# Patient Record
Sex: MALE | Race: WHITE | NOT HISPANIC OR LATINO | Employment: OTHER | URBAN - METROPOLITAN AREA
[De-identification: names, ages, dates, MRNs, and addresses within clinical notes are randomized per-mention and may not be internally consistent; named-entity substitution may affect disease eponyms.]

---

## 2017-01-30 ENCOUNTER — OFFICE VISIT - HEALTHEAST (OUTPATIENT)
Dept: FAMILY MEDICINE | Facility: CLINIC | Age: 67
End: 2017-01-30

## 2017-01-30 ENCOUNTER — RECORDS - HEALTHEAST (OUTPATIENT)
Dept: GENERAL RADIOLOGY | Facility: CLINIC | Age: 67
End: 2017-01-30

## 2017-01-30 DIAGNOSIS — I73.9 PERIPHERAL VASCULAR DISEASE, UNSPECIFIED (H): ICD-10-CM

## 2017-01-30 DIAGNOSIS — H53.9 VISION ABNORMALITIES: ICD-10-CM

## 2017-01-30 DIAGNOSIS — I73.9 CLAUDICATION OF LEFT LOWER EXTREMITY (H): ICD-10-CM

## 2017-01-30 DIAGNOSIS — Z00.00 HEALTHCARE MAINTENANCE: ICD-10-CM

## 2017-01-30 DIAGNOSIS — I10 HYPERTENSION, ESSENTIAL, BENIGN: ICD-10-CM

## 2017-01-30 ASSESSMENT — MIFFLIN-ST. JEOR: SCORE: 1709.27

## 2017-02-02 ENCOUNTER — COMMUNICATION - HEALTHEAST (OUTPATIENT)
Dept: FAMILY MEDICINE | Facility: CLINIC | Age: 67
End: 2017-02-02

## 2017-02-10 ENCOUNTER — HOSPITAL ENCOUNTER (OUTPATIENT)
Dept: ULTRASOUND IMAGING | Facility: CLINIC | Age: 67
Discharge: HOME | End: 2017-02-10
Attending: FAMILY MEDICINE

## 2017-02-10 ENCOUNTER — AMBULATORY - HEALTHEAST (OUTPATIENT)
Dept: FAMILY MEDICINE | Facility: CLINIC | Age: 67
End: 2017-02-10

## 2017-02-10 DIAGNOSIS — I73.9 CLAUDICATION OF LEFT LOWER EXTREMITY (H): ICD-10-CM

## 2017-02-10 DIAGNOSIS — I73.9 PAD (PERIPHERAL ARTERY DISEASE) (H): ICD-10-CM

## 2017-02-11 ENCOUNTER — OFFICE VISIT - HEALTHEAST (OUTPATIENT)
Dept: FAMILY MEDICINE | Facility: CLINIC | Age: 67
End: 2017-02-11

## 2017-02-11 DIAGNOSIS — M10.9 GOUT OF RIGHT HAND: ICD-10-CM

## 2017-02-13 ENCOUNTER — COMMUNICATION - HEALTHEAST (OUTPATIENT)
Dept: FAMILY MEDICINE | Facility: CLINIC | Age: 67
End: 2017-02-13

## 2017-02-27 ENCOUNTER — OFFICE VISIT - HEALTHEAST (OUTPATIENT)
Dept: VASCULAR SURGERY | Facility: CLINIC | Age: 67
End: 2017-02-27

## 2017-02-27 DIAGNOSIS — I73.9 PAD (PERIPHERAL ARTERY DISEASE) (H): ICD-10-CM

## 2017-02-27 DIAGNOSIS — I73.9 CLAUDICATION (H): ICD-10-CM

## 2017-02-27 DIAGNOSIS — Z01.812 PRE-PROCEDURE LAB EXAM: ICD-10-CM

## 2017-02-27 DIAGNOSIS — Z72.0 TOBACCO USE: ICD-10-CM

## 2017-02-27 ASSESSMENT — MIFFLIN-ST. JEOR: SCORE: 1704.73

## 2017-03-01 ENCOUNTER — COMMUNICATION - HEALTHEAST (OUTPATIENT)
Dept: VASCULAR SURGERY | Facility: CLINIC | Age: 67
End: 2017-03-01

## 2017-03-02 ENCOUNTER — COMMUNICATION - HEALTHEAST (OUTPATIENT)
Dept: VASCULAR SURGERY | Facility: CLINIC | Age: 67
End: 2017-03-02

## 2017-03-21 ENCOUNTER — AMBULATORY - HEALTHEAST (OUTPATIENT)
Dept: VASCULAR SURGERY | Facility: CLINIC | Age: 67
End: 2017-03-21

## 2017-03-23 ENCOUNTER — COMMUNICATION - HEALTHEAST (OUTPATIENT)
Dept: FAMILY MEDICINE | Facility: CLINIC | Age: 67
End: 2017-03-23

## 2017-03-23 ENCOUNTER — COMMUNICATION - HEALTHEAST (OUTPATIENT)
Dept: VASCULAR SURGERY | Facility: CLINIC | Age: 67
End: 2017-03-23

## 2017-03-24 ENCOUNTER — AMBULATORY - HEALTHEAST (OUTPATIENT)
Dept: VASCULAR SURGERY | Facility: CLINIC | Age: 67
End: 2017-03-24

## 2017-03-27 ENCOUNTER — OFFICE VISIT - HEALTHEAST (OUTPATIENT)
Dept: VASCULAR SURGERY | Facility: CLINIC | Age: 67
End: 2017-03-27

## 2017-03-27 DIAGNOSIS — I73.9 CLAUDICATION (H): ICD-10-CM

## 2017-03-27 DIAGNOSIS — E78.5 HYPERLIPIDEMIA: ICD-10-CM

## 2017-03-27 DIAGNOSIS — I73.9 PAD (PERIPHERAL ARTERY DISEASE) (H): ICD-10-CM

## 2017-03-27 DIAGNOSIS — Z72.0 TOBACCO USE: ICD-10-CM

## 2017-03-27 DIAGNOSIS — I10 HYPERTENSION: ICD-10-CM

## 2017-03-27 ASSESSMENT — MIFFLIN-ST. JEOR: SCORE: 1704.73

## 2017-04-06 ENCOUNTER — COMMUNICATION - HEALTHEAST (OUTPATIENT)
Dept: FAMILY MEDICINE | Facility: CLINIC | Age: 67
End: 2017-04-06

## 2017-04-06 ENCOUNTER — HOSPITAL ENCOUNTER (OUTPATIENT)
Dept: INTERVENTIONAL RADIOLOGY/VASCULAR | Facility: CLINIC | Age: 67
Discharge: HOME | End: 2017-04-06
Attending: SURGERY

## 2017-04-06 DIAGNOSIS — I73.9 PAD (PERIPHERAL ARTERY DISEASE) (H): ICD-10-CM

## 2017-04-06 DIAGNOSIS — E78.5 HYPERLIPIDEMIA: ICD-10-CM

## 2017-04-06 DIAGNOSIS — I10 ESSENTIAL HYPERTENSION: ICD-10-CM

## 2017-04-06 DIAGNOSIS — I10 HYPERTENSION: ICD-10-CM

## 2017-04-06 DIAGNOSIS — I73.9 CLAUDICATION (H): ICD-10-CM

## 2017-04-06 DIAGNOSIS — Z72.0 TOBACCO USE: ICD-10-CM

## 2017-04-06 LAB
CREAT SERPL-MCNC: 1.46 MG/DL (ref 0.7–1.3)
GFR SERPL CREATININE-BSD FRML MDRD: 48 ML/MIN/1.73M2

## 2017-04-06 ASSESSMENT — MIFFLIN-ST. JEOR: SCORE: 1684.04

## 2017-04-07 ENCOUNTER — COMMUNICATION - HEALTHEAST (OUTPATIENT)
Dept: VASCULAR SURGERY | Facility: CLINIC | Age: 67
End: 2017-04-07

## 2017-04-10 ENCOUNTER — OFFICE VISIT - HEALTHEAST (OUTPATIENT)
Dept: VASCULAR SURGERY | Facility: CLINIC | Age: 67
End: 2017-04-10

## 2017-04-10 DIAGNOSIS — E78.5 HYPERLIPIDEMIA: ICD-10-CM

## 2017-04-10 DIAGNOSIS — I73.9 CLAUDICATION (H): ICD-10-CM

## 2017-04-10 DIAGNOSIS — I73.9 PAD (PERIPHERAL ARTERY DISEASE) (H): ICD-10-CM

## 2017-04-10 DIAGNOSIS — Z72.0 TOBACCO USE: ICD-10-CM

## 2017-04-10 DIAGNOSIS — I10 HYPERTENSION: ICD-10-CM

## 2017-04-10 ASSESSMENT — MIFFLIN-ST. JEOR: SCORE: 1682.05

## 2017-04-11 ENCOUNTER — AMBULATORY - HEALTHEAST (OUTPATIENT)
Dept: VASCULAR SURGERY | Facility: CLINIC | Age: 67
End: 2017-04-11

## 2017-04-13 ENCOUNTER — AMBULATORY - HEALTHEAST (OUTPATIENT)
Dept: VASCULAR SURGERY | Facility: CLINIC | Age: 67
End: 2017-04-13

## 2017-04-18 ENCOUNTER — OFFICE VISIT - HEALTHEAST (OUTPATIENT)
Dept: CARDIOLOGY | Facility: CLINIC | Age: 67
End: 2017-04-18

## 2017-04-18 DIAGNOSIS — I10 HTN (HYPERTENSION): ICD-10-CM

## 2017-04-18 DIAGNOSIS — I42.9 CARDIOMYOPATHY (H): ICD-10-CM

## 2017-04-18 DIAGNOSIS — I25.10 CORONARY ARTERY DISEASE INVOLVING NATIVE CORONARY ARTERY OF NATIVE HEART WITHOUT ANGINA PECTORIS: ICD-10-CM

## 2017-04-18 DIAGNOSIS — E78.5 DYSLIPIDEMIA: ICD-10-CM

## 2017-04-18 LAB
CHOLEST SERPL-MCNC: 209 MG/DL
FASTING STATUS PATIENT QL REPORTED: YES
HDLC SERPL-MCNC: 39 MG/DL
LDLC SERPL CALC-MCNC: 150 MG/DL
TRIGL SERPL-MCNC: 99 MG/DL

## 2017-04-18 ASSESSMENT — MIFFLIN-ST. JEOR: SCORE: 1704.73

## 2017-04-19 ENCOUNTER — AMBULATORY - HEALTHEAST (OUTPATIENT)
Dept: CARDIOLOGY | Facility: CLINIC | Age: 67
End: 2017-04-19

## 2017-04-19 DIAGNOSIS — E78.5 HYPERLIPIDEMIA: ICD-10-CM

## 2017-04-19 DIAGNOSIS — I25.10 CAD (CORONARY ARTERY DISEASE): ICD-10-CM

## 2017-04-26 ENCOUNTER — OFFICE VISIT - HEALTHEAST (OUTPATIENT)
Dept: FAMILY MEDICINE | Facility: CLINIC | Age: 67
End: 2017-04-26

## 2017-04-26 DIAGNOSIS — I73.9 PAD (PERIPHERAL ARTERY DISEASE) (H): ICD-10-CM

## 2017-04-26 DIAGNOSIS — M10.9 GOUTY ARTHROPATHY: ICD-10-CM

## 2017-04-26 DIAGNOSIS — Z01.818 PREOPERATIVE GENERAL PHYSICAL EXAMINATION: ICD-10-CM

## 2017-04-26 DIAGNOSIS — I10 ESSENTIAL HYPERTENSION WITH GOAL BLOOD PRESSURE LESS THAN 130/85: ICD-10-CM

## 2017-04-26 LAB — HBA1C MFR BLD: 5.9 % (ref 3.5–6.1)

## 2017-04-26 ASSESSMENT — MIFFLIN-ST. JEOR: SCORE: 1739.21

## 2017-04-28 ENCOUNTER — COMMUNICATION - HEALTHEAST (OUTPATIENT)
Dept: FAMILY MEDICINE | Facility: CLINIC | Age: 67
End: 2017-04-28

## 2017-04-28 ENCOUNTER — AMBULATORY - HEALTHEAST (OUTPATIENT)
Dept: FAMILY MEDICINE | Facility: CLINIC | Age: 67
End: 2017-04-28

## 2017-04-28 DIAGNOSIS — D72.829 LEUKOCYTOSIS, UNSPECIFIED TYPE: ICD-10-CM

## 2017-05-01 ENCOUNTER — HOSPITAL ENCOUNTER (OUTPATIENT)
Dept: NUCLEAR MEDICINE | Facility: CLINIC | Age: 67
Discharge: HOME | End: 2017-05-01
Attending: INTERNAL MEDICINE

## 2017-05-01 ENCOUNTER — HOSPITAL ENCOUNTER (OUTPATIENT)
Dept: CARDIOLOGY | Facility: CLINIC | Age: 67
Discharge: HOME | End: 2017-05-01
Attending: INTERNAL MEDICINE

## 2017-05-01 DIAGNOSIS — I42.9 CARDIOMYOPATHY (H): ICD-10-CM

## 2017-05-01 DIAGNOSIS — I25.10 CORONARY ARTERY DISEASE INVOLVING NATIVE CORONARY ARTERY OF NATIVE HEART WITHOUT ANGINA PECTORIS: ICD-10-CM

## 2017-05-01 LAB
AORTIC ROOT: 3.7 CM
AORTIC VALVE MEAN VELOCITY: 112 CM/S
AV DIMENSIONLESS INDEX VTI: 0.6
AV MEAN GRADIENT: 6 MMHG
AV PEAK GRADIENT: 9.6 MMHG
AV VALVE AREA: 1.8 CM2
AV VELOCITY RATIO: 0.5
BSA FOR ECHO PROCEDURE: 2.2 M2
CV BLOOD PRESSURE: NORMAL MMHG
CV ECHO HEIGHT: 69 IN
CV ECHO WEIGHT: 219 LBS
CV STRESS CURRENT BP HE: NORMAL
CV STRESS CURRENT HR HE: 65
CV STRESS CURRENT HR HE: 66
CV STRESS CURRENT HR HE: 68
CV STRESS CURRENT HR HE: 70
CV STRESS CURRENT HR HE: 72
CV STRESS CURRENT HR HE: 73
CV STRESS CURRENT HR HE: 73
CV STRESS CURRENT HR HE: 75
CV STRESS CURRENT HR HE: 77
CV STRESS CURRENT HR HE: 77
CV STRESS CURRENT HR HE: 78
CV STRESS DEVIATION TIME HE: NORMAL
CV STRESS ECHO PERCENT HR HE: NORMAL
CV STRESS EXERCISE STAGE HE: NORMAL
CV STRESS FINAL RESTING BP HE: NORMAL
CV STRESS FINAL RESTING HR HE: 68
CV STRESS MAX HR HE: 78
CV STRESS MAX TREADMILL GRADE HE: 0
CV STRESS MAX TREADMILL SPEED HE: 0
CV STRESS PEAK DIA BP HE: NORMAL
CV STRESS PEAK SYS BP HE: NORMAL
CV STRESS PHASE HE: NORMAL
CV STRESS PROTOCOL HE: NORMAL
CV STRESS RESTING PT POSITION HE: NORMAL
CV STRESS ST DEVIATION AMOUNT HE: NORMAL
CV STRESS ST DEVIATION ELEVATION HE: NORMAL
CV STRESS ST EVELATION AMOUNT HE: NORMAL
CV STRESS TEST TYPE HE: NORMAL
CV STRESS TOTAL STAGE TIME MIN 1 HE: NORMAL
DOP CALC AO PEAK VEL: 155 CM/S
DOP CALC AO VTI: 35.2 CM
DOP CALC LVOT AREA: 3.14 CM2
DOP CALC LVOT DIAMETER: 2 CM
DOP CALC LVOT PEAK VEL: 79.4 CM/S
DOP CALC LVOT STROKE VOLUME: 63.4 CM3
DOP CALCLVOT PEAK VEL VTI: 20.2 CM
EJECTION FRACTION: 52 % (ref 55–75)
FRACTIONAL SHORTENING: 30.4 % (ref 28–44)
INTERVENTRICULAR SEPTUM IN END DIASTOLE: 1.47 CM (ref 0.6–1)
IVS/PW RATIO: 1.3
LA AREA 1: 18.8 CM2
LA AREA 2: 22.9 CM2
LEFT ATRIUM LENGTH: 5.44 CM
LEFT ATRIUM SIZE: 4.2 CM
LEFT ATRIUM TO AORTIC ROOT RATIO: 1.08 NO UNITS
LEFT ATRIUM VOLUME INDEX: 30.6 ML/M2
LEFT ATRIUM VOLUME: 67.3 CM3
LEFT VENTRICLE CARDIAC INDEX: 1.8 L/MIN/M2
LEFT VENTRICLE CARDIAC OUTPUT: 4.1 L/MIN
LEFT VENTRICLE DIASTOLIC VOLUME INDEX: 56.4 CM3/M2 (ref 34–74)
LEFT VENTRICLE DIASTOLIC VOLUME: 124 CM3 (ref 62–150)
LEFT VENTRICLE HEART RATE: 64 BPM
LEFT VENTRICLE MASS INDEX: 125.7 G/M2
LEFT VENTRICLE SYSTOLIC VOLUME INDEX: 26.8 CM3/M2 (ref 11–31)
LEFT VENTRICLE SYSTOLIC VOLUME: 59 CM3 (ref 21–61)
LEFT VENTRICULAR INTERNAL DIMENSION IN DIASTOLE: 5.16 CM (ref 4.2–5.8)
LEFT VENTRICULAR INTERNAL DIMENSION IN SYSTOLE: 3.59 CM (ref 2.5–4)
LEFT VENTRICULAR MASS: 276.6 G
LEFT VENTRICULAR OUTFLOW TRACT MEAN GRADIENT: 1 MMHG
LEFT VENTRICULAR OUTFLOW TRACT MEAN VELOCITY: 51.5 CM/S
LEFT VENTRICULAR OUTFLOW TRACT PEAK GRADIENT: 3 MMHG
LEFT VENTRICULAR POSTERIOR WALL IN END DIASTOLE: 1.14 CM (ref 0.6–1)
LV STROKE VOLUME INDEX: 28.8 ML/M2
MITRAL VALVE E/A RATIO: 1.6
MV AVERAGE E/E' RATIO: 13.6 CM/S
MV DECELERATION TIME: 194 MS
MV E'TISSUE VEL-LAT: 7.22 CM/S
MV E'TISSUE VEL-MED: 12.1 CM/S
MV LATERAL E/E' RATIO: 18.1
MV MEDIAL E/E' RATIO: 10.8
MV PEAK A VELOCITY: 81.7 CM/S
MV PEAK E VELOCITY: 131 CM/S
NUC REST DIASTOLIC VOLUME INDEX: 3504 LBS
NUC REST SYSTOLIC VOLUME INDEX: 69 IN
NUC STRESS EJECTION FRACTION: 40 %
STRESS ECHO BASELINE BP: NORMAL
STRESS ECHO BASELINE HR: 64
STRESS ECHO CALCULATED PERCENT HR: 51 %
STRESS ECHO LAST STRESS BP: NORMAL
STRESS ECHO LAST STRESS HR: 78
TRICUSPID VALVE ANULAR PLANE SYSTOLIC EXCURSION: 2.4 CM

## 2017-05-01 ASSESSMENT — MIFFLIN-ST. JEOR: SCORE: 1738.76

## 2017-05-02 ENCOUNTER — COMMUNICATION - HEALTHEAST (OUTPATIENT)
Dept: CARDIOLOGY | Facility: CLINIC | Age: 67
End: 2017-05-02

## 2017-05-17 ENCOUNTER — SURGERY - HEALTHEAST (OUTPATIENT)
Dept: SURGERY | Facility: CLINIC | Age: 67
End: 2017-05-17

## 2017-05-17 ENCOUNTER — ANESTHESIA - HEALTHEAST (OUTPATIENT)
Dept: SURGERY | Facility: CLINIC | Age: 67
End: 2017-05-17

## 2017-05-17 ASSESSMENT — MIFFLIN-ST. JEOR: SCORE: 1678.08

## 2017-05-18 ASSESSMENT — MIFFLIN-ST. JEOR: SCORE: 1744.65

## 2017-05-19 ASSESSMENT — MIFFLIN-ST. JEOR: SCORE: 1746.01

## 2017-05-20 ASSESSMENT — MIFFLIN-ST. JEOR: SCORE: 1647.75

## 2017-05-21 ASSESSMENT — MIFFLIN-ST. JEOR: SCORE: 1653.02

## 2017-05-22 ASSESSMENT — MIFFLIN-ST. JEOR: SCORE: 1799.99

## 2017-05-23 ASSESSMENT — MIFFLIN-ST. JEOR: SCORE: 1817.68

## 2017-05-24 ASSESSMENT — MIFFLIN-ST. JEOR: SCORE: 1800.9

## 2017-05-25 ASSESSMENT — MIFFLIN-ST. JEOR: SCORE: 1806.79

## 2017-05-26 ASSESSMENT — MIFFLIN-ST. JEOR: SCORE: 1750.55

## 2017-05-27 ENCOUNTER — COMMUNICATION - HEALTHEAST (OUTPATIENT)
Dept: SCHEDULING | Facility: CLINIC | Age: 67
End: 2017-05-27

## 2017-05-31 ENCOUNTER — OFFICE VISIT - HEALTHEAST (OUTPATIENT)
Dept: GERIATRICS | Facility: CLINIC | Age: 67
End: 2017-05-31

## 2017-05-31 DIAGNOSIS — M79.606 ISCHEMIC REST PAIN OF LOWER EXTREMITY: ICD-10-CM

## 2017-05-31 DIAGNOSIS — I10 HYPERTENSION: ICD-10-CM

## 2017-05-31 DIAGNOSIS — I25.5 ISCHEMIC CARDIOMYOPATHY: ICD-10-CM

## 2017-05-31 DIAGNOSIS — N18.3 CKD (CHRONIC KIDNEY DISEASE), STAGE 3 (MODERATE): ICD-10-CM

## 2017-05-31 DIAGNOSIS — I99.8 ISCHEMIC REST PAIN OF LOWER EXTREMITY: ICD-10-CM

## 2017-06-05 ENCOUNTER — OFFICE VISIT - HEALTHEAST (OUTPATIENT)
Dept: GERIATRICS | Facility: CLINIC | Age: 67
End: 2017-06-05

## 2017-06-05 DIAGNOSIS — I99.8 ISCHEMIC REST PAIN OF LOWER EXTREMITY: ICD-10-CM

## 2017-06-05 DIAGNOSIS — I10 HYPERTENSION: ICD-10-CM

## 2017-06-05 DIAGNOSIS — M79.606 ISCHEMIC REST PAIN OF LOWER EXTREMITY: ICD-10-CM

## 2017-06-05 DIAGNOSIS — N18.3 CKD (CHRONIC KIDNEY DISEASE), STAGE 3 (MODERATE): ICD-10-CM

## 2017-06-05 DIAGNOSIS — G89.18 POST-OP PAIN: ICD-10-CM

## 2017-06-05 DIAGNOSIS — M1A.00X0 CHRONIC GOUTY ARTHRITIS: ICD-10-CM

## 2017-06-05 DIAGNOSIS — I25.5 ISCHEMIC CARDIOMYOPATHY: ICD-10-CM

## 2017-06-06 ENCOUNTER — HOME CARE/HOSPICE - HEALTHEAST (OUTPATIENT)
Dept: HOME HEALTH SERVICES | Facility: HOME HEALTH | Age: 67
End: 2017-06-06

## 2017-06-07 ENCOUNTER — COMMUNICATION - HEALTHEAST (OUTPATIENT)
Dept: GERIATRICS | Facility: CLINIC | Age: 67
End: 2017-06-07

## 2017-06-07 ENCOUNTER — HOME CARE/HOSPICE - HEALTHEAST (OUTPATIENT)
Dept: HOME HEALTH SERVICES | Facility: HOME HEALTH | Age: 67
End: 2017-06-07

## 2017-06-07 ENCOUNTER — COMMUNICATION - HEALTHEAST (OUTPATIENT)
Dept: FAMILY MEDICINE | Facility: CLINIC | Age: 67
End: 2017-06-07

## 2017-06-07 ENCOUNTER — AMBULATORY - HEALTHEAST (OUTPATIENT)
Dept: GERIATRICS | Facility: CLINIC | Age: 67
End: 2017-06-07

## 2017-06-07 DIAGNOSIS — L03.90 CELLULITIS: ICD-10-CM

## 2017-06-09 ENCOUNTER — HOME CARE/HOSPICE - HEALTHEAST (OUTPATIENT)
Dept: HOME HEALTH SERVICES | Facility: HOME HEALTH | Age: 67
End: 2017-06-09

## 2017-06-09 ENCOUNTER — OFFICE VISIT - HEALTHEAST (OUTPATIENT)
Dept: VASCULAR SURGERY | Facility: CLINIC | Age: 67
End: 2017-06-09

## 2017-06-09 DIAGNOSIS — I73.9 PAD (PERIPHERAL ARTERY DISEASE) (H): ICD-10-CM

## 2017-06-09 ASSESSMENT — MIFFLIN-ST. JEOR: SCORE: 1727.41

## 2017-06-10 ENCOUNTER — HOME CARE/HOSPICE - HEALTHEAST (OUTPATIENT)
Dept: HOME HEALTH SERVICES | Facility: HOME HEALTH | Age: 67
End: 2017-06-10

## 2017-06-12 ENCOUNTER — OFFICE VISIT - HEALTHEAST (OUTPATIENT)
Dept: FAMILY MEDICINE | Facility: CLINIC | Age: 67
End: 2017-06-12

## 2017-06-12 DIAGNOSIS — M10.9 ACUTE GOUT OF RIGHT KNEE, UNSPECIFIED CAUSE: ICD-10-CM

## 2017-06-12 DIAGNOSIS — Z95.828 S/P AORTOBIFEMORAL BYPASS SURGERY: ICD-10-CM

## 2017-06-13 ENCOUNTER — HOME CARE/HOSPICE - HEALTHEAST (OUTPATIENT)
Dept: HOME HEALTH SERVICES | Facility: HOME HEALTH | Age: 67
End: 2017-06-13

## 2017-06-13 ENCOUNTER — COMMUNICATION - HEALTHEAST (OUTPATIENT)
Dept: FAMILY MEDICINE | Facility: CLINIC | Age: 67
End: 2017-06-13

## 2017-06-13 DIAGNOSIS — M10.9 ACUTE GOUT OF RIGHT KNEE, UNSPECIFIED CAUSE: ICD-10-CM

## 2017-06-16 ENCOUNTER — HOME CARE/HOSPICE - HEALTHEAST (OUTPATIENT)
Dept: HOME HEALTH SERVICES | Facility: HOME HEALTH | Age: 67
End: 2017-06-16

## 2017-06-16 ENCOUNTER — AMBULATORY - HEALTHEAST (OUTPATIENT)
Dept: FAMILY MEDICINE | Facility: CLINIC | Age: 67
End: 2017-06-16

## 2017-06-16 DIAGNOSIS — T81.49XA WOUND INFECTION AFTER SURGERY: ICD-10-CM

## 2017-06-19 ENCOUNTER — HOME CARE/HOSPICE - HEALTHEAST (OUTPATIENT)
Dept: HOME HEALTH SERVICES | Facility: HOME HEALTH | Age: 67
End: 2017-06-19

## 2017-06-20 ENCOUNTER — HOME CARE/HOSPICE - HEALTHEAST (OUTPATIENT)
Dept: HOME HEALTH SERVICES | Facility: HOME HEALTH | Age: 67
End: 2017-06-20

## 2017-07-06 ENCOUNTER — COMMUNICATION - HEALTHEAST (OUTPATIENT)
Dept: VASCULAR SURGERY | Facility: CLINIC | Age: 67
End: 2017-07-06

## 2017-07-12 ENCOUNTER — OFFICE VISIT - HEALTHEAST (OUTPATIENT)
Dept: FAMILY MEDICINE | Facility: CLINIC | Age: 67
End: 2017-07-12

## 2017-07-12 ENCOUNTER — RECORDS - HEALTHEAST (OUTPATIENT)
Dept: ADMINISTRATIVE | Facility: OTHER | Age: 67
End: 2017-07-12

## 2017-07-12 DIAGNOSIS — R10.9 ABDOMINAL PAIN: ICD-10-CM

## 2017-07-12 DIAGNOSIS — G89.29 CHRONIC PAIN OF MULTIPLE JOINTS: ICD-10-CM

## 2017-07-12 DIAGNOSIS — R11.0 NAUSEA: ICD-10-CM

## 2017-07-12 DIAGNOSIS — M25.50 CHRONIC PAIN OF MULTIPLE JOINTS: ICD-10-CM

## 2017-07-13 ENCOUNTER — COMMUNICATION - HEALTHEAST (OUTPATIENT)
Dept: FAMILY MEDICINE | Facility: CLINIC | Age: 67
End: 2017-07-13

## 2017-07-14 ENCOUNTER — OFFICE VISIT - HEALTHEAST (OUTPATIENT)
Dept: FAMILY MEDICINE | Facility: CLINIC | Age: 67
End: 2017-07-14

## 2017-07-14 DIAGNOSIS — K59.03 DRUG-INDUCED CONSTIPATION: ICD-10-CM

## 2017-07-14 DIAGNOSIS — M10.9 ACUTE GOUT OF LEFT WRIST, UNSPECIFIED CAUSE: ICD-10-CM

## 2017-07-15 ENCOUNTER — COMMUNICATION - HEALTHEAST (OUTPATIENT)
Dept: FAMILY MEDICINE | Facility: CLINIC | Age: 67
End: 2017-07-15

## 2017-07-15 ENCOUNTER — AMBULATORY - HEALTHEAST (OUTPATIENT)
Dept: FAMILY MEDICINE | Facility: CLINIC | Age: 67
End: 2017-07-15

## 2017-07-21 ENCOUNTER — COMMUNICATION - HEALTHEAST (OUTPATIENT)
Dept: FAMILY MEDICINE | Facility: CLINIC | Age: 67
End: 2017-07-21

## 2017-07-21 DIAGNOSIS — I10 HYPERTENSION, ESSENTIAL, BENIGN: ICD-10-CM

## 2017-07-28 ENCOUNTER — OFFICE VISIT - HEALTHEAST (OUTPATIENT)
Dept: VASCULAR SURGERY | Facility: CLINIC | Age: 67
End: 2017-07-28

## 2017-07-28 DIAGNOSIS — I73.9 PAD (PERIPHERAL ARTERY DISEASE) (H): ICD-10-CM

## 2017-07-28 DIAGNOSIS — Z95.828 S/P AORTOBIFEMORAL BYPASS SURGERY: ICD-10-CM

## 2017-07-28 DIAGNOSIS — M10.9 GOUT: ICD-10-CM

## 2017-08-11 ENCOUNTER — OFFICE VISIT - HEALTHEAST (OUTPATIENT)
Dept: RHEUMATOLOGY | Facility: CLINIC | Age: 67
End: 2017-08-11

## 2017-08-11 ENCOUNTER — RECORDS - HEALTHEAST (OUTPATIENT)
Dept: GENERAL RADIOLOGY | Age: 67
End: 2017-08-11

## 2017-08-11 DIAGNOSIS — M10.9 ACUTE GOUT OF RIGHT FOOT: ICD-10-CM

## 2017-08-11 DIAGNOSIS — M10.9 GOUT, UNSPECIFIED: ICD-10-CM

## 2017-08-11 DIAGNOSIS — M10.9 ACUTE GOUT OF RIGHT KNEE, UNSPECIFIED CAUSE: ICD-10-CM

## 2017-08-11 DIAGNOSIS — M13.0 POLYARTHRITIS: ICD-10-CM

## 2017-08-11 DIAGNOSIS — M1A.09X1 IDIOPATHIC CHRONIC GOUT, MULTIPLE SITES, WITH TOPHUS (TOPHI): ICD-10-CM

## 2017-08-11 DIAGNOSIS — M13.0 POLYARTHRITIS, UNSPECIFIED: ICD-10-CM

## 2017-08-11 ASSESSMENT — MIFFLIN-ST. JEOR: SCORE: 1652.12

## 2017-08-14 LAB
ANA SER QL: 0.4 U
HCV AB SERPL QL IA: POSITIVE

## 2017-08-16 ENCOUNTER — COMMUNICATION - HEALTHEAST (OUTPATIENT)
Dept: RHEUMATOLOGY | Facility: CLINIC | Age: 67
End: 2017-08-16

## 2017-08-16 DIAGNOSIS — M10.9 ACUTE GOUT OF RIGHT KNEE, UNSPECIFIED CAUSE: ICD-10-CM

## 2017-08-16 DIAGNOSIS — M1A.09X1 IDIOPATHIC CHRONIC GOUT, MULTIPLE SITES, WITH TOPHUS (TOPHI): ICD-10-CM

## 2017-08-30 ENCOUNTER — COMMUNICATION - HEALTHEAST (OUTPATIENT)
Dept: VASCULAR SURGERY | Facility: CLINIC | Age: 67
End: 2017-08-30

## 2017-09-08 ENCOUNTER — OFFICE VISIT - HEALTHEAST (OUTPATIENT)
Dept: FAMILY MEDICINE | Facility: CLINIC | Age: 67
End: 2017-09-08

## 2017-09-08 DIAGNOSIS — F32.89 DEPRESSIVE DISORDER, NOT ELSEWHERE CLASSIFIED: ICD-10-CM

## 2017-09-08 DIAGNOSIS — E78.5 HYPERLIPIDEMIA, UNSPECIFIED HYPERLIPIDEMIA TYPE: ICD-10-CM

## 2017-09-08 DIAGNOSIS — I10 HYPERTENSION, ESSENTIAL, BENIGN: ICD-10-CM

## 2017-09-08 DIAGNOSIS — I25.10 CAD (CORONARY ARTERY DISEASE): ICD-10-CM

## 2017-09-08 DIAGNOSIS — M10.9 ACUTE GOUT OF RIGHT FOOT: ICD-10-CM

## 2017-09-08 DIAGNOSIS — Z23 NEED FOR IMMUNIZATION AGAINST INFLUENZA: ICD-10-CM

## 2017-09-08 DIAGNOSIS — G47.00 INSOMNIA, UNSPECIFIED TYPE: ICD-10-CM

## 2017-10-07 ENCOUNTER — OFFICE VISIT - HEALTHEAST (OUTPATIENT)
Dept: FAMILY MEDICINE | Facility: CLINIC | Age: 67
End: 2017-10-07

## 2017-10-07 DIAGNOSIS — H10.9 BACTERIAL CONJUNCTIVITIS: ICD-10-CM

## 2017-11-13 ENCOUNTER — COMMUNICATION - HEALTHEAST (OUTPATIENT)
Dept: FAMILY MEDICINE | Facility: CLINIC | Age: 67
End: 2017-11-13

## 2017-11-13 DIAGNOSIS — I10 ESSENTIAL HYPERTENSION WITH GOAL BLOOD PRESSURE LESS THAN 130/85: ICD-10-CM

## 2017-11-14 ENCOUNTER — COMMUNICATION - HEALTHEAST (OUTPATIENT)
Dept: FAMILY MEDICINE | Facility: CLINIC | Age: 67
End: 2017-11-14

## 2017-11-14 DIAGNOSIS — I10 ESSENTIAL HYPERTENSION WITH GOAL BLOOD PRESSURE LESS THAN 130/85: ICD-10-CM

## 2017-12-12 ENCOUNTER — COMMUNICATION - HEALTHEAST (OUTPATIENT)
Dept: RHEUMATOLOGY | Facility: CLINIC | Age: 67
End: 2017-12-12

## 2017-12-12 DIAGNOSIS — M10.9 ACUTE GOUT OF RIGHT KNEE, UNSPECIFIED CAUSE: ICD-10-CM

## 2017-12-12 DIAGNOSIS — M1A.09X1 IDIOPATHIC CHRONIC GOUT, MULTIPLE SITES, WITH TOPHUS (TOPHI): ICD-10-CM

## 2017-12-12 DIAGNOSIS — M10.9 ACUTE GOUT OF RIGHT FOOT: ICD-10-CM

## 2017-12-12 DIAGNOSIS — M10.9 GOUTY ARTHROPATHY: ICD-10-CM

## 2018-01-15 ENCOUNTER — OFFICE VISIT - HEALTHEAST (OUTPATIENT)
Dept: FAMILY MEDICINE | Facility: CLINIC | Age: 68
End: 2018-01-15

## 2018-01-15 DIAGNOSIS — E78.5 HYPERLIPIDEMIA, UNSPECIFIED HYPERLIPIDEMIA TYPE: ICD-10-CM

## 2018-01-15 DIAGNOSIS — M10.9 GOUTY ARTHROPATHY: ICD-10-CM

## 2018-01-15 DIAGNOSIS — M1A.09X1 IDIOPATHIC CHRONIC GOUT, MULTIPLE SITES, WITH TOPHUS (TOPHI): ICD-10-CM

## 2018-01-15 DIAGNOSIS — M10.9 ACUTE GOUT OF RIGHT KNEE, UNSPECIFIED CAUSE: ICD-10-CM

## 2018-01-15 DIAGNOSIS — Z23 NEED FOR IMMUNIZATION AGAINST INFLUENZA: ICD-10-CM

## 2018-01-15 DIAGNOSIS — I10 HYPERTENSION, ESSENTIAL, BENIGN: ICD-10-CM

## 2018-01-15 DIAGNOSIS — F32.A DEPRESSION: ICD-10-CM

## 2018-01-16 ENCOUNTER — AMBULATORY - HEALTHEAST (OUTPATIENT)
Dept: LAB | Facility: CLINIC | Age: 68
End: 2018-01-16

## 2018-01-16 ENCOUNTER — COMMUNICATION - HEALTHEAST (OUTPATIENT)
Dept: FAMILY MEDICINE | Facility: CLINIC | Age: 68
End: 2018-01-16

## 2018-01-16 DIAGNOSIS — M1A.09X1 IDIOPATHIC CHRONIC GOUT, MULTIPLE SITES, WITH TOPHUS (TOPHI): ICD-10-CM

## 2018-01-16 DIAGNOSIS — E78.5 HYPERLIPIDEMIA, UNSPECIFIED HYPERLIPIDEMIA TYPE: ICD-10-CM

## 2018-01-16 LAB
ALBUMIN SERPL-MCNC: 4 G/DL (ref 3.5–5)
ALP SERPL-CCNC: 132 U/L (ref 45–120)
ALT SERPL W P-5'-P-CCNC: 24 U/L (ref 0–45)
ANION GAP SERPL CALCULATED.3IONS-SCNC: 11 MMOL/L (ref 5–18)
AST SERPL W P-5'-P-CCNC: 27 U/L (ref 0–40)
BILIRUB SERPL-MCNC: 0.9 MG/DL (ref 0–1)
BUN SERPL-MCNC: 16 MG/DL (ref 8–22)
CALCIUM SERPL-MCNC: 9.8 MG/DL (ref 8.5–10.5)
CHLORIDE BLD-SCNC: 105 MMOL/L (ref 98–107)
CHOLEST SERPL-MCNC: 109 MG/DL
CO2 SERPL-SCNC: 28 MMOL/L (ref 22–31)
CREAT SERPL-MCNC: 1.31 MG/DL (ref 0.7–1.3)
FASTING STATUS PATIENT QL REPORTED: YES
GFR SERPL CREATININE-BSD FRML MDRD: 55 ML/MIN/1.73M2
GLUCOSE BLD-MCNC: 97 MG/DL (ref 70–125)
HDLC SERPL-MCNC: 45 MG/DL
LDLC SERPL CALC-MCNC: 54 MG/DL
POTASSIUM BLD-SCNC: 4 MMOL/L (ref 3.5–5)
PROT SERPL-MCNC: 6.9 G/DL (ref 6–8)
SODIUM SERPL-SCNC: 144 MMOL/L (ref 136–145)
TRIGL SERPL-MCNC: 51 MG/DL

## 2018-01-17 ENCOUNTER — COMMUNICATION - HEALTHEAST (OUTPATIENT)
Dept: FAMILY MEDICINE | Facility: CLINIC | Age: 68
End: 2018-01-17

## 2018-01-22 ENCOUNTER — COMMUNICATION - HEALTHEAST (OUTPATIENT)
Dept: RHEUMATOLOGY | Facility: CLINIC | Age: 68
End: 2018-01-22

## 2018-01-22 DIAGNOSIS — M10.9 ACUTE GOUT OF RIGHT KNEE, UNSPECIFIED CAUSE: ICD-10-CM

## 2018-01-25 ENCOUNTER — COMMUNICATION - HEALTHEAST (OUTPATIENT)
Dept: FAMILY MEDICINE | Facility: CLINIC | Age: 68
End: 2018-01-25

## 2018-01-26 ENCOUNTER — RECORDS - HEALTHEAST (OUTPATIENT)
Dept: ADMINISTRATIVE | Facility: OTHER | Age: 68
End: 2018-01-26

## 2018-02-06 ENCOUNTER — COMMUNICATION - HEALTHEAST (OUTPATIENT)
Dept: FAMILY MEDICINE | Facility: CLINIC | Age: 68
End: 2018-02-06

## 2018-02-06 DIAGNOSIS — F32.A DEPRESSION: ICD-10-CM

## 2018-02-14 ENCOUNTER — COMMUNICATION - HEALTHEAST (OUTPATIENT)
Dept: FAMILY MEDICINE | Facility: CLINIC | Age: 68
End: 2018-02-14

## 2018-03-24 ENCOUNTER — COMMUNICATION - HEALTHEAST (OUTPATIENT)
Dept: SCHEDULING | Facility: CLINIC | Age: 68
End: 2018-03-24

## 2018-03-27 ENCOUNTER — OFFICE VISIT - HEALTHEAST (OUTPATIENT)
Dept: FAMILY MEDICINE | Facility: CLINIC | Age: 68
End: 2018-03-27

## 2018-03-27 DIAGNOSIS — Z12.5 SCREENING FOR MALIGNANT NEOPLASM OF PROSTATE: ICD-10-CM

## 2018-03-27 DIAGNOSIS — Z12.11 SCREEN FOR COLON CANCER: ICD-10-CM

## 2018-03-27 DIAGNOSIS — H53.121 VISUAL LOSS, TRANSIENT, RIGHT: ICD-10-CM

## 2018-03-27 DIAGNOSIS — Z13.220 ENCOUNTER FOR SCREENING FOR LIPOID DISORDERS: ICD-10-CM

## 2018-03-27 DIAGNOSIS — Z00.01 ENCOUNTER FOR GENERAL ADULT MEDICAL EXAMINATION WITH ABNORMAL FINDINGS: ICD-10-CM

## 2018-03-27 DIAGNOSIS — M10.9 ACUTE GOUT OF RIGHT KNEE, UNSPECIFIED CAUSE: ICD-10-CM

## 2018-03-27 LAB
ALBUMIN SERPL-MCNC: 3.8 G/DL (ref 3.5–5)
ALP SERPL-CCNC: 115 U/L (ref 45–120)
ALT SERPL W P-5'-P-CCNC: 31 U/L (ref 0–45)
ANION GAP SERPL CALCULATED.3IONS-SCNC: 11 MMOL/L (ref 5–18)
AST SERPL W P-5'-P-CCNC: 27 U/L (ref 0–40)
BILIRUB SERPL-MCNC: 0.6 MG/DL (ref 0–1)
BUN SERPL-MCNC: 14 MG/DL (ref 8–22)
CALCIUM SERPL-MCNC: 9.7 MG/DL (ref 8.5–10.5)
CHLORIDE BLD-SCNC: 104 MMOL/L (ref 98–107)
CHOLEST SERPL-MCNC: 113 MG/DL
CO2 SERPL-SCNC: 28 MMOL/L (ref 22–31)
CREAT SERPL-MCNC: 1.2 MG/DL (ref 0.7–1.3)
FASTING STATUS PATIENT QL REPORTED: NO
GFR SERPL CREATININE-BSD FRML MDRD: 60 ML/MIN/1.73M2
GLUCOSE BLD-MCNC: 72 MG/DL (ref 70–125)
HDLC SERPL-MCNC: 46 MG/DL
LDLC SERPL CALC-MCNC: 52 MG/DL
POTASSIUM BLD-SCNC: 4 MMOL/L (ref 3.5–5)
PROT SERPL-MCNC: 6.8 G/DL (ref 6–8)
PSA SERPL-MCNC: 0.4 NG/ML (ref 0–4.5)
SODIUM SERPL-SCNC: 143 MMOL/L (ref 136–145)
TRIGL SERPL-MCNC: 73 MG/DL

## 2018-03-28 ENCOUNTER — COMMUNICATION - HEALTHEAST (OUTPATIENT)
Dept: FAMILY MEDICINE | Facility: CLINIC | Age: 68
End: 2018-03-28

## 2018-03-28 DIAGNOSIS — F32.A DEPRESSION: ICD-10-CM

## 2018-04-02 ENCOUNTER — HOSPITAL ENCOUNTER (OUTPATIENT)
Dept: ULTRASOUND IMAGING | Facility: CLINIC | Age: 68
Discharge: HOME | End: 2018-04-02
Attending: FAMILY MEDICINE

## 2018-04-02 ENCOUNTER — HOSPITAL ENCOUNTER (OUTPATIENT)
Dept: MRI IMAGING | Facility: CLINIC | Age: 68
Discharge: HOME | End: 2018-04-02
Attending: FAMILY MEDICINE

## 2018-04-02 DIAGNOSIS — H53.121 VISUAL LOSS, TRANSIENT, RIGHT: ICD-10-CM

## 2018-04-03 ENCOUNTER — AMBULATORY - HEALTHEAST (OUTPATIENT)
Dept: FAMILY MEDICINE | Facility: CLINIC | Age: 68
End: 2018-04-03

## 2018-04-03 DIAGNOSIS — I65.21 CAROTID STENOSIS, RIGHT: ICD-10-CM

## 2018-04-04 ENCOUNTER — AMBULATORY - HEALTHEAST (OUTPATIENT)
Dept: VASCULAR SURGERY | Facility: CLINIC | Age: 68
End: 2018-04-04

## 2018-04-04 DIAGNOSIS — I65.23 CAROTID STENOSIS, ASYMPTOMATIC, BILATERAL: ICD-10-CM

## 2018-04-09 ENCOUNTER — RECORDS - HEALTHEAST (OUTPATIENT)
Dept: ADMINISTRATIVE | Facility: OTHER | Age: 68
End: 2018-04-09

## 2018-04-09 ENCOUNTER — OFFICE VISIT - HEALTHEAST (OUTPATIENT)
Dept: VASCULAR SURGERY | Facility: CLINIC | Age: 68
End: 2018-04-09

## 2018-04-09 ENCOUNTER — COMMUNICATION - HEALTHEAST (OUTPATIENT)
Dept: FAMILY MEDICINE | Facility: CLINIC | Age: 68
End: 2018-04-09

## 2018-04-09 DIAGNOSIS — G45.3 AMAUROSIS FUGAX, RIGHT EYE: ICD-10-CM

## 2018-04-09 DIAGNOSIS — I65.21 SYMPTOMATIC CAROTID ARTERY STENOSIS, RIGHT: ICD-10-CM

## 2018-04-09 DIAGNOSIS — Z87.891 HISTORY OF TOBACCO ABUSE: ICD-10-CM

## 2018-04-09 DIAGNOSIS — I73.9 PAD (PERIPHERAL ARTERY DISEASE) (H): ICD-10-CM

## 2018-04-10 ENCOUNTER — OFFICE VISIT - HEALTHEAST (OUTPATIENT)
Dept: FAMILY MEDICINE | Facility: CLINIC | Age: 68
End: 2018-04-10

## 2018-04-10 ENCOUNTER — RECORDS - HEALTHEAST (OUTPATIENT)
Dept: ADMINISTRATIVE | Facility: OTHER | Age: 68
End: 2018-04-10

## 2018-04-10 DIAGNOSIS — Z01.818 PREOPERATIVE GENERAL PHYSICAL EXAMINATION: ICD-10-CM

## 2018-04-10 DIAGNOSIS — I65.21 CAROTID STENOSIS, RIGHT: ICD-10-CM

## 2018-04-10 LAB
ATRIAL RATE - MUSE: 63 BPM
DIASTOLIC BLOOD PRESSURE - MUSE: NORMAL MMHG
ERYTHROCYTE [DISTWIDTH] IN BLOOD BY AUTOMATED COUNT: 14.3 % (ref 11–14.5)
HCT VFR BLD AUTO: 42.8 % (ref 40–54)
HGB BLD-MCNC: 14.4 G/DL (ref 14–18)
INR PPP: 1.09 (ref 0.9–1.1)
INTERPRETATION ECG - MUSE: NORMAL
MCH RBC QN AUTO: 29.8 PG (ref 27–34)
MCHC RBC AUTO-ENTMCNC: 33.7 G/DL (ref 32–36)
MCV RBC AUTO: 88 FL (ref 80–100)
P AXIS - MUSE: -21 DEGREES
PLATELET # BLD AUTO: 222 THOU/UL (ref 140–440)
PMV BLD AUTO: 9.1 FL (ref 7–10)
PR INTERVAL - MUSE: 200 MS
QRS DURATION - MUSE: 116 MS
QT - MUSE: 422 MS
QTC - MUSE: 431 MS
R AXIS - MUSE: -50 DEGREES
RBC # BLD AUTO: 4.83 MILL/UL (ref 4.4–6.2)
SYSTOLIC BLOOD PRESSURE - MUSE: NORMAL MMHG
T AXIS - MUSE: 47 DEGREES
VENTRICULAR RATE- MUSE: 63 BPM
WBC: 11.8 THOU/UL (ref 4–11)

## 2018-04-10 ASSESSMENT — MIFFLIN-ST. JEOR: SCORE: 1584.08

## 2018-04-11 ENCOUNTER — COMMUNICATION - HEALTHEAST (OUTPATIENT)
Dept: FAMILY MEDICINE | Facility: CLINIC | Age: 68
End: 2018-04-11

## 2018-04-12 ENCOUNTER — SURGERY - HEALTHEAST (OUTPATIENT)
Dept: SURGERY | Facility: CLINIC | Age: 68
End: 2018-04-12

## 2018-04-12 ENCOUNTER — ANESTHESIA - HEALTHEAST (OUTPATIENT)
Dept: SURGERY | Facility: CLINIC | Age: 68
End: 2018-04-12

## 2018-04-12 ASSESSMENT — MIFFLIN-ST. JEOR: SCORE: 1569.22

## 2018-04-13 ASSESSMENT — MIFFLIN-ST. JEOR: SCORE: 1568.65

## 2018-04-16 ENCOUNTER — COMMUNICATION - HEALTHEAST (OUTPATIENT)
Dept: NURSING | Facility: CLINIC | Age: 68
End: 2018-04-16

## 2018-04-16 ENCOUNTER — AMBULATORY - HEALTHEAST (OUTPATIENT)
Dept: CARE COORDINATION | Facility: CLINIC | Age: 68
End: 2018-04-16

## 2018-04-16 DIAGNOSIS — I25.10 CAD (CORONARY ARTERY DISEASE): ICD-10-CM

## 2018-04-16 DIAGNOSIS — N18.9 CKD (CHRONIC KIDNEY DISEASE): ICD-10-CM

## 2018-04-20 ENCOUNTER — COMMUNICATION - HEALTHEAST (OUTPATIENT)
Dept: FAMILY MEDICINE | Facility: CLINIC | Age: 68
End: 2018-04-20

## 2018-04-20 ENCOUNTER — OFFICE VISIT - HEALTHEAST (OUTPATIENT)
Dept: FAMILY MEDICINE | Facility: CLINIC | Age: 68
End: 2018-04-20

## 2018-04-20 DIAGNOSIS — M1A.09X1 IDIOPATHIC CHRONIC GOUT, MULTIPLE SITES, WITH TOPHUS (TOPHI): ICD-10-CM

## 2018-04-20 DIAGNOSIS — F32.A DEPRESSION: ICD-10-CM

## 2018-04-20 DIAGNOSIS — Z98.890 S/P CAROTID ENDARTERECTOMY: ICD-10-CM

## 2018-04-20 DIAGNOSIS — I65.21 SYMPTOMATIC STENOSIS OF RIGHT CAROTID ARTERY: ICD-10-CM

## 2018-04-23 ENCOUNTER — OFFICE VISIT - HEALTHEAST (OUTPATIENT)
Dept: VASCULAR SURGERY | Facility: CLINIC | Age: 68
End: 2018-04-23

## 2018-04-23 DIAGNOSIS — I65.23 CAROTID STENOSIS, ASYMPTOMATIC, BILATERAL: ICD-10-CM

## 2018-04-23 DIAGNOSIS — I73.9 PAD (PERIPHERAL ARTERY DISEASE) (H): ICD-10-CM

## 2018-04-26 ENCOUNTER — COMMUNICATION - HEALTHEAST (OUTPATIENT)
Dept: NURSING | Facility: CLINIC | Age: 68
End: 2018-04-26

## 2018-05-04 ENCOUNTER — COMMUNICATION - HEALTHEAST (OUTPATIENT)
Dept: NURSING | Facility: CLINIC | Age: 68
End: 2018-05-04

## 2018-05-06 ENCOUNTER — COMMUNICATION - HEALTHEAST (OUTPATIENT)
Dept: FAMILY MEDICINE | Facility: CLINIC | Age: 68
End: 2018-05-06

## 2018-05-06 DIAGNOSIS — F32.A DEPRESSION: ICD-10-CM

## 2018-05-06 DIAGNOSIS — G47.00 INSOMNIA, UNSPECIFIED TYPE: ICD-10-CM

## 2018-05-10 ENCOUNTER — COMMUNICATION - HEALTHEAST (OUTPATIENT)
Dept: ADMINISTRATIVE | Facility: CLINIC | Age: 68
End: 2018-05-10

## 2018-05-25 ENCOUNTER — RECORDS - HEALTHEAST (OUTPATIENT)
Dept: ADMINISTRATIVE | Facility: OTHER | Age: 68
End: 2018-05-25

## 2018-05-25 ENCOUNTER — COMMUNICATION - HEALTHEAST (OUTPATIENT)
Dept: FAMILY MEDICINE | Facility: CLINIC | Age: 68
End: 2018-05-25

## 2018-06-17 ENCOUNTER — COMMUNICATION - HEALTHEAST (OUTPATIENT)
Dept: FAMILY MEDICINE | Facility: CLINIC | Age: 68
End: 2018-06-17

## 2018-06-17 DIAGNOSIS — F32.A DEPRESSION: ICD-10-CM

## 2018-07-17 ENCOUNTER — COMMUNICATION - HEALTHEAST (OUTPATIENT)
Dept: FAMILY MEDICINE | Facility: CLINIC | Age: 68
End: 2018-07-17

## 2018-07-17 DIAGNOSIS — F32.A DEPRESSION: ICD-10-CM

## 2018-08-06 ENCOUNTER — RECORDS - HEALTHEAST (OUTPATIENT)
Dept: VASCULAR ULTRASOUND | Facility: CLINIC | Age: 68
End: 2018-08-06

## 2018-08-06 ENCOUNTER — OFFICE VISIT - HEALTHEAST (OUTPATIENT)
Dept: VASCULAR SURGERY | Facility: CLINIC | Age: 68
End: 2018-08-06

## 2018-08-06 DIAGNOSIS — I73.9 PERIPHERAL VASCULAR DISEASE, UNSPECIFIED (H): ICD-10-CM

## 2018-08-06 DIAGNOSIS — I65.23 OCCLUSION AND STENOSIS OF BILATERAL CAROTID ARTERIES: ICD-10-CM

## 2018-08-06 DIAGNOSIS — Z95.828 PRESENCE OF OTHER VASCULAR IMPLANTS AND GRAFTS: ICD-10-CM

## 2018-08-06 DIAGNOSIS — I65.23 CAROTID STENOSIS, ASYMPTOMATIC, BILATERAL: ICD-10-CM

## 2018-08-06 DIAGNOSIS — I65.23 BILATERAL CAROTID ARTERY STENOSIS: ICD-10-CM

## 2018-08-08 ENCOUNTER — COMMUNICATION - HEALTHEAST (OUTPATIENT)
Dept: FAMILY MEDICINE | Facility: CLINIC | Age: 68
End: 2018-08-08

## 2018-08-09 ENCOUNTER — COMMUNICATION - HEALTHEAST (OUTPATIENT)
Dept: FAMILY MEDICINE | Facility: CLINIC | Age: 68
End: 2018-08-09

## 2018-08-09 DIAGNOSIS — M1A.09X1 IDIOPATHIC CHRONIC GOUT, MULTIPLE SITES, WITH TOPHUS (TOPHI): ICD-10-CM

## 2018-08-11 ENCOUNTER — COMMUNICATION - HEALTHEAST (OUTPATIENT)
Dept: FAMILY MEDICINE | Facility: CLINIC | Age: 68
End: 2018-08-11

## 2018-08-15 ENCOUNTER — COMMUNICATION - HEALTHEAST (OUTPATIENT)
Dept: FAMILY MEDICINE | Facility: CLINIC | Age: 68
End: 2018-08-15

## 2018-08-15 DIAGNOSIS — M10.9 ACUTE GOUT OF RIGHT KNEE, UNSPECIFIED CAUSE: ICD-10-CM

## 2018-08-15 DIAGNOSIS — F32.A DEPRESSION: ICD-10-CM

## 2018-08-19 ENCOUNTER — COMMUNICATION - HEALTHEAST (OUTPATIENT)
Dept: FAMILY MEDICINE | Facility: CLINIC | Age: 68
End: 2018-08-19

## 2018-10-14 ENCOUNTER — COMMUNICATION - HEALTHEAST (OUTPATIENT)
Dept: FAMILY MEDICINE | Facility: CLINIC | Age: 68
End: 2018-10-14

## 2018-10-14 DIAGNOSIS — G47.00 INSOMNIA, UNSPECIFIED TYPE: ICD-10-CM

## 2018-10-14 DIAGNOSIS — F32.A DEPRESSION: ICD-10-CM

## 2018-11-05 ENCOUNTER — OFFICE VISIT - HEALTHEAST (OUTPATIENT)
Dept: VASCULAR SURGERY | Facility: CLINIC | Age: 68
End: 2018-11-05

## 2018-11-05 ENCOUNTER — RECORDS - HEALTHEAST (OUTPATIENT)
Dept: VASCULAR ULTRASOUND | Facility: CLINIC | Age: 68
End: 2018-11-05

## 2018-11-05 DIAGNOSIS — I65.23 OCCLUSION AND STENOSIS OF BILATERAL CAROTID ARTERIES: ICD-10-CM

## 2018-11-05 DIAGNOSIS — I65.23 BILATERAL CAROTID ARTERY STENOSIS: ICD-10-CM

## 2018-11-10 ENCOUNTER — COMMUNICATION - HEALTHEAST (OUTPATIENT)
Dept: FAMILY MEDICINE | Facility: CLINIC | Age: 68
End: 2018-11-10

## 2018-11-10 DIAGNOSIS — M1A.09X1 IDIOPATHIC CHRONIC GOUT, MULTIPLE SITES, WITH TOPHUS (TOPHI): ICD-10-CM

## 2018-11-12 ENCOUNTER — COMMUNICATION - HEALTHEAST (OUTPATIENT)
Dept: FAMILY MEDICINE | Facility: CLINIC | Age: 68
End: 2018-11-12

## 2018-11-12 DIAGNOSIS — I10 HYPERTENSION, ESSENTIAL, BENIGN: ICD-10-CM

## 2018-11-29 ENCOUNTER — COMMUNICATION - HEALTHEAST (OUTPATIENT)
Dept: FAMILY MEDICINE | Facility: CLINIC | Age: 68
End: 2018-11-29

## 2018-11-29 DIAGNOSIS — M10.9 ACUTE GOUT OF RIGHT KNEE, UNSPECIFIED CAUSE: ICD-10-CM

## 2018-11-29 DIAGNOSIS — F32.A DEPRESSION: ICD-10-CM

## 2018-12-05 ENCOUNTER — OFFICE VISIT - HEALTHEAST (OUTPATIENT)
Dept: FAMILY MEDICINE | Facility: CLINIC | Age: 68
End: 2018-12-05

## 2018-12-05 DIAGNOSIS — F32.A DEPRESSION: ICD-10-CM

## 2018-12-05 DIAGNOSIS — F41.9 ANXIETY: ICD-10-CM

## 2018-12-05 DIAGNOSIS — I10 ESSENTIAL HYPERTENSION: ICD-10-CM

## 2018-12-30 ENCOUNTER — COMMUNICATION - HEALTHEAST (OUTPATIENT)
Dept: FAMILY MEDICINE | Facility: CLINIC | Age: 68
End: 2018-12-30

## 2018-12-30 DIAGNOSIS — F41.9 ANXIETY: ICD-10-CM

## 2019-01-04 ENCOUNTER — COMMUNICATION - HEALTHEAST (OUTPATIENT)
Dept: FAMILY MEDICINE | Facility: CLINIC | Age: 69
End: 2019-01-04

## 2019-01-04 DIAGNOSIS — F41.9 ANXIETY: ICD-10-CM

## 2019-01-24 ENCOUNTER — COMMUNICATION - HEALTHEAST (OUTPATIENT)
Dept: FAMILY MEDICINE | Facility: CLINIC | Age: 69
End: 2019-01-24

## 2019-01-24 DIAGNOSIS — F41.9 ANXIETY: ICD-10-CM

## 2019-02-01 ENCOUNTER — COMMUNICATION - HEALTHEAST (OUTPATIENT)
Dept: FAMILY MEDICINE | Facility: CLINIC | Age: 69
End: 2019-02-01

## 2019-02-01 ENCOUNTER — OFFICE VISIT - HEALTHEAST (OUTPATIENT)
Dept: FAMILY MEDICINE | Facility: CLINIC | Age: 69
End: 2019-02-01

## 2019-02-01 DIAGNOSIS — Z12.11 SCREENING FOR COLON CANCER: ICD-10-CM

## 2019-02-01 DIAGNOSIS — E78.5 HYPERLIPIDEMIA, UNSPECIFIED HYPERLIPIDEMIA TYPE: ICD-10-CM

## 2019-02-01 DIAGNOSIS — F41.9 ANXIETY: ICD-10-CM

## 2019-02-01 DIAGNOSIS — R10.31 ABDOMINAL PAIN, RIGHT LOWER QUADRANT: ICD-10-CM

## 2019-02-01 LAB
ANION GAP SERPL CALCULATED.3IONS-SCNC: 8 MMOL/L (ref 5–18)
BUN SERPL-MCNC: 14 MG/DL (ref 8–22)
CALCIUM SERPL-MCNC: 9.5 MG/DL (ref 8.5–10.5)
CHLORIDE BLD-SCNC: 104 MMOL/L (ref 98–107)
CO2 SERPL-SCNC: 31 MMOL/L (ref 22–31)
CREAT SERPL-MCNC: 1.34 MG/DL (ref 0.7–1.3)
GFR SERPL CREATININE-BSD FRML MDRD: 53 ML/MIN/1.73M2
GLUCOSE BLD-MCNC: 93 MG/DL (ref 70–125)
POTASSIUM BLD-SCNC: 4.3 MMOL/L (ref 3.5–5)
SODIUM SERPL-SCNC: 143 MMOL/L (ref 136–145)

## 2019-02-04 ENCOUNTER — HOSPITAL ENCOUNTER (OUTPATIENT)
Dept: CT IMAGING | Facility: CLINIC | Age: 69
Discharge: HOME | End: 2019-02-04
Attending: FAMILY MEDICINE

## 2019-02-04 DIAGNOSIS — R10.31 ABDOMINAL PAIN, RIGHT LOWER QUADRANT: ICD-10-CM

## 2019-02-05 ENCOUNTER — COMMUNICATION - HEALTHEAST (OUTPATIENT)
Dept: FAMILY MEDICINE | Facility: CLINIC | Age: 69
End: 2019-02-05

## 2019-02-09 ENCOUNTER — COMMUNICATION - HEALTHEAST (OUTPATIENT)
Dept: FAMILY MEDICINE | Facility: CLINIC | Age: 69
End: 2019-02-09

## 2019-02-09 DIAGNOSIS — I10 HYPERTENSION, ESSENTIAL, BENIGN: ICD-10-CM

## 2019-02-22 ENCOUNTER — RECORDS - HEALTHEAST (OUTPATIENT)
Dept: ADMINISTRATIVE | Facility: OTHER | Age: 69
End: 2019-02-22

## 2019-02-26 ENCOUNTER — RECORDS - HEALTHEAST (OUTPATIENT)
Dept: ADMINISTRATIVE | Facility: OTHER | Age: 69
End: 2019-02-26

## 2019-02-26 ENCOUNTER — COMMUNICATION - HEALTHEAST (OUTPATIENT)
Dept: FAMILY MEDICINE | Facility: CLINIC | Age: 69
End: 2019-02-26

## 2019-02-26 DIAGNOSIS — F41.9 ANXIETY: ICD-10-CM

## 2019-02-27 ENCOUNTER — COMMUNICATION - HEALTHEAST (OUTPATIENT)
Dept: FAMILY MEDICINE | Facility: CLINIC | Age: 69
End: 2019-02-27

## 2019-03-02 ENCOUNTER — COMMUNICATION - HEALTHEAST (OUTPATIENT)
Dept: FAMILY MEDICINE | Facility: CLINIC | Age: 69
End: 2019-03-02

## 2019-03-02 DIAGNOSIS — M10.9 ACUTE GOUT OF RIGHT KNEE, UNSPECIFIED CAUSE: ICD-10-CM

## 2019-03-06 ENCOUNTER — COMMUNICATION - HEALTHEAST (OUTPATIENT)
Dept: FAMILY MEDICINE | Facility: CLINIC | Age: 69
End: 2019-03-06

## 2019-03-28 ENCOUNTER — COMMUNICATION - HEALTHEAST (OUTPATIENT)
Dept: FAMILY MEDICINE | Facility: CLINIC | Age: 69
End: 2019-03-28

## 2019-03-28 DIAGNOSIS — F41.9 ANXIETY: ICD-10-CM

## 2019-03-28 DIAGNOSIS — G47.00 INSOMNIA, UNSPECIFIED TYPE: ICD-10-CM

## 2019-04-29 ENCOUNTER — COMMUNICATION - HEALTHEAST (OUTPATIENT)
Dept: FAMILY MEDICINE | Facility: CLINIC | Age: 69
End: 2019-04-29

## 2019-04-29 DIAGNOSIS — F41.9 ANXIETY: ICD-10-CM

## 2019-05-06 ENCOUNTER — OFFICE VISIT - HEALTHEAST (OUTPATIENT)
Dept: VASCULAR SURGERY | Facility: CLINIC | Age: 69
End: 2019-05-06

## 2019-05-06 ENCOUNTER — RECORDS - HEALTHEAST (OUTPATIENT)
Dept: VASCULAR ULTRASOUND | Facility: CLINIC | Age: 69
End: 2019-05-06

## 2019-05-06 DIAGNOSIS — I65.23 OCCLUSION AND STENOSIS OF BILATERAL CAROTID ARTERIES: ICD-10-CM

## 2019-05-06 DIAGNOSIS — I73.9 PAD (PERIPHERAL ARTERY DISEASE) (H): ICD-10-CM

## 2019-05-06 DIAGNOSIS — R09.89 OTHER SPECIFIED SYMPTOMS AND SIGNS INVOLVING THE CIRCULATORY AND RESPIRATORY SYSTEMS: ICD-10-CM

## 2019-05-06 DIAGNOSIS — I65.23 BILATERAL CAROTID ARTERY STENOSIS: ICD-10-CM

## 2019-05-29 ENCOUNTER — COMMUNICATION - HEALTHEAST (OUTPATIENT)
Dept: FAMILY MEDICINE | Facility: CLINIC | Age: 69
End: 2019-05-29

## 2019-05-29 DIAGNOSIS — F41.9 ANXIETY: ICD-10-CM

## 2019-06-25 ENCOUNTER — COMMUNICATION - HEALTHEAST (OUTPATIENT)
Dept: FAMILY MEDICINE | Facility: CLINIC | Age: 69
End: 2019-06-25

## 2019-06-25 DIAGNOSIS — F41.9 ANXIETY: ICD-10-CM

## 2019-07-08 ENCOUNTER — OFFICE VISIT - HEALTHEAST (OUTPATIENT)
Dept: FAMILY MEDICINE | Facility: CLINIC | Age: 69
End: 2019-07-08

## 2019-07-08 DIAGNOSIS — F32.A DEPRESSION: ICD-10-CM

## 2019-07-08 DIAGNOSIS — E78.5 HYPERLIPIDEMIA, UNSPECIFIED HYPERLIPIDEMIA TYPE: ICD-10-CM

## 2019-07-08 DIAGNOSIS — I10 ESSENTIAL HYPERTENSION: ICD-10-CM

## 2019-07-08 DIAGNOSIS — L98.9 SKIN LESIONS: ICD-10-CM

## 2019-07-08 DIAGNOSIS — F41.9 ANXIETY: ICD-10-CM

## 2019-07-08 LAB
ALBUMIN SERPL-MCNC: 4.1 G/DL (ref 3.5–5)
ALP SERPL-CCNC: 78 U/L (ref 45–120)
ALT SERPL W P-5'-P-CCNC: 34 U/L (ref 0–45)
ANION GAP SERPL CALCULATED.3IONS-SCNC: 12 MMOL/L (ref 5–18)
AST SERPL W P-5'-P-CCNC: 25 U/L (ref 0–40)
BILIRUB SERPL-MCNC: 0.5 MG/DL (ref 0–1)
BUN SERPL-MCNC: 20 MG/DL (ref 8–22)
CALCIUM SERPL-MCNC: 9.8 MG/DL (ref 8.5–10.5)
CHLORIDE BLD-SCNC: 106 MMOL/L (ref 98–107)
CHOLEST SERPL-MCNC: 117 MG/DL
CO2 SERPL-SCNC: 27 MMOL/L (ref 22–31)
CREAT SERPL-MCNC: 1.21 MG/DL (ref 0.7–1.3)
FASTING STATUS PATIENT QL REPORTED: YES
GFR SERPL CREATININE-BSD FRML MDRD: 59 ML/MIN/1.73M2
GLUCOSE BLD-MCNC: 98 MG/DL (ref 70–125)
HDLC SERPL-MCNC: 53 MG/DL
LDLC SERPL CALC-MCNC: 56 MG/DL
POTASSIUM BLD-SCNC: 4.1 MMOL/L (ref 3.5–5)
PROT SERPL-MCNC: 6.9 G/DL (ref 6–8)
SODIUM SERPL-SCNC: 145 MMOL/L (ref 136–145)
TRIGL SERPL-MCNC: 41 MG/DL

## 2019-08-02 ENCOUNTER — COMMUNICATION - HEALTHEAST (OUTPATIENT)
Dept: FAMILY MEDICINE | Facility: CLINIC | Age: 69
End: 2019-08-02

## 2019-08-02 DIAGNOSIS — F41.9 ANXIETY: ICD-10-CM

## 2019-08-05 ENCOUNTER — COMMUNICATION - HEALTHEAST (OUTPATIENT)
Dept: FAMILY MEDICINE | Facility: CLINIC | Age: 69
End: 2019-08-05

## 2019-08-05 DIAGNOSIS — M10.9 ACUTE GOUT OF RIGHT KNEE, UNSPECIFIED CAUSE: ICD-10-CM

## 2019-08-20 ENCOUNTER — COMMUNICATION - HEALTHEAST (OUTPATIENT)
Dept: FAMILY MEDICINE | Facility: CLINIC | Age: 69
End: 2019-08-20

## 2019-08-20 DIAGNOSIS — E78.5 HYPERLIPIDEMIA, UNSPECIFIED HYPERLIPIDEMIA TYPE: ICD-10-CM

## 2019-08-30 ENCOUNTER — COMMUNICATION - HEALTHEAST (OUTPATIENT)
Dept: FAMILY MEDICINE | Facility: CLINIC | Age: 69
End: 2019-08-30

## 2019-08-30 DIAGNOSIS — F41.9 ANXIETY: ICD-10-CM

## 2019-09-03 ENCOUNTER — COMMUNICATION - HEALTHEAST (OUTPATIENT)
Dept: FAMILY MEDICINE | Facility: CLINIC | Age: 69
End: 2019-09-03

## 2019-09-03 DIAGNOSIS — F41.9 ANXIETY: ICD-10-CM

## 2019-09-30 ENCOUNTER — COMMUNICATION - HEALTHEAST (OUTPATIENT)
Dept: FAMILY MEDICINE | Facility: CLINIC | Age: 69
End: 2019-09-30

## 2019-09-30 DIAGNOSIS — F41.9 ANXIETY: ICD-10-CM

## 2019-10-29 ENCOUNTER — COMMUNICATION - HEALTHEAST (OUTPATIENT)
Dept: FAMILY MEDICINE | Facility: CLINIC | Age: 69
End: 2019-10-29

## 2019-10-29 DIAGNOSIS — F41.9 ANXIETY: ICD-10-CM

## 2019-11-14 ENCOUNTER — COMMUNICATION - HEALTHEAST (OUTPATIENT)
Dept: FAMILY MEDICINE | Facility: CLINIC | Age: 69
End: 2019-11-14

## 2019-11-14 DIAGNOSIS — M1A.09X1 IDIOPATHIC CHRONIC GOUT, MULTIPLE SITES, WITH TOPHUS (TOPHI): ICD-10-CM

## 2019-11-19 ENCOUNTER — OFFICE VISIT - HEALTHEAST (OUTPATIENT)
Dept: FAMILY MEDICINE | Facility: CLINIC | Age: 69
End: 2019-11-19

## 2019-11-19 DIAGNOSIS — M10.9 GOUTY ARTHROPATHY: ICD-10-CM

## 2019-11-19 DIAGNOSIS — Z12.5 SCREENING FOR MALIGNANT NEOPLASM OF PROSTATE: ICD-10-CM

## 2019-11-19 DIAGNOSIS — G47.00 INSOMNIA, UNSPECIFIED TYPE: ICD-10-CM

## 2019-11-19 DIAGNOSIS — F32.A DEPRESSION: ICD-10-CM

## 2019-11-19 DIAGNOSIS — Z23 NEED FOR PNEUMOCOCCAL VACCINATION: ICD-10-CM

## 2019-11-19 DIAGNOSIS — F41.9 ANXIETY: ICD-10-CM

## 2019-11-19 LAB — URATE SERPL-MCNC: 5.2 MG/DL (ref 3–8)

## 2019-11-19 ASSESSMENT — MIFFLIN-ST. JEOR: SCORE: 1616.28

## 2019-11-20 LAB — PSA SERPL-MCNC: 0.7 NG/ML (ref 0–4.5)

## 2019-11-22 ENCOUNTER — COMMUNICATION - HEALTHEAST (OUTPATIENT)
Dept: FAMILY MEDICINE | Facility: CLINIC | Age: 69
End: 2019-11-22

## 2019-11-28 ENCOUNTER — COMMUNICATION - HEALTHEAST (OUTPATIENT)
Dept: FAMILY MEDICINE | Facility: CLINIC | Age: 69
End: 2019-11-28

## 2019-11-28 DIAGNOSIS — F41.9 ANXIETY: ICD-10-CM

## 2019-12-26 ENCOUNTER — COMMUNICATION - HEALTHEAST (OUTPATIENT)
Dept: FAMILY MEDICINE | Facility: CLINIC | Age: 69
End: 2019-12-26

## 2019-12-26 DIAGNOSIS — F41.9 ANXIETY: ICD-10-CM

## 2020-01-24 ENCOUNTER — COMMUNICATION - HEALTHEAST (OUTPATIENT)
Dept: FAMILY MEDICINE | Facility: CLINIC | Age: 70
End: 2020-01-24

## 2020-01-24 DIAGNOSIS — F41.9 ANXIETY: ICD-10-CM

## 2020-01-24 DIAGNOSIS — M1A.09X1 IDIOPATHIC CHRONIC GOUT, MULTIPLE SITES, WITH TOPHUS (TOPHI): ICD-10-CM

## 2020-01-30 ENCOUNTER — COMMUNICATION - HEALTHEAST (OUTPATIENT)
Dept: FAMILY MEDICINE | Facility: CLINIC | Age: 70
End: 2020-01-30

## 2020-01-30 DIAGNOSIS — F41.9 ANXIETY: ICD-10-CM

## 2020-02-06 ENCOUNTER — COMMUNICATION - HEALTHEAST (OUTPATIENT)
Dept: FAMILY MEDICINE | Facility: CLINIC | Age: 70
End: 2020-02-06

## 2020-02-06 DIAGNOSIS — I10 HYPERTENSION, ESSENTIAL, BENIGN: ICD-10-CM

## 2020-03-03 ENCOUNTER — COMMUNICATION - HEALTHEAST (OUTPATIENT)
Dept: FAMILY MEDICINE | Facility: CLINIC | Age: 70
End: 2020-03-03

## 2020-03-03 DIAGNOSIS — F41.9 ANXIETY: ICD-10-CM

## 2020-03-09 ENCOUNTER — COMMUNICATION - HEALTHEAST (OUTPATIENT)
Dept: FAMILY MEDICINE | Facility: CLINIC | Age: 70
End: 2020-03-09

## 2020-03-09 DIAGNOSIS — E78.5 HYPERLIPIDEMIA, UNSPECIFIED HYPERLIPIDEMIA TYPE: ICD-10-CM

## 2020-03-16 ENCOUNTER — AMBULATORY - HEALTHEAST (OUTPATIENT)
Dept: FAMILY MEDICINE | Facility: CLINIC | Age: 70
End: 2020-03-16

## 2020-03-16 DIAGNOSIS — E78.5 HYPERLIPIDEMIA, UNSPECIFIED HYPERLIPIDEMIA TYPE: ICD-10-CM

## 2020-03-31 ENCOUNTER — COMMUNICATION - HEALTHEAST (OUTPATIENT)
Dept: FAMILY MEDICINE | Facility: CLINIC | Age: 70
End: 2020-03-31

## 2020-03-31 DIAGNOSIS — F41.9 ANXIETY: ICD-10-CM

## 2020-04-06 ENCOUNTER — COMMUNICATION - HEALTHEAST (OUTPATIENT)
Dept: FAMILY MEDICINE | Facility: CLINIC | Age: 70
End: 2020-04-06

## 2020-04-06 DIAGNOSIS — I10 HYPERTENSION, ESSENTIAL, BENIGN: ICD-10-CM

## 2020-04-10 ENCOUNTER — COMMUNICATION - HEALTHEAST (OUTPATIENT)
Dept: FAMILY MEDICINE | Facility: CLINIC | Age: 70
End: 2020-04-10

## 2020-04-10 DIAGNOSIS — I10 ESSENTIAL HYPERTENSION: ICD-10-CM

## 2020-05-01 ENCOUNTER — COMMUNICATION - HEALTHEAST (OUTPATIENT)
Dept: FAMILY MEDICINE | Facility: CLINIC | Age: 70
End: 2020-05-01

## 2020-05-01 DIAGNOSIS — F41.9 ANXIETY: ICD-10-CM

## 2020-05-27 ENCOUNTER — OFFICE VISIT - HEALTHEAST (OUTPATIENT)
Dept: FAMILY MEDICINE | Facility: CLINIC | Age: 70
End: 2020-05-27

## 2020-05-27 DIAGNOSIS — F32.A DEPRESSION: ICD-10-CM

## 2020-05-27 DIAGNOSIS — G47.00 INSOMNIA, UNSPECIFIED TYPE: ICD-10-CM

## 2020-05-27 DIAGNOSIS — F41.9 ANXIETY: ICD-10-CM

## 2020-05-27 DIAGNOSIS — E78.5 HYPERLIPIDEMIA, UNSPECIFIED HYPERLIPIDEMIA TYPE: ICD-10-CM

## 2020-05-27 DIAGNOSIS — I10 ESSENTIAL HYPERTENSION: ICD-10-CM

## 2020-06-01 ENCOUNTER — COMMUNICATION - HEALTHEAST (OUTPATIENT)
Dept: FAMILY MEDICINE | Facility: CLINIC | Age: 70
End: 2020-06-01

## 2020-06-01 DIAGNOSIS — F41.9 ANXIETY: ICD-10-CM

## 2020-07-01 ENCOUNTER — COMMUNICATION - HEALTHEAST (OUTPATIENT)
Dept: FAMILY MEDICINE | Facility: CLINIC | Age: 70
End: 2020-07-01

## 2020-07-01 DIAGNOSIS — F41.9 ANXIETY: ICD-10-CM

## 2020-08-03 ENCOUNTER — COMMUNICATION - HEALTHEAST (OUTPATIENT)
Dept: FAMILY MEDICINE | Facility: CLINIC | Age: 70
End: 2020-08-03

## 2020-08-03 DIAGNOSIS — F41.9 ANXIETY: ICD-10-CM

## 2020-08-20 ENCOUNTER — HOSPITAL ENCOUNTER (INPATIENT)
Facility: CLINIC | Age: 70
LOS: 2 days | Discharge: HOME | DRG: 123 | End: 2020-08-22
Attending: PSYCHIATRY & NEUROLOGY | Admitting: PSYCHIATRY & NEUROLOGY
Payer: COMMERCIAL

## 2020-08-20 ENCOUNTER — APPOINTMENT (OUTPATIENT)
Dept: GENERAL RADIOLOGY | Facility: CLINIC | Age: 70
DRG: 123 | End: 2020-08-20
Attending: PSYCHIATRY & NEUROLOGY
Payer: COMMERCIAL

## 2020-08-20 ENCOUNTER — APPOINTMENT (OUTPATIENT)
Dept: CT IMAGING | Facility: CLINIC | Age: 70
DRG: 123 | End: 2020-08-20
Attending: NURSE PRACTITIONER
Payer: COMMERCIAL

## 2020-08-20 ENCOUNTER — RECORDS - HEALTHEAST (OUTPATIENT)
Dept: ADMINISTRATIVE | Facility: OTHER | Age: 70
End: 2020-08-20

## 2020-08-20 ENCOUNTER — APPOINTMENT (OUTPATIENT)
Dept: MRI IMAGING | Facility: CLINIC | Age: 70
DRG: 123 | End: 2020-08-20
Attending: COUNSELOR
Payer: COMMERCIAL

## 2020-08-20 ENCOUNTER — APPOINTMENT (OUTPATIENT)
Dept: SPEECH THERAPY | Facility: CLINIC | Age: 70
DRG: 123 | End: 2020-08-20
Attending: COUNSELOR
Payer: COMMERCIAL

## 2020-08-20 ENCOUNTER — APPOINTMENT (OUTPATIENT)
Dept: CARDIOLOGY | Facility: CLINIC | Age: 70
DRG: 123 | End: 2020-08-20
Attending: COUNSELOR
Payer: COMMERCIAL

## 2020-08-20 DIAGNOSIS — I63.9 ISCHEMIC STROKE (H): Primary | ICD-10-CM

## 2020-08-20 DIAGNOSIS — I10 ESSENTIAL HYPERTENSION, BENIGN: ICD-10-CM

## 2020-08-20 LAB
ABO + RH BLD: NORMAL
ABO + RH BLD: NORMAL
ANION GAP SERPL CALCULATED.3IONS-SCNC: 7 MMOL/L (ref 3–14)
APTT PPP: 31 SEC (ref 22–37)
BLD GP AB SCN SERPL QL: NORMAL
BLOOD BANK CMNT PATIENT-IMP: NORMAL
BUN SERPL-MCNC: 22 MG/DL (ref 7–30)
CALCIUM SERPL-MCNC: 8.7 MG/DL (ref 8.5–10.1)
CHLORIDE SERPL-SCNC: 107 MMOL/L (ref 94–109)
CO2 SERPL-SCNC: 27 MMOL/L (ref 20–32)
CREAT SERPL-MCNC: 1.07 MG/DL (ref 0.66–1.25)
CRP SERPL-MCNC: 7.8 MG/L (ref 0–8)
ERYTHROCYTE [DISTWIDTH] IN BLOOD BY AUTOMATED COUNT: 14.3 % (ref 10–15)
ERYTHROCYTE [SEDIMENTATION RATE] IN BLOOD BY WESTERGREN METHOD: 6 MM/H (ref 0–20)
GFR SERPL CREATININE-BSD FRML MDRD: 70 ML/MIN/{1.73_M2}
GLUCOSE BLDC GLUCOMTR-MCNC: 85 MG/DL (ref 70–99)
GLUCOSE BLDC GLUCOMTR-MCNC: 95 MG/DL (ref 70–99)
GLUCOSE BLDC GLUCOMTR-MCNC: 97 MG/DL (ref 70–99)
GLUCOSE SERPL-MCNC: 86 MG/DL (ref 70–99)
HBA1C MFR BLD: 5.4 % (ref 0–5.6)
HCT VFR BLD AUTO: 40.3 % (ref 40–53)
HGB BLD-MCNC: 13.7 G/DL (ref 13.3–17.7)
INR PPP: 1.17 (ref 0.86–1.14)
LDLC SERPL DIRECT ASSAY-MCNC: 41 MG/DL
MAGNESIUM SERPL-MCNC: 1.9 MG/DL (ref 1.6–2.3)
MCH RBC QN AUTO: 30.3 PG (ref 26.5–33)
MCHC RBC AUTO-ENTMCNC: 34 G/DL (ref 31.5–36.5)
MCV RBC AUTO: 89 FL (ref 78–100)
PHOSPHATE SERPL-MCNC: 2.6 MG/DL (ref 2.5–4.5)
PLATELET # BLD AUTO: 195 10E9/L (ref 150–450)
POTASSIUM SERPL-SCNC: 3 MMOL/L (ref 3.4–5.3)
POTASSIUM SERPL-SCNC: 3.6 MMOL/L (ref 3.4–5.3)
RBC # BLD AUTO: 4.52 10E12/L (ref 4.4–5.9)
SODIUM SERPL-SCNC: 140 MMOL/L (ref 133–144)
SPECIMEN EXP DATE BLD: NORMAL
TROPONIN I SERPL-MCNC: 0.02 UG/L (ref 0–0.04)
WBC # BLD AUTO: 13.9 10E9/L (ref 4–11)

## 2020-08-20 PROCEDURE — U0003 INFECTIOUS AGENT DETECTION BY NUCLEIC ACID (DNA OR RNA); SEVERE ACUTE RESPIRATORY SYNDROME CORONAVIRUS 2 (SARS-COV-2) (CORONAVIRUS DISEASE [COVID-19]), AMPLIFIED PROBE TECHNIQUE, MAKING USE OF HIGH THROUGHPUT TECHNOLOGIES AS DESCRIBED BY CMS-2020-01-R: HCPCS | Performed by: PSYCHIATRY & NEUROLOGY

## 2020-08-20 PROCEDURE — 92526 ORAL FUNCTION THERAPY: CPT | Mod: GN

## 2020-08-20 PROCEDURE — 85652 RBC SED RATE AUTOMATED: CPT | Performed by: COUNSELOR

## 2020-08-20 PROCEDURE — 92610 EVALUATE SWALLOWING FUNCTION: CPT | Mod: GN

## 2020-08-20 PROCEDURE — 25800030 ZZH RX IP 258 OP 636: Performed by: COUNSELOR

## 2020-08-20 PROCEDURE — 84484 ASSAY OF TROPONIN QUANT: CPT | Performed by: COUNSELOR

## 2020-08-20 PROCEDURE — 25000128 H RX IP 250 OP 636: Performed by: COUNSELOR

## 2020-08-20 PROCEDURE — 20000004 ZZH R&B ICU UMMC

## 2020-08-20 PROCEDURE — 86850 RBC ANTIBODY SCREEN: CPT | Performed by: COUNSELOR

## 2020-08-20 PROCEDURE — 70496 CT ANGIOGRAPHY HEAD: CPT

## 2020-08-20 PROCEDURE — 25000128 H RX IP 250 OP 636: Performed by: PSYCHIATRY & NEUROLOGY

## 2020-08-20 PROCEDURE — 86900 BLOOD TYPING SEROLOGIC ABO: CPT | Performed by: COUNSELOR

## 2020-08-20 PROCEDURE — 93306 TTE W/DOPPLER COMPLETE: CPT

## 2020-08-20 PROCEDURE — 25000132 ZZH RX MED GY IP 250 OP 250 PS 637: Performed by: COUNSELOR

## 2020-08-20 PROCEDURE — 80048 BASIC METABOLIC PNL TOTAL CA: CPT | Performed by: COUNSELOR

## 2020-08-20 PROCEDURE — 85027 COMPLETE CBC AUTOMATED: CPT | Performed by: COUNSELOR

## 2020-08-20 PROCEDURE — 93005 ELECTROCARDIOGRAM TRACING: CPT

## 2020-08-20 PROCEDURE — 84132 ASSAY OF SERUM POTASSIUM: CPT | Performed by: COUNSELOR

## 2020-08-20 PROCEDURE — 70551 MRI BRAIN STEM W/O DYE: CPT

## 2020-08-20 PROCEDURE — 84100 ASSAY OF PHOSPHORUS: CPT | Performed by: COUNSELOR

## 2020-08-20 PROCEDURE — 86140 C-REACTIVE PROTEIN: CPT | Performed by: COUNSELOR

## 2020-08-20 PROCEDURE — 83721 ASSAY OF BLOOD LIPOPROTEIN: CPT | Performed by: COUNSELOR

## 2020-08-20 PROCEDURE — 83735 ASSAY OF MAGNESIUM: CPT | Performed by: COUNSELOR

## 2020-08-20 PROCEDURE — 70450 CT HEAD/BRAIN W/O DYE: CPT

## 2020-08-20 PROCEDURE — 93010 ELECTROCARDIOGRAM REPORT: CPT | Performed by: INTERNAL MEDICINE

## 2020-08-20 PROCEDURE — 71045 X-RAY EXAM CHEST 1 VIEW: CPT

## 2020-08-20 PROCEDURE — 85730 THROMBOPLASTIN TIME PARTIAL: CPT | Performed by: COUNSELOR

## 2020-08-20 PROCEDURE — 83036 HEMOGLOBIN GLYCOSYLATED A1C: CPT | Performed by: COUNSELOR

## 2020-08-20 PROCEDURE — 86901 BLOOD TYPING SEROLOGIC RH(D): CPT | Performed by: COUNSELOR

## 2020-08-20 PROCEDURE — 36415 COLL VENOUS BLD VENIPUNCTURE: CPT | Performed by: COUNSELOR

## 2020-08-20 PROCEDURE — 93306 TTE W/DOPPLER COMPLETE: CPT | Mod: 26 | Performed by: INTERNAL MEDICINE

## 2020-08-20 PROCEDURE — 25500064 ZZH RX 255 OP 636: Performed by: INTERNAL MEDICINE

## 2020-08-20 PROCEDURE — 00000146 ZZHCL STATISTIC GLUCOSE BY METER IP

## 2020-08-20 PROCEDURE — 85610 PROTHROMBIN TIME: CPT | Performed by: COUNSELOR

## 2020-08-20 RX ORDER — ONDANSETRON 4 MG/1
4 TABLET, ORALLY DISINTEGRATING ORAL EVERY 6 HOURS PRN
Status: DISCONTINUED | OUTPATIENT
Start: 2020-08-20 | End: 2020-08-22 | Stop reason: HOSPADM

## 2020-08-20 RX ORDER — SODIUM CHLORIDE 9 MG/ML
INJECTION, SOLUTION INTRAVENOUS CONTINUOUS
Status: DISCONTINUED | OUTPATIENT
Start: 2020-08-20 | End: 2020-08-20

## 2020-08-20 RX ORDER — IOPAMIDOL 755 MG/ML
75 INJECTION, SOLUTION INTRAVASCULAR ONCE
Status: COMPLETED | OUTPATIENT
Start: 2020-08-20 | End: 2020-08-20

## 2020-08-20 RX ORDER — POTASSIUM CHLORIDE 750 MG/1
20-40 TABLET, EXTENDED RELEASE ORAL
Status: DISCONTINUED | OUTPATIENT
Start: 2020-08-20 | End: 2020-08-22 | Stop reason: HOSPADM

## 2020-08-20 RX ORDER — LABETALOL 20 MG/4 ML (5 MG/ML) INTRAVENOUS SYRINGE
10 EVERY 10 MIN PRN
Status: DISCONTINUED | OUTPATIENT
Start: 2020-08-20 | End: 2020-08-22 | Stop reason: HOSPADM

## 2020-08-20 RX ORDER — MAGNESIUM SULFATE HEPTAHYDRATE 40 MG/ML
4 INJECTION, SOLUTION INTRAVENOUS EVERY 4 HOURS PRN
Status: DISCONTINUED | OUTPATIENT
Start: 2020-08-20 | End: 2020-08-22 | Stop reason: HOSPADM

## 2020-08-20 RX ORDER — POTASSIUM CL/LIDO/0.9 % NACL 10MEQ/0.1L
10 INTRAVENOUS SOLUTION, PIGGYBACK (ML) INTRAVENOUS
Status: DISCONTINUED | OUTPATIENT
Start: 2020-08-20 | End: 2020-08-22 | Stop reason: HOSPADM

## 2020-08-20 RX ORDER — ALBUTEROL SULFATE 90 UG/1
2 AEROSOL, METERED RESPIRATORY (INHALATION) EVERY 6 HOURS PRN
Status: DISCONTINUED | OUTPATIENT
Start: 2020-08-20 | End: 2020-08-22 | Stop reason: HOSPADM

## 2020-08-20 RX ORDER — ACETAMINOPHEN 325 MG/1
650 TABLET ORAL EVERY 4 HOURS PRN
Status: DISCONTINUED | OUTPATIENT
Start: 2020-08-20 | End: 2020-08-20

## 2020-08-20 RX ORDER — ONDANSETRON 2 MG/ML
4 INJECTION INTRAMUSCULAR; INTRAVENOUS EVERY 6 HOURS PRN
Status: DISCONTINUED | OUTPATIENT
Start: 2020-08-20 | End: 2020-08-22 | Stop reason: HOSPADM

## 2020-08-20 RX ORDER — NALOXONE HYDROCHLORIDE 0.4 MG/ML
.1-.4 INJECTION, SOLUTION INTRAMUSCULAR; INTRAVENOUS; SUBCUTANEOUS
Status: DISCONTINUED | OUTPATIENT
Start: 2020-08-20 | End: 2020-08-22 | Stop reason: HOSPADM

## 2020-08-20 RX ORDER — POTASSIUM CHLORIDE 29.8 MG/ML
20 INJECTION INTRAVENOUS
Status: DISCONTINUED | OUTPATIENT
Start: 2020-08-20 | End: 2020-08-22 | Stop reason: HOSPADM

## 2020-08-20 RX ORDER — POTASSIUM CHLORIDE 7.45 MG/ML
10 INJECTION INTRAVENOUS
Status: DISCONTINUED | OUTPATIENT
Start: 2020-08-20 | End: 2020-08-22 | Stop reason: HOSPADM

## 2020-08-20 RX ORDER — ACYCLOVIR 200 MG/1
12 CAPSULE ORAL ONCE
Status: DISCONTINUED | OUTPATIENT
Start: 2020-08-20 | End: 2020-08-22 | Stop reason: HOSPADM

## 2020-08-20 RX ORDER — LIDOCAINE 40 MG/G
CREAM TOPICAL
Status: DISCONTINUED | OUTPATIENT
Start: 2020-08-20 | End: 2020-08-22 | Stop reason: HOSPADM

## 2020-08-20 RX ORDER — MAGNESIUM SULFATE HEPTAHYDRATE 40 MG/ML
2 INJECTION, SOLUTION INTRAVENOUS DAILY PRN
Status: DISCONTINUED | OUTPATIENT
Start: 2020-08-20 | End: 2020-08-22 | Stop reason: HOSPADM

## 2020-08-20 RX ORDER — CITALOPRAM HYDROBROMIDE 20 MG/1
40 TABLET ORAL DAILY
Status: DISCONTINUED | OUTPATIENT
Start: 2020-08-20 | End: 2020-08-20

## 2020-08-20 RX ORDER — AMOXICILLIN 250 MG
1-2 CAPSULE ORAL 2 TIMES DAILY
Status: DISCONTINUED | OUTPATIENT
Start: 2020-08-20 | End: 2020-08-20

## 2020-08-20 RX ORDER — POTASSIUM CHLORIDE 1.5 G/1.58G
20-40 POWDER, FOR SOLUTION ORAL
Status: DISCONTINUED | OUTPATIENT
Start: 2020-08-20 | End: 2020-08-22 | Stop reason: HOSPADM

## 2020-08-20 RX ADMIN — IOPAMIDOL 75 ML: 755 INJECTION, SOLUTION INTRAVENOUS at 16:38

## 2020-08-20 RX ADMIN — SODIUM CHLORIDE: 9 INJECTION, SOLUTION INTRAVENOUS at 06:25

## 2020-08-20 RX ADMIN — Medication 10 MEQ: at 14:21

## 2020-08-20 RX ADMIN — Medication 10 MEQ: at 11:07

## 2020-08-20 RX ADMIN — LABETALOL 20 MG/4 ML (5 MG/ML) INTRAVENOUS SYRINGE 10 MG: at 16:11

## 2020-08-20 RX ADMIN — Medication 10 MEQ: at 12:57

## 2020-08-20 RX ADMIN — HUMAN ALBUMIN MICROSPHERES AND PERFLUTREN 6 ML: 10; .22 INJECTION, SOLUTION INTRAVENOUS at 13:00

## 2020-08-20 RX ADMIN — Medication 10 MEQ: at 09:18

## 2020-08-20 RX ADMIN — POTASSIUM CHLORIDE 20 MEQ: 750 TABLET, EXTENDED RELEASE ORAL at 18:37

## 2020-08-20 RX ADMIN — LABETALOL 20 MG/4 ML (5 MG/ML) INTRAVENOUS SYRINGE 10 MG: at 16:23

## 2020-08-20 RX ADMIN — MAGNESIUM SULFATE 2 G: 2 INJECTION INTRAVENOUS at 15:22

## 2020-08-20 ASSESSMENT — VISUAL ACUITY
OU: NORMAL ACUITY

## 2020-08-20 ASSESSMENT — ACTIVITIES OF DAILY LIVING (ADL)
FALL_HISTORY_WITHIN_LAST_SIX_MONTHS: NO
ADLS_ACUITY_SCORE: 12
COGNITION: 0 - NO COGNITION ISSUES REPORTED
RETIRED_COMMUNICATION: 0-->UNDERSTANDS/COMMUNICATES WITHOUT DIFFICULTY
DRESS: 0-->INDEPENDENT
TRANSFERRING: 0-->INDEPENDENT
TOILETING: 0-->INDEPENDENT
ADLS_ACUITY_SCORE: 12
AMBULATION: 0-->INDEPENDENT
RETIRED_EATING: 0-->INDEPENDENT
BATHING: 0-->INDEPENDENT
ADLS_ACUITY_SCORE: 10
SWALLOWING: 0-->SWALLOWS FOODS/LIQUIDS WITHOUT DIFFICULTY
ADLS_ACUITY_SCORE: 12

## 2020-08-20 NOTE — PLAN OF CARE
"D/I: Patient arrived on unit 4A Surgical/Neuro ICU at appx 0445 - transfer from [facility]  Neuro- A/Ox4. R pupil >L; and right is irregular, sluggish. L pupil round and brisk. Reports vision change in R eye - states it is \"like looking through a straw\". R facial droop. L tongue deviation.  CV- HR stable. BP elevated but stable.   Pulm- LS clear on room air.  GI- NPO pending swallow eval. BS active; no bm overnight  - Voiding adequately per urinal  Gtts- NS at 75ml/hr  Skin- L arm hematoma wrapped with coban.   Pain- Denies.  IV's - R PIVx2  See flow sheets for further interventions and assessments.   A: Stable   P: Continue to monitor pt closely. Notify MD of significant changes    "

## 2020-08-20 NOTE — CONSULTS
Social Work: Assessment with Discharge Plan    Patient Name:  Ahmet Ordonez  :  1950  Age:  70 year old  MRN:  [id number]  Risk/Complexity Score:  Filed Complexity Screen Score: 5  Completed assessment with:  Patient (in person)    Presenting Information   Reason for Referral:  Stroke consult  Date of Intake:  2020  Referral Source:  Physician  Decision Maker:  Patient/ Self  Alternate Decision Maker:  Mitra (Spouse)  Health Care Directive:  Declined completing  Living Situation:  Pt lives with his spouse and 3 dogs in a split-level home  Previous Functional Status:  Independent  Patient and family understanding of hospitalization:  Pt appeared to have a good understanding and asked appropriate questions.  Cultural/Language/Spiritual Considerations:  None  Adjustment to Illness:  Pt is adjusting appropriately, eager to get out of bed.    Physical Health  Reason for Admission:  Stroke  Services Needed/Recommended:  TBD    Mental Health/Chemical Dependency  Diagnosis:  None  Support/Services in Place:  None  Services Needed/Recommended:  None     Support System  Significant relationship at present time:  Mitra (Spouse) works full-time as a CNA at [employer] and thus, is not available 24/ to provide support.  Family of origin is available for support:  Pt has two children however, they have an estranged relationship and would not be able to provide support.  Other support available:  None  Gaps in support system:  Pt does not have 24/ support available  Patient is caregiver to:  None     Provider Information   Primary Care Physician:  Ike Valdes   [phone]   Clinic:  [address] / [facility] [address]      :  N/A    Financial   Income Source:  shelter  Financial Concerns:  None Identified  Insurance:    Payor/Plan Subscriber Name Rel Member # Group #   MEDICARE - MEDICARE AHMET ESTEBAN  [id number]       ATTN CLAIMS, [address]       Discharge  Plan   Patient and family discharge goal:  Home  Provided education on discharge plan:  NO  Patient agreeable to discharge plan:  TBD  A list of Medicare Certified Facilities was provided to the patient and/or family to encourage patient choice. Patient's choices for facility are:  N/A  Will NH provide Skilled rehabilitation or complex medical:  TBD  General information regarding anticipated insurance coverage and possible out of pocket cost was discussed. Patient and patient's family are aware patient may incur the cost of transportation to the facility, pending insurance payment: NO  Barriers to discharge:  Medical Stability    Discharge Recommendations   Anticipated Disposition:  TBD  Transportation Needs:  TBD  Name of Transportation Company and Phone:  TBD    Additional comments   SW met with pt to introduce self, explain role and provide support.  Pt was alert and oriented, reported having a lot of leg cramping and was eager to get out of bed.    Prior to admission, pt was living with his spouse and independent with all ADLs.  His spouse works full-time as a CNA at [employer] and thus, is not available 24/7 for support.  He has been to [facility] a few years ago following a surgery.  Discharge needs are TBD.  BPCI-A letter provided.    JOSEPH Ferrer, [facility]  ICU   [facility]  Phone:  [phone]  Pager:  [phone]

## 2020-08-20 NOTE — PLAN OF CARE
4A PT - Cancel. PT orders acknowledged and appreciated. Per discussion with RN and chart review, pt receive tPA and is to remain bed rest for first 12 hours then can get up to chair BID with RN. Will reschedule evaluation for tomorrow.
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
## 2020-08-20 NOTE — H&P
[facility] [place]    Stroke Admission Note    Chief Complaint  Vision loss     HPI  Young Ordonez is a 70 year old male with past medical history of hypertension, hyperlipidemia, CAD, ischemic cardiomyopathy, CKD3, PAD s/p bl femoral artery bypass graft, right carotid endarterextomy (2018) after 3 episodes of amaurosis fugax,  HCV, COPD,  MI who presents today after receiving TPA for acute onset right eye nasal visual field loss at 1215 AM at [facility] ED. He is transferred to us for post-TPA monitoring and stroke work up. He has persistent visual deficit in the right eye with some intact right upper quadrant vision.     He reports that he takes daily prescription medications are for hypertension, aspirin (81 mg), and atorvastatin. He was a long time tobacco user and quit in 2012. Strong family history of stroke including strokes at a young age in his father (40's).       TPA Treatment   Administered at outside facility.    Endovascular Treatment  Not initiated due to absence of proximal vessel occlusion    Impression  1. Ischemic Stroke due to undetermined etiology   Patient presented with right eye visual deficit concerning for retinal artery occlusion. Patient has history of similar symptom prior to his endarterectomy. He Had mild atherosclerosis on his right carotid on CTA, he did have 80 % stenosis on the left. He also has a facial droop which would not fit with a right sided retinal artery occlusion and may represent chronic subtle asymmetry versus multifocal embolic stroke. We will make him NPO until swallow study.     Plan  Acute Ischemic Stroke (post tPA) Plan  - Admit to Neuro ICU, under Neurocritical Care Team  - Close monitoring and neurochecks per post tPA orders for any evidence of hemorrhagic transformation or allergic reaction  - Labetalol PRN to maintain BP < 180/105  - Avoid hypotonic IV fluids  - Hold all anti-thrombic and anti-coagulant medications for 24 hrs  post-IV Alteplase (tPA)  - Hold pharmacologic VTE prophylaxis for 24 hrs post-IV Alteplase (tPA)  - Statin: atorvastating 80 mg (once able to take PO)   - MRI Stroke Protocol  - TTE with Bubble Study  - Telemetry, EKG  - Bedside Glucose Monitoring  - A1c, Lipid Panel, Troponin x 3  - PT/OT/SLP  - Stroke Education  - Depression Screen  - Apnea Screen  - Euthermia, Euglycemia      #hypertension   -atenolol, lisinopril, amlodipine held     #HLD  -Atorvastatin 80 mg held    # MDD  -sertraline 50 mg held   -trazodone 50 mg held     # Gout   -colchicine and allopurinol held     Prophylaxis            For VTE Prevention:  - pneumatic compression device    For Acid Suppression:  - GI prophylaxis is not indicated    Code Status  Full Code  (okay with intubation as a temporizing measure, would not want to be ventilator dependant long term)     During initial physical assessment, the plan of care was discussed and developed with patient.  Plan of care includes: admission for post TPA monitoring and ECHO and MRI as stroke work up .    Patient was admitted via transfer from [facility]    The patient will be admitted to the Neuro Critical Care/Stroke team..     The patient was discussed with Stroke Fellow, Dr. Cortez.  The Stroke Staff is Dr. Sullivan.    Latanya Noel MD  Neurology Resident  Pager:  [phone]  ___________________________________________________    Nutrition: advance as tolerated   None    Past Medical History   Past Medical History:   Diagnosis Date     BENIGN HYPERTENSION      DEPRESSIVE DISORDER NEC 4/29/2008     HEPATITIS C CARRIER (H)      HYPERLIPIDEMIA NEC/NOS      MI NOS EPISODE CARE UNSPEC 1995     PEYRONIE'S DISEASE      Past Surgical History   Past Surgical History:   Procedure Laterality Date     NONSPECIFIC PROCEDURE  1995    MI -- angioplasty     Medications   Home Meds  Prior to Admission medications    Medication Sig Start Date End Date Taking? Authorizing Provider   ASPIRIN 81 MG OR TABS 1 tab po QD  (Once per day)       ATENOLOL 50 MG OR TABS 1 TABLET DAILY 07   Luis Boswell MD   BETAMETHASONE VALERATE 0.1 % EX CREA apply top bid 07   Luis Boswell MD   CITALOPRAM HYDROBROMIDE 40 MG OR TABS 1 TABLET DAILY 08   Luis Boswell MD   IBUPROFEN 800 MG OR TABS TAKE ONE TABLET 3 TIMES A DAY WITH FOOD 07   Luis Boswell MD   LISINOPRIL-HYDROCHLOROTHIAZIDE 20-12.5 MG OR TABS TWO DAILY IN THE MORNING 08   Luis Boswell MD   MECLIZINE HCL 25 MG OR CAPS 1 CAPSULE 3 TIMES DAILY AS NEEDED 07   Luis Boswell MD   OXYCODONE HCL 5 MG OR TABS ONE TABLET EVERY 6 HOURS, AS NEEDED FOR SEVERE PAIN 08   Luis Berger MD   OXYCODONE HCL 5 MG OR TABS ONE TABLET EVERY 6 HOURS, AS NEEDED FOR SEVERE PAIN 08   Luis Boswell MD   PEGASYS 180 MCG/0.5ML SC KIT  07   Luis Boswell MD   RIBAVIRIN 600 MG OR TABS 1 TABLET TWICE DAILY WITH FOOD 07   Luis Boswell MD       Scheduled Meds      Infusion Meds      PRN Meds      Allergies   Allergies   Allergen Reactions     No Known Allergies      Family History   Family History   Problem Relation Age of Onset     Cancer Mother         d:age37 ovarian     Respiratory Father         d:age 59      () Brother         b:5 asthma     Family History Negative Brother         b:     Social History   Social History     Tobacco Use     Smoking status: Former Smoker     Last attempt to quit: 2003     Years since quittin.6   Substance Use Topics     Alcohol use: No     Drug use: No       Review of Systems   The 10 point Review of Systems is negative other than noted in the HPI or here.        Physical Examination   Vitals: There were no vitals taken for this visit.  General: Patient lying in bed, NAD  HEENT: NC/AT, no icterus, moist mucous membranes  Cardiac: RRR  Chest: non-labored on RA  Abdomen: S/NT/ND  Extremities: Warm, no  edema, large left arm hematoma with pressure bandage. Left ankle with gouty tophi.   Skin: No rash or lesion   Psych: Affect appropriate for situation   Neuro:  Mental status: Awake, alert, attentive, oriented to self, time, place, and circumstance. Language is fluent with intact comprehension of complex commands.  Cranial nerves: Pupils are equal, afferent defect on the right, EOMI, right eye vision with just some superior temporal quadrant vision, unable to count fingers, visual fields intact in the left eye, face with right sided lower droop, shoulder shrug strong, tongue protrusion/uvula midline, no dysarthria.   Motor: Normal muscle bulk and tone. No abnormal movements. 5/5 strength in 4/4 extremities.   Sensory: Intact to light touch, has left led medial foot/lower leg numbness related to attempt of femoral a stent placement.   Coordination: FNF without ataxia or dysmetria.   Gait: deferred         Dysphagia Screen  NPO due to facial droop.     Modified Tiffanie Scale (pre-stroke):   0-No symptoms     Stroke Scales    NIHSS  Interval     Interval Comments     1a. Level of Consciousness 0-->Alert, keenly responsive   1b. LOC Questions 0-->Answers both questions correctly   1c. LOC Commands 0-->Performs both tasks correctly   2.   Best Gaze 0-->Normal   3.   Visual 1-->Partial hemianopia   4.   Facial Palsy 1-->Minor paralysis (flattened nasolabial fold, asymmetry on smiling)   5a. Motor Arm, Left 0-->No drift, limb holds 90 (or 45) degrees for full 10 secs   5b. Motor Arm, Right 0-->No drift, limb holds 90 (or 45) degrees for full 10 secs   6a. Motor Leg, Left 0-->No drift, leg holds 30 degree position for full 5 secs   6b. Motor Leg, right 0-->No drift, leg holds 30 degree position for full 5 secs   7.   Limb Ataxia 0-->Absent   8.   Sensory 0-->Normal, no sensory loss   9.   Best Language 0-->No aphasia, normal   10. Dysarthria 0-->Normal   11. Extinction and Inattention  0-->No abnormality   Total 2 (08/20/20  0545)       Imaging  I personally reviewed all imaging; relevant findings per the HPI.    EXAM: CTA HEAD AND NECK : 8/20/2020   CONTENTS: No intracranial hemorrhage, extraaxial collection, or mass effect. No CT evidence of acute infarct. Mild presumed chronic small vessel ischemic changes. Mild generalized volume loss. No hydrocephalus. Redemonstration of chronic lacunar infarcts in the right caudate nucleus and in the left cerebellar hemisphere. VISUALIZED ORBITS/SINUSES/MASTOIDS: No intraorbital abnormality. No paranasal sinus mucosal disease. No middle ear or mastoid effusion. BONES/SOFT TISSUES: No acute abnormality. HEAD CTA: ANTERIOR CIRCULATION: No intraluminal filling defect, flow-limiting stenosis or occlusion of the major vessels of the anterior circulation. There are moderate atheromatous changes in the carotid siphons. Within the limits of CTA, no convincing intracranial aneurysm or high flow vascular lesion. Standard [place] of [place] anatomy. POSTERIOR CIRCULATION: Segmental atheromatous changes in the intradural vertebral arteries bilaterally with approximate 70% stenosis of the left vertebral artery and 50% stenosis of the right vertebral artery. The basilar artery is patent, demonstrates mild atheromatous changes. No intraluminal filling defect, flow-limiting stenosis or occlusion of the major vessels. Within the limits of CTA, no convincing intracranial aneurysm or high flow vascular lesion. DURAL VENOUS SINUSES: Expected enhancement of the major dural venous sinuses. NECK CTA: RIGHT CAROTID: Scattered areas of atheromatous calcified and noncalcified plaque extend throughout the right common carotid artery. There are mild atheromatous changes in the proximal right internal carotid artery without a measurable stenosis based on NASCET criteria. LEFT CAROTID: Diffuse atheromatous changes are demonstrated in the left common carotid artery with greater than 50% segmental narrowing in the mid cervical  portion. There is extensive calcified and noncalcified atheromatous plaque in the proximal left internal carotid artery with estimated luminal diameter stenosis of approximately 80% based on NASCET criteria. VERTEBRAL ARTERIES: There are scattered areas of atheromatous calcification in both vertebral arteries without a definite flow limiting stenosis. Balanced vertebral arteries. AORTIC ARCH: Classic aortic arch anatomy. There is a moderate to severe stenosis of the origin of the left subclavian artery and moderate stenosis at the origin of the left common carotid artery. There are diffuse atheromatous changes in the demonstrated aortic arch. NONVASCULAR STRUCTURES: Unremarkable.     HEAD CT: 1. No CT evidence of acute intracranial hemorrhage, mass or recent transcortical infarct. 2. Chronic lacunar infarcts right caudate nucleus and left cerebellar hemisphere. 3. Mild age-related changes. HEAD CTA: 1. No acute thromboembolism, flow-limiting stenosis or occlusion of the major vessels of the anterior and posterior circulation. 2. Evidence of intracranial atherosclerosis as highlighted above. NECK CTA: 1. Approximate 80% stenosis of the proximal left internal carotid artery based on NASCET criteria. 2. There are diffuse atheromatous changes involving both common carotid arteries more prominently on the left where there is at least a 50% segmental narrowing in the mid cervical portion. 3. There are moderate to severe stenoses at the origins of the left common carotid artery and the left subclavian artery. 4. Scattered atheromatous changes in the vertebral arteries.     MRI 2018   There is no restricted diffusion. Small chronic lacunar infarcts in the left greater than right cerebellar hemispheres. There is no mass effect, midline shift, or extraaxial collection. No evidence for acute or chronic intracranial blood products.     Carotid US 2018   1.  Moderate atheromatous plaque in the carotid arteries.  2.  High-grade,  70-99% diameter stenosis of the right ICA.  3.  Moderate, 50-69% diameter stenosis left ICA.    Lab Results Data   Pending        Stroke Code / Stroke Consult Data Data    Not a stroke code

## 2020-08-20 NOTE — PROGRESS NOTES
08/20/20 1117   General Information   Onset Date 08/20/20   Start of Care Date 08/20/20   Referring Physician Latanya Noel MD    Patient Profile Review/OT: Additional Occupational Profile Info See Profile for full history and prior level of function   Patient/Family Goals Statement To have something to drink   Swallowing Evaluation Bedside swallow evaluation   Behaviorial Observations WFL (within functional limits)   Mode of current nutrition NPO   Respiratory Status Room air   Comments Young Ordonez is a 70 year old male with past medical history of hypertension, hyperlipidemia, CAD, ischemic cardiomyopathy, CKD3, PAD s/p bl femoral artery bypass graft, right carotid endarterextomy (2018) after 3 episodes of amaurosis fugax,  HCV, COPD,  MI who presents today after receiving TPA for acute onset right eye nasal visual field loss at 1215 AM at [facility] ED. He is transferred to us for post-TPA monitoring and stroke work up. He has persistent visual deficit in the right eye with some intact right upper quadrant vision.  Per pt report and chart review, no hx of swallowing or speech-language deficits.   Clinical Swallow Evaluation   Oral Musculature anomalies present   Structural Abnormalities none present   Dentition other (see comments)  (Missing upper and lower dentition, WFL)   Mucosal Quality good   Mandibular Strength and Mobility intact   Oral Labial Strength and Mobility WFL   Lingual Strength and Mobility WFL   Buccal Strength and Mobility intact   Laryngeal Function Cough;Throat clear;Swallow;Voicing initiated   Oral Musculature Comments Mild R facial droop   Additional Documentation Yes   Swallow Eval   Feeding Assistance no assistance needed   Clinical Swallow Eval: Thin Liquid Texture Trial   Mode of Presentation, Thin Liquids cup;straw;self-fed   Volume of Liquid or Food Presented 4 oz   Oral Phase of Swallow WFL   Pharyngeal Phase of Swallow intact   Diagnostic Statement No overt s/sx of  aspiration, oral phase WFL   Clinical Swallow Eval: Puree Solid Texture Trial   Mode of Presentation, Puree spoon   Volume of Puree Presented 3 TBSP   Oral Phase, Puree WFL   Pharyngeal Phase, Puree intact   Diagnostic Statement No overt s/sx of aspiration, oral phase WFL   Clinical Swallow Eval: Solid Food Texture Trial   Mode of Presentation, Solid self-fed   Volume of Solid Food Presented 1 cracker   Oral Phase, Solid WFL   Pharyngeal Phase, Solid intact   Diagnostic Statement No overt s/sx of aspiration, oral phase WFL   Swallow Compensations   Swallow Compensations No compensations were used   Esophageal Phase of Swallow   Patient reports or presents with symptoms of esophageal dysphagia No   General Therapy Interventions   Planned Therapy Interventions Dysphagia Treatment   Dysphagia treatment Instruction of safe swallow strategies   Swallow Eval: Clinical Impressions   Skilled Criteria for Therapy Intervention No problems identified which require skilled intervention   Functional Assessment Scale (FAS) 7   Treatment Diagnosis Functional oropharyngeal swallowing mechanism   Diet texture recommendations Regular diet;Thin liquids   Recommended Feeding/Eating Techniques alternate between small bites and sips of food/liquid;small sips/bites;maintain upright posture during/after eating for 30 mins   Demonstrates Need for Referral to Another Service occupational therapy;physical therapy   Therapy Frequency   (x1 with bedside swallow evaluation)   Predicted Duration of Therapy Intervention (days/wks) 1 session   Anticipated Discharge Disposition home   Risks and Benefits of Treatment have been explained. Yes   Patient, family and/or staff in agreement with Plan of Care Yes   Clinical Impression Comments Clinical swallow evaluation completed per MD order.  Pt presents with a functional oropharyngeal swallowing mechanism in the setting of stroke.  Recommend regular diet and thin liquids. Ensure pt is fully alert and  upright for all PO, given small bites/sips, alternating bites/sips.  Pt awake and alert. Mild R facial droop.  No overt s/sx of aspiration with thin via cup/straw, puree, solid texture.  Oral phase WFL.  Pt following safe swallow precautions without cues. Per pt report and informal observation by SLP, speech and language skills at baseline PTA. SLP to complete order.   Total Evaluation Time   Total Evaluation Time (Minutes) 9

## 2020-08-20 NOTE — UTILIZATION REVIEW
Admission Status; Secondary Review Determination       Under the authority of the Utilization Management Committee, the utilization review process indicated a secondary review on the above patient. The review outcome is based on review of the medical records, discussions with staff, and applying clinical experience noted on the date of the review.     (x) Inpatient Status Appropriate - This patient's medical care is consistent with medical management for inpatient care and reasonable inpatient medical practice.     RATIONALE FOR DETERMINATION   70 year old male with past medical history of hypertension, hyperlipidemia, CAD, ischemic cardiomyopathy, CKD3, PAD s/p bl femoral artery bypass graft, right carotid endarterextomy (2018) after 3 episodes of amaurosis fugax,  HCV, COPD,  MI who presents today after receiving TPA for acute onset right eye nasal visual field loss at 1215 AM at [facility] ED. He is transferred to us for post-TPA monitoring and stroke work up. He has persistent visual deficit in the right eye with some intact right upper quadrant vision.    Acute stroke post TPA patient presented and started receiving care at an outside emergency room prior to midnight. The expected length of stay at the time of admission was more than 2 nights because of the severity of illness, intensity of service provided, and risk for adverse outcome. Inpatient admission is appropriate.     This document was produced using voice recognition software       The information on this document is developed by the utilization review team in order for the business office to ensure compliance. This only denotes the appropriateness of proper admission status and does not reflect the quality of care rendered.   The definitions of Inpatient Status and Observation Status used in making the determination above are those provided in the CMS Coverage Manual, Chapter 1 and Chapter 6, section 70.4.   Sincerely,   KARIN ARMENTA MD   System  Medical Director   Utilization Management   [facility].

## 2020-08-20 NOTE — PLAN OF CARE
Discharge Planner SLP   Patient plan for discharge: Pt did not state  Current status: Clinical swallow evaluation completed per MD order.  Pt presents with a functional oropharyngeal swallowing mechanism in the setting of stroke.  Recommend regular diet and thin liquids. Ensure pt is fully alert and upright for all PO, given small bites/sips, alternating bites/sips.  Pt awake and alert. Mild R facial droop.  No overt s/sx of aspiration with thin via cup/straw, puree, solid texture.  Oral phase WFL.  Pt following safe swallow precautions without cues. Per pt report and informal observation by SLP, speech and language skills at baseline PTA. SLP to complete order.  Barriers to return to prior living situation: None per SLP perspective  Recommendations for discharge: Defer to PT/OT  Rationale for recommendations: Functional oropharyngeal swallowing mechanism       Entered by: Radha Nj 08/20/2020 11:25 AM

## 2020-08-20 NOTE — LETTER
This letter is to give you information only. No action is required on your part.                               Beneficiary Notification Letter - BPCI Advanced                     Your Doctor or Hospital Has Joined Medicare s New Payment and                                                          Service Delivery Model     Hello,     We wanted to let you know that your health care provider, [facility], has volunteered to take part in our Centers for Medicare & Medicaid Services (CMS) Bundled Payments for Care Improvement Advanced Model (BPCI Advanced). This doesn t change your Medicare rights or benefits and you don t need to do anything.      What are bundled payments?   A bundled payment combines, or bundles together, payments that Medicare makes to your health care providers for the many different kinds of medical services you might get in a specific time period. In BPCI Advanced, this time period could include a hospital inpatient stay or outpatient procedure, plus 90 days.     Why would Medicare bundle payments?   Bundled payments are thought of as a  value-based  way to pay because health care providers are responsible for both the quality and cost of medical care they give. This is a relatively new way of paying health care providers compared to the fee-for-service  way Medicare has traditionally paid, where providers are paid separately for each service they provide. Bundled payments encourage these providers to work together to provide better, more coordinated care during your hospital stay, or outpatient procedure, and through your recovery.     What does BPCI Advanced mean for me?   You re more likely to get even better care when hospitals, doctors, and other health care providers work together. In BPCI Advanced, hospitals, doctors, and other health care providers may be rewarded for providing better, more coordinated health care. Medicare will watch BPCI Advanced participants  closely to make sure that you and other patients keep getting efficient, high quality care.     What do I need to know about BPCI Advanced?   What s most important for you to know is that your Medicare rights and benefits don t change because your health care provider is participating in BPCI Advanced. Medicare will keep covering all of your medically necessary services.       January 2019    Beneficiary Notification Letter - BPCI Advanced (page 2)     Even though Medicare will pay your doctor in a different way under BPCI Advanced, how much you have to pay won t change. Health care providers and suppliers who are enrolled in Medicare will submit their Medicare claims like they always have.     You ll have all the same Medicare rights and protections, including the right to choose which hospital, doctor, or other health care provider you see. If you don t want to get care from a health care provider who s participating in BPCI Advanced, then you ll have to choose a different health care provider who s not participating in the Model.     How can I give feedback about my health care?   Medicare might ask you to take a voluntary survey about the services and care you received from [facility] during your hospital stay or outpatient procedure and for a specific period of time afterwards. You can decide whether you want to take the voluntary survey, but if you do, it ll help Medicare make BPCI Advanced and the care of other Medicare patients better.     If you have concerns or complaints about your care, you can:     Talk to your doctor or health care provider.     Contact your Beneficiary and Family Centered Care Quality Improvement Organization ([facility]IO). You can get your [facility]-QIO s phone number at Medicare.gov/contacts or by calling 1-800-MEDICARE. [employer] users can call 1-640.754.4933.     Where can I learn more about BPCI Advanced?   Learn more about BPCI Advanced at  https://innovation.cms.gov/initiatives/bpci-advanced/:     A list of all the hospitals and physician group practices in the country participating in BPCI Advanced.     All of the inpatient and outpatient Clinical Episodes that are currently included under BPCI Advanced. A Clinical Episode is a grouping of medical conditions or diagnoses that are included in the BPCI Advanced Model.                                   January 2019

## 2020-08-20 NOTE — CONSULTS
OPHTHALMOLOGY CONSULT NOTE  08/20/20    Patient: Young Ordonez      HISTORY OF PRESENTING ILLNESS:     Young Ordonez is a 70 year old male who presents with loss of vision in the right eye. He received TPA yesterday 08/19/2020. In 2018, there is a history of amaurosis fugax which was thought to be from carotid artery occlusion and s/p endarterectomy. Medical history is also significant. For hypertension, CAD, ischemic cardiomyopathy, CKD stage 3, and PAD s/p femoral artery bypass graft. Patient complains of vision loss since 12:30AM 08/20/2020. There is one area he can see light. No eye pain. No headaches. No scalp tenderness. No jaw claudication.       10+ review of systems were otherwise negative except for that which has been stated above.      OCULAR/MEDICAL/SURGICAL HISTORIES:     Past Ocular History:  Cataract both eyes.    Family history of eye disease: none    Not a smoker.     Past Medical History:   Diagnosis Date     BENIGN HYPERTENSION      DEPRESSIVE DISORDER NEC 4/29/2008     HEPATITIS C CARRIER (H)      HYPERLIPIDEMIA NEC/NOS      MI NOS EPISODE CARE UNSPEC 1995     PEYRONIE'S DISEASE        Past Surgical History:   Procedure Laterality Date     NONSPECIFIC PROCEDURE  1995    MI -- angioplasty       EXAMINATION:     Base Eye Exam     Visual Acuity       Right Left    Near cc HM 20/20          Tonometry (TONOPEN, 2:15 PM)       Right Left    Pressure 16 16          Pupils       APD    Right (+) apd    Left           Visual Fields       Left Right     Full     Restrictions  Total superior temporal, inferior temporal, superior nasal, inferior nasal deficiencies          Extraocular Movement       Right Left     Full Full          Neuro/Psych     Oriented x3:  Yes    Mood/Affect:  Normal          Dilation     Both eyes:  1.0% Mydriacyl, 2.5% Michel Synephrine @ 2:14 PM            Slit Lamp and Fundus Exam     External Exam       Right Left    External Normal Normal          Slit Lamp Exam       Right  Left    Lids/Lashes Normal Normal    Conjunctiva/Sclera White and quiet White and quiet    Cornea Clear Clear    Anterior Chamber Deep and quiet Deep and quiet    Iris Round and reactive Round and reactive    Lens NSC NSC          Fundus Exam       Right Left    Vitreous Normal Normal    Disc Normal, no edema, no pallor Normal, no edema, no pallor    C/D Ratio Vertical 0.3 0.3    C/D Ratio Horizontal 0.3 0.3    Macula CRAO - cilioretinal artery sparing Normal    Vessels Normal Normal    Periphery Normal Normal                     ASSESSMENT/PLAN:       #Central Retinal Artery Occlusion, right eye.   #Cilioretinal Artery Sparing, right eye.   Young Ordonez is a 70 year old male who presents with vision loss in the right eye since 12:30AM today 08/20/2020. MRI today 08/20/2020 does not show any evidence of an acute infarct. There is no jaw claudication, no headache, and no scalp tenderness. He does endorse 10 pounds unintentional weight loss since 3-4 months ago. The index of suspicion for GCA is low. Though given age, race, and recent weight loss, I recommend ESR and CRP to rule out GCA.     Plan:  - Recommend lab testing to include ESR and CRP.   - See retina tomorrow 08/21/2020 at 7:30AM with Dr. Jensen at [facility] 9th floor [facility] - primary team, please assist with transport.       It is our pleasure to participate in this patient's care and treatment. Please contact us with any further questions or concerns.      Keegan Campbell OD  Adjunct   Ophthalmology   Pager: [phone]

## 2020-08-21 ENCOUNTER — APPOINTMENT (OUTPATIENT)
Dept: OCCUPATIONAL THERAPY | Facility: CLINIC | Age: 70
DRG: 123 | End: 2020-08-21
Attending: PSYCHIATRY & NEUROLOGY
Payer: COMMERCIAL

## 2020-08-21 ENCOUNTER — OFFICE VISIT (OUTPATIENT)
Dept: OPHTHALMOLOGY | Facility: CLINIC | Age: 70
DRG: 123 | End: 2020-08-21
Attending: OPHTHALMOLOGY
Payer: COMMERCIAL

## 2020-08-21 DIAGNOSIS — H34.11 CRAO (CENTRAL RETINAL ARTERY OCCLUSION), RIGHT: Primary | ICD-10-CM

## 2020-08-21 LAB
CHOLEST SERPL-MCNC: 97 MG/DL
HDLC SERPL-MCNC: 50 MG/DL
INTERPRETATION ECG - MUSE: NORMAL
LDLC SERPL CALC-MCNC: 35 MG/DL
MAGNESIUM SERPL-MCNC: 2.4 MG/DL (ref 1.6–2.3)
NONHDLC SERPL-MCNC: 47 MG/DL
POTASSIUM SERPL-SCNC: 3.4 MMOL/L (ref 3.4–5.3)
SARS-COV-2 RNA SPEC QL NAA+PROBE: NOT DETECTED
SPECIMEN SOURCE: NORMAL
TRIGL SERPL-MCNC: 60 MG/DL

## 2020-08-21 PROCEDURE — G0463 HOSPITAL OUTPT CLINIC VISIT: HCPCS | Mod: ZF

## 2020-08-21 PROCEDURE — 84132 ASSAY OF SERUM POTASSIUM: CPT | Performed by: PSYCHIATRY & NEUROLOGY

## 2020-08-21 PROCEDURE — 83735 ASSAY OF MAGNESIUM: CPT | Performed by: COUNSELOR

## 2020-08-21 PROCEDURE — 92134 CPTRZ OPH DX IMG PST SGM RTA: CPT | Mod: ZF | Performed by: OPHTHALMOLOGY

## 2020-08-21 PROCEDURE — 97535 SELF CARE MNGMENT TRAINING: CPT | Mod: GO | Performed by: OCCUPATIONAL THERAPIST

## 2020-08-21 PROCEDURE — 25000128 H RX IP 250 OP 636: Performed by: PSYCHIATRY & NEUROLOGY

## 2020-08-21 PROCEDURE — 36415 COLL VENOUS BLD VENIPUNCTURE: CPT | Performed by: COUNSELOR

## 2020-08-21 PROCEDURE — 25000132 ZZH RX MED GY IP 250 OP 250 PS 637: Performed by: COUNSELOR

## 2020-08-21 PROCEDURE — 80061 LIPID PANEL: CPT | Performed by: COUNSELOR

## 2020-08-21 PROCEDURE — 92250 FUNDUS PHOTOGRAPHY W/I&R: CPT | Mod: ZF | Performed by: OPHTHALMOLOGY

## 2020-08-21 PROCEDURE — 97166 OT EVAL MOD COMPLEX 45 MIN: CPT | Mod: GO | Performed by: OCCUPATIONAL THERAPIST

## 2020-08-21 PROCEDURE — 92235 FLUORESCEIN ANGRPH MLTIFRAME: CPT | Mod: ZF | Performed by: OPHTHALMOLOGY

## 2020-08-21 PROCEDURE — 25000132 ZZH RX MED GY IP 250 OP 250 PS 637: Performed by: NURSE PRACTITIONER

## 2020-08-21 PROCEDURE — 25000132 ZZH RX MED GY IP 250 OP 250 PS 637: Performed by: PSYCHIATRY & NEUROLOGY

## 2020-08-21 PROCEDURE — 12000001 ZZH R&B MED SURG/OB UMMC

## 2020-08-21 PROCEDURE — 84132 ASSAY OF SERUM POTASSIUM: CPT | Performed by: COUNSELOR

## 2020-08-21 RX ORDER — ATENOLOL 50 MG/1
100 TABLET ORAL DAILY
Status: DISCONTINUED | OUTPATIENT
Start: 2020-08-21 | End: 2020-08-22 | Stop reason: HOSPADM

## 2020-08-21 RX ORDER — ALLOPURINOL 300 MG/1
300 TABLET ORAL DAILY
COMMUNITY
End: 2022-03-02

## 2020-08-21 RX ORDER — ATENOLOL 100 MG/1
100 TABLET ORAL DAILY
COMMUNITY
End: 2022-01-09

## 2020-08-21 RX ORDER — ATORVASTATIN CALCIUM 80 MG/1
80 TABLET, FILM COATED ORAL EVERY EVENING
Status: DISCONTINUED | OUTPATIENT
Start: 2020-08-21 | End: 2020-08-22 | Stop reason: HOSPADM

## 2020-08-21 RX ORDER — COLCHICINE 0.6 MG/1
0.6 TABLET ORAL 2 TIMES DAILY
Status: DISCONTINUED | OUTPATIENT
Start: 2020-08-21 | End: 2020-08-21

## 2020-08-21 RX ORDER — LISINOPRIL 40 MG/1
40 TABLET ORAL DAILY
Status: DISCONTINUED | OUTPATIENT
Start: 2020-08-21 | End: 2020-08-22 | Stop reason: HOSPADM

## 2020-08-21 RX ORDER — CLOPIDOGREL BISULFATE 75 MG/1
75 TABLET ORAL DAILY
Status: DISCONTINUED | OUTPATIENT
Start: 2020-08-21 | End: 2020-08-22 | Stop reason: HOSPADM

## 2020-08-21 RX ORDER — TRAZODONE HYDROCHLORIDE 50 MG/1
50 TABLET, FILM COATED ORAL AT BEDTIME
Status: DISCONTINUED | OUTPATIENT
Start: 2020-08-21 | End: 2020-08-22 | Stop reason: HOSPADM

## 2020-08-21 RX ORDER — ASPIRIN 81 MG/1
81 TABLET, CHEWABLE ORAL DAILY
Status: DISCONTINUED | OUTPATIENT
Start: 2020-08-21 | End: 2020-08-22 | Stop reason: HOSPADM

## 2020-08-21 RX ORDER — LISINOPRIL 20 MG/1
20 TABLET ORAL DAILY
COMMUNITY
End: 2022-03-02

## 2020-08-21 RX ORDER — TRAZODONE HYDROCHLORIDE 50 MG/1
50 TABLET, FILM COATED ORAL AT BEDTIME
COMMUNITY
End: 2022-03-02

## 2020-08-21 RX ORDER — ASPIRIN 81 MG/1
81 TABLET ORAL DAILY
COMMUNITY
End: 2022-05-31

## 2020-08-21 RX ORDER — COLCHICINE 0.6 MG/1
0.6 CAPSULE ORAL 2 TIMES DAILY
Status: ON HOLD | COMMUNITY
End: 2020-08-22

## 2020-08-21 RX ORDER — ALLOPURINOL 300 MG/1
300 TABLET ORAL DAILY
Status: DISCONTINUED | OUTPATIENT
Start: 2020-08-21 | End: 2020-08-22 | Stop reason: HOSPADM

## 2020-08-21 RX ORDER — HYDRALAZINE HYDROCHLORIDE 20 MG/ML
10 INJECTION INTRAMUSCULAR; INTRAVENOUS ONCE
Status: COMPLETED | OUTPATIENT
Start: 2020-08-21 | End: 2020-08-21

## 2020-08-21 RX ORDER — AMLODIPINE BESYLATE 5 MG/1
5 TABLET ORAL DAILY
Status: DISCONTINUED | OUTPATIENT
Start: 2020-08-21 | End: 2020-08-22 | Stop reason: HOSPADM

## 2020-08-21 RX ORDER — AMLODIPINE BESYLATE 5 MG/1
5 TABLET ORAL DAILY
COMMUNITY
End: 2021-12-10

## 2020-08-21 RX ORDER — HYDROCHLOROTHIAZIDE 25 MG/1
25 TABLET ORAL DAILY
Status: DISCONTINUED | OUTPATIENT
Start: 2020-08-21 | End: 2020-08-22 | Stop reason: HOSPADM

## 2020-08-21 RX ORDER — CALCIUM CARBONATE 500 MG/1
500 TABLET, CHEWABLE ORAL DAILY PRN
Status: DISCONTINUED | OUTPATIENT
Start: 2020-08-21 | End: 2020-08-22 | Stop reason: HOSPADM

## 2020-08-21 RX ORDER — HYDRALAZINE HYDROCHLORIDE 20 MG/ML
INJECTION INTRAMUSCULAR; INTRAVENOUS
Status: DISCONTINUED
Start: 2020-08-21 | End: 2020-08-21 | Stop reason: HOSPADM

## 2020-08-21 RX ORDER — ATORVASTATIN CALCIUM 80 MG/1
80 TABLET, FILM COATED ORAL DAILY
COMMUNITY
End: 2022-03-19

## 2020-08-21 RX ORDER — LORAZEPAM 0.5 MG/1
0.5 TABLET ORAL
COMMUNITY
End: 2021-08-09

## 2020-08-21 RX ADMIN — ATORVASTATIN CALCIUM 80 MG: 80 TABLET, FILM COATED ORAL at 19:45

## 2020-08-21 RX ADMIN — CALCIUM CARBONATE (ANTACID) CHEW TAB 500 MG 500 MG: 500 CHEW TAB at 18:47

## 2020-08-21 RX ADMIN — ATENOLOL 100 MG: 25 TABLET ORAL at 12:07

## 2020-08-21 RX ADMIN — TRAZODONE HYDROCHLORIDE 50 MG: 50 TABLET ORAL at 22:21

## 2020-08-21 RX ADMIN — ASPIRIN 81 MG CHEWABLE TABLET 81 MG: 81 TABLET CHEWABLE at 12:07

## 2020-08-21 RX ADMIN — LISINOPRIL 40 MG: 40 TABLET ORAL at 12:07

## 2020-08-21 RX ADMIN — ALLOPURINOL 300 MG: 300 TABLET ORAL at 16:10

## 2020-08-21 RX ADMIN — CLOPIDOGREL BISULFATE 75 MG: 75 TABLET ORAL at 12:07

## 2020-08-21 RX ADMIN — POTASSIUM CHLORIDE 20 MEQ: 750 TABLET, EXTENDED RELEASE ORAL at 06:23

## 2020-08-21 RX ADMIN — HYDRALAZINE HYDROCHLORIDE 10 MG: 20 INJECTION INTRAMUSCULAR; INTRAVENOUS at 11:10

## 2020-08-21 RX ADMIN — SERTRALINE HYDROCHLORIDE 75 MG: 50 TABLET ORAL at 16:09

## 2020-08-21 RX ADMIN — AMLODIPINE BESYLATE 5 MG: 5 TABLET ORAL at 12:07

## 2020-08-21 RX ADMIN — HYDROCHLOROTHIAZIDE 25 MG: 25 TABLET ORAL at 12:07

## 2020-08-21 ASSESSMENT — VISUAL ACUITY
METHOD: SNELLEN - LINEAR
OS_SC+: +1
OD_SC: CF @ 2'
OS_SC: 20/20

## 2020-08-21 ASSESSMENT — MIFFLIN-ST. JEOR: SCORE: 1554.5

## 2020-08-21 ASSESSMENT — ACTIVITIES OF DAILY LIVING (ADL)
ADLS_ACUITY_SCORE: 10
ADLS_ACUITY_SCORE: 12
PREVIOUS_RESPONSIBILITIES: MEAL PREP;HOUSEKEEPING;LAUNDRY;SHOPPING;YARDWORK;MEDICATION MANAGEMENT;FINANCES;DRIVING
ADLS_ACUITY_SCORE: 12

## 2020-08-21 ASSESSMENT — CONF VISUAL FIELD
OD_SUPERIOR_NASAL_RESTRICTION: 1
OD_INFERIOR_TEMPORAL_RESTRICTION: 1
OD_INFERIOR_NASAL_RESTRICTION: 1
OS_NORMAL: 1
OD_SUPERIOR_TEMPORAL_RESTRICTION: 3

## 2020-08-21 ASSESSMENT — TONOMETRY
IOP_METHOD: TONOPEN
OS_IOP_MMHG: 11
OD_IOP_MMHG: 15

## 2020-08-21 ASSESSMENT — EXTERNAL EXAM - LEFT EYE: OS_EXAM: NORMAL

## 2020-08-21 ASSESSMENT — EXTERNAL EXAM - RIGHT EYE: OD_EXAM: NORMAL

## 2020-08-21 ASSESSMENT — SLIT LAMP EXAM - LIDS
COMMENTS: NORMAL
COMMENTS: NORMAL

## 2020-08-21 ASSESSMENT — CUP TO DISC RATIO
OS_RATIO: 0.2
OD_RATIO: 0.2

## 2020-08-21 NOTE — PROGRESS NOTES
Major Shift Events:   VSS. BP wnl. Did not give any labetolol for elevated BP tonoc.   AAO x4. Neuro deficit remains unchanged in that only able to see out of right eye as if looking through a straw. Denies pain. Slept poorly. Potassium replaced.  Plan: Today will have opthamology appt @ 0730 @ [facility] and then ID will come to see pt here.  Continue poc.  For vital signs and complete assessments, please see documentation flowsheets.

## 2020-08-21 NOTE — PHARMACY-ADMISSION MEDICATION HISTORY
Admission Medication History Completed by Pharmacy    See [facility] Admission Navigator for allergy information, preferred outpatient pharmacy, prior to admission medications and immunization status.     Medication History Sources:     Patient + Dispense History (Sure Scripts) +  search     Changes made to PTA medication list (reason):    Added: Allopurinol, amlodipine, atorvastatin, colchicine, lisinopril, lorazepam, sertraline, trazodone (all added per patient and Sure Scripts)     Deleted: Betamethasone, citalopram, ibuprofen, lisinopril-hydrochlorothiazide, meclizine, oxycodone, ribavirin (all old RX's from ~10 years ago, not taking per patient)     Changed: Atenolol 50 mg daily --> 100 mg daily (per patient and Sure Scripts)     Additional Information:    Patient a reliable historian as he was able to verify dosing, frequency, and last times of administration of his medications.     Per  search, patient filled on 8/4/2020: Lorazepam 1 mg #30 for 30 days supply. Fills almost exactly every 30 days.     Per patient, he is suppose to take colchicine 0.6 mg twice daily but due to the cost of this medication, he started taking the medication every third day to make the medication last. Per patient he ran out about two weeks ago. Though he still has a supply of allopurinol at home, he was taking his allopurinol the same way as his colchicine as he was under the impression he had to take the medications together in order to treat his gout.       Prior to Admission medications    Medication Sig Last Dose Taking? Auth Provider   allopurinol (ZYLOPRIM) 300 MG tablet Take 300 mg by mouth daily Past Month at Unknown time Yes Unknown, Entered By History   amLODIPine (NORVASC) 5 MG tablet Take 5 mg by mouth daily 8/19/2020 at PM Yes Unknown, Entered By History   aspirin 81 MG EC tablet Take 81 mg by mouth daily 8/19/2020 at PM Yes Unknown, Entered By History   atenolol (TENORMIN) 100 MG tablet Take 100 mg by mouth daily  8/19/2020 at PM Yes Unknown, Entered By History   atorvastatin (LIPITOR) 80 MG tablet Take 80 mg by mouth daily 8/19/2020 at PM Yes Unknown, Entered By History   colchicine (MITIGARE) 0.6 MG capsule Take 0.6 mg by mouth 2 times daily  Past Month at Unknown time Yes Unknown, Entered By History   lisinopril (ZESTRIL) 20 MG tablet Take 20 mg by mouth daily 8/19/2020 at PM Yes Unknown, Entered By History   LORazepam (ATIVAN) 0.5 MG tablet Take 0.5 mg by mouth nightly as needed for anxiety Past Week at HS Yes Unknown, Entered By History   sertraline (ZOLOFT) 50 MG tablet Take 75 mg by mouth daily 8/19/2020 at PM Yes Unknown, Entered By History   traZODone (DESYREL) 50 MG tablet Take 50 mg by mouth At Bedtime Past Week at HS Yes Unknown, Entered By History       Date completed: 08/21/20    Medication history completed by: Anup May, PharmD

## 2020-08-21 NOTE — PROGRESS NOTES
Nurse Care Coordination was consulted to assess needs of patient following stroke. Chart was reviewed and discussed with interdisciplinary team. Nurse care coordination needs are not identified at this time.  RNCC will cont to follow plan of care.      Kamryn Martinez RN, PHN, BSN  4A and 4E/ ICU  Care Coordinator  Phone: [phone]  Pager: [phone]

## 2020-08-21 NOTE — PLAN OF CARE
Discharge Planner OT   4A ETTAAL TX and DISCHARGE   Patient plan for discharge: home w OP therapies  Current status: Pt and wife present, loss of vision limiting safety with IADLs such as driving and community mobility. Educated re dc rec for OP OT and compensatory strategies.  Rec no driving until cleared by MD due to vision loss BP over parameters after OOB: 164/75   192/86, remained in room only at this time.  No LOB IND with functional transfers and ADLs standing. Ok to mobilize IND.  Precautions SBP <180  Barriers to return to prior living situation: loss of vision, fall risk  Recommendations for discharge: home w A PRN, no driving until cleared by MD, OP OT Vision Therapy   Rationale for recommendations: Vision therapy for compensatory strategies and return to IND IADLs such as driving.       Entered by: Za Do 08/21/2020 3:33 PM

## 2020-08-21 NOTE — PROGRESS NOTES
"Brief Ophthalmology Note    S: Mr. Ordonez is a 70 year old male who was admitted to neuro ICU transferred from [facility] ED following acute onset of right eye vision loss at 1230 AM 8/20 and receiving tPA at OSH. He has a history of three episodes of amaurosis fugax in the right eye and is s/p endarterectomy (2018). He also has a history of htn, CAD, CKD3, PAD s/p bilateral femoral artery bypass grafts. He is accompanied by his wife at bedside. He reports that his vision has been getting worse since onset but he can see his wife's shape out of the corner of his right eye, but not much else. Denies pain, night sweats, joint aches, fevers, but does report he has fluctuating weight where he looses weight in the summer and gains it back in the winter.     O: Agree with examination as documented by Dr. Campbell.  Patient is able to recognize parts of my hand at half a foot away if he moves his head and finds the \"right spot\", unable to count fingers consistently in the right eye.   Anterior bedside slit lamp exam is significant for bilateral cataracts.   Indirect ophthalmoscopy exam is significant for cherry red spot in the right eye with white edematous macula with nasal sparing of retina that does not appear edematous.   Left eye retina appears within normal limits.    A&P: Agree with assessment and plan as documented by Dr. Campbell  - Patient admitted to Neuro ICU transferred to the [facility] from [facility] ED for close monitoring post TPA and for stroke work up per stroke team  - Although based on history there is low suspicion for GCA, GCA can present as CRAO therefore would recommend ESR and CRP labs  - Discussed monocular precautions  - Retina evaluation in the clinic with further studies on 8/20 @730AM    Ena Alcazar MD  Ophthalmology PGY-2      "

## 2020-08-21 NOTE — PLAN OF CARE
Arrived from: 4A  Belongings/meds: cell phone, watch, , hat, shorts   2 RN Skin Assessment Completed by:  Silvia JARRELL And Chance ISRAEL  Non-intact findings documented (yes/no/NA): intact ex RUE large bruise/hematoma from OSH during PIV placement.

## 2020-08-21 NOTE — PROGRESS NOTES
CC -  CRAO OD    INTERVAL HISTORY - Initial visit with me. Vision remains poor in the right eye - HM 8/20 with +APD and cherry red spot per inpatient consult service.     HPI -   Young Ordonez is a  70 year old year-old patient with a vasculopathic history (HTN, CAD, CKD, PAD s/p femoral artery bypass graft) transferred from OSH for CRAO OD, s/p TPA 8/19/20. History of amaurosis fugax, s/p right CEA 4/2018.       PAST OCULAR SURGERY  None    RETINAL IMAGING:  OCT 08/21/20    OD - severe inner retinal edema, PHF attached  OS - retina normal, PHF attached    FA 08/21/20   OD (transit) - normal choroidal filling, very slow arterial filling, possible ciiloretinal filling of nasal macula peripapillary region, ?staining  artery on ONH  OS - normal      ASSESSMENT & PLAN    1. CRAO, OD, with cilioretinal artery sparing   - Onset 8/19/20 of sudden painless vision loss    - Etiology: suspect atherosclerotic plaque; thrombus visible in arteriolar branch (photo 8/2020)   - ESR, CRP normal, no HA, no temporal pain, no fevers   - had 10# weight loss unclear why   - low suspicion for GCA     - MRI brain without acute infarct    - CTA without significant narrowing of the right ICA       - FA with delayed arterial filling ?embolus visible on exam   - OCT with inner hyperreflectivity and diffuse edema      - Agree with hospitalization   - Stroke workup, per primary team   - RTC in 1 month       2. H/o amaurosis fugax, OD   -s/p CEA 4/2018     3. NSC, OU mild/mod   - observe        return to clinic: 1 month, OCT OU    Harsha Gomez MD  Ophthalmology PGY-3  [facility]     ATTESTATION     Attending Attestation:     Complete documentation of historical and exam elements from today's encounter can be found in the full encounter summary report (not reduplicated in this progress note).  I personally obtained the chief complaint(s) and history of present illness.  I confirmed and edited as necessary the review of systems,  past medical/surgical history, family history, social history, and examination findings as documented by others; and I examined the patient myself.  I personally reviewed the relevant tests, images, and reports as documented above.  I personally reviewed the ophthalmic test(s) associated with this encounter, agree with the interpretation(s) as documented by the resident/fellow, and have edited the corresponding report(s) as necessary.   I formulated and edited as necessary the assessment and plan and discussed the findings and management plan with the patient and family    Shelby Jensen MD, PhD  , Vitreoretinal Surgery  Department of Ophthalmology  [facility]

## 2020-08-21 NOTE — PROGRESS NOTES
[facility]  NeuroCritical Care Progress Note    Patient Name:  Young Ordonez  MRN:  [id number]    :  1950  Date of Service:  2020  Primary care provider:  Ike Valdes        24 HOUR EVENTS:  No events overnight. Plan to resume home medications today. Plan for discharge home tomorrow.    ASSESSMENT/PLAN:  Young Ordonez is a 70 year old with an h/o HTN, HLD, CAD, CKD3, PAD, MI, COPD, HCV, endart , admitted 2020 with R eye blindness post TPA.       NEURO:  #retinal artery occlusion s/p TPA.  -Post TPA care  -Imaging completed.  -Optho consulted- Plan to follow up in their clinic 1 month post discharge.  -Resume ASA 81 daily  -Start Plavix 75 daily    #MDD  -restart sertraline 75mg daily  -restart trazodone 50 nightly      CARDIO:  #HTN  #HLD  #CAD  #PAD  -SBP < 180/105  -Home BP goal of 130/80  Resume home atenolol, lisinopril, hydrochlorothiazide, amlodipine, atorvastatin.      PULM:  #COPD  -On rooms air      GI:  #ELLA  -Diet ordered  - Bowel regimen: none  - Last BM: PTA  - GI ppx: none      RENAL:  #CKDIII  -Daily BMP      ENDO:  #gout  -resume home gout meds  - Follow glucose levels    HEME:  #Hep C  - Daily CBC      ID:  #ELLA  -Daily CBC      Checklist:  FEN: none, replacement protocol, regular diet  LDA: PIV  PPx:   - DVT: SCD's,   - GI: none  Code:full    Dispo: transfer/discharge home       Patient discussed with Attending Dr. Sullivan .    Rick Gomez DNP  Ascom: [phone]  2020      ______________________________    24 HOUR VITAL SIGNS SUMMARY:   Temperatures:  Current - Temp: 98.5  F (36.9  C); Max - Temp  Av.1  F (36.7  C)  Min: 97.4  F (36.3  C)  Max: 98.6  F (37  C)  Respiration range: Resp  Av.8  Min: 0  Max: 22  Pulse range: Pulse  Av.9  Min: 58  Max: 70  Blood pressure range: Systolic (24hrs), Av , Min:99 , Max:195   ; Diastolic (24hrs), Av, Min:49, Max:104    Pulse oximetry range: SpO2  Av %   "Min: 90 %  Max: 97 %    VENTILATOR SETTINGS:  Resp: 11        Intake/Output Summary (Last 24 hours) at 8/21/2020 1512  Last data filed at 8/21/2020 0400  Gross per 24 hour   Intake 150 ml   Output 475 ml   Net -325 ml       BP - Mean:  [] 102    PHYSICAL EXAMINATION:   BP (!) 148/70   Pulse 70   Temp 98.5  F (36.9  C) (Axillary)   Resp 11   Ht 1.727 m (5' 8\")   Wt 82 kg (180 lb 12.4 oz)   SpO2 97%   BMI 27.49 kg/m      No sedation    General: Awake and alert, Lying in bed, NAD, cooperative  HEENT: Normocephalic, atraumatic, no epistaxis, no oral lesions, MMM  Resp: CTAB, no increased work of breathing  Cardio: RRR  Abdomen: +BS, Soft, non-distended, non-tender  Extremities: Warm and well perfused, peripheral pulses present, no edema  Skin: Not jaundiced, no rash, ecchymoses on left arm  Psych: Normal mood and affect    Neuro:  Mental status: Awake, alert, attentive; oriented to P/P/T/S  Speech: Fluent, comprehension and repetition intact; No dysarthria  Cranial nerves: blindness in right eye, Eyes conjugate, PERRLA, EOMI w/ normal and smooth pursuit, face expression symmetric, facial sensation intact and symmetric, hearing intact to conversation, shoulder shrug strong, palate rise symmetric, tongue/uvula midline.  Motor: Tone normal. 5/5 strength in x4E. No atrophy or twitches. No Pronator drift present. Finger tapping symmetric. Rolling hands normal rate and coordination  Sensory: Intact to LT, PP x4E; No lateral extinction   Coordination: FNF intact bilaterally, no dysmetria; Normal heel-shin test bilaterally  Gait: Normal width, stride length, turn, and arm swing. Station normal. Tandem walk intact.       CURRENT MEDICATIONS:    amLODIPine  5 mg Oral Daily     aspirin  81 mg Oral Daily     atenolol  100 mg Oral Daily     clopidogrel  75 mg Oral Daily     hydrALAZINE         hydrochlorothiazide  25 mg Oral Daily     lisinopril  40 mg Oral Daily     sodium chloride (PF)  10 mL Intravenous Once     " sodium chloride (PF)  3 mL Intracatheter Q8H     sodium chloride bacteriostatic  12 mL Intravenous Once       PRN MEDICATIONS:  albuterol, labetalol, lidocaine 4%, lidocaine (buffered or not buffered), magnesium sulfate, magnesium sulfate, - MEDICATION INSTRUCTIONS -, naloxone, ondansetron **OR** ondansetron, potassium chloride, potassium chloride with lidocaine, potassium chloride, potassium chloride, potassium chloride, sodium chloride (PF), sodium phosphate, sodium phosphate, sodium phosphate    INFUSIONS:    - MEDICATION INSTRUCTIONS -         ALLERGIES:  Allergies   Allergen Reactions     No Known Allergies          LABS/STUDIES:  Recent Labs   Lab Test 08/21/20  0337 08/20/20  1749 08/20/20  0557   NA  --   --  140   POTASSIUM 3.4 3.6 3.0*   CHLORIDE  --   --  107   CO2  --   --  27   ANIONGAP  --   --  7   GLC  --   --  86   BUN  --   --  22   CR  --   --  1.07   KALIE  --   --  8.7   WBC  --   --  13.9*   RBC  --   --  4.52   HGB  --   --  13.7   PLT  --   --  195       Recent Labs   Lab Test 08/20/20  0557   INR 1.17*   PTT 31       No lab results found in last 7 days.      I have personally reviewed all labs and imaging for this patient.

## 2020-08-21 NOTE — NURSING NOTE
Chief Complaints and History of Present Illnesses   Patient presents with     Retinal Hole Evaluation     CRAO right eye     Chief Complaint(s) and History of Present Illness(es)     Retinal Hole Evaluation     Laterality: right eye    Comments: CRAO right eye              Comments     Pt seen in ED yesterday for CRAO RE.  Pt notes that he stood up and vision started to close off in RE. Pt reports limited vision in RE today.  No eye pain today. No changes in vision LEJERSON Patel August 21, 2020 8:03 AM

## 2020-08-21 NOTE — PROGRESS NOTES
08/21/20 1500   Quick Adds   Type of Visit Initial Occupational Therapy Evaluation   Living Environment   Lives With spouse   Living Arrangements house   Living Environment Comment Pt lives in a split level house with his wife and 3 dogs.    Self-Care   Usual Activity Tolerance excellent   Current Activity Tolerance good   Regular Exercise Yes   Equipment Currently Used at Home none   Activity/Exercise/Self-Care Comment IND at baseline, retired    Functional Level   Ambulation 0-->independent   Transferring 0-->independent   Toileting 0-->independent   Bathing 0-->independent   Dressing 0-->independent   Eating 0-->independent   Communication 0-->understands/communicates without difficulty   Swallowing 0-->swallows foods/liquids without difficulty   Cognition 0 - no cognition issues reported   Fall history within last six months no   Which of the above functional risks had a recent onset or change? ambulation   Prior Functional Level Comment IND prior, deficits in vision   General Information   Onset of Illness/Injury or Date of Surgery - Date 08/20/20   Referring Physician Dr Noel   Patient/Family Goals Statement get back to driving   Additional Occupational Profile Info/Pertinent History of Current Problem 70 year old year-old patient with a vasculopathic history (HTN, CAD, CKD, PAD s/p femoral artery bypass graft) transferred from OS for CRAO OD, s/p TPA 8/19/20.    Precautions/Limitations fall precautions   General Observations Pt pleasant and agreeable, wife present, she works for [employer].  SBP <180   General Info Comments activity up in chair as told   Cognitive Status Examination   Orientation orientation to person, place and time   Level of Consciousness alert   Cognitive Comment intact   Visual Perception   Visual Field loss of vision in R eye, can see some   Sensory Examination   Sensory Quick Adds No deficits were identified   Pain Assessment   Patient Currently in Pain No    Integumentary/Edema   Integumentary/Edema no deficits were identifed   Posture   Posture not impaired   Range of Motion (ROM)   ROM Comment WFL   Strength   Strength Comments WFL   Coordination   Upper Extremity Coordination No deficits were identified   Mobility   Bed Mobility Comments IND   Transfer Skills   Transfer Comments IND, cues for safety with vision loss   Transfer Skill: Bed to Chair/Chair to Bed   Level of [place]: Bed to Chair independent   Transfer Skill: Sit to Stand   Level of [place]: Sit/Stand independent   Transfer Skill: Toilet Transfer   Level of [place]: Toilet independent   Balance   Balance Comments cues for safety    Upper Body Dressing   Level of [place]: Dress Upper Body independent   Lower Body Dressing   Level of [place]: Dress Lower Body independent   Grooming   Level of [place]: Grooming independent   Eating/Self Feeding   Level of [place]: Eating independent   Instrumental Activities of Daily Living (IADL)   Previous Responsibilities meal prep;housekeeping;laundry;shopping;yardwork;medication management;finances;driving   Activities of Daily Living Analysis   Impairments Contributing to Impaired Activities of Daily Living balance impaired  (vision)   General Therapy Interventions   Planned Therapy Interventions ADL retraining;IADL retraining;visual perception   Clinical Impression   Criteria for Skilled Therapeutic Interventions Met yes, treatment indicated   OT Diagnosis stroke with vision loss   Influenced by the following impairments loss of vision   Assessment of Occupational Performance 1-3 Performance Deficits   Identified Performance Deficits driving, safe community mobility   Clinical Decision Making (Complexity) Moderate complexity   Therapy Frequency Daily   Predicted Duration of Therapy Intervention (days/wks) 1x   Anticipated Discharge Disposition Home with Outpatient Therapy   Risks and Benefits of Treatment have been explained. Yes  "  Patient, Family & other staff in agreement with plan of care Yes   [facility] AM-PAC TM \"6 Clicks\"   2016, Trustees of [facility], under license to [employer].  All rights reserved.   6 Clicks Short Forms Daily Activity Inpatient Short Form   [facility] AM-PAC  \"6 Clicks\" Daily Activity Inpatient Short Form   1. Putting on and taking off regular lower body clothing? 4 - None   2. Bathing (including washing, rinsing, drying)? 4 - None   3. Toileting, which includes using toilet, bedpan or urinal? 4 - None   4. Putting on and taking off regular upper body clothing? 4 - None   5. Taking care of personal grooming such as brushing teeth? 4 - None   6. Eating meals? 4 - None   Daily Activity Raw Score (Score out of 24.Lower scores equate to lower levels of function) 24   Total Evaluation Time   Total Evaluation Time (Minutes) 5     "

## 2020-08-21 NOTE — PLAN OF CARE
Major Shift Events:   -Patient left for an eye appointment where his retina was examined and he was dilated  -Blood pressure at its highest was 195/93 and hydralazine was given and lowered to a more acceptable level in the 140s  -Home blood pressure medications was started at 1200  -Patient sat up in the chair for two hours today    -6a orders are written     Plan:  -Continue to monitor for signs of neurological changes  -Transfered up to 6a    For vital signs and complete assessments, please see documentation flowsheets.

## 2020-08-21 NOTE — PLAN OF CARE
4A Defer PT Consult  Discharge Planner PT   Patient plan for discharge: home with SO  Current status: PT orders acknowledged and appreciated. Per discussion with OT and chart review, pt is mobilizing IND - steady on feet. No IP PT needs. PT to complete orders.   Barriers to return to prior living situation: medical status  Recommendations for discharge: per OT, home with SO  Rationale for recommendations: defer to OT    Thank you for your referral.          Entered by: Octavia Camarillo 08/21/2020 3:33 PM

## 2020-08-22 ENCOUNTER — COMMUNICATION - HEALTHEAST (OUTPATIENT)
Dept: SCHEDULING | Facility: CLINIC | Age: 70
End: 2020-08-22

## 2020-08-22 ENCOUNTER — RECORDS - HEALTHEAST (OUTPATIENT)
Dept: ADMINISTRATIVE | Facility: OTHER | Age: 70
End: 2020-08-22

## 2020-08-22 VITALS
BODY MASS INDEX: 27.4 KG/M2 | HEART RATE: 69 BPM | TEMPERATURE: 98.2 F | DIASTOLIC BLOOD PRESSURE: 69 MMHG | HEIGHT: 68 IN | WEIGHT: 180.78 LBS | SYSTOLIC BLOOD PRESSURE: 110 MMHG | RESPIRATION RATE: 14 BRPM | OXYGEN SATURATION: 98 %

## 2020-08-22 PROCEDURE — 25000132 ZZH RX MED GY IP 250 OP 250 PS 637: Performed by: NURSE PRACTITIONER

## 2020-08-22 RX ORDER — HYDROCHLOROTHIAZIDE 25 MG/1
25 TABLET ORAL DAILY
Qty: 90 TABLET | Refills: 1 | Status: SHIPPED | OUTPATIENT
Start: 2020-08-23 | End: 2020-08-22

## 2020-08-22 RX ORDER — LISINOPRIL 40 MG/1
40 TABLET ORAL DAILY
Qty: 90 TABLET | Refills: 1 | Status: SHIPPED | OUTPATIENT
Start: 2020-08-23 | End: 2020-08-22

## 2020-08-22 RX ORDER — CLOPIDOGREL BISULFATE 75 MG/1
75 TABLET ORAL DAILY
Qty: 20 TABLET | Refills: 0 | Status: SHIPPED | OUTPATIENT
Start: 2020-08-23 | End: 2022-05-31

## 2020-08-22 RX ORDER — ASPIRIN 325 MG
325 TABLET ORAL DAILY
Qty: 90 TABLET | Refills: 1 | Status: SHIPPED | OUTPATIENT
Start: 2020-08-22 | End: 2023-01-01

## 2020-08-22 RX ADMIN — SERTRALINE HYDROCHLORIDE 75 MG: 50 TABLET ORAL at 08:50

## 2020-08-22 RX ADMIN — HYDROCHLOROTHIAZIDE 25 MG: 25 TABLET ORAL at 08:49

## 2020-08-22 RX ADMIN — ALLOPURINOL 300 MG: 300 TABLET ORAL at 08:49

## 2020-08-22 RX ADMIN — ASPIRIN 81 MG CHEWABLE TABLET 81 MG: 81 TABLET CHEWABLE at 08:49

## 2020-08-22 RX ADMIN — LISINOPRIL 40 MG: 40 TABLET ORAL at 08:49

## 2020-08-22 RX ADMIN — ATENOLOL 100 MG: 25 TABLET ORAL at 08:49

## 2020-08-22 RX ADMIN — CLOPIDOGREL BISULFATE 75 MG: 75 TABLET ORAL at 08:49

## 2020-08-22 RX ADMIN — AMLODIPINE BESYLATE 5 MG: 5 TABLET ORAL at 08:49

## 2020-08-22 ASSESSMENT — ACTIVITIES OF DAILY LIVING (ADL)
ADLS_ACUITY_SCORE: 12

## 2020-08-22 ASSESSMENT — PATIENT HEALTH QUESTIONNAIRE - PHQ9: SUM OF ALL RESPONSES TO PHQ QUESTIONS 1-9: 2

## 2020-08-22 NOTE — DISCHARGE SUMMARY
[facility], [place]    Neurology Stroke Discharge Summary    Date of Admission: 8/20/2020  Date of Discharge: 08/22/2020    Disposition: Discharged to home  Primary Care Physician: Ike Valdes      Admission Diagnosis:   #Central Retinal Artery Occlusion, right eye.     Discharge Diagnosis:   #Central Retinal Artery Occlusion, right eye.     Problem Leading to Hospitalization (from HPI):   Presented at [facility] ED for acute right eye nasal visual field loss at 12:15 AM on 8/20. He was found to have CRAO and given TPA then transferred to [facility] for post-TPA ICU care and stroke work-up.    Please see H&P dated 8/20/2020 for further details about presentation.    Hospital Course   Patient tolerated first 24 hours after TPA very well. Vision improved slightly by day of discharge. He was seen by PT/OT/SLP who recommended outpatient vision therapy with OT. He will follow up with ophthalmology in 1 month. MR brain without acute infarct. CTA with 70% stenosis of proximal left internal carotid artery. Etiology is thought to be secondary to known right carotid artery disease (pt s/p endarterectomy).   Rehab evaluation: OT, PT and SLP. Will follow up in my clinic in 1-2 months for post stroke eval .    Smoking Cessation: patient is not a smoker    BP Long-term Goal: 140/90 or less    Antithrombotic/Anticoagulant Agent: He will do DAPT for 21 days (aspirin 81 + plavix 75) then transition to full dose aspirin.       Statins: Atorvastatin 80mg    Hgb A1C Goal: < 7.0    Complications: None.     Other problems addressed during this hospitalization:  None    PERTINENT INVESTIGATIONS    Labs  Lipid Panel:   Recent Labs   Lab Test 08/21/20  0337   CHOL 97   HDL 50   LDL 35   TRIG 60     A1C:   Lab Results   Component Value Date    A1C 5.4 08/20/2020     INR:   Recent Labs   Lab 08/20/20  0557   INR 1.17*      Coag Panel / Hypercoag Workup: Not indicated  Pending test results: None    Echo: Mildly  (EF 45-50%) reduced left ventricular function is present. Akinesis and  thinning of the mid inferoseptal and anteroseptal segments and all apical  segments, most likely due to a distal LAD infarct. There is no LV thrombus.  Global right ventricular function is normal.  A small patent foramen ovale was demonstrated by agitated saline bubble  study .    Imaging: See above  Endovascular procedure: None     Cardiac Monitoring: Patient had > 24 hrs of cardiac monitor while in hospital.    Findings: No atrial fibrillation was found.    Sleep Apnea Screen:   1. Prior to your stroke, have you been told that you snore? No.    2. Prior to your stroke, have you been told that you struggle to breath while you are sleeping? No.    3. Prior to your stroke, do you feel tired and sleepy even after getting a normal night of sleep? No.    Sleep Apnea Screen Findings: Patient has 0-1 symptoms of sleep apnea.  Further sleep study is not recommended at this time.    PHQ-9 Depression Screen Score: 2    Education discussed with: patient on blood pressure management, cholesterol management, medical management and driving recommendation (follow up with OT before OK to drive).    During daily rounds, the plan of care was discussed and developed with patient.  Plan of care includes: stroke wokrkup.    PHYSICAL EXAMINATION  Vital Signs:  B/P: 110/69, T: 98.2, P: 69, R: 14    General: Patient lying in bed, NAD  HEENT: NC/AT, no icterus, moist mucous membranes  Cardiac: RRR  Chest: non-labored on RA  Abdomen: S/NT/ND  Extremities: No edema    Skin: No rash or lesion   Psych: Affect appropriate for situation   Neuro:  Mental status: Awake, alert, attentive, oriented to self, time, place, and circumstance. Language is fluent with intact comprehension of complex commands.  Cranial nerves: Pupils are equal, diminished reactivity on right.  Can count fingers up close on right. Symmetric face.   Motor: Normal muscle bulk and tone. No abnormal  movements. 5/5 strength in 4/4 extremities.   Sensory: Intact to light touch x 4 extremities    Coordination: FNF without ataxia or dysmetria.   Gait: took few steps at side of bed. Maintained balance     National Institutes of Health Stroke Scale (on day of discharge)  NIHSS Total Score: 0    Modified Tiffanie Scale (on day of discharge): 2-Slight disability; unable to carry out all previous activities, but able to look after own affairs    Medications    Discharge Medication List as of 8/22/2020  1:26 PM      START taking these medications    Details   aspirin (ASA) 325 MG tablet Take 1 tablet (325 mg) by mouth daily Only start taking this dose of aspirin AFTER you have finished the 20 days of Plavix., Disp-90 tablet,R-1, Local Print      clopidogrel (PLAVIX) 75 MG tablet Take 1 tablet (75 mg) by mouth daily, Disp-20 tablet,R-0, Local Print         CONTINUE these medications which have NOT CHANGED    Details   allopurinol (ZYLOPRIM) 300 MG tablet Take 300 mg by mouth daily, Historical      amLODIPine (NORVASC) 5 MG tablet Take 5 mg by mouth daily, Historical      aspirin 81 MG EC tablet Take 81 mg by mouth daily, Historical      atenolol (TENORMIN) 100 MG tablet Take 100 mg by mouth daily, Historical      atorvastatin (LIPITOR) 80 MG tablet Take 80 mg by mouth daily, Historical      lisinopril (ZESTRIL) 20 MG tablet Take 20 mg by mouth daily, Historical      LORazepam (ATIVAN) 0.5 MG tablet Take 0.5 mg by mouth nightly as needed for anxiety, Historical      sertraline (ZOLOFT) 50 MG tablet Take 75 mg by mouth daily, Historical      traZODone (DESYREL) 50 MG tablet Take 50 mg by mouth At Bedtime, Historical         STOP taking these medications       colchicine (MITIGARE) 0.6 MG capsule Comments:   Reason for Stopping:         hydrochlorothiazide (HYDRODIURIL) 25 MG tablet Comments:   Reason for Stopping:               Additional recommendations and follow up:       OCCUPATIONAL THERAPY REFERRAL      EYE ADULT  REFERRAL      Reason for your hospital stay    You were in the hospital to have your eye stroke evaluated and treated.     Follow Up and recommended labs and tests    Follow up with Dr. Gomez in general neurology clinic at [facility] in 1-2 months for stroke follow up   Follow up with ophthalmology in 1 month.     Activity    Your activity upon discharge: activity as tolerated     Discharge Instructions    Take aspirin 81mg and Plavix 75mg for 20 days. The day after your Plavix runs out, start taking the 325mg aspirin pill and discontinue taking the aspirin 81mg pill.       Please see occupational therapy outpatient before you begin driving again.   Return to the hospital if you experience stroke-like symptoms.   Your risk factors for stroke or TIA (transient ischemic attack):    Your Risk Factors Your Results Normal Ranges  High blood pressure BP Readings from Last 1 Encounters:  20 : 110/69   Less than 120/80  Cholesterol              Total Lab Results       Component                Value               Date                       CHOL                     97                  2020             Less than 150   Triglycerides   Lab Results       Component                Value               Date                       TRIG                     60                  2020           Less than 150  LDL Lab Results       Component                Value               Date                       LDL                      35                  2020              Less than 70  HDL Lab Results       Component                Value               Date                       HDL                      50                  2020                   Greater than 40 (men)  Greater than 50 (women)  Diabetes @labrcntip(g)@ Fasting blood glucose   Smoking/tobacco use  Quit smoking and tobacco  Overweight  Lose 1-2 pounds a week  Lack of exercise  30 minutes moderate activity each day  Other risk factors include carotid  (neck) artery disease, atrial fibrillation and stress. You may be on new medicine to treat high blood pressure, cholesterol, diabetes or atrial fibrillation.    Understanding Stroke Booklet given to patient. Please refer to booklet for further information.    Stroke warning signs and symptoms - CALL 911 right away for:  - Sudden numbness or weakness in the face, arm or leg (often on one side of the body).  - Sudden confusion or trouble understanding what is going on.  - Sudden blurred or decreased vision in one or both eyes.  - Sudden trouble speaking, loss of balance, dizziness or problems with coordination.  - Sudden, severe headache for no reason.  - Fainting or seizures.  - Symptoms may go away then come back suddenly.     Diet    Follow this diet upon discharge: Orders Placed This Encounter      Regular Diet Adult Thin Liquids (water, ice chips, juice, milk, gelatin, ice cream, etc)       Patient was seen and discussed with the Attending, Dr. Sullivan.     Kvng Gomez MD

## 2020-08-22 NOTE — PROGRESS NOTES
"Shift [id number]  Skin: Large bruise on left arm, stable. PIVs x 2.   Eyes: R. Eye visual deficits. Describes it as a dark cloud.   Ears/Nose/Throat:   Respiratory: No shortness of breath. Lung sounds clear.  Cardiovascular: No chest pain. Regular rate and rhythm. Pt hypertensive, did not meet PRN parameters, will continue to monitor.   Gastrointestinal: Tolerated regular diet. No nausea/emesis. Heart burn previous to my shift, improving.   Genitourinary: voiding spontaneously.   Musculoskeletal: Up independently.   Neurologic: Alert and oriented x 4. Neuros intact except right sided vision.     Blood pressure (!) 165/85, pulse 75, temperature 98.3  F (36.8  C), temperature source Oral, resp. rate 16, height 1.727 m (5' 8\"), weight 82 kg (180 lb 12.4 oz), SpO2 96 %.    "

## 2020-08-22 NOTE — PLAN OF CARE
VSS. AxOx4. Denies pain. R eye vision decreased, able to see small visual field in center, cloudy/shadowy vision in peripheral vision in R eye. Up independently in room. Tolerating regular diet. Stroke education completed, patient verbalized signs/symptoms of stroke and when to call emergency services. Discussed upcoming appointments and patient verbalized that he will not drive due to vision changes. All belongings sent with patient and medications picked up at pharmacy. Declined wheelchair transport, nurse accompanied patient to pharmacy and lobby where wife picked him up.

## 2020-08-22 NOTE — PLAN OF CARE
"Status: Pt admitted 08/20 with R eye blindness post TPA for retinal artery occlusion  Vitals: stable on RA  Neuros: A&O x4. R eye with visual deficits, pt describes as \"dark cloud\", can make out colors  IV: PIV SL  Resp/trach: LS clear  Diet: Regular  Bowel status: No BM overnight  : VDSP  Skin: Large bruise to L arm, VINCENT  Pain: Denies pain  Activity: Up independent in room  Social: No calls overnight  Plan: Discharge to home today, pt stated wife can pick him up who lives in [place]    "

## 2020-08-23 ENCOUNTER — PATIENT OUTREACH (OUTPATIENT)
Dept: CARE COORDINATION | Facility: CLINIC | Age: 70
End: 2020-08-23

## 2020-08-24 ENCOUNTER — TELEPHONE (OUTPATIENT)
Dept: NEUROLOGY | Facility: CLINIC | Age: 70
End: 2020-08-24
Payer: COMMERCIAL

## 2020-08-24 NOTE — TELEPHONE ENCOUNTER
[facility] Call Center    Phone Message    May a detailed message be left on voicemail: yes     Reason for Call: Other: Pts discharge instructions state to follow up with Dr Nicole in 1 month for stroke management. Thanks!     Action Taken: Message routed to:  [facility] ([facility] Neurology    Travel Screening: Not Applicable

## 2020-08-28 ENCOUNTER — PATIENT OUTREACH (OUTPATIENT)
Dept: NEUROLOGY | Facility: CLINIC | Age: 70
End: 2020-08-28

## 2020-08-28 NOTE — PROGRESS NOTES
Neurology Stroke Post Discharge Note     Date of Admission: 8/20/2020  Date of Discharge: 08/22/2020    Disposition: Discharged to home  Primary Care Physician: Ike Valdes       Admission Diagnosis:   #Central Retinal Artery Occlusion, right eye.      Discharge Diagnosis:   #Central Retinal Artery Occlusion, right eye.     Plan per Discharge Summary:  * Discontinue colchicine and hydrochlorothiazide   * Take aspirin 81mg and Plavix 75mg for 20 days. The day after your Plavix runs out, start taking the 325mg aspirin pill and discontinue taking the aspirin 81mg pill.   * Please see occupational therapy outpatient before you begin driving again. Scheduled 9/14  * Follow up with Dr. Gomez in general neurology clinic at [facility] in 1-2 months for stroke follow up Scheduled 10/8  * Follow up with ophthalmology in 1 month. Seen 8/20    [facility] for patient to return call with questions/concerns. Alma Aguilera RN 8/28/2020 1:51 PM

## 2020-08-31 ENCOUNTER — HOSPITAL ENCOUNTER (OUTPATIENT)
Dept: OCCUPATIONAL THERAPY | Facility: CLINIC | Age: 70
Setting detail: THERAPIES SERIES
End: 2020-08-31
Attending: PSYCHIATRY & NEUROLOGY
Payer: COMMERCIAL

## 2020-08-31 DIAGNOSIS — I63.9 ISCHEMIC STROKE (H): ICD-10-CM

## 2020-08-31 PROCEDURE — 97535 SELF CARE MNGMENT TRAINING: CPT | Mod: GO | Performed by: OCCUPATIONAL THERAPIST

## 2020-08-31 PROCEDURE — 97166 OT EVAL MOD COMPLEX 45 MIN: CPT | Mod: GO | Performed by: OCCUPATIONAL THERAPIST

## 2020-08-31 ASSESSMENT — ACTIVITIES OF DAILY LIVING (ADL)
PRIOR_ADL/IADL_STATUS: INDEPENDENT PRIOR TO ONSET
RETIRED_PRIOR_ADL/IADL_STATUS: INDEPENDENT PRIOR TO ONSET

## 2020-08-31 NOTE — PROGRESS NOTES
08/31/20 1100   Visit Type   Type of Visit Initial   General Information   Start Of Care Date 08/31/20   Referring Physician Marianna Sullivan MD   Orders Evaluate And Treat As Indicated  (Low Vision)   Date of Order 08/21/20   Medical Diagnosis Ischemic stroke (H) I63.9  - Primary   Onset Of Illness/injury Or Date Of Surgery 8/20/20   Surgical/Medical history reviewed Yes   Precautions/Limitations Hospital discharge notes: Please see occupational therapy outpatient before you begin driving again.   Additional Occupational Profile Info/Pertinent History of Current Problem Presented at [facility] ED for acute right eye nasal visual field loss on 8/20/20. He was found to have CRAO and given TPA then transferred to [facility] for post-TPA ICU care and stroke work-up. MR brain without acute infarct. CTA with 70% stenosis of proximal left internal carotid artery. Etiology is thought to be secondary to known right carotid artery disease (pt s/p endarterectomy). Discharged home on 8/22/20. Past medical history: hypertension, hyperlipidemia, CAD, ischemic cardiomyopathy, CKD3, PAD s/p bl femoral artery bypass graft, right carotid endarterectomy (2018) after 3 episodes of amaurosis fugax.   Prior ADL/IADL status Independent prior to onset   Prior Status Comment patient does all the cooking, cleaning, laundry, shopping for he and spouse; spouse does all finances; patient drove and managed his own medications prior   Others present at visit Spouse/significant other  (Mitra)   Patient/family Goals Statement Be able to drive   General information comments Had his eyes checked last week (had been 2 years since he'd had them checked), ordered trifocals, separate readers, and prescription sunglasses (light sensitivity since stroke).  Has done some homemaking tasks since home from hospital but has been very tired (taking naps - new since CVA).  Spouse works outside the home.   Social History/Home Environment   Living Environment  "House/townhome  ([facility])   Current Community Support Family/friend caregiver  (spouse)   Patient Role/employment History  Retired  (over the road ; was in [employer])   Avocational takes care of 3 dogs   Social/Environment Comment stairs don''t bother him per his report initially but later stated uneven surfaces have been a challenge   Pain Assessment   Pain Reported No   Fall Risk Screen   Have you fallen 2 or more times in the past year? No   Have you fallen and had an injury in the past year? No   Is patient a fall risk? Yes  (related to vision not balance)   Cognitive/Behavioral   Communication Intact   Cognitive Status Intact for evaluation process   Behavior Appropriate   Patient/family aware of diagnosis Yes   Physical Status/Equipment   Physical Status No problems observed/reported   Visual Report   Functional Complaints Reading;Homemaking;Safety in mobility   Visual Complaints Constricted visual fields right eye;Visual fatigue;Difficulty maintaining focus;Light sensitivity   Complaints comments might \"misstep\" a little bit   Arnulfo Bonnet Symptoms? No   Reading glasses   (cheaters only prior to dx)   Technology   (ipad, smart phone - hard to see since stroke in the eye)   Equipment comments wore cheaters (doesn't know power); tends to close R eye while watching TV - inquires about wearing a patch over R eye   Lighting and Glare   Is glare a problem? Yes/ indoors;Yes/ outdoors   Are you satisfied with your sunglasses?   (getting new prescriptioon sunglasses)   Contrast Sensitivity   Contrast sensitivity (score/25) 25/25   MN Read   Smallest print size read ambient light: 1M; task light: .4M   Critical print size ambient light: 1.3M; task light: .6M   Words per minute at critical print size ambient light: ~114wpm; task light: .150wpm; preferred print size: 1.6M; wore +2.0 single vision readers during assessment   Functional Reading Screen   Reading screen comments Central visual field " "screened via Red Dot Confrontation Test.  Patient unable to see all targets in R eye except lower R target.  Saw all targets with L eye. Peripheral visual field via Periometer: R eye: nasal: unable/absent, peripheral/temporal: saw a \"shadow\" at 65 degrees, then nothing until \"shadow\" again at 35 degrees to 0 degrees. L eye: nasal: 55 degrees and peripheral/temporal: 75 degrees.  Eye Dominance: likely R per report (unable to accurately assess).   Dynavision: Evaluation of visual skills/search of extra personal space via 5X4 foot computerized light board with 64 stimuli.  The user reacts as quickly as possible by striking the lights as they turn on in random succession.   Dynavision Cascade Dynavision Mode A (single stimulus attention, 1 minute;Dynavision Mode A Continuous (single stimulus attention, 4 minutes;Dynavision Mode B (timed attention, 1 minute);Dynavision Mode B Timed Divided Attention (1-second flash rate, 1 minute)   Dynavision Mode A (single stimulus attention, 1 minute)   Patient Score (Mode A, 1 min) 49   Dynavision Mode A (SSA, 1 Min) Comment Response time per quadrant all fairly equal between 1.1 seconds and 1.2 seconds with the exception of right lower quadrant at 1.5 seconds   Dynavision Mode A Continuous (single stimulus attention, 4 minutes)   Patient Score (Mode A, 4 min) 47   Dynavision Mode A Continous Comment Response time: 74% upper left quadrant, 86% lower left, 58% upper right, 55% lower right   Clinical Impression, OT Eval   Criteria for Skilled Therapeutic Interventions Met yes;treatment indicated   Therapy  Diagnosis: Impaired ADL/IADL with deficits in Reading based ADL;Written communication;Home management  (functional and community mobility)   Impairment comments decreased vision, fatigue   Assessment of Occupational Performance 3-5 Performance Deficits   Identified Performance Deficits Decreased ability to complete homemaking tasks including meal prep, cleaning; decreased functional " and community mobility   Clinical Decision Making (Complexity) Moderate complexity   OT Visual Rehabilitation Evaluation Plan   Therapy Plan Occupational therapy intervention   Planned Interventions Visual field loss awareness;Visual skills training for near tasks;Visual skills training for safety in mobility;Visual skills training for location of objects;Low vision compensatory training for reading;Low vision compensatory training for written communication;Low vision compensatory training for object identification;Instruction in environmental adaptations for glare;Instruction in environmental adaptations for lighting;Optical device/ADL device instruction and training;Computer modifications   Frequency / Duration 2x/week, decreasing frequency prn x 8 weeks   Risks and Benefits of Treatment have been explained. Yes   Patient, Family in agreement with plan of care Yes   GOALS   Goals Near Vision;Visual Field;Environmental Modification   Goal 1 - Near Vision   Near Vision Goal Comment Patient will demonstrate 3 pieces of adaptive equipment and/or adaptive techniques in regards to magnification, lighting, contrast and glare for increased ADL/IADL independence (reading, meal prep, medication management, writing).   Target Date 10/26/20   Goal 2 - Visual field   Visual Field Goal Comment Patient will verbalize awareness of central/peripheral visual field Loss and demonstrate use of 2 adaptive techniques/adaptive equipment for increased independence and safety in near vision tasks, functional mobility, navigation, object finding.   Target Date 10/26/20   Goal 3 - Environmental modification   Environmental Modification Goal Comment Patient will demonstrate and/or verbalize 3 environmentally-based ADL modifications to improve visual-based ADL/IADL activities.   Target Date 10/26/20   Total Evaluation Time   OT Reinaldo Moderate Complexity Minutes (36640) 33

## 2020-09-01 ENCOUNTER — COMMUNICATION - HEALTHEAST (OUTPATIENT)
Dept: FAMILY MEDICINE | Facility: CLINIC | Age: 70
End: 2020-09-01

## 2020-09-01 DIAGNOSIS — M10.9 ACUTE GOUT OF RIGHT KNEE, UNSPECIFIED CAUSE: ICD-10-CM

## 2020-09-01 DIAGNOSIS — F41.9 ANXIETY: ICD-10-CM

## 2020-09-11 ENCOUNTER — COMMUNICATION - HEALTHEAST (OUTPATIENT)
Dept: FAMILY MEDICINE | Facility: CLINIC | Age: 70
End: 2020-09-11

## 2020-09-11 DIAGNOSIS — E78.5 HYPERLIPIDEMIA, UNSPECIFIED HYPERLIPIDEMIA TYPE: ICD-10-CM

## 2020-09-15 ENCOUNTER — HOSPITAL ENCOUNTER (OUTPATIENT)
Dept: OCCUPATIONAL THERAPY | Facility: CLINIC | Age: 70
Setting detail: THERAPIES SERIES
End: 2020-09-15
Attending: PSYCHIATRY & NEUROLOGY
Payer: COMMERCIAL

## 2020-09-15 PROCEDURE — 97535 SELF CARE MNGMENT TRAINING: CPT | Mod: GO | Performed by: OCCUPATIONAL THERAPIST

## 2020-09-28 DIAGNOSIS — H34.11 CRAO (CENTRAL RETINAL ARTERY OCCLUSION), RIGHT: Primary | ICD-10-CM

## 2020-10-02 ENCOUNTER — HOSPITAL ENCOUNTER (OUTPATIENT)
Dept: OCCUPATIONAL THERAPY | Facility: CLINIC | Age: 70
Setting detail: THERAPIES SERIES
End: 2020-10-02
Attending: PSYCHIATRY & NEUROLOGY
Payer: COMMERCIAL

## 2020-10-02 PROCEDURE — 97535 SELF CARE MNGMENT TRAINING: CPT | Mod: GO | Performed by: OCCUPATIONAL THERAPIST

## 2020-10-04 ENCOUNTER — COMMUNICATION - HEALTHEAST (OUTPATIENT)
Dept: FAMILY MEDICINE | Facility: CLINIC | Age: 70
End: 2020-10-04

## 2020-10-04 DIAGNOSIS — I10 ESSENTIAL HYPERTENSION: ICD-10-CM

## 2020-10-04 DIAGNOSIS — F41.9 ANXIETY: ICD-10-CM

## 2020-10-05 ENCOUNTER — DOCUMENTATION ONLY (OUTPATIENT)
Dept: CARE COORDINATION | Facility: CLINIC | Age: 70
End: 2020-10-05

## 2020-10-05 ENCOUNTER — OFFICE VISIT (OUTPATIENT)
Dept: OPHTHALMOLOGY | Facility: CLINIC | Age: 70
End: 2020-10-05
Attending: OPHTHALMOLOGY
Payer: COMMERCIAL

## 2020-10-05 DIAGNOSIS — H40.51X0 NVG (NEOVASCULAR GLAUCOMA), RIGHT, STAGE UNSPECIFIED: Primary | ICD-10-CM

## 2020-10-05 DIAGNOSIS — H34.11 CRAO (CENTRAL RETINAL ARTERY OCCLUSION), RIGHT: ICD-10-CM

## 2020-10-05 PROCEDURE — 99213 OFFICE O/P EST LOW 20 MIN: CPT | Mod: 25 | Performed by: OPHTHALMOLOGY

## 2020-10-05 PROCEDURE — 250N000011 HC RX IP 250 OP 636: Performed by: OPHTHALMOLOGY

## 2020-10-05 PROCEDURE — 92020 GONIOSCOPY: CPT | Performed by: OPHTHALMOLOGY

## 2020-10-05 PROCEDURE — 67028 INJECTION EYE DRUG: CPT | Mod: RT | Performed by: OPHTHALMOLOGY

## 2020-10-05 PROCEDURE — 92134 CPTRZ OPH DX IMG PST SGM RTA: CPT | Mod: 26 | Performed by: OPHTHALMOLOGY

## 2020-10-05 PROCEDURE — G0463 HOSPITAL OUTPT CLINIC VISIT: HCPCS | Mod: 25

## 2020-10-05 RX ORDER — LATANOPROST 50 UG/ML
1 SOLUTION/ DROPS OPHTHALMIC AT BEDTIME
Qty: 2.5 ML | Refills: 11 | Status: SHIPPED | OUTPATIENT
Start: 2020-10-05 | End: 2022-05-31

## 2020-10-05 RX ORDER — LATANOPROST 50 UG/ML
1 SOLUTION/ DROPS OPHTHALMIC AT BEDTIME
Qty: 2.5 ML | Refills: 11 | Status: SHIPPED | OUTPATIENT
Start: 2020-10-05 | End: 2020-10-05

## 2020-10-05 RX ADMIN — Medication 1.25 MG: at 11:18

## 2020-10-05 ASSESSMENT — GONIOSCOPY
OS_NASAL: SQUEEZING
OS_TEMPORAL: SQUEEZING
OS_SUPERIOR: SQUEEZING

## 2020-10-05 ASSESSMENT — VISUAL ACUITY
OS_SC: 20/20
OD_SC: HM
METHOD: SNELLEN - LINEAR

## 2020-10-05 ASSESSMENT — TONOMETRY
OD_IOP_MMHG: 30
IOP_METHOD: TONOPEN
OS_IOP_MMHG: 12

## 2020-10-05 ASSESSMENT — EXTERNAL EXAM - LEFT EYE: OS_EXAM: NORMAL

## 2020-10-05 ASSESSMENT — EXTERNAL EXAM - RIGHT EYE: OD_EXAM: NORMAL

## 2020-10-05 ASSESSMENT — CUP TO DISC RATIO
OS_RATIO: 0.2
OD_RATIO: 0.2

## 2020-10-05 ASSESSMENT — SLIT LAMP EXAM - LIDS
COMMENTS: NORMAL
COMMENTS: NORMAL

## 2020-10-05 NOTE — NURSING NOTE
BRANT follow up - patient states he's doing well. No pain or discomfort in either eye. Vision unchanged in right eye, left eye is clear.     Jody Yeh  9:52 AM

## 2020-10-05 NOTE — PROGRESS NOTES
CC -  CRAO OD    INTERVAL HISTORY - VA OD unchanged, no pain      HPI -   Young Ordonez is a  70 year old year-old patient with a vasculopathic history (HTN, CAD, CKD, PAD s/p femoral artery bypass graft) transferred from OSH for CRAO OD, s/p TPA 8/19/20. History of amaurosis fugax, s/p right CEA 4/2018.   On 8/2020 VA was HM OS  with +APD and cherry red spot per inpatient consult service. Placed on Plavix x 3 weeks then ASA  Retired         PAST OCULAR SURGERY  None    RETINAL IMAGING:  OCT 10-5-20   OD - inner retinal thinning, still hyper-reflective, PHF attached  OS - retina normal, PHF attached    FA 08/21/20   OD (transit) - normal choroidal filling, very slow arterial filling, possible ciiloretinal filling of nasal macula peripapillary region, ?staining  artery on ONH  OS - normal      ASSESSMENT & PLAN    1. CRAO, OD, with cilioretinal artery sparing   - Onset 8/19/20 of sudden painless vision loss    - Etiology: suspect atherosclerotic plaque; thrombus visible in arteriolar branch (photo 8/2020)   - ESR, CRP normal, no HA, no temporal pain, no fevers   - had 10# weight loss unclear why   - low suspicion for GCA     - had stroke w/u 8/2020 now on ASA    2. NVG OD   - diagnosed 10/5/20   - NVA, no NVI, IOP 30     - Avastin tdoay   - start xalatan   - RTC 2-3 weeks PRP OD     - d/w patient & wife eye color change (brown/ross currently), eyelash growth     - r/b/a IVT anti-VEGF d/w patient   - infection, blindness, need for surgery   - off-label use of Avastin        2. H/o amaurosis fugax, OD   -s/p CEA 4/2018       3. NSC, OU mild/mod   - observe          return to clinic: 1 months, OCT OU        ATTESTATION     Attending Attestation:     Complete documentation of historical and exam elements from today's encounter can be found in the full encounter summary report (not reduplicated in this progress note).  I personally obtained the chief complaint(s) and history of present illness.  I confirmed  and edited as necessary the review of systems, past medical/surgical history, family history, social history, and examination findings as documented by others; and I examined the patient myself.  I personally reviewed the relevant tests, images, and reports as documented above.  I formulated and edited as necessary the assessment and plan and discussed the findings and management plan with the patient and family    Shelby Jensen MD, PhD  , Vitreoretinal Surgery  Department of Ophthalmology  [facility]

## 2020-10-08 ENCOUNTER — RECORDS - HEALTHEAST (OUTPATIENT)
Dept: ADMINISTRATIVE | Facility: OTHER | Age: 70
End: 2020-10-08

## 2020-10-08 ENCOUNTER — OFFICE VISIT (OUTPATIENT)
Dept: NEUROLOGY | Facility: CLINIC | Age: 70
End: 2020-10-08
Payer: COMMERCIAL

## 2020-10-08 VITALS — DIASTOLIC BLOOD PRESSURE: 77 MMHG | OXYGEN SATURATION: 98 % | HEART RATE: 61 BPM | SYSTOLIC BLOOD PRESSURE: 157 MMHG

## 2020-10-08 DIAGNOSIS — G62.9 NEUROPATHY: Primary | ICD-10-CM

## 2020-10-08 PROCEDURE — 99203 OFFICE O/P NEW LOW 30 MIN: CPT | Mod: GC | Performed by: STUDENT IN AN ORGANIZED HEALTH CARE EDUCATION/TRAINING PROGRAM

## 2020-10-08 RX ORDER — ASPIRIN 325 MG
325 TABLET, DELAYED RELEASE (ENTERIC COATED) ORAL DAILY
Qty: 60 TABLET | Refills: 1 | Status: CANCELLED | OUTPATIENT
Start: 2020-10-08

## 2020-10-08 ASSESSMENT — PAIN SCALES - GENERAL: PAINLEVEL: NO PAIN (0)

## 2020-10-08 NOTE — PROGRESS NOTES
"  DEPARTMENT OF NEUROLOGY  Referral for: Stroke Hospitalization Follow-Up   Patient Name:  Young Ordonez  MRN:  [id number]    :  1950  Date of Clinic Visit:  2020  Primary Care Provider:  Ike Valdes  Referring Provider: Dr. Marianna Sullivan     CHIEF COMPLAINT: Stroke Hospitalization Follow-Up     HISTORY OF PRESENT ILLNESS:  Young Ordonez is a 70 year old gentleman with history of hypertension, hyperlipidemia, CAD, ischemic cardiomyopathy, CKD3, PAD s/p bl femoral artery bypass graft, right carotid endarterectomy () after 3 episodes of amaurosis fugax, who presents to neurology clinic for post-stroke (R CRAO) evaluation. Mr. Ordonez presented to [facility] ED for acute right eye nasal visual field loss on 20. He was found to have CRAO, received TPA, then was transferred to [facility] for post-TPA ICU care and stroke work-up. He had no post-TPA complications. MR brain without acute infarct. CTA with 70% stenosis of proximal left internal carotid artery. Etiology is thought to be secondary to known right carotid artery disease (pt s/p endarterectomy). Vision had improved slightly by day of discharge. He was instructed to take DAPT for 21 days (aspirin 81 + plavix 75) then transitioned to full dose aspirin alone.     Mr. Ordonez is accompanied by his wife Mitra wang. They deny any new medical diagnoses or symptoms since hospital discharge in August. From stroke standpoint, Mr. Ordonez endorses stomach discomfort and upset with aspirin use. He currently takes 4 OTC baby aspirins daily because he has been told his insurance does not cover aspirin.     Patient was evaluated by ophthalmology on 10/5/20. Fluorescein angiography showed visible plaque versus embolus in central retinal artery, which I suspect was the source of his CRAO. 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 He was \"given an injection and started on eye drops\". He was told he would need laser " surgery in his eye in 2 weeks. He did participate in visual rehabilitation with OT and has been cleared to drive by them. He drives locally just about everyday and has not had any accidents or issues with driving.     He denies issues with gait, urinary/bowel incontinence, tingling, numbness, weakness. He endorses 3-4 headache days/month that he believes are secondary to poor neck positioning. He describes laying down with his neck crooked for hours sometimes that develops into neck pain headache of his entire head. He is not concerned by these headaches, they do not disturb his quality of life, and they respond to OTC pain medications.    ASSESSMENT AND PLAN:  Young Ordonez is a 70 year old gentleman with history of hypertension, hyperlipidemia, CAD, ischemic cardiomyopathy, CKD3, PAD s/p bl femoral artery bypass graft, right carotid endarterectomy (2018) after 3 episodes of amaurosis fugax, who presents to neurology clinic for post-stroke (R CRAO) evaluation. Etiology is felt to be atheroembolic from known lesion in right ICA. Neurological exam today is notable for right eye vision deficits but otherwise no new significant findings.     Plan:  - Consider switching to 325mg EC aspirin (can get this over the counter at many pharmacies)   - Atorvastatin 40mg is appropriate from secondary stroke prevention standpoint (patient currently on 80mg) but ultimately defer to PCP on this issue.   - Can consider PT for cervicogenic headaches in the future if they persist    Follow up in neurology clinic on an as-needed basis.     Patient was seen and discussed with Dr. Figueredo.     Kvng Gomez MD  PGY-2 Neurology Resident  [facility] Department of Neurology  Pager: [phone]    Attending attestation: I personally interviewed and examined Mr. Ordonez, who presented today with his wife. I agree with the above assessment and plan with the following additions: he has anisocoria with R>L asymmetry.  Additionally, I recommended that we obtain neuropathy labs given exam, and CK given hip flexor weakness, but Mr. Ordonez declined these labs, as well as PT referral for headaches. He will follow with us PRN.    Yolanda Figueredo DO   of Neurology    PHYSICAL EXAMINATION:  Vitals: BP (!) 157/77   Pulse 61   SpO2 98%     Neurologic Exam:    Mental status: Patient is oriented to person, place and time and able to provide a detailed account of history of illness. Attention and fund of knowledge are normal. Immediate recall is 3/3, and remote recall is 3/3.    Language: Speech is fluent; comprehension, repetition and naming are normal.    Cranial nerves: Pupils are round and react normally to light and accommodation. Right optic disc pale. Can count fingers only on temporal side of right eye. Visual fields are full on the left. Extraocular movements are normal. Facial strength and sensation are normal. Hearing is normal to finger rub bilaterally. The palate rises symmetrically and there is no dysarthria. Tongue protrusion is normal.    Power: Strength is normal bilaterally (5/5) in the following muscles/groups: sternocleidomastoids, trapezius, deltoids, biceps, triceps, wrist extensors, finger extensors, finger interossei, abudctor pollicis brevis, hip flexors, quadriceps, hamstrings, gastrocnemius/soleus, and anterior tibialis.    Reflexes: Biceps, brachioradialis, triceps, patellae, and Achilles are hyporeflexic but symmetric.     Motor/cerebellar: There are no tremors, myoclonus, or other abnormal movements. Muscle bulk and tone are normal. Finger-to-nose and heel-to-shin maneuvers are symmetric and normal bilaterally. Rapid alternating movements are symmetric in the extremities. There is no pronator drift in the arms.    Sensation: Sensation is intact to large (vibration, proprioception) and small fiber (pain, temperature) modalities in all four extremities.    Gait: Gait is narrow based and steady  and the patient is able to walk on heels, toes and in tandem. Romberg is negative.       REVIEW OF SYSTEMS: 12-point RoS negative except as per HPI.    ALLERGIES:  Allergies   Allergen Reactions     No Known Allergies      MEDICATIONS:  Current Outpatient Medications   Medication Sig Dispense Refill     allopurinol (ZYLOPRIM) 300 MG tablet Take 300 mg by mouth daily       amLODIPine (NORVASC) 5 MG tablet Take 5 mg by mouth daily       aspirin (ASA) 325 MG tablet Take 1 tablet (325 mg) by mouth daily Only start taking this dose of aspirin AFTER you have finished the 20 days of Plavix. 90 tablet 1     aspirin 81 MG EC tablet Take 81 mg by mouth daily       atenolol (TENORMIN) 100 MG tablet Take 100 mg by mouth daily       atorvastatin (LIPITOR) 80 MG tablet Take 80 mg by mouth daily       clopidogrel (PLAVIX) 75 MG tablet Take 1 tablet (75 mg) by mouth daily 20 tablet 0     latanoprost (XALATAN) 0.005 % ophthalmic solution Place 1 drop into the right eye At Bedtime 2.5 mL 11     lisinopril (ZESTRIL) 20 MG tablet Take 20 mg by mouth daily       LORazepam (ATIVAN) 0.5 MG tablet Take 0.5 mg by mouth nightly as needed for anxiety       sertraline (ZOLOFT) 50 MG tablet Take 75 mg by mouth daily       traZODone (DESYREL) 50 MG tablet Take 50 mg by mouth At Bedtime       PAST MEDICAL HISTORY:  Past Medical History:   Diagnosis Date     Acute myocardial infarction, unspecified site, episode of care unspecified 1995     DEPRESSIVE DISORDER NEC 4/29/2008     Essential hypertension, benign      Hepatitis C carrier (H)      Other and unspecified hyperlipidemia      Peyronie's disease      PAST SURGICAL HISTORY:  Past Surgical History:   Procedure Laterality Date     [facility] NONSPECIFIC PROCEDURE  1995    MI -- angioplasty     SOCIAL HISTORY:  Social History     Socioeconomic History     Marital status:      Spouse name: Mitra     Number of children: 2     Years of education: 12     Highest education level: Not on file    Occupational History     Occupation:      Comment: [employer]   Social Needs     Financial resource strain: Not on file     Food insecurity     Worry: Not on file     Inability: Not on file     Transportation needs     Medical: Not on file     Non-medical: Not on file   Tobacco Use     Smoking status: Former Smoker     Quit date: 2003     Years since quittin.7     Smokeless tobacco: Never Used   Substance and Sexual Activity     Alcohol use: No     Drug use: No     Sexual activity: Yes     Partners: Female   Lifestyle     Physical activity     Days per week: Not on file     Minutes per session: Not on file     Stress: Not on file   Relationships     Social connections     Talks on phone: Not on file     Gets together: Not on file     Attends [employer] service: Not on file     Active member of club or organization: Not on file     Attends meetings of clubs or organizations: Not on file     Relationship status: Not on file     Intimate partner violence     Fear of current or ex partner: Not on file     Emotionally abused: Not on file     Physically abused: Not on file     Forced sexual activity: Not on file   Other Topics Concern     Not on file   Social History Narrative     Not on file     FAMILY HISTORY:  Family History   Problem Relation Age of Onset     Cancer Mother         d:age39 ovarian     Respiratory Father         d:age 59     Family History Negative Brother         b:1960     Glaucoma No family hx of      Macular Degeneration No family hx of

## 2020-10-08 NOTE — LETTER
10/8/2020       RE: Young Ordonez  [address] [facility] [address]     Dear Colleague,    Thank you for referring your patient, Young Ordonez, to the [facility] [place] at [facility]. Please see a copy of my visit note below.    DEPARTMENT OF NEUROLOGY  Referral for: Stroke Hospitalization Follow-Up   Patient Name:  Young Ordonez  MRN:  [id number]    :  1950  Date of Clinic Visit:  2020  Primary Care Provider:  Ike Valdes  Referring Provider: Dr. Marianna Sullivan     CHIEF COMPLAINT: Stroke Hospitalization Follow-Up     HISTORY OF PRESENT ILLNESS:  Young Ordonez is a 70 year old gentleman with history of hypertension, hyperlipidemia, CAD, ischemic cardiomyopathy, CKD3, PAD s/p bl femoral artery bypass graft, right carotid endarterectomy () after 3 episodes of amaurosis fugax, who presents to neurology clinic for post-stroke (R CRAO) evaluation. Mr. Ordonez presented to [facility] ED for acute right eye nasal visual field loss on 20. He was found to have CRAO, received TPA, then was transferred to [facility] for post-TPA ICU care and stroke work-up. He had no post-TPA complications. MR brain without acute infarct. CTA with 70% stenosis of proximal left internal carotid artery. Etiology is thought to be secondary to known right carotid artery disease (pt s/p endarterectomy). Vision had improved slightly by day of discharge. He was instructed to take DAPT for 21 days (aspirin 81 + plavix 75) then transitioned to full dose aspirin alone.     Mr. Ordonez is accompanied by his wife Mitra wang. They deny any new medical diagnoses or symptoms since hospital discharge in August. From stroke standpoint, Mr. Ordonez endorses stomach discomfort and upset with aspirin use. He currently takes 4 OTC baby aspirins daily because he has been told his insurance does not cover aspirin.     Patient was evaluated by ophthalmology on 10/5/20.  "Fluorescein angiography showed visible plaque versus embolus in central retinal artery, which I suspect was the source of his CRAO. 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 He was \"given an injection and started on eye drops\". He was told he would need laser surgery in his eye in 2 weeks. He did participate in visual rehabilitation with OT and has been cleared to drive by them. He drives locally just about everyday and has not had any accidents or issues with driving.     He denies issues with gait, urinary/bowel incontinence, tingling, numbness, weakness. He endorses 3-4 headache days/month that he believes are secondary to poor neck positioning. He describes laying down with his neck crooked for hours sometimes that develops into neck pain headache of his entire head. He is not concerned by these headaches, they do not disturb his quality of life, and they respond to OTC pain medications.    ASSESSMENT AND PLAN:  Young Ordonez is a 70 year old gentleman with history of hypertension, hyperlipidemia, CAD, ischemic cardiomyopathy, CKD3, PAD s/p bl femoral artery bypass graft, right carotid endarterectomy (2018) after 3 episodes of amaurosis fugax, who presents to neurology clinic for post-stroke (R CRAO) evaluation. Etiology is felt to be atheroembolic from known lesion in right ICA. Neurological exam today is notable for right eye vision deficits but otherwise no new significant findings.     Plan:  - Consider switching to 325mg EC aspirin (can get this over the counter at many pharmacies)   - Atorvastatin 40mg is appropriate from secondary stroke prevention standpoint (patient currently on 80mg) but ultimately defer to PCP on this issue.   - Can consider PT for cervicogenic headaches in the future if they persist    Follow up in neurology clinic on an as-needed basis.     Patient was seen and discussed with Dr. Figueredo.     Kvng Gomez MD  PGY-2 Neurology " Resident  [facility] Department of Neurology  Pager: [phone]    Attending attestation: I personally interviewed and examined Mr. Ordonez, who presented today with his wife. I agree with the above assessment and plan with the following additions: he has anisocoria with R>L asymmetry. Additionally, I recommended that we obtain neuropathy labs given exam, and CK given hip flexor weakness, but Mr. Ordonez declined these labs, as well as PT referral for headaches. He will follow with us PRN.    Yolanda Figueredo DO   of Neurology      PHYSICAL EXAMINATION:  Vitals: BP (!) 157/77   Pulse 61   SpO2 98%     Neurologic Exam:    Mental status: Patient is oriented to person, place and time and able to provide a detailed account of history of illness. Attention and fund of knowledge are normal. Immediate recall is 3/3, and remote recall is 3/3.    Language: Speech is fluent; comprehension, repetition and naming are normal.    Cranial nerves: Pupils are round and react normally to light and accommodation. Right optic disc pale. Can count fingers only on temporal side of right eye. Visual fields are full on the left. Extraocular movements are normal. Facial strength and sensation are normal. Hearing is normal to finger rub bilaterally. The palate rises symmetrically and there is no dysarthria. Tongue protrusion is normal.    Power: Strength is normal bilaterally (5/5) in the following muscles/groups: sternocleidomastoids, trapezius, deltoids, biceps, triceps, wrist extensors, finger extensors, finger interossei, abudctor pollicis brevis, hip flexors, quadriceps, hamstrings, gastrocnemius/soleus, and anterior tibialis.    Reflexes: Biceps, brachioradialis, triceps, patellae, and Achilles are hyporeflexic but symmetric.     Motor/cerebellar: There are no tremors, myoclonus, or other abnormal movements. Muscle bulk and tone are normal. Finger-to-nose and heel-to-shin maneuvers are symmetric and  normal bilaterally. Rapid alternating movements are symmetric in the extremities. There is no pronator drift in the arms.    Sensation: Sensation is intact to large (vibration, proprioception) and small fiber (pain, temperature) modalities in all four extremities.    Gait: Gait is narrow based and steady and the patient is able to walk on heels, toes and in tandem. Romberg is negative.    REVIEW OF SYSTEMS: 12-point RoS negative except as per HPI.    ALLERGIES:  Allergies   Allergen Reactions     No Known Allergies      MEDICATIONS:  Current Outpatient Medications   Medication Sig Dispense Refill     allopurinol (ZYLOPRIM) 300 MG tablet Take 300 mg by mouth daily       amLODIPine (NORVASC) 5 MG tablet Take 5 mg by mouth daily       aspirin (ASA) 325 MG tablet Take 1 tablet (325 mg) by mouth daily Only start taking this dose of aspirin AFTER you have finished the 20 days of Plavix. 90 tablet 1     aspirin 81 MG EC tablet Take 81 mg by mouth daily       atenolol (TENORMIN) 100 MG tablet Take 100 mg by mouth daily       atorvastatin (LIPITOR) 80 MG tablet Take 80 mg by mouth daily       clopidogrel (PLAVIX) 75 MG tablet Take 1 tablet (75 mg) by mouth daily 20 tablet 0     latanoprost (XALATAN) 0.005 % ophthalmic solution Place 1 drop into the right eye At Bedtime 2.5 mL 11     lisinopril (ZESTRIL) 20 MG tablet Take 20 mg by mouth daily       LORazepam (ATIVAN) 0.5 MG tablet Take 0.5 mg by mouth nightly as needed for anxiety       sertraline (ZOLOFT) 50 MG tablet Take 75 mg by mouth daily       traZODone (DESYREL) 50 MG tablet Take 50 mg by mouth At Bedtime       PAST MEDICAL HISTORY:  Past Medical History:   Diagnosis Date     Acute myocardial infarction, unspecified site, episode of care unspecified 1995     DEPRESSIVE DISORDER NEC 4/29/2008     Essential hypertension, benign      Hepatitis C carrier (H)      Other and unspecified hyperlipidemia      Peyronie's disease      PAST SURGICAL HISTORY:  Past Surgical  History:   Procedure Laterality Date     ZZC NONSPECIFIC PROCEDURE      MI -- angioplasty     SOCIAL HISTORY:  Social History     Socioeconomic History     Marital status:      Spouse name: Mitra     Number of children: 2     Years of education: 12     Highest education level: Not on file   Occupational History     Occupation:      Comment: [employer]   Social Needs     Financial resource strain: Not on file     Food insecurity     Worry: Not on file     Inability: Not on file     Transportation needs     Medical: Not on file     Non-medical: Not on file   Tobacco Use     Smoking status: Former Smoker     Quit date: 2003     Years since quittin.7     Smokeless tobacco: Never Used   Substance and Sexual Activity     Alcohol use: No     Drug use: No     Sexual activity: Yes     Partners: Female   Lifestyle     Physical activity     Days per week: Not on file     Minutes per session: Not on file     Stress: Not on file   Relationships     Social connections     Talks on phone: Not on file     Gets together: Not on file     Attends Buddhism service: Not on file     Active member of club or organization: Not on file     Attends meetings of clubs or organizations: Not on file     Relationship status: Not on file     Intimate partner violence     Fear of current or ex partner: Not on file     Emotionally abused: Not on file     Physically abused: Not on file     Forced sexual activity: Not on file   Other Topics Concern     Not on file   Social History Narrative     Not on file     FAMILY HISTORY:  Family History   Problem Relation Age of Onset     Cancer Mother         d:age39 ovarian     Respiratory Father         d:age 59     Family History Negative Brother         b:1960     Glaucoma No family hx of      Macular Degeneration No family hx of          Again, thank you for allowing me to participate in the care of your patient.  Sincerely,    Kvng Gomez MD

## 2020-10-08 NOTE — LETTER
10/8/2020       RE: Young Ordonez  [address] [facility] [address]     Dear Colleague,    Thank you for referring your patient, Young Ordonez, to the [facility] [place] at [facility]. Please see a copy of my visit note below.      DEPARTMENT OF NEUROLOGY  Referral for: Stroke Hospitalization Follow-Up   Patient Name:  Young Ordonez  MRN:  [id number]    :  1950  Date of Clinic Visit:  2020  Primary Care Provider:  Ike Valdes  Referring Provider: Dr. Marianna Sullivan     CHIEF COMPLAINT: Stroke Hospitalization Follow-Up     HISTORY OF PRESENT ILLNESS:  Young Ordonez is a 70 year old gentleman with history of hypertension, hyperlipidemia, CAD, ischemic cardiomyopathy, CKD3, PAD s/p bl femoral artery bypass graft, right carotid endarterectomy () after 3 episodes of amaurosis fugax, who presents to neurology clinic for post-stroke (R CRAO) evaluation. Mr. Ordonez presented to [facility] ED for acute right eye nasal visual field loss on 20. He was found to have CRAO, received TPA, then was transferred to [facility] for post-TPA ICU care and stroke work-up. He had no post-TPA complications. MR brain without acute infarct. CTA with 70% stenosis of proximal left internal carotid artery. Etiology is thought to be secondary to known right carotid artery disease (pt s/p endarterectomy). Vision had improved slightly by day of discharge. He was instructed to take DAPT for 21 days (aspirin 81 + plavix 75) then transitioned to full dose aspirin alone.     Mr. Ordonez is accompanied by his wife Mitra wang. They deny any new medical diagnoses or symptoms since hospital discharge in August. From stroke standpoint, Mr. Ordonez endorses stomach discomfort and upset with aspirin use. He currently takes 4 OTC baby aspirins daily because he has been told his insurance does not cover aspirin.     Patient was evaluated by ophthalmology on  "10/5/20. Fluorescein angiography showed visible plaque versus embolus in central retinal artery, which I suspect was the source of his CRAO. 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 He was \"given an injection and started on eye drops\". He was told he would need laser surgery in his eye in 2 weeks. He did participate in visual rehabilitation with OT and has been cleared to drive by them. He drives locally just about everyday and has not had any accidents or issues with driving.     He denies issues with gait, urinary/bowel incontinence, tingling, numbness, weakness. He endorses 3-4 headache days/month that he believes are secondary to poor neck positioning. He describes laying down with his neck crooked for hours sometimes that develops into neck pain headache of his entire head. He is not concerned by these headaches, they do not disturb his quality of life, and they respond to OTC pain medications.    ASSESSMENT AND PLAN:  Young Ordonez is a 70 year old gentleman with history of hypertension, hyperlipidemia, CAD, ischemic cardiomyopathy, CKD3, PAD s/p bl femoral artery bypass graft, right carotid endarterectomy (2018) after 3 episodes of amaurosis fugax, who presents to neurology clinic for post-stroke (R CRAO) evaluation. Etiology is felt to be atheroembolic from known lesion in right ICA. Neurological exam today is notable for right eye vision deficits but otherwise no new significant findings.     Plan:  - Consider switching to 325mg EC aspirin (can get this over the counter at many pharmacies)   - Atorvastatin 40mg is appropriate from secondary stroke prevention standpoint (patient currently on 80mg) but ultimately defer to PCP on this issue.   - Can consider PT for cervicogenic headaches in the future if they persist    Follow up in neurology clinic on an as-needed basis.     Patient was seen and discussed with Dr. Figueredo.     Kvng Gomez MD  PGY-2 Neurology " Resident  [facility] Department of Neurology  Pager: [phone]    Attending attestation: I personally interviewed and examined Mr. Ordonez, who presented today with his wife. I agree with the above assessment and plan with the following additions: he has anisocoria with R>L asymmetry. Additionally, I recommended that we obtain neuropathy labs given exam, and CK given hip flexor weakness, but Mr. Ordonez declined these labs, as well as PT referral for headaches. He will follow with us PRN.    Yolanda Figueredo DO   of Neurology    PHYSICAL EXAMINATION:  Vitals: BP (!) 157/77   Pulse 61   SpO2 98%     Neurologic Exam:    Mental status: Patient is oriented to person, place and time and able to provide a detailed account of history of illness. Attention and fund of knowledge are normal. Immediate recall is 3/3, and remote recall is 3/3.    Language: Speech is fluent; comprehension, repetition and naming are normal.    Cranial nerves: Pupils are round and react normally to light and accommodation. Right optic disc pale. Can count fingers only on temporal side of right eye. Visual fields are full on the left. Extraocular movements are normal. Facial strength and sensation are normal. Hearing is normal to finger rub bilaterally. The palate rises symmetrically and there is no dysarthria. Tongue protrusion is normal.    Power: Strength is normal bilaterally (5/5) in the following muscles/groups: sternocleidomastoids, trapezius, deltoids, biceps, triceps, wrist extensors, finger extensors, finger interossei, abudctor pollicis brevis, hip flexors, quadriceps, hamstrings, gastrocnemius/soleus, and anterior tibialis.    Reflexes: Biceps, brachioradialis, triceps, patellae, and Achilles are hyporeflexic but symmetric.     Motor/cerebellar: There are no tremors, myoclonus, or other abnormal movements. Muscle bulk and tone are normal. Finger-to-nose and heel-to-shin maneuvers are symmetric and  normal bilaterally. Rapid alternating movements are symmetric in the extremities. There is no pronator drift in the arms.    Sensation: Sensation is intact to large (vibration, proprioception) and small fiber (pain, temperature) modalities in all four extremities.    Gait: Gait is narrow based and steady and the patient is able to walk on heels, toes and in tandem. Romberg is negative.       REVIEW OF SYSTEMS: 12-point RoS negative except as per HPI.    ALLERGIES:  Allergies   Allergen Reactions     No Known Allergies      MEDICATIONS:  Current Outpatient Medications   Medication Sig Dispense Refill     allopurinol (ZYLOPRIM) 300 MG tablet Take 300 mg by mouth daily       amLODIPine (NORVASC) 5 MG tablet Take 5 mg by mouth daily       aspirin (ASA) 325 MG tablet Take 1 tablet (325 mg) by mouth daily Only start taking this dose of aspirin AFTER you have finished the 20 days of Plavix. 90 tablet 1     aspirin 81 MG EC tablet Take 81 mg by mouth daily       atenolol (TENORMIN) 100 MG tablet Take 100 mg by mouth daily       atorvastatin (LIPITOR) 80 MG tablet Take 80 mg by mouth daily       clopidogrel (PLAVIX) 75 MG tablet Take 1 tablet (75 mg) by mouth daily 20 tablet 0     latanoprost (XALATAN) 0.005 % ophthalmic solution Place 1 drop into the right eye At Bedtime 2.5 mL 11     lisinopril (ZESTRIL) 20 MG tablet Take 20 mg by mouth daily       LORazepam (ATIVAN) 0.5 MG tablet Take 0.5 mg by mouth nightly as needed for anxiety       sertraline (ZOLOFT) 50 MG tablet Take 75 mg by mouth daily       traZODone (DESYREL) 50 MG tablet Take 50 mg by mouth At Bedtime       PAST MEDICAL HISTORY:  Past Medical History:   Diagnosis Date     Acute myocardial infarction, unspecified site, episode of care unspecified 1995     DEPRESSIVE DISORDER NEC 4/29/2008     Essential hypertension, benign      Hepatitis C carrier (H)      Other and unspecified hyperlipidemia      Peyronie's disease      PAST SURGICAL HISTORY:  Past Surgical  History:   Procedure Laterality Date     ZZC NONSPECIFIC PROCEDURE      MI -- angioplasty     SOCIAL HISTORY:  Social History     Socioeconomic History     Marital status:      Spouse name: Mitra     Number of children: 2     Years of education: 12     Highest education level: Not on file   Occupational History     Occupation:      Comment: [employer]   Social Needs     Financial resource strain: Not on file     Food insecurity     Worry: Not on file     Inability: Not on file     Transportation needs     Medical: Not on file     Non-medical: Not on file   Tobacco Use     Smoking status: Former Smoker     Quit date: 2003     Years since quittin.7     Smokeless tobacco: Never Used   Substance and Sexual Activity     Alcohol use: No     Drug use: No     Sexual activity: Yes     Partners: Female   Lifestyle     Physical activity     Days per week: Not on file     Minutes per session: Not on file     Stress: Not on file   Relationships     Social connections     Talks on phone: Not on file     Gets together: Not on file     Attends Judaism service: Not on file     Active member of club or organization: Not on file     Attends meetings of clubs or organizations: Not on file     Relationship status: Not on file     Intimate partner violence     Fear of current or ex partner: Not on file     Emotionally abused: Not on file     Physically abused: Not on file     Forced sexual activity: Not on file   Other Topics Concern     Not on file   Social History Narrative     Not on file     FAMILY HISTORY:  Family History   Problem Relation Age of Onset     Cancer Mother         d:age37 ovarian     Respiratory Father         d:age 59     Family History Negative Brother         b:1960     Glaucoma No family hx of      Macular Degeneration No family hx of            Again, thank you for allowing me to participate in the care of your patient.      Sincerely,    Kvng Gomez MD

## 2020-10-13 ENCOUNTER — COMMUNICATION - HEALTHEAST (OUTPATIENT)
Dept: SCHEDULING | Facility: CLINIC | Age: 70
End: 2020-10-13

## 2020-10-26 ENCOUNTER — AMBULATORY - HEALTHEAST (OUTPATIENT)
Dept: VASCULAR SURGERY | Facility: CLINIC | Age: 70
End: 2020-10-26

## 2020-10-26 ENCOUNTER — COMMUNICATION - HEALTHEAST (OUTPATIENT)
Dept: VASCULAR SURGERY | Facility: CLINIC | Age: 70
End: 2020-10-26

## 2020-10-26 DIAGNOSIS — I65.23 BILATERAL CAROTID ARTERY STENOSIS: ICD-10-CM

## 2020-10-29 ENCOUNTER — OFFICE VISIT - HEALTHEAST (OUTPATIENT)
Dept: VASCULAR SURGERY | Facility: CLINIC | Age: 70
End: 2020-10-29

## 2020-10-29 ENCOUNTER — COMMUNICATION - HEALTHEAST (OUTPATIENT)
Dept: VASCULAR SURGERY | Facility: CLINIC | Age: 70
End: 2020-10-29

## 2020-10-29 DIAGNOSIS — I65.23 BILATERAL CAROTID ARTERY STENOSIS: ICD-10-CM

## 2020-10-29 DIAGNOSIS — I73.9 PAD (PERIPHERAL ARTERY DISEASE) (H): ICD-10-CM

## 2020-11-05 ENCOUNTER — COMMUNICATION - HEALTHEAST (OUTPATIENT)
Dept: FAMILY MEDICINE | Facility: CLINIC | Age: 70
End: 2020-11-05

## 2020-11-05 DIAGNOSIS — F41.9 ANXIETY: ICD-10-CM

## 2020-11-09 ENCOUNTER — COMMUNICATION - HEALTHEAST (OUTPATIENT)
Dept: FAMILY MEDICINE | Facility: CLINIC | Age: 70
End: 2020-11-09

## 2020-11-09 DIAGNOSIS — I10 HYPERTENSION, ESSENTIAL, BENIGN: ICD-10-CM

## 2020-12-06 ENCOUNTER — COMMUNICATION - HEALTHEAST (OUTPATIENT)
Dept: FAMILY MEDICINE | Facility: CLINIC | Age: 70
End: 2020-12-06

## 2020-12-06 DIAGNOSIS — F41.9 ANXIETY: ICD-10-CM

## 2020-12-10 ENCOUNTER — COMMUNICATION - HEALTHEAST (OUTPATIENT)
Dept: VASCULAR SURGERY | Facility: CLINIC | Age: 70
End: 2020-12-10

## 2020-12-24 NOTE — PROGRESS NOTES
OCCUPATIONAL THERAPY VISUAL REHABILITATION DISCHARGE SUMMARY     Patient: Young Ordonez  : 1950  Insurance:   Payor/Plan Subscriber Name Rel Member # Group #   PREFERREDONE - PREFER* TORRES ORDONEZ Spouse [id number]       [address] [id number]       Beginning/End Dates of Reporting Period:  20 to 2020  (last seen on 10/2/20)    Therapy Diagnosis:   Therapy  Diagnosis: Impaired ADL/IADL with deficits in: Reading based ADL, Written communication, Home management(functional and community mobility)  Impairment comments: decreased vision, fatigue    Client Self Report: Has driven a couple times - local only and it has gone well per his report, spouse not in the car with him.  Patient reports a couple of instances where he has missed things in his lower right quadrant, not specific to driving.  Was able to schedule his eye appointment at the [place] too early next week.  Trying to get used to his trifocals, did not asked to switch to bifocals yet.    GOALS     Goal 1 - Near vision        Near Vision Goal Comment: Patient will demonstrate 3 pieces of adaptive equipment and/or adaptive techniques in regards to magnification, lighting, contrast and glare for increased ADL/IADL independence (reading, meal prep, medication management, writing).   Target Date: 10/26/20        Goal 2 - Visual field        Visual Field Goal Comment: Patient will verbalize awareness of central/peripheral visual field Loss and demonstrate use of 2 adaptive techniques/adaptive equipment for increased independence and safety in near vision tasks, functional mobility, navigation, object finding.   Target Date: 10/26/20        Goal 3 - Environmental modification        Environmental Modification Goal Comment: Patient will demonstrate and/or verbalize 3 environmentally-based ADL modifications to improve visual-based ADL/IADL activities.   Target Date: 10/26/20        Goal 4 - Resource education                     Goal 5 - Distance  viewing                     Goal 6 - Cognition                     Goal 7                 Goal 8                   Progress Toward Goals  Goal(s) all not met due to limited number of treatment sessions; all were partially met  Progress: patient seen for 3 visits total including the initial evaluation    Reason for Discharge  Patient has failed to schedule further appointments.    Adaptive Equipment/Optical Devices Recommended:  education: Central field versus peripheral visual field, nasal versus peripheral field, visual field amount required for community mobility in state of [place]; recommendation for use of large print and larger font on smart phone and ipad; eye dominance, scanning strategies (turning head/eyes, etc.), always lens over L eye to protect it, EC - brief basic; Trial light filters for TV glare: Light plum, light gray and yellow/night driving, all worked fairly well with simulated to computer; visual scanning strategies to the right    Discharge Plan  Patient to continue home program/techniques

## 2020-12-29 ENCOUNTER — COMMUNICATION - HEALTHEAST (OUTPATIENT)
Dept: VASCULAR SURGERY | Facility: CLINIC | Age: 70
End: 2020-12-29

## 2021-01-06 ENCOUNTER — COMMUNICATION - HEALTHEAST (OUTPATIENT)
Dept: FAMILY MEDICINE | Facility: CLINIC | Age: 71
End: 2021-01-06

## 2021-01-06 DIAGNOSIS — F41.9 ANXIETY: ICD-10-CM

## 2021-01-15 ENCOUNTER — HEALTH MAINTENANCE LETTER (OUTPATIENT)
Age: 71
End: 2021-01-15

## 2021-02-02 ENCOUNTER — COMMUNICATION - HEALTHEAST (OUTPATIENT)
Dept: FAMILY MEDICINE | Facility: CLINIC | Age: 71
End: 2021-02-02

## 2021-02-02 DIAGNOSIS — F41.9 ANXIETY: ICD-10-CM

## 2021-02-02 DIAGNOSIS — I10 HYPERTENSION, ESSENTIAL, BENIGN: ICD-10-CM

## 2021-03-01 ENCOUNTER — COMMUNICATION - HEALTHEAST (OUTPATIENT)
Dept: FAMILY MEDICINE | Facility: CLINIC | Age: 71
End: 2021-03-01

## 2021-03-01 DIAGNOSIS — F32.A DEPRESSION: ICD-10-CM

## 2021-03-01 DIAGNOSIS — F41.9 ANXIETY: ICD-10-CM

## 2021-03-04 ENCOUNTER — COMMUNICATION - HEALTHEAST (OUTPATIENT)
Dept: FAMILY MEDICINE | Facility: CLINIC | Age: 71
End: 2021-03-04

## 2021-03-04 DIAGNOSIS — F41.9 ANXIETY: ICD-10-CM

## 2021-03-08 ENCOUNTER — OFFICE VISIT - HEALTHEAST (OUTPATIENT)
Dept: FAMILY MEDICINE | Facility: CLINIC | Age: 71
End: 2021-03-08

## 2021-03-08 DIAGNOSIS — F32.A DEPRESSION: ICD-10-CM

## 2021-03-08 DIAGNOSIS — B18.2: ICD-10-CM

## 2021-03-08 DIAGNOSIS — F41.9 ANXIETY: ICD-10-CM

## 2021-03-08 DIAGNOSIS — M54.50 RIGHT-SIDED LOW BACK PAIN WITHOUT SCIATICA, UNSPECIFIED CHRONICITY: ICD-10-CM

## 2021-03-08 DIAGNOSIS — I73.9 PAD (PERIPHERAL ARTERY DISEASE) (H): ICD-10-CM

## 2021-03-08 ASSESSMENT — MIFFLIN-ST. JEOR: SCORE: 1568.65

## 2021-04-05 ENCOUNTER — COMMUNICATION - HEALTHEAST (OUTPATIENT)
Dept: FAMILY MEDICINE | Facility: CLINIC | Age: 71
End: 2021-04-05

## 2021-04-05 DIAGNOSIS — F41.9 ANXIETY: ICD-10-CM

## 2021-04-05 DIAGNOSIS — G47.00 INSOMNIA, UNSPECIFIED TYPE: ICD-10-CM

## 2021-04-05 DIAGNOSIS — F32.A DEPRESSION: ICD-10-CM

## 2021-05-04 ENCOUNTER — COMMUNICATION - HEALTHEAST (OUTPATIENT)
Dept: FAMILY MEDICINE | Facility: CLINIC | Age: 71
End: 2021-05-04

## 2021-05-04 DIAGNOSIS — F41.9 ANXIETY: ICD-10-CM

## 2021-05-26 VITALS — DIASTOLIC BLOOD PRESSURE: 60 MMHG | RESPIRATION RATE: 17 BRPM | HEART RATE: 80 BPM | SYSTOLIC BLOOD PRESSURE: 120 MMHG

## 2021-05-28 ENCOUNTER — COMMUNICATION - HEALTHEAST (OUTPATIENT)
Dept: FAMILY MEDICINE | Facility: CLINIC | Age: 71
End: 2021-05-28

## 2021-05-28 DIAGNOSIS — F32.A DEPRESSION: ICD-10-CM

## 2021-05-28 DIAGNOSIS — F41.9 ANXIETY: ICD-10-CM

## 2021-05-28 NOTE — TELEPHONE ENCOUNTER
Controlled Substance Refill Request  Medication:   Requested Prescriptions     Pending Prescriptions Disp Refills     LORazepam (ATIVAN) 0.5 MG tablet [Pharmacy Med Name: LORAZEPAM 0.5 MG TABLET] 30 tablet 0     Sig: TAKE 1 TABLET (0.5 MG TOTAL) BY MOUTH AT BEDTIME AS NEEDED FOR ANXIETY.     Date Last Fill: 3/29/19  Pharmacy:  [id number]  Submit electronically to pharmacy  Controlled Substance Agreement on File:   Encounter-Level CSA Scan Date:    There are no encounter-level csa scan date.       Last office visit: Last office visit pertaining to requested medication was 2/1/19.

## 2021-05-28 NOTE — PROGRESS NOTES
HPI: She is here today in clinic for follow-up in 1 year status post right carotid endarterectomy for symptomatic stenosis.  He is doing very well with no symptoms neurologically.  Overall, everything looks stable and he is happy with his results.  He has no significant complaints of lower leg claudication.      Allergies, Medications, Social History, Past Medical History and Past Surgical History were reviewed and are noted in the chart.     Review of Systems - Negative.     Vitals:    05/06/19 1353   BP: 115/70   Pulse: 60   Resp: 14        EXAM:  GENERAL: This is a well developed male in no distress.  HEAD AND NECK: Cranial nerves intact. No neck masses or bruits.  The right neck incision is well-healed with good scar tissue formation.  CARDIAC: RRR w/out murmur  CHEST/LUNG: Clear  EXTREM: Strength and sensation intact bilaterally     IMAGES:   Duplex suggest less than 50% stenosis of the right internal carotid artery.  There is 50-69% stenosis of the left internal carotid artery.      Assessment/Plan: 38-year-old male status post right carotid endarterectomy for symptomatic carotid stenosis with amaurosis fugax.     At this point the plan is to see Young teran at the one-year visit with a repeat carotid duplex at that time.  He also needs an aorto-iliac ultrasound and bilateral lower leg JOSE's.  No additional questions at this time he will call us if he develops any symptoms or problems in the near future.     Total time spent 15 minutes face to face with patient with more than 50% time spent in counseling and coordination of care.      Sergei Lemus MD  Vascular Surgery

## 2021-05-29 ENCOUNTER — COMMUNICATION - HEALTHEAST (OUTPATIENT)
Dept: FAMILY MEDICINE | Facility: CLINIC | Age: 71
End: 2021-05-29

## 2021-05-29 DIAGNOSIS — I10 HYPERTENSION, ESSENTIAL, BENIGN: ICD-10-CM

## 2021-05-29 DIAGNOSIS — M10.9 ACUTE GOUT OF RIGHT KNEE, UNSPECIFIED CAUSE: ICD-10-CM

## 2021-05-29 NOTE — TELEPHONE ENCOUNTER
Controlled Substance Refill Request  Medication:   Requested Prescriptions     Pending Prescriptions Disp Refills     LORazepam (ATIVAN) 0.5 MG tablet [Pharmacy Med Name: LORAZEPAM 0.5 MG TABLET] 30 tablet 0     Sig: TAKE 1 TABLET (0.5 MG TOTAL) BY MOUTH AT BEDTIME AS NEEDED FOR ANXIETY.     Date Last Fill: 4/30/19  [id number]   Submit electronically to pharmacy  Controlled Substance Agreement on File:   Encounter-Level CSA Scan Date:    There are no encounter-level csa scan date.       Last office visit: Last office visit pertaining to requested medication was 2/1/19.

## 2021-05-29 NOTE — TELEPHONE ENCOUNTER
Refill request for medication: 5/29/19  Last visit addressing this medication: 12/5/18  Follow up plan 1  Months - in regards to BP  Last refill on 4/30/19, quantity #30   CSA completed NA    checked  05/30/19, last dispensed refill 4/30/19    Appointment: Not due     Deborah Martinez, A

## 2021-05-30 VITALS — HEIGHT: 68 IN | BODY MASS INDEX: 31.83 KG/M2 | WEIGHT: 210 LBS

## 2021-05-30 VITALS — BODY MASS INDEX: 32.45 KG/M2 | HEIGHT: 69 IN | WEIGHT: 219.1 LBS

## 2021-05-30 VITALS — WEIGHT: 210.44 LBS | BODY MASS INDEX: 31.89 KG/M2 | HEIGHT: 68 IN

## 2021-05-30 VITALS — WEIGHT: 215 LBS | HEIGHT: 68 IN | BODY MASS INDEX: 32.58 KG/M2

## 2021-05-30 VITALS — BODY MASS INDEX: 32.58 KG/M2 | WEIGHT: 215 LBS | HEIGHT: 68 IN

## 2021-05-30 VITALS — BODY MASS INDEX: 32.44 KG/M2 | HEIGHT: 69 IN | WEIGHT: 219 LBS

## 2021-05-30 VITALS — BODY MASS INDEX: 33.15 KG/M2 | WEIGHT: 218 LBS

## 2021-05-30 VITALS — BODY MASS INDEX: 32.74 KG/M2 | WEIGHT: 216 LBS | HEIGHT: 68 IN

## 2021-05-30 NOTE — PROGRESS NOTES
ASSESSMENT:  1. Essential hypertension  - lisinopril (PRINIVIL,ZESTRIL) 20 MG tablet; Take 1 tablet (20 mg total) by mouth daily.  Dispense: 90 tablet; Refill: 1  - Comprehensive Metabolic Panel  - [facility] LAV  Refill his lisinopril for his blood pressure which is actually controlled at this time.  We will also look at his labs.  2. Hyperlipidemia, unspecified hyperlipidemia type  - Lipid Cascade  He has been using atorvastatin.  We will get his labs today.  3. Anxiety  - sertraline (ZOLOFT) 50 MG tablet; Take 1.5 tablets (75 mg total) by mouth daily.  Dispense: 135 tablet; Refill: 1    4. Depression  - sertraline (ZOLOFT) 50 MG tablet; Take 1.5 tablets (75 mg total) by mouth daily.  Dispense: 135 tablet; Refill: 1  We discussed the depression and anxiety.  He is still on 50 mg daily I think that slight increase in the dose might be helpful, such I did give him a prescription for 1-1/2 tablets daily.  If that is not helpful then I can increase the dose of the lorazepam.  5. Skin lesions  Discussed management of the skin lesions especially the one on the thighs which does appear to be warts.  Discussed different options that he has for the management.  At this point he noted that he wanted to watch it and continue to use the over-the-counter medications for it.  I did encourage him to let me know if he wants to so that it can be burnt off.      PLAN:  There are no Patient Instructions on file for this visit.    Orders Placed This Encounter   Procedures     Lipid Cascade     Order Specific Question:   Fasting is required?     Answer:   Yes     Comprehensive Metabolic Panel     [facility] LAV     Medications Discontinued During This Encounter   Medication Reason     sertraline (ZOLOFT) 50 MG tablet Reorder     lisinopril (PRINIVIL,ZESTRIL) 20 MG tablet Reorder     sertraline (ZOLOFT) 50 MG tablet        No follow-ups on file.      CHIEF COMPLAINT:  Chief Complaint   Patient presents with     Follow-up     Medication Refill      Hypertension       HISTORY OF PRESENT ILLNESS:  Young is a 69 y.o. male presenting to the clinic today for follow up and and medication refill.  He does have a history of hypertension as well as hyperlipidemia and gout.  He does have a history of coronary artery disease which is stable and compensated at this time.  He is stable with his other medications and doing well.  He comes in today because he needs to have medication rechecked as well as I get some labs for his cholesterol.  He has been taking allopurinol and colchicine for gout.  Noted that he has not had gout exacerbations for a long time long.  Blood pressure has remained normal.  He denied having any side effects.  He also has a some anxiety.  He does use sertraline at 50 mg daily, he noted that the anxiety/depression is stable but appears to be getting worse.  He also takes lorazepam, is hoping to get an increase to 1 mg daily.  Notes some difficulty with sleep which is not being helped at this time with the Lorazepam.  He also does have some skin concerns, he notes having some growth but he thinks are warts on his thigh on both sides and he wants to see what they are.  He also has a lot of other skin skin lesions that he has had for some years now.  Has been picking at them.  Noted no major abnormal changes.    REVIEW OF SYSTEMS:   Review of system is as noted above, he denies that he feels well.  And noted having some stress financially.  Thinks that it could have been 1 of the reasons why he is having increase in his anxiety.  All other systems are negative.    PFSH:  Reviewed, as below.    Social History     Tobacco Use   Smoking Status Former Smoker     Packs/day: 1.50     Types: Cigarettes     Last attempt to quit:      Years since quittin.5   Smokeless Tobacco Never Used       Family History   Problem Relation Age of Onset     Cancer Mother      Stroke Father      Hypertension Father      Heart disease Paternal Grandmother       Emphysema Paternal Grandfather        Social History     Socioeconomic History     Marital status:      Spouse name: Not on file     Number of children: Not on file     Years of education: Not on file     Highest education level: Not on file   Occupational History     Not on file   Social Needs     Financial resource strain: Not on file     Food insecurity:     Worry: Not on file     Inability: Not on file     Transportation needs:     Medical: Not on file     Non-medical: Not on file   Tobacco Use     Smoking status: Former Smoker     Packs/day: 1.50     Types: Cigarettes     Last attempt to quit:      Years since quittin.5     Smokeless tobacco: Never Used   Substance and Sexual Activity     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      Drug use: No     Sexual activity: Not on file   Lifestyle     Physical activity:     Days per week: Not on file     Minutes per session: Not on file     Stress: Not on file   Relationships     Social connections:     Talks on phone: Not on file     Gets together: Not on file     Attends Cheondoism service: Not on file     Active member of club or organization: Not on file     Attends meetings of clubs or organizations: Not on file     Relationship status: Not on file     Intimate partner violence:     Fear of current or ex partner: Not on file     Emotionally abused: Not on file     Physically abused: Not on file     Forced sexual activity: Not on file   Other Topics Concern     Not on file   Social History Narrative     Not on file       Past Surgical History:   Procedure Laterality Date     ANGIOPLASTY       ANKLE FRACTURE SURGERY Left      AORTA - BILATERAL FEMORAL ARTERY BYPASS GRAFT N/A 2017    Procedure: AORTOBIFEMORAL BYPASS ;  Surgeon: Ilir FERRO MD;  Location: [facility] OR;  Service:      CAROTID ENDARTERECTOMY Right 2018    Procedure: RIGHT CAROTID ENDARTERECTOMY WITH EEG;  Surgeon: Sergei Lemus MD;  Location: [facility]  OR;  Service:      FRACTURE SURGERY       MANDIBLE SURGERY         No Known Allergies    Active Ambulatory Problems     Diagnosis Date Noted     Gout      Chronic coronary artery disease      Cellulitis      HTN, goal below 150/90      Chronic gouty arthritis 09/18/2015     Tobacco use 07/18/2013     Ischemic rest pain of lower extremity (H) 05/17/2017     Hypotension, unspecified hypotension type      FRANCY (acute kidney injury) (H)      Low urine output      Post-op pain      Stage 3 chronic kidney disease (H)      Abdominal distention      Ischemic cardiomyopathy      Coronary artery disease due to lipid rich plaque      Obesity, unspecified obesity severity, unspecified obesity type      Gout attack 05/25/2017     Acute myocardial infarction (H) 07/26/2017     Depressive disorder, not elsewhere classified 04/29/2008     Benign essential hypertension 08/25/2003     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 (H) 07/26/2017     Hyperlipidemia 07/26/2017     Polyarthritis 08/11/2017     Idiopathic chronic gout, multiple sites, with tophus (tophi) 08/11/2017     Acute gout of right foot 08/11/2017     Symptomatic stenosis of right carotid artery 04/12/2018     Resolved Ambulatory Problems     Diagnosis Date Noted     No Resolved Ambulatory Problems     Past Medical History:   Diagnosis Date     Arthritis      Cardiomyopathy (H)      Claudication (H)      COPD (chronic obstructive pulmonary disease) (H)      Coronary artery disease      Depression      Gout      Gout attack 5/25/2017     HTN (hypertension)      Hyperlipidemia      Infectious viral hepatitis      Low back pain      MI (myocardial infarction) (H) 1995     Obesity      PAD (peripheral artery disease) (H)        Current Outpatient Medications   Medication Sig Dispense Refill     allopurinol (ZYLOPRIM) 300 MG tablet TAKE 1 & 1/2 TABLETS BY MOUTH ONCE DAILY 135 tablet 1     amLODIPine (NORVASC) 5 MG tablet TAKE 1 TABLET (5 MG TOTAL) BY MOUTH DAILY. 90 tablet 3     aspirin  81 MG EC tablet Take 81 mg by mouth daily.        atenolol (TENORMIN) 100 MG tablet TAKE 1 TABLET BY MOUTH EVERY DAY 90 tablet 3     atorvastatin (LIPITOR) 80 MG tablet Take 1 tablet (80 mg total) by mouth at bedtime. 90 tablet 1     colchicine 0.6 mg cap Take 1 capsule by mouth 2 (two) times a day. 180 capsule 1     lisinopril (PRINIVIL,ZESTRIL) 20 MG tablet Take 1 tablet (20 mg total) by mouth daily. 90 tablet 1     sertraline (ZOLOFT) 50 MG tablet Take 1.5 tablets (75 mg total) by mouth daily. 135 tablet 1     traZODone (DESYREL) 50 MG tablet TAKE 1 TABLET BY MOUTH EVERYDAY AT BEDTIME 90 tablet 2     LORazepam (ATIVAN) 0.5 MG tablet Take 1 tablet (0.5 mg total) by mouth at bedtime as needed for anxiety. 30 tablet 0     No current facility-administered medications for this visit.        VITALS:  Vitals:    07/08/19 1250   BP: 124/82   Pulse: 66   SpO2: 95%   Weight: 193 lb (87.5 kg)     Wt Readings from Last 3 Encounters:   07/08/19 193 lb (87.5 kg)   02/01/19 196 lb 8 oz (89.1 kg)   12/05/18 198 lb 2 oz (89.9 kg)     Body mass index is 29.35 kg/m .    PHYSICAL EXAM:  General Appearance: Alert, cooperative, no distress, appears stated age  HEENT: Pupils are equal and reactive, extraocular motions is normal.  Oropharynx is moist.  Neck is supple no notable thyromegaly.  External ears are normal.  Lungs: Clear to auscultation bilaterally, respirations unlabored  Heart: Regular rate and rhythm, S1 and S2 normal..  Abdomen: Soft slightly obese, nontender.  Skin: He does have multiple superficial ecchymosis noted on the upper extremities due to possible aspirin.  He also has on his thighs on both sides dry warts.  On the left it is on the inner aspect of the mid thigh, on the right it is on the outer part of the mid thigh.  On the right outer mid thigh it has slightly bigger than the left lesion does look like a wart but has been picked on.  No bleeding.  On the left temple he also has a dry patch,scaly and he felt  slightly tender.  Musculoskeletal: Normal range of motion. No joint swelling or deformity.   Neurologic:  Alert and oriented times 3.  Psychiatric: Normal mood and affect.    MEDICATIONS:  Current Outpatient Medications   Medication Sig Dispense Refill     allopurinol (ZYLOPRIM) 300 MG tablet TAKE 1 & 1/2 TABLETS BY MOUTH ONCE DAILY 135 tablet 1     amLODIPine (NORVASC) 5 MG tablet TAKE 1 TABLET (5 MG TOTAL) BY MOUTH DAILY. 90 tablet 3     aspirin 81 MG EC tablet Take 81 mg by mouth daily.        atenolol (TENORMIN) 100 MG tablet TAKE 1 TABLET BY MOUTH EVERY DAY 90 tablet 3     atorvastatin (LIPITOR) 80 MG tablet Take 1 tablet (80 mg total) by mouth at bedtime. 90 tablet 1     colchicine 0.6 mg cap Take 1 capsule by mouth 2 (two) times a day. 180 capsule 1     lisinopril (PRINIVIL,ZESTRIL) 20 MG tablet Take 1 tablet (20 mg total) by mouth daily. 90 tablet 1     sertraline (ZOLOFT) 50 MG tablet Take 1.5 tablets (75 mg total) by mouth daily. 135 tablet 1     traZODone (DESYREL) 50 MG tablet TAKE 1 TABLET BY MOUTH EVERYDAY AT BEDTIME 90 tablet 2     LORazepam (ATIVAN) 0.5 MG tablet Take 1 tablet (0.5 mg total) by mouth at bedtime as needed for anxiety. 30 tablet 0     No current facility-administered medications for this visit.

## 2021-05-30 NOTE — TELEPHONE ENCOUNTER
Controlled Substance Refill Request  Medication:   Requested Prescriptions     Pending Prescriptions Disp Refills     LORazepam (ATIVAN) 0.5 MG tablet [Pharmacy Med Name: LORAZEPAM 0.5 MG TABLET] 30 tablet 0     Sig: TAKE 1 TABLET (0.5 MG TOTAL) BY MOUTH AT BEDTIME AS NEEDED FOR ANXIETY.     Date Last Fill: 6.3.19  Pharmacy: [facility]   Submit electronically to pharmacy  Controlled Substance Agreement on File:   Encounter-Level CSA Scan Date:    There are no encounter-level csa scan date.       Last office visit: Last office visit pertaining to requested medication was 2.1.19.

## 2021-05-30 NOTE — TELEPHONE ENCOUNTER
Refill request for medication: LORazepam (ATIVAN) 0.5 MG tablet  Last visit addressing this medication: 12/5/18  Follow up plan no clear follow up listed   Last refill on 6/3/19, quantity #30     Appointment: Do you want patient to be seen in clinic?     Ladan Betancur MA

## 2021-05-31 VITALS — WEIGHT: 212.4 LBS | BODY MASS INDEX: 32.3 KG/M2

## 2021-05-31 VITALS — BODY MASS INDEX: 30.83 KG/M2 | WEIGHT: 203.4 LBS | HEIGHT: 68 IN

## 2021-05-31 VITALS — BODY MASS INDEX: 34.11 KG/M2 | HEIGHT: 68 IN | WEIGHT: 225.1 LBS

## 2021-05-31 VITALS — BODY MASS INDEX: 32.23 KG/M2 | WEIGHT: 212 LBS

## 2021-05-31 VITALS — WEIGHT: 218.5 LBS | BODY MASS INDEX: 33.22 KG/M2

## 2021-05-31 VITALS — WEIGHT: 204.2 LBS | BODY MASS INDEX: 31.05 KG/M2

## 2021-05-31 VITALS — BODY MASS INDEX: 31.06 KG/M2 | WEIGHT: 204.25 LBS

## 2021-05-31 VITALS — BODY MASS INDEX: 33.34 KG/M2 | WEIGHT: 220 LBS | HEIGHT: 68 IN

## 2021-05-31 VITALS — WEIGHT: 225 LBS | BODY MASS INDEX: 34.21 KG/M2

## 2021-05-31 VITALS — BODY MASS INDEX: 33.45 KG/M2 | WEIGHT: 220 LBS

## 2021-05-31 VITALS — BODY MASS INDEX: 30.43 KG/M2 | WEIGHT: 200.1 LBS

## 2021-05-31 NOTE — TELEPHONE ENCOUNTER
Refill request for medication: lorazepam 0.5   Last visit addressing this medication: 07/08/19  Follow up plan 6  months  Last refill on 08/08/19, quantity #30   CSA completed none    checked  09/03/19, last dispensed refill 08/08/19    Appointment: Not due     Mariella Braden [facility]

## 2021-05-31 NOTE — TELEPHONE ENCOUNTER
"Medication Question or Clarification  Who is calling: Other: [id number] [place] [address]   What medication are you calling about? (include dose and sig)     allopurinol (ZYLOPRIM) 300 MG tablet 135 tablet 1 3/3/2019     Sig: TAKE 1 & 1/2 TABLETS BY MOUTH ONCE DAILY    Sent to pharmacy as: allopurinol (ZYLOPRIM) 300 MG tablet    E-Prescribing Status: Receipt confirmed by pharmacy (3/3/2019  8:43 AM CST)         Who prescribed the medication?: Ike Valdes MD  What is your question/concern?: Per fax received from the above pharmacy, \"Pt reports taking 1 tablet a day, please verify with patient & send new script.\"  Pharmacy: [facility] [id number] [place] [address]   Okay to leave a detailed message?: No  Site CMT - Please call the pharmacy to obtain any additional needed information.  "

## 2021-05-31 NOTE — TELEPHONE ENCOUNTER
Controlled Substance Refill Request  Medication:   Requested Prescriptions     Pending Prescriptions Disp Refills     LORazepam (ATIVAN) 0.5 MG tablet [Pharmacy Med Name: LORAZEPAM 0.5 MG TABLET] 30 tablet 0     Sig: TAKE 1 TABLET BY MOUTH AT BEDTIME AS NEEDED FOR ANXIETY     Date Last Fill: 6/28/19  [id number]   Submit electronically to pharmacy  Controlled Substance Agreement on File:   Encounter-Level CSA Scan Date:    There are no encounter-level csa scan date.       Last office visit: Last office visit pertaining to requested medication was 7/8/19.

## 2021-05-31 NOTE — TELEPHONE ENCOUNTER
Controlled Substance Refill Request  Medication:   Requested Prescriptions     Pending Prescriptions Disp Refills     LORazepam (ATIVAN) 0.5 MG tablet [Pharmacy Med Name: LORAZEPAM 0.5 MG TABLET] 30 tablet 0     Sig: TAKE 1 TABLET BY MOUTH AT BEDTIME AS NEEDED FOR ANXIETY     Date Last Fill: 8/5/19  [id number]   Submit electronically to pharmacy  Controlled Substance Agreement on File:   Encounter-Level CSA Scan Date:    There are no encounter-level csa scan date.       Last office visit: Last office visit pertaining to requested medication was 7/8/19.

## 2021-06-01 ENCOUNTER — RECORDS - HEALTHEAST (OUTPATIENT)
Dept: ADMINISTRATIVE | Facility: CLINIC | Age: 71
End: 2021-06-01

## 2021-06-01 ENCOUNTER — OFFICE VISIT - HEALTHEAST (OUTPATIENT)
Dept: FAMILY MEDICINE | Facility: CLINIC | Age: 71
End: 2021-06-01

## 2021-06-01 VITALS — WEIGHT: 186.6 LBS | BODY MASS INDEX: 28.37 KG/M2

## 2021-06-01 VITALS — WEIGHT: 188.4 LBS | BODY MASS INDEX: 28.55 KG/M2 | HEIGHT: 68 IN

## 2021-06-01 VITALS — WEIGHT: 189.6 LBS | BODY MASS INDEX: 28.83 KG/M2

## 2021-06-01 VITALS — BODY MASS INDEX: 28.04 KG/M2 | WEIGHT: 185 LBS | HEIGHT: 68 IN

## 2021-06-01 DIAGNOSIS — F32.A DEPRESSION: ICD-10-CM

## 2021-06-01 DIAGNOSIS — I10 HYPERTENSION, ESSENTIAL, BENIGN: ICD-10-CM

## 2021-06-01 DIAGNOSIS — F41.9 ANXIETY: ICD-10-CM

## 2021-06-01 ASSESSMENT — ANXIETY QUESTIONNAIRES
5. BEING SO RESTLESS THAT IT IS HARD TO SIT STILL: NOT AT ALL
IF YOU CHECKED OFF ANY PROBLEMS ON THIS QUESTIONNAIRE, HOW DIFFICULT HAVE THESE PROBLEMS MADE IT FOR YOU TO DO YOUR WORK, TAKE CARE OF THINGS AT HOME, OR GET ALONG WITH OTHER PEOPLE: NOT DIFFICULT AT ALL
1. FEELING NERVOUS, ANXIOUS, OR ON EDGE: SEVERAL DAYS
6. BECOMING EASILY ANNOYED OR IRRITABLE: SEVERAL DAYS
GAD7 TOTAL SCORE: 5
4. TROUBLE RELAXING: SEVERAL DAYS
2. NOT BEING ABLE TO STOP OR CONTROL WORRYING: SEVERAL DAYS
3. WORRYING TOO MUCH ABOUT DIFFERENT THINGS: SEVERAL DAYS
7. FEELING AFRAID AS IF SOMETHING AWFUL MIGHT HAPPEN: NOT AT ALL

## 2021-06-01 ASSESSMENT — PATIENT HEALTH QUESTIONNAIRE - PHQ9: SUM OF ALL RESPONSES TO PHQ QUESTIONS 1-9: 8

## 2021-06-01 NOTE — TELEPHONE ENCOUNTER
Refill request for medication: lorazepam  Last visit addressing this medication: 7/8/2019  Follow up plan 6  months  Last refill on 9/6/2019, quantity #90  I verified with the pharmacy that the patient does not have any pending prescriptions at the pharmacy. Last refill was on 9/3/2019.  CSA completed - no CSA on file    checked  10/01/19, last dispensed refill 9/3/2019    Appointment: Not due     Opal Ceron LPN

## 2021-06-01 NOTE — TELEPHONE ENCOUNTER
Controlled Substance Refill Request  Medication:   Requested Prescriptions     Pending Prescriptions Disp Refills     LORazepam (ATIVAN) 0.5 MG tablet [Pharmacy Med Name: LORAZEPAM 0.5 MG TABLET] 30 tablet 0     Sig: TAKE 1 TABLET BY MOUTH AT BEDTIME AS NEEDED FOR ANXIETY     Date Last Fill: 9/3/19  [id number]   Submit electronically to pharmacy  Controlled Substance Agreement on File:   Encounter-Level CSA Scan Date:    There are no encounter-level csa scan date.       Last office visit: Last office visit pertaining to requested medication was 7/8/19.

## 2021-06-02 ENCOUNTER — RECORDS - HEALTHEAST (OUTPATIENT)
Dept: ADMINISTRATIVE | Facility: CLINIC | Age: 71
End: 2021-06-02

## 2021-06-02 VITALS — WEIGHT: 198.13 LBS | BODY MASS INDEX: 30.12 KG/M2

## 2021-06-02 VITALS — WEIGHT: 196.5 LBS | BODY MASS INDEX: 29.88 KG/M2

## 2021-06-02 NOTE — TELEPHONE ENCOUNTER
He does need to come in to have the discussion regarding what medication to use if the sertraline is not working for him.  We will definitely consider weaning of if he does not come in to discuss that.

## 2021-06-02 NOTE — TELEPHONE ENCOUNTER
Reached out to patient and informed him he will need to start weaning down on the Lorazepam. Patient stated he is not currently taking the Sertraline due to it not being effective. Please advise. Thank you, Mary Saunders

## 2021-06-02 NOTE — TELEPHONE ENCOUNTER
Controlled Substance Refill Request  Medication:   Requested Prescriptions     Pending Prescriptions Disp Refills     LORazepam (ATIVAN) 0.5 MG tablet [Pharmacy Med Name: LORAZEPAM 0.5 MG TABLET] 30 tablet 0     Sig: TAKE 1 TABLET BY MOUTH AT BEDTIME AS NEEDED FOR ANXIETY     Date Last Fill: 10/1/19  [id number]   Submit electronically to pharmacy  Controlled Substance Agreement on File:   Encounter-Level CSA Scan Date:    There are no encounter-level csa scan date.       Last office visit: 7/8/2019 Ike Valdes MD

## 2021-06-03 VITALS — WEIGHT: 193 LBS | BODY MASS INDEX: 29.35 KG/M2

## 2021-06-03 NOTE — TELEPHONE ENCOUNTER
Left patient a message to return phone call. Please relay the below message to patient when he calls back. Thank you,  Mary Saunders

## 2021-06-03 NOTE — PROGRESS NOTES
ASSESSMENT:  1. Gout  - Uric Acid  We will get his uric acid level but he will continue with the allopurinol and colchicine following.  2. Anxiety  - sertraline (ZOLOFT) 50 MG tablet; Take 1.5 tablets (75 mg total) by mouth daily.  Dispense: 135 tablet; Refill: 1    3. Depression  - sertraline (ZOLOFT) 50 MG tablet; Take 1.5 tablets (75 mg total) by mouth daily.  Dispense: 135 tablet; Refill: 1  He is doing well regarding his anxiety and depression on sertraline which he takes 75 mg daily.  He will continue with that for now and will take intermittent lorazepam.  4. Insomnia, unspecified type  - traZODone (DESYREL) 50 MG tablet; Take 1 tablet (50 mg total) by mouth at bedtime.  Dispense: 90 tablet; Refill: 2  Trazodone was refilled.  5. Screening for malignant neoplasm of prostate  - PSA, Annual Screen (Prostatic-Specific Antigen)    6. Need for pneumococcal vaccination  - Pneumococcal conjugate vaccine 13-valent 6wks-17yrs; >50yrs      PLAN:  Discussed all his medical concerns today and encouraged him to follow-up closely.    Orders Placed This Encounter   Procedures     Pneumococcal conjugate vaccine 13-valent 6wks-17yrs; >50yrs     Uric Acid     PSA, Annual Screen (Prostatic-Specific Antigen)     Medications Discontinued During This Encounter   Medication Reason     sertraline (ZOLOFT) 50 MG tablet Reorder     traZODone (DESYREL) 50 MG tablet Reorder       No follow-ups on file.      CHIEF COMPLAINT:  Chief Complaint   Patient presents with     Medication Management     Medication Refill     colchicine        HISTORY OF PRESENT ILLNESS:  Young is a 69 y.o. male presenting to the clinic today having been asked to come in before refill of his colchicine which he uses for gout.  He already had the medication refilled.  He apparently has not had uric acid level done in a long time.  He also has a history of depression and anxiety as well as insomnia and he needed to have medication refilled for those as well.  He  noted that he is doing well currently.  He denied having any concerns.  He sleeps well.  He noted having had gout a long time ago that as long as he is taking the colchicine and allopurinol he has not had any gout attacks.  He would like to have that refilled.  He also noted that his depression anxiety stable.  Trazodone helps him with insomnia and he continues to take that.  He does need to get up-to-date regarding his shots, he had already gotten his influenza shot.    REVIEW OF SYSTEMS:   A full review of system was done and is negative except as stated.  All other systems are negative.    PFSH:  Reviewed, as below.    Social History     Tobacco Use   Smoking Status Former Smoker     Packs/day: 1.50     Types: Cigarettes     Last attempt to quit:      Years since quittin.8   Smokeless Tobacco Never Used       Family History   Problem Relation Age of Onset     Cancer Mother      Stroke Father      Hypertension Father      Heart disease Paternal Grandmother      Emphysema Paternal Grandfather        Social History     Socioeconomic History     Marital status:      Spouse name: Not on file     Number of children: Not on file     Years of education: Not on file     Highest education level: Not on file   Occupational History     Not on file   Social Needs     Financial resource strain: Not on file     Food insecurity:     Worry: Not on file     Inability: Not on file     Transportation needs:     Medical: Not on file     Non-medical: Not on file   Tobacco Use     Smoking status: Former Smoker     Packs/day: 1.50     Types: Cigarettes     Last attempt to quit:      Years since quittin.8     Smokeless tobacco: Never Used   Substance and Sexual Activity     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      Drug use: No     Sexual activity: Not on file   Lifestyle     Physical activity:     Days per week: Not on file     Minutes per session: Not on file     Stress: Not on file   Relationships      Social connections:     Talks on phone: Not on file     Gets together: Not on file     Attends [employer] service: Not on file     Active member of club or organization: Not on file     Attends meetings of clubs or organizations: Not on file     Relationship status: Not on file     Intimate partner violence:     Fear of current or ex partner: Not on file     Emotionally abused: Not on file     Physically abused: Not on file     Forced sexual activity: Not on file   Other Topics Concern     Not on file   Social History Narrative     Not on file       Past Surgical History:   Procedure Laterality Date     ANGIOPLASTY  1995     ANKLE FRACTURE SURGERY Left      AORTA - BILATERAL FEMORAL ARTERY BYPASS GRAFT N/A 5/17/2017    Procedure: AORTOBIFEMORAL BYPASS ;  Surgeon: Ilir FERRO MD;  Location: [facility];  Service:      CAROTID ENDARTERECTOMY Right 4/12/2018    Procedure: RIGHT CAROTID ENDARTERECTOMY WITH EEG;  Surgeon: Sergei Lemus MD;  Location: [facility];  Service:      FRACTURE SURGERY       MANDIBLE SURGERY         No Known Allergies    Active Ambulatory Problems     Diagnosis Date Noted     Gout      Chronic coronary artery disease      Cellulitis      HTN, goal below 150/90      Chronic gouty arthritis 09/18/2015     Tobacco use 07/18/2013     Ischemic rest pain of lower extremity (H) 05/17/2017     Hypotension, unspecified hypotension type      FRANCY (acute kidney injury) (H)      Low urine output      Post-op pain      Stage 3 chronic kidney disease (H)      Abdominal distention      Ischemic cardiomyopathy      Coronary artery disease due to lipid rich plaque      Obesity, unspecified obesity severity, unspecified obesity type      Gout attack 05/25/2017     Acute myocardial infarction (H) 07/26/2017     Depressive disorder, not elsewhere classified 04/29/2008     Benign essential hypertension 08/25/2003     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 (H) 07/26/2017     Hyperlipidemia 07/26/2017      "Polyarthritis 08/11/2017     Idiopathic chronic gout, multiple sites, with tophus (tophi) 08/11/2017     Acute gout of right foot 08/11/2017     Symptomatic stenosis of right carotid artery 04/12/2018     Resolved Ambulatory Problems     Diagnosis Date Noted     No Resolved Ambulatory Problems     Past Medical History:   Diagnosis Date     Arthritis      Cardiomyopathy (H)      Claudication (H)      COPD (chronic obstructive pulmonary disease) (H)      Coronary artery disease      Depression      Gout      HTN (hypertension)      Infectious viral hepatitis      Low back pain      MI (myocardial infarction) (H) 1995     Obesity      PAD (peripheral artery disease) (H)        Current Outpatient Medications   Medication Sig Dispense Refill     allopurinol (ZYLOPRIM) 300 MG tablet Take 1 tablet (300 mg total) by mouth daily. 90 tablet 2     amLODIPine (NORVASC) 5 MG tablet TAKE 1 TABLET (5 MG TOTAL) BY MOUTH DAILY. 90 tablet 3     aspirin 81 MG EC tablet Take 81 mg by mouth daily.        atenolol (TENORMIN) 100 MG tablet TAKE 1 TABLET BY MOUTH EVERY DAY 90 tablet 3     atorvastatin (LIPITOR) 80 MG tablet TAKE 1 TABLET BY MOUTH EVERYDAY AT BEDTIME 90 tablet 1     lisinopril (PRINIVIL,ZESTRIL) 20 MG tablet Take 1 tablet (20 mg total) by mouth daily. 90 tablet 1     LORazepam (ATIVAN) 0.5 MG tablet TAKE 1 TABLET BY MOUTH AT BEDTIME AS NEEDED FOR ANXIETY 30 tablet 0     sertraline (ZOLOFT) 50 MG tablet Take 1.5 tablets (75 mg total) by mouth daily. 135 tablet 1     traZODone (DESYREL) 50 MG tablet Take 1 tablet (50 mg total) by mouth at bedtime. 90 tablet 2     colchicine (MITIGARE) 0.6 mg capsule TAKE 1 CAPSULE BY MOUTH TWICE A  capsule 1     No current facility-administered medications for this visit.        VITALS:  Vitals:    11/19/19 1409   BP: 130/74   Pulse: 70   SpO2: 95%   Weight: 195 lb 8 oz (88.7 kg)   Height: 5' 8\" (1.727 m)     Wt Readings from Last 3 Encounters:   11/19/19 195 lb 8 oz (88.7 kg) "   07/08/19 193 lb (87.5 kg)   02/01/19 196 lb 8 oz (89.1 kg)     Body mass index is 29.73 kg/m .    PHYSICAL EXAM:  General Appearance: Alert, cooperative, no distress, appears stated age  HEENT: Pupils are equal and reactive, extraocular motions is normal. Neck is supple no notable thyromegaly.  External ears are normal.  Lungs: Clear to auscultation bilaterally, respirations unlabored  Heart: Regular rhythm and normal rate,S1 and S2 normal, no murmur, rub, or gallop  Abdomen: Soft  Musculoskeletal: Normal range of motion.   Neurologic:  Alert and oriented times 3.   Psychiatric: Normal mood and affect.    MEDICATIONS:  Current Outpatient Medications   Medication Sig Dispense Refill     allopurinol (ZYLOPRIM) 300 MG tablet Take 1 tablet (300 mg total) by mouth daily. 90 tablet 2     amLODIPine (NORVASC) 5 MG tablet TAKE 1 TABLET (5 MG TOTAL) BY MOUTH DAILY. 90 tablet 3     aspirin 81 MG EC tablet Take 81 mg by mouth daily.        atenolol (TENORMIN) 100 MG tablet TAKE 1 TABLET BY MOUTH EVERY DAY 90 tablet 3     atorvastatin (LIPITOR) 80 MG tablet TAKE 1 TABLET BY MOUTH EVERYDAY AT BEDTIME 90 tablet 1     lisinopril (PRINIVIL,ZESTRIL) 20 MG tablet Take 1 tablet (20 mg total) by mouth daily. 90 tablet 1     LORazepam (ATIVAN) 0.5 MG tablet TAKE 1 TABLET BY MOUTH AT BEDTIME AS NEEDED FOR ANXIETY 30 tablet 0     sertraline (ZOLOFT) 50 MG tablet Take 1.5 tablets (75 mg total) by mouth daily. 135 tablet 1     traZODone (DESYREL) 50 MG tablet Take 1 tablet (50 mg total) by mouth at bedtime. 90 tablet 2     colchicine (MITIGARE) 0.6 mg capsule TAKE 1 CAPSULE BY MOUTH TWICE A  capsule 1     No current facility-administered medications for this visit.

## 2021-06-03 NOTE — TELEPHONE ENCOUNTER
Patient Returning Call  Reason for call:  Patient called back.  Information relayed to patient:  Informed of Dr Valdes's message listed below.  Patient states understanding.  Patient has additional questions:  No  If YES, what are your questions/concerns:    Okay to leave a detailed message?: No call back needed

## 2021-06-03 NOTE — TELEPHONE ENCOUNTER
Controlled Substance Refill Request  Medication:   Requested Prescriptions     Pending Prescriptions Disp Refills     LORazepam (ATIVAN) 0.5 MG tablet [Pharmacy Med Name: LORAZEPAM 0.5 MG TABLET] 30 tablet 0     Sig: TAKE 1 TABLET BY MOUTH AT BEDTIME AS NEEDED FOR ANXIETY     Date Last Fill: 10/30/19  Pharm[facility]: [id number]   Submit electronically to pharmacy  Controlled Substance Agreement on File:   Encounter-Level CSA Scan Date:    There are no encounter-level csa scan date.       Last office visit: 11/19/2019 Ike Valdes MD

## 2021-06-03 NOTE — TELEPHONE ENCOUNTER
Refill request for medication: LORazepam (ATIVAN) 0.5 MG tablet  Last visit addressing this medication: 11/19/2019  Follow up plan 6  months  Last refill on 10/30/2019, quantity #30   CSA completed No   checked  11/29/19, last dispensed refill 10/30/2019    Appointment: Not due     Mary Saunders, [facility]

## 2021-06-03 NOTE — TELEPHONE ENCOUNTER
RN cannot approve Refill Request    RN can NOT refill this medication PCP messaged that patient is overdue for Labs. Last office visit: 7/8/2019 Ike Valdes MD Last Physical: 4/10/2018 Last MTM visit: Visit date not found Last visit same specialty: 7/8/2019 Ike Valdse MD.  Next visit within 3 mo: 11/19/19  Mary Slater, [facility] Triage/Med Refill 11/15/2019    Requested Prescriptions   Pending Prescriptions Disp Refills     colchicine (MITIGARE) 0.6 mg capsule [Pharmacy Med Name: COLCHICINE 0.6 MG CAPSULE] 180 capsule 1     Sig: TAKE 1 CAPSULE BY MOUTH TWICE A DAY       Colchicine Refill Protocol Failed - 11/14/2019  1:46 PM        Failed - CBC (Hemogram 2) in last year      WBC   Date Value Ref Range Status   04/13/2018 19.0 (H) 4.0 - 11.0 thou/uL Final     RBC   Date Value Ref Range Status   04/13/2018 4.15 (L) 4.40 - 6.20 mill/uL Final     Hemoglobin   Date Value Ref Range Status   04/13/2018 12.4 (L) 14.0 - 18.0 g/dL Final     Hematocrit   Date Value Ref Range Status   04/13/2018 36.9 (L) 40.0 - 54.0 % Final     MCV   Date Value Ref Range Status   04/13/2018 89 80 - 100 fL Final     MCH   Date Value Ref Range Status   04/13/2018 29.9 27.0 - 34.0 pg Final     MCHC   Date Value Ref Range Status   04/13/2018 33.6 32.0 - 36.0 g/dL Final     RDW   Date Value Ref Range Status   04/13/2018 15.2 (H) 11.0 - 14.5 % Final     Platelets   Date Value Ref Range Status   04/13/2018 194 140 - 440 thou/uL Final     MPV   Date Value Ref Range Status   04/13/2018 11.7 8.5 - 12.5 fL Final                Passed - LFT or AST or ALT in last year     Albumin   Date Value Ref Range Status   07/08/2019 4.1 3.5 - 5.0 g/dL Final     Bilirubin, Total   Date Value Ref Range Status   07/08/2019 0.5 0.0 - 1.0 mg/dL Final     Bilirubin, Direct   Date Value Ref Range Status   04/18/2017 0.2 <=0.5 mg/dL Final     Alkaline Phosphatase   Date Value Ref Range Status   07/08/2019 78 45 - 120 U/L Final      AST   Date Value Ref Range Status   07/08/2019 25 0 - 40 U/L Final     ALT   Date Value Ref Range Status   07/08/2019 34 0 - 45 U/L Final     Protein, Total   Date Value Ref Range Status   07/08/2019 6.9 6.0 - 8.0 g/dL Final                Passed - Visit with PCP or prescribing provider visit in past 12 months     Last office visit with prescriber/PCP: 7/8/2019 Ike Valdes MD OR same dept: 7/8/2019 Ike Valdes MD OR same specialty: 7/8/2019 Ike Valdes MD  Last physical: 4/10/2018 Last MTM visit: Visit date not found   Next visit within 3 mo: Visit date not found  Next physical within 3 mo: Visit date not found  Prescriber OR PCP: Ike Valdes MD  Last diagnosis associated with med order: 1. Idiopathic chronic gout, multiple sites, with tophus (tophi)  - colchicine (MITIGARE) 0.6 mg capsule [Pharmacy Med Name: COLCHICINE 0.6 MG CAPSULE]; TAKE 1 CAPSULE BY MOUTH TWICE A DAY  Dispense: 180 capsule; Refill: 1    If protocol passes may refill for 12 months if within 3 months of last provider visit (or a total of 15 months).             Passed - Serum creatinine in last year     Creatinine   Date Value Ref Range Status   07/08/2019 1.21 0.70 - 1.30 mg/dL Final

## 2021-06-03 NOTE — TELEPHONE ENCOUNTER
Left a message for patient to return phone call. Please relay the below message when patient calls back.  Thank you, Mary Saunders

## 2021-06-03 NOTE — TELEPHONE ENCOUNTER
Left message for pt to call back     Please relay results to pt     Thank you   Mariella Braden, CMA

## 2021-06-03 NOTE — TELEPHONE ENCOUNTER
----- Message from Ike Valdes MD sent at 11/21/2019  6:08 PM CST -----  The Uric acid and the PSA were both normal.

## 2021-06-04 VITALS
WEIGHT: 195.5 LBS | HEIGHT: 68 IN | OXYGEN SATURATION: 95 % | HEART RATE: 70 BPM | DIASTOLIC BLOOD PRESSURE: 74 MMHG | BODY MASS INDEX: 29.63 KG/M2 | SYSTOLIC BLOOD PRESSURE: 130 MMHG

## 2021-06-04 NOTE — TELEPHONE ENCOUNTER
Pt has had monthly fills of this for the past two years from PCP. As covering provider, this Rx appears to be chronic and regular use. I will fill one time as has been getting monthly, however PATIENT NEEDS PCP VISIT to create controlled substance agreement for ongoing prescriptions. This will enable covering providers to ensure we are all aware of the plan, as this does not not appear to be intermittent use, but rather regular use.

## 2021-06-04 NOTE — TELEPHONE ENCOUNTER
Refill request for medication: lorazepam 0.5   Last visit addressing this medication: 11/19/19 for medication management   Follow up plan 6  months  Last refill on 12/02/19 , quantity #30   CSA completed none    checked  12/30/19, last dispensed refill 12/02/19    Appointment: Not due     Will send it to covering provider        Mariella Braden CMA

## 2021-06-04 NOTE — TELEPHONE ENCOUNTER
Controlled Substance Refill Request  Medication:   Requested Prescriptions     Pending Prescriptions Disp Refills     LORazepam (ATIVAN) 0.5 MG tablet [Pharmacy Med Name: LORAZEPAM 0.5 MG TABLET] 30 tablet 0     Sig: TAKE 1 TABLET BY MOUTH AT BEDTIME AS NEEDED FOR ANXIETY     Date Last Fill: 12/2/19  [id number]   Submit electronically to pharmacy  Controlled Substance Agreement on File:   Encounter-Level CSA Scan Date:    There are no encounter-level csa scan date.       Last office visit: Last office visit pertaining to requested medication was 11/19/19.

## 2021-06-05 VITALS
HEART RATE: 62 BPM | SYSTOLIC BLOOD PRESSURE: 124 MMHG | BODY MASS INDEX: 28.04 KG/M2 | OXYGEN SATURATION: 94 % | DIASTOLIC BLOOD PRESSURE: 80 MMHG | HEIGHT: 68 IN | WEIGHT: 185 LBS

## 2021-06-05 NOTE — TELEPHONE ENCOUNTER
Reached out to patient and was informed he is not due to be seen until May of 2020.  Mary Saunders

## 2021-06-05 NOTE — TELEPHONE ENCOUNTER
RN cannot approve Refill Request    RN can NOT refill this medication PCP messaged that patient is overdue for Labs and Protocol failed and NO refill given.    Vicky Hanna, [facility] Triage/Med Refill 1/24/2020    Requested Prescriptions   Pending Prescriptions Disp Refills     colchicine (MITIGARE) 0.6 mg capsule [Pharmacy Med Name: COLCHICINE 0.6 MG CAPSULE] 90 capsule 3     Sig: TAKE 1 CAPSULE BY MOUTH TWICE A DAY       Colchicine Refill Protocol Failed - 1/24/2020  8:32 AM        Failed - CBC (Hemogram 2) in last year      WBC   Date Value Ref Range Status   04/13/2018 19.0 (H) 4.0 - 11.0 thou/uL Final     RBC   Date Value Ref Range Status   04/13/2018 4.15 (L) 4.40 - 6.20 mill/uL Final     Hemoglobin   Date Value Ref Range Status   04/13/2018 12.4 (L) 14.0 - 18.0 g/dL Final     Hematocrit   Date Value Ref Range Status   04/13/2018 36.9 (L) 40.0 - 54.0 % Final     MCV   Date Value Ref Range Status   04/13/2018 89 80 - 100 fL Final     MCH   Date Value Ref Range Status   04/13/2018 29.9 27.0 - 34.0 pg Final     MCHC   Date Value Ref Range Status   04/13/2018 33.6 32.0 - 36.0 g/dL Final     RDW   Date Value Ref Range Status   04/13/2018 15.2 (H) 11.0 - 14.5 % Final     Platelets   Date Value Ref Range Status   04/13/2018 194 140 - 440 thou/uL Final     MPV   Date Value Ref Range Status   04/13/2018 11.7 8.5 - 12.5 fL Final                Passed - LFT or AST or ALT in last year     Albumin   Date Value Ref Range Status   07/08/2019 4.1 3.5 - 5.0 g/dL Final     Bilirubin, Total   Date Value Ref Range Status   07/08/2019 0.5 0.0 - 1.0 mg/dL Final     Bilirubin, Direct   Date Value Ref Range Status   04/18/2017 0.2 <=0.5 mg/dL Final     Alkaline Phosphatase   Date Value Ref Range Status   07/08/2019 78 45 - 120 U/L Final     AST   Date Value Ref Range Status   07/08/2019 25 0 - 40 U/L Final     ALT   Date Value Ref Range Status   07/08/2019 34 0 - 45 U/L Final     Protein, Total   Date Value Ref Range  Status   07/08/2019 6.9 6.0 - 8.0 g/dL Final                Passed - Visit with PCP or prescribing provider visit in past 12 months     Last office visit with prescriber/PCP: 11/19/2019 Ike Valdes MD OR same dept: 11/19/2019 Ike Valdes MD OR same specialty: 11/19/2019 Ike Valdes MD  Last physical: 4/10/2018 Last MTM visit: Visit date not found   Next visit within 3 mo: Visit date not found  Next physical within 3 mo: Visit date not found  Prescriber OR PCP: Ike Valdes MD  Last diagnosis associated with med order: 1. Idiopathic chronic gout, multiple sites, with tophus (tophi)  - colchicine (MITIGARE) 0.6 mg capsule [Pharmacy Med Name: COLCHICINE 0.6 MG CAPSULE]; TAKE 1 CAPSULE BY MOUTH TWICE A DAY  Dispense: 90 capsule; Refill: 3    If protocol passes may refill for 12 months if within 3 months of last provider visit (or a total of 15 months).             Passed - Serum creatinine in last year     Creatinine   Date Value Ref Range Status   07/08/2019 1.21 0.70 - 1.30 mg/dL Final

## 2021-06-05 NOTE — TELEPHONE ENCOUNTER
Refill request for medication: 1/24/2020  Last visit addressing this medication: 11/19/2019  Follow up plan 6  months  Last refill on 12/30/19, quantity #30   CSA completed not on file    checked  01/27/20, last dispensed refill 12/30/19    Appointment: Not due     KRYSTIN Zhu

## 2021-06-05 NOTE — TELEPHONE ENCOUNTER
Controlled Substance Refill Request  Medication Name:   Requested Prescriptions     Pending Prescriptions Disp Refills     LORazepam (ATIVAN) 0.5 MG tablet 30 tablet 0     Sig: Take 1 tablet (0.5 mg total) by mouth at bedtime as needed for anxiety.     Date Last Fill: 12/30/19  Is patient out of medication?:  Yes  Patient notified refills processed within 3 business days:  Yes  Requested Pharmacy: [id number]  Submit electronically to pharmacy  Controlled Substance Agreement on file:   Encounter-Level CSA Scan Date:    There are no encounter-level csa scan date.        Last office visit:  11/19/19

## 2021-06-05 NOTE — TELEPHONE ENCOUNTER
Controlled Substance Refill Request  Medication Name:   Requested Prescriptions     Pending Prescriptions Disp Refills     LORazepam (ATIVAN) 0.5 MG tablet [Pharmacy Med Name: LORAZEPAM 0.5 MG TABLET] 30 tablet 0     Sig: TAKE 1 TABLET BY MOUTH AT BEDTIME AS NEEDED FOR ANXIETY     Date Last Fill:   LORazepam (ATIVAN) 0.5 MG tablet 30 tablet 0 12/30/2019  No   Sig: TAKE 1 TABLET BY MOUTH AT BEDTIME AS NEEDED FOR ANXIETY   Sent to pharmacy as: LORazepam 0.5 mg tablet (ATIVAN)   Notes to Pharmacy: PATIENT NEEDS PCP VISIT to create controlled substance agreement for ongoing prescriptions.   E-Prescribing Status: Receipt confirmed by pharmacy (12/30/2019  1:00 PM CST)   [facility]ed [id number]  Submit electronically to pharmacy  Controlled Substance Agreement on file:   Encounter-Level CSA Scan Date:    There are no encounter-level csa scan date.        Last office visit:  11/19/19

## 2021-06-05 NOTE — TELEPHONE ENCOUNTER
Refill request for medication: LORazepam (ATIVAN) 0.5 MG tablet  Last visit addressing this medication: 11/19/2019  Follow up plan 6  months  Last refill on 12/30/2019, quantity #30   CSA completed No   checked  01/30/20, last dispensed refill 12/30/2019    Appointment: Not due     Mary Saunders, [facility]

## 2021-06-05 NOTE — TELEPHONE ENCOUNTER
Refill Approved    Rx renewed per Medication Renewal Policy. Medication was last renewed on 2/11/19    Praveena Hester, [facility] Triage/Med Refill 2/6/2020     Requested Prescriptions   Pending Prescriptions Disp Refills     atenoloL (TENORMIN) 100 MG tablet [Pharmacy Med Name: ATENOLOL 100 MG TABLET] 90 tablet 3     Sig: TAKE 1 TABLET BY MOUTH EVERY DAY       Beta-Blockers Refill Protocol Passed - 2/6/2020  4:48 AM        Passed - PCP or prescribing provider visit in past 12 months or next 3 months     Last office visit with prescriber/PCP: 11/19/2019 Ike Valdes MD OR same dept: 11/19/2019 Ike Valdes MD OR same specialty: 11/19/2019 Ike Valdes MD  Last physical: 4/10/2018 Last MTM visit: Visit date not found   Next visit within 3 mo: Visit date not found  Next physical within 3 mo: Visit date not found  Prescriber OR PCP: Ike Valdes MD  Last diagnosis associated with med order: 1. Hypertension, essential, benign  - atenoloL (TENORMIN) 100 MG tablet [Pharmacy Med Name: ATENOLOL 100 MG TABLET]; TAKE 1 TABLET BY MOUTH EVERY DAY  Dispense: 90 tablet; Refill: 3    If protocol passes may refill for 12 months if within 3 months of last provider visit (or a total of 15 months).             Passed - Blood pressure filed in past 12 months     BP Readings from Last 1 Encounters:   11/19/19 130/74

## 2021-06-06 NOTE — TELEPHONE ENCOUNTER
Controlled Substance Refill Request  Medication Name:   Requested Prescriptions     Pending Prescriptions Disp Refills     LORazepam (ATIVAN) 0.5 MG tablet 30 tablet 0     Sig: Take 1 tablet (0.5 mg total) by mouth at bedtime as needed for anxiety. Use needs reviewed.     Date Last Fill: 1.31.2020  Is patient out of medication?: N/A - electronic request  Patient notified refills processed within 3 business days: N/A - electronic request  [facility]  Submit electronically to pharmacy  Controlled Substance Agreement on file:   Encounter-Level CSA Scan Date:    There are no encounter-level csa scan date.        Last office visit:  11.19.2019

## 2021-06-06 NOTE — TELEPHONE ENCOUNTER
Refill Approved    Rx renewed per Medication Renewal Policy. Medication was last renewed on 8/20/19.    Praveena Hester, [facility] Triage/Med Refill 3/12/2020     Requested Prescriptions   Pending Prescriptions Disp Refills     atorvastatin (LIPITOR) 80 MG tablet 90 tablet 1       Statins Refill Protocol (Hmg CoA Reductase Inhibitors) Passed - 3/9/2020 12:09 PM        Passed - PCP or prescribing provider visit in past 12 months      Last office visit with prescriber/PCP: 11/19/2019 Ike Valdes MD OR same dept: 11/19/2019 Ike Valdes MD OR same specialty: 11/19/2019 Ike Valdes MD  Last physical: 4/10/2018 Last MTM visit: Visit date not found   Next visit within 3 mo: Visit date not found  Next physical within 3 mo: Visit date not found  Prescriber OR PCP: Ike Valdes MD  Last diagnosis associated with med order: 1. Hyperlipidemia, unspecified hyperlipidemia type  - atorvastatin (LIPITOR) 80 MG tablet  Dispense: 90 tablet; Refill: 1    If protocol passes may refill for 12 months if within 3 months of last provider visit (or a total of 15 months).

## 2021-06-06 NOTE — TELEPHONE ENCOUNTER
Refill request for medication: LORazepam (ATIVAN) 0.5 MG tablet  Last visit addressing this medication: 07/08/2019    Follow up plan Patient was to follow up in 6 months per office visit dated 11/19/2019  months  Last refill on 01/31/2020 , quantity #30   CSA completed No   checked  03/03/20, last dispensed refill 01/31/2020    Appointment: Not due     Mary Saunders [facility]

## 2021-06-07 NOTE — TELEPHONE ENCOUNTER
Refill request for medication: 3/31/2020  Last visit addressing this medication: 11/19/19  Follow up plan 6  months  Last refill on 3/3/2020, quantity #30   CSA completed NA    checked  04/01/20, last dispensed refill 3/3/20    Appointment: Not due     KRYSTIN Zhu

## 2021-06-08 NOTE — PROGRESS NOTES
Chief Complaint   Patient presents with     Hand Pain     Right pain/swelling        History of Present Illness: Nursing notes reviewed. Patient gets some GI upset from ibuprofen and naproxen. He has some renal insufficiency. He has had increased right hand edema and pain for three days, with condition worsening. He has a prior history of gout.     Review of systems: See history of present illness, otherwise negative.     Current Outpatient Prescriptions   Medication Sig Dispense Refill     allopurinol (ZYLOPRIM) 300 MG tablet TAKE 1 & 1/2 TABLETS BY MOUTH DAILY.  3     amLODIPine (NORVASC) 5 MG tablet Take 1 tablet (5 mg total) by mouth daily. 90 tablet 1     aspirin 81 mg chewable tablet Chew 81 mg.       atenolol (TENORMIN) 100 MG tablet        atorvastatin (LIPITOR) 20 MG tablet        lisinopril (PRINIVIL,ZESTRIL) 20 MG tablet TAKE ONE TABLET BY MOUTH ONE TIME DAILY  0     predniSONE (DELTASONE) 10 MG tablet TAKE 1TAB BY MOUTH DAILY FOR GOUT PROPHYLAXIS INCREASE UP TO 30MG X1DAY 20MG X1DAY THEN 10MG X1DAY  2     sertraline (ZOLOFT) 25 MG tablet TAKE ONE TABLET BY MOUTH ONE TIME DAILY  3     HYDROcodone-acetaminophen 5-325 mg per tablet Take 1-2 tablets by mouth every 6 (six) hours as needed for pain (for pain relief). 15 tablet 0     No current facility-administered medications for this visit.        No past medical history on file.   Past Surgical History:   Procedure Laterality Date     ANGIOPLASTY  1995      Social History     Social History     Marital status:      Spouse name: N/A     Number of children: N/A     Years of education: N/A     Social History Main Topics     Smoking status: Former Smoker     Smokeless tobacco: Never Used     Alcohol use None     Drug use: None     Sexual activity: Not Asked     Other Topics Concern     None     Social History Narrative       History   Smoking Status     Former Smoker   Smokeless Tobacco     Never Used      Exam:   Blood pressure 138/88, pulse 70,  temperature 97.9  F (36.6  C), temperature source Oral, resp. rate 18, weight 218 lb (98.9 kg).    EXAM:   General: Vital signs reviewed. Patient is in some distress due to right hand pain. Breathing is non labored appearing. Patient is alert and oriented x 3.   Examination of right hand is notable for tender moderate edema around his first MCP joint area where there is also some erythema. He has pain here with movement of thumb in any direction. I do not see any open skin wound.    Assessment/Plan   1. Gout of right hand  colchicine 0.6 mg tablet    HYDROcodone-acetaminophen 5-325 mg per tablet       Patient Instructions   Caution that the hydrocodone may cause drowsiness and constipation, and can lead to psychological addiction in some people. There is also acetaminophen in this medication, so do not take other products containing this when using the prescription. Be seen again in 2-3 days if symptoms are not better, sooner if feeling any worse.       Sagar Pritchett, DO

## 2021-06-08 NOTE — PROGRESS NOTES
"Young Ordonez is a 70 y.o. male who is being evaluated via a billable telephone visit.      The patient has been notified of following:     \"This telephone visit will be conducted via a call between you and your physician/provider. We have found that certain health care needs can be provided without the need for a physical exam.  This service lets us provide the care you need with a short phone conversation.  If a prescription is necessary we can send it directly to your pharmacy.  If lab work is needed we can place an order for that and you can then stop by our lab to have the test done at a later time.    Telephone visits are billed at different rates depending on your insurance coverage. During this emergency period, for some insurers they may be billed the same as an in-person visit.  Please reach out to your insurance provider with any questions.    If during the course of the call the physician/provider feels a telephone visit is not appropriate, you will not be charged for this service.\"    Patient has given verbal consent to a Telephone visit? Yes    What phone number would you like to be contacted at? Number in EHR    Patient would like to receive their AVS by AVS Preference: Mail a copy.    HPI:  Called in for medication check. He has a history of hypertension, hyperlipidemia and Anxiety. He takes medication for those and is stable. Last lab check was in November 19. He also does take medication for Gout. For the anxiety, he noted doing well though intermittently will need Lorazepam. He also does have insomnia and takes Trazodone for it. He does not have any concerns today but needs medication refilled.  ROS:  Fully done but negative.    Assessment/Plan:  1. Essential hypertension    2. Hyperlipidemia, unspecified hyperlipidemia type    3. Insomnia, unspecified type  - traZODone (DESYREL) 50 MG tablet; Take 1 tablet (50 mg total) by mouth at bedtime.  Dispense: 90 tablet; Refill: 2    4. Anxiety  - " sertraline (ZOLOFT) 50 MG tablet; Take 1.5 tablets (75 mg total) by mouth daily.  Dispense: 135 tablet; Refill: 1    5. Depression  - sertraline (ZOLOFT) 50 MG tablet; Take 1.5 tablets (75 mg total) by mouth daily.  Dispense: 135 tablet; Refill: 1  Reviewed his medical history and medications. His refills were sent to his pharmacy. Discussed need for labs and he will come in for lab only visit.  Questions were answered.    Phone call duration:  11 minutes    Ike Valdes MD

## 2021-06-08 NOTE — TELEPHONE ENCOUNTER
Refill request for medication: 6/1/2020  Last visit addressing this medication: 5/27/2020  Follow up plan none listed   Last refill on 5/4/2020, quantity #30   CSA completed none on file    checked  06/01/20, last dispensed refill 5/4/2020    Appointment: Not due     KRYSTIN Zhu

## 2021-06-08 NOTE — PROGRESS NOTES
ASSESSMENT:  1. Claudication of left lower extremity  - XR Lumbar Spine 2 or 3 VWS; Future  - US Arterial Legs Bilateral Complete; Future    2. Hypertension, essential, benign  - amLODIPine (NORVASC) 5 MG tablet; Take 1 tablet (5 mg total) by mouth daily.  Dispense: 90 tablet; Refill: 1    3. Vision abnormalities  - Ambulatory referral to Ophthalmology    4. Healthcare maintenance  - Pneumococcal polysaccharide vaccine 23-valent 3 yo or older, subq/IM  - Ambulatory referral for Colonoscopy    PLAN:  There are no Patient Instructions on file for this visit.    Orders Placed This Encounter   Procedures     XR Lumbar Spine 2 or 3 VWS     Standing Status:   Future     Number of Occurrences:   1     Standing Expiration Date:   1/30/2018     Order Specific Question:   Can the procedure be changed per Radiologist protocol?     Answer:   Yes     US Arterial Legs Bilateral Complete     Standing Status:   Future     Standing Expiration Date:   1/30/2018     Order Specific Question:   Can the procedure be changed per Radiologist protocol?     Answer:   Yes     Pneumococcal polysaccharide vaccine 23-valent 3 yo or older, subq/IM     Ambulatory referral to Ophthalmology     Referral Priority:   Routine     Referral Type:   Consultation     Referral Reason:   Evaluation and Treatment     Referral Location:   [facility] [place]     Requested Specialty:   Ophthalmology     Number of Visits Requested:   1     Ambulatory referral for Colonoscopy     Referral Priority:   Routine     Referral Type:   Colonoscopy     Referral Reason:   Evaluation and Treatment     Referral Location:   [facility]     Requested Specialty:   Gastroenterology     Number of Visits Requested:   1     There are no discontinued medications.    No Follow-up on file.     Ordered Lumbar Spine X-ray and Arterial Ultrasound to evaluate leg pain; will await results. Referred patient to Ophthalmology for evaluation and management of vision  abnormalities. Reviewed health maintenance with patient. Referred patient for a colonoscopy because he is due. He will also get a pneumococcal shot today. Refilled medication, as noted.     CHIEF COMPLAINT:  Chief Complaint   Patient presents with     Establish Care     left leg -quad muscle- burning muscle with walking- needs eye check would like referral       HISTORY OF PRESENT ILLNESS:  Young is a 66 y.o. male presenting to the clinic today to establish care and for evaluation of leg pain. He was previous care was at [facility].     Leg Pain: He has burning muscle pain in his left leg when he walks. The pain goes from his upper leg down to his shin. The pain is intermittent. He experiences the pain even after walking short distances like down to the mailbox. Once he experiences the pain, he has to rest and sit down, and he experiences the pain for some time before it subsides. He notes that his left leg feels more muscular than his right. He experiences left lower back pain when he first gets up in the morning, but the pain improves after he gets up and starts moving.     Coronary Artery Disease: He had an angioplasty in 1995 after he had a heart attack.     Hypertension: He takes lisinopril and atenolol daily for hypertension. His blood pressure today is 168/84.     Gout: He takes allopurinol daily for gout.     Anxiety: He takes sertraline for anxiety.     Health Maintenance: He had a colonoscopy about 10 years ago; he reports that he had polyps. He is due for a pneumococcal vaccine.     REVIEW OF SYSTEMS:   He tires quickly and has low energy. His vision is not as clear as it used to be; the vision issues come and go; the vision issues started about the time after he had a nuclear stress test and after he started sertraline. All other systems are negative.    PFSH:  Social: He smoked for 50 years before quitting. He retired about 4 years ago. He has 2 dogs.     Reviewed, as below.    History   Smoking  "Status     Former Smoker   Smokeless Tobacco     Never Used       Family History   Problem Relation Age of Onset     Cancer Mother      Stroke Father      Hypertension Father      Heart disease Paternal Grandmother      Emphysema Paternal Grandfather        Social History     Social History     Marital status:      Spouse name: N/A     Number of children: N/A     Years of education: N/A     Occupational History     Not on file.     Social History Main Topics     Smoking status: Former Smoker     Smokeless tobacco: Never Used     Alcohol use Not on file     Drug use: Not on file     Sexual activity: Not on file     Other Topics Concern     Not on file     Social History Narrative       Past Surgical History   Procedure Laterality Date     Angioplasty  1995       No Known Allergies    Active Ambulatory Problems     Diagnosis Date Noted     Gout      Coronary Artery Disease      Cellulitis      Hypertension      Resolved Ambulatory Problems     Diagnosis Date Noted     No Resolved Ambulatory Problems     No Additional Past Medical History       Current Outpatient Prescriptions   Medication Sig Dispense Refill     allopurinol (ZYLOPRIM) 300 MG tablet TAKE 1 & 1/2 TABLETS BY MOUTH DAILY.  3     atenolol (TENORMIN) 100 MG tablet        lisinopril (PRINIVIL,ZESTRIL) 20 MG tablet TAKE ONE TABLET BY MOUTH ONE TIME DAILY  0     predniSONE (DELTASONE) 10 MG tablet TAKE 1TAB BY MOUTH DAILY FOR GOUT PROPHYLAXIS INCREASE UP TO 30MG X1DAY 20MG X1DAY THEN 10MG X1DAY  2     sertraline (ZOLOFT) 25 MG tablet TAKE ONE TABLET BY MOUTH ONE TIME DAILY  3     amLODIPine (NORVASC) 5 MG tablet Take 1 tablet (5 mg total) by mouth daily. 90 tablet 1     No current facility-administered medications for this visit.        VITALS:  Vitals:    01/30/17 1417   BP: 168/84   Patient Site: Left Arm   Patient Position: Sitting   Cuff Size: Adult Large   Pulse: 64   Weight: 216 lb (98 kg)   Height: 5' 8\" (1.727 m)     Wt Readings from Last 3 " Encounters:   01/30/17 216 lb (98 kg)     Body mass index is 32.84 kg/(m^2).    PHYSICAL EXAM:  General Appearance: Alert, cooperative, no distress, appears stated age  Head: Normocephalic, without obvious abnormality, atraumatic  Eyes: Pupils equal, symmetric  Ears: Normal TMs and external ear canals, both ears  Nose: Nares normal, septum midline, mucosa normal, no drainage  Throat: Lips, mucosa, and tongue normal; teeth and gums normal  Neck: Supple, symmetrical, trachea midline, no adenopathy;  thyroid: not enlarged, symmetric, no tenderness/mass/nodules  Lungs: Clear to auscultation bilaterally, respirations unlabored  Heart: Regular rate and rhythm, S1 and S2 normal, no murmur, rub, or gallop  Abdomen: Truncal obesity. Soft, non-tender, bowel sounds active all four quadrants, no masses, no organomegaly  Musculoskeletal: Lower Back: No specific tender area.   Lower Extremities: Lateral aspect of the left ankle showing bumps that appear to be rivets from plates used to stabilize ankle fracture in the past. Not able to feel peripheral pulsations on lower extremities.  Lymph nodes: Cervical nodes normal  Neurologic:  Alert and oriented times 3. Normal reflexes. Cranial nerves II-XII intact.   Psychiatric: Normal mood and affect.      ADDITIONAL HISTORY SUMMARIZED (2): None.  DECISION TO OBTAIN EXTRA INFORMATION (1): Requested information from Care Everywhere.   RADIOLOGY TESTS (1): Ordered Lumbar Spine X-ray. Reviewed 8/21/2009 Lumbar Spine MRI, indication: pain in the left mid and lower back.   LABS (1): Reviewed labs from [facility] including 2015 and 2016 [facility].   MEDICINE TESTS (1): Ordered Arterial Legs Ultrasound. Reviewed 5/27/2016 Stress report, indication: CAD.   INDEPENDENT REVIEW (2 each): None.       The visit lasted a total of 29 minutes face to face with the patient. Over 50% of the time was spent counseling and educating the patient about leg pain and medications.    Ahmet ZIMMER am  scribing for and in the presence of, Dr. Valdes.    I, Dr. Valdes, personally performed the services described in this documentation, as scribed by Ahmet Spencer in my presence, and it is both accurate and complete.    MEDICATIONS:  Current Outpatient Prescriptions   Medication Sig Dispense Refill     allopurinol (ZYLOPRIM) 300 MG tablet TAKE 1 & 1/2 TABLETS BY MOUTH DAILY.  3     atenolol (TENORMIN) 100 MG tablet        lisinopril (PRINIVIL,ZESTRIL) 20 MG tablet TAKE ONE TABLET BY MOUTH ONE TIME DAILY  0     predniSONE (DELTASONE) 10 MG tablet TAKE 1TAB BY MOUTH DAILY FOR GOUT PROPHYLAXIS INCREASE UP TO 30MG X1DAY 20MG X1DAY THEN 10MG X1DAY  2     sertraline (ZOLOFT) 25 MG tablet TAKE ONE TABLET BY MOUTH ONE TIME DAILY  3     amLODIPine (NORVASC) 5 MG tablet Take 1 tablet (5 mg total) by mouth daily. 90 tablet 1     No current facility-administered medications for this visit.        Total data points: 4

## 2021-06-09 NOTE — PROGRESS NOTES
VASCULAR SURGERY CLINIC CONSULTATION    VASCULAR SURGEON: Ilir Mandujano MD     LOCATION:  [facility]    Young Ordonez   Medical Record #:  [id number]  YOB: 1950  Age:  66 y.o.     Date of Service: 2017    PRIMARY CARE PROVIDER: Ike Valdes MD  Requesting Provider: Ike Valdes MD     Reason for visit:  Left leg pain    IMPRESSION:    Arterial insufficiency with ischemic rest pain and evidence for multilevel arterial occlusive disease including inflow and runof on left. There is no tissue loss at this time    RECOMMENDATION:    CT angiogram of aorta/iliacs and femoral arteries.  Follow up after study  Cardiac and peripheral arterial risk factor optimization.    HPI:  Young Ordonez is a 66 y.o. male who was seen today in consultation for leg pain(Left>>Right). He develops pain in left calf with minimal ambulation. He will develop pain in left foot and leg at night when in bed. This is relieved by standing up and short walk. No ulcerations. He has had trauma to left calf including fractured left ankle repaired with plates. Past history of MI at 45 years of age. S/P PTCA in approx . Past history of cigarette abuse, but none since . Mother and father  in late 40's and 50's of MI and CVA.    PHH:    Past Medical History:   Diagnosis Date     Claudication      History of smoking      HTN (hypertension)      Hyperlipidemia      PAD (peripheral artery disease)         Past Surgical History:   Procedure Laterality Date     ANGIOPLASTY         ALLERGIES:  Review of patient's allergies indicates no known allergies.    MEDS:    Current Outpatient Prescriptions:      allopurinol (ZYLOPRIM) 300 MG tablet, TAKE 1 & 1/2 TABLETS BY MOUTH DAILY., Disp: , Rfl: 3     amLODIPine (NORVASC) 5 MG tablet, Take 1 tablet (5 mg total) by mouth daily., Disp: 90 tablet, Rfl: 1     aspirin 81 mg chewable tablet, Chew 81 mg., Disp: , Rfl:      atenolol  "(TENORMIN) 100 MG tablet, , Disp: , Rfl:      atorvastatin (LIPITOR) 20 MG tablet, , Disp: , Rfl:      lisinopril (PRINIVIL,ZESTRIL) 20 MG tablet, TAKE ONE TABLET BY MOUTH ONE TIME DAILY, Disp: , Rfl: 0     sertraline (ZOLOFT) 25 MG tablet, TAKE ONE TABLET BY MOUTH ONE TIME DAILY, Disp: , Rfl: 3     predniSONE (DELTASONE) 10 MG tablet, TAKE 1TAB BY MOUTH DAILY FOR GOUT PROPHYLAXIS INCREASE UP TO 30MG X1DAY 20MG X1DAY THEN 10MG X1DAY, Disp: , Rfl: 2    SOCIAL HABITS:    History   Smoking Status     Former Smoker     Packs/day: 1.50     Types: Cigarettes   Smokeless Tobacco     Never Used       History   Alcohol Use     Yes       History   Drug Use No       FAMILY HISTORY:    Family History   Problem Relation Age of Onset     Cancer Mother      Stroke Father      Hypertension Father      Heart disease Paternal Grandmother      Emphysema Paternal Grandfather        REVIEW OF SYSTEMS:  12 point ros reviewed.    PE:  Visit Vitals     /76 (Patient Site: Left Arm, Patient Position: Sitting, Cuff Size: Adult Regular)     Pulse 76     Temp 99  F (37.2  C) (Temporal)     Resp 16     Ht 5' 8\" (1.727 m)     Wt 215 lb (97.5 kg)  Comment: per patient report     BMI 32.69 kg/m2     Gen:nad, obese  HEENT:  At/nc   Chest:  nl  Lungs:  No wheezing or rales  Heart:  Rrr. + car/rad and right femoral pulse palpable. Left Femoral plse is diminished. Pop/dp/pt not palpable bilaterally.No aneurysms palpable, but abdominal girth precludes and adequate evaluation  Abd:  obese  Ext:  No c/c/e. Plate in left ankle.Gouty changes in left third toe  Skin:intact  Neuro:CN2-12 intact, bilaterally.                 Strength 5/5 in upper and lower extremities, bilaterally                 Normal speech, swallowing and gait.  Psych: no abnormalities      DIAGNOSTIC STUDIES   Date: 2/10/2017 Department: [facility] Ultrasound Released By: RT Abhi (R) Authorizing: Ike Valdes MD   Study Result   [facility] " HOSPITAL  EXAM: ANKLE-BRACHIAL INDICES (ABIs) AND DUPLEX ARTERIAL ULTRASOUND OF THE BILATERAL LOWER EXTREMITIES  2/10/2017 5:24 PM     INDICATION: Left lower extremity claudication.     COMPARISON: None.     TECHNIQUE: Duplex imaging is performed utilizing gray-scale, two-dimensional images, and color-flow imaging. Doppler waveform analysis and spectral Doppler imaging is also performed.     FINDINGS:      SEGMENTAL BP  RIGHT  Brachial: 150  Ankle (PT): >134; Index: NC  Ankle (DP): >254; Index: NC     LEFT  Brachial: 149  Ankle (PT): 0; Index: 0.00  Ankle (DP): 0; Index: 0.00     The resting ABIs are noncompressible in the right and not obtainable on the left.     DUPLEX ARTERIAL ULTRASOUND FINDINGS:      LOWER EXTREMITY ARTERIAL DUPLEX EXAM WITH WAVEFORMS  RIGHT (cm/s)  EIA: 236B  CFA: 216B  PFA: 311M  SFA-Proximal: 120M  SFA-Mid: 41M  SFA-Distal: 27M  Popliteal: 36M  PTA: 30M  HERNAN: 32M  DPA: 21M  (M=monophasic, B=biphasic, T=triphasic)     LEFT (cm/s)  EIA: 86M  CFA: 46M  PFA: 40M  SFA-Proximal: 98M  SFA-Mid: 4M  SFA-Distal: 6M  Popliteal: 30M  PTA: 6M  HERNAN: Non-visualized  DPA: Non-visualized  (M=monophasic, B=biphasic, T=triphasic)     RIGHT: Biphasic waveforms with moderate spectral broadening within the visualized external iliac and common femoral arteries. Biphasic waveforms within the superficial femoral artery origin. Abnormal monophasic waveforms within the mid superficial   femoral artery to the popliteal artery. Monophasic waveforms within the posterior tibial and anterior tibial arteries.     LEFT: Monophasic waveforms within the visualized external iliac and common femoral arteries. Occluded mid to distal superficial femoral artery. Parvus et tardus waveforms within the popliteal artery. Very abnormal parvus et tardus waveforms within the   distal posterior tibial artery. The anterior tibial and dorsalis pedis arteries demonstrate no flow.     IMPRESSION:   1. RIGHT LOWER EXTREMITY: Noncompressible  ABIs due to underlying calcified atherosclerotic changes. Arterial duplex ultrasound suggests the presence of significant stenosis within the femoropopliteal segment.  2. LEFT LOWER EXTREMITY: Unobtainable ABIs. Monophasic waveforms proximally suggests a significant inflow stenosis. Occluded superficial femoral artery. Occluded anterior tibial artery.       LABS:              Invalid input(s): LABALJASMYNE Mandujano MD  VASCULAR SURGERY     [facility]

## 2021-06-09 NOTE — PROCEDURES
INTERVENTIONAL RADIOLOGY PROCEDURE NOTE    Patient Name: Young Ordonze  Medical Record Number: [id number]  YOB: 1950    Date/Time: 4/6/2017 2:23 PM    Procedure: Left leg angiogram     Diagnosis: Critical limb ischemia    Medications: Versed 1.5mg IV and Fentanyl 75mcg IV    Contrast: Visipaque 320, 165mL    Fluoroscopy Time: 9.2 minutes    EBL: Yes - minimal    Complications: none immediate    Specimens Sent: None    Findings: R CFA 5F sheath.   LLE: occluded common and external iliac arteries. Occluded CFA. Reconstitution at the PFA origin. Occluded SFA. Above-knee reconstitution of the popliteal artery. Inadquate opacification of the infrapopliteal arteries, due to the proximal occlusions.   RLE: Moderate stenosis, mid CHARLEE. Moderate EIA and CFA irregularity. Occluded SFA. Patent PFA. Above-knee reconstitution of the popliteal artery. 3 vessel runoff. Severe stenosis proximal HERNAN.     Plan: Post angiographic monitoring. F/U with Dr. Ilir Mandujano for potential surgical intervention.     Sinan Guy MD

## 2021-06-09 NOTE — PROGRESS NOTES
Imaging follow-up, PAD w/ claudication. Patient underwent a CTA 03/16/2017 Bilateral SFA occlusion and possible left external iliac artery occlusion. Patient has continued claudication in legs bilaterally, left greater than right.

## 2021-06-09 NOTE — PROGRESS NOTES
66 year female, PAD w/ claudication. Patient underwent ABIs 02/10/2017 that showed bilateral disease - significant stenosis within the right femoropopliteal segment w/ significant inflow stenosis. Occluded superficial femoral artery and anterior tibial artery. Patient has Hx of smoking w/ HTN and HLD. No Hx for arterial procedures. Patient reports he can only walk about a block before he claudication. Patient has hair growth through his lower legs. Patient has lumbar disease and gout.

## 2021-06-09 NOTE — TELEPHONE ENCOUNTER
Controlled Substance Refill Request  Medication Name:   Requested Prescriptions     Pending Prescriptions Disp Refills     LORazepam (ATIVAN) 0.5 MG tablet [Pharmacy Med Name: LORAZEPAM 0.5 MG TABLET] 30 tablet 0     Sig: TAKE ONE TABLET BY MOUTH NIGHTLY AT BEDTIME AS NEEDED FOR ANXIETY     Date Last Fill: 6/1/20  Requested Pharmacy: [facility]  Submit electronically to pharmacy  Controlled Substance Agreement on file:   Encounter-Level CSA Scan Date:    There are no encounter-level csa scan date.        Last office visit:  5/27/20

## 2021-06-09 NOTE — H&P
[facility] Radiology History and Physical Note  Date/Time: 4/6/2017/9:39 AM    Procedure Requested: Bilateral lower extremity angiogram (Abdominal aortogram, pelvic angiogram, and left lower extremity angiogram)  Requesting Provider: Ilir FERRO MD    HPI: Young Ordonez is a 67 y.o. male with history of HTN, hyperlipidemia, MI, former smoker, and PAD with claudication. He was recently seen in clinic by Dr. Ilir Mandujano for concerns of left leg pain. He has left calf pain with minimal ambulation. Also reports left lower leg numbness (from calf down) at rest with lying flat and being in a prone position. ABIs suggestive of occluded superficial femoral artery, occluded anterior tibial artery. Presents today for bilateral lower extremity angiogram with possible intervention(s).     IMAGING:   Recent CTA shows diffuse calcific arterial occlusive disease primarily in ileofemoral distribution (per chart review).      [facility]  EXAM: ANKLE-BRACHIAL INDICES (ABIs) AND DUPLEX ARTERIAL ULTRASOUND OF THE BILATERAL LOWER EXTREMITIES  2/10/2017 5:24 PM  INDICATION: Left lower extremity claudication.     COMPARISON: None.     TECHNIQUE: Duplex imaging is performed utilizing gray-scale, two-dimensional images, and color-flow imaging. Doppler waveform analysis and spectral Doppler imaging is also performed.     FINDINGS:      SEGMENTAL BP  RIGHT  Brachial: 150  Ankle (PT): >134; Index: NC  Ankle (DP): >254; Index: NC     LEFT  Brachial: 149  Ankle (PT): 0; Index: 0.00  Ankle (DP): 0; Index: 0.00     The resting ABIs are noncompressible in the right and not obtainable on the left.     DUPLEX ARTERIAL ULTRASOUND FINDINGS:      LOWER EXTREMITY ARTERIAL DUPLEX EXAM WITH WAVEFORMS  RIGHT (cm/s)  EIA: 236B  CFA: 216B  PFA: 311M  SFA-Proximal: 120M  SFA-Mid: 41M  SFA-Distal: 27M  Popliteal: 36M  PTA: 30M  HERNAN: 32M  DPA: 21M  (M=monophasic, B=biphasic, T=triphasic)     LEFT (cm/s)  EIA: 86M  CFA: 46M  PFA: 40M  SFA-Proximal:  98M  SFA-Mid: 4M  SFA-Distal: 6M  Popliteal: 30M  PTA: 6M  HERNAN: Non-visualized  DPA: Non-visualized  (M=monophasic, B=biphasic, T=triphasic)     RIGHT: Biphasic waveforms with moderate spectral broadening within the visualized external iliac and common femoral arteries. Biphasic waveforms within the superficial femoral artery origin. Abnormal monophasic waveforms within the mid superficial   femoral artery to the popliteal artery. Monophasic waveforms within the posterior tibial and anterior tibial arteries.     LEFT: Monophasic waveforms within the visualized external iliac and common femoral arteries. Occluded mid to distal superficial femoral artery. Parvus et tardus waveforms within the popliteal artery. Very abnormal parvus et tardus waveforms within the   distal posterior tibial artery. The anterior tibial and dorsalis pedis arteries demonstrate no flow.     IMPRESSION:   1. RIGHT LOWER EXTREMITY: Noncompressible ABIs due to underlying calcified atherosclerotic changes. Arterial duplex ultrasound suggests the presence of significant stenosis within the femoropopliteal segment.  2. LEFT LOWER EXTREMITY: Unobtainable ABIs. Monophasic waveforms proximally suggests a significant inflow stenosis. Occluded superficial femoral artery. Occluded anterior tibial artery.    NPO Status: Midnight   Anticoagulation/Antiplatelets/Bleeding tendencies: Aspirin  Antibiotics: None    PAST MEDICAL HISTORY:   Past Medical History:   Diagnosis Date     Claudication      History of smoking      HTN (hypertension)      Hyperlipidemia      PAD (peripheral artery disease)        PAST SURGICAL HISTORY:   Past Surgical History:   Procedure Laterality Date     ANGIOPLASTY  1995       ALLERGIES:  Review of patient's allergies indicates no known allergies.    MEDICATIONS:  Current Outpatient Prescriptions   Medication Sig Dispense Refill     allopurinol (ZYLOPRIM) 300 MG tablet TAKE 1 & 1/2 TABLETS BY MOUTH DAILY.  3     amLODIPine  "(NORVASC) 5 MG tablet Take 1 tablet (5 mg total) by mouth daily. 90 tablet 1     aspirin 81 MG EC tablet Take 81 mg by mouth.       atenolol (TENORMIN) 100 MG tablet        atorvastatin (LIPITOR) 20 MG tablet        lisinopril (PRINIVIL,ZESTRIL) 20 MG tablet TAKE ONE TABLET BY MOUTH ONE TIME DAILY  0     predniSONE (DELTASONE) 10 MG tablet TAKE 1TAB BY MOUTH DAILY FOR GOUT PROPHYLAXIS INCREASE UP TO 30MG X1DAY 20MG X1DAY THEN 10MG X1DAY  2     sertraline (ZOLOFT) 25 MG tablet TAKE ONE TABLET BY MOUTH ONE TIME DAILY  3     No current facility-administered medications for this encounter.        LABS:  INR (no units)   Date Value   04/06/2017 1.02     Hemoglobin (g/dL)   Date Value   04/06/2017 13.3 (L)     Platelets (thou/uL)   Date Value   04/06/2017 248     Creatinine (mg/dL)   Date Value   04/06/2017 1.46 (H)     EXAM:  /62 (Patient Position: Sitting)  Pulse 70  Temp 98.1  F (36.7  C) (Oral)   Resp 20  Ht 5' 8\" (1.727 m)  Wt 210 lb 7 oz (95.5 kg)  SpO2 93%  BMI 32 kg/m2  General: Lying on the cart, pleasant, a bit anxious appearing, in no acute distress.  Neuro: A&O x 3.   Resp: Lungs clear to auscultation bilaterally.  Cardio: S1S2 and reg, without murmur, clicks or rubs  Vascular: Bilateral lower extremities warm, non-swollen.  No sores appreciated.  No distal pulses palpable bilaterally.      Pre-Sedation Assessment:  Mallampati Airway Classification: Class 2: upper half of tonsil fossa visible  Previous reaction to anesthesia/sedation: no  Sedation plan based on assessment: Moderate  Sleep Apnea: no  Dentures: no  COPD: no  ASA Classification: ASA 3 - Patient with moderate systemic disease with functional limitations  Comments: no hx of asthma    ASSESSMENT: 68 yo male with left lower extremity rest symptoms (numbness) and claudication.  Recent \"CTA shows diffuse calcific arterial occlusive disease primarily in ileofemoral distribution.\"    Cr 1.46. Patient has been pre-hydrated.      PLAN: Left " lower extremity angiogram with possible angioplasty, stent or lysis with sedation.      The procedure, risks and moderate sedation were discussed with the patient and his wife, all questions answered and patient agrees to proceed with the procedure. Written consent obtained.      TORO Benson, CNP    [facility]: Interventional Radiology   [phone]

## 2021-06-09 NOTE — TELEPHONE ENCOUNTER
Refill request for medication: LORazepam (ATIVAN) 0.5 MG tablet   Last visit addressing this medication: 05/27/2020  Follow up plan Unknown    Last refill on 06/01/2020, quantity #30   CSA completed No   checked  07/02/20, last dispensed refill 06/01/2020    Appointment: No appointments at this time     Mary Saunders, [facility]

## 2021-06-10 ENCOUNTER — COMMUNICATION - HEALTHEAST (OUTPATIENT)
Dept: FAMILY MEDICINE | Facility: CLINIC | Age: 71
End: 2021-06-10

## 2021-06-10 DIAGNOSIS — E78.5 HYPERLIPIDEMIA, UNSPECIFIED HYPERLIPIDEMIA TYPE: ICD-10-CM

## 2021-06-10 NOTE — ANESTHESIA PROCEDURE NOTES
Epidural Block    Patient location during procedure: pre-op    Reason for Block:post-op pain management and at surgeon's request  Staffing:  Performing  Anesthesiologist: SANJU MEYER  Preanesthetic Checklist  Completed: patient identified, risks, benefits, and alternatives discussed, timeout performed, consent obtained, airway assessed, oxygen available, suction available, emergency drugs available and hand hygiene performed  Procedure  Patient position: sitting  Prep: ChloraPrep and site prepped and draped  Patient monitoring: continuous pulse oximetry, heart rate and blood pressure  Approach: midline  Location: T12-L1  Injection technique: LORAINE saline  Number of Attempts:1  Needle  Needle type: Stubmatickodak   Needle gauge: 18 G     Catheter in Space: 5  Assessment  Sensory level:  No complications    Events: Negative TD X2

## 2021-06-10 NOTE — ANESTHESIA PREPROCEDURE EVALUATION
Anesthesia Evaluation      Patient summary reviewed   No history of anesthetic complications     Airway    Pulmonary    (+) COPD, a smoker  (-) sleep apnea                         Cardiovascular   Exercise tolerance: < 4 METS (Leg weakness and pain limits activity)  (+) hypertension, past MI, CAD, cardiomyopathy, hypercholesterolemia, PVD    (-) CABG/stent, angina     ROS comment:   The images of 11/2/2009 were unavailable for comparison review.    The left ventricular ejection fraction is 40%. There is severe hypokinesis to akinesis of the apical segments.    The pharmacologic nuclear stress test is abnormal.    There is moderate to severely reduced tracer uptake in a medium-sized area of the mid to apical anteroseptal and apex representing an area of transmural myocardial infarction.    Stress test was negative for inducible ischemia.        1. The left ventricle is normal in size. Left ventricular systolic performance is mildly reduced. The ejection fraction is estimated to be 45-50%.   2. There is a small area of distal septal and apical hypokinesis.  3. No significant valvular heart disease is identified on this study.   4. There is mild left atrial enlargement.      Neuro/Psych - negative ROS     Endo/Other       Comments: Gout    GI/Hepatic/Renal    (+) GERD intermittent,     (-) esophageal disease          Dental                         Anesthesia Plan      ASA 3

## 2021-06-10 NOTE — PROGRESS NOTES
ASSESSMENT:  1. Preoperative general physical examination  - INR  - HM2(CBC w/o Differential)  - Basic Metabolic Panel  - Urinalysis    2. Essential hypertension with goal blood pressure less than 130/85  - lisinopril (PRINIVIL,ZESTRIL) 20 MG tablet; TAKE ONE TABLET BY MOUTH ONE TIME DAILY  Dispense: 90 tablet; Refill: 1    3. PAD (peripheral artery disease)  - Glycosylated Hemoglobin A1C    4. Gout  - predniSONE (DELTASONE) 20 MG tablet; Take 2 tablets (40 mg total) by mouth daily.  Dispense: 30 tablet; Refill: 1       67 y.o. male with planned surgery vascular surgery for aortofemoral bypass due to peripheral artery disease.    Known risk factors for perioperative complications: Coronary disease , he had balloon angioplasty around 1995 but does not have any noted complications at this point.  Difficulty with intubation is not anticipated.    Cardiac Risk Estimation: As noted he has a history of coronary artery disease but he is stable and asymptomatic, he has hypertension which is being controlled with amlodipine and atenolol as well as lisinopril.  Blood pressure is normal.  He is active but limited by lower extremity claudication.  He is a class III surgical risk with about 6.6% cardiac risk, but he is going to be getting nuclear stress testing and having a cardiology visit.  We will be awaiting nuclear stress test report and cardiologist review.  Otherwise he does not have any other contraindications to the surgery.     Current medications which may produce withdrawal symptoms if withheld perioperatively: None    PLAN:  There are no Patient Instructions on file for this visit.    Orders Placed This Encounter   Procedures     INR     HM2(CBC w/o Differential)     Basic Metabolic Panel     Urinalysis     Glycosylated Hemoglobin A1C     Medications Discontinued During This Encounter   Medication Reason     predniSONE (DELTASONE) 10 MG tablet Reorder     lisinopril (PRINIVIL,ZESTRIL) 20 MG tablet Reorder       No  Follow-up on file.       1. Preoperative workup as follows: INR, HM2(CBC w/o Differential), Basic Metabolic Panel, urinalysis (urinary tract instrumentation planned).   2. Change in medication regimen before surgery: His medications were discussed he will take his blood pressure medication in the morning of the surgery.  The rest of the medicines he will take after surgery..  3. Prophylaxis for cardiac events with perioperative beta-blockers: He is already on atenolol and will continue with that.  4. Invasive hemodynamic monitoring perioperatively: at the discretion of anesthesiologist.  5. Deep vein thrombosis prophylaxis postoperatively:regimen to be chosen by surgical team.     Mallampati score: II (hard and soft palate, upper portion of tonsils anduvula visible)      Reduced lisinopril dose from 20 mg to 10 mg daily due to decreases in his blood pressure.  He will keep checking his blood pressure and let us know if it is getting too high.. Patient should check his blood pressure at home and keep record.     CHIEF COMPLAINT:  Chief Complaint   Patient presents with     Pre-op Exam     vascular attachment from heart to pelvis.  Dr Ilir Mandujano- surgery not schedule yet, needs to see cardiologist on 5/1     Gout     right foot and knee pain       HISTORY OF PRESENT ILLNESS:  Young is a 67 y.o. male here for a pre-operative consultation. The exam is requested by Dr. Ilir Mandujano in preparation for aortofemoral bypass vascular surgery. The surgery has not yet been scheduled. Today s examination on 4/26/2017 is done to review the underlying surgical condition of aortoiliac occlusive disease, clear for anesthesia, and review medical problems with appropriate changes in medications.    Young has tolerated previous surgeries well without bleeding or anesthesia difficulty.      He has leg pains that are worse in his left leg. He has difficulty climbing stairs because of the pain. He will see the Cardiologist on May  1st, and he will have a nuclear stress test then. He will schedule the surgery after he sees the Cardiologist.     Gout: He has a history of gout, and he takes allopurinol. He currently has right foot pain, especially in his ankle and toes, and right knee pain. He has a bump on his right knee as well. The gout pain has been keeping him up at night. He has been taking ibuprofen without much improvement. He would like a prescription for prednisone for gout pain. He does not prefer narcotic pain medications because of constipation.     Hypertension: He takes lisinopril, amlodipine, and atenolol. His blood pressure in clinic today is 110/60.    REVIEW OF SYSTEMS:   He denies ear problems, headaches, and dizziness. He does not snore. He denies coughing, wheezing, and chest pain. All other systems are negative.    PFSH:  Social: His wife is a CMA.     Reviewed, as below.     History   Smoking Status     Former Smoker     Packs/day: 1.50     Types: Cigarettes     Quit date: 2013   Smokeless Tobacco     Never Used       Family History   Problem Relation Age of Onset     Cancer Mother      Stroke Father      Hypertension Father      Heart disease Paternal Grandmother      Emphysema Paternal Grandfather        Past Surgical History:   Procedure Laterality Date     ANGIOPLASTY  1995     ANKLE FRACTURE SURGERY Left      MANDIBLE SURGERY         No Known Allergies    Active Ambulatory Problems     Diagnosis Date Noted     Gout      Chronic coronary artery disease      Cellulitis      Hypertension      Chronic gouty arthritis 09/18/2015     Tobacco use 07/18/2013     Resolved Ambulatory Problems     Diagnosis Date Noted     No Resolved Ambulatory Problems     Past Medical History:   Diagnosis Date     Claudication      COPD (chronic obstructive pulmonary disease)      Coronary artery disease      History of smoking      HTN (hypertension)      Hyperlipidemia      MI (myocardial infarction)      PAD (peripheral artery disease)   "      Current Outpatient Prescriptions   Medication Sig Dispense Refill     allopurinol (ZYLOPRIM) 300 MG tablet TAKE 1 & 1/2 TABLETS BY MOUTH DAILY.  3     amLODIPine (NORVASC) 5 MG tablet Take 1 tablet (5 mg total) by mouth daily. 90 tablet 1     aspirin 81 MG EC tablet Take 81 mg by mouth.       atenolol (TENORMIN) 100 MG tablet Take 1 tablet (100 mg total) by mouth daily. 30 tablet 0     atorvastatin (LIPITOR) 80 MG tablet Take 1 tablet (80 mg total) by mouth at bedtime. 90 tablet 1     cholecalciferol, vitamin D3, 5,000 unit Tab Take by mouth.       lisinopril (PRINIVIL,ZESTRIL) 20 MG tablet TAKE ONE TABLET BY MOUTH ONE TIME DAILY 90 tablet 1     predniSONE (DELTASONE) 20 MG tablet Take 2 tablets (40 mg total) by mouth daily. 30 tablet 1     sertraline (ZOLOFT) 25 MG tablet TAKE ONE TABLET BY MOUTH ONE TIME DAILY  3     No current facility-administered medications for this visit.        VITALS:  Vitals:    04/26/17 1124   BP: 110/60   Pulse: 60   Resp: 20   Temp: 98.4  F (36.9  C)   SpO2: 95%   Weight: 219 lb 1.6 oz (99.4 kg)   Height: 5' 9\" (1.753 m)     Wt Readings from Last 3 Encounters:   04/26/17 219 lb 1.6 oz (99.4 kg)   04/18/17 215 lb (97.5 kg)   04/10/17 210 lb (95.3 kg)     Body mass index is 32.36 kg/(m^2).    PHYSICAL EXAM:  General Appearance: Alert, cooperative, appears stated age. Intermittent pain distress.   Head: Normocephalic, without obvious abnormality, atraumatic  Eyes: Pupils equal, symmetric  Ears: Normal TMs and external ear canals, both ears  Nose: Nares normal, septum midline, mucosa normal, no drainage  Throat: Lips, mucosa, and tongue normal; teeth and gums normal  Neck: Supple, symmetrical, trachea midline, no adenopathy;  thyroid: not enlarged, symmetric, no tenderness/mass/nodules  Lungs: Clear to auscultation bilaterally, respirations unlabored  Heart: Regular rate and rhythm, S1 and S2 normal, no murmur, rub, or gallop  Abdomen: Obese. Soft, non-tender, bowel sounds active all " four quadrants, no masses, no organomegaly  Musculoskeletal: Right Knee: Right-sided lower knee tender bump hypertrophy with swelling and tenderness.   Lower Extremities: Reduced palpable peripheral pulsations bilaterally as well as in the femoral area. Lower extremity skin color normal.  Lymph nodes: Cervical nodes normal  Neurologic:  Alert and oriented times 3. Normal reflexes. Cranial nerves II-XII intact.   Psychiatric: Normal mood and affect. No signs of depression or anxiety.       ADDITIONAL HISTORY SUMMARIZED (2): None.  DECISION TO OBTAIN EXTRA INFORMATION (1): Requested information from Care Everywhere.   RADIOLOGY TESTS (1): None.   LABS (1): Ordered labs. Reviewed labs including 3/6/2017 basic metabolic panel.   MEDICINE TESTS (1): Reviewed Stress Test report from [facility], indication: coronary artery disease.   INDEPENDENT REVIEW (2 each): None.       The visit lasted a total of 25 minutes face to face with the patient. Over 50% of the time was spent counseling and educating the patient about aortoiliac occlusive disease, preoperative measures, and gout.    IAhmet, am scribing for and in the presence of, Dr. Valdes.    I, Dr. Valdes, personally performed the services described in this documentation, as scribed by Ahmet Spencer in my presence, and it is both accurate and complete.    MEDICATIONS:  Current Outpatient Prescriptions   Medication Sig Dispense Refill     allopurinol (ZYLOPRIM) 300 MG tablet TAKE 1 & 1/2 TABLETS BY MOUTH DAILY.  3     amLODIPine (NORVASC) 5 MG tablet Take 1 tablet (5 mg total) by mouth daily. 90 tablet 1     aspirin 81 MG EC tablet Take 81 mg by mouth.       atenolol (TENORMIN) 100 MG tablet Take 1 tablet (100 mg total) by mouth daily. 30 tablet 0     atorvastatin (LIPITOR) 80 MG tablet Take 1 tablet (80 mg total) by mouth at bedtime. 90 tablet 1     cholecalciferol, vitamin D3, 5,000 unit Tab Take by mouth.       lisinopril (PRINIVIL,ZESTRIL) 20 MG tablet  TAKE ONE TABLET BY MOUTH ONE TIME DAILY 90 tablet 1     predniSONE (DELTASONE) 20 MG tablet Take 2 tablets (40 mg total) by mouth daily. 30 tablet 1     sertraline (ZOLOFT) 25 MG tablet TAKE ONE TABLET BY MOUTH ONE TIME DAILY  3     No current facility-administered medications for this visit.        Total data points: 3

## 2021-06-10 NOTE — PROGRESS NOTES
[facility]      Code Status:  FULL CODE  Visit Type: H & P     Facility:  [facility] [[id number]]           History of Present Illness: Young Ordonez is a 67 y.o. male  Who is admitted as  A transfer from the hospital.  67 y.o. old male with history of CAD (MI in 1995 PTCA), cardiomyopathy, hypertension, dyslipidemia, and peripheral vascular disease who underwent aortobifemoral bypass(end-to-side aortic anastomosis and distal anastomosis to profunda femoris arteries) on 5/17/2017.    Patient was hypotensive post op which was likely from hypovolemia and epidural and blood loss.  His BP improved with iv fluids.  He was moved out of ICU on 5/19/2017. He required an NG initially for a few days until his bowel function improved.  He did develop some FRANCY/CKD.  That has slowly improved.   His blood pressures however have now rebounded and are quite elevated in the TCU which he believes are from uncontrolled pain  Start with OxyContin with oxycodone.  He is currently done with his OxyContin but reports that he is unable to rest or be comfortable in spite of using his oxycodone due to significant pain in his lower extremities.  Subsequently, patient had acute right knee pain.  He has h/o gout and it was felt likely from acute Gout flare up.  He is chronically on prednisone 10 mg daily for gout/arthritis.  He had his prednisone dose increased but symptoms did not improve much.  He was then changed to iv solumedrol 05/25.  Today his symptoms are much improved and he is eating well.  WBC went up due to IV solumedrol.  He was switched back to prednisone at 20 mg daily for 5 days then will drop to 10 mg daily.  He is requesting of his dosage can be reduced further.  In addition he has noticed some increased redness and pain in his left lower extremity around the staples and wanted that examined to he is not very ambulatory right now preferring to stay in bed all day long       Past  Medical History:   Diagnosis Date     Arthritis      Cardiomyopathy      Claudication      COPD (chronic obstructive pulmonary disease)      Coronary artery disease      Gout      Gout attack 5/25/2017     HTN (hypertension)      Hyperlipidemia      MI (myocardial infarction) 1995     PAD (peripheral artery disease)      Past Surgical History:   Procedure Laterality Date     ANGIOPLASTY  1995     ANKLE FRACTURE SURGERY Left      AORTA - BILATERAL FEMORAL ARTERY BYPASS GRAFT N/A 5/17/2017    Procedure: AORTOBIFEMORAL BYPASS ;  Surgeon: Ilir FERRO MD;  Location: [facility];  Service:      FRACTURE SURGERY       MANDIBLE SURGERY       Family History   Problem Relation Age of Onset     Cancer Mother      Stroke Father      Hypertension Father      Heart disease Paternal Grandmother      Emphysema Paternal Grandfather      Social History     Social History     Marital status:      Spouse name: N/A     Number of children: N/A     Years of education: N/A     Occupational History     Not on file.     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 30 tablet 0     allopurinol (ZYLOPRIM) 300 MG tablet Take 1 tablet (300 mg total) by mouth daily.  0     amLODIPine (NORVASC) 5 MG tablet Take 1 tablet (5 mg total) by mouth daily. 90 tablet 1     ascorbic acid, vitamin C, (VITAMIN C) 500 MG tablet Take 1 tablet (500 mg total) by mouth 2 (two) times a day.  0     aspirin 81 MG EC tablet Take 81 mg by mouth daily.        atenolol (TENORMIN)  100 MG tablet Take 1 tablet (100 mg total) by mouth daily. 30 tablet 0     atorvastatin (LIPITOR) 80 MG tablet Take 1 tablet (80 mg total) by mouth at bedtime. 90 tablet 1     bisacodyl (DULCOLAX) 10 mg suppository 1 supp FL daily PRN constipation 12 suppository 0     cholecalciferol, vitamin D3, 5,000 unit Tab Take 5,000 Units by mouth daily.        docusate sodium (COLACE) 100 MG capsule Take 1 capsule (100 mg total) by mouth 2 (two) times a day for 10 days. 10 capsule 0     HEPARIN SODIUM,PORCINE/PF (HEPARIN, PF,) 5,000 unit/0.5 mL Syrg Inject 0.5 mL (5,000 Units total) under the skin every 8 (eight) hours.  0     lisinopril (PRINIVIL,ZESTRIL) 20 MG tablet Take 20 mg by mouth daily.       magnesium hydroxide (MILK OF MAG) 400 mg/5 mL Susp suspension Take 30 mL by mouth daily as needed.  0     miconazole (MICOTIN) 2 % powder Apply to scrotum BID 70 g 0     multivitamin with minerals (THERA-M) 9 mg iron-400 mcg Tab tablet Take 1 tablet by mouth daily.  0     oxyCODONE (ROXICODONE) 5 MG immediate release tablet Take 1-2 tablets (5-10 mg total) by mouth every 4 (four) hours as needed for pain (5 mg for mod pain scale 5-7/10, 10 mg for sev pain 8-10/10). 30 tablet 0     predniSONE (DELTASONE) 10 mg tablet Takes 2 tabs (20 mg) daily for 5 days through 5/31/2017, then resume 1 tab (10 mg) daily 6/1/2017  0     senna-docusate (PERICOLACE) 8.6-50 mg tablet Take 1 tablet by mouth 2 (two) times a day.  0     sertraline (ZOLOFT) 25 MG tablet Take 25 mg by mouth daily.       No current facility-administered medications for this visit.      No Known Allergies      Review of Systems:    Constitutional: Negative.  Negative for fever, chills,has activity change, appetite change and fatigue.   HENT: Negative for congestion and facial swelling.    Eyes: Negative for photophobia, redness and visual disturbance.   Respiratory: Negative for cough and chest tightness.    Cardiovascular: Negative for chest pain, palpitations and leg  swelling.   Gastrointestinal: Negative for nausea, diarrhea, constipation, blood in stool and abdominal distention.   Genitourinary: Negative.    Musculoskeletal: Negative.  pain in legs  Skin: Negative.    Neurological: Negative for dizziness, tremors, syncope, weakness, light-headedness and headaches.   Hematological: Does not bruise/bleed easily.   Psychiatric/Behavioral: Negative.  anxious    Vitals:    05/31/17 1610   BP: 158/90   Pulse: 85   Temp: 98  F (36.7  C)       Physical Exam:    GENERAL: no acute distress. Cooperative in conversation.   HEENT: pupils are equal, round and reactive. Oral mucosa is moist and intact.  RESP:Chest symmetric. Regular respiratory rate. No stridor.  CVS: S1S2  ABD: Nondistended, soft.  Midline abdominal incision was intact with staples present slight irritation noted of the surrounding skin but no exam.  Findings consistent with cellulitis in the abdomen.  Bowel sounds are brisk  He has an incision noted in his right groin with intact staples minimal irritation  Incision present in the left groin however shows significant erythema which is extending all the way to his inner thigh.  The surrounding skin is markedly erythematous and indurated with fluctuance noted and some drainage noted from his incision site this is more consistent with cellulitis  EXTREMITIES: trace. lower extremity edema.   NEURO: non focal. Alert and oriented x3.   PSYCH: within normal limits. No depression or anxiety.  SKIN: warm dry intact     Labs:    Recent Results (from the past 240 hour(s))   Renal Function Profile   Result Value Ref Range    Albumin 2.7 (L) 3.5 - 5.0 g/dL    Calcium 8.3 (L) 8.5 - 10.5 mg/dL    Phosphorus 2.3 (L) 2.5 - 4.5 mg/dL    Glucose 120 70 - 125 mg/dL    BUN 28 (H) 8 - 22 mg/dL    Creatinine 1.49 (H) 0.70 - 1.30 mg/dL    Sodium 139 136 - 145 mmol/L    Potassium 3.7 3.5 - 5.0 mmol/L    Chloride 107 98 - 107 mmol/L    CO2 21 (L) 22 - 31 mmol/L    Anion Gap, Calculation 11 5 - 18  mmol/L    GFR MDRD Af Amer 57 (L) >60 mL/min/1.73m2    GFR MDRD Non Af Amer 47 (L) >60 mL/min/1.73m2   Magnesium   Result Value Ref Range    Magnesium 1.9 1.8 - 2.6 mg/dL   HM2(CBC W/O DIFF)   Result Value Ref Range    WBC 21.0 (H) 4.0 - 11.0 thou/uL    RBC 3.34 (L) 4.40 - 6.20 mill/uL    Hemoglobin 10.2 (L) 14.0 - 18.0 g/dL    Hematocrit 32.0 (L) 40.0 - 54.0 %    MCV 96 80 - 100 fL    MCH 30.5 27.0 - 34.0 pg    MCHC 31.9 (L) 32.0 - 36.0 g/dL    RDW 16.1 (H) 11.0 - 14.5 %    Platelets 244 140 - 440 thou/uL    MPV 11.3 8.5 - 12.5 fL   BNP(B-type Natriuretic Peptide)   Result Value Ref Range    BNP 98 (H) 0 - 62 pg/mL   Phosphorus   Result Value Ref Range    Phosphorus 2.6 2.5 - 4.5 mg/dL   Morphology,Smear Review (MORP)   Result Value Ref Range    Pathology, Smear Review See Separate Pathology Report (!) (none)    WBC 15.2 (H) 4.0 - 11.0 thou/uL    RBC 3.04 (L) 4.40 - 6.20 mill/uL    Hemoglobin 9.2 (L) 14.0 - 18.0 g/dL    Hematocrit 28.7 (L) 40.0 - 54.0 %    MCV 94 80 - 100 fL    MCH 30.3 27.0 - 34.0 pg    MCHC 32.1 32.0 - 36.0 g/dL    RDW 15.7 (H) 11.0 - 14.5 %    Platelets 176 140 - 440 thou/uL    MPV 10.7 8.5 - 12.5 fL    Neutrophils % 81 (H) 50 - 70 %    Lymphocytes % 8 (L) 20 - 40 %    Monocytes % 9 2 - 10 %    Eosinophils % 2 0 - 6 %    Basophils % 0 0 - 2 %    Neutrophils Absolute 12.1 (H) 2.0 - 7.7 thou/uL    Lymphocytes Absolute 1.3 0.8 - 4.4 thou/uL    Monocytes Absolute 1.4 (H) 0.0 - 0.9 thou/uL    Eosinophils Absolute 0.2 0.0 - 0.4 thou/uL    Basophils Absolute 0.0 0.0 - 0.2 thou/uL   Peripheral Blood Smear, Path Review   Result Value Ref Range    Case Report       Peripheral Blood Morphology Report                Case: [id number]                                   Authorizing Provider:  Dee Del Cid MD            Collected:           05/23/2017 0820              Ordering Location:     [place] [address] Received:            05/23/2017 0856                                     Ortho/Spine/MS                                                                Pathologist:           Anderson Mathis MD                                                         Specimen:    Peripheral Blood                                                                           Final Diagnosis       PERIPHERAL BLOOD:    -  MIXED LEUKOCYTOSIS (PRIMARILY NEUTROPHILIA)    -  NORMOCHROMIC NORMOCYTIC ANEMIA    Comment       The causes of reactive neutrophilia are numerous and range from infection to metabolic defects. Bacterial infections are the predominant cause of neutrophilia; other causes include therapeutic or endogenous drugs/hormones, acute stress, acute tissue necrosis and other infectious/inflammatory processes, including collagen vascular disorders and autoimmune disease. Absolute neutrophilia is seen in a variety of hematopoietic neoplasms, especially myeloproliferative disorders. Recommend clinical correlation; consider repeat CBC after resolution of any possible infection. If a diagnosis of CML is suspected, evaluation of the peripheral blood for the presence of the [place] chromosome and/or the BCR/ABL fusion gene is suggested.    Reactive monocytosis can be seen in chronic infections, collagen vascular diseases, immune-mediated gastrointestinal disorders, sarcoidosis, hemolytic anemia, and recovery phase of agranulocytosis, among others.    Normocytic anemia can be caused by multiple  etiologies including intrinsic bone marrow disease, renal disease, hepatic disease, endocrine disease, anemia of chronic disease, post-hemorrhagic anemia, and bone marrow infiltration by tumors. Recommend clinical correlation and follow-up.    Peripheral Smear       Red blood cells are decreased in number but overall normochromic and normocytic. Anisopoikilocytosis and polychromasia are increased but rouleaux formation does not appear prominent.     The white blood cell count and differential appear as reported on the CBC. Leukocytes are  increased in number and demonstrate an absolute neutrophilia and mild monocytosis. No blasts or dysplastic changes are identified.    Platelets are normal in number and appearance.    Charges CPT:   95488  ICD10: D64.9    Culture, Blood   Result Value Ref Range    Anaerobic Blood Culture Bottle No Growth No Growth, No organisms seen, bottle returned to instrument, Specimen not received    Aerobic Blood Culture Bottle No Growth No Growth, No organisms seen, bottle returned to instrument, Specimen not received   HM2(CBC W/O DIFF)   Result Value Ref Range    WBC 16.1 (H) 4.0 - 11.0 thou/uL    RBC 3.05 (L) 4.40 - 6.20 mill/uL    Hemoglobin 9.3 (L) 14.0 - 18.0 g/dL    Hematocrit 28.7 (L) 40.0 - 54.0 %    MCV 94 80 - 100 fL    MCH 30.5 27.0 - 34.0 pg    MCHC 32.4 32.0 - 36.0 g/dL    RDW 15.6 (H) 11.0 - 14.5 %    Platelets 192 140 - 440 thou/uL    MPV 11.3 8.5 - 12.5 fL   Culture, Blood   Result Value Ref Range    Anaerobic Blood Culture Bottle Specimen not received No Growth, No organisms seen, bottle returned to instrument, Specimen not received    Aerobic Blood Culture Bottle No Growth No Growth, No organisms seen, bottle returned to instrument, Specimen not received   Phosphorus Level > 2.4 no replacement required   Result Value Ref Range    Phosphorus 3.0 2.5 - 4.5 mg/dL   HM2(CBC w/o Differential)   Result Value Ref Range    WBC 18.9 (H) 4.0 - 11.0 thou/uL    RBC 3.10 (L) 4.40 - 6.20 mill/uL    Hemoglobin 9.4 (L) 14.0 - 18.0 g/dL    Hematocrit 28.9 (L) 40.0 - 54.0 %    MCV 93 80 - 100 fL    MCH 30.3 27.0 - 34.0 pg    MCHC 32.5 32.0 - 36.0 g/dL    RDW 15.4 (H) 11.0 - 14.5 %    Platelets 207 140 - 440 thou/uL    MPV 11.3 8.5 - 12.5 fL   Basic Metabolic Panel   Result Value Ref Range    Sodium 141 136 - 145 mmol/L    Potassium 3.4 (L) 3.5 - 5.0 mmol/L    Chloride 103 98 - 107 mmol/L    CO2 30 22 - 31 mmol/L    Anion Gap, Calculation 8 5 - 18 mmol/L    Glucose 113 70 - 125 mg/dL    Calcium 8.5 8.5 - 10.5 mg/dL    BUN 19  8 - 22 mg/dL    Creatinine 1.35 (H) 0.70 - 1.30 mg/dL    GFR MDRD Af Amer >60 >60 mL/min/1.73m2    GFR MDRD Non Af Amer 53 (L) >60 mL/min/1.73m2   Urinalysis-UC if Indicated   Result Value Ref Range    Color, UA Yellow Colorless, Yellow, Straw, Light Yellow    Clarity, UA Cloudy (!) Clear    Glucose, UA Negative Negative    Bilirubin, UA Negative Negative    Ketones, UA Negative Negative    Specific Gravity, UA 1.015 1.001 - 1.030    Blood, UA Moderate (!) Negative    pH, UA 5.0 4.5 - 8.0    Protein, UA 30 mg/dL (!) Negative mg/dL    Urobilinogen, UA <2.0 E.U./dL <2.0 E.U./dL, 2.0 E.U./dL    Nitrite, UA Negative Negative    Leukocytes, UA Large (!) Negative    Bacteria, UA  None Seen hpf   Culture, Urine   Result Value Ref Range    Culture >100,000 col/ml Escherichia coli (!)        Susceptibility    Escherichia coli -  (no method available)     Amoxicillin + Clavulanate >16/8 Resistant      Ampicillin >16 Resistant      Cefazolin >16 Resistant      Cefepime <=1 Sensitive      Ceftriaxone <=1 Sensitive      Ciprofloxacin <=0.5 Sensitive      Gentamicin <=2 Sensitive      Levofloxacin <=1 Sensitive      Meropenem <=0.25 Sensitive      Nitrofurantoin <=16 Sensitive      Tetracycline <=2 Sensitive      Tobramycin <=2 Sensitive      Trimethoprim + Sulfamethoxazole <=0.5 Sensitive    Phosphorus Level > 2.4 no replacement required   Result Value Ref Range    Phosphorus 3.3 2.5 - 4.5 mg/dL   HM2(CBC w/o Differential)   Result Value Ref Range    WBC 16.9 (H) 4.0 - 11.0 thou/uL    RBC 3.21 (L) 4.40 - 6.20 mill/uL    Hemoglobin 9.8 (L) 14.0 - 18.0 g/dL    Hematocrit 29.4 (L) 40.0 - 54.0 %    MCV 92 80 - 100 fL    MCH 30.5 27.0 - 34.0 pg    MCHC 33.3 32.0 - 36.0 g/dL    RDW 15.2 (H) 11.0 - 14.5 %    Platelets 232 140 - 440 thou/uL    MPV 11.1 8.5 - 12.5 fL   Basic Metabolic Panel   Result Value Ref Range    Sodium 142 136 - 145 mmol/L    Potassium 4.2 3.5 - 5.0 mmol/L    Chloride 104 98 - 107 mmol/L    CO2 31 22 - 31 mmol/L     Anion Gap, Calculation 7 5 - 18 mmol/L    Glucose 104 70 - 125 mg/dL    Calcium 8.5 8.5 - 10.5 mg/dL    BUN 22 8 - 22 mg/dL    Creatinine 1.47 (H) 0.70 - 1.30 mg/dL    GFR MDRD Af Amer 58 (L) >60 mL/min/1.73m2    GFR MDRD Non Af Amer 48 (L) >60 mL/min/1.73m2   Potassium - Next AM   Result Value Ref Range    Potassium 4.4 3.5 - 5.0 mmol/L   HM1 (CBC with Diff)   Result Value Ref Range    WBC 21.8 (H) 4.0 - 11.0 thou/uL    RBC 3.20 (L) 4.40 - 6.20 mill/uL    Hemoglobin 9.7 (L) 14.0 - 18.0 g/dL    Hematocrit 30.2 (L) 40.0 - 54.0 %    MCV 94 80 - 100 fL    MCH 30.3 27.0 - 34.0 pg    MCHC 32.1 32.0 - 36.0 g/dL    RDW 15.5 (H) 11.0 - 14.5 %    Platelets 248 140 - 440 thou/uL    MPV 11.0 8.5 - 12.5 fL   Manual Differential   Result Value Ref Range    Total Neutrophils % 89 (H) 50 - 70 %    Lymphocytes % 6 (L) 20 - 40 %    Monocytes % 5 2 - 10 %    Eosinophils %  0 0 - 6 %    Basophils % 0 0 - 2 %    Metamyelocytes % 1 <=1 %    Total Neutrophils Absolute 19.4 (H) 2.0 - 7.7 thou/ul    Lymphocytes Absolute 1.2 0.8 - 4.4 thou/uL    Monocytes Absolute 1.1 (H) 0.0 - 0.9 thou/uL    Eosinophils Absolute 0.0 0.0 - 0.4 thou/uL    Basophils Absolute 0.0 0.0 - 0.2 thou/uL    Metamyelocytes Absolute 0.1 <=0.1 thou/uL    Toxic Granulation 1+ (!) Negative    Platelet Estimate Normal Normal   HM2(CBC w/o Differential)   Result Value Ref Range    WBC 17.5 (H) 4.0 - 11.0 thou/uL    RBC 3.17 (L) 4.40 - 6.20 mill/uL    Hemoglobin 9.7 (L) 14.0 - 18.0 g/dL    Hematocrit 30.1 (L) 40.0 - 54.0 %    MCV 95 80 - 100 fL    MCH 30.6 27.0 - 34.0 pg    MCHC 32.2 32.0 - 36.0 g/dL    RDW 15.8 (H) 11.0 - 14.5 %    Platelets 251 140 - 440 thou/uL    MPV 11.7 8.5 - 12.5 fL   Basic Metabolic Panel   Result Value Ref Range    Sodium 142 136 - 145 mmol/L    Potassium 3.7 3.5 - 5.0 mmol/L    Chloride 105 98 - 107 mmol/L    CO2 31 22 - 31 mmol/L    Anion Gap, Calculation 6 5 - 18 mmol/L    Glucose 92 70 - 125 mg/dL    Calcium 8.5 8.5 - 10.5 mg/dL    BUN 17 8 - 22  mg/dL    Creatinine 1.01 0.70 - 1.30 mg/dL    GFR MDRD Af Amer >60 >60 mL/min/1.73m2    GFR MDRD Non Af Amer >60 >60 mL/min/1.73m2     Xr Chest Ap Portable    Result Date: 5/22/2017  XR CHEST AP PORTABLE 5/22/2017 9:21 AM INDICATION: Shortness of breath and weight gain. COMPARISON: 05/20/2017. FINDINGS: Heart size and vascularity are normal. Shallow inspiration with no acute infiltrate, pneumothorax pleural effusion.    Xr Chest Ap Portable    Result Date: 5/20/2017  XR CHEST AP PORTABLE 5/20/2017 2:39 PM INDICATION: sob COMPARISON: None. FINDINGS: Shallow inspiration. Lungs are clear. Heart and pulmonary vascularity are normal.    Xr Abdomen Ap Portable    Result Date: 5/19/2017  XR ABDOMEN AP PORTABLE 5/19/2017 5:19 AM INDICATION: f/u abdominal distention COMPARISON: 05/18/2017 FINDINGS: NG tube is now in good position with the stomach decompressed. Bowel gas pattern appears normal. Midline skin staples from laparotomy incision.    Xr Abdomen Ap Portable    Result Date: 5/18/2017  XR ABDOMEN AP PORTABLE 5/18/2017 1:48 PM INDICATION: distension COMPARISON: 05/17/2017 FINDINGS: No significant change. Bowel gas pattern remains normal. NG tube is been removed. Skin staples from a abdominal and groin surgery.    Xr Abdomen Ap Portable    Result Date: 5/17/2017  XR ABDOMEN AP PORTABLE XR PELVIS AP PORTABLE 5/17/2017 5:10 PM INDICATION: Instrument count in O.R. COMPARISON: 3/16/2017 CTA runoff SPR. FINDINGS: No radiographic evidence of retained surgical instrument. Nasogastric tube tip in the distal stomach. Vascular calcifications. Surgical clips in the retroperitoneum, iliac and femoral regions. Bowel gas pattern within normal limits. Abdomen and  pelvis otherwise negative.    Xr Pelvis Ap Portable    Result Date: 5/17/2017  XR ABDOMEN AP PORTABLE XR PELVIS AP PORTABLE 5/17/2017 5:10 PM INDICATION: Instrument count in O.R. COMPARISON: 3/16/2017 CTA runoff SPR. FINDINGS: No radiographic evidence of retained surgical  instrument. Nasogastric tube tip in the distal stomach. Vascular calcifications. Surgical clips in the retroperitoneum, iliac and femoral regions. Bowel gas pattern within normal limits. Abdomen and  pelvis otherwise negative.    Us Ankle Brachial Indices    Result Date: 5/25/2017  [facility] EXAM: RESTING AND POSTEXERCISE ANKLE-BRACHIAL INDICES (ABIs) AND DUPLEX ARTERIAL ULTRASOUND OF THE LEFT LOWER EXTREMITY. INDICATION: Peripheral arterial disease. Aortobifemoral arterial bypass. COMPARISON: The preoperative study from 04/06/2017. TECHNIQUE: Duplex imaging is performed utilizing gray-scale, two-dimensional images, and color-flow imaging. Doppler waveform analysis and spectral Doppler imaging is also performed. JOSE FINDINGS: SEGMENTAL BP RIGHT Brachial: 175 Ankle (PT): >254; Index: NC Ankle (DP): >254; Index: NC LEFT Brachial: 168 Ankle (PT): 252; Index: 1.44 Ankle (DP): 249; Index: 1.42 DUPLEX ARTERIAL ULTRASOUND FINDINGS: LOWER EXTREMITY ARTERIAL DUPLEX EXAM WITH WAVEFORMS RIGHT (cm/s) PTA: 45 M HERNAN: 46 M DPA: 32 M (M=monophasic, B=biphasic, T=triphasic) LEFT (cm/s) CFA: 80 B PFA: 235 B SFA-Proximal: 50 M SFA-Mid: 71 M SFA-Distal: 131 M Popliteal: 61 M PTA: 34 M HERNAN: 46 M DPA: 41 M Proximal Native: NV Proximal Anast: NV Proximal: 123 B Mid: 112 B Distal: 89 B Distal Anast: 152 B Distal Native: 80 B (M=monophasic, B=biphasic, T=triphasic)     1. RIGHT LOWER EXTREMITY: JOSE at rest is unable to be obtained due to vascular noncompressibility. Poststenotic monophasic waveforms are present in the posterior tibial, anterior tibial and dorsalis pedis arteries in keeping with the known SFA occlusion. 2. LEFT LOWER EXTREMITY: JOSE at rest is normal at 1.44 (previously 0.00). The left aortofemoral bypass limb is patent with a normal waveform. The native superficial femoral artery is occluded. Monophasic waveforms in the posterior tibial, anterior tibial  and dorsalis pedis arteries in keeping with the known  proximal occlusion.    Nm Pharmacologic Stress Test    Result Date: 5/1/2017    The images of 11/2/2009 were unavailable for comparison review.   The left ventricular ejection fraction is 40%. There is severe hypokinesis to akinesis of the apical segments.   The pharmacologic nuclear stress test is abnormal.   There is moderate to severely reduced tracer uptake in a medium-sized area of the mid to apical anteroseptal and apex representing an area of transmural myocardial infarction.   Stress test was negative for inducible ischemia.      Us Arterial Leg Left    Result Date: 5/25/2017  [facility] EXAM: RESTING AND POSTEXERCISE ANKLE-BRACHIAL INDICES (ABIs) AND DUPLEX ARTERIAL ULTRASOUND OF THE LEFT LOWER EXTREMITY. INDICATION: Peripheral arterial disease. Aortobifemoral arterial bypass. COMPARISON: The preoperative study from 04/06/2017. TECHNIQUE: Duplex imaging is performed utilizing gray-scale, two-dimensional images, and color-flow imaging. Doppler waveform analysis and spectral Doppler imaging is also performed. JOSE FINDINGS: SEGMENTAL BP RIGHT Brachial: 175 Ankle (PT): >254; Index: NC Ankle (DP): >254; Index: NC LEFT Brachial: 168 Ankle (PT): 252; Index: 1.44 Ankle (DP): 249; Index: 1.42 DUPLEX ARTERIAL ULTRASOUND FINDINGS: LOWER EXTREMITY ARTERIAL DUPLEX EXAM WITH WAVEFORMS RIGHT (cm/s) PTA: 45 M HERNAN: 46 M DPA: 32 M (M=monophasic, B=biphasic, T=triphasic) LEFT (cm/s) CFA: 80 B PFA: 235 B SFA-Proximal: 50 M SFA-Mid: 71 M SFA-Distal: 131 M Popliteal: 61 M PTA: 34 M HERNAN: 46 M DPA: 41 M Proximal Native: NV Proximal Anast: NV Proximal: 123 B Mid: 112 B Distal: 89 B Distal Anast: 152 B Distal Native: 80 B (M=monophasic, B=biphasic, T=triphasic)     1. RIGHT LOWER EXTREMITY: JOSE at rest is unable to be obtained due to vascular noncompressibility. Poststenotic monophasic waveforms are present in the posterior tibial, anterior tibial and dorsalis pedis arteries in keeping with the known SFA occlusion. 2.  LEFT LOWER EXTREMITY: JOSE at rest is normal at 1.44 (previously 0.00). The left aortofemoral bypass limb is patent with a normal waveform. The native superficial femoral artery is occluded. Monophasic waveforms in the posterior tibial, anterior tibial  and dorsalis pedis arteries in keeping with the known proximal occlusion.      Assessment/Plan:      Ischemic rest pain of lower extremity s/p aorto-bifem bypass 5/17/2017-Patient tolerated the procedure well  currently his staples are intact with some irritation noted however his left groin is showing significant induration swelling with redness extending down to the thigh suspicious for cellulitis with possible fluid collection. Plan is to treate with antibiotics if his drainage and redness does not resolve he will need to see her surgeon sooner than scheduled in two weeks  He will get Keflex 504 times a day for 10 days monitor incision closely    Hypertension With reported postoperative hypotension felt to be related to his are dehydration recent surgery  continue to monitor blood pressures closely in the TCU he's preferring to to be flat which can predispose him to orthostatic hypotension's advice to sit up  Pressure seems to be rebounding with elevated blood systolics and diastolics however patient believes this is more likely from uncontrolled pain we will continue to monitor if need be at increased the dose of medications if blood pressures remain high.  He is on Norvasc 5 mg, Tenormin 100 mg, lisinopril 20 mg.        Post-op pain-Currently discharge on oral oxycodone and this is not adequate in controlling his pain so will schedule oxycodone 10mg four times a day for him in addition    CKD (chronic kidney disease), stage 3 (moderate) With acute renal insufficiency reported in the hospital did resolved with fluid hydration and currently baseline creatinine is 1.5.r/c 1  HLD-ON lipitor    Ischemic cardiomyopathy with EF 45-50%    Coronary artery disease due to  lipid rich plaque    Obesity, unspecified obesity severity, unspecified obesity type    Gout attack-This was felt to be related to diuretic usage which was discontinued.  Currently he has been discharged on prednisone 10 mg which is a tapered down at his request will decrease dosage further to 7.5 mg and monitor closely  Mood disorder-on zoloft    Acute blood loss anemia post-operatively  UTI  Leukocytosis felt to be related to steroid usage. White count remain high but improved  cellulitis of his left thigh-will emperically start him on Keflex and monitor closely  if his drainage does not resolve and he continues to have through regulation he may need to see surgeon for wound expression this was explained to him  generalized weakness continue with his PT OT advice to set up more  Total time spent was 45 minutes, more than half of it was in face-to-face counseling regarding disease state, treatment, side effects, documentation, review of clinical data and coordination of care    Electronically signed by: DEREJE Martino  This progress note was completed using Dragon software and there may be grammatical errors.

## 2021-06-10 NOTE — ANESTHESIA POST-OP FOLLOW-UP NOTE
Patient: Young Ordonez  AORTOBIFEMORAL BYPASS   Anesthesia type: general    Pt rates pain a 3/10. Denies complaints regarding anesthesia care. Able to stand at bedside and march in place without trouble. Epidural dressing CDI. Will continue with 0.0625% bupiv at 10 cc/hour.

## 2021-06-10 NOTE — ANESTHESIA PROCEDURE NOTES
Central line    Start time: 5/17/2017 11:40 AM  End time: 5/17/2017 11:55 AM  Patient location: Pre-op  Indications: central pressure monitoring and vascular access  Performing Anesthesiologist: SANJU MEYER  Pre-procedure Checklist  Completed: patient identified, site marked, risks, benefits, and alternatives discussed, timeout performed, consent obtained, hand hygiene performed, all elements of maximal sterile barriers used including cap, mask, gown, sterile gloves, and large sheet and skin prep agent completely dried prior to procedure    Procedure Details:  Lidocaine 1% local anesthesia used for skin prep  Preparation: 2% chlorhexidine  Location details: right internal jugular  Catheter type: Introducer with Hands-Off  Introducer type: Cordis  Lumens:single lumenNumber of attempts: 1  Ultrasound evaluation of access site: yes  Vessel patent by US exam  Concurrent real time visualization of needle entry  manometry confirmation of venous access

## 2021-06-10 NOTE — ANESTHESIA PROCEDURE NOTES
Arterial Line  Reason for Procedure: hemodynamic monitoring  Patient location during procedure: Pre-op  Start time: 5/17/2017 11:20 AM  End time: 5/17/2017 11:30 AM  Staffing:  Performing  Anesthesiologist: SANJU MEYER  Sterile Precautions:  sterile barriers used during insertion: cap, mask, sterile gloves, large sheet, and hand hygiene used.  Arterial Line:   Immediately prior to procedure a time out was called to verify the correct patient, procedure, equipment, support staff and site/side marked as required  Laterality: right  Location: brachial  Prepped with: ChloroPrep    Needle gauge: 20 G  Number of Attempts: 3  Secured with: tape and transparent dressing  Flushed with: saline  1% lidocaine local anesthesia used for skin prep.   See MAR for additional medications given.  Ultrasound evaluation of access site: yes  Vessel patent by US exam    Concurrent real time visualization of needle entry

## 2021-06-10 NOTE — ANESTHESIA CARE TRANSFER NOTE
Last vitals:   Vitals:    05/17/17 1736   BP: 117/54   Pulse: 62   Resp: 16   Temp: 36  C (96.8  F)   SpO2: 100%     Patient's level of consciousness is drowsy  Spontaneous respirations: yes  Maintains airway independently: yes  Dentition unchanged: yes  Oropharynx: nasal gastric tube in place    QCDR Measures:  ASA# 20 - Surgical Safety Checklist: ASA20A - Safety Checks Done  PQRS# 430 - Adult PONV Prevention: 4558F - Pt received => 2 anti-emetic agents (different classes) preop & intraop  ASA# 8 - Peds PONV Prevention: NA - Not pediatric patient, not GA or 2 or more risk factors NOT present  PQRS# 424 - Rhiannon-op Temp Management: 4559F - At least one body temp DOCUMENTED => 35.5C or 95.9F within required timeframe  PQRS# 426 - PACU Transfer Protocol: - Transfer of care checklist used  ASA# 14 - Acute Post-op Pain: ASA14B - Patient did NOT experience pain >= 7 out of 10    I completed my SBAR handoff to the receiving nurse per policy and procedure.

## 2021-06-10 NOTE — TELEPHONE ENCOUNTER
Controlled Substance Refill Request  Medication Name:   Requested Prescriptions     Pending Prescriptions Disp Refills     LORazepam (ATIVAN) 0.5 MG tablet [Pharmacy Med Name: LORAZEPAM 0.5 MG TABLET] 30 tablet 0     Sig: TAKE ONE TABLET BY MOUTH NIGHTLY AT BEDTIME AS NEEDED FOR ANXIETY     Date Last Fill: 7/3/20  Requested Pharmacy: [facility]  Submit electronically to pharmacy  Controlled Substance Agreement on file:   Encounter-Level CSA Scan Date:    There are no encounter-level csa scan date.        Last office visit:  5/27/20

## 2021-06-10 NOTE — ANESTHESIA POSTPROCEDURE EVALUATION
Patient: Young Ordonez  AORTOBIFEMORAL BYPASS   Anesthesia type: general    Patient location: PACU  Last vitals:   Vitals:    05/18/17 0700   BP: (!) 87/52   Pulse: 66   Resp: 13   Temp:    SpO2: 91%     Post vital signs: stable  Level of consciousness: awake and responds to simple questions  Post-anesthesia pain: pain controlled  Post-anesthesia nausea and vomiting: no  Pulmonary: unassisted, return to baseline  Cardiovascular: stable and blood pressure at baseline  Hydration: adequate  Anesthetic events: no    QCDR Measures:  ASA# 11 - Rhiannon-op Cardiac Arrest: ASA11B - Patient did NOT experience unanticipated cardiac arrest  ASA# 12 - Rhiannon-op Mortality Rate: ASA12B - Patient did NOT die  ASA# 13 - PACU Re-Intubation Rate: ASA13B - Patient did NOT require a new airway mgmt  ASA# 10 - Composite Anes Safety: ASA10A - No serious adverse event  ASA# 38 - New Corneal Injury: ASA38A - No new exposure keratitis or corneal abrasion in PACU    Additional Notes:

## 2021-06-10 NOTE — PROGRESS NOTES
Faxed surgery order form w/ patient demographics to [facility] [phone] attn Li, patient will be set-up for aorto bi-fem bypass. Form placed to be scanned.

## 2021-06-10 NOTE — PROGRESS NOTES
Imaging follow-up, PAD w/ claudication. Patient underwent a CTA 03/16/2017 Bilateral SFA occlusion and possible left external iliac artery occlusion. Patient has continued claudication in legs bilaterally, left greater than right. Patient underwent LE angio that confirmed bilateral SFA occlusion. Patient reports rest pain.

## 2021-06-10 NOTE — TELEPHONE ENCOUNTER
Refill request for medication: ativan  Last visit addressing this medication: 05/27/20  Follow up plan unknown  months  Last refill on 07/03/20, quantity #30   CSA completed none on file   checked  08/04/20, last dispensed refill 07/03/20      Appointment: Not due     Dalila Yip, [facility]

## 2021-06-11 NOTE — PROGRESS NOTES
[facility] Patient Outreach:     Discharge Date::  6/6/17      Reason for TCU stay (discharge diagnosis)::  PVD, aortobifemoral bypass, gout      Are you feeling better, the same or worse since your discharge?:  Patient is feeling the same (left leg swelling improved since elevation)          As part of your discharge plan, did they discuss home care with you?: Yes        Have your seen them yet, or are they scheduled to visit?: Yes                Do you have any follow up visits scheduled with your PCP or Specialist?:  Yes, with Specialist      Who are you seeing and when is it scheduled?:  Dr. Juan Pablo Mandujano  6/9 @ 0945      I'm glad to hear you're doing well and we want you to continue to do well. Your PCP would like to see you for a follow-up visit. Can we help set that up for your today?: Yes            (RN) Patient transferred to [facility]? **If immediate concers (e.g. patient is feeling worse and/or not taking new medictations), send in basket message to PCP with quick summary of concern.: Yes

## 2021-06-11 NOTE — TELEPHONE ENCOUNTER
Patient Returning Call  Reason for call:  Patient called.  Information relayed to patient:  n/a  Patient has additional questions:  Yes  If YES, what are your questions/concerns:  Calling on the status of these medications.  Please address today.  Okay to leave a detailed message?: Yes

## 2021-06-11 NOTE — PROGRESS NOTES
Vascular surgery: This 67-year-old male comes in 23 days following his aortobifemoral bypass by Dr. Ilir Mandujano.  His appetite is good.  His energy level is improving as well.  His incisions are healed well on exam.  Staples were removed and Steri-Strips applied.  The feet appear well vascularized.    Patient advised to avoid heavy lifting.  We will see him in final follow-up in 6-8 weeks.  His progress to date has been good.

## 2021-06-11 NOTE — PROGRESS NOTES
66 yo male surgical follow-up PAD w/ prior bilateral SFA occlusions, patient underwent aortobifem bypass 05/17/2017 w/ Dr. Ilir Mandujano. Currently having 10/10 pain, he is currently having a gout flair up, is scheduled to see his PMD Monday. Patient staples are in place, there is no redness, no dehiscence. Patients legs and feet appear well vascularized.

## 2021-06-11 NOTE — TELEPHONE ENCOUNTER
Controlled Substance Refill Request  Medication Name:   Requested Prescriptions     Pending Prescriptions Disp Refills     LORazepam (ATIVAN) 0.5 MG tablet [Pharmacy Med Name: LORAZEPAM 0.5 MG TABLET] 30 tablet 0     Sig: TAKE ONE TABLET BY MOUTH NIGHTLY AT BEDTIME AS NEEDED FOR ANXIETY     allopurinoL (ZYLOPRIM) 300 MG tablet 90 tablet 2     Sig: Take 1 tablet (300 mg total) by mouth daily.     Date Last Fill: 8/4/20  [facility]  Submit electronically to pharmacy  Controlled Substance Agreement on file:   Encounter-Level CSA Scan Date:    There are no encounter-level csa scan date.        Last office visit:  5/27/20      Provider Refill Request  Medication:  allopurinol  RN can NOT refill this medication per RN refill protocol because Protocol failed and NO refill given

## 2021-06-11 NOTE — PROGRESS NOTES
ASSESSMENT:   1. Chronic pain of multiple joints  HM1(CBC and Differential)    Uric Acid    Lyme Antibody [place]    Ehrlichia Antibody Panel    Ehrlichia/Anaplasma, Molecular Detection, PCR, Whole Blood    Babesia sp. Molecular Detection, PCR    Ambulatory referral to Rheumatology    HM1 (CBC with Diff)   2. Abdominal pain     3. Nausea          PLAN:  Patient has been on chronic corticosteroid therapy for nearly 4 years for these joint pains. He has seen rheumatology in the past with [facility] and reports he was ultimately dx with gout, though also recalls a discussion about rheumatoid arthritis.  Discussed with patient this certainly would be an atypical presentation for gout and clinical course is more consistent with rheumatoid arthritis or other autoimmune condition.  Regardless, chronic prednisone therapy is certainly not the best longterm treatment for him.  I checked a CBC and uric acid today.  Additionally, checked tick borne illnesses, though I have a low suspicion since he has really not been outdoors this spring or summer with his recent surgery.      I do not recommend increasing his prednisone at this time since he has worked hard to decrease it. Discussed he should avoid use of NSAIDS due to his history of FRANCY.  His most recent Cr was normal 5/31/17.  He may use Tylenol (acetaminophen): 500mg every 4-6 hours as needed.  Do not exceed 3000mg (3grams) in a 24 hour period.     Referred to rheumatology.  If unable to see them within the next week or two, recommend he f/u with his PCP before then.     ADDENDUM: Received patient's prelim CBC.  White count elevated at 18.1 with left shift.  Could be due to prednisone therapy, but with his new onset nausea, abdominal bloating and tenderness, and recent abdominal surgery, recommend he is evaluated in the ER for further infectious work up.  Report called to [facility].  Patient transported to ER via personal automobile.    SUBJECTIVE:   Young Ordonez is a 67 y.o.  "male presents today with 1 month complaint of arthalgias. He reports right knee, left index finger MCPJ, left elbow, and left shoulder pain. Joints are swollen, left index finger MCPJ is red.  Has tried ibuprofen without relief.  Additionally, he reports feeling nauseated, fatigued, and his abdomen feels \"bloated\" for the past 2-3 days.  Has eaten minimal food due to the nausea and bloated feeling.     He reports he had recurrent joints symptoms in the past similar to this. They have all involved more than 1 joint. He has been on chronic oral corticosteroids since 2013 to treat this. He was previously seen at [facility] by a rheumatologist.  He states he has always been dx with gout, but had also discussed the possibility of rheumatoid arthritis.  His last \"bad flare\" was at the end of May and he increased the prednisone to 40mg daily. He has gradually tapered himself down to 10mg once daily as he has noticed thinning of his skin and would ideally like to be off the prednisone eventually.    He had an aortobifemoral bypass done in May 2017. He has been in the hospital or rehabilitation since the end of May and came home a few weeks ago.  His post surgical course was complicated by a wound infection in the left femoral incision mid-June.He was treated with a course of cephalexin, then Bactrim and feels the infection had improved.  However, he still notes he still has some pain and swelling at the surgical site.    Denies fever, chills, URI symptoms, sore throat, headache, vomiting, rash.  No injury/trauma to the arms or legs.    Patient Active Problem List   Diagnosis     Gout     Chronic coronary artery disease     Cellulitis     Hypertension     Chronic gouty arthritis     Tobacco use     Ischemic rest pain of lower extremity     Hypotension, unspecified hypotension type     FRANCY (acute kidney injury)     Low urine output     Post-op pain     CKD (chronic kidney disease), stage 3 (moderate)     Abdominal " distention     Ischemic cardiomyopathy     Coronary artery disease due to lipid rich plaque     Obesity, unspecified obesity severity, unspecified obesity type     Gout attack       History   Smoking Status     Former Smoker     Packs/day: 1.50     Types: Cigarettes     Quit date: 2013   Smokeless Tobacco     Never Used       Current Medications:  Current Outpatient Prescriptions on File Prior to Visit   Medication Sig Dispense Refill     allopurinol (ZYLOPRIM) 300 MG tablet Take 1 tablet (300 mg total) by mouth daily. 90 tablet 2     amLODIPine (NORVASC) 5 MG tablet Take 1 tablet (5 mg total) by mouth daily. 90 tablet 1     ascorbic acid, vitamin C, (VITAMIN C) 500 MG tablet Take 1 tablet (500 mg total) by mouth 2 (two) times a day.  0     aspirin 81 MG EC tablet Take 81 mg by mouth daily.        atenolol (TENORMIN) 100 MG tablet Take 1 tablet (100 mg total) by mouth daily. 30 tablet 0     atorvastatin (LIPITOR) 80 MG tablet Take 1 tablet (80 mg total) by mouth at bedtime. 90 tablet 1     cholecalciferol, vitamin D3, 5,000 unit Tab Take 5,000 Units by mouth daily.        lisinopril (PRINIVIL,ZESTRIL) 20 MG tablet Take 20 mg by mouth daily.       magnesium hydroxide (MILK OF MAG) 400 mg/5 mL Susp suspension Take 30 mL by mouth daily as needed.  0     multivitamin with minerals (THERA-M) 9 mg iron-400 mcg Tab tablet Take 1 tablet by mouth daily.  0     predniSONE (DELTASONE) 10 mg tablet Takes 2 tabs (20 mg) daily for 5 days through 5/31/2017, then resume 1 tab (10 mg) daily 6/1/2017  0     senna-docusate (PERICOLACE) 8.6-50 mg tablet Take 1 tablet by mouth 2 (two) times a day.  0     sertraline (ZOLOFT) 25 MG tablet Take 25 mg by mouth daily.       HEPARIN SODIUM,PORCINE/PF (HEPARIN, PF,) 5,000 unit/0.5 mL Syrg Inject 0.5 mL (5,000 Units total) under the skin every 8 (eight) hours.  0     oxyCODONE-acetaminophen (PERCOCET) 5-325 mg per tablet Take 1-2 tablets by mouth 2 (two) times a day as needed for pain. 60  tablet 0     No current facility-administered medications on file prior to visit.        Allergies:   No Known Allergies    OBJECTIVE:   Vitals:    07/12/17 1257   BP: 144/80   Patient Site: Right Arm   Patient Position: Sitting   Cuff Size: Adult Regular   Pulse: 62   Resp: 18   Temp: 97.9  F (36.6  C)   TempSrc: Oral   SpO2: 96%   Weight: 212 lb (96.2 kg)     Physical exam reveals a pleasant 67 y.o. male.   Appears healthy, alert, cooperative and in NAD.  Lungs: Chest is clear, no wheezing, rhonchi or rales. Symmetric air entry throughout both lung fields.  Heart: regular rate and rhythm, no murmur, rub or gallop  ABD: BS active in all 4 quadrants. Midline surgical incision spanning the length of the abdomen is without erythema, edema, or induration. The femoral incision on the left has a palpable, golfball sized mass at the distal aspect, but there is no erythema, induration, or drainage from the incision site. Abdomen soft. He has tenderness of the left upper and lower abdomen.  No hepatosplenomegaly.    Lower extremities: He has mild swelling of the right knee with tenderness of the lateral knee.  Full AROM of the knee, tenderness with full knee flexion. Lower legs both have trace pedal edema.  No redness or swelling of the knee or lower extremities.   Upper extremities: both of his dorsal hands have hyperpigmentation, which he reports as chronic since being on the prednisone.  He has swelling and tenderness of the left index finger MCPJ. Full AROM of the joint, tenderness with flexion.  The elbow and shoulder on the left are also tender.  Full AROM of the elbow and shoulder, tenderness with flexion of the elbow and abduction of the shoulder. The index finger MCPJ is mildly red. There is no redness or warmth of the elbow or shoulder.

## 2021-06-11 NOTE — PROGRESS NOTES
Lab results came back for the tick borne illnesses - ehrlichia, babesia, anaplasmosis and lyme are all negative.   Follow up as planned for further evaluation with primary MD and rheumatology.

## 2021-06-11 NOTE — PROGRESS NOTES
[facility]    Facility:   [facility] [[id number]]   Code Status: FULL CODE  PCP: Ike Valdes MD   Phone: [phone]   Fax: [phone]      CHIEF COMPLAINT/REASON FOR VISIT:  Chief Complaint   Patient presents with     Discharge Summary       HISTORY COURSE:  Young Ordonez is a 67 y.o. male  Who is admitted as  A transfer from the hospital.  Patient is being discharged at his own request today.  67 y.o. old male with history of CAD (MI in 1995 PTCA), cardiomyopathy, hypertension, dyslipidemia, and peripheral vascular disease who underwent aortobifemoral bypass(end-to-side aortic anastomosis and distal anastomosis to profunda femoris arteries) on 5/17/2017.    Patient was hypotensive post op which was likely from hypovolemia and epidural and blood loss.  His BP improved with iv fluids.  He was moved out of ICU on 5/19/2017. He required an NG initially for a few days until his bowel function improved.  He did develop some FRANCY/CKD.  That has slowly improved.   His blood pressures however have now rebounded and are quite elevated in the TCU which he believes are from uncontrolled pain.  His blood pressure since have improved.  He was complaining of uncontrolled pain and was scheduled on oxycodone with very good response  His incision seems to be healing up well with intact staples he was treated for cellulitis around his left groin but there is still considerable induration but no redness no foul odor or purulent drainage noted  Finish his course of Keflex 500 mg to be given 4 times a day for total of 10 days that was started in the TCU  Subsequently, patient had acute right knee pain.  He has h/o gout and it was felt likely from acute Gout flare up.  He is chronically on prednisone 10 mg daily for gout/arthritis.  He had his prednisone dose increased but symptoms did not improve much.  He was then changed to iv solumedrol 05/25.  Today his symptoms are  much improved and he is eating well.  WBC went up due to IV solumedrol.  He was switched back to prednisone at 20 mg daily for 5 days then will drop to 10 mg daily.  His dosage was further reduced in the TCU with a very good response.  He on prednisone 7.5 mg the plan was to further taper him down to 5 mg in a week's time  He is now walking 150 feet and refusing therapy most of the days and requesting to go home.  He told me he has no plan to resume his smoking  He is comfortable injecting his heparin and has a follow-up appointment with his surgeon this week.    Review of Systems  Constitutional: Negative.  Negative for fever, chills,has activity change, appetite change and fatigue.   HENT: Negative for congestion and facial swelling.    Eyes: Negative for photophobia, redness and visual disturbance.   Respiratory: Negative for cough and chest tightness.    Cardiovascular: Negative for chest pain, palpitations and leg swelling.   Gastrointestinal: Negative for nausea, diarrhea, constipation, blood in stool and abdominal distention.   Genitourinary: Negative.    Musculoskeletal: Negative.  pain in legs  Skin: Negative.    Neurological: Negative for dizziness, tremors, syncope, weakness, light-headedness and headaches.   Hematological: Does not bruise/bleed easily.   Psychiatric/Behavioral: Negative.  anxious  Vitals:    06/05/17 1449   BP: 111/63   Pulse: 73   Temp: 98  F (36.7  C)   Weight: 220 lb (99.8 kg)       Physical Exam  GENERAL: no acute distress. Cooperative in conversation.   HEENT: pupils are equal, round and reactive. Oral mucosa is moist and intact.  RESP:Chest symmetric. Regular respiratory rate. No stridor.  CVS: S1S2  ABD: Nondistended, soft.  Midline abdominal incision was intact with staples present slight irritation noted of the surrounding skin but no exam.  Findings consistent with cellulitis in the abdomen.  Bowel sounds are brisk  He has an incision noted in his right groin with intact  staples minimal irritation  Incision present in the left groin however shows intact incision with staples present there is still some induration surrounding it but is not red or warm and minimal serosanguineous drainage now noted  EXTREMITIES: trace. lower extremity edema.   NEURO: non focal. Alert and oriented x3.   PSYCH: within normal limits. No depression or anxiety.  SKIN: warm dry intact   MEDICATION LIST:  Updated Medication list, printed and signed at discharge, please refer to that final medication list from the [facility] [place] for accuracy.   Currently on prednisone 7.5 mg daily  Current Outpatient Prescriptions   Medication Sig     allopurinol (ZYLOPRIM) 300 MG tablet Take 1 tablet (300 mg total) by mouth daily.     amLODIPine (NORVASC) 5 MG tablet Take 1 tablet (5 mg total) by mouth daily.     ascorbic acid, vitamin C, (VITAMIN C) 500 MG tablet Take 1 tablet (500 mg total) by mouth 2 (two) times a day.     aspirin 81 MG EC tablet Take 81 mg by mouth daily.      atenolol (TENORMIN) 100 MG tablet Take 1 tablet (100 mg total) by mouth daily.     atorvastatin (LIPITOR) 80 MG tablet Take 1 tablet (80 mg total) by mouth at bedtime.     bisacodyl (DULCOLAX) 10 mg suppository 1 supp TN daily PRN constipation     cholecalciferol, vitamin D3, 5,000 unit Tab Take 5,000 Units by mouth daily.      docusate sodium (COLACE) 100 MG capsule Take 1 capsule (100 mg total) by mouth 2 (two) times a day for 10 days.     HEPARIN SODIUM,PORCINE/PF (HEPARIN, PF,) 5,000 unit/0.5 mL Syrg Inject 0.5 mL (5,000 Units total) under the skin every 8 (eight) hours.     lisinopril (PRINIVIL,ZESTRIL) 20 MG tablet Take 20 mg by mouth daily.     magnesium hydroxide (MILK OF MAG) 400 mg/5 mL Susp suspension Take 30 mL by mouth daily as needed.     miconazole (MICOTIN) 2 % powder Apply to scrotum BID     multivitamin with minerals (THERA-M) 9 mg iron-400 mcg Tab tablet Take 1 tablet by mouth daily.     oxyCODONE (ROXICODONE) 5 MG  immediate release tablet Take 1-2 tablets (5-10 mg total) by mouth every 4 (four) hours as needed for pain (5 mg for mod pain scale 5-7/10, 10 mg for sev pain 8-10/10).     predniSONE (DELTASONE) 10 mg tablet Takes 2 tabs (20 mg) daily for 5 days through 5/31/2017, then resume 1 tab (10 mg) daily 6/1/2017     senna-docusate (PERICOLACE) 8.6-50 mg tablet Take 1 tablet by mouth 2 (two) times a day.     sertraline (ZOLOFT) 25 MG tablet Take 25 mg by mouth daily.     Recent Results (from the past 240 hour(s))   HM2(CBC w/o Differential)   Result Value Ref Range    WBC 17.5 (H) 4.0 - 11.0 thou/uL    RBC 3.17 (L) 4.40 - 6.20 mill/uL    Hemoglobin 9.7 (L) 14.0 - 18.0 g/dL    Hematocrit 30.1 (L) 40.0 - 54.0 %    MCV 95 80 - 100 fL    MCH 30.6 27.0 - 34.0 pg    MCHC 32.2 32.0 - 36.0 g/dL    RDW 15.8 (H) 11.0 - 14.5 %    Platelets 251 140 - 440 thou/uL    MPV 11.7 8.5 - 12.5 fL   Basic Metabolic Panel   Result Value Ref Range    Sodium 142 136 - 145 mmol/L    Potassium 3.7 3.5 - 5.0 mmol/L    Chloride 105 98 - 107 mmol/L    CO2 31 22 - 31 mmol/L    Anion Gap, Calculation 6 5 - 18 mmol/L    Glucose 92 70 - 125 mg/dL    Calcium 8.5 8.5 - 10.5 mg/dL    BUN 17 8 - 22 mg/dL    Creatinine 1.01 0.70 - 1.30 mg/dL    GFR MDRD Af Amer >60 >60 mL/min/1.73m2    GFR MDRD Non Af Amer >60 >60 mL/min/1.73m2     DISCHARGE DIAGNOSIS:    ICD-10-CM    1. Ischemic rest pain of lower extremity I73.9    2. Post-op pain G89.18    3. Hypertension I10    4. CKD (chronic kidney disease), stage 3 (moderate) N18.3    5. Chronic gouty arthritis M1A.00X0    6. Ischemic cardiomyopathy I25.5    7       Leukocytoses  8       ischemic cardiomyopathy  9       mood disorder    MEDICAL EQUIPMENT NEEDS:  none    DISCHARGE PLAN/FACE TO FACE:  I certify that services are/were furnished while this patient was under the care of a physician and that a physician or an allowed non-physician practitioner (NPP), had a face-to-face encounter that meets the physician  face-to-face encounter requirements. The encounter was in whole, or in part, related to the primary reason for home health. The patient is confined to his/her home and needs intermittent skilled nursing, physical therapy, speech-language pathology, or the continued need for occupational therapy. A plan of care has been established by a physician and is periodically reviewed by a physician.  Date of Face-to-Face Encounter:  6/5/17    I certify that, based on my findings, the following services are medically necessary home health services:  Pt/ot    My clinical findings support the need for the above skilled services because: (Please write a brief narrative summary that describes what the RN, PT, SLP, or other services will be doing in the home. A list of diagnoses in this section does not meet the CMS requirements.)  S/p aortofemoral bypass    This patient is homebound because: (Please write a brief narrative summary describing the functional limitations as to why this patient is homebound and specifically what makes this patient homebound.)  weakness  Advised to follow-up with a surgeon as scheduled this week   Advised to follow with his primary care physician within 2 weeks   The patient is, or has been, under my care and I have initiated the establishment of the plan of care. This patient will be followed by a physician who will periodically review the plan of care.  Total time spent was 35 minutes, more than half of it was in face-to-face counseling regarding disease state, treatment, side effects, documentation, review of clinical data and coordination of care    Electronically signed by: DEREJE Martino

## 2021-06-11 NOTE — PROGRESS NOTES
ASSESSMENT:  1. Acute gout of right knee,left knee and left index finger, unspecified cause    2. S/P aortobifemoral bypass surgery  - oxyCODONE-acetaminophen (PERCOCET) 5-325 mg per tablet; Take 1-2 tablets by mouth 2 (two) times a day as needed for pain.  Dispense: 60 tablet; Refill: 0  - Culture, Wound        PLAN:  There are no Patient Instructions on file for this visit.    Orders Placed This Encounter   Procedures     Culture, Wound     Medications Discontinued During This Encounter   Medication Reason     oxyCODONE-acetaminophen (PERCOCET) 5-325 mg per tablet Reorder       No Follow-up on file.     All medications were reconciled. Refilled Percocet for pain from gout.  He continues to take allopurinol unfortunately is currently on 10 mg of prednisone which is not helpful.  He did so that colchicine does not help him and indometacin causes gastritis.  Hopefully the Percocet will be helpful with the pain. With regard to the swelling in the groin access suture, patient will let us know if the pain or swelling worsen. Will await wound culture lab result.     CHIEF COMPLAINT:  Chief Complaint   Patient presents with     Hospital Visit Follow Up     TCU followup, bilateral leg pain, abdominal pain (from surgery), left shoulder pain, left 1st knuckle pain       HISTORY OF PRESENT ILLNESS:  Young is a 67 y.o. male presenting to the clinic today for a hospital visit follow-up regarding aortobifemoral bypass surgery. He had aortobifemoral bypass for peripheral artery disease on May 17th at [facility], and he was discharged to TCU on May 26th. He was discharged from TCU on June 6th. He was started on Keflex at TCU because of an infection in his groin area. He followed up with Dr. Juan Pablo Mandujano on June 9th. He has been in a lot of pain since the surgery. He has bilateral leg swelling and pain that is worse in his right leg around his knee. He has abdominal pain into his groin from surgical incisions and infection. The  pain is worse in his groin area than it was before, but the swelling has gone down. He has felt chills for 2 or 3 days. He has left shoulder and left hand pain around his first knuckle. His left knuckle is red and swollen. He has had the left knuckle pain for about a week. He has a history of gout. He takes allopurinol. He is not currently taking colchicine. He has used prednisone in the past for gout flare-ups. He has been taking prednisone, and he is trying to wean off. He has used naproxen and indomethacin in the past, but they caused him to have stomach issues. He has been taking 2 pills of oxycodone twice per day over the last week with pain relief. He did not take oxycodone today because he has been driving. He is taking a stool softener, and he has had normal bowel movements over the last 2 days.     REVIEW OF SYSTEMS:   Comprehensive review of systems negative except as noted above.    PFSH:  Reviewed, as below.     TOBACCO USE:  History   Smoking Status     Former Smoker     Packs/day: 1.50     Types: Cigarettes     Quit date: 2013   Smokeless Tobacco     Never Used       VITALS:  Vitals:    06/12/17 1301   BP: 118/64   Pulse: 68   Weight: 218 lb 8 oz (99.1 kg)     Wt Readings from Last 3 Encounters:   06/12/17 218 lb 8 oz (99.1 kg)   06/09/17 220 lb (99.8 kg)   06/05/17 220 lb (99.8 kg)     Body mass index is 33.22 kg/(m^2).    PHYSICAL EXAM:  General Appearance: Alert, cooperative, no distress, appears stated age  HEENT: Pupils equal, symmetric  Lungs: Clear to auscultation bilaterally, respirations unlabored  Heart: Regular rate and rhythm, S1 and S2 normal, no murmur, rub, or gallop  Abdomen: Midline supraumbilical incision that goes down to the suprapubic region that is covered with Steri strips, no signs of inflammation.   He has surgical incisions in his groin area bilaterally: On the right side it is slightly tender but no inflammation, on the left side he has slight redness with induration and  tenderness. Swab taken from some discharges from drainage.   Musculoskeletal: Right index finger MCP inflammation with tenderness.   Right Knee lateral aspect has an area with redness and slight swelling.   Having a antalgic gait.   Neurologic:  Alert and oriented times 3. Normal reflexes. Cranial nerves II-XII intact.   Psychiatric: Normal mood and affect.      ADDITIONAL HISTORY SUMMARIZED (2): Reviewed 6/9/2017 note from [facility] regarding peripheral artery disease.   DECISION TO OBTAIN EXTRA INFORMATION (1): None.   RADIOLOGY TESTS (1): None.  LABS (1): Ordered lab.   MEDICINE TESTS (1): None.  INDEPENDENT REVIEW (2 each): None.       The visit lasted a total of 28 minutes face to face with the patient. Over 50% of the time was spent counseling and educating the patient about s/p aortobifemoral bypass surgery.    Ahmet ZIMMER, am scribing for and in the presence of, Dr. Valdes.    IDr. Valdes, personally performed the services described in this documentation, as scribed by Ahmet Spencer in my presence, and it is both accurate and complete.    MEDICATIONS:  Current Outpatient Prescriptions   Medication Sig Dispense Refill     allopurinol (ZYLOPRIM) 300 MG tablet Take 1 tablet (300 mg total) by mouth daily.  0     amLODIPine (NORVASC) 5 MG tablet Take 1 tablet (5 mg total) by mouth daily. 90 tablet 1     ascorbic acid, vitamin C, (VITAMIN C) 500 MG tablet Take 1 tablet (500 mg total) by mouth 2 (two) times a day.  0     atenolol (TENORMIN) 100 MG tablet Take 1 tablet (100 mg total) by mouth daily. 30 tablet 0     atorvastatin (LIPITOR) 80 MG tablet Take 1 tablet (80 mg total) by mouth at bedtime. 90 tablet 1     cephalexin (KEFLEX) 500 MG capsule Take 1 capsule (500 mg total) by mouth 4 (four) times a day for 5 days. 20 capsule 0     cholecalciferol, vitamin D3, 5,000 unit Tab Take 5,000 Units by mouth daily.        HEPARIN SODIUM,PORCINE/PF (HEPARIN, PF,) 5,000 unit/0.5 mL Syrg Inject  0.5 mL (5,000 Units total) under the skin every 8 (eight) hours.  0     lisinopril (PRINIVIL,ZESTRIL) 20 MG tablet Take 20 mg by mouth daily.       multivitamin with minerals (THERA-M) 9 mg iron-400 mcg Tab tablet Take 1 tablet by mouth daily.  0     oxyCODONE-acetaminophen (PERCOCET) 5-325 mg per tablet Take 1-2 tablets by mouth 2 (two) times a day as needed for pain. 60 tablet 0     predniSONE (DELTASONE) 10 mg tablet Takes 2 tabs (20 mg) daily for 5 days through 5/31/2017, then resume 1 tab (10 mg) daily 6/1/2017  0     senna-docusate (PERICOLACE) 8.6-50 mg tablet Take 1 tablet by mouth 2 (two) times a day.  0     sertraline (ZOLOFT) 25 MG tablet Take 25 mg by mouth daily.       aspirin 81 MG EC tablet Take 81 mg by mouth daily.        magnesium hydroxide (MILK OF MAG) 400 mg/5 mL Susp suspension Take 30 mL by mouth daily as needed.  0     No current facility-administered medications for this visit.        Total data points: 3

## 2021-06-11 NOTE — TELEPHONE ENCOUNTER
Refill request for medication:   LORazepam (ATIVAN) 0.5 MG tablet [Pharmacy Med Name: LORAZEPAM 0.5 MG TABLET] 30 tablet 0        Sig: TAKE ONE TABLET BY MOUTH NIGHTLY AT BEDTIME AS NEEDED FOR ANXIETY     allopurinoL (ZYLOPRIM) 300 MG tablet 90 tablet 2       Sig: Take 1 tablet (300 mg total) by mouth daily.       Last visit addressing this medication: 5/27/20  Follow up plan not specified  months  Last refill on 8/5/20   CSA completed none on file      Appointment: Not due     Leola Salazar RN

## 2021-06-11 NOTE — TELEPHONE ENCOUNTER
Refill Approved    Rx renewed per Medication Renewal Policy. Medication was last renewed on 3/16/20.    Praveena Hester, [facility] Triage/Med Refill 9/12/2020     Requested Prescriptions   Pending Prescriptions Disp Refills     atorvastatin (LIPITOR) 80 MG tablet [Pharmacy Med Name: ATORVASTATIN 80 MG TABLET] 30 tablet 5     Sig: TAKE 1 TABLET BY MOUTH EVERYDAY AT BEDTIME       Statins Refill Protocol (Hmg CoA Reductase Inhibitors) Passed - 9/11/2020 12:27 AM        Passed - PCP or prescribing provider visit in past 12 months      Last office visit with prescriber/PCP: 11/19/2019 Ike Valdes MD OR same dept: 11/19/2019 Ike Valdes MD OR same specialty: 11/19/2019 Ike Valdes MD  Last physical: 4/10/2018 Last MTM visit: Visit date not found   Next visit within 3 mo: Visit date not found  Next physical within 3 mo: Visit date not found  Prescriber OR PCP: Ike Valdes MD  Last diagnosis associated with med order: 1. Hyperlipidemia, unspecified hyperlipidemia type  - atorvastatin (LIPITOR) 80 MG tablet [Pharmacy Med Name: ATORVASTATIN 80 MG TABLET]; TAKE 1 TABLET BY MOUTH EVERYDAY AT BEDTIME  Dispense: 30 tablet; Refill: 5    If protocol passes may refill for 12 months if within 3 months of last provider visit (or a total of 15 months).

## 2021-06-11 NOTE — PROGRESS NOTES
Assessment/Plan:        Diagnoses and all orders for this visit:    Drug-induced constipation    Acute gout of left wrist, unspecified cause  -     traMADol (ULTRAM) 50 mg tablet; Take 1 tablet (50 mg total) by mouth 2 (two) times a day as needed for pain.  Dispense: 40 tablet; Refill: 0  I did review the CT scan and noted increased amount of feces in the large intestines.  Based on that as well as the radiologist report I think that the cause of his pain likely is constipation.  I discussed medication management with the patient at this point.  He does have milk of magnesia, he will use that as well as get some MiraLAX for continuous use.  He is no longer onHydrocodone but uses tramadol now.  He will continue to use that.  Let me know if he has continued to have the abdominal pain.  He will also continue with the tramadol for the gout as he is tapering off prednisone.        Subjective:    Patient ID: Young Ordonez is a 67 y.o. male.    HPI Comments: Status post bilateral femoral artery bypass graft.  He was seen at the emergency room for abdominal pain, CT scan of the abdomen did not show any major cause of the pain.  He was treated symptomatically and discharged home.  But he continues to have abdominal pain.  He does note good improvement with his walking noting that he has no pain and thankful that he is surgery did go well.  Has had some pain on his right index finger which is pain of gout.  Also noted some pain on the right lateral aspect of his knee joint which is also associated with gout.    Abdominal Pain   This is a new (Pain is said to be achy, more around flanks, no associated diarrhea but he has not been having a lot of bowel movements.  He has had some nausea but no vomiting.) problem. The current episode started 1 to 4 weeks ago. The onset quality is gradual. The problem occurs intermittently. The problem has been waxing and waning. The pain is located in the left flank, right flank and  generalized abdominal region. The pain is at a severity of 5/10. The pain is moderate. The quality of the pain is aching. The abdominal pain does not radiate. Associated symptoms include constipation, flatus and nausea. Pertinent negatives include no anorexia, belching, diarrhea, dysuria, fever, headaches or vomiting. The pain is aggravated by eating and palpation. The pain is relieved by nothing. Treatments tried: He was given nausea medication and hospital. The treatment provided mild relief. Prior diagnostic workup includes CT scan. There is no history of abdominal surgery, GERD or PUD.       Past Medical History:   Diagnosis Date     Arthritis      Cardiomyopathy      Claudication      COPD (chronic obstructive pulmonary disease)      Coronary artery disease      Gout      Gout attack 5/25/2017     HTN (hypertension)      Hyperlipidemia      MI (myocardial infarction) 1995     PAD (peripheral artery disease)      Past Surgical History:   Procedure Laterality Date     ANGIOPLASTY  1995     ANKLE FRACTURE SURGERY Left      AORTA - BILATERAL FEMORAL ARTERY BYPASS GRAFT N/A 5/17/2017    Procedure: AORTOBIFEMORAL BYPASS ;  Surgeon: Ilir FERRO MD;  Location: [facility];  Service:      FRACTURE SURGERY       MANDIBLE SURGERY       Social History     Social History     Marital status:      Spouse name: N/A     Number of children: N/A     Years of education: N/A     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     Drug use: No     Sexual activity: Not Asked     Other Topics Concern     None     Social History Narrative     Family History   Problem Relation Age of Onset     Cancer Mother      Stroke Father      Hypertension Father      Heart disease Paternal Grandmother      Emphysema Paternal Grandfather      No Known Allergies  Current  Outpatient Prescriptions   Medication Sig Dispense Refill     allopurinol (ZYLOPRIM) 300 MG tablet Take 1 tablet (300 mg total) by mouth daily. 90 tablet 2     amLODIPine (NORVASC) 5 MG tablet Take 1 tablet (5 mg total) by mouth daily. 90 tablet 1     ascorbic acid, vitamin C, (VITAMIN C) 500 MG tablet Take 1 tablet (500 mg total) by mouth 2 (two) times a day.  0     aspirin 81 MG EC tablet Take 81 mg by mouth daily.        atenolol (TENORMIN) 100 MG tablet Take 1 tablet (100 mg total) by mouth daily. 30 tablet 0     atorvastatin (LIPITOR) 80 MG tablet Take 1 tablet (80 mg total) by mouth at bedtime. 90 tablet 1     cholecalciferol, vitamin D3, 5,000 unit Tab Take 5,000 Units by mouth daily.        lisinopril (PRINIVIL,ZESTRIL) 20 MG tablet Take 20 mg by mouth daily.       ondansetron (ZOFRAN ODT) 4 MG disintegrating tablet Take 1 tablet (4 mg total) by mouth every 4 (four) hours as needed for nausea. 20 tablet 0     predniSONE (DELTASONE) 10 mg tablet Takes 2 tabs (20 mg) daily for 5 days through 5/31/2017, then resume 1 tab (10 mg) daily 6/1/2017  0     senna-docusate (PERICOLACE) 8.6-50 mg tablet Take 1 tablet by mouth 2 (two) times a day.  0     sertraline (ZOLOFT) 25 MG tablet Take 25 mg by mouth daily.       traMADol (ULTRAM) 50 mg tablet Take 1 tablet (50 mg total) by mouth 2 (two) times a day as needed for pain. 40 tablet 0     magnesium hydroxide (MILK OF MAG) 400 mg/5 mL Susp suspension Take 30 mL by mouth daily as needed.  0     multivitamin with minerals (THERA-M) 9 mg iron-400 mcg Tab tablet Take 1 tablet by mouth daily.  0     No current facility-administered medications for this visit.          Review of Systems   Constitutional: Negative for activity change, appetite change, chills and fever.   HENT: Negative.    Respiratory: Negative.    Cardiovascular: Negative.    Gastrointestinal: Positive for abdominal pain, constipation, flatus and nausea. Negative for anorexia, diarrhea and vomiting.    Genitourinary: Negative for dysuria.   Musculoskeletal: Negative.    Skin: Negative.    Neurological: Negative for headaches.           Vitals:    07/14/17 0918   BP: 138/62   Pulse: 64   Temp: 98.2  F (36.8  C)   TempSrc: Oral   Weight: 212 lb 6.4 oz (96.3 kg)         Objective:    Physical Exam   Constitutional: He appears well-developed and well-nourished.   HENT:   Mouth/Throat: Oropharynx is clear and moist.   Eyes: Pupils are equal, round, and reactive to light.   Neck: Normal range of motion. Neck supple.   Cardiovascular: Normal rate and regular rhythm.    Pulmonary/Chest: Effort normal and breath sounds normal.   Abdominal: Soft. He exhibits distension. There is generalized tenderness. There is no rebound, no guarding and no CVA tenderness.   Tenderness is noted more around the flanks.   Musculoskeletal: Normal range of motion.   Extremities did not show any swellings, but he does have a slight lump lateral aspect of his knee which is slightly tender.  There is also tenderness and swelling at the metacarpophalangeal joints of the index finger right side.  This is as a result of gout.

## 2021-06-12 NOTE — PROGRESS NOTES
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 Angelo not additional lab tests needed at this time. Pt has follow up appt scheduled 10/11/17 with Dr. Angelo.   "

## 2021-06-12 NOTE — PROGRESS NOTES
ASSESSMENT AND PLAN:  Young Ordonez 67 y.o. male is seen here on 08/11/17 for evaluation of polyarthralgias.  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  He has enthesitis likely associated with gouty attacks.  He is on allopurinol only.  He does not recall being on colchicine.  I have recommended that he considers taking that with the allopurinol.  He has been on prednisone for 2 years off and on.  While prednisone is an excellent choice in the short-term but to stay on it for that long would be something cannot be recommended.  We also discussed the possibility of an additional/alternative differential diagnosis of inflammatory arthropathy such as psoriatic arthritis.  There does not appear to be any family history or personal history of the same.  Today I have asked him to add colchicine 0.6 mg twice daily to his current regimen.  Given the acute onset of inflammation I offered him injection with corticosteroids.  He would like to defer those.  In view of this prednisone 20 mg per day for the next 10 days is been prescribed.  He would then stop this.  He will continue colchicine along with allopurinol.  X-rays of the hands taken today show an erosion at the base of the proximal phalanx of the right index finger adjacent to the second MCP.  We will meet here in the next 2 months.  Diagnoses and all orders for this visit:    Polyarthritis  -     XR Feet Bilateral 3 Or More VWS; Future; Expected date: 8/11/17  -     CCP Antibodies  -     Hepatitis C Antibody (Anti-HCV)  -     Rheumatoid Factor Quant  -     C-Reactive Protein  -     Erythrocyte Sedimentation Rate  -     Antinuclear Antibody (YAYA) Cascade  -     XR Hands Bilateral 3 or More VWS; Future; Expected date: 8/11/17    Idiopathic chronic gout, multiple sites, with tophus (tophi)  -     XR Feet Bilateral 3 Or More VWS; Future; Expected date: 8/11/17  -     XR Hands Bilateral 3 or More VWS; Future; Expected date: 8/11/17  -      colchicine (MITIGARE) 0.6 mg cap; Take 0.6 mg by mouth 2 (two) times a day.  Dispense: 180 capsule; Refill: 0    Acute gout of right foot  -     XR Feet Bilateral 3 Or More VWS; Future; Expected date: 8/11/17  -     colchicine (MITIGARE) 0.6 mg cap; Take 0.6 mg by mouth 2 (two) times a day.  Dispense: 180 capsule; Refill: 0  -     predniSONE (DELTASONE) 20 MG tablet; Takes 2 tabs daily for 10 days  Dispense: 20 tablet; Refill: 0    Acute gout of right knee, unspecified cause    HISTORY OF PRESENTING ILLNESS:  Young Ordonez, 67 y.o., male is here for evaluation of painful joints.  He reports that these began 10 years ago.  He describes episodic swelling tenderness warmth and redness in various joint areas.  Sometimes it is and feet of the times it is around the knees or the hands.  He has had ankle involvement as well.  He reports that the most recent episode was, in the past few days affecting his knee area further distal from the knee around the tibial tubercle region where there is redness warmth and swelling.  He also developed a similar changes of redness warmth and swelling along the lateral aspect of his right foot around the base of the fifth metatarsal region.  He would typically take prednisone for this.  He is not aware that he has ever had colchicine along with the allopurinol.  He rates his pain as 8.0/10, severe.  In between the episodes he has no appreciable joint area issues.  There is no personal or family history of psoriasis.  He recently underwent abdominal surgery sounds like it was for peripheral vascular disease.  He has noted history of hypertension.  He used to drive a truck.  Does not smoke does not take alcohol. Further historical information and ADL limitations as noted in the multidimensional health assessment questionnaire attached in the EMR. Rest of the 13 system ROS is negative.     ALLERGIES:Review of patient's allergies indicates no known allergies.    PAST MEDICAL/ACTIVE  PROBLEMS/MEDICATION/ FAMILY HISTORY/SOCIAL DATA:  The patient has a family history of  Past Medical History:   Diagnosis Date     Arthritis      Cardiomyopathy      Claudication      COPD (chronic obstructive pulmonary disease)      Coronary artery disease      Gout      Gout attack 5/25/2017     HTN (hypertension)      Hyperlipidemia      MI (myocardial infarction) 1995     PAD (peripheral artery disease)      History   Smoking Status     Former Smoker     Packs/day: 1.50     Types: Cigarettes     Quit date: 2013   Smokeless Tobacco     Never Used     Patient Active Problem List   Diagnosis     Gout     Chronic coronary artery disease     Cellulitis     Hypertension     Chronic gouty arthritis     Tobacco use     Ischemic rest pain of lower extremity     Hypotension, unspecified hypotension type     FRANCY (acute kidney injury)     Low urine output     Post-op pain     CKD (chronic kidney disease), stage 3 (moderate)     Abdominal distention     Ischemic cardiomyopathy     Coronary artery disease due to lipid rich plaque     Obesity, unspecified obesity severity, unspecified obesity type     Gout attack     Acute myocardial infarction     Depressive disorder, not elsewhere classified     Benign essential hypertension     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 90 tablet 2     amLODIPine (NORVASC) 5 MG tablet Take 1 tablet (5 mg total) by mouth daily. 90 tablet 3     ascorbic acid, vitamin C, (VITAMIN C) 500 MG tablet Take 1 tablet (500 mg total) by mouth 2 (two) times a day.  0     aspirin 81 MG EC tablet Take 81 mg by mouth daily.        atenolol (TENORMIN) 100 MG tablet Take 1 tablet (100 mg total) by mouth daily. 30 tablet 0     atorvastatin (LIPITOR) 80 MG tablet Take 1 tablet (80 mg total) by mouth at bedtime. 90 tablet 1     cholecalciferol, vitamin  "D3, 5,000 unit Tab Take 5,000 Units by mouth daily.        lisinopril (PRINIVIL,ZESTRIL) 20 MG tablet Take 20 mg by mouth daily.       sertraline (ZOLOFT) 25 MG tablet Take 25 mg by mouth daily.       HYDROmorphone (DILAUDID) 2 MG tablet Take 1 tablet (2 mg total) by mouth every 4 (four) hours as needed for pain. 20 tablet 0     multivitamin with minerals (THERA-M) 9 mg iron-400 mcg Tab tablet Take 1 tablet by mouth daily.  0     predniSONE (DELTASONE) 10 mg tablet Takes 2 tabs (20 mg) daily for 5 days through 5/31/2017, then resume 1 tab (10 mg) daily 6/1/2017  0     traMADol (ULTRAM) 50 mg tablet Take 1 tablet (50 mg total) by mouth 2 (two) times a day as needed for pain. 40 tablet 0     No current facility-administered medications for this visit.        COMPREHENSIVE EXAMINATION:  Vitals:    08/11/17 1257   BP: 132/80   Weight: 203 lb 6.4 oz (92.3 kg)   Height: 5' 8\" (1.727 m)     A well appearing alert oriented male. Vital data as noted above. His eyes without inflammation/scleromalacia. ENTwithout oral mucositis, thrush, nasal deformity, external ear redness, deformity. His neck is without lymphadenopathy and supple. Lungs normal sounds, no pleural rub. Heart auscultation normal rate, rhythm; no pericardial rub and murmurs. Abdomen soft, non tender, no organomegaly. Skin examined for heliotrope, malar area eruption, lupus pernio, periungual erythema, sclerodactyly, papules, erythema nodosum, purpura, nail pitting, onycholysis, and obvious psoriasis lesion. Neurological examination shows normal alertness, speech, facial symmetry, tone and power in upper and lower extremities, Tinel's and Phalen's at wrist and gait. The joint examination is performed for swelling, tenderness, warmth, erythema, and range of motion in the following joints: DIPs, PIPs, MCPs, wrists, first CMC's, elbows, shoulders, hips, knees, ankles, feet; spine for range of motion and paraspinal muscles for tenderness. The salient normal / " abnormal findings are appended.  He has tophi on the left second toe, nodularities along the olecranon which could also be tophi.  He has inflamed lateral aspect of the foot around the base of fifth metatarsal bone.  He has tenderness warmth and swelling at the tibial tubercle area.  None of the joints are swollen or tender exception of left second MCP.  He has incision on his anterior abdominal wall for recent surgery for peripheral vascular disease in the lower extremities.    LAB / IMAGING DATA:  ALT   Date Value Ref Range Status   07/12/2017 34 0 - 45 U/L Final   05/19/2017 19 0 - 45 U/L Final   05/18/2017 20 0 - 45 U/L Final     Albumin   Date Value Ref Range Status   07/12/2017 3.4 (L) 3.5 - 5.0 g/dL Final   05/22/2017 2.7 (L) 3.5 - 5.0 g/dL Final   05/21/2017 2.6 (L) 3.5 - 5.0 g/dL Final     Creatinine   Date Value Ref Range Status   07/12/2017 1.14 0.70 - 1.30 mg/dL Final   05/31/2017 1.01 0.70 - 1.30 mg/dL Final   05/25/2017 1.47 (H) 0.70 - 1.30 mg/dL Final       WBC   Date Value Ref Range Status   07/12/2017 14.5 (H) 4.0 - 11.0 thou/uL Final   07/12/2017 17.7 (H) 4.0 - 11.0 thou/uL Final     Hemoglobin   Date Value Ref Range Status   07/12/2017 10.3 (L) 14.0 - 18.0 g/dL Final   07/12/2017 11.0 (L) 14.0 - 18.0 g/dL Final   05/28/2017 9.7 (L) 14.0 - 18.0 g/dL Final     Platelets   Date Value Ref Range Status   07/12/2017 205 140 - 440 thou/uL Final   07/12/2017 219 140 - 440 thou/uL Final   05/28/2017 251 140 - 440 thou/uL Final       No results found for: YAYA, RF, SEDRATE

## 2021-06-12 NOTE — PROGRESS NOTES
VASCULAR SURGERY OUTPATIENT CONSULT OR VISIT   VASCULAR SURGEON: Isabel Hawthorne MD    LOCATION:  [facility]    Young Ordonez   Medical Record #:  [id number]  YOB: 1950  Age:  70 y.o.     Date of Service: 10/29/2020    PRIMARY CARE PROVIDER: Ike Valdes MD      Reason for consultation: Evaluation of carotid stenosis    IMPRESSION: Patient with right eye amaurosis fugax episode of unclear etiology.  Has asymptomatic moderate left carotid stenosis that is unchanged and warrants ongoing surveillance.  Patient has not had adequate occlusion of the possibility of a cardioembolic event to the right eye.  He is not on anticoagulation.  Important history of prior MI with abnormal contractility, although he has no history of arrhythmia or palpitations    RECOMMENDATION: I am very concerned that this patient had a cardioembolic event and is not an adequate protection from additional events.  Referring to a cardiologist with a plan for echocardiogram and possible Zio patch.  I would like to see him once his work-up is complete in about 3 months time.  At that time we will do a repeat carotid duplex and reevaluate his left carotid artery as well as getting ABIs and lower extremity arterial duplex to follow-up on his aortobifemoral bypass graft.  Right carotid artery is extremely unlikely to be the source of his most recent neurologic event.    HPI:  Young Ordonez is a 70 y.o. male who was seen today in consultation for his carotid disease.  This is a patient with presented on August 20 to the ER with right eye lateral vision loss consistent with a retinal embolus.  He was transferred emergently to the [facility] and underwent work-up and systemic lytic therapy.  Not evaluated or seen by vascular surgery but managed appropriately by neurology.  Discharge soon and seen in follow-up by them.  Also seen by ophthalmology.  Appears to have permanent vision loss laterally.   Patient underwent a CTA of the head and neck which demonstrated that his right carotid artery which had undergone prior repair by Dr. Lemus was widely patent and without thrombus or obvious stenosis.  Admittedly there is a very small amount of calcified plaque at the origin of the common carotid artery of the aorta but nothing else.  The left carotid artery is diffusely diseased and the reported as much is 80% narrowing but it certainly is consistent with the 60 or so percent narrowing that had been previously diagnosed.  This was recognized not to be a possible etiology for his vision loss.  It does not appear that the patient underwent echocardiogram or other cardiac work-up to look for possible cardioembolic source.    Of note, prior to the patient having right carotid endarterectomy he had had 3 prior amaurosis fugax episodes which were attributed to his carotid stenosis.  Even then he did not have an aggressive cardioembolic work-up.  The patient does have a history of cardiac disease and infarct and motion abnormality of his atrium from an echocardiogram in 2017.  He does not appear to have ever had palpitations or arrhythmias documented.    The patient has been placed transiently on dual antiplatelet therapy but finished this regimen and is now on a full dose aspirin.  He was never placed on therapeutic anticoagulation.  He is also on statin and amlodipine.    The patient comes to me asking about need for left carotid endarterectomy given the high degree of stenosis recognized on CTA.    We spent some time discussing that I was not comfortable that a cardioembolic source for his vision loss has been appropriately ruled out.  After our discussion he fully supported additional work-up.  He also recognize that moving forward with left carotid endarterectomy without having worked up his recent source of vision loss would be dangerous.    Separate from this, the patient has a history of aortobifemoral bypass  performed in 2017.  He has not had any symptoms of claudication or other limb problems since.  He has not however had surveillance of his aortobifemoral bypass or PAD.    No other new health issues.    PHH:    Past Medical History:   Diagnosis Date     Arthritis      Cardiomyopathy (H)      Claudication (H)      COPD (chronic obstructive pulmonary disease) (H)      Coronary artery disease      Depression      Gout      Gout attack 5/25/2017     HTN (hypertension)      Hyperlipidemia      Infectious viral hepatitis     carrier of viral hepatitis     Low back pain     chronic     MI (myocardial infarction) (H) 1995     Obesity      PAD (peripheral artery disease) (H)         Past Surgical History:   Procedure Laterality Date     ANGIOPLASTY  1995     ANKLE FRACTURE SURGERY Left      AORTA - BILATERAL FEMORAL ARTERY BYPASS GRAFT N/A 5/17/2017    Procedure: AORTOBIFEMORAL BYPASS ;  Surgeon: Ilir FERRO MD;  Location: [facility];  Service:      CAROTID ENDARTERECTOMY Right 4/12/2018    Procedure: RIGHT CAROTID ENDARTERECTOMY WITH EEG;  Surgeon: Sergei Lemus MD;  Location: [facility];  Service:      FRACTURE SURGERY       MANDIBLE SURGERY         ALLERGIES:  Patient has no known allergies.    MEDS:    Current Outpatient Medications:      allopurinoL (ZYLOPRIM) 300 MG tablet, Take 1 tablet (300 mg total) by mouth daily., Disp: 90 tablet, Rfl: 2     amLODIPine (NORVASC) 5 MG tablet, Take 1 tablet (5 mg total) by mouth daily., Disp: 90 tablet, Rfl: 3     aspirin 325 MG tablet, Take 325 mg by mouth., Disp: , Rfl:      atenoloL (TENORMIN) 100 MG tablet, TAKE 1 TABLET BY MOUTH EVERY DAY, Disp: 90 tablet, Rfl: 2     atorvastatin (LIPITOR) 80 MG tablet, TAKE 1 TABLET BY MOUTH EVERYDAY AT BEDTIME, Disp: 90 tablet, Rfl: 2     lisinopriL (PRINIVIL,ZESTRIL) 20 MG tablet, TAKE 1 TABLET BY MOUTH EVERY DAY, Disp: 90 tablet, Rfl: 2     LORazepam (ATIVAN) 0.5 MG tablet, TAKE ONE TABLET BY MOUTH NIGHTLY AT BEDTIME  AS NEEDED FOR ANXIETY, Disp: 30 tablet, Rfl: 0     sertraline (ZOLOFT) 50 MG tablet, Take 1.5 tablets (75 mg total) by mouth daily., Disp: 135 tablet, Rfl: 1     traZODone (DESYREL) 50 MG tablet, Take 1 tablet (50 mg total) by mouth at bedtime., Disp: 90 tablet, Rfl: 2    SOCIAL HABITS:    Social History     Tobacco Use   Smoking Status Former Smoker     Packs/day: 1.50     Types: Cigarettes     Quit date:      Years since quittin.8   Smokeless Tobacco Never Used       Social History     Substance and Sexual Activity   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    EXAM:  GENERAL: This is a well-developed 70 y.o. male who appears his stated age  EYES: Grossly normal.  MOUTH: Buccal mucosa normal   MUSCULOSKELETAL: Grossly normal and both lower extremities are intact.  HEME/LYMPH: No lymphedema  NEUROLOGIC: Focally intact, Alert and oriented x 3.   PSYCH: appropriate affect  INTEGUMENT: No open lesions or ulcers    DIAGNOSTIC STUDIES:     Images:  No results found.    I personally reviewed the images and my interpretation is that his CTA demonstrates no disease in the right carotid artery to explain an embolic loss of vision.  There is very trivial disease at the very origin of the common carotid artery but this appears to be calcified and without hypodense or high risk appearing material.  His left carotid artery is diffusely diseased with  irregular plaque along the path.  There certainly could be areas of more severe to stenosis.  This is hard to define on CTA with respect to the degree of stenosis.  There has been no recent duplex, and the last shows values consistent with a less than 70% stenosis.    LABS:      Sodium   Date Value Ref Range Status   08/20/2020 140 136 - 145 mmol/L Final   07/08/2019 145 136 - 145 mmol/L Final   02/01/2019 143 136 - 145 mmol/L Final     Potassium   Date Value Ref Range Status   08/20/2020 3.7 3.5 - 5.0 mmol/L Final   07/08/2019 4.1 3.5 - 5.0 mmol/L Final   02/01/2019 4.3 3.5 - 5.0 mmol/L Final     Chloride   Date Value Ref Range Status   08/20/2020 103 98 - 107 mmol/L Final   07/08/2019 106 98 - 107 mmol/L Final   02/01/2019 104 98 - 107 mmol/L Final     BUN   Date Value Ref Range Status   08/20/2020 27 8 - 28 mg/dL Final   07/08/2019 20 8 - 22 mg/dL Final   02/01/2019 14 8 - 22 mg/dL Final     Creatinine   Date Value Ref Range Status   08/20/2020 1.45 (H) 0.70 - 1.30 mg/dL Final   07/08/2019 1.21 0.70 - 1.30 mg/dL Final   02/01/2019 1.34 (H) 0.70 - 1.30 mg/dL Final     Hemoglobin   Date Value Ref Range Status   08/20/2020 14.2 14.0 - 18.0 g/dL Final   04/13/2018 12.4 (L) 14.0 - 18.0 g/dL Final   04/12/2018 13.1 (L) 14.0 - 18.0 g/dL Final     Platelets   Date Value Ref Range Status   08/20/2020 202 140 - 440 thou/uL Final   04/13/2018 194 140 - 440 thou/uL Final   04/12/2018 203 140 - 440 thou/uL Final     BNP   Date Value Ref Range Status   05/22/2017 98 (H) 0 - 62 pg/mL Final     INR   Date Value Ref Range Status   08/20/2020 1.09 0.90 - 1.10 Final   04/10/2018 1.09 0.90 - 1.10 Final   04/26/2017 1.04 0.90 - 1.10 Final       Total time spent 25 minutes face to face with patient with more than 50% time spent in counseling and coordination of care.    Isabel Hawthorne MD  VASCULAR SURGERY

## 2021-06-12 NOTE — TELEPHONE ENCOUNTER
Controlled Substance Refill Request  Medication Name:   Requested Prescriptions     Pending Prescriptions Disp Refills     lisinopriL (PRINIVIL,ZESTRIL) 20 MG tablet [Pharmacy Med Name: LISINOPRIL 20 MG TABLET] 30 tablet 5     Sig: TAKE 1 TABLET BY MOUTH EVERY DAY     LORazepam (ATIVAN) 0.5 MG tablet [Pharmacy Med Name: LORAZEPAM 0.5 MG TABLET] 30 tablet 0     Sig: TAKE ONE TABLET BY MOUTH NIGHTLY AT BEDTIME AS NEEDED FOR ANXIETY     Date Last Fill: 9/4/20  Requested Pharmacy: [facility]  Submit electronically to pharmacy  Controlled Substance Agreement on file:   Encounter-Level CSA Scan Date:    There are no encounter-level csa scan date.        Last office visit:  5/27/20      Rx renewed per Medication Renewal policy  Lisinopril  Lab Results   Component Value Date    CREATININE 1.45 (H) 08/20/2020    BUN 27 08/20/2020     08/20/2020    K 3.7 08/20/2020     08/20/2020    CO2 27 08/20/2020

## 2021-06-12 NOTE — PROGRESS NOTES
66 yo male surgical follow-up PAD w/ prior bilateral SFA occlusions, patient underwent aortobifem bypass 05/17/2017. Pt was given dilaudid for pain related to gout flare-up. He reports he doing better from a symptom stand point, no longer on narcotic, is taking Tramadol. Discomfort could be related to his gout. Legs look well vascularized.

## 2021-06-12 NOTE — TELEPHONE ENCOUNTER
Controlled Substance Refill Request  Medication Name:   Requested Prescriptions     Pending Prescriptions Disp Refills     LORazepam (ATIVAN) 0.5 MG tablet [Pharmacy Med Name: LORAZEPAM 0.5 MG TABLET] 30 tablet 0     Sig: TAKE ONE TABLET BY MOUTH NIGHTLY AT BEDTIME AS NEEDED FOR ANXIETY     Date Last Fill: 10/6/20  Requested Pharmacy: [facility]  Submit electronically to pharmacy  Controlled Substance Agreement on file:   Encounter-Level CSA Scan Date:    There are no encounter-level csa scan date.        Last office visit:  5/27/20

## 2021-06-12 NOTE — TELEPHONE ENCOUNTER
Pt calling to schedule a f/u with Dr. Lemus. Recent TIA and carotid stenosis of the L side. Also previous cfqax-sz-yacoxbl bypass. Writer will discuss pt with Dr. Hawthorne and call pt back to schedule f/u, he prefers [facility].

## 2021-06-12 NOTE — PROGRESS NOTES
Patient is here over 2 months out from his aortobifemoral bypass with Dr. Ilir Mandujano.  He is doing well with walking, even up to a mile without pain.  He has been struggling with suspected gout in his left knee, ankle and right ankle for the past several weeks.     On exam :  Incisions are well healed, no defects noted.  LE are warm with good color.     Plan:   Patient was given a prescription for dilaudid for the gout pain but instructed that any more pain management should come from his primary's office.  We will see him again as needed.     Patient seen with DR. Juan Pablo Mandujano.

## 2021-06-12 NOTE — PROGRESS NOTES
Assessment/Plan:        Diagnoses and all orders for this visit:    Insomnia, unspecified type  -     traZODone (DESYREL) 50 MG tablet; Take 1 tablet (50 mg total) by mouth at bedtime.  Dispense: 90 tablet; Refill: 2    Acute gout of right foot  -     predniSONE (DELTASONE) 20 MG tablet; Takes 2 tabs daily for 10 days  Dispense: 20 tablet; Refill: 0  -     cholecalciferol, vitamin D3, 5,000 unit Tab; Take 1 tablet (5,000 Units total) by mouth daily.  Dispense: 90 tablet; Refill: 2    Need for immunization against influenza  -     Influenza High Dose, Seasonal 65+ yrs    Hyperlipidemia, unspecified hyperlipidemia type  -     atorvastatin (LIPITOR) 80 MG tablet; Take 1 tablet (80 mg total) by mouth at bedtime.  Dispense: 90 tablet; Refill: 1    CAD (coronary artery disease)  -     atorvastatin (LIPITOR) 80 MG tablet; Take 1 tablet (80 mg total) by mouth at bedtime.  Dispense: 90 tablet; Refill: 1    Hypertension, essential, benign  -     atenolol (TENORMIN) 100 MG tablet; Take 1 tablet (100 mg total) by mouth daily.  Dispense: 90 tablet; Refill: 1  -     amLODIPine (NORVASC) 5 MG tablet; Take 1 tablet (5 mg total) by mouth daily.  Dispense: 90 tablet; Refill: 3  -     lisinopril (PRINIVIL,ZESTRIL) 20 MG tablet; Take 1 tablet (20 mg total) by mouth daily.  Dispense: 90 tablet; Refill: 2    Depressive disorder, not elsewhere classified  -     sertraline (ZOLOFT) 50 MG tablet; Take 1 tablet (50 mg total) by mouth daily.  Dispense: 90 tablet; Refill: 3  The medications was all reviewed and refilled as needed.Will start on Trazodone for sleep and discussed need for continuing use of Zoloft for anxiety and actually increasing the dose to see if that will help with anxiety and help also with sleep.Agreed to increase the dose and will see if there is any effect.        Subjective:    Patient ID: Young Ordonez is a 67 y.o. male.    HPI Comments: .Young Ordonez 67-year-old male who comes in today for medication refill and  check.  He has a history of gout, hypertension, high cholesterol as well as a prior history of coronary artery disease.  He also takes some medication for depressive disorder.  He does have a refill of this medication but he wanted to make sure that he has a refill when he needs it.  He denied having any major concerns today except for the fact of having problems with sleep.  He noted that sometimes he will lay in bed and will a very difficult time initiating sleep.  Does note that sometimes when he is sleeping he is able to sleep through.  He has taken his wife's trazodone intermittently and that had helped him sleep and he is wondering about a prescription for trazodone at this point.  As noted that he has had depression and is on sertraline.  He did note that when he started the medicine he was having a lot of problems with his wife but that had settled down.  He continues to take the sertraline but has a lot of problems with worrying.  He noted not having any problems taking the medicine if he does need it.  He just had a recent vascular surgery for lower extremity claudication and the pain is much better now and he is not on any pain medication.      The following portions of the patient's history were reviewed and updated as appropriate: allergies, current medications, past family history, past medical history, past social history, past surgical history and problem list.    Review of Systems   Constitutional: Positive for fatigue. Negative for chills and diaphoresis.   HENT: Negative.    Respiratory: Negative for cough, chest tightness and wheezing.    Cardiovascular: Negative for chest pain.   Gastrointestinal: Negative.    Genitourinary: Negative.    Skin: Negative.    Psychiatric/Behavioral: Positive for sleep disturbance. Negative for decreased concentration and self-injury.           Vitals:    09/08/17 1446   BP: 128/60   Pulse: 64   Weight: 204 lb 3.2 oz (92.6 kg)         Objective:    Physical Exam    Constitutional: He is oriented to person, place, and time. He appears well-developed and well-nourished. No distress.   HENT:   Mouth/Throat: Oropharynx is clear and moist.   Neck: Normal range of motion. Neck supple.   Cardiovascular: Normal rate and regular rhythm.    Pulmonary/Chest: Effort normal and breath sounds normal.   Abdominal: Soft.   Musculoskeletal: Normal range of motion.   Neurological: He is alert and oriented to person, place, and time.   Psychiatric: He has a normal mood and affect.

## 2021-06-12 NOTE — TELEPHONE ENCOUNTER
Last seen by Lucio 5/27/20 for virtual visit.   checked and last filled 9/4/20. Radha Gillette LPN

## 2021-06-12 NOTE — PROGRESS NOTES
Recent blood tests / Xrays show abnormalities; these require further discussion best accomplished during upcoming office visit.  If your symptoms have not changed, no immediate change in plan is indicated.

## 2021-06-12 NOTE — TELEPHONE ENCOUNTER
Refill request for medication: LORazepam (ATIVAN) 0.5 MG tablet  Last visit addressing this medication: 5/27/2020  Follow up plan not listed  Last refill on 10/6/2020, quantity #30   CSA completed not on file   checked  11/06/20, last dispensed refill 10/6/2020    Appointment: No appointment made     Ladan Betancur MA

## 2021-06-12 NOTE — PROGRESS NOTES
Patient is in clinic today to see MD Isabel Hawthorne.      Patient is here for a consult to discuss Carotid Stenosis and PAD. Previous [place] pt.     The patient does not smoke.    Pt is currently taking aspirin and statin.    The provider has been notified that the patient has no complaints. Recent TIA and carotid stenosis of the L side. NO further symptoms since stroke in Aug. Also previous bhpyf-mf-awvyqiq bypass. R CEA 4/2018.      At the end of the visit the patient was provided with education both verbally and on their AVS regarding testing.

## 2021-06-12 NOTE — TELEPHONE ENCOUNTER
Called and left message for patient per order from DR Hawthorne for patient to see DR Hampton Cardiologist appt made @ [facility] cardiology [facility] on 11/16/2020 @ 3:30pm cd 10/29/2020

## 2021-06-13 NOTE — PROGRESS NOTES
"Assessment/Plan:    Young was seen today for eye problem.    Diagnoses and all orders for this visit:    Bacterial conjunctivitis: Patient has an exam noteworthy for red/erythematous conjunctiva.  Given the acuity of onset, the spread from one eye to the other eye I am recommending bacterial eyedrops.  Polytrim was sent to the pharmacy.  Patient will follow up if not improving.  -     polymyxin B-trimethoprim (FOR POLYTRIM) 10,000 unit- 1 mg/mL Drop ophthalmic drops; 1-2 drops to the affected eye 4 (four) times a day for 7 days       Return if symptoms worsen or fail to improve.    Roly Horowitz MD  _______________________________    Chief Complaint   Patient presents with     Eye Problem     red eyes bilaterally.      Subjective: Young Ordonez is a 67 y.o. year old male who I have not seen in clinic before who presents with the following acute complaint(s):    Pink eye:   - started yesterday morning.   - discharge / matter   - worsening today.  Spread to the left eye   - URI symptoms of cold and cough   - wife with \"flu\"   - palliative:  None   - no provacative.   - feels okay   - no vision changes.   - right side a scratchy feeling.   - takes prednisone intermittently for gout.    ROS: Complete review of systems obtained.  Pertinent items are listed above.     The following portions of the patient's history were reviewed and updated as appropriate: allergies, current medications, past medical history and problem list.     Objective:   /76 (Patient Site: Right Arm, Patient Position: Sitting, Cuff Size: Adult Regular)  Pulse 70  Temp 98.4  F (36.9  C) (Oral)   Wt 204 lb 4 oz (92.6 kg)  SpO2 94%  BMI 31.06 kg/m2  General: No acute distress, pleasant.  Eyes: The conjunctiva bilaterally is erythematous.  There is clear discharge on the right side.  No matter is present.  Pupils equal and reactive to light and accommodating.  Extraocular movements are intact.    No results found for this or any " previous visit (from the past 24 hour(s)).  No results found.    Additional History from Old Records Summarized (2): no  Decision to Obtain Records (1): no  Radiology Tests Summarized or Ordered (1): no  Labs Reviewed or Ordered (1): no  Medicine Test Summarized or Ordered (1): no  Independent Review of EKG or X-RAY(2 each): no    This note has been dictated using voice recognition software. Any grammatical or context distortions are unintentional and inherent to the software

## 2021-06-13 NOTE — TELEPHONE ENCOUNTER
Refill Approved    Rx renewed per Medication Renewal Policy. Medication was last renewed on 02/06/2020.  Last office visit was 05/27/2020 with PCP.    Vivian Bee, [facility] Triage/Med Refill 11/13/2020     Requested Prescriptions   Pending Prescriptions Disp Refills     atenoloL (TENORMIN) 100 MG tablet [Pharmacy Med Name: ATENOLOL 100 MG TABLET] 30 tablet 8     Sig: TAKE 1 TABLET BY MOUTH EVERY DAY       Beta-Blockers Refill Protocol Passed - 11/9/2020  3:31 PM        Passed - PCP or prescribing provider visit in past 12 months or next 3 months     Last office visit with prescriber/PCP: 11/19/2019 Ike Valdes MD OR same dept: 11/19/2019 Ike Valdes MD OR same specialty: 11/19/2019 Ike Valdes MD  Last physical: 4/10/2018 Last MTM visit: Visit date not found   Next visit within 3 mo: Visit date not found  Next physical within 3 mo: Visit date not found  Prescriber OR PCP: Ike Valdes MD  Last diagnosis associated with med order: 1. Hypertension, essential, benign  - atenoloL (TENORMIN) 100 MG tablet [Pharmacy Med Name: ATENOLOL 100 MG TABLET]; TAKE 1 TABLET BY MOUTH EVERY DAY  Dispense: 30 tablet; Refill: 8    If protocol passes may refill for 12 months if within 3 months of last provider visit (or a total of 15 months).             Passed - Blood pressure filed in past 12 months     BP Readings from Last 1 Encounters:   10/29/20 120/60

## 2021-06-13 NOTE — TELEPHONE ENCOUNTER
Refill request for medication: Lorazepam 0.5 mg tab   Last visit addressing this medication: 05/27/2020  Follow up plan not noted     Last refill on 11/06/2020, quantity #30   CSA completed not on file    checked Do not have access     Appointment: not scheduled unknown when pt is to follow up      Nica Mccullough LPN

## 2021-06-13 NOTE — TELEPHONE ENCOUNTER
Controlled Substance Refill Request  Medication Name:   Requested Prescriptions     Pending Prescriptions Disp Refills     LORazepam (ATIVAN) 0.5 MG tablet [Pharmacy Med Name: LORAZEPAM 0.5 MG TABLET] 30 tablet 0     Sig: TAKE ONE TABLET BY MOUTH NIGHTLY AT BEDTIME AS NEEDED FOR ANXIETY     Date Last Fill: 11/6/2020  Is patient out of medication?: N/A - electronic request  Patient notified refills processed within 3 business days: N/A - electronic request  Requested Pharmacy: [facility] in [place], [place] on [facility]  Submit electronically to pharmacy  Controlled Substance Agreement on file:   Encounter-Level CSA Scan Date:    There are no encounter-level csa scan date.        Last office visit:  Virtual Visit on 5/27/2020 with PCP: Dr LORENA Valdes

## 2021-06-14 NOTE — TELEPHONE ENCOUNTER
Refill request for medication: lorazepam   Last visit addressing this medication: 5/27/2020  Follow up plan none listed     Last refill on 1/6/2021 , quantity #30   CSA completed not on file    checked  02/03/21, last dispensed refill 1/8/21    Appointment: none scheduled      KRYSTIN Zhu

## 2021-06-14 NOTE — TELEPHONE ENCOUNTER
Refill request for medication: LORazepam (ATIVAN) 0.5 MG tablet  Last visit addressing this medication: 5/27/2020  Follow up plan not listed  Last refill on 12/8/2020, quantity #30   CSA completed not on file   checked  01/07/21, last dispensed refill 12/8/2020    Appointment: No appointment made.     Ladan Betancur MA

## 2021-06-14 NOTE — TELEPHONE ENCOUNTER
Controlled Substance Refill Request  Medication Name:   Requested Prescriptions     Pending Prescriptions Disp Refills     LORazepam (ATIVAN) 0.5 MG tablet [Pharmacy Med Name: LORAZEPAM 0.5 MG TABLET] 30 tablet 0     Sig: TAKE ONE TABLET BY MOUTH NIGHTLY AT BEDTIME AS NEEDED FOR ANXIETY     Date Last Fill: 12/8/2020  Requested Pharmacy: [facility]  Submit electronically to pharmacy  Controlled Substance Agreement on file:   Encounter-Level CSA Scan Date:    There are no encounter-level csa scan date.        Last office visit: 5/27/2020 with PCP.          Casandra Carpenter RN, BSN Nurse Triage Advisor 7:26 PM 1/6/2021

## 2021-06-14 NOTE — TELEPHONE ENCOUNTER
Controlled Substance Refill Request  Medication Name:   Requested Prescriptions     Pending Prescriptions Disp Refills     LORazepam (ATIVAN) 0.5 MG tablet [Pharmacy Med Name: LORAZEPAM 0.5 MG TABLET] 30 tablet 0     Sig: TAKE ONE TABLET BY MOUTH NIGHTLY AT BEDTIME AS NEEDED FOR ANXIETY     amLODIPine (NORVASC) 5 MG tablet [Pharmacy Med Name: AMLODIPINE BESYLATE 5 MG TAB] 90 tablet 3     Sig: TAKE 1 TABLET BY MOUTH EVERY DAY     Date Last Fill: 1/7/21  [facility]ed [facility]  Submit electronically to pharmacy  Controlled Substance Agreement on file:   Encounter-Level CSA Scan Date:    There are no encounter-level csa scan date.        Last office visit:  5/27/20         Rx renewed per Medication Renewal policy  amlodipine

## 2021-06-15 NOTE — PROGRESS NOTES
ASSESSMENT:  1. Hypertension, essential, benign  - amLODIPine (NORVASC) 5 MG tablet; Take 1 tablet (5 mg total) by mouth daily.  Dispense: 90 tablet; Refill: 3  - atenolol (TENORMIN) 100 MG tablet; Take 1 tablet (100 mg total) by mouth daily.  Dispense: 90 tablet; Refill: 1  - lisinopril (PRINIVIL,ZESTRIL) 20 MG tablet; Take 1 tablet (20 mg total) by mouth daily.  Dispense: 90 tablet; Refill: 1  His blood pressure seems to be very good control at this point as such medications were refilled and he will continue with them.  He will follow-up if there is any other concerns.  Discussed increasing physical activity as well as dietary changes.  2. Need for immunization against influenza  - Influenza, Seasonal,Quad Inj, 36+ MOS    3. Gout    4. Acute gout of right knee, unspecified cause  - allopurinol (ZYLOPRIM) 300 MG tablet; Take 1 tablet (300 mg total) by mouth daily.  Dispense: 90 tablet; Refill: 1    5. Hyperlipidemia, unspecified hyperlipidemia type  - atorvastatin (LIPITOR) 80 MG tablet; Take 1 tablet (80 mg total) by mouth at bedtime.  Dispense: 90 tablet; Refill: 1  - Comprehensive Metabolic Panel; Future  - Lipid Cascade; Future  He is not fasted today, will come in for lab draw for the cholesterol do atorvastatin is refilled.  We will inform him of his lab results as soon as they are ready.  6. Idiopathic chronic gout, multiple sites, with tophus (tophi)  - colchicine (MITIGARE) 0.6 mg cap; Take 0.6 mg by mouth 2 (two) times a day.  Dispense: 180 capsule; Refill: 0  Colchicine will be refilled as well as the allopurinol I will follow-up with him as needed.  7. Depression  - sertraline (ZOLOFT) 50 MG tablet; Take 1.5 tablets (75 mg total) by mouth daily.  Dispense: 135 tablet; Refill: 3  - LORazepam (ATIVAN) 0.5 MG tablet; Take 1-2 tablets (0.5-1 mg total) by mouth daily as needed for anxiety.  Dispense: 45 tablet; Refill: 0  Discussed fully with him the needs that he has for anxiety and depression.  I will  increase the sertraline to 75 mg and he will let me know if he does have improvement based on that.  I did also give him a prescription for lorazepam that he will take once or twice a day as needed and hopefully with the increase in the sertraline and he will be less prone to anger issues.  We will follow-up as needed.    PLAN:  There are no Patient Instructions on file for this visit.    Orders Placed This Encounter   Procedures     Influenza, Seasonal,Quad Inj, 36+ MOS     Comprehensive Metabolic Panel     Standing Status:   Future     Standing Expiration Date:   1/15/2019     Lipid Cascade     Standing Status:   Future     Standing Expiration Date:   1/15/2019     Order Specific Question:   Fasting is required?     Answer:   Yes     Medications Discontinued During This Encounter   Medication Reason     lisinopril (PRINIVIL,ZESTRIL) 20 MG tablet Duplicate order     lisinopril (PRINIVIL,ZESTRIL) 20 MG tablet Duplicate order     allopurinol (ZYLOPRIM) 300 MG tablet Reorder     amLODIPine (NORVASC) 5 MG tablet Reorder     atenolol (TENORMIN) 100 MG tablet Reorder     atorvastatin (LIPITOR) 80 MG tablet Reorder     colchicine (MITIGARE) 0.6 mg cap Reorder     lisinopril (PRINIVIL,ZESTRIL) 20 MG tablet Reorder     sertraline (ZOLOFT) 50 MG tablet Reorder       No Follow-up on file.      CHIEF COMPLAINT:  Chief Complaint   Patient presents with     Medication Refill     med check       HISTORY OF PRESENT ILLNESS:  Young is a 67 y.o. male presenting to the clinic today for medication check and refill.  He has multiple chronic medical concerns and needs to have her medications refilled for those.  Hypertension; he is currently on atenolol as well as lisinopril and amlodipine.  He noted good control of his blood pressure without any major side effects.  He did have some swelling to his lower extremity which has since resolved.  He denied any chest pains, no shortness of breath.  He denies any lightheadedness or  palpitations.  Gout; he has had episodes of gout exacerbation.  Recently was started on colchicine as well as continue to take allopurinol.  Noted that for the past 3 months he has had no episodes of gout.  He has also not been taking prednisone and has had no skin concerns or bruises.  He will want to continue with the 2 medications for that insurance will cover both of them without any problems.  Hyperlipidemia; he has been on atorvastatin at 80 mg denying any major side effects.  No muscle pain and no upper abdominal pain.  No nausea no vomiting.  Anxiety/depression; he has been on sertraline at 50 mg noting that he is not really noticing a lot of difference.  He continues to be intermittently very anxious and easy to anger.  This is worrisome to his wife.  He is wondering if there is anything that he can add to what he is taking or if there is a different medication that he can take to help with this.  He appears to have taken lorazepam in the past and noted some improvement.  Wondering if that would be a good fit for him.    REVIEW OF SYSTEMS:   He continues to have some insomnia but does not use the trazodone because it does not help.  Denied having any tearful episodes.  Denied any lightheadedness or dizziness.  No vertigo.  He does not have any difficulty with swallowing and rest of the review of systems as in the history otherwise negative.  All other systems are negative.    PFSH:  Reviewed, as below.    History   Smoking Status     Former Smoker     Packs/day: 1.50     Types: Cigarettes     Quit date: 2013   Smokeless Tobacco     Never Used       Family History   Problem Relation Age of Onset     Cancer Mother      Stroke Father      Hypertension Father      Heart disease Paternal Grandmother      Emphysema Paternal Grandfather        Social History     Social History     Marital status:      Spouse name: N/A     Number of children: N/A     Years of education: N/A     Occupational History     Not  on file.     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     Drug use: No     Sexual activity: Not on file     Other Topics Concern     Not on file     Social History Narrative       Past Surgical History:   Procedure Laterality Date     ANGIOPLASTY  1995     ANKLE FRACTURE SURGERY Left      AORTA - BILATERAL FEMORAL ARTERY BYPASS GRAFT N/A 5/17/2017    Procedure: AORTOBIFEMORAL BYPASS ;  Surgeon: Ilir FERRO MD;  Location: [facility];  Service:      FRACTURE SURGERY       MANDIBLE SURGERY         No Known Allergies    Active Ambulatory Problems     Diagnosis Date Noted     Gout      Chronic coronary artery disease      Cellulitis      Hypertension      Chronic gouty arthritis 09/18/2015     Tobacco use 07/18/2013     Ischemic rest pain of lower extremity 05/17/2017     Hypotension, unspecified hypotension type      FRANCY (acute kidney injury)      Low urine output      Post-op pain      CKD (chronic kidney disease), stage 3 (moderate)      Abdominal distention      Ischemic cardiomyopathy      Coronary artery disease due to lipid rich plaque      Obesity, unspecified obesity severity, unspecified obesity type      Gout attack 05/25/2017     Acute myocardial infarction 07/26/2017     Depressive disorder, not elsewhere classified 04/29/2008     Benign essential hypertension 08/25/2003     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 07/26/2017     Hyperlipidemia 07/26/2017     Polyarthritis 08/11/2017     Idiopathic chronic gout, multiple sites, with tophus (tophi) 08/11/2017     Acute gout of right foot 08/11/2017     Resolved Ambulatory Problems     Diagnosis Date Noted     No Resolved Ambulatory Problems     Past Medical History:   Diagnosis Date     Arthritis      Cardiomyopathy      Claudication      COPD (chronic obstructive pulmonary disease)       Coronary artery disease      Gout      Gout attack 5/25/2017     HTN (hypertension)      Hyperlipidemia      MI (myocardial infarction) 1995     PAD (peripheral artery disease)        Current Outpatient Prescriptions   Medication Sig Dispense Refill     amLODIPine (NORVASC) 5 MG tablet Take 1 tablet (5 mg total) by mouth daily. 90 tablet 3     ascorbic acid, vitamin C, (VITAMIN C) 500 MG tablet Take 1 tablet (500 mg total) by mouth 2 (two) times a day.  0     aspirin 81 MG EC tablet Take 81 mg by mouth daily.        atenolol (TENORMIN) 100 MG tablet Take 1 tablet (100 mg total) by mouth daily. 90 tablet 1     atorvastatin (LIPITOR) 80 MG tablet Take 1 tablet (80 mg total) by mouth at bedtime. 90 tablet 1     cholecalciferol, vitamin D3, 5,000 unit Tab Take 1 tablet (5,000 Units total) by mouth daily. 90 tablet 2     colchicine (MITIGARE) 0.6 mg cap Take 0.6 mg by mouth 2 (two) times a day. 180 capsule 0     lisinopril (PRINIVIL,ZESTRIL) 20 MG tablet Take 1 tablet (20 mg total) by mouth daily. 90 tablet 1     multivitamin with minerals (THERA-M) 9 mg iron-400 mcg Tab tablet Take 1 tablet by mouth daily.  0     polymyxin B-trimethoprim (FOR POLYTRIM) 10,000 unit- 1 mg/mL Drop ophthalmic drops 1-2 drops to the affected eye 4 (four) times a day for 7 days 1 Bottle 0     sertraline (ZOLOFT) 50 MG tablet Take 1.5 tablets (75 mg total) by mouth daily. 135 tablet 3     traZODone (DESYREL) 50 MG tablet Take 1 tablet (50 mg total) by mouth at bedtime. 90 tablet 2     allopurinol (ZYLOPRIM) 300 MG tablet Take 1 tablet (300 mg total) by mouth daily. 90 tablet 1     LORazepam (ATIVAN) 0.5 MG tablet Take 1-2 tablets (0.5-1 mg total) by mouth daily as needed for anxiety. 45 tablet 0     predniSONE (DELTASONE) 20 MG tablet Takes 2 tabs daily for 10 days 20 tablet 0     No current facility-administered medications for this visit.        VITALS:  Vitals:    01/15/18 1342   BP: 112/60   Patient Site: Left Arm   Patient Position:  Sitting   Cuff Size: Adult Regular   Pulse: 68   Weight: 200 lb 1.6 oz (90.8 kg)     Wt Readings from Last 3 Encounters:   01/15/18 200 lb 1.6 oz (90.8 kg)   10/07/17 204 lb 4 oz (92.6 kg)   09/08/17 204 lb 3.2 oz (92.6 kg)     Body mass index is 30.43 kg/(m^2).    PHYSICAL EXAM:  General Appearance: Alert, cooperative, no distress, appears stated age  HEENT: Pupils are equal and reactive, extraocular motions is normal.  Oropharynx is moist.  Neck is supple no notable thyromegaly.  External ears are normal.  Lungs: Clear to auscultation bilaterally, respirations unlabored  Heart: Regular rate and rhythm, S1 and S2 normal, no murmur, rub, or gallop  Abdomen: Soft  Musculoskeletal: Normal range of motion. No joint swelling or deformity.   Neurologic:  Alert and oriented times 3.   Psychiatric: Normal mood and affect.    MEDICATIONS:  Current Outpatient Prescriptions   Medication Sig Dispense Refill     amLODIPine (NORVASC) 5 MG tablet Take 1 tablet (5 mg total) by mouth daily. 90 tablet 3     ascorbic acid, vitamin C, (VITAMIN C) 500 MG tablet Take 1 tablet (500 mg total) by mouth 2 (two) times a day.  0     aspirin 81 MG EC tablet Take 81 mg by mouth daily.        atenolol (TENORMIN) 100 MG tablet Take 1 tablet (100 mg total) by mouth daily. 90 tablet 1     atorvastatin (LIPITOR) 80 MG tablet Take 1 tablet (80 mg total) by mouth at bedtime. 90 tablet 1     cholecalciferol, vitamin D3, 5,000 unit Tab Take 1 tablet (5,000 Units total) by mouth daily. 90 tablet 2     colchicine (MITIGARE) 0.6 mg cap Take 0.6 mg by mouth 2 (two) times a day. 180 capsule 0     lisinopril (PRINIVIL,ZESTRIL) 20 MG tablet Take 1 tablet (20 mg total) by mouth daily. 90 tablet 1     multivitamin with minerals (THERA-M) 9 mg iron-400 mcg Tab tablet Take 1 tablet by mouth daily.  0     polymyxin B-trimethoprim (FOR POLYTRIM) 10,000 unit- 1 mg/mL Drop ophthalmic drops 1-2 drops to the affected eye 4 (four) times a day for 7 days 1 Bottle 0      sertraline (ZOLOFT) 50 MG tablet Take 1.5 tablets (75 mg total) by mouth daily. 135 tablet 3     traZODone (DESYREL) 50 MG tablet Take 1 tablet (50 mg total) by mouth at bedtime. 90 tablet 2     allopurinol (ZYLOPRIM) 300 MG tablet Take 1 tablet (300 mg total) by mouth daily. 90 tablet 1     LORazepam (ATIVAN) 0.5 MG tablet Take 1-2 tablets (0.5-1 mg total) by mouth daily as needed for anxiety. 45 tablet 0     predniSONE (DELTASONE) 20 MG tablet Takes 2 tabs daily for 10 days 20 tablet 0     No current facility-administered medications for this visit.

## 2021-06-15 NOTE — TELEPHONE ENCOUNTER
Controlled Substance Refill Request  Medication Name:   Requested Prescriptions     Pending Prescriptions Disp Refills     LORazepam (ATIVAN) 0.5 MG tablet [Pharmacy Med Name: LORAZEPAM 0.5 MG TABLET] 30 tablet 0     Sig: TAKE ONE TABLET BY MOUTH NIGHTLY AT BEDTIME AS NEEDED FOR ANXIETY     Date Last Fill: 2/5/21  [facility]ed [facility]  Submit electronically to pharmacy  Controlled Substance Agreement on file:   Encounter-Level CSA Scan Date:    There are no encounter-level csa scan date.        Last office visit:  5/27/20

## 2021-06-15 NOTE — TELEPHONE ENCOUNTER
Refill request for medication: LORazepam (ATIVAN) 0.5 MG tablet  Last visit addressing this medication: 5/27/2020  Follow up plan not listed  Last refill on 2/5/21, quantity #30   CSA completed n/a   checked  03/08/21, last dispensed refill 2/4/21    Appointment: Appointment scheduled for 3/8/21     Ladan Betancur MA

## 2021-06-15 NOTE — TELEPHONE ENCOUNTER
Last refill 5/27/2020 for 135 tablets with 1 refill    Last visit was virtual 5/27/2020 for BP and med check

## 2021-06-15 NOTE — PROGRESS NOTES
ASSESSMENT:  1. Anxiety  - sertraline (ZOLOFT) 100 MG tablet; Take 1 tablet (100 mg total) by mouth daily.  Dispense: 90 tablet; Refill: 1  - LORazepam (ATIVAN) 0.5 MG tablet; Take 1 tablet (0.5 mg total) by mouth at bedtime.  Dispense: 30 tablet; Refill: 0    2. Depression  - sertraline (ZOLOFT) 100 MG tablet; Take 1 tablet (100 mg total) by mouth daily.  Dispense: 90 tablet; Refill: 1    3. PAD (peripheral artery disease) (H)    4. 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 Right-sided low back pain without sciatica, unspecified chronicity      PLAN / MDM:  Reviewed he is medications as well as the diagnosis.  He noted not needing any refills at this point except for the lorazepam which was refilled today.  I discussed changes in his medication and will go ahead and increase the sertraline to 100 mg from 75 mg.  He will continue to take 2 of what he has and will inform me when he needs to have a refill and let me know how it is going.  Discussed the back pain.  He noted that he does not think it is very bad at this time.  But he will let me know if he gets to the extent where he needs some intervention he will inform me.  At that point we will go ahead and get an x-ray and possibly start him on physical therapy depending on the results.  Otherwise I will reviewed his other diagnosis.  And will have him follow-up as needed.    No orders of the defined types were placed in this encounter.    Medications Discontinued During This Encounter   Medication Reason     LORazepam (ATIVAN) 0.5 MG tablet Reorder     sertraline (ZOLOFT) 50 MG tablet Reorder       No follow-ups on file.      CHIEF COMPLAINT:  Chief Complaint   Patient presents with     Medication Refill       HISTORY OF PRESENT ILLNESS:  Young is a 70 y.o. male presenting to the clinic today for medication check.  Abnormality is coming into get a refill of his lorazepam which he takes for anxiety and panic attacks.  He also takes sertraline for anxiety as well, he  noted that about 1-1/2 tablets which is a 75 mg daily is still anxious and has been irritated.  He thinks it has to do with the current situation with coronavirus.  He finds it difficult to sleep sometimes even though he takes trazodone.  He is also noting lower back pain noted on the right side.  No radiation down to the legs, no numbness.  Pain is said to be worse in the morning and is achy in nature.  Admitted that he is a still taking his medication for his various medical concerns.  He was recently on admission late  with a stroke and lost vision to the right eye.  He still sees very minimal.  He did do rehabilitation for that and feels better.  He noted no swelling to lower extremities noted no lightheadedness.  No chest pain, no notable shortness of breath.  REVIEW OF SYSTEMS:   Noted pain with movement especially with his back pain.  But once he is evacuated he feels well.  He has normal bowel movements no constipation.  There are no skin concerns.  No weaknesses rest of review of system is normal.  All other systems are negative.    PFSH:  Reviewed, as below.    Social History     Tobacco Use   Smoking Status Former Smoker     Packs/day: 1.50     Types: Cigarettes     Quit date:      Years since quittin.1   Smokeless Tobacco Never Used       Family History   Problem Relation Age of Onset     Cancer Mother      Stroke Father      Hypertension Father      Heart disease Paternal Grandmother      Emphysema Paternal Grandfather        Social History     Socioeconomic History     Marital status:      Spouse name: Not on file     Number of children: Not on file     Years of education: Not on file     Highest education level: Not on file   Occupational History     Not on file   Social Needs     Financial resource strain: Not on file     Food insecurity     Worry: Not on file     Inability: Not on file     Transportation needs     Medical: Not on file     Non-medical: Not on file   Tobacco Use      Smoking status: Former Smoker     Packs/day: 1.50     Types: Cigarettes     Quit date: 2013     Years since quittin.1     Smokeless tobacco: Never Used   Substance and Sexual Activity     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      Drug use: No     Sexual activity: Not on file   Lifestyle     Physical activity     Days per week: Not on file     Minutes per session: Not on file     Stress: Not on file   Relationships     Social connections     Talks on phone: Not on file     Gets together: Not on file     Attends Church service: Not on file     Active member of club or organization: Not on file     Attends meetings of clubs or organizations: Not on file     Relationship status: Not on file     Intimate partner violence     Fear of current or ex partner: Not on file     Emotionally abused: Not on file     Physically abused: Not on file     Forced sexual activity: Not on file   Other Topics Concern     Not on file   Social History Narrative     Not on file       Past Surgical History:   Procedure Laterality Date     ANGIOPLASTY       ANKLE FRACTURE SURGERY Left      AORTA - BILATERAL FEMORAL ARTERY BYPASS GRAFT N/A 2017    Procedure: AORTOBIFEMORAL BYPASS ;  Surgeon: Ilir FERRO MD;  Location: [facility];  Service:      CAROTID ENDARTERECTOMY Right 2018    Procedure: RIGHT CAROTID ENDARTERECTOMY WITH EEG;  Surgeon: Sergei Lemus MD;  Location: [facility];  Service:      FRACTURE SURGERY       MANDIBLE SURGERY         No Known Allergies    Active Ambulatory Problems     Diagnosis Date Noted     Gout      Chronic coronary artery disease      Cellulitis      HTN, goal below 150/90      Chronic gouty arthritis 2015     Tobacco use 2013     Ischemic rest pain of lower extremity 2017     Hypotension, unspecified hypotension type      FRANCY (acute kidney injury) (H)      Low urine output      Post-op pain      Abdominal distention      Ischemic cardiomyopathy       Coronary artery disease due to lipid rich plaque      Obesity, unspecified obesity severity, unspecified obesity type      Gout attack 05/25/2017     Acute myocardial infarction (H) 07/26/2017     Depressive disorder, not elsewhere classified 04/29/2008     Benign essential hypertension 08/25/2003     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 (H) 07/26/2017     Hyperlipidemia 07/26/2017     Polyarthritis 08/11/2017     Idiopathic chronic gout, multiple sites, with tophus (tophi) 08/11/2017     Acute gout of right foot 08/11/2017     Symptomatic stenosis of right carotid artery 04/12/2018     PAD (peripheral artery disease) (H) 03/08/2021     Resolved Ambulatory Problems     Diagnosis Date Noted     Stage 3 chronic kidney disease      Past Medical History:   Diagnosis Date     Arthritis      Cardiomyopathy (H)      Claudication (H)      COPD (chronic obstructive pulmonary disease) (H)      Coronary artery disease      Depression      Gout      HTN (hypertension)      Infectious viral hepatitis      Low back pain      MI (myocardial infarction) (H) 1995     Obesity        Current Outpatient Medications   Medication Sig Dispense Refill     allopurinoL (ZYLOPRIM) 300 MG tablet Take 1 tablet (300 mg total) by mouth daily. 90 tablet 2     amLODIPine (NORVASC) 5 MG tablet TAKE 1 TABLET BY MOUTH EVERY DAY 90 tablet 0     aspirin 325 MG tablet Take 325 mg by mouth.       atenoloL (TENORMIN) 100 MG tablet TAKE 1 TABLET BY MOUTH EVERY DAY 30 tablet 6     atorvastatin (LIPITOR) 80 MG tablet TAKE 1 TABLET BY MOUTH EVERYDAY AT BEDTIME 90 tablet 2     latanoprost (XALATAN) 0.005 % ophthalmic solution        lisinopriL (PRINIVIL,ZESTRIL) 20 MG tablet TAKE 1 TABLET BY MOUTH EVERY DAY 90 tablet 2     LORazepam (ATIVAN) 0.5 MG tablet Take 1 tablet (0.5 mg total) by mouth at bedtime. 30 tablet 0     sertraline (ZOLOFT) 100 MG tablet Take 1 tablet (100 mg total) by mouth daily. 90 tablet 1     traZODone (DESYREL) 50 MG tablet Take 1 tablet (50  "mg total) by mouth at bedtime. 90 tablet 2     No current facility-administered medications for this visit.        VITALS:  Vitals:    03/08/21 1543   BP: 124/80   Pulse: 62   SpO2: 94%   Weight: 185 lb (83.9 kg)   Height: 5' 8\" (1.727 m)     Wt Readings from Last 3 Encounters:   03/08/21 185 lb (83.9 kg)   08/20/20 189 lb 9.6 oz (86 kg)   11/19/19 195 lb 8 oz (88.7 kg)     Body mass index is 28.13 kg/m .    PHYSICAL EXAM:  General Appearance: Alert, cooperative, no distress, appears stated age  HEENT: Pupils are equal and reactive, extraocular motions is normal.  Neck is supple no notable thyromegaly.  External ears are normal.  Lungs: Clear to auscultation bilaterally, respirations unlabored  Heart: Regular rhythm and normal rate,S1 and S2 normal, no murmur, rub, or gallop  Abdomen: Soft but obese.  Musculoskeletal: Normal range of motion. No joint swelling or deformity.  Slight antalgic gait.  No tenderness in the lower back, straight leg raising normal  Neurologic:  Alert and oriented times 3. Cranial nerves II-XII intact.   Psychiatric: Normal mood and affect.    MEDICATIONS:  Current Outpatient Medications   Medication Sig Dispense Refill     allopurinoL (ZYLOPRIM) 300 MG tablet Take 1 tablet (300 mg total) by mouth daily. 90 tablet 2     amLODIPine (NORVASC) 5 MG tablet TAKE 1 TABLET BY MOUTH EVERY DAY 90 tablet 0     aspirin 325 MG tablet Take 325 mg by mouth.       atenoloL (TENORMIN) 100 MG tablet TAKE 1 TABLET BY MOUTH EVERY DAY 30 tablet 6     atorvastatin (LIPITOR) 80 MG tablet TAKE 1 TABLET BY MOUTH EVERYDAY AT BEDTIME 90 tablet 2     latanoprost (XALATAN) 0.005 % ophthalmic solution        lisinopriL (PRINIVIL,ZESTRIL) 20 MG tablet TAKE 1 TABLET BY MOUTH EVERY DAY 90 tablet 2     LORazepam (ATIVAN) 0.5 MG tablet Take 1 tablet (0.5 mg total) by mouth at bedtime. 30 tablet 0     sertraline (ZOLOFT) 100 MG tablet Take 1 tablet (100 mg total) by mouth daily. 90 tablet 1     traZODone (DESYREL) 50 MG " tablet Take 1 tablet (50 mg total) by mouth at bedtime. 90 tablet 2     No current facility-administered medications for this visit.

## 2021-06-16 NOTE — TELEPHONE ENCOUNTER
Controlled Substance Refill Request  Medication Name:   Requested Prescriptions     Pending Prescriptions Disp Refills     LORazepam (ATIVAN) 0.5 MG tablet [Pharmacy Med Name: LORAZEPAM 0.5 MG TABLET] 30 tablet 0     Sig: TAKE 1 TABLET BY MOUTH AT BEDTIME.     Signed Prescriptions Disp Refills     traZODone (DESYREL) 50 MG tablet 90 tablet 3     Sig: TAKE 1 TABLET BY MOUTH EVERYDAY AT BEDTIME     Authorizing Provider: TAMMIE HARRINGTON     Ordering User: JORGE FOX     Refused Prescriptions Disp Refills     sertraline (ZOLOFT) 50 MG tablet [Pharmacy Med Name: SERTRALINE HCL 50 MG TABLET] 135 tablet 0     Sig: TAKE 1.5 TABLET BY MOUTH ONCE DAILY     Refused By: JORGE FOX     Reason for Refusal: Adjustment in Therapy     Date Last Fill: 3/8/21  Requested Pharmacy: [facility]  Submit electronically to pharmacy  Controlled Substance Agreement on file:   Encounter-Level CSA Scan Date:    There are no encounter-level csa scan date.        Last office visit:  3/8/21

## 2021-06-16 NOTE — TELEPHONE ENCOUNTER
Jayeshdled with Dr. Machado who has received pt's medical records from [place] and will be reviewing them. Pt notified. Pt asked how thick the packet looked and the triage nurse estimated 1-3 cm wide stacked papers. Pt said that it should have been several inches wide stack and will be will be bringing in more documents tomorrow into the clinic tomorrow. AUBREYI sent to PCP.    Refill request for medication: lorazepam  Last visit addressing this medication: 3/8/2021  Follow up plan 6  months  Last refill on 3/8/2021, quantity #30   CSA completed Not on file   checked  04/06/21, last dispensed refill 3/8/2021    Appointment: Not due     Theresa Huff LPN

## 2021-06-16 NOTE — TELEPHONE ENCOUNTER
Refill Approved    Rx renewed per Medication Renewal Policy. Medication was last renewed on 5/27/20.    Mac Alva, [facility] Triage/Med Refill 4/6/2021     Requested Prescriptions   Pending Prescriptions Disp Refills     traZODone (DESYREL) 50 MG tablet [Pharmacy Med Name: TRAZODONE 50 MG TABLET] 30 tablet 8     Sig: TAKE 1 TABLET BY MOUTH EVERYDAY AT BEDTIME       Tricyclics/Misc Antidepressant/Antianxiety Meds Refill Protocol Passed - 4/5/2021  4:33 PM        Passed - PCP or prescribing provider visit in last year     Last office visit with prescriber/PCP: 3/8/2021 Ike Valdes MD OR same dept: Visit date not found OR same specialty: 3/8/2021 Ike Valdes MD  Last physical: 4/10/2018 Last MTM visit: Visit date not found   Next visit within 3 mo: Visit date not found  Next physical within 3 mo: Visit date not found  Prescriber OR PCP: Ike Valdes MD  Last diagnosis associated with med order: 1. Insomnia, unspecified type  - traZODone (DESYREL) 50 MG tablet [Pharmacy Med Name: TRAZODONE 50 MG TABLET]; TAKE 1 TABLET BY MOUTH EVERYDAY AT BEDTIME  Dispense: 30 tablet; Refill: 8    2. Anxiety  - sertraline (ZOLOFT) 50 MG tablet [Pharmacy Med Name: SERTRALINE HCL 50 MG TABLET]; TAKE 1.5 TABLET BY MOUTH ONCE DAILY  Dispense: 135 tablet; Refill: 0  - LORazepam (ATIVAN) 0.5 MG tablet [Pharmacy Med Name: LORAZEPAM 0.5 MG TABLET]; TAKE 1 TABLET BY MOUTH AT BEDTIME.  Dispense: 30 tablet; Refill: 0    3. Depression  - sertraline (ZOLOFT) 50 MG tablet [Pharmacy Med Name: SERTRALINE HCL 50 MG TABLET]; TAKE 1.5 TABLET BY MOUTH ONCE DAILY  Dispense: 135 tablet; Refill: 0    If protocol passes may refill for 12 months if within 3 months of last provider visit (or a total of 15 months).                sertraline (ZOLOFT) 50 MG tablet [Pharmacy Med Name: SERTRALINE HCL 50 MG TABLET] 135 tablet 0     Sig: TAKE 1.5 TABLET BY MOUTH ONCE DAILY       [facility] Refill Protocol  Passed -  4/5/2021  4:33 PM        Passed - PCP or prescribing provider visit in last year     Last office visit with prescriber/PCP: 3/8/2021 Ike Valdes MD OR storm dept: Visit date not found OR same specialty: 3/8/2021 Ike Valdes MD  Last physical: 4/10/2018 Last MTM visit: Visit date not found   Next visit within 3 mo: Visit date not found  Next physical within 3 mo: Visit date not found  Prescriber OR PCP: Ike Valdes MD  Last diagnosis associated with med order: 1. Insomnia, unspecified type  - traZODone (DESYREL) 50 MG tablet [Pharmacy Med Name: TRAZODONE 50 MG TABLET]; TAKE 1 TABLET BY MOUTH EVERYDAY AT BEDTIME  Dispense: 30 tablet; Refill: 8    2. Anxiety  - sertraline (ZOLOFT) 50 MG tablet [Pharmacy Med Name: SERTRALINE HCL 50 MG TABLET]; TAKE 1.5 TABLET BY MOUTH ONCE DAILY  Dispense: 135 tablet; Refill: 0  - LORazepam (ATIVAN) 0.5 MG tablet [Pharmacy Med Name: LORAZEPAM 0.5 MG TABLET]; TAKE 1 TABLET BY MOUTH AT BEDTIME.  Dispense: 30 tablet; Refill: 0    3. Depression  - sertraline (ZOLOFT) 50 MG tablet [Pharmacy Med Name: SERTRALINE HCL 50 MG TABLET]; TAKE 1.5 TABLET BY MOUTH ONCE DAILY  Dispense: 135 tablet; Refill: 0    If protocol passes may refill for 12 months if within 3 months of last provider visit (or a total of 15 months).                LORazepam (ATIVAN) 0.5 MG tablet [Pharmacy Med Name: LORAZEPAM 0.5 MG TABLET] 30 tablet 0     Sig: TAKE 1 TABLET BY MOUTH AT BEDTIME.       Controlled Substances Refill Protocol Failed - 4/5/2021  4:33 PM        Failed - Route all Controlled Substance Requests to Provider        Failed - Patient has controlled substance agreement in past 12 months     Encounter-Level CSA Scan Date:    There are no encounter-level csa scan date.               Passed - Visit with PCP or prescribing provider visit in past 12 months      Last office visit with prescriber/PCP: 3/8/2021 Ike Valdes MD OR storm dept: Visit date  not found OR same specialty: 3/8/2021 Ike Valdes MD Last physical: 4/10/2018 Last MTM visit: Visit date not found    Next visit within 3 mo: Visit date not found  Next physical within 3 mo: Visit date not found  Prescriber OR PCP: Ike Valdes MD  Last diagnosis associated with med order: 1. Insomnia, unspecified type  - traZODone (DESYREL) 50 MG tablet [Pharmacy Med Name: TRAZODONE 50 MG TABLET]; TAKE 1 TABLET BY MOUTH EVERYDAY AT BEDTIME  Dispense: 30 tablet; Refill: 8    2. Anxiety  - sertraline (ZOLOFT) 50 MG tablet [Pharmacy Med Name: SERTRALINE HCL 50 MG TABLET]; TAKE 1.5 TABLET BY MOUTH ONCE DAILY  Dispense: 135 tablet; Refill: 0  - LORazepam (ATIVAN) 0.5 MG tablet [Pharmacy Med Name: LORAZEPAM 0.5 MG TABLET]; TAKE 1 TABLET BY MOUTH AT BEDTIME.  Dispense: 30 tablet; Refill: 0    3. Depression  - sertraline (ZOLOFT) 50 MG tablet [Pharmacy Med Name: SERTRALINE HCL 50 MG TABLET]; TAKE 1.5 TABLET BY MOUTH ONCE DAILY  Dispense: 135 tablet; Refill: 0

## 2021-06-16 NOTE — PROGRESS NOTES
Assessment and Plan:       1. Encounter for general adult medical examination with abnormal findings  - Comprehensive Metabolic Panel  - JIC LAV    2. Acute gout of right knee, unspecified cause  - allopurinol (ZYLOPRIM) 300 MG tablet; Take 1.5 tablets (450 mg total) by mouth daily.  Dispense: 135 tablet; Refill: 0    3. Visual loss, transient, right  - MR Brain Without Contrast; Future  - US Carotid Bilateral; Future  - Ambulatory referral to Ophthalmology    4. Encounter for screening for lipoid disorders  - Lipid Cascade, RANDOM    5. Screening for malignant neoplasm of prostate  - PSA (Prostatic-Specific Antigen), Annual Screen    6. Screen for colon cancer  - Ambulatory referral for Colonoscopy    I reviewed his medical diagnosis.  He continues to be on allopurinol which was refilled.  He is also on amlodipine, atenolol for his blood pressure.  He does take atorvastatin.  With his history of sudden onset vision loss that has happened on 2 different occasions I am worried that he might have had some blood clot probably on the ophthalmic arteries.  But also there is a possible risk of transient ischemic attacks.  Also because of that he will get his MRI of the brain as well as ultrasound of the carotid arteries for an evaluation.  I did also refer him to see the ophthalmologist for a dilated eye exam.Advised to seek emergent care if the symptoms get happen again or he has other symptom like weakness to the extremities or slurring of his speech.  He is on a half maintenance needs were also addressed.  Colonoscopy was ordered, PSA will be gotten today and he does not have any major symptoms of prostate enlargement.  Other labs were gotten also for his physical.  We will inform him of his results.    The patient's current medical problems were reviewed.    I have had an Advance Directives discussion with the patient.  The following high BMI interventions were performed this visit: encouragement to exercise, weight  monitoring and prescribed dietary intake  The following health maintenance schedule was reviewed with the patient and provided in printed form in the after visit summary:   Health Maintenance   Topic Date Due     DEPRESSION FOLLOW UP  1950     ADVANCE DIRECTIVES DISCUSSED WITH PATIENT  03/09/1968     COLONOSCOPY  03/09/2000     PNEUMOCOCCAL CONJUGATE VACCINE FOR ADULTS (PCV13 OR PREVNAR)  03/09/2015     FALL RISK ASSESSMENT  03/09/2015     TD 18+ HE  02/26/2020     PNEUMOCOCCAL POLYSACCHARIDE VACCINE AGE 65 AND OVER  Completed     INFLUENZA VACCINE RULE BASED  Completed     ZOSTER VACCINE  Completed        Subjective:   Chief Complaint: Young Ordonez is an 68 y.o. male here for an Annual Wellness visit.   HPI:  Comes in for Wellness Visit but does have concerns that includes episode that he had some days back with having right sided vision loss.  This happened as he was  working at home and suddenly felt slightly tired and noted that he was unable to see to the right eye.  He went to rest and after about half an hour he started regaining his sight.  He did have another episode by the next the that lasted about 15-20 minutes.  This felt as if he was looking at the moon-shaped area still on the right side.  He is currently feeling slightly blurry on the right eye.  He denied any pain.  He denied having any redness to the eyes and no wateriness.  He does not have any headaches noted no weaknesses to the upper or lower extremities.  He otherwise feels well.  He is also noted to have had a little bit of weight loss not really sure why.  He does have some low back pain which has been chronic.  Mostly on the left side as well as having stiffness almost all over his body.  He has not been exercising because of pain to his lower extremity.  He does have a history of femoral angioplasty.    Review of Systems:    Constitutional:Denied any fatigue no fevers no chills.  Has good appetite.  HEENT: Does not have any neck  pain.  No difficulty swallowing denies having any postnasal drips.    Respiratory: There is no cough.  No chest wall pain.  Cardiovascular: Denied chest pain shortness of breath or palpitations.  There is no notable lower extremity swelling.    Gastrointestinal: Denies nausea vomiting.  No abdominal pain no diarrhea or constipation.  Endocrine:Has no sensitivity to cold or heat.  Denied undue thirst.   Genitourinary:Has no urinary symptoms, no nocturia.  Musculoskeletal: There is musculoskeletal pain mainly on the back as well as lower extremity that he notes some pain with prolonged walking.  Skin: He does not have any rashes.   Allergic/Immunologic: Negative.   Neurological: No Numbness  Hematological: Negative.   Psychiatric/Behavioral: No anxiety or depression symptoms.     Please see above.  The rest of the review of systems are negative for all systems.    Patient Care Team:  Ike Valdes MD as PCP - General (Family Medicine)     Patient Active Problem List   Diagnosis     Gout     Chronic coronary artery disease     Cellulitis     Hypertension     Chronic gouty arthritis     Tobacco use     Ischemic rest pain of lower extremity     Hypotension, unspecified hypotension type     FRANCY (acute kidney injury)     Low urine output     Post-op pain     CKD (chronic kidney disease), stage 3 (moderate)     Abdominal distention     Ischemic cardiomyopathy     Coronary artery disease due to lipid rich plaque     Obesity, unspecified obesity severity, unspecified obesity type     Gout attack     Acute myocardial infarction     Depressive disorder, not elsewhere classified     Benign essential hypertension     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     Hyperlipidemia     Polyarthritis     Idiopathic chronic gout, multiple sites, with tophus (tophi)     Acute gout of right foot     Past Medical History:   Diagnosis Date     Arthritis      Cardiomyopathy      Claudication      COPD (chronic obstructive pulmonary disease)       Coronary artery disease      Gout      Gout attack 5/25/2017     HTN (hypertension)      Hyperlipidemia      MI (myocardial infarction) 1995     PAD (peripheral artery disease)       Past Surgical History:   Procedure Laterality Date     ANGIOPLASTY  1995     ANKLE FRACTURE SURGERY Left      AORTA - BILATERAL FEMORAL ARTERY BYPASS GRAFT N/A 5/17/2017    Procedure: AORTOBIFEMORAL BYPASS ;  Surgeon: Ilir FERRO MD;  Location: [facility] OR;  Service:      FRACTURE SURGERY       MANDIBLE SURGERY        Family History   Problem Relation Age of Onset     Cancer Mother      Stroke Father      Hypertension Father      Heart disease Paternal Grandmother      Emphysema Paternal Grandfather       Social History     Social History     Marital status:      Spouse name: N/A     Number of children: N/A     Years of education: N/A     Occupational History     Not on file.     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 45 tablet 0     amLODIPine (NORVASC) 5 MG tablet Take 1 tablet (5 mg total) by mouth daily. 90 tablet 3     aspirin 81 MG EC tablet Take 81 mg by mouth daily.        atenolol (TENORMIN) 100 MG tablet Take 1 tablet (100 mg total) by mouth daily. 90 tablet 1     atorvastatin (LIPITOR) 80 MG tablet Take 1 tablet (80 mg total) by mouth at bedtime. 90 tablet 1     cholecalciferol, vitamin D3, 5,000 unit Tab Take 1 tablet (5,000 Units total) by mouth daily. 90 tablet 2     colchicine (MITIGARE) 0.6 mg cap Take 0.6 mg by mouth 2 (two) times a day. 180 capsule 0      lisinopril (PRINIVIL,ZESTRIL) 20 MG tablet Take 1 tablet (20 mg total) by mouth daily. 90 tablet 1     polymyxin B-trimethoprim (FOR POLYTRIM) 10,000 unit- 1 mg/mL Drop ophthalmic drops 1-2 drops to the affected eye 4 (four) times a day for 7 days 1 Bottle 0     sertraline (ZOLOFT) 50 MG tablet Take 1.5 tablets (75 mg total) by mouth daily. 135 tablet 3     traZODone (DESYREL) 50 MG tablet Take 1 tablet (50 mg total) by mouth at bedtime. 90 tablet 2     ascorbic acid, vitamin C, (VITAMIN C) 500 MG tablet Take 1 tablet (500 mg total) by mouth 2 (two) times a day.  0     LORazepam (ATIVAN) 0.5 MG tablet TAKE 1-2 TABLETS (0.5-1 MG TOTAL) BY MOUTH DAILY AS NEEDED FOR ANXIETY. 45 tablet 0     multivitamin with minerals (THERA-M) 9 mg iron-400 mcg Tab tablet Take 1 tablet by mouth daily.  0     predniSONE (DELTASONE) 20 MG tablet Takes 2 tabs daily for 10 days 20 tablet 0     No current facility-administered medications for this visit.       Objective:   Vital Signs:   Visit Vitals     /74 (Patient Site: Left Arm, Patient Position: Sitting, Cuff Size: Adult Regular)     Pulse 64     Wt 189 lb 9.6 oz (86 kg)     BMI 28.83 kg/m2        VisionScreening:  No exam data present     Physical Exam:  General Appearance: Alert, cooperative, no distress, appears stated age, does look slightly worried.  Head: Normocephalic, without obvious abnormality, atraumatic  Eyes: PERRL, conjunctiva/corneas clear, EOM's intact.  Ears: Normal TM's and external ear canals, both ears  Nose: Nares normal, septum midline,mucosa normal, no drainage  Throat: Lips, mucosa, and tongue normal; teeth and gums normal  Neck: Supple, symmetrical, trachea midline, no adenopathy;  thyroid: not enlarged, symmetric, no tenderness/mass/nodules; no carotid bruit or JVD  Back: Symmetric, no curvature, ROM normal, no CVA tenderness  Lungs: Clear to auscultation bilaterally, respirations unlabored  Heart: Regular rate and rhythm, S1 and S2 normal, no  murmur, rub, or gallop,  Abdomen: Soft, non-tender, bowel sounds active all four quadrants,  no masses, no organomegaly  Musculoskeletal: Normal range of motion.  Does have mild discomfort on the lower back especially on laying down.  Extremities: Extremities normal, atraumatic, no cyanosis or edema he has normal peripheral pulsations in the lower extremities.  Skin: Skin color, texture, turgor normal, no rashes or lesions  Lymph nodes: Cervical, supraclavicular, and axillary nodes normal  Neurologic: He is alert. He has normal reflexes.   Psychiatric: He has a normal mood and affect.       Assessment Results 3/27/2018   Activities of Daily Living No help needed   Instrumental Activities of Daily Living No help needed   Mini Cog Total Score 5   Some recent data might be hidden     A Mini-Cog score of 0-2 suggests the possibility of dementia, score of 3-5 suggests no dementia    Identified Health Risks:     The patient was provided with suggestions to help him develop a healthy lifestyle.   He is at risk for lack of exercise and has been provided with information to increase physical activity for the benefit of his well-being.  The patient was counseled and encouraged to consider modifying their diet and eating habits. He was provided with information on recommended healthy diet options.  Information regarding advance directives (living [place]), including where he can download the appropriate form, was provided to the patient via the AVS.

## 2021-06-17 NOTE — PROGRESS NOTES
VASCULAR SURGERY CLINIC CONSULTATION    VASCULAR SURGEON: Sergei Lemus MD    LOCATION:  [facility]    Young Ordonez   Medical Record #:  [id number]  YOB: 1950  Age:  68 y.o.     Date of Service: 4/9/2018    PRIMARY CARE PROVIDER: Ike Valdes MD      Reason for visit: Symptomatic right carotid stenosis.    IMPRESSION: Based on review of the carotid duplex as well as the CT angiogram of the head and neck patient has a symptomatic 70-90% carotid stenosis the right internal carotid artery.    RECOMMENDATION: I discussed the findings of the CT scan as well as the carotid duplex with Mr. Ordonez regarding his symptomatic right carotid stenosis.  His peak systolic velocity is 271 with end-diastolic of 76 and an ICA to CCA ratio of 3.4 on the right side.  Based on his findings of symptomatic carotid stenosis did quote him a wrist reduction in stroke over 2 years of 17%.  I have also asked that he start Plavix in addition to his 81 mg aspirin p.o. daily.  We discussed risks and benefits associated procedure including cranial nerve injury, stroke, heart attack, infection, and bleeding.  He also had a recent MRI of the brain that showed no large stroke only small lacunar infarcts bilaterally left is worse than the right.  He tolerated a aortobifemoral bypass just last summer in 2017 for severe lower extremity short distance claudication.  I would asked that he continue Plavix and aspirin perioperatively.  Patient understands risks and benefits and agreeable to proceeding with scheduling for a right carotid endarterectomy.    HPI:  Young Ordonez is a 68 y.o. male who was seen today in consultation for left carotid stenosis with recent episodes of amaurosis fugax.  She has a past medical history significant for hypertension, hyperlipidemia, COPD, peripheral arterial disease status post aortobifemoral bypass in 2017, and a 50 year history of tobacco abuse.  The patient does say that  he is now tobacco free.  He describes 2 episodes within the last month of right eye amaurosis fugax.  The first episode about a month ago was weight loss of vision in the right eye that lasted for approximately 15 minutes and then resolved.  The second episode was at the grocery store involved loss of half the vision in the right eye with a shade and resolved within about 30 seconds to 1 minute.  He has been otherwise asymptomatic with no symptoms of upper extremity or lower extremity weakness or paralysis, no expressive aphasia or difficulty with word finding.  His carotid duplex today does show that he has 70-99% stenosis of the right internal carotid artery as well as 50-69% stenosis of the left carotid artery.  CT angiographic shows that the carotid bifurcation is approximately at the top of C3 with heavily calcific disease and significant narrowing of the internal carotid artery lumen with some soft plaque noted potentially a small ulcer bed.  Does not appear to be evidence of significant tandem disease on review of the CT scan.  History is also significant for a previous coronary angioplasty within the last 10 years but otherwise no unstable angina or history of heart attack.      PHH:    Past Medical History:   Diagnosis Date     Arthritis      Cardiomyopathy      Claudication      COPD (chronic obstructive pulmonary disease)      Coronary artery disease      Gout      Gout attack 5/25/2017     HTN (hypertension)      Hyperlipidemia      MI (myocardial infarction) 1995     PAD (peripheral artery disease)         Past Surgical History:   Procedure Laterality Date     ANGIOPLASTY  1995     ANKLE FRACTURE SURGERY Left      AORTA - BILATERAL FEMORAL ARTERY BYPASS GRAFT N/A 5/17/2017    Procedure: AORTOBIFEMORAL BYPASS ;  Surgeon: Ilir FERRO MD;  Location: [facility];  Service:      FRACTURE SURGERY       MANDIBLE SURGERY         ALLERGIES:  Review of patient's allergies indicates no known  allergies.    MEDS:    Current Outpatient Prescriptions:      allopurinol (ZYLOPRIM) 300 MG tablet, Take 1.5 tablets (450 mg total) by mouth daily., Disp: 135 tablet, Rfl: 0     amLODIPine (NORVASC) 5 MG tablet, Take 1 tablet (5 mg total) by mouth daily., Disp: 90 tablet, Rfl: 3     ascorbic acid, vitamin C, (VITAMIN C) 500 MG tablet, Take 1 tablet (500 mg total) by mouth 2 (two) times a day., Disp: , Rfl: 0     aspirin 81 MG EC tablet, Take 81 mg by mouth daily. , Disp: , Rfl:      atenolol (TENORMIN) 100 MG tablet, Take 1 tablet (100 mg total) by mouth daily., Disp: 90 tablet, Rfl: 1     atorvastatin (LIPITOR) 80 MG tablet, Take 1 tablet (80 mg total) by mouth at bedtime., Disp: 90 tablet, Rfl: 1     cholecalciferol, vitamin D3, 5,000 unit Tab, Take 1 tablet (5,000 Units total) by mouth daily., Disp: 90 tablet, Rfl: 2     colchicine (MITIGARE) 0.6 mg cap, Take 0.6 mg by mouth 2 (two) times a day., Disp: 180 capsule, Rfl: 0     lisinopril (PRINIVIL,ZESTRIL) 20 MG tablet, Take 1 tablet (20 mg total) by mouth daily., Disp: 90 tablet, Rfl: 1     LORazepam (ATIVAN) 0.5 MG tablet, TAKE 1-2 TABLETS (0.5-1 MG TOTAL) BY MOUTH DAILY AS NEEDED FOR ANXIETY., Disp: 45 tablet, Rfl: 0     multivitamin with minerals (THERA-M) 9 mg iron-400 mcg Tab tablet, Take 1 tablet by mouth daily., Disp: , Rfl: 0     polymyxin B-trimethoprim (FOR POLYTRIM) 10,000 unit- 1 mg/mL Drop ophthalmic drops, 1-2 drops to the affected eye 4 (four) times a day for 7 days, Disp: 1 Bottle, Rfl: 0     sertraline (ZOLOFT) 50 MG tablet, Take 1.5 tablets (75 mg total) by mouth daily., Disp: 135 tablet, Rfl: 3     traZODone (DESYREL) 50 MG tablet, Take 1 tablet (50 mg total) by mouth at bedtime., Disp: 90 tablet, Rfl: 2     clopidogrel (PLAVIX) 75 mg tablet, Take 1 tablet (75 mg total) by mouth daily., Disp: 90 tablet, Rfl: 0     predniSONE (DELTASONE) 20 MG tablet, Takes 2 tabs daily for 10 days, Disp: 20 tablet, Rfl: 0    SOCIAL HABITS:    History   Smoking  Status     Former Smoker     Packs/day: 1.50     Types: Cigarettes     Quit date: 2013   Smokeless Tobacco     Never Used       History   Alcohol Use 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    EXAM:  GENERAL: This is a well-developed 68 y.o. male who appears his stated age  EYES: Grossly normal.  MOUTH: Buccal mucosa normal   CARDIAC:  NS1 S2, No Murmur  CHEST/LUNG:  Clear lung fields bilaterally   GASTROINTESINAL (ABDOMEN): Soft, non-tender, B/S present, no pulsatile mass  MUSCULOSKELETAL: Grossly normal and both lower extremities are intact.  HEME/LYMPH: No lymphedema  NEUROLOGIC: Focally intact, Alert and oriented x 3.  CN's are grossly intact on review.  PSYCH: appropriate affect  INTEGUMENT: No open lesions or ulcers  Pulse Exam:     Radial: Left +2   Right  +2    DP: Left +1   Right  +1     PT:   Left +1   Right  +1          DIAGNOSTIC STUDIES:     Images:  Mr Brain Without Contrast    Result Date: 4/3/2018  [facility] HEAD MRI WITHOUT IV CONTRAST 4/2/2018 3:55 PM INDICATION: TIA or CVA Visual Impairment. TECHNIQUE: Routine. CONTRAST: None. COMPARISON: None FINDINGS: There is no restricted diffusion. Small chronic lacunar infarcts in the left greater than right cerebellar hemispheres. There is no mass effect, midline shift, or extraaxial collection. No evidence for acute or chronic intracranial blood products. Intracranial flow voids are intact. Paranasal  sinuses are free from significant disease. Mastoid air cells appear free from significant disease. The sella, orbits, skull base and marrow are unremarkable for technique.     CONCLUSION: 1.  No acute intracranial abnormality. 2.  Small chronic lacunar infarcts in the left greater than right cerebellar hemispheres.     Us Carotid Bilateral    Result Date: 4/2/2018  US CAROTID BILATERAL 4/2/2018 3:29 PM INDICATION: Transient visual loss, right eye. TECHNIQUE: Duplex exam performed utilizing 2D gray-scale imaging, Doppler interrogation with color-flow and spectral waveform analysis. COMPARISON: None. FINDINGS: RIGHT: There is moderate atheromatous plaque. 70-99% diameter stenosis in the proximal right ICA using velocity criteria. LEFT: There is moderate atheromatous plaque. 50-69% diameter stenosis in the mid left ICA. Both vertebral arteries and subclavian artery waveforms are antegrade. Probable stenosis proximal right subclavian artery. VELOCITY CHART: The following velocities were obtained in the RIGHT carotid system. CCA: 80/16 cm/s ICA: 271/76 cm/s ECA: 97/22 cm/s ICA/CCA: PS 3.4 The following velocities were obtained in the LEFT carotid system. CCA: 285/62 cm/s ICA: 176/63 cm/s ECA: 154/25 cm/s ICA/CCA: PS 0.6                            CONCLUSION: 1.  Moderate atheromatous plaque in the carotid arteries. 2.  High-grade, 70-99% diameter stenosis of the right ICA. 3.  Moderate, 50-69% diameter stenosis left ICA. Evaluation based on velocities and NASCET criteria.      I personally reviewed the images and my interpretation is 70-99% stenosis of the right internal carotid artery based on velocity criteria with calcified plaque and extensive shadowing noted on review of the ultrasonic images.  Antegrade flow noted in both vertebral arteries.  The CT angiogram of the head and neck reveals that he has significant calcific disease in the right internal carotid artery with bifurcation approximately the top of C3.  The  left internal carotid artery has some mild disease with extension of the calcific plaque into the left common carotid artery.    LABS:      Sodium   Date Value Ref Range Status   03/27/2018 143 136 - 145 mmol/L Final   01/16/2018 144 136 - 145 mmol/L Final   07/12/2017 142 136 - 145 mmol/L Final     Potassium   Date Value Ref Range Status   03/27/2018 4.0 3.5 - 5.0 mmol/L Final   01/16/2018 4.0 3.5 - 5.0 mmol/L Final   07/12/2017 3.4 (L) 3.5 - 5.0 mmol/L Final     Chloride   Date Value Ref Range Status   03/27/2018 104 98 - 107 mmol/L Final   01/16/2018 105 98 - 107 mmol/L Final   07/12/2017 107 98 - 107 mmol/L Final     BUN   Date Value Ref Range Status   03/27/2018 14 8 - 22 mg/dL Final   01/16/2018 16 8 - 22 mg/dL Final   07/12/2017 24 (H) 8 - 22 mg/dL Final     Creatinine   Date Value Ref Range Status   03/27/2018 1.20 0.70 - 1.30 mg/dL Final   01/16/2018 1.31 (H) 0.70 - 1.30 mg/dL Final   07/12/2017 1.14 0.70 - 1.30 mg/dL Final     Hemoglobin   Date Value Ref Range Status   07/12/2017 10.3 (L) 14.0 - 18.0 g/dL Final   07/12/2017 11.0 (L) 14.0 - 18.0 g/dL Final   05/28/2017 9.7 (L) 14.0 - 18.0 g/dL Final     Platelets   Date Value Ref Range Status   07/12/2017 205 140 - 440 thou/uL Final   07/12/2017 219 140 - 440 thou/uL Final   05/28/2017 251 140 - 440 thou/uL Final     BNP   Date Value Ref Range Status   05/22/2017 98 (H) 0 - 62 pg/mL Final     INR   Date Value Ref Range Status   04/26/2017 1.04 0.90 - 1.10 Final   04/06/2017 1.02 0.90 - 1.10 Final   11/09/2009 0.98 0.90 - 1.10 Final     Comment:                                                     INR Therapeutic Ranges                                                  Mech. Valve 2.5-3.5                                                  Post surg.  2.0-3.0                                                  DVT/PE      2.0-3.0         Sergei Lemus MD  VASCULAR SURGERY

## 2021-06-17 NOTE — PROGRESS NOTES
Preoperative Exam    Scheduled Procedure: Right Carotid Artery Endarterectomy due to symptomatic carotid stenosis.  Surgery Date:  4/12/18  Surgery Location: [facility], fax [phone]    Surgeon:  Dr Lemus    Assessment/Plan:     1. Preoperative general physical examination  - INR  - HM2(CBC w/o Differential)  - Electrocardiogram Perform and Read  - [facility] RED    2. Carotid stenosis, right      Surgical Procedure Risk: Vascular/High (reported cardiac risk often > 5%), though he does not have an active cardiovascular disease symptoms, but the surgery is going to be vascular and does have slightly increased risk.  Have you had prior anesthesia?: Yes  Have you or any family members had a previous anesthesia reaction:  No  Do you or any family members have a history of a clotting or bleeding disorder?: No  Cardiac Risk Assessment: He does have a slightly increased cardiovascular risk.  He has had multiple vascular surgeries and does have a previous though remote history of cardiovascular disease which is currently very stable and not symptomatic.  He had been currently active with more than the metabolic equivalents.  His blood pressure is well controlled with medication.  He is a symptomatic.  There is no cardiovascular contraindications to the planned surgery which can be carried out with appropriate anesthesia.    Functional Status: Independent  Patient plans to recover at home with family.     Subjective:      Young Ordonez is a 68 y.o. male who presents for a preoperative consultation.    He is going to have a carotid endarterectomy on the right side due to carotid stenosis that is asymptomatic.  He has been reviewed by the vascular surgeon and is on appropriate for the surgery.  He has had a prior vascular surgery with no notable complications.  Today he does not have any main concerns, he feels well.  He denied having any symptoms except for having lower back pain which has been chronic.  He noted not to  have had any vision concerns as he did previously.  He has good appetite.      Review of Systems   Constitutional:Denied any fatigue no fevers no chills.  Has good appetite.  HEENT: Does not have any neck pain.  No difficulty swallowing denies having any postnasal drips.    Respiratory: There is no cough.  No chest wall pain.  Cardiovascular: Denied chest pain shortness of breath or palpitations.  There is no notable lower extremity swelling.    Gastrointestinal: Denies nausea vomiting.  No abdominal pain no diarrhea or constipation.  Endocrine:Has no sensitivity to cold or heat.  Denied undue thirst.   Genitourinary:Has no urinary symptoms, no nocturia.  Musculoskeletal: There is no musculoskeletal pain and swelling.    Skin: He does not have any rashes.   Allergic/Immunologic: Negative.   Neurological: No Numbness  Hematological: Negative.   Psychiatric/Behavioral: No anxiety or depression symptoms.    All other systems reviewed and are negative, other than those listed in the HPI.    Pertinent History  Do you have difficulty breathing or chest pain after walking up a flight of stairs: Yes: This he thinks is as a result of deconditioning.  History of obstructive sleep apnea: No  Steroid use in the last 6 months: No  Frequent Aspirin/NSAID use: Yes: He continues to take aspirin and did have clopidogrel started by the vascular physician.  Prior Blood Transfusion: No  Prior Blood Transfusion Reaction: No  If for some reason prior to, during or after the procedure, if it is medically indicated, would you be willing to have a blood transfusion?:  There is no transfusion refusal.    Current Outpatient Prescriptions   Medication Sig Dispense Refill     allopurinol (ZYLOPRIM) 300 MG tablet Take 1.5 tablets (450 mg total) by mouth daily. 135 tablet 0     amLODIPine (NORVASC) 5 MG tablet Take 1 tablet (5 mg total) by mouth daily. 90 tablet 3     ascorbic acid, vitamin C, (VITAMIN C) 500 MG tablet Take 1 tablet (500 mg  total) by mouth 2 (two) times a day.  0     aspirin 81 MG EC tablet Take 81 mg by mouth daily.        atenolol (TENORMIN) 100 MG tablet Take 1 tablet (100 mg total) by mouth daily. 90 tablet 1     atorvastatin (LIPITOR) 80 MG tablet Take 1 tablet (80 mg total) by mouth at bedtime. 90 tablet 1     cholecalciferol, vitamin D3, 5,000 unit Tab Take 1 tablet (5,000 Units total) by mouth daily. 90 tablet 2     clopidogrel (PLAVIX) 75 mg tablet Take 1 tablet (75 mg total) by mouth daily. 90 tablet 0     colchicine (MITIGARE) 0.6 mg cap Take 0.6 mg by mouth 2 (two) times a day. 180 capsule 0     lisinopril (PRINIVIL,ZESTRIL) 20 MG tablet Take 1 tablet (20 mg total) by mouth daily. 90 tablet 1     LORazepam (ATIVAN) 0.5 MG tablet TAKE 1-2 TABLETS (0.5-1 MG TOTAL) BY MOUTH DAILY AS NEEDED FOR ANXIETY. 45 tablet 0     multivitamin with minerals (THERA-M) 9 mg iron-400 mcg Tab tablet Take 1 tablet by mouth daily.  0     polymyxin B-trimethoprim (FOR POLYTRIM) 10,000 unit- 1 mg/mL Drop ophthalmic drops 1-2 drops to the affected eye 4 (four) times a day for 7 days 1 Bottle 0     predniSONE (DELTASONE) 20 MG tablet Takes 2 tabs daily for 10 days 20 tablet 0     sertraline (ZOLOFT) 50 MG tablet Take 50 mg by mouth daily.       traZODone (DESYREL) 50 MG tablet Take 1 tablet (50 mg total) by mouth at bedtime. 90 tablet 2     No current facility-administered medications for this visit.         No Known Allergies    Patient Active Problem List   Diagnosis     Gout     Chronic coronary artery disease     Cellulitis     Hypertension     Chronic gouty arthritis     Tobacco use     Ischemic rest pain of lower extremity     Hypotension, unspecified hypotension type     FRANCY (acute kidney injury)     Low urine output     Post-op pain     CKD (chronic kidney disease), stage 3 (moderate)     Abdominal distention     Ischemic cardiomyopathy     Coronary artery disease due to lipid rich plaque     Obesity, unspecified obesity severity,  "unspecified obesity type     Gout attack     Acute myocardial infarction     Depressive disorder, not elsewhere classified     Benign essential hypertension     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     Hyperlipidemia     Polyarthritis     Idiopathic chronic gout, multiple sites, with tophus (tophi)     Acute gout of right foot       Past Medical History:   Diagnosis Date     Arthritis      Cardiomyopathy      Claudication      COPD (chronic obstructive pulmonary disease)      Coronary artery disease      Gout      Gout attack 5/25/2017     HTN (hypertension)      Hyperlipidemia      MI (myocardial infarction) 1995     PAD (peripheral artery disease)        Past Surgical History:   Procedure Laterality Date     ANGIOPLASTY  1995     ANKLE FRACTURE SURGERY Left      AORTA - BILATERAL FEMORAL ARTERY BYPASS GRAFT N/A 5/17/2017    Procedure: AORTOBIFEMORAL BYPASS ;  Surgeon: Ilir FERRO MD;  Location: [facility];  Service:      FRACTURE SURGERY       MANDIBLE SURGERY         Social History     Social History     Marital status:      Spouse name: N/A     Number of children: N/A     Years of education: N/A     Occupational History     Not on file.     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     Drug use: No     Sexual activity: Not on file     Other Topics Concern     Not on file     Social History Narrative       Patient Care Team:  Ike Valdes MD as PCP - General (Family Medicine)          Objective:     Vitals:    04/10/18 1419   BP: 150/72   Patient Site: Left Arm   Patient Position: Sitting   Cuff Size: Adult Regular   Pulse: 65   Temp: 97.6  F (36.4  C)   TempSrc: Oral   SpO2: 95%   Weight: 188 lb 6.4 oz (85.5 kg)   Height: 5' 8\" (1.727 m)         Physical Exam:  General Appearance: Alert, cooperative, no distress, appears " stated age  Head: Normocephalic, without obvious abnormality, atraumatic  Eyes: PERRL, conjunctiva/corneas clear, EOM's intact  Ears: Normal TM's and external ear canals, both ears  Nose: Nares normal, septum midline,mucosa normal, no drainage  Throat: Lips, mucosa, and tongue normal; teeth and gums normal  Neck: Supple, symmetrical, trachea midline, no adenopathy;  thyroid: not enlarged, symmetric, no tenderness/mass/nodules.  Back: Symmetric, no curvature, ROM normal, no CVA tenderness  Lungs: Clear to auscultation bilaterally, respirations unlabored  Heart: Regular rate and rhythm, S1 and S2 normal, no murmur, rub, or gallop,  Abdomen: Soft, non-tender, bowel sounds active all four quadrants,  no masses, no organomegaly  Musculoskeletal: He has slightly reduced range of motion on his back lumbar region.  He does not have any weaknesses.  Extremities: Extremities normal, atraumatic, no cyanosis or edema  Skin: Skin color, texture, turgor normal, no rashes or lesions  Lymph nodes: Cervical, supraclavicular, and axillary nodes normal  Neurologic: He is alert. He has normal reflexes.   Psychiatric: He has a normal mood and affect.       Labs:  Recent Results (from the past 24 hour(s))   Electrocardiogram Perform and Read    Collection Time: 04/10/18  3:00 PM   Result Value Ref Range    SYSTOLIC BLOOD PRESSURE  mmHg    DIASTOLIC BLOOD PRESSURE  mmHg    VENTRICULAR RATE 63 BPM    ATRIAL RATE 63 BPM    P-R INTERVAL 200 ms    QRS DURATION 116 ms    Q-T INTERVAL 422 ms    QTC CALCULATION (BEZET) 431 ms    P Axis -21 degrees    R AXIS -50 degrees    T AXIS 47 degrees    MUSE DIAGNOSIS       Normal sinus rhythm  Left anterior fascicular block  Left ventricular hypertrophy with QRS widening  Cannot rule out Septal infarct , age undetermined  Abnormal ECG  No previous ECGs available  Confirmed by DIPESH GALLAGHER MD LOC:[facility] ([id number]) on 4/10/2018 4:50:30 PM     HM2(CBC w/o Differential)    Collection Time: 04/10/18  3:09 PM   Result  Value Ref Range    WBC 11.8 (H) 4.0 - 11.0 thou/uL    RBC 4.83 4.40 - 6.20 mill/uL    Hemoglobin 14.4 14.0 - 18.0 g/dL    Hematocrit 42.8 40.0 - 54.0 %    MCV 88 80 - 100 fL    MCH 29.8 27.0 - 34.0 pg    MCHC 33.7 32.0 - 36.0 g/dL    RDW 14.3 11.0 - 14.5 %    Platelets 222 140 - 440 thou/uL    MPV 9.1 7.0 - 10.0 fL       Immunization History   Administered Date(s) Administered     Hep A, historic 01/01/2002, 03/01/2002, 07/01/2002     Hep B, historic 01/01/2002, 03/01/2002, 07/01/2002     Influenza, seasonal,quad inj 36+ mos 01/15/2018     Influenza, seasonal,quad inj 6-35 mos 09/18/2009     Pneumo Polysac 23-V 01/30/2017     Td,adult,historic,unspecified 08/08/2005, 02/26/2010     Tdap 02/26/2010     ZOSTER, LIVE 10/10/2014           Electronically signed by Ike Valdes MD 04/10/18 2:16 PM

## 2021-06-17 NOTE — ANESTHESIA POSTPROCEDURE EVALUATION
Patient: Young Ordonez  RIGHT CAROTID ENDARTERECTOMY WITH EEG  Anesthesia type: general    Patient location: PACU  Last vitals:   Vitals:    04/12/18 1100   BP:    Pulse: 61   Resp: 13   Temp:    SpO2: 94%     Post vital signs: stable  Level of consciousness: awake, alert and oriented  Post-anesthesia pain: pain controlled  Post-anesthesia nausea and vomiting: no  Pulmonary: unassisted, nasal cannula  Cardiovascular: mild hypotension, treat with IVF bolus  Hydration: adequate  Anesthetic events: yes: soft tissue trauma and left upper lip laceration during intubation.  No active bleeding.  Discussed with pt.  RN will monitor.      QCDR Measures:  ASA# 11 - Rhiannon-op Cardiac Arrest: ASA11B - Patient did NOT experience unanticipated cardiac arrest  ASA# 12 - Rhiannon-op Mortality Rate: ASA12B - Patient did NOT die  ASA# 13 - PACU Re-Intubation Rate: ASA13B - Patient did NOT require a new airway mgmt  ASA# 10 - Composite Anes Safety: ASA10A - No serious adverse event    Additional Notes:  Sign out give to the intensivist by phone.

## 2021-06-17 NOTE — ANESTHESIA CARE TRANSFER NOTE
Last vitals:   Vitals:    04/12/18 1031   BP: 104/46   Pulse: 63   Resp: 12   Temp: 36.8  C (98.2  F)   SpO2: 98%     Patient's level of consciousness is awake and drowsy  Spontaneous respirations: yes  Maintains airway independently: yes  Dentition unchanged: yes  Oropharynx: oropharynx clear of all foreign objects    QCDR Measures:  ASA# 20 - Surgical Safety Checklist: WHO surgical safety checklist completed prior to induction  PQRS# 430 - Adult PONV Prevention: 4558F - Pt received => 2 anti-emetic agents (different classes) preop & intraop  ASA# 8 - Peds PONV Prevention: NA - Not pediatric patient, not GA or 2 or more risk factors NOT present  PQRS# 424 - Rhiannon-op Temp Management: 4559F - At least one body temp DOCUMENTED => 35.5C or 95.9F within required timeframe  PQRS# 426 - PACU Transfer Protocol: - Transfer of care checklist used  ASA# 14 - Acute Post-op Pain: ASA14B - Patient did NOT experience pain >= 7 out of 10

## 2021-06-17 NOTE — PROGRESS NOTES
On 4/16/18, the patient was referred to Clinic Care Coordination via Hospital Discharge    CCC Referral: Attempt 3  Care Guide called and left a message for the patient in order to discuss enrollment with Clinic Care Coordination.  Per CCC standard work, I have made 3 attempts to reach the patient to discuss enrollment and have been unsuccessful in reaching him.  At this time I will cease trying to reach the patient.    If the patient is returning my call, please transfer him to me, Crystal Lilly, at [phone].    In the future, if the patient's provider feels enrollment with [facility] would be beneficial to the patient, a new order can be placed and I will begin trying to reach the patient again.

## 2021-06-17 NOTE — TELEPHONE ENCOUNTER
Controlled Substance Refill Request  Medication Name:   Requested Prescriptions     Pending Prescriptions Disp Refills     LORazepam (ATIVAN) 0.5 MG tablet [Pharmacy Med Name: LORAZEPAM 0.5 MG TABLET] 30 tablet 0     Sig: TAKE 1 TABLET BY MOUTH EVERYDAY AT BEDTIME     Date Last Fill: 4/7/21  Requested Pharmacy: [facility]  Submit electronically to pharmacy  Controlled Substance Agreement on file:   Encounter-Level CSA Scan Date:    There are no encounter-level csa scan date.        Last office visit:  3/8/21         Casandra Carpenter RN, BSN Nurse Triage Advisor 7:35 PM 5/4/2021

## 2021-06-17 NOTE — ANESTHESIA PROCEDURE NOTES
Arterial Line  Reason for Procedure: hemodynamic monitoring  Patient location during procedure: Pre-op  Start time: 4/12/2018 7:28 AM  End time: 4/12/2018 7:34 AM  Staffing:  Performing  Anesthesiologist: RAMSES MCCORMICK  Performing CRNA: YENNY DE  Sterile Precautions:  sterile barriers used during insertion: cap, mask, sterile gloves, large sheet, and hand hygiene used.  Arterial Line:   Immediately prior to procedure a time out was called to verify the correct patient, procedure, equipment, support staff and site/side marked as required  Laterality: right  Location: radial  Prepped with: ChloroPrep    Needle gauge: 20 G  Number of Attempts: 1  Secured with: tape, pressure dressing and transparent dressing  Flushed with: saline  1% lidocaine local anesthesia used for skin prep.   See MAR for additional medications given.  Ultrasound evaluation of access site: yes  Vessel patent by US exam    Concurrent real time visualization of needle entry

## 2021-06-17 NOTE — PROGRESS NOTES
On 4/16/18, the patient was referred to Clinic Care Coordination via Hospital Discharge    CCC Referral: Attempt 1  Care Guide called and left a message for the patient in order to discuss enrollment with Clinic Care Coordination.  If the patient is returning my call, please transfer him to me, Crystal Lilly, at [phone].    Patient was hospitalized from 4/12/18-4/13/18 at [facility]  Dx: Symptomatic Stenosis of the Right Carotid Artery    Need to ask the patient:  When he left the hospital, did he have a good understanding of the things he was responsible for in managing his health?  If not, why?    Pending Appointments:  4/20/18 at 3:40 with Dr. Valdes for Hospital Follow up  4/23/18 at 1:00 with Dr. Sergei Lemus @ [facility]  ________________  Next Outreach Date: 4/25/18

## 2021-06-17 NOTE — PROGRESS NOTES
ASSESSMENT:    1. S/P carotid endarterectomy  - oxyCODONE-acetaminophen (PERCOCET) 5-325 mg per tablet; Take 1 tablet by mouth 2 (two) times a day as needed for pain.  Dispense: 30 tablet; Refill: 0    2. Symptomatic stenosis of right carotid artery    3. Idiopathic chronic gout, multiple sites, with tophus (tophi)  - colchicine (MITIGARE) 0.6 mg cap; Take 0.6 mg by mouth 2 (two) times a day.  Dispense: 180 capsule; Refill: 0    4. Depression  - LORazepam (ATIVAN) 0.5 MG tablet; Take 1 tablet (0.5 mg total) by mouth at bedtime as needed for anxiety.  Dispense: 30 tablet; Refill: 0  .    PLAN:  Medications were refilled.  Discussed with him the importance of being physically active and increasing his fluid intake to remain hydrated.  Also discussed need for him to make sure not to take too much of narcotic pain  medication but uses sparingly.  He is encouraged to follow with his surgeon for surgical follow-up and let me know if there is any other concerns.  No orders of the defined types were placed in this encounter.    Medications Discontinued During This Encounter   Medication Reason     oxyCODONE-acetaminophen (PERCOCET) 5-325 mg per tablet Reorder     colchicine (MITIGARE) 0.6 mg cap Reorder     LORazepam (ATIVAN) 0.5 MG tablet Reorder     traZODone (DESYREL) 50 MG tablet Therapy completed       No Follow-up on file.      CHIEF COMPLAINT:  Chief Complaint   Patient presents with     Hospital Visit Follow Up     f/u carotid endarectomy 4/12-4/13 [facility] seeing surgeon on Monday, concerned about pain and lump        HISTORY OF PRESENT ILLNESS:  Young is a 68 y.o. male presenting to the clinic today for follow-up of hospitalization which he had right-sided carotid endarterectomy.  He was in the hospital overnight following the surgery.  He is currently doing quite well but continues to have mild discomfort to the surgical site.  He did not have any complications.  He is still complaining of having some pain and  is hoping to get a refill of his pain medications.  He is currently still on anticoagulants.  Still feels slightly tired and fatigued but noted improvement in his appetite.  He also noted that he is feeling a little bit more anxious and sher and hoping to get a refill of his Lorazepam.  Noted that he is not sleeping very well and has been using lorazepam intermittently for that.  He would like to have some medications refilled at this point.    REVIEW OF SYSTEMS:   He does feel well, does not have any major complaints.  A full review system was done and insisted in the HPI otherwise negative.  PFSH:  Reviewed, as below.    History   Smoking Status     Former Smoker     Packs/day: 1.50     Types: Cigarettes     Quit date: 2013   Smokeless Tobacco     Never Used       Family History   Problem Relation Age of Onset     Cancer Mother      Stroke Father      Hypertension Father      Heart disease Paternal Grandmother      Emphysema Paternal Grandfather        Social History     Social History     Marital status:      Spouse name: N/A     Number of children: N/A     Years of education: N/A     Occupational History     Not on file.     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;  Service:      CAROTID ENDARTERECTOMY Right 4/12/2018    Procedure: RIGHT CAROTID ENDARTERECTOMY WITH EEG;  Surgeon: Sergei Lemus MD;  Location: [facility];  Service:      FRACTURE  SURGERY       MANDIBLE SURGERY         No Known Allergies    Active Ambulatory Problems     Diagnosis Date Noted     Gout      Chronic coronary artery disease      Cellulitis      HTN, goal below 150/90      Chronic gouty arthritis 09/18/2015     Tobacco use 07/18/2013     Ischemic rest pain of lower extremity 05/17/2017     Hypotension, unspecified hypotension type      FRANCY (acute kidney injury)      Low urine output      Post-op pain      CKD (chronic kidney disease), stage 3 (moderate)      Abdominal distention      Ischemic cardiomyopathy      Coronary artery disease due to lipid rich plaque      Obesity, unspecified obesity severity, unspecified obesity type      Gout attack 05/25/2017     Acute myocardial infarction 07/26/2017     Depressive disorder, not elsewhere classified 04/29/2008     Benign essential hypertension 08/25/2003     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 07/26/2017     Hyperlipidemia 07/26/2017     Polyarthritis 08/11/2017     Idiopathic chronic gout, multiple sites, with tophus (tophi) 08/11/2017     Acute gout of right foot 08/11/2017     Symptomatic stenosis of right carotid artery 04/12/2018     Resolved Ambulatory Problems     Diagnosis Date Noted     No Resolved Ambulatory Problems     Past Medical History:   Diagnosis Date     Arthritis      Cardiomyopathy      Claudication      COPD (chronic obstructive pulmonary disease)      Coronary artery disease      Depression      Gout      Gout attack 5/25/2017     HTN (hypertension)      Hyperlipidemia      Infectious viral hepatitis      Low back pain      MI (myocardial infarction) 1995     Obesity      PAD (peripheral artery disease)        Current Outpatient Prescriptions   Medication Sig Dispense Refill     allopurinol (ZYLOPRIM) 300 MG tablet Take 1.5 tablets (450 mg total) by mouth daily. 135 tablet 0     amLODIPine (NORVASC) 5 MG tablet Take 1 tablet (5 mg total) by mouth daily. 90 tablet 3     aspirin 81 MG EC tablet Take 81 mg by mouth  daily.        atenolol (TENORMIN) 100 MG tablet Take 1 tablet (100 mg total) by mouth daily. 90 tablet 1     atorvastatin (LIPITOR) 80 MG tablet Take 1 tablet (80 mg total) by mouth at bedtime. 90 tablet 1     cholecalciferol, vitamin D3, 5,000 unit Tab Take 1 tablet (5,000 Units total) by mouth daily. 90 tablet 2     clopidogrel (PLAVIX) 75 mg tablet Take 1 tablet (75 mg total) by mouth daily. 90 tablet 0     lisinopril (PRINIVIL,ZESTRIL) 20 MG tablet Take 1 tablet (20 mg total) by mouth daily. 90 tablet 1     LORazepam (ATIVAN) 0.5 MG tablet Take 1 tablet (0.5 mg total) by mouth at bedtime as needed for anxiety. 30 tablet 0     oxyCODONE-acetaminophen (PERCOCET) 5-325 mg per tablet Take 1 tablet by mouth 2 (two) times a day as needed for pain. 30 tablet 0     sertraline (ZOLOFT) 50 MG tablet Take 50 mg by mouth daily.       colchicine (MITIGARE) 0.6 mg cap Take 0.6 mg by mouth 2 (two) times a day. 180 capsule 0     No current facility-administered medications for this visit.        VITALS:  Vitals:    04/20/18 1540   BP: 136/68   Patient Site: Left Arm   Patient Position: Sitting   Cuff Size: Adult Regular   Pulse: 76   Weight: 186 lb 9.6 oz (84.6 kg)     Wt Readings from Last 3 Encounters:   04/20/18 186 lb 9.6 oz (84.6 kg)   04/13/18 185 lb (83.9 kg)   04/10/18 188 lb 6.4 oz (85.5 kg)     Body mass index is 28.37 kg/(m^2).    PHYSICAL EXAM:  General Appearance: Alert, cooperative, no distress, appears stated age  HEENT: Pupils are equal and reactive, extraocular motions is normal. Mild right sided swelling to the neck with surgical scar.no signs of infection. Neck is supple no notable thyromegaly.  External ears are normal.  Lungs: Clear to auscultation bilaterally, respirations unlabored  Heart: Regular rate and rhythm, S1 and S2 normal, no murmur, rub, or gallop  Abdomen: Soft  Musculoskeletal: Normal range of motion. No joint swelling or deformity.   Neurologic:  Alert and oriented times 3. Normal reflexes.  Cranial nerves II-XII intact.   Psychiatric: Normal mood and affect.    MEDICATIONS:  Current Outpatient Prescriptions   Medication Sig Dispense Refill     allopurinol (ZYLOPRIM) 300 MG tablet Take 1.5 tablets (450 mg total) by mouth daily. 135 tablet 0     amLODIPine (NORVASC) 5 MG tablet Take 1 tablet (5 mg total) by mouth daily. 90 tablet 3     aspirin 81 MG EC tablet Take 81 mg by mouth daily.        atenolol (TENORMIN) 100 MG tablet Take 1 tablet (100 mg total) by mouth daily. 90 tablet 1     atorvastatin (LIPITOR) 80 MG tablet Take 1 tablet (80 mg total) by mouth at bedtime. 90 tablet 1     cholecalciferol, vitamin D3, 5,000 unit Tab Take 1 tablet (5,000 Units total) by mouth daily. 90 tablet 2     clopidogrel (PLAVIX) 75 mg tablet Take 1 tablet (75 mg total) by mouth daily. 90 tablet 0     lisinopril (PRINIVIL,ZESTRIL) 20 MG tablet Take 1 tablet (20 mg total) by mouth daily. 90 tablet 1     LORazepam (ATIVAN) 0.5 MG tablet Take 1 tablet (0.5 mg total) by mouth at bedtime as needed for anxiety. 30 tablet 0     oxyCODONE-acetaminophen (PERCOCET) 5-325 mg per tablet Take 1 tablet by mouth 2 (two) times a day as needed for pain. 30 tablet 0     sertraline (ZOLOFT) 50 MG tablet Take 50 mg by mouth daily.       colchicine (MITIGARE) 0.6 mg cap Take 0.6 mg by mouth 2 (two) times a day. 180 capsule 0     No current facility-administered medications for this visit.

## 2021-06-17 NOTE — TELEPHONE ENCOUNTER
Refill request for medication: Lorazepam  Last visit addressing this medication: 3/8/21  Follow up plan 6  months  Last refill on 4/7/21, quantity #30   CSA completed NOT ON FILE  Date PDMP was last reviewed NEVER REVIEWED    Appointment: Not due   Appointment recommended at least every 3 months for opioid prescriptions. Appointment recommended at least every 6 months for ADHD medications.    Theresa Huff LPN

## 2021-06-17 NOTE — PROGRESS NOTES
HPI: Mr. Ordonez presents for follow-up of his carotid disease. We had performed a right carotid endarterectomy 2 weeks ago fr a symptomatic right carotid stenosis, 3 episodes of amaurosis fugax. He is doing well, and has no neurological complaints. No difficulty with speech, vision, strength or sensation.    Allergies, Medications, Social History, Past Medical History and Past Surgical History were reviewed and are noted in the chart.    Review of Systems - Negative.    /80  Pulse 70  Temp 98  F (36.7  C)  Resp 16  There is no height or weight on file to calculate BMI.    EXAM:  GENERAL: This is a well developed male in no distress.  HEAD AND NECK: Cranial nerves intact. There is mild swelling around the incision with very limited hematoma, unchanged since surgery.  Incision is healing well and approximated with no signs of infection.   CARDIAC: RRR w/out murmur  CHEST/LUNG: Clear  EXTREM: 5/5 Strength and sensation intact bilaterally.          IMAGES:   Xr Chest 2 Views    Result Date: 4/12/2018  XR CHEST 2 VIEWS 4/12/2018 6:55 AM INDICATION: Hypertension. Coronary and carotid artery disease. Preoperative. COMPARISON: None. FINDINGS: Minimal left basilar atelectasis or scarring. Otherwise, lungs are clear. No pleural effusion or pneumothorax. Upper normal cardiac silhouette size. Mild tortuous aorta with atherosclerosis.     Mr Brain Without Contrast    Result Date: 4/3/2018  [facility] HEAD MRI WITHOUT IV CONTRAST 4/2/2018 3:55 PM INDICATION: TIA or CVA Visual Impairment. TECHNIQUE: Routine. CONTRAST: None. COMPARISON: None FINDINGS: There is no restricted diffusion. Small chronic lacunar infarcts in the left greater than right cerebellar hemispheres. There is no mass effect, midline shift, or extraaxial collection. No evidence for acute or chronic intracranial blood products. Intracranial flow voids are intact. Paranasal sinuses are free from significant disease. Mastoid air cells appear free from  significant disease. The sella, orbits, skull base and marrow are unremarkable for technique.     CONCLUSION: 1.  No acute intracranial abnormality. 2.  Small chronic lacunar infarcts in the left greater than right cerebellar hemispheres.     Us Carotid Bilateral    Result Date: 4/2/2018  US CAROTID BILATERAL 4/2/2018 3:29 PM INDICATION: Transient visual loss, right eye. TECHNIQUE: Duplex exam performed utilizing 2D gray-scale imaging, Doppler interrogation with color-flow and spectral waveform analysis. COMPARISON: None. FINDINGS: RIGHT: There is moderate atheromatous plaque. 70-99% diameter stenosis in the proximal right ICA using velocity criteria. LEFT: There is moderate atheromatous plaque. 50-69% diameter stenosis in the mid left ICA. Both vertebral arteries and subclavian artery waveforms are antegrade. Probable stenosis proximal right subclavian artery. VELOCITY CHART: The following velocities were obtained in the RIGHT carotid system. CCA: 80/16 cm/s ICA: 271/76 cm/s ECA: 97/22 cm/s ICA/CCA: PS 3.4 The following velocities were obtained in the LEFT carotid system. CCA: 285/62 cm/s ICA: 176/63 cm/s ECA: 154/25 cm/s ICA/CCA: PS 0.6                            CONCLUSION: 1.  Moderate atheromatous plaque in the carotid arteries. 2.  High-grade, 70-99% diameter stenosis of the right ICA. 3.  Moderate, 50-69% diameter stenosis left ICA. Evaluation based on velocities and NASCET criteria.      Assessment/Plan: Mr. Ordonez appears to be doing well. We will plan another ultrasound in 6 months, and follow him for a total of 2 years postoperatively. If he is doing well then, we will [facility].  We will follow the carotid disease, which is often progressive. He will return at the 3 month post-op visit for a carotid ultrasound.  I will also obtain bilateral lower extremity JOSE's with exercise given his previous history of [id number] bypass.       Sergei Lemus MD

## 2021-06-17 NOTE — PROGRESS NOTES
Carotid stenosis. Sudden onset of R vision loss that has happened on 2 different occasions. US on 4/2/18 showed high-grade, 70-99% diameter stenosis of the right ICA. HTN. Norvasc, ASA, statin.

## 2021-06-17 NOTE — PROGRESS NOTES
On 4/16/18, the patient was referred to Clinic Care Coordination via Hospital Discharge    [facility] Referral: Attempt 2  Care Guide called and left a message for the patient in order to discuss enrollment with Clinic Care Coordination.  If the patient is returning my call, please transfer him to me, Crystal Lilly, at [phone].    Patient was hospitalized from 4/12/18-4/13/18 at [facility]  Dx: Symptomatic Stenosis of the Right Carotid Artery    Need to ask the patient:  When he left the hospital, did he have a good understanding of the things he was responsible for in managing his health?  If not, why?    Pending Appointments:  7/23/18 at 1:00 for Carotid US                   1:30 for JOSE                   2:40 with Dr. Sergei Lemus @ [facility]  ________________  Next Outreach Date: 5/4/18

## 2021-06-17 NOTE — ANESTHESIA PREPROCEDURE EVALUATION
Anesthesia Evaluation      Patient summary reviewed   No history of anesthetic complications     Airway   Mallampati: II  Neck ROM: full   Pulmonary - normal exam    breath sounds clear to auscultation  (+) COPD, a smoker (Former smoker)                         Cardiovascular - normal exam  Exercise tolerance: > or = 4 METS  (+) hypertension, past MI, CAD, cardiomyopathy (Ischemic), hypercholesterolemia, PVD (s/p Aortobifem bypass, carotid stenosis)    (-) murmur  ECG reviewed  Rhythm: regular  Rate: normal,    no murmur      Neuro/Psych      Comments: Pt had some hallucinations with Dilaudid after aortobifem bypass per pt's wife.  Pt thinks it was related to dosing.     Endo/Other    (+) arthritis, obesity,      GI/Hepatic/Renal    (+)   chronic renal disease CRI,   (-) GERD     Other findings: 04/02/18 1529 US Carotid Bilateral SCAN  Impression:   CONCLUSION:  1. Moderate atheromatous plaque in the carotid arteries.  2. High-grade, 70-99% diameter stenosis of the right ICA.  3. Moderate, 50-69% diameter stenosis left ICA.       NM stress [id number]  Conclusion     The images of 11/2/2009 were unavailable for comparison review.    The left ventricular ejection fraction is 40%. There is severe hypokinesis to akinesis of the apical segments.    The pharmacologic nuclear stress test is abnormal.    There is moderate to severely reduced tracer uptake in a medium-sized area of the mid to apical anteroseptal and apex representing an area of transmural myocardial infarction.    Stress test was negative for inducible ischemia.      Echo 5/1/17  Summary    1. The left ventricle is normal in size. Left ventricular systolic performance is mildly reduced. The ejection fraction is estimated to be 45-50%.   2. There is a small area of distal septal and apical hypokinesis.  3. No significant valvular heart disease is identified on this study.   4. There is mild left atrial enlargement.                Dental    (+) poor dentition                        Anesthesia Plan  Planned anesthetic: general endotracheal  Phenylephrine inline  ASA 3   Induction: intravenous   Anesthetic plan and risks discussed with: patient  Anesthesia plan special considerations: antiemetics, arterial catheterization, IV therapy two IVs, dexmedetomidine  Post-op plan: routine recovery

## 2021-06-18 NOTE — PATIENT INSTRUCTIONS - HE
Patient Instructions by Arabella Ruvalcaba RN at 10/29/2020  9:20 AM     Author: Arabella Ruvalcaba RN Service: -- Author Type: Registered Nurse    Filed: 10/29/2020 10:25 AM Encounter Date: 10/29/2020 Status: Signed    : Arabella Ruvalcaba RN (Registered Nurse)       Patient Education     Carotid Artery Problems: Blockage     A healthy carotid artery lets blood flow easily to the brain.   The blood carries oxygen and nutrients throughout the body. The carotid arteries are large blood vessels that carry blood to the brain. Certain health problems can make the inside of the carotid arteries narrow and rough. Over time, this damage increases the chances of having a stroke. A stroke is a sudden loss of brain function.   Open carotid arteries  In a healthy carotid artery, the inside of the artery is open. The lining of the artery wall is also smooth. This lets blood flow freely from the heart to the brain. The brain gets all the oxygen and nutrients it needs to function well.  Narrowed carotid arteries  Health factors, such as high blood pressure, high cholesterol, smoking, and diabetes, can damage artery walls and make them rough. This allows cholesterol and other particles in the blood to stick to the artery walls and form plaque or fatty deposits. As the plaque builds up, it can narrow the artery. Blood may also collect on the plaque and form blood clots.  How a stroke happens     Emboli can enter the bloodstream and travel to the brain. Brain tissue is damaged when emboli block arteries in the brain.   A stroke can happen when plaque in the carotid artery ruptures. This can allow small pieces of plaque to break off into the bloodstream. At the same time, rupture can make more blood clots. The pieces of plaque and tiny blood clots or emboli can flow in the blood until they get stuck in a small blood vessel in the brain. This blocks blood flow to part of the brain and causes a stroke.  Date Last Reviewed: 6/8/2015     7406-1455 [employer]. [address] [place] [id number]. All rights reserved. This information is not intended as a substitute for professional medical care. Always follow your healthcare professional's instructions.    Lower Extremity Arterial Ultrasound    Description  Ultrasound examinations are painless and easy for the patient. The vascular laboratory will contain a bed and just two or three pieces of equipment. You will be asked to remove pants or shorts and gowns will be provided. It usually takes about 30 minutes.  The technician will tuck a towel under your underpants in the groin. The gel is water-soluble and will not stain your skin or clothes.  Ultrasound gel, usually warmed for your comfort, will be placed on the inner side of your legs.    Through the gel, the technician will apply to your legs a small hand-held device that emits sound waves.  When the test is completed, the technician will remove excess gel from your legs.    Risks  There are typically no side effects or complications associated with a lower extremity arterial duplex ultrasound.  How to Prepare  Eat and take medications as usual.  There is no preparation required for a lower extremity arterial duplex ultrasound.  What Can I Expect After the Test?  The technician will send the ultrasound images to your vascular surgeon for evaluation. Typically, a report is available in 2-3 days. If anything critical is found, it is standard practice to notify the vascular surgeon immediately.  Reference: https://vascular.org/patient-resources/vascular-tests  Ankle-Brachial Index (JOSE) or Physiologic Test    Description  An ankle-brachial index test is relatively pain free. Blood pressure cuffs of various sizes are placed on your thigh, calf, foot and toes.  Similar to having your blood pressure checked with an arm cuff, as the technician inflates the cuffs, they progressively tighten and are then quickly released.  You may feel  some discomfort, but generally for less than 60 seconds for each measurement. You will be asked to remove your socks and shoes and possibly your pants or shorts. Gowns will be provided. It usually takes about 30-60 minutes.   Depending on the initial readings and patient symptoms, you may be asked to perform a light walk on a treadmill.  The technician will apply ultrasound gel, usually warmed for your comfort, to your ankles and wrists. Through the gel, the technician will use a small hand-held device that emits sound waves.  Risks  There are typically no side effects or complications associated with a physiologic study.  How to Prepare  Eat and take medications as usual.  There is no preparation required for an ankle-brachial index (JOSE) or physiologic exam.  What Can I Expect After the Test?  The technician will send the ultrasound images to your vascular surgeon for evaluation. Typically, a report is available in 2-3 days. If anything critical is found, it is standard practice to notify the vascular surgeon immediately.  Reference: https://vascular.org/patient-resources/vascular-tests    Carotid Duplex    Description  Wearing your street clothes, you will be asked to lie on a stretcher.    Ultrasound gel, usually warmed for your comfort, will be placed on either side of your neck.    Through the gel, the technician will apply to your neck a small hand-held device that emits sound waves.   When the test is completed, the technician will remove excess gel from your neck. The gel is water-soluble and will not stain your skin or clothes.  How to Prepare  Eat and take medications as usual.   Wear minimal or no jewelry to ease application and removal of gel.  Risks  There are typically no side effects or complications associated with a carotid duplex ultrasound examination.  What Can I Expect After the Test?  The technician will send the ultrasound images to your vascular surgeon for evaluation. Typically, a report is  available in 2-3 days. If anything critical is found, it is standard practice to notify the vascular surgeon immediately.  Reference: https://vascular.org/patient-resources/vascular-tests

## 2021-06-19 NOTE — PROGRESS NOTES
HPI: Mr. Ordonez presents for follow-up of his carotid disease. We had performed a right carotid endarterectomy 3  months ago. He is doing well, and has no neurological complaints. No difficulty with speech, vision, strength or sensation.  Overall, he is very happy with his progress.     Allergies, Medications, Social History, Past Medical History and Past Surgical History were reviewed and are noted in the chart.    Review of Systems - Negative    /72  Pulse 64  Temp 97.9  F (36.6  C) (Oral)   There is no height or weight on file to calculate BMI.    EXAM:  GENERAL: This is a well developed male in no distress.  HEAD AND NECK: Cranial nerves intact. No neck masses or bruits. Right neck incision is c/d/i with good scar tissue present.  CARDIAC: RRR w/out murmur  CHEST/LUNG: Clear  EXTREM: Strength and sensation intact bilaterally.  5/5 strength and sensory bilaterally.          IMAGES:   < 50% of the right ICA, 50-69% stenosis of the left ICA with some moderate CCA disease on the left.     Assessment/Plan: Mr. Ordonez appears to be doing well. I will plan to see him back in clinic in 3 months for his 6 month ultrasound of his carotid arteries.  I will obtain an ultrasound of his [id number] bypass in 1 year at his visit to evaluate the progress of his disease.  I'd like him to continue on ASA 81 mg po daily and statin therapy.  He will call if any issues arise.  Also, his left ICA stenosis is about 60-70% and asymptomatic.  I plan to continue to watch his left ICA stenosis until it reaches 80% or symptoms develop.      Total time spent 15 minutes face to face with patient with more than 50% time spent in counseling and coordination of care.       Sergei Lemus MD  Vascular Surgery

## 2021-06-19 NOTE — PROGRESS NOTES
Walking ok but gets pain in the feet alll the time since surgery.   S/p carotid endarterectomy in 4/12/18 on the left. Asymptomatic.

## 2021-06-21 NOTE — PROGRESS NOTES
3 month f/u. S/p carotid endarterectomy in 4/12/18 on the left.  No leg pain. HTN, CKD. Norvasc, ASA, statin.

## 2021-06-21 NOTE — LETTER
Letter by Isabel Hawthorne MD at      Author: Isabel Hawthorne MD Service: -- Author Type: --    Filed:  Encounter Date: 12/10/2020 Status: (Other)         12/10/20      Young Ordonez  [address] [facility] [address]    Dear Young,    As a valued [facility] patient, your healthcare needs are our priority. Our clinic records indicate we have attempted to contact you to reschedule a follow up appointment with Dr. Isabel Hawthorne as well as ultrasounds, but have not heard back from you. If you are interested in scheduling, please contact our office to schedule your appointment at your soonest convenience. If you have already scheduled your appointment please disregard this letter.  We can be reached at: [phone]    Sincerely,    [facility]

## 2021-06-21 NOTE — PROGRESS NOTES
HPI: She is here today in clinic for follow-up in 6 months status post right carotid endarterectomy for symptomatic stenosis.  He is doing very well with no symptoms neurologically.  He is no longer having amaurosis symptoms at today's visit.  He had a carotid duplex today that we reviewed the ultrasound results today in clinic.  Overall he is happy his result.    Allergies, Medications, Social History, Past Medical History and Past Surgical History were reviewed and are noted in the chart.    Review of Systems - Negative.    /80  Pulse 72  Resp 16  There is no height or weight on file to calculate BMI.    EXAM:  GENERAL: This is a well developed male in no distress.  HEAD AND NECK: Cranial nerves intact. No neck masses or bruits.  The right neck incision is well-healed with good scar tissue formation.  CARDIAC: RRR w/out murmur  CHEST/LUNG: Clear  EXTREM: Strength and sensation intact bilaterally        IMAGES:   Duplex suggest less than 50% stenosis of the right internal carotid artery.  There is 50-69% stenosis of the left internal carotid artery.  Left common carotid artery lesion appears to be stable with a peak systolic velocity in the mid 200s which is slightly better since comparison to 3 months ago.    Assessment/Plan: 38-year-old male status post right carotid endarterectomy for symptomatic carotid stenosis with amaurosis fugax.    At this point the plan is to see Young back in 6 months at the one-year visit with a repeat carotid duplex at that time.  Prior to his carotid stenosis on the left side make sure it is not recurrence of disease in the right side.  No additional questions at this time he will call us if he develops any symptoms or problems in the near future.    Total time spent 15 minutes face to face with patient with more than 50% time spent in counseling and coordination of care.       Sergei Lemus MD  Vascular Surgery

## 2021-06-22 NOTE — PROGRESS NOTES
ASSESSMENT/PLAN:    Essential hypertension  Long history of hypertension.  His blood pressure is elevated today.  He states that his blood pressures have been within normal range while he was at home.  For now, I will refill his current medications but asked him to go back to see his primary care provider in 1 month to make sure that his blood pressure is truly in fact within normal range.  Continue with amlodipine 5 mg, atenolol 100 mg, and lisinopril 20 mg  -     lisinopril (PRINIVIL,ZESTRIL) 20 MG tablet; Take 1 tablet (20 mg total) by mouth daily.    Anxiety  Refilled  Reviewed safety profile of Ativan  -     sertraline (ZOLOFT) 50 MG tablet; Take 1 tablet (50 mg total) by mouth daily.  -     LORazepam (ATIVAN) 0.5 MG tablet; Take 1 tablet (0.5 mg total) by mouth at bedtime as needed for anxiety.    Depression  refilled  -     sertraline (ZOLOFT) 50 MG tablet; Take 1 tablet (50 mg total) by mouth daily.    SUBJECTIVE:    Young Ordonez is a 68 y.o. male who came in today for a medication management visit. Patient had a MI in 1995. His BP is a bit higher than normal today although last month it was normal. Pt wants his Lorazepam and Sertraline refilled for anxiety and occasional depression. Pt is unwilling to see a psychologist. He would like a refill for Lisinopril as well.   PHQ-9 Total Score: 6 (12/5/2018  2:00 PM)  SANTA-7 Total: 7 (3/27/2018  4:00 PM)  SANTA 7 Total Score: 6 (12/5/2018  2:00 PM)      Review of Systems (except those mentioned above)  Constitutional: Negative.   HENT: Negative.   Eyes: Negative.   Respiratory: Negative.   Cardiovascular: Negative.   Gastrointestinal: Negative.   Endocrine: Negative.   Genitourinary: Negative.   Musculoskeletal: Negative.   Skin: Negative.   Allergic/Immunologic: Negative.   Neurological: Negative.   Hematological: Negative.   Psychiatric/Behavioral: Negative.     Patient Active Problem List    Diagnosis Date Noted     Symptomatic stenosis of right carotid artery  04/12/2018     Polyarthritis 08/11/2017     Idiopathic chronic gout, multiple sites, with tophus (tophi) 08/11/2017     Acute gout of right foot 08/11/2017     Acute myocardial infarction (H) 07/26/2017     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 (H) 07/26/2017     Hyperlipidemia 07/26/2017     Gout attack 05/25/2017     Coronary artery disease due to lipid rich plaque      Obesity, unspecified obesity severity, unspecified obesity type      Hypotension, unspecified hypotension type      FRANCY (acute kidney injury) (H)      Low urine output      Post-op pain      CKD (chronic kidney disease), stage 3 (moderate)      Abdominal distention      Ischemic cardiomyopathy      Ischemic rest pain of lower extremity (H) 05/17/2017     Chronic gouty arthritis 09/18/2015     Gout      Chronic coronary artery disease      Cellulitis      HTN, goal below 150/90      Tobacco use 07/18/2013     Depressive disorder, not elsewhere classified 04/29/2008     Benign essential hypertension 08/25/2003     No Known Allergies  Current Outpatient Medications   Medication Sig Dispense Refill     allopurinol (ZYLOPRIM) 300 MG tablet TAKE 1 & 1/2 TABLETS BY MOUTH ONCE DAILY 135 tablet 0     amLODIPine (NORVASC) 5 MG tablet Take 1 tablet (5 mg total) by mouth daily. 90 tablet 3     aspirin 81 MG EC tablet Take 81 mg by mouth daily.        atenolol (TENORMIN) 100 MG tablet Take 1 tablet (100 mg total) by mouth daily. 90 tablet 0     atorvastatin (LIPITOR) 80 MG tablet Take 1 tablet (80 mg total) by mouth at bedtime. 90 tablet 1     cholecalciferol, vitamin D3, 5,000 unit capsule TAKE 1 CAPSULE BY MOUTH ONCE DAILY 90 capsule 2     LORazepam (ATIVAN) 0.5 MG tablet Take 1 tablet (0.5 mg total) by mouth at bedtime as needed for anxiety. 30 tablet 0     traZODone (DESYREL) 50 MG tablet TAKE 1 TABLET BY MOUTH EVERYDAY AT BEDTIME 90 tablet 1     cholecalciferol, vitamin D3, 5,000 unit Tab Take 1 tablet (5,000 Units total) by mouth daily. 90 tablet 2     colchicine  0.6 mg cap Take 1 capsule by mouth 2 (two) times a day. 180 capsule 1     lisinopril (PRINIVIL,ZESTRIL) 20 MG tablet Take 1 tablet (20 mg total) by mouth daily. 90 tablet 1     sertraline (ZOLOFT) 50 MG tablet Take 1 tablet (50 mg total) by mouth daily. 90 tablet 1     No current facility-administered medications for this visit.      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 viral hepatitis     Low back pain     chronic     MI (myocardial infarction) (H)      Obesity      PAD (peripheral artery disease) (H)      Past Surgical History:   Procedure Laterality Date     ANGIOPLASTY       ANKLE FRACTURE SURGERY Left      AORTA - BILATERAL FEMORAL ARTERY BYPASS GRAFT N/A 2017    Procedure: AORTOBIFEMORAL BYPASS ;  Surgeon: Ilir FERRO MD;  Location: [facility];  Service:      CAROTID ENDARTERECTOMY Right 2018    Procedure: RIGHT CAROTID ENDARTERECTOMY WITH EEG;  Surgeon: Sergei Lemus MD;  Location: [facility];  Service:      FRACTURE SURGERY       MANDIBLE SURGERY       Social History     Socioeconomic History     Marital status:      Spouse name: None     Number of children: None     Years of education: None     Highest education level: None   Social Needs     Financial resource strain: None     Food insecurity - worry: None     Food insecurity - inability: None     Transportation needs - medical: None     Transportation needs - non-medical: None   Occupational History     None   Tobacco Use     Smoking status: Former Smoker     Packs/day: 1.50     Types: Cigarettes     Last attempt to quit: 2013     Years since quittin.9     Smokeless tobacco: Never Used   Substance and Sexual Activity      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    Constitutional: Patient is oriented to person, place, and time. Patient appears well-developed and well-nourished. No distress.   Head: Normocephalic and atraumatic.   Right Ear: External ear normal.   Left Ear: External ear normal.   Nose: Nose normal.   Mouth/Throat: Oropharynx is clear and moist. No oropharyngeal exudate.   Eyes: Conjunctivae and EOM are normal. Right eye exhibits no discharge. Left eye exhibits no discharge. No scleral icterus.   Neurological: Patient is alert and oriented to person, place, and time. Patient has normal reflexes. No cranial nerve deficit. Coordination normal.   Skin: No rash noted. Patient is not diaphoretic. No erythema. No pallor.    Results for orders placed or performed during the hospital encounter of 04/12/18   Basic Metabolic Panel   Result Value Ref Range    Sodium 140 136 - 145 mmol/L    Potassium 3.5 3.5 - 5.0 mmol/L    Chloride 106 98 - 107 mmol/L    CO2 25 22 - 31 mmol/L    Anion Gap, Calculation 9 5 - 18 mmol/L    Glucose 98 70 - 125 mg/dL    Calcium 9.6 8.5 - 10.5 mg/dL    BUN 16 8 - 22 mg/dL    Creatinine 1.28 0.70 - 1.30 mg/dL    GFR MDRD Af Amer >60 >60 mL/min/1.73m2    GFR MDRD Non Af Amer 56 (L) >60 mL/min/1.73m2   HM2 (CBC W/O DIFF)   Result Value Ref Range    WBC 12.0 (H) 4.0 - 11.0 thou/uL    RBC 4.37 (L) 4.40 - 6.20 mill/uL    Hemoglobin 13.1  (L) 14.0 - 18.0 g/dL    Hematocrit 38.6 (L) 40.0 - 54.0 %    MCV 88 80 - 100 fL    MCH 30.0 27.0 - 34.0 pg    MCHC 33.9 32.0 - 36.0 g/dL    RDW 15.8 (H) 11.0 - 14.5 %    Platelets 203 140 - 440 thou/uL    MPV 11.7 8.5 - 12.5 fL   Type and screen   Result Value Ref Range    ABORh A POS     Antibody Screen Negative Negative   Basic metabolic panel   Result Value Ref Range    Sodium 137 136 - 145 mmol/L    Potassium 3.4 (L) 3.5 - 5.0 mmol/L    Chloride 102 98 - 107 mmol/L    CO2 24 22 - 31 mmol/L    Anion Gap, Calculation 11 5 - 18 mmol/L    Glucose 132 (H) 70 - 125 mg/dL    Calcium 8.9 8.5 - 10.5 mg/dL    BUN 11 8 - 22 mg/dL    Creatinine 1.00 0.70 - 1.30 mg/dL    GFR MDRD Af Amer >60 >60 mL/min/1.73m2    GFR MDRD Non Af Amer >60 >60 mL/min/1.73m2   HM1 (CBC with Diff)   Result Value Ref Range    WBC 19.0 (H) 4.0 - 11.0 thou/uL    RBC 4.15 (L) 4.40 - 6.20 mill/uL    Hemoglobin 12.4 (L) 14.0 - 18.0 g/dL    Hematocrit 36.9 (L) 40.0 - 54.0 %    MCV 89 80 - 100 fL    MCH 29.9 27.0 - 34.0 pg    MCHC 33.6 32.0 - 36.0 g/dL    RDW 15.2 (H) 11.0 - 14.5 %    Platelets 194 140 - 440 thou/uL    MPV 11.7 8.5 - 12.5 fL    Neutrophils % 81 (H) 50 - 70 %    Lymphocytes % 11 (L) 20 - 40 %    Monocytes % 8 2 - 10 %    Eosinophils % 0 0 - 6 %    Basophils % 0 0 - 2 %    Neutrophils Absolute 15.3 (H) 2.0 - 7.7 thou/uL    Lymphocytes Absolute 2.1 0.8 - 4.4 thou/uL    Monocytes Absolute 1.5 (H) 0.0 - 0.9 thou/uL    Eosinophils Absolute 0.0 0.0 - 0.4 thou/uL    Basophils Absolute 0.1 0.0 - 0.2 thou/uL   Potassium   Result Value Ref Range    Potassium 3.4 (L) 3.5 - 5.0 mmol/L   POCT Glucose   Result Value Ref Range    Glucose,  mg/dL   Surgical Pathology Exam   Result Value Ref Range    Case Report       Surgical Pathology Report                         Case: [id number]                                    Authorizing Provider:  Sergei Lemus MD            Collected:           04/12/2018 0840              Ordering Location:     [facility]  "[facility] OR   Received:            04/12/2018 1111              Pathologist:           Cheikh Sims MD                                                         Specimens:   A) - Lymph Node(s), Cervical, Right, deep cervical lymph node, right                                B) - Carotid Plaque, Right                                                                 Final Diagnosis       A) DEEP CERVICAL LYMPH NODE, RIGHT, BIOPSY:        - REACTIVE PARACORTICAL HYPERPLASIA        - NEGATIVE FOR MALIGNANCY     B) TISSUE, RIGHT CAROTID ARTERY, ENDARTERECTOMY:        - ATHEROSCLEROTIC PLAQUE    Microscopic Description       Microscopic examination performed, substantiating the above diagnosis.    Clinical Information       Pre-op Diagnosis:  Symptomatic carotid artery stenosis, right [I65.21]    Gross Description       A) Received in formalin, labeled with the patient's name and \"deep cervical lymph node, right,\" is a 0.8 x 0.5 x 0.4 cm, tan-pink, mottled lymph node with minimal attached yellow lobulated fat. T&TE-1C    B) Received in formalin, labeled with the patient's name and \"right carotid plaque,\" is a previously incised 1.9 x 0.8 cm, yellow-pink to red, calcified vascular plaque. There is a 0.4 x 0.3 cm bifurcation. Sectioning displays a yellow-pink to red-brown, variegated and calcified cut surface. There is approximately 90% luminal stenosis identified. RS-1C (decal).  RJR:db    Charges CPT:  61902, 73745, 35281  ICD-10:  I70.90     Result Flag  Normal     ADDITIONAL HISTORY SUMMARIZED (2): None.   DECISION TO OBTAIN EXTRA INFORMATION (1): None.   RADIOLOGY TESTS (1): None.  LABS (1): None.  MEDICINE TESTS (1): None.  INDEPENDENT REVIEW (2 each): None.     Total data points = 0    By signing my name below, Jo ZIMMER, attest that this documentation has been prepared under the direction and in the presence of Dr. Dalia Sahu.  Electronic Signature: Tami Madrid. 12/05/2018 14:24.    Dr. GORAN " Toma Esparza MD, personally performed the services described in this documentation. All medical record entries made by the scribe were at my direction and in my presence. I have reviewed the chart and discharge instructions (if applicable) and agree that the record reflects my personal performance and is accurate and complete.

## 2021-06-22 NOTE — TELEPHONE ENCOUNTER
Controlled Substance Refill Request  Medication:   Requested Prescriptions     Pending Prescriptions Disp Refills     LORazepam (ATIVAN) 0.5 MG tablet [Pharmacy Med Name: LORAZEPAM 0.5 MG TABLET] 30 tablet 0     Sig: TAKE 1 TABLET (0.5 MG TOTAL) BY MOUTH AT BEDTIME AS NEEDED FOR ANXIETY     Date Last Fill: 1/2/19  Pharm[id number]   Submit electronically to pharmacy  Controlled Substance Agreement on File:   Encounter-Level CSA Scan Date:    There are no encounter-level csa scan date.       Last office visit: Last office visit pertaining to requested medication was 12/5/18.

## 2021-06-23 NOTE — TELEPHONE ENCOUNTER
Refill Approved    Rx renewed per Medication Renewal Policy. Medication was last renewed on 1/15/18. 11/12/18    Praveena Hester, [facility] Triage/Med Refill 2/11/2019     Requested Prescriptions   Pending Prescriptions Disp Refills     amLODIPine (NORVASC) 5 MG tablet [Pharmacy Med Name: AMLODIPINE BESYLATE 5 MG TAB] 90 tablet 1     Sig: TAKE 1 TABLET (5 MG TOTAL) BY MOUTH DAILY.    Calcium-Channel Blockers Protocol Passed - 2/9/2019  4:43 PM       Passed - PCP or prescribing provider visit in past 12 months or next 3 months    Last office visit with prescriber/PCP: 2/1/2019 Ike Valdes MD OR same dept: 2/1/2019 Ike Valdes MD OR same specialty: 2/1/2019 Ike Valdes MD  Last physical: 4/10/2018 Last MTM visit: Visit date not found   Next visit within 3 mo: Visit date not found  Next physical within 3 mo: Visit date not found  Prescriber OR PCP: Ike Valdes MD  Last diagnosis associated with med order: 1. Hypertension, essential, benign  - amLODIPine (NORVASC) 5 MG tablet [Pharmacy Med Name: AMLODIPINE BESYLATE 5 MG TAB]; Take 1 tablet (5 mg total) by mouth daily.  Dispense: 90 tablet; Refill: 1  - atenolol (TENORMIN) 100 MG tablet [Pharmacy Med Name: ATENOLOL 100 MG TABLET]; TAKE 1 TABLET BY MOUTH EVERY DAY  Dispense: 90 tablet; Refill: 0    If protocol passes may refill for 12 months if within 3 months of last provider visit (or a total of 15 months).            Passed - Blood pressure filed in past 12 months    BP Readings from Last 1 Encounters:   02/01/19 128/72             atenolol (TENORMIN) 100 MG tablet [Pharmacy Med Name: ATENOLOL 100 MG TABLET] 90 tablet 0     Sig: TAKE 1 TABLET BY MOUTH EVERY DAY    Beta-Blockers Refill Protocol Passed - 2/9/2019  4:43 PM       Passed - PCP or prescribing provider visit in past 12 months or next 3 months    Last office visit with prescriber/PCP: 2/1/2019 Ike Valdes MD OR same dept:  2/1/2019 Ike Valdes MD OR same specialty: 2/1/2019 Ike Valdes MD  Last physical: 4/10/2018 Last MTM visit: Visit date not found   Next visit within 3 mo: Visit date not found  Next physical within 3 mo: Visit date not found  Prescriber OR PCP: Ike Valdes MD  Last diagnosis associated with med order: 1. Hypertension, essential, benign  - amLODIPine (NORVASC) 5 MG tablet [Pharmacy Med Name: AMLODIPINE BESYLATE 5 MG TAB]; Take 1 tablet (5 mg total) by mouth daily.  Dispense: 90 tablet; Refill: 1  - atenolol (TENORMIN) 100 MG tablet [Pharmacy Med Name: ATENOLOL 100 MG TABLET]; TAKE 1 TABLET BY MOUTH EVERY DAY  Dispense: 90 tablet; Refill: 0    If protocol passes may refill for 12 months if within 3 months of last provider visit (or a total of 15 months).            Passed - Blood pressure filed in past 12 months    BP Readings from Last 1 Encounters:   02/01/19 128/72

## 2021-06-23 NOTE — TELEPHONE ENCOUNTER
Test Results  Who is calling?:  Patient   Who ordered the test:  Ike Valdes MD   Type of test: Imaging CT  Date of test:  2/4/19  Where was the test performed:  [facility]   What are your questions/concerns?:  Results.   Okay to leave a detailed message?:  Yes      Writer relayed the blood work result from 2/1/19  Notes recorded by Ike Valdes MD on 2/5/2019 at 12:15 PM CST  Please inform the patient that the lab that was done when he came in for abdominal pain was essentially normal except for very minimal increase in the creatinine for which I will encourage him to be more hydrated.  Still awaiting the CT scan of the abdomen but I hope that he is doing better.

## 2021-06-23 NOTE — TELEPHONE ENCOUNTER
Left message to call back for: Young  Information to relay to patient:      ----- Message from Ike Valdes MD sent at 2/5/2019 12:15 PM CST -----  Please inform the patient that the lab that was done when he came in for abdominal pain was essentially normal except for very minimal increase in the creatinine for which I will encourage him to be more hydrated.  Still awaiting the CT scan of the abdomen but I hope that he is doing better.

## 2021-06-23 NOTE — PROGRESS NOTES
ASSESSMENT:  1. Abdominal pain, right lower quadrant  - CT Abdomen Pelvis With Oral With IV Contrast; Future  - Basic Metabolic Panel  - JI LAV  Discussed the possible cause of the pain.Talked about constipation, hernia etc though symptoms and signs does not match any of the differentials.He has that for the past month so will have him do a CT scan to evaluate. Advised to call or go in to the ER if he has worsening of the pain or develops new symptoms.  2. Screening for colon cancer  - Ambulatory referral for Colonoscopy  Ordered so he can get the colonoscopy.  3. Hyperlipidemia, unspecified hyperlipidemia type  - atorvastatin (LIPITOR) 80 MG tablet; Take 1 tablet (80 mg total) by mouth at bedtime.  Dispense: 90 tablet; Refill: 1  Medication refilled ,had Lipid check last year.    PLAN:  Will call with the CT SCAN report and decide follow up.    Orders Placed This Encounter   Procedures     CT Abdomen Pelvis With Oral With IV Contrast     Standing Status:   Future     Standing Expiration Date:   2/1/2020     Order Specific Question:   Reason for Exam (Describe Symptoms):     Answer:   Right Lower Quadrant abdominal pain.     Order Specific Question:   Can the procedure be changed per Radiologist protocol?     Answer:   Yes     Order Specific Question:   If this is a diagnostic procedure, have the patient's age and recent imaging history been considered?     Answer:   Yes     Basic Metabolic Panel     JIC LAV     Ambulatory referral for Colonoscopy     Referral Priority:   Routine     Referral Type:   Colonoscopy     Referral Reason:   Evaluation and Treatment     Referral Location:   [facility]     Requested Specialty:   Gastroenterology     Number of Visits Requested:   1     Medications Discontinued During This Encounter   Medication Reason     atorvastatin (LIPITOR) 80 MG tablet Reorder       No Follow-up on file.      CHIEF COMPLAINT:  Chief Complaint   Patient presents with     Pain     lower  right abdomen        HISTORY OF PRESENT ILLNESS:  Young is a 68 y.o. male presenting to the clinic today with concerns of abdominal pain which has been going on for the past 1 month.Pain is right lower abdominal quadrant. Described as achy with no radiation.Does not hurt with touch.No redness or swelling to the area.Has no constipation or diarrhea and no nausea. Has good appetite.Has remained about same with no worsening.  Has a history of hyperlipidemia and on medication.Doing well.    REVIEW OF SYSTEMS:   As in the history. Denies any vomiting, and does not feel bloated. Has no constitutional symptoms. Denies any dizziness. Has no fatigue. Denies any urinary symptoms.All other systems are negative.    PFSH:  Reviewed, as below.    Social History     Tobacco Use   Smoking Status Former Smoker     Packs/day: 1.50     Types: Cigarettes     Last attempt to quit:      Years since quittin.0   Smokeless Tobacco Never Used       Family History   Problem Relation Age of Onset     Cancer Mother      Stroke Father      Hypertension Father      Heart disease Paternal Grandmother      Emphysema Paternal Grandfather        Social History     Socioeconomic History     Marital status:      Spouse name: Not on file     Number of children: Not on file     Years of education: Not on file     Highest education level: Not on file   Social Needs     Financial resource strain: Not on file     Food insecurity - worry: Not on file     Food insecurity - inability: Not on file     Transportation needs - medical: Not on file     Transportation needs - non-medical: Not on file   Occupational History     Not on file   Tobacco Use     Smoking status: Former Smoker     Packs/day: 1.50     Types: Cigarettes     Last attempt to quit:      Years since quittin.0     Smokeless tobacco: Never Used   Substance and Sexual Activity     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      Drug use: No     Sexual activity: Not on file    Other Topics Concern     Not on file   Social History Narrative     Not on file       Past Surgical History:   Procedure Laterality Date     ANGIOPLASTY  1995     ANKLE FRACTURE SURGERY Left      AORTA - BILATERAL FEMORAL ARTERY BYPASS GRAFT N/A 5/17/2017    Procedure: AORTOBIFEMORAL BYPASS ;  Surgeon: Ilir FERRO MD;  Location: [facility];  Service:      CAROTID ENDARTERECTOMY Right 4/12/2018    Procedure: RIGHT CAROTID ENDARTERECTOMY WITH EEG;  Surgeon: Sergei Lemus MD;  Location: [facility];  Service:      FRACTURE SURGERY       MANDIBLE SURGERY         No Known Allergies    Active Ambulatory Problems     Diagnosis Date Noted     Gout      Chronic coronary artery disease      Cellulitis      HTN, goal below 150/90      Chronic gouty arthritis 09/18/2015     Tobacco use 07/18/2013     Ischemic rest pain of lower extremity (H) 05/17/2017     Hypotension, unspecified hypotension type      FRANCY (acute kidney injury) (H)      Low urine output      Post-op pain      CKD (chronic kidney disease), stage 3 (moderate)      Abdominal distention      Ischemic cardiomyopathy      Coronary artery disease due to lipid rich plaque      Obesity, unspecified obesity severity, unspecified obesity type      Gout attack 05/25/2017     Acute myocardial infarction (H) 07/26/2017     Depressive disorder, not elsewhere classified 04/29/2008     Benign essential hypertension 08/25/2003     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 (H) 07/26/2017     Hyperlipidemia 07/26/2017     Polyarthritis 08/11/2017     Idiopathic chronic gout, multiple sites, with tophus (tophi) 08/11/2017     Acute gout of right foot 08/11/2017     Symptomatic stenosis of right carotid artery 04/12/2018     Resolved Ambulatory Problems     Diagnosis Date Noted     No Resolved Ambulatory Problems     Past Medical History:   Diagnosis Date     Arthritis      Cardiomyopathy (H)      Claudication (H)      COPD (chronic obstructive pulmonary disease) (H)       Coronary artery disease      Depression      Gout      Gout attack 5/25/2017     HTN (hypertension)      Hyperlipidemia      Infectious viral hepatitis      Low back pain      MI (myocardial infarction) (H) 1995     Obesity      PAD (peripheral artery disease) (H)        Current Outpatient Medications   Medication Sig Dispense Refill     allopurinol (ZYLOPRIM) 300 MG tablet TAKE 1 & 1/2 TABLETS BY MOUTH ONCE DAILY 135 tablet 0     amLODIPine (NORVASC) 5 MG tablet Take 1 tablet (5 mg total) by mouth daily. 90 tablet 3     aspirin 81 MG EC tablet Take 81 mg by mouth daily.        atenolol (TENORMIN) 100 MG tablet Take 1 tablet (100 mg total) by mouth daily. 90 tablet 0     atorvastatin (LIPITOR) 80 MG tablet Take 1 tablet (80 mg total) by mouth at bedtime. 90 tablet 1     cholecalciferol, vitamin D3, 5,000 unit capsule TAKE 1 CAPSULE BY MOUTH ONCE DAILY 90 capsule 2     colchicine 0.6 mg cap Take 1 capsule by mouth 2 (two) times a day. 180 capsule 1     lisinopril (PRINIVIL,ZESTRIL) 20 MG tablet Take 1 tablet (20 mg total) by mouth daily. 90 tablet 1     LORazepam (ATIVAN) 0.5 MG tablet Take 1 tablet (0.5 mg total) by mouth at bedtime as needed for anxiety. 30 tablet 0     sertraline (ZOLOFT) 50 MG tablet Take 1 tablet (50 mg total) by mouth daily. 90 tablet 1     traZODone (DESYREL) 50 MG tablet TAKE 1 TABLET BY MOUTH EVERYDAY AT BEDTIME 90 tablet 1     cholecalciferol, vitamin D3, 5,000 unit Tab Take 1 tablet (5,000 Units total) by mouth daily. 90 tablet 2     No current facility-administered medications for this visit.        VITALS:  Vitals:    02/01/19 1416   BP: 128/72   Pulse: 64   Resp: 16   Weight: 196 lb 8 oz (89.1 kg)     Wt Readings from Last 3 Encounters:   02/01/19 196 lb 8 oz (89.1 kg)   12/05/18 198 lb 2 oz (89.9 kg)   04/20/18 186 lb 9.6 oz (84.6 kg)     Body mass index is 29.88 kg/m .    PHYSICAL EXAM:  General Appearance: Alert, cooperative, no distress, appears stated age  HEENT: Pupils are equal  and reactive, extraocular motions is normal.  Neck is supple no notable thyromegaly.  External ears are normal.  Lungs: Clear to auscultation bilaterally, respirations unlabored  Heart: Regular rate and rhythm, S1 and S2 normal, no murmur, rub, or gallop  Abdomen: Soft,obese,non tender,no masses palpable.Has no hernia.Normal bowel sounds.  Musculoskeletal: Normal range of motion. No joint swelling or deformity.   Neurologic:  Alert and oriented times 3. Cranial nerves II-XII intact.   Psychiatric: Normal mood and affect.    MEDICATIONS:  Current Outpatient Medications   Medication Sig Dispense Refill     allopurinol (ZYLOPRIM) 300 MG tablet TAKE 1 & 1/2 TABLETS BY MOUTH ONCE DAILY 135 tablet 0     amLODIPine (NORVASC) 5 MG tablet Take 1 tablet (5 mg total) by mouth daily. 90 tablet 3     aspirin 81 MG EC tablet Take 81 mg by mouth daily.        atenolol (TENORMIN) 100 MG tablet Take 1 tablet (100 mg total) by mouth daily. 90 tablet 0     atorvastatin (LIPITOR) 80 MG tablet Take 1 tablet (80 mg total) by mouth at bedtime. 90 tablet 1     cholecalciferol, vitamin D3, 5,000 unit capsule TAKE 1 CAPSULE BY MOUTH ONCE DAILY 90 capsule 2     colchicine 0.6 mg cap Take 1 capsule by mouth 2 (two) times a day. 180 capsule 1     lisinopril (PRINIVIL,ZESTRIL) 20 MG tablet Take 1 tablet (20 mg total) by mouth daily. 90 tablet 1     LORazepam (ATIVAN) 0.5 MG tablet Take 1 tablet (0.5 mg total) by mouth at bedtime as needed for anxiety. 30 tablet 0     sertraline (ZOLOFT) 50 MG tablet Take 1 tablet (50 mg total) by mouth daily. 90 tablet 1     traZODone (DESYREL) 50 MG tablet TAKE 1 TABLET BY MOUTH EVERYDAY AT BEDTIME 90 tablet 1     cholecalciferol, vitamin D3, 5,000 unit Tab Take 1 tablet (5,000 Units total) by mouth daily. 90 tablet 2     No current facility-administered medications for this visit.

## 2021-06-23 NOTE — TELEPHONE ENCOUNTER
Controlled Substance Refill Request  Medication Name:   Requested Prescriptions     Pending Prescriptions Disp Refills     LORazepam (ATIVAN) 0.5 MG tablet 30 tablet 0     Sig: Take 1 tablet (0.5 mg total) by mouth at bedtime as needed for anxiety.     Date Last Fill: 1/2/2019  Pharmacy: [facility].      Submit electronically to pharmacy  Controlled Substance Agreement Date Scanned:   Encounter-Level CSA Scan Date:    There are no encounter-level csa scan date.       Last office visit with prescriber/PCP: 4/20/2018 Ike Valdes MD OR same dept: 12/5/2018 Toma Weeks MD OR same specialty: 12/5/2018 Toma Weeks MD  Last physical: 4/10/2018 Last MTM visit: Visit date not found

## 2021-06-23 NOTE — TELEPHONE ENCOUNTER
Controlled Substance Refill Request  Medication:   Requested Prescriptions     Pending Prescriptions Disp Refills     LORazepam (ATIVAN) 0.5 MG tablet [Pharmacy Med Name: LORAZEPAM 0.5 MG TABLET] 30 tablet 0     Sig: TAKE 1 TABLET (0.5 MG TOTAL) BY MOUTH AT BEDTIME AS NEEDED FOR ANXIETY     Date Last Fill: 1/25/19 # 30  Pharmacy: [id number]   Submit electronically to pharmacy  Controlled Substance Agreement on File:   Encounter-Level CSA Scan Date:    There are no encounter-level csa scan date.       Last office visit: Last office visit pertaining to requested medication was 2/1/19.

## 2021-06-23 NOTE — TELEPHONE ENCOUNTER
Last ov 2/1/2019 with Dr. Valdes   Last refill 1/25/2019   reviewed - last 2 refills of lorazepam 1/2/2019#30 and 1/31/2019 #30  Refill request too early from the pharmacy   Opal Ceron LPN

## 2021-06-24 NOTE — TELEPHONE ENCOUNTER
PA APPROVED:    Approval start date:02/26/2019  Approval end date:02/26/2020    Pharmacy has been notified of approval and will contact patient when medication is ready for pickup.

## 2021-06-24 NOTE — TELEPHONE ENCOUNTER
Central PA team  [phone]  Pool: HE PA MED ([id number])          PA has been initiated.       PA form completed and faxed insurance via Cover My Meds     Key:   [id number]     Medication:  Colchicine 0.6mg capsule    Insurance: [employer]        Response will be received via fax and may take up to 5-10 business days depending on plan

## 2021-06-24 NOTE — TELEPHONE ENCOUNTER
----- Message from Ike Valdes MD sent at 3/4/2019  5:53 PM CST -----  Please inform the patient that we have gotten his colonoscopy report and he does need to do a repeat colonoscopy in 3 years.

## 2021-06-24 NOTE — TELEPHONE ENCOUNTER
Controlled Substance Refill Request  Medication:   Requested Prescriptions     Pending Prescriptions Disp Refills     LORazepam (ATIVAN) 0.5 MG tablet [Pharmacy Med Name: LORAZEPAM 0.5 MG TABLET] 30 tablet 0     Sig: TAKE 1 TABLET (0.5 MG TOTAL) BY MOUTH AT BEDTIME AS NEEDED FOR ANXIETY.     Date Last Fill: 1/25/19  Pharmacy: [facility]   Submit electronically to pharmacy    Controlled Substance Agreement on File:   Encounter-Level CSA Scan Date:    There are no encounter-level csa scan date.       Last office visit with primary: 2/1/2019

## 2021-06-24 NOTE — TELEPHONE ENCOUNTER
Refill Approved    Rx renewed per Medication Renewal Policy. Medication was last renewed on 11/30/18, last OV 2/1/19.    Marely Silverio, [facility] Triage/Med Refill 3/3/2019     Requested Prescriptions   Pending Prescriptions Disp Refills     allopurinol (ZYLOPRIM) 300 MG tablet [Pharmacy Med Name: ALLOPURINOL 300 MG TABLET] 135 tablet 0     Sig: TAKE 1 & 1/2 TABLETS BY MOUTH ONCE DAILY    Allopurinol/Febuxostat Refill Protocol  Passed - 3/2/2019  8:32 AM       Passed - LFT or AST or ALT in last 12 months    Albumin   Date Value Ref Range Status   03/27/2018 3.8 3.5 - 5.0 g/dL Final     Bilirubin, Total   Date Value Ref Range Status   03/27/2018 0.6 0.0 - 1.0 mg/dL Final     Bilirubin, Direct   Date Value Ref Range Status   04/18/2017 0.2 <=0.5 mg/dL Final     Alkaline Phosphatase   Date Value Ref Range Status   03/27/2018 115 45 - 120 U/L Final     AST   Date Value Ref Range Status   03/27/2018 27 0 - 40 U/L Final     ALT   Date Value Ref Range Status   03/27/2018 31 0 - 45 U/L Final     Protein, Total   Date Value Ref Range Status   03/27/2018 6.8 6.0 - 8.0 g/dL Final               Passed - Visit with PCP or prescribing provider visit in past 12 months    Last office visit with prescriber/PCP: 2/1/2019 Ike Valdes MD OR same dept: 2/1/2019 Ike Valdes MD OR same specialty: 2/1/2019 Ike Valdes MD  Last physical: 4/10/2018 Last MTM visit: Visit date not found   Next visit within 3 mo: Visit date not found  Next physical within 3 mo: Visit date not found  Prescriber OR PCP: Ike Valdes MD  Last diagnosis associated with med order: 1. Acute gout of right knee, unspecified cause  - allopurinol (ZYLOPRIM) 300 MG tablet [Pharmacy Med Name: ALLOPURINOL 300 MG TABLET]; TAKE 1 & 1/2 TABLETS BY MOUTH ONCE DAILY  Dispense: 135 tablet; Refill: 0    If protocol passes may refill for 12 months if within 3 months of last provider visit (or a total of 15  months).

## 2021-06-24 NOTE — TELEPHONE ENCOUNTER
Fax received from [facility] pharmacy requesting Prior Authorization    Medication Name: Colchicine 0.6mg capsule    Insurance Plan: [employer]   PBM:    Patient ID Number: [id number]    Please start PA process

## 2021-06-24 NOTE — TELEPHONE ENCOUNTER
Left message with person who answered the phone  for pt to call back       Please relay results to pt     Thank you   Mariella Braden, CMA

## 2021-06-24 NOTE — TELEPHONE ENCOUNTER
Patient Returning Call  Reason for call:  Patient returning call to the clinic.  Information relayed to patient:  Yes and patient agrees with plan.  Patient has additional questions:  No  If YES, what are your questions/concerns:  none  Okay to leave a detailed message?: Yes   CXR clear, EKG no acute ischemia, trop negative.  Plan for observation for serial trop and stress in am. MESERET Young MD

## 2021-06-25 NOTE — TELEPHONE ENCOUNTER
Refill Approved    Rx renewed per Medication Renewal Policy. Medication was last renewed on 9/12/20.    Mac Alva, [facility] Triage/Med Refill 6/11/2021     Requested Prescriptions   Pending Prescriptions Disp Refills     atorvastatin (LIPITOR) 80 MG tablet [Pharmacy Med Name: ATORVASTATIN 80 MG TABLET] 30 tablet 8     Sig: TAKE 1 TABLET BY MOUTH EVERYDAY AT BEDTIME       Statins Refill Protocol (Hmg CoA Reductase Inhibitors) Passed - 6/10/2021 12:24 AM        Passed - PCP or prescribing provider visit in past 12 months      Last office visit with prescriber/PCP: 6/1/2021 Ike Valdes MD OR same dept: 6/1/2021 Ike Valdes MD OR same specialty: 6/1/2021 Ike Valdes MD  Last physical: 4/10/2018 Last MTM visit: Visit date not found   Next visit within 3 mo: Visit date not found  Next physical within 3 mo: Visit date not found  Prescriber OR PCP: Ike Valdes MD  Last diagnosis associated with med order: 1. Hyperlipidemia, unspecified hyperlipidemia type  - atorvastatin (LIPITOR) 80 MG tablet [Pharmacy Med Name: ATORVASTATIN 80 MG TABLET]; TAKE 1 TABLET BY MOUTH EVERYDAY AT BEDTIME  Dispense: 30 tablet; Refill: 8    If protocol passes may refill for 12 months if within 3 months of last provider visit (or a total of 15 months).

## 2021-06-25 NOTE — TELEPHONE ENCOUNTER
Refill Approved    Rx renewed per Medication Renewal Policy. Medication was last renewed on 3/8/21, last OV 3/8/21.    Marely Silverio, [facility] Triage/Med Refill 5/29/2021     Requested Prescriptions   Pending Prescriptions Disp Refills     sertraline (ZOLOFT) 50 MG tablet [Pharmacy Med Name: SERTRALINE HCL 50 MG TABLET] 135 tablet 0     Sig: TAKE 1.5 TABLET BY MOUTH ONCE DAILY       [facility] Refill Protocol  Passed - 5/28/2021  5:29 PM        Passed - PCP or prescribing provider visit in last year     Last office visit with prescriber/PCP: 3/8/2021 Ike Valdes MD OR same dept: 3/8/2021 Ike Valdes MD OR same specialty: 3/8/2021 Ike Valdes MD  Last physical: 4/10/2018 Last MTM visit: Visit date not found   Next visit within 3 mo: Visit date not found  Next physical within 3 mo: Visit date not found  Prescriber OR PCP: Ike Valdes MD  Last diagnosis associated with med order: 1. Anxiety  - sertraline (ZOLOFT) 50 MG tablet [Pharmacy Med Name: SERTRALINE HCL 50 MG TABLET]; TAKE 1.5 TABLET BY MOUTH ONCE DAILY  Dispense: 135 tablet; Refill: 0    2. Depression  - sertraline (ZOLOFT) 50 MG tablet [Pharmacy Med Name: SERTRALINE HCL 50 MG TABLET]; TAKE 1.5 TABLET BY MOUTH ONCE DAILY  Dispense: 135 tablet; Refill: 0    If protocol passes may refill for 12 months if within 3 months of last provider visit (or a total of 15 months).

## 2021-06-25 NOTE — PROGRESS NOTES
Progress Notes by Nancy Martinez MD at 4/18/2017  2:30 PM     Author: Nancy Martinez MD Service: -- Author Type: Physician    Filed: 4/18/2017  2:33 PM Encounter Date: 4/18/2017 Status: Signed    : Nancy Martinez MD (Physician)        `        [email or web address]            Thank you Dr. Mandujano for asking the [facility] team to participate in the care of your patient, Young Ordonez.     Impression and Plan     1.  Coronary artery disease.  Young has known coronary artery disease having suffered a large anterior myocardial infarction 1995.  Patient underwent angiography with angioplasty at that time.    Patient is now being considered for aortobifemoral bypass under the auspices of Dr. Ilir Mandujano.  Given patients limited ability to ambulate, history of coronary disease, and the proposed surgery; do feel preoperative risk assessment reasonable and warranted.  Recommend:    Regadenoson nuclear perfusion study and will compare to previous study which had revealed evidence of infarct in the left anterior descending coronary artery distribution..    Assessment of direct LDL with subsequent initiation of statin therapy given the putative acute plaque stabilizing another pleiotropic effects of the therapy.  Formulation of statin to be determined based on lipid assessment.    2.  Cardiomyopathy.  Nuclear perfusion imaging to November 2009 revealed moderate depression in left ventricular systolic performance with ejection fraction of 39% at that time.    Echocardiogram to not only assess left ventricular systolic performance, but also to rule out any associated valvular pathology such as mitral insufficiency though significant valvular disease is not suggested on auscultation.    Continue ACE inhibitor therapy.    Continue beta blocker therapy.    3. Hypertension.  Blood pressure is reasonably office today at 120/66 mmHg.    4.  Dyslipidemia.  Patient currently not on  "statin therapy.  He states it had been advised in the past though he simply had not renewed his prior prescription.    Plan to obtain lipid profile today.      Follow-up and further recommendations pending test results.         History of Present Illness    Once again I would like to thank you again for asking me to participate in the care of your patient, Young Ordonez.  As you know, but to reiterate for my own records, Young Ordonez is a 67 y.o. male with significant peripheral vascular disease.  Patient was seen by Dr. Ilir Mandujano 10 April 2017 at which time aortobifemoral bypass was recommended.    Young also has known coronary artery disease having suffered a large anterior myocardial infarction 1995.  Patient underwent angiography with angioplasty at that time.    Patient has had symptomatic peripheral vascular disease with leg/calf discomfort with left being more so than right.  He developed symptoms with minimal ambulation.  He denies chest pain symptoms, however.  His breathing is comfortable.    Further review of systems is otherwise negative/noncontributory (medical record and 13 point review of systems reviewed as well and pertinent positives noted).         Cardiac Diagnostics     Adenosine nuclear perfusion study 2 November 2009:  1. No evidence of ischemia.  2. Moderate to large mid to distal anteroseptal, anterior, and apical infarction.  3. Moderately reduced left ventricular systolic performance with ejection fraction of 39% with anteroseptal and apical akinesis.           Physical Examination       /66 (Patient Site: Right Arm, Patient Position: Sitting, Cuff Size: Adult Large)  Pulse 60  Resp 18  Ht 5' 8\" (1.727 m)  Wt 215 lb (97.5 kg)  BMI 32.69 kg/m2        Wt Readings from Last 3 Encounters:   04/18/17 215 lb (97.5 kg)   04/10/17 210 lb (95.3 kg)   04/06/17 210 lb 7 oz (95.5 kg)     The patient is alert and oriented times three. Sclerae are anicteric. Mucosal membranes are " moist. Jugular venous pressure is normal. No significant adenopathy/thyromegally appreciated. Lungs are clear with good expansion. On cardiovascular exam, the patient has a regular S1 and S2. Abdomen is soft and non-tender. Extremities reveal no clubbing, cyanosis, or edema.       Imaging     Angiogram 6 April 2017:  1. LLE: occluded common and external iliac arteries. Occluded CFA. Reconstitution at the PFA origin. Occluded SFA. Above-knee reconstitution of the popliteal artery. Inadquate opacification of the infrapopliteal arteries, due to the proximal occlusions.   2. RLE: Moderate stenosis, mid CHARLEE. Moderate EIA and CFA irregularity. Occluded SFA. Patent PFA. Above-knee reconstitution of the popliteal artery. 3 vessel runoff. Severe stenosis proximal HERNAN.     EXAM: ANKLE-BRACHIAL INDICES (ABIs) AND DUPLEX ARTERIAL ULTRASOUND OF THE BILATERAL LOWER EXTREMITIES 10 February 2017:  1. RIGHT LOWER EXTREMITY: Non-compressible ABIs due to underlying calcified atherosclerotic changes. Arterial duplex ultrasound suggests the presence of significant stenosis within the femoropopliteal segment.  2. LEFT LOWER EXTREMITY: Unobtainable ABIs. Monophasic waveforms proximally suggests a significant inflow stenosis. Occluded superficial femoral artery. Occluded anterior tibial artery.          Family History/Social History/Risk Factors   Patient does not smoke.  Family history of hypertension.       Medications  Allergies   Current Outpatient Prescriptions   Medication Sig Dispense Refill   ? allopurinol (ZYLOPRIM) 300 MG tablet TAKE 1 & 1/2 TABLETS BY MOUTH DAILY.  3   ? amLODIPine (NORVASC) 5 MG tablet Take 1 tablet (5 mg total) by mouth daily. 90 tablet 1   ? aspirin 81 MG EC tablet Take 81 mg by mouth.     ? atenolol (TENORMIN) 100 MG tablet Take 1 tablet (100 mg total) by mouth daily. 30 tablet 0   ? cholecalciferol, vitamin D3, 5,000 unit Tab Take by mouth.     ? lisinopril (PRINIVIL,ZESTRIL) 20 MG tablet TAKE ONE TABLET BY  MOUTH ONE TIME DAILY 30 tablet 0   ? predniSONE (DELTASONE) 10 MG tablet TAKE 1TAB BY MOUTH DAILY FOR GOUT PROPHYLAXIS INCREASE UP TO 30MG X1DAY 20MG X1DAY THEN 10MG X1DAY  2   ? sertraline (ZOLOFT) 25 MG tablet TAKE ONE TABLET BY MOUTH ONE TIME DAILY  3     No current facility-administered medications for this visit.       No Known Allergies       Lab Results   Lab Results   Component Value Date     03/06/2017    K 3.8 03/06/2017     03/06/2017    CO2 25 03/06/2017    BUN 22 03/06/2017    CREATININE 1.46 (H) 04/06/2017    CALCIUM 9.3 03/06/2017     Lab Results   Component Value Date    WBC 18.6 (H) 04/01/2013    HGB 13.3 (L) 04/06/2017    HCT 42.8 04/01/2013    MCV 91 04/01/2013     04/06/2017     Lab Results   Component Value Date    CHOL 137 02/26/2010    TRIG 59 02/26/2010    HDL 46 02/26/2010    LDLCALC 79.2 02/26/2010    LDLDIRECT 118.0 06/13/2011     Lab Results   Component Value Date    INR 1.02 04/06/2017           Medical History  Surgical History   Past Medical History:   Diagnosis Date   ? Claudication    ? COPD (chronic obstructive pulmonary disease)    ? Coronary artery disease    ? History of smoking    ? HTN (hypertension)    ? Hyperlipidemia    ? MI (myocardial infarction)    ? PAD (peripheral artery disease)       Past Surgical History:   Procedure Laterality Date   ? ANGIOPLASTY  1995   ? ANKLE FRACTURE SURGERY Left    ? MANDIBLE SURGERY

## 2021-06-25 NOTE — PROGRESS NOTES
"    Assessment & Plan     Anxiety  - LORazepam (ATIVAN) 0.5 MG tablet  Dispense: 30 tablet; Refill: 0  - sertraline (ZOLOFT) 100 MG tablet  Dispense: 135 tablet; Refill: 1    Depression  - sertraline (ZOLOFT) 100 MG tablet  Dispense: 135 tablet; Refill: 1    Hypertension, essential, benign  - amLODIPine (NORVASC) 5 MG tablet  Dispense: 90 tablet; Refill: 1  - lisinopriL (PRINIVIL,ZESTRIL) 20 MG tablet  Dispense: 90 tablet; Refill: 2  Noted that he is doing well regarding the anxiety and depression.  But noted to have a refill to show that it has been increased.  This was done, I also did a refill his lorazepam because he had noted having difficulty focusing as well as panic attacks because of his family stressors.  Blood pressure seems to be doing well but medications were refilled.  I will have him follow-up for his physical exam soon.   :[id number]}     BMI:   Estimated body mass index is 27.79 kg/m  as calculated from the following:    Height as of 3/8/21: 5' 8\" (1.727 m).    Weight as of this encounter: 182 lb 12.8 oz (82.9 kg).       No follow-ups on file.    Ike Valdes MD  [facility]   Young Ordonez is 71 y.o. and presents today for the following health issues   HPI   He is here for medication check.  He takes sertraline should be on 150 mg daily but apparently needs to have a refill of her medications to be able to do that at this time.  Noted that he has been taking what he has at home.  Noted that he has been on the same level of stress being that he has a some issues with his daughter and son from a previous marriage who has not been able to communicate with him even when he tries to.  He noted that he has been having stress and anxiety because of that.  He does take lorazepam to help him relax and sleep especially in the evening because of the concerns.  He has a hypertension which is being controlled with medications.  And noted no chest pains " and no shortness of breath.  And feels well otherwise.  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 viral hepatitis     Low back pain     chronic     MI (myocardial infarction) (H)      Obesity      PAD (peripheral artery disease) (H)      Past Surgical History:   Procedure Laterality Date     ANGIOPLASTY       ANKLE FRACTURE SURGERY Left      AORTA - BILATERAL FEMORAL ARTERY BYPASS GRAFT N/A 2017    Procedure: AORTOBIFEMORAL BYPASS ;  Surgeon: Ilir FERRO MD;  Location: [facility];  Service:      CAROTID ENDARTERECTOMY Right 2018    Procedure: RIGHT CAROTID ENDARTERECTOMY WITH EEG;  Surgeon: Sergei Lemus MD;  Location: [facility];  Service:      FRACTURE SURGERY       MANDIBLE SURGERY       Social History     Socioeconomic History     Marital status:      Spouse name: None     Number of children: None     Years of education: None     Highest education level: None   Occupational History     None   Social Needs     Financial resource strain: None     Food insecurity     Worry: None     Inability: None     Transportation needs     Medical: None     Non-medical: None   Tobacco Use     Smoking status: Former Smoker     Packs/day: 1.50     Types: Cigarettes     Quit date: 2013     Years since quittin.4     Smokeless tobacco: Never Used   Substance and Sexual Activity     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      Drug use: No     Sexual activity: None   Lifestyle     Physical activity     Days per week: None     Minutes per session: None     Stress: None   Relationships     Social connections     Talks on phone: None     Gets together: None     Attends [employer] service: None     Active  member of club or organization: None     Attends meetings of clubs or organizations: None     Relationship status: None     Intimate partner violence     Fear of current or ex partner: None     Emotionally abused: None     Physically abused: None     Forced sexual activity: None   Other Topics Concern     None   Social History Narrative     None     Family History   Problem Relation Age of Onset     Cancer Mother      Stroke Father      Hypertension Father      Heart disease Paternal Grandmother      Emphysema Paternal Grandfather      No Known Allergies  Current Outpatient Medications   Medication Sig Dispense Refill     allopurinoL (ZYLOPRIM) 300 MG tablet TAKE 1 TABLET BY MOUTH EVERY DAY 30 tablet 8     amLODIPine (NORVASC) 5 MG tablet Take 1 tablet (5 mg total) by mouth daily. 90 tablet 1     aspirin 325 MG tablet Take 325 mg by mouth.       atenoloL (TENORMIN) 100 MG tablet TAKE 1 TABLET BY MOUTH EVERY DAY 30 tablet 6     atorvastatin (LIPITOR) 80 MG tablet TAKE 1 TABLET BY MOUTH EVERYDAY AT BEDTIME 90 tablet 2     lisinopriL (PRINIVIL,ZESTRIL) 20 MG tablet Take 1 tablet (20 mg total) by mouth daily. 90 tablet 2     traZODone (DESYREL) 50 MG tablet TAKE 1 TABLET BY MOUTH EVERYDAY AT BEDTIME 90 tablet 3     LORazepam (ATIVAN) 0.5 MG tablet Take 1 tablet (0.5 mg total) by mouth at bedtime as needed for anxiety. 30 tablet 0     sertraline (ZOLOFT) 100 MG tablet Take 1.5 tablets (150 mg total) by mouth daily. 135 tablet 1     No current facility-administered medications for this visit.          Review of Systems   Constitutional: Negative.    Respiratory: Negative for chest tightness and shortness of breath.    Cardiovascular: Negative for chest pain and palpitations.   Neurological: Negative for headaches.   Psychiatric/Behavioral: Positive for dysphoric mood and sleep disturbance. Negative for behavioral problems and suicidal ideas. The patient is nervous/anxious.            Objective    /80   Pulse 64    Wt 182 lb 12.8 oz (82.9 kg)   BMI 27.79 kg/m    Body mass index is 27.79 kg/m .  Physical Exam   Constitutional: He is oriented to person, place, and time.   Cardiovascular: Normal rate and regular rhythm.   Pulmonary/Chest: Effort normal and breath sounds normal.   Neurological: He is alert and oriented to person, place, and time.   Psychiatric: His mood appears not anxious. His affect is not angry. He does not exhibit a depressed mood.   He does look slightly sad.  And I did  him regarding that.

## 2021-06-25 NOTE — TELEPHONE ENCOUNTER
RN cannot approve Refill Request    RN can NOT refill this medication PCP messaged that patient is overdue for Labs. Last office visit: 3/8/2021 Ike Valdes MD Last Physical: 4/10/2018 Last MTM visit: Visit date not found Last visit same specialty: 3/8/2021 Ike Valdes MD.  Next visit within 3 mo: Visit date not found  Next physical within 3 mo: Visit date not found      Marely Silverio, [facility] Triage/Med Refill 5/29/2021    Requested Prescriptions   Pending Prescriptions Disp Refills     allopurinoL (ZYLOPRIM) 300 MG tablet [Pharmacy Med Name: ALLOPURINOL 300 MG TABLET] 30 tablet 8     Sig: TAKE 1 TABLET BY MOUTH EVERY DAY       Allopurinol/Febuxostat Refill Protocol  Failed - 5/29/2021  9:25 AM        Failed - LFT or AST or ALT in last 12 months     Albumin   Date Value Ref Range Status   07/08/2019 4.1 3.5 - 5.0 g/dL Final     Bilirubin, Total   Date Value Ref Range Status   07/08/2019 0.5 0.0 - 1.0 mg/dL Final     Bilirubin, Direct   Date Value Ref Range Status   04/18/2017 0.2 <=0.5 mg/dL Final     Alkaline Phosphatase   Date Value Ref Range Status   07/08/2019 78 45 - 120 U/L Final     AST   Date Value Ref Range Status   07/08/2019 25 0 - 40 U/L Final     ALT   Date Value Ref Range Status   07/08/2019 34 0 - 45 U/L Final     Protein, Total   Date Value Ref Range Status   07/08/2019 6.9 6.0 - 8.0 g/dL Final                Passed - Visit with PCP or prescribing provider visit in past 12 months     Last office visit with prescriber/PCP: 3/8/2021 Ike Valdes MD OR same dept: Visit date not found OR same specialty: 3/8/2021 Ike Valdes MD  Last physical: 4/10/2018 Last MTM visit: Visit date not found   Next visit within 3 mo: Visit date not found  Next physical within 3 mo: Visit date not found  Prescriber OR PCP: Ike Valdes MD  Last diagnosis associated with med order: 1. Acute gout of right knee, unspecified cause  -  allopurinoL (ZYLOPRIM) 300 MG tablet [Pharmacy Med Name: ALLOPURINOL 300 MG TABLET]; TAKE 1 TABLET BY MOUTH EVERY DAY  Dispense: 30 tablet; Refill: 8    2. Hypertension, essential, benign  - atenoloL (TENORMIN) 100 MG tablet [Pharmacy Med Name: ATENOLOL 100 MG TABLET]; TAKE 1 TABLET BY MOUTH EVERY DAY  Dispense: 30 tablet; Refill: 6    If protocol passes may refill for 12 months if within 3 months of last provider visit (or a total of 15 months).                atenoloL (TENORMIN) 100 MG tablet [Pharmacy Med Name: ATENOLOL 100 MG TABLET] 30 tablet 6     Sig: TAKE 1 TABLET BY MOUTH EVERY DAY       Beta-Blockers Refill Protocol Passed - 5/29/2021  9:25 AM        Passed - PCP or prescribing provider visit in past 12 months or next 3 months     Last office visit with prescriber/PCP: 3/8/2021 Ike Valdes MD OR same dept: Visit date not found OR same specialty: 3/8/2021 Ike Valdes MD  Last physical: 4/10/2018 Last MTM visit: Visit date not found   Next visit within 3 mo: Visit date not found  Next physical within 3 mo: Visit date not found  Prescriber OR PCP: Ike Valdes MD  Last diagnosis associated with med order: 1. Acute gout of right knee, unspecified cause  - allopurinoL (ZYLOPRIM) 300 MG tablet [Pharmacy Med Name: ALLOPURINOL 300 MG TABLET]; TAKE 1 TABLET BY MOUTH EVERY DAY  Dispense: 30 tablet; Refill: 8    2. Hypertension, essential, benign  - atenoloL (TENORMIN) 100 MG tablet [Pharmacy Med Name: ATENOLOL 100 MG TABLET]; TAKE 1 TABLET BY MOUTH EVERY DAY  Dispense: 30 tablet; Refill: 6    If protocol passes may refill for 12 months if within 3 months of last provider visit (or a total of 15 months).             Passed - Blood pressure filed in past 12 months     BP Readings from Last 1 Encounters:   03/08/21 124/80

## 2021-07-03 NOTE — ADDENDUM NOTE
Addendum Note by Killian Elmore MA at 3/6/2017  1:01 PM     Author: Killian Elmore MA Service: -- Author Type: Medical Assistant    Filed: 3/6/2017  1:01 PM Encounter Date: 2/27/2017 Status: Signed    : Killian Elmore MA (Medical Assistant)    Addended by: KILLIAN ELMORE on: 3/6/2017 01:01 PM        Modules accepted: Orders

## 2021-07-03 NOTE — ADDENDUM NOTE
Addendum Note by Ashley Kolb RN at 8/6/2018  1:52 PM     Author: Ashley Kolb RN Service: -- Author Type: --    Filed: 8/6/2018  1:52 PM Encounter Date: 8/6/2018 Status: Signed    : Ashley Kolb RN (Registered Nurse)    Addended by: ASHLEY KOLB on: 8/6/2018 01:52 PM        Modules accepted: Orders

## 2021-07-05 ENCOUNTER — COMMUNICATION - HEALTHEAST (OUTPATIENT)
Dept: FAMILY MEDICINE | Facility: CLINIC | Age: 71
End: 2021-07-05

## 2021-07-05 DIAGNOSIS — F41.9 ANXIETY: ICD-10-CM

## 2021-07-06 VITALS
WEIGHT: 182.8 LBS | DIASTOLIC BLOOD PRESSURE: 80 MMHG | HEART RATE: 64 BPM | SYSTOLIC BLOOD PRESSURE: 140 MMHG | BODY MASS INDEX: 27.79 KG/M2

## 2021-07-06 ASSESSMENT — PATIENT HEALTH QUESTIONNAIRE - PHQ9: SUM OF ALL RESPONSES TO PHQ QUESTIONS 1-9: 8

## 2021-07-08 ASSESSMENT — ANXIETY QUESTIONNAIRES: GAD7 TOTAL SCORE: 5

## 2021-08-06 NOTE — PROGRESS NOTES
Progress Notes by Ilir Mandujano MD at 4/10/2017  3:40 PM     Author: Ilir Mandujano MD Service: -- Author Type: Physician    Filed: 4/10/2017  4:34 PM Encounter Date: 4/10/2017 Status: Signed    : Ilir Mandujano MD (Physician)        VASCULAR SURGERY CLINIC CONSULTATION    VASCULAR SURGEON: Ilir Mandujano MD     LOCATION:  [place] VASCULAR [facility]    Young Ordonez   Medical Record #:  [id number]  YOB: 1950  Age:  67 y.o.     Date of Service: 4/10/2017    PRIMARY CARE PROVIDER: Ike Valdes MD  Requesting Provider: Ike Valdes MD     IMPRESSION:    Left leg Ischemic Rest pain secondary to multilevel arterial insufficiency including left iliac and common femoral artery occlusion and right common and external iliac artery stenoses. He also has bilateral Internal iliac artery occlusions. He has Bilateral SFA occlusions with reconstitution of calcified popliteal arteries at Sergio's canal and then single vessel tibial artery runoff to feet.   There is no tissue loss at this time.  CTA shows diffuse calcific arterial occlusive disease  in ileofemoral distribution which is further demonstrated by aortogram with runoff (described above)    RECOMMENDATION:     Aortobifemoral bypass.I have discussed the case with patient. All options discussed. He realizes that the risks of surgery includes, but is not limited to infection, vte, pain, wound complication, blood loss requiring transfusion and it's inherent risks, need for further surgery.  Cardiac and peripheral arterial risk factor optimization.If open procedure is done, he will need cardiac evaluation before surgery.  Also, he will need stess steroid coverage, for he has been taking Prednisone 10 mg orally per day for a prolonged time for gout. He recently stopped (approximately 2 weeks ago).    HPI:  Young Ordonez is a 67 y.o. male who was seen today in follow up for leg pain(Left>>Right). He develops pain in  left calf with minimal ambulation (50 feet)  He will develop pain in left foot and leg at night when in bed. This is relieved by standing up and short walk. No ulcerations. He has had trauma to left calf including fractured left ankle repaired with plates. Past history of MI at 45 years of age. S/P PTCA in approx . Past history of cigarette abuse, but none since . Mother and father  in late 40's and 50's of MI and CVA.    He had a CT-Angiogram recently with [facility].Severe ileofemoral disease was diagnosed. Aortogram with runoff subsequently performed with results as above    PHH:    Past Medical History:   Diagnosis Date   ? Claudication    ? COPD (chronic obstructive pulmonary disease)    ? Coronary artery disease    ? History of smoking    ? HTN (hypertension)    ? Hyperlipidemia    ? MI (myocardial infarction)    ? PAD (peripheral artery disease)     -  Ischemic Rest Pain     Past Surgical History:   Procedure Laterality Date   ? ANGIOPLASTY     ? ANKLE FRACTURE SURGERY Left    ? MANDIBLE SURGERY         ALLERGIES:  Review of patient's allergies indicates no known allergies.    MEDS:    Current Outpatient Prescriptions:   ?  allopurinol (ZYLOPRIM) 300 MG tablet, TAKE 1 & 1/2 TABLETS BY MOUTH DAILY., Disp: , Rfl: 3  ?  amLODIPine (NORVASC) 5 MG tablet, Take 1 tablet (5 mg total) by mouth daily., Disp: 90 tablet, Rfl: 1  ?  aspirin 81 MG EC tablet, Take 81 mg by mouth., Disp: , Rfl:   ?  atenolol (TENORMIN) 100 MG tablet, Take 1 tablet (100 mg total) by mouth daily., Disp: 30 tablet, Rfl: 0  ?  lisinopril (PRINIVIL,ZESTRIL) 20 MG tablet, TAKE ONE TABLET BY MOUTH ONE TIME DAILY, Disp: 30 tablet, Rfl: 0  ?  predniSONE (DELTASONE) 10 MG tablet, TAKE 1TAB BY MOUTH DAILY FOR GOUT PROPHYLAXIS INCREASE UP TO 30MG X1DAY 20MG X1DAY THEN 10MG X1DAY, Disp: , Rfl: 2  ?  sertraline (ZOLOFT) 25 MG tablet, TAKE ONE TABLET BY MOUTH ONE TIME DAILY, Disp: , Rfl: 3    SOCIAL HABITS:    History   Smoking Status   ? Former  "Smoker   ? Packs/day: 1.50   ? Types: Cigarettes   ? Quit date: 2013   Smokeless Tobacco   ? Never Used       History   Alcohol Use   ? Yes       History   Drug Use No       FAMILY HISTORY:    Family History   Problem Relation Age of Onset   ? Cancer Mother    ? Stroke Father    ? Hypertension Father    ? Heart disease Paternal Grandmother    ? Emphysema Paternal Grandfather        REVIEW OF SYSTEMS:  12 point ros reviewed.    PE:  Ht 5' 8\" (1.727 m)  Wt 210 lb (95.3 kg)  BMI 31.93 kg/m2  Gen:nad, obese  HEENT:  At/nc   Chest:  nl  Lungs:  No wheezing or rales  Heart:  Rrr. + car/rad and right femoral pulse palpable. Left Femoral plse is diminished. Pop/dp/pt not palpable bilaterally.No aneurysms palpable, but abdominal girth precludes and adequate evaluation  Abd:  obese  Ext:  No c/c/e. Plate in left ankle.Gouty changes in left third toe  Skin:intact  Neuro:CN2-12 intact, bilaterally.                 Strength 5/5 in upper and lower extremities, bilaterally                 Normal speech, swallowing and gait.  Psych: no abnormalities      DIAGNOSTIC STUDIES     [facility]  4/6/2017 1:52 PM     PROCEDURE: ABDOMINAL AORTOGRAM AND BILATERAL LOWER EXTREMITY ANGIOGRAM    (personally interpreted and reviewed with patient)    FINDINGS:  1. AORTA: Mild to moderate atherosclerotic irregularity of the infrarenal abdominal aorta without severe stenosis. Mildly irregular bilateral renal arteries without significant stenosis. Prominent inferior mesenteric artery and lumbar arteries.     2. LEFT LOWER EXTREMITY: Short, patent segment of the common iliac artery origin, measuring approximately 1 cm in length. The common iliac artery occludes distal to this. Occluded internal and external iliac arteries. The internal iliac artery branches   reconstitute through lumbar and inferior mesenteric artery collaterals. Occluded common femoral artery. Reconstituted profunda femoral artery, at its origin through left internal " iliac artery collaterals. Chronically occluded superficial femoral artery.   Above-knee reconstitution of the popliteal artery. There is faint enhancement of the popliteal artery, as the outflow is mainly through collateral branches. Mild to moderate, eccentric stenoses at the level of the distal femoral metaphysis. No   significant stenosis caudal to this. Very faint opacification of the infrapopliteal arteries. Patent, proximal anterior tibial and peroneal arteries. The anterior tibial artery appears to occluded the mid to distal calf. There appears to be   reconstitution of the posterior tibial artery at the distal calf. Marked calcified atherosclerotic changes of the visualized arteries.     3. RIGHT LOWER EXTREMITY: Moderate calcified irregularity of the common and external iliac arteries. Appears to be moderate stenosis of the mid common iliac artery. Occluded internal iliac artery with reconstitution of its distal branches, through an   inferior mesenteric artery collateral. Moderate irregularity of the common femoral artery with eccentric, mild stenoses. Prominent, irregular profunda femoris artery. Chronically occluded superficial femoral artery. Above-knee reconstitution of the   popliteal artery. No significant stenosis within the reconstituted popliteal artery. Three-vessel runoff. Severe stenosis of the proximal anterior tibial artery. The infrapopliteal arteries are opacified down to the foot. Marked calcified atherosclerotic  changes of the visualized arteries.     IMPRESSION:   1. LEFT LOWER EXTREMITY: Occluded iliac and common femoral arteries. Chronically occluded superficial femoral artery. The profunda femoris artery reconstitutes at its origin through reconstituted left internal iliac artery branches. Above-knee   reconstitution of the popliteal artery. Faint opacification of the infrapopliteal arteries. Severe occlusive disease of the infrapopliteal arteries with occlusion of the mid to distal  anterior tibial artery and proximal posterior tibial artery. The   posterior tibial artery reconstitutes at the distal calf.  2. LEFT LOWER EXTREMITY: Moderate common iliac artery stenosis. Chronically occluded superficial femoral artery with above-knee reconstitution of the popliteal artery. Three-vessel runoff with severe stenosis of the proximal anterior tibial artery.  Date: 2/10/2017 Department: [facility] Ultrasound Released By: RT Abhi (R) Authorizing: Ike Valdes MD   Study Result   [facility]  EXAM: ANKLE-BRACHIAL INDICES (ABIs) AND DUPLEX ARTERIAL ULTRASOUND OF THE BILATERAL LOWER EXTREMITIES  2/10/2017 5:24 PM     INDICATION: Left lower extremity claudication.     COMPARISON: None.     TECHNIQUE: Duplex imaging is performed utilizing gray-scale, two-dimensional images, and color-flow imaging. Doppler waveform analysis and spectral Doppler imaging is also performed.     FINDINGS:      SEGMENTAL BP  RIGHT  Brachial: 150  Ankle (PT): >134; Index: NC  Ankle (DP): >254; Index: NC     LEFT  Brachial: 149  Ankle (PT): 0; Index: 0.00  Ankle (DP): 0; Index: 0.00     The resting ABIs are noncompressible in the right and not obtainable on the left.     DUPLEX ARTERIAL ULTRASOUND FINDINGS:      LOWER EXTREMITY ARTERIAL DUPLEX EXAM WITH WAVEFORMS  RIGHT (cm/s)  EIA: 236B  CFA: 216B  PFA: 311M  SFA-Proximal: 120M  SFA-Mid: 41M  SFA-Distal: 27M  Popliteal: 36M  PTA: 30M  HERNAN: 32M  DPA: 21M  (M=monophasic, B=biphasic, T=triphasic)     LEFT (cm/s)  EIA: 86M  CFA: 46M  PFA: 40M  SFA-Proximal: 98M  SFA-Mid: 4M  SFA-Distal: 6M  Popliteal: 30M  PTA: 6M  HERNAN: Non-visualized  DPA: Non-visualized  (M=monophasic, B=biphasic, T=triphasic)     RIGHT: Biphasic waveforms with moderate spectral broadening within the visualized external iliac and common femoral arteries. Biphasic waveforms within the superficial femoral artery origin. Abnormal monophasic waveforms within the mid  superficial   femoral artery to the popliteal artery. Monophasic waveforms within the posterior tibial and anterior tibial arteries.     LEFT: Monophasic waveforms within the visualized external iliac and common femoral arteries. Occluded mid to distal superficial femoral artery. Parvus et tardus waveforms within the popliteal artery. Very abnormal parvus et tardus waveforms within the   distal posterior tibial artery. The anterior tibial and dorsalis pedis arteries demonstrate no flow.     IMPRESSION:   1. RIGHT LOWER EXTREMITY: Noncompressible ABIs due to underlying calcified atherosclerotic changes. Arterial duplex ultrasound suggests the presence of significant stenosis within the femoropopliteal segment.  2. LEFT LOWER EXTREMITY: Unobtainable ABIs. Monophasic waveforms proximally suggests a significant inflow stenosis. Occluded superficial femoral artery. Occluded anterior tibial artery.     CT angiogram reviewed and discussed with patient             Results from last 7 days  Lab Units 04/06/17  1050   LN-INR  1.02        Labs:    Basic Metabolic Panel   Order: [id number]   Status:  Final result   Visible to patient:  Yes (MyChart) Next appt:  None Dx:  PAD (peripheral artery disease); Pre-...         Ref Range & Units 3/6/17  1:06 PM   5/4/12 12:38 PM      Sodium 136 - 145 mmol/L 144 139    Potassium 3.5 - 5.0 mmol/L 3.8 3.7R    Chloride 98 - 107 mmol/L 104 101    CO2 22 - 31 mmol/L 25 29R    Anion Gap, Calculation 5 - 18 mmol/L 15 9R    Glucose 70 - 125 mg/dL 94 84R    Calcium 8.5 - 10.5 mg/dL 9.3 9.3R    BUN 8 - 22 mg/dL 22 18    Creatinine 0.70 - 1.30 mg/dL 1.46 (H) 1.6 (H)R    GFR MDRD Af Amer >60 mL/min/1.73m2 59 (L) 57 (L)R    GFR MDRD Non Af Amer >60 mL/min/1.73m2 48 (L) 47 (L)R            Ref Range & Units 4/6/17 10:50 AM   3/6/17  1:06 PM       Creatinine 0.70 - 1.30 mg/dL 1.46 (H) 1.46 (H)    GFR MDRD Af Amer >60 mL/min/1.73m2 58 (L) 59 (L)    GFR MDRD Non Af Amer >60 mL/min/1.73m2 48 (L) 48 (L)          Ilir Mandujano MD  VASCULAR SURGERY

## 2021-08-09 DIAGNOSIS — F41.9 ANXIETY: Primary | ICD-10-CM

## 2021-08-09 NOTE — TELEPHONE ENCOUNTER
8-9-21  Reason for Call:   medication refill:    Do you use a [facility]? [facility] [address]    Name of the medication requested: LORazepam (ATIVAN) 0.5 MG tablet    Other request: none    Can we leave a detailed message on this number? YES    Phone number patient can be reached at: Cell number on file:    Telephone Information:   Mobile [phone]       Best Time: anytime    Call taken on 8/9/2021 at 4:04 PM by Keily Pruett

## 2021-08-10 NOTE — TELEPHONE ENCOUNTER
Patient calling for an update on refill request.  Patient states he is out of medication but does not need this medication today.      Nuha Land

## 2021-08-11 RX ORDER — LORAZEPAM 0.5 MG/1
0.5 TABLET ORAL
Qty: 30 TABLET | Refills: 0 | Status: SHIPPED | OUTPATIENT
Start: 2021-08-11 | End: 2021-09-07

## 2021-08-11 NOTE — TELEPHONE ENCOUNTER
Refill request for medication: lorazepam 0.5 mg tab   Last visit addressing this medication: 06/1/21  Follow up plan 6  months  Last refill on 07/06/21, quantity #30   CSA completed Not on file   Date PDMP was last reviewed Never Reviewed     Appointment: Not due   Appointment recommended at least every 3 months for opioid prescriptions. Appointment recommended at least every 6 months for ADHD medications.

## 2021-08-31 ENCOUNTER — TELEPHONE (OUTPATIENT)
Dept: FAMILY MEDICINE | Facility: CLINIC | Age: 71
End: 2021-08-31

## 2021-08-31 ENCOUNTER — OFFICE VISIT (OUTPATIENT)
Dept: FAMILY MEDICINE | Facility: CLINIC | Age: 71
End: 2021-08-31
Payer: COMMERCIAL

## 2021-08-31 VITALS
BODY MASS INDEX: 27.06 KG/M2 | SYSTOLIC BLOOD PRESSURE: 134 MMHG | DIASTOLIC BLOOD PRESSURE: 78 MMHG | WEIGHT: 178 LBS | HEART RATE: 56 BPM | OXYGEN SATURATION: 94 %

## 2021-08-31 DIAGNOSIS — L98.9 SKIN LESION OF FACE: Primary | ICD-10-CM

## 2021-08-31 DIAGNOSIS — L98.9 SKIN LESION OF RIGHT LOWER EXTREMITY: ICD-10-CM

## 2021-08-31 PROCEDURE — 99213 OFFICE O/P EST LOW 20 MIN: CPT | Performed by: FAMILY MEDICINE

## 2021-08-31 ASSESSMENT — ENCOUNTER SYMPTOMS
HEMATOLOGIC/LYMPHATIC NEGATIVE: 1
CHEST TIGHTNESS: 0
CONSTITUTIONAL NEGATIVE: 1
COUGH: 0
CARDIOVASCULAR NEGATIVE: 1
COLOR CHANGE: 1

## 2021-08-31 NOTE — TELEPHONE ENCOUNTER
Reason for Call: After visit summary questions    Detailed comments: Per patient, this shouldn't be on his chart? Please advise.     NOV 2 2020  bevacizumab (AVASTIN)  Next due Monday November 2 (Overdue) (last given  10/5/2020)  Expected: every 28 days (11 doses remaining)    Phone Number Patient can be reached at: Cell number on file:    Telephone Information:   Mobile [phone]       Best Time: ANY    Can we leave a detailed message on this number? YES    Call taken on 8/31/2021 at 3:14 PM by VIDHI Jeffers

## 2021-08-31 NOTE — PROGRESS NOTES
"    Assessment & Plan     Skin lesion of face  - Adult Dermatology Referral    Skin lesion of right lower extremity  - Adult Dermatology Referral  The lesions does look very abnormal and I think that it is reasonable to have him see the dermatologist.  This is especially for the one on the left aspect of his cheek which needs to be biopsied.  Also on his lateral aspect of the right thigh.  I did put in the referral for both of them regularly seeing him soon.  [id number]}     BMI:   Estimated body mass index is 27.06 kg/m  as calculated from the following:    Height as of 3/8/21: 1.727 m (5' 8\").    Weight as of this encounter: 80.7 kg (178 lb).           No follow-ups on file.    Ike Valdes MD  [facility]   Young is a 71 year old who presents for the following health issues     HPI   He comes in because he has lesions that he had noted on the left side of his face which he has had for some months now.  Noted that it appears to scale, a is getting bigger, and more tender.  He is worried that it could be cancer.  He has been having had also some lesion on the right thigh which he noted have been there for years.  It appears that this heals and then will rebleed again when the scab was removed.  Noted having been treated in the past for squamous cell cancer of the skin.    Family History   Problem Relation Age of Onset     Cancer Mother      Respiratory Father         d:age 59     Family History Negative Brother         b:1960     Glaucoma No family hx of      Macular Degeneration No family hx of      Cerebrovascular Disease Father      Hypertension Father      Heart Disease Paternal Grandmother      Emphysema Paternal Grandfather       Social History     Socioeconomic History     Marital status:      Spouse name: Mirta     Number of children: 2     Years of education: 12     Highest education level: Not on file   Occupational History     Occupation: truck "      Comment: [employer]   Tobacco Use     Smoking status: Former Smoker     Packs/day: 1.50     Types: Cigarettes     Quit date: 2013     Years since quittin.6     Smokeless tobacco: Never Used   Substance and Sexual Activity     Alcohol use: No     Comment: Alcoholic Drinks/day: sober since      Drug use: No     Sexual activity: Yes     Partners: Female   Other Topics Concern     Not on file   Social History Narrative     Not on file     Social Determinants of Health     Financial Resource Strain:      Difficulty of Paying Living Expenses:    Food Insecurity:      Worried About Running Out of Food in the Last Year:      Ran Out of Food in the Last Year:    Transportation Needs:      Lack of Transportation (Medical):      Lack of Transportation (Non-Medical):    Physical Activity:      Days of Exercise per Week:      Minutes of Exercise per Session:    Stress:      Feeling of Stress :    Social Connections:      Frequency of Communication with Friends and Family:      Frequency of Social Gatherings with Friends and Family:      Attends [employer] Services:      Active Member of Clubs or Organizations:      Attends Club or Organization Meetings:      Marital Status:    Intimate Partner Violence:      Fear of Current or Ex-Partner:      Emotionally Abused:      Physically Abused:      Sexually Abused:       Past Surgical History:   Procedure Laterality Date     ANGIOPLASTY       ANKLE FRACTURE SURGERY Left      BYPASS GRAFT AORTOFEMORAL N/A 2017    Procedure: AORTOBIFEMORAL BYPASS ;  Surgeon: Ilir FERRO MD;  Location: [facility] OR;  Service:      CAROTID ENDARTERECTOMY Right 2018    Procedure: RIGHT CAROTID ENDARTERECTOMY WITH EEG;  Surgeon: Sergei Lemus MD;  Location: [facility] OR;  Service:      FRACTURE SURGERY       IR AORTIC ARCH 4 VESSEL ANGIOGRAM  2009     IR CAROTID ANGIOGRAM  2009     IR CAROTID ANGIOGRAM  2009     IR EXTREMITY ANGIOGRAM  BILATERAL  4/6/2017     MANDIBLE SURGERY       [facility] NONSPECIFIC PROCEDURE  1995    MI -- angioplasty      Past Medical History:   Diagnosis Date     Acute myocardial infarction, unspecified site, episode of care unspecified 1995     Arthritis      Cardiomyopathy (H)      Claudication (H)      COPD (chronic obstructive pulmonary disease) (H)      Coronary artery disease      Depression      DEPRESSIVE DISORDER NEC 4/29/2008     Essential hypertension, benign      Gout      Gout attack 5/25/2017     Hepatitis C carrier (H)      HTN (hypertension)      Hyperlipidemia      Infectious viral hepatitis     carrier of viral hepatitis     Low back pain     chronic     MI (myocardial infarction) (H) 1995     Obesity      Other and unspecified hyperlipidemia      PAD (peripheral artery disease) (H)      Peyronie's disease             Review of Systems   Constitutional: Negative.    Respiratory: Negative for cough and chest tightness.    Cardiovascular: Negative.    Skin: Positive for color change.   Hematological: Negative.             Objective    /78   Pulse 56   Wt 80.7 kg (178 lb)   SpO2 94%   BMI 27.06 kg/m    Body mass index is 27.06 kg/m .  Physical Exam  Constitutional:       General: He is not in acute distress.  Cardiovascular:      Pulses: Normal pulses.   Pulmonary:      Effort: Pulmonary effort is normal.   Skin:     General: Skin is warm.      Findings: Lesion present.      Comments: He has a slightly raised nodule and scaly lesion measuring about 2 cm on the left cheek.  It is nontender.  On the lateral aspect of the right thigh, he does have a lesion that has a thick scab also measuring about 2 cm, also scabby.   Neurological:      Mental Status: He is alert.

## 2021-09-01 NOTE — TELEPHONE ENCOUNTER
Please inform him that the medication is supposed to be for his eyes.  And I do not think that it was prescribed here.  It is in his chart has a history.

## 2021-09-04 ENCOUNTER — HEALTH MAINTENANCE LETTER (OUTPATIENT)
Age: 71
End: 2021-09-04

## 2021-09-06 DIAGNOSIS — F41.9 ANXIETY: ICD-10-CM

## 2021-09-07 RX ORDER — LORAZEPAM 0.5 MG/1
0.5 TABLET ORAL
Qty: 30 TABLET | Refills: 0 | Status: SHIPPED | OUTPATIENT
Start: 2021-09-07 | End: 2021-10-11

## 2021-09-07 NOTE — TELEPHONE ENCOUNTER
Routing refill request to provider for review/approval because:  Controlled substance request    Last Written Prescription Date:  8/11/21  Last Fill Quantity: 30,  # refills: 0   Last office visit provider:  6/1/21     Requested Prescriptions   Pending Prescriptions Disp Refills     LORazepam (ATIVAN) 0.5 MG tablet [Pharmacy Med Name: LORAZEPAM 0.5 MG TABLET] 30 tablet 0     Sig: TAKE 1 TABLET (0.5 MG) BY MOUTH NIGHTLY AS NEEDED FOR ANXIETY       There is no refill protocol information for this order          palmer wagoner RN 09/06/21 8:39 PM

## 2021-10-08 DIAGNOSIS — F41.9 ANXIETY DISORDER, UNSPECIFIED: ICD-10-CM

## 2021-10-08 DIAGNOSIS — F41.1 GENERALIZED ANXIETY DISORDER: Primary | ICD-10-CM

## 2021-10-08 DIAGNOSIS — F32.9 MAJOR DEPRESSIVE DISORDER, SINGLE EPISODE, UNSPECIFIED: ICD-10-CM

## 2021-10-08 DIAGNOSIS — F41.9 ANXIETY: ICD-10-CM

## 2021-10-10 NOTE — TELEPHONE ENCOUNTER
"  Disp Refills Start End JASON    sertraline (ZOLOFT) 100 MG tablet 135 tablet 1 6/1/2021  No   Sig - Route: Take 1.5 tablets (150 mg total) by mouth daily. - Oral   Sent to pharmacy as: sertraline 100 mg tablet (ZOLOFT)   Notes to Pharmacy: New Dose   E-Prescribing Status: Receipt confirmed by pharmacy (6/1/2021  3:22 PM CDT)       sertraline (ZOLOFT) 100 MG tablet [[id number]]    Electronically signed by: Ike Valdes MD on 06/01/21 1521 Status: Active   Ordering user: Ike Valdes MD 06/01/21 1521 Authorized by: Ike Valdes MD   Frequency: DAILY 06/01/21 - Until Discontinued   Diagnoses  Anxiety [F41.9]  Depression [F32.9]   Medication comments: New Dose     Routing refill request to provider for review/approval because:  PHQ 9 score  Early refill requested.    Last Written Prescription Date:  6/1/21  Last Fill Quantity: 135,  # refills: 1   Last office visit provider:  8/31/21     Requested Prescriptions   Pending Prescriptions Disp Refills     LORazepam (ATIVAN) 0.5 MG tablet [Pharmacy Med Name: LORAZEPAM 0.5 MG TABLET] 30 tablet 0     Sig: TAKE 1 TABLET (0.5 MG) BY MOUTH NIGHTLY AS NEEDED FOR ANXIETY       There is no refill protocol information for this order        sertraline (ZOLOFT) 100 MG tablet [Pharmacy Med Name: SERTRALINE  MG TABLET] 135 tablet 1     Sig: TAKE 1.5 TABLETS BY MOUTH DAILY       SSRIs Protocol Failed - 10/8/2021  5:35 PM        Failed - PHQ-9 score less than 5 in past 6 months     Please review last PHQ-9 score.           Passed - Medication is active on med list        Passed - Patient is age 18 or older        Passed - Recent (6 mo) or future (30 days) visit within the authorizing provider's specialty     Patient had office visit in the last 6 months or has a visit in the next 30 days with authorizing provider or within the authorizing provider's specialty.  See \"Patient Info\" tab in inbasket, or \"Choose Columns\" in Meds & Orders " section of the refill encounter.                 Mac Alva RN 10/10/21 11:33 AM

## 2021-10-10 NOTE — TELEPHONE ENCOUNTER
"Routing refill request to provider for review/approval because:  Controlled substance request    Last Written Prescription Date:  9/7/21  Last Fill Quantity: 30,  # refills: 0   Last office visit provider:  8/31/21     Requested Prescriptions   Pending Prescriptions Disp Refills     LORazepam (ATIVAN) 0.5 MG tablet [Pharmacy Med Name: LORAZEPAM 0.5 MG TABLET] 30 tablet 0     Sig: TAKE 1 TABLET (0.5 MG) BY MOUTH NIGHTLY AS NEEDED FOR ANXIETY       There is no refill protocol information for this order        sertraline (ZOLOFT) 100 MG tablet [Pharmacy Med Name: SERTRALINE  MG TABLET] 135 tablet 1     Sig: TAKE 1.5 TABLETS BY MOUTH DAILY       SSRIs Protocol Failed - 10/8/2021  5:35 PM        Failed - PHQ-9 score less than 5 in past 6 months     Please review last PHQ-9 score.           Passed - Medication is active on med list        Passed - Patient is age 18 or older        Passed - Recent (6 mo) or future (30 days) visit within the authorizing provider's specialty     Patient had office visit in the last 6 months or has a visit in the next 30 days with authorizing provider or within the authorizing provider's specialty.  See \"Patient Info\" tab in inbasket, or \"Choose Columns\" in Meds & Orders section of the refill encounter.                 Mac Alva RN 10/10/21 11:34 AM  "

## 2021-10-11 RX ORDER — SERTRALINE HYDROCHLORIDE 100 MG/1
TABLET, FILM COATED ORAL
Qty: 135 TABLET | Refills: 1 | Status: SHIPPED | OUTPATIENT
Start: 2021-10-11 | End: 2022-03-02

## 2021-10-11 RX ORDER — LORAZEPAM 0.5 MG/1
0.5 TABLET ORAL
Qty: 30 TABLET | Refills: 0 | Status: SHIPPED | OUTPATIENT
Start: 2021-10-11 | End: 2021-11-12

## 2021-10-12 ENCOUNTER — TELEPHONE (OUTPATIENT)
Dept: FAMILY MEDICINE | Facility: CLINIC | Age: 71
End: 2021-10-12

## 2021-10-12 NOTE — TELEPHONE ENCOUNTER
Patient dropped form for Dr. Valdes to sign.    Form for dentist.    Please fax to  [phone] when done.    Routed to  core.

## 2021-11-08 DIAGNOSIS — F41.1 GENERALIZED ANXIETY DISORDER: ICD-10-CM

## 2021-11-10 ENCOUNTER — TRANSFERRED RECORDS (OUTPATIENT)
Dept: HEALTH INFORMATION MANAGEMENT | Facility: CLINIC | Age: 71
End: 2021-11-10
Payer: COMMERCIAL

## 2021-11-10 NOTE — TELEPHONE ENCOUNTER
Routing refill request to provider for review/approval because:  Controlled substance request    Last Written Prescription Date:  10/11/21  Last Fill Quantity: 30,  # refills: 0   Last office visit provider:  8/31/21     Requested Prescriptions   Pending Prescriptions Disp Refills     LORazepam (ATIVAN) 0.5 MG tablet [Pharmacy Med Name: LORAZEPAM 0.5 MG TABLET] 30 tablet 0     Sig: TAKE 1 TABLET BY MOUTH NIGHTLY AS NEEDED FOR ANXIETY.       There is no refill protocol information for this order          Mac Alva RN 11/10/21 8:03 AM

## 2021-11-12 RX ORDER — LORAZEPAM 0.5 MG/1
TABLET ORAL
Qty: 30 TABLET | Refills: 0 | Status: SHIPPED | OUTPATIENT
Start: 2021-11-12 | End: 2021-12-10

## 2021-11-18 ENCOUNTER — TRANSFERRED RECORDS (OUTPATIENT)
Dept: HEALTH INFORMATION MANAGEMENT | Facility: CLINIC | Age: 71
End: 2021-11-18
Payer: COMMERCIAL

## 2021-12-10 DIAGNOSIS — F41.1 GENERALIZED ANXIETY DISORDER: ICD-10-CM

## 2021-12-10 DIAGNOSIS — I10 HYPERTENSION, ESSENTIAL, BENIGN: Primary | ICD-10-CM

## 2021-12-10 NOTE — TELEPHONE ENCOUNTER
Refill request for medication: lorazepam  Last visit addressing this medication: 8/31/2021  Follow up plan 6  months  Last refill on 11/12/21, quantity #30   CSA completed not done  Date PDMP was last reviewed never    Appointment: Not due   Appointment recommended at least every 3 months for opioid prescriptions. Appointment recommended at least every 6 months for ADHD medications.    @[facility]@

## 2021-12-13 RX ORDER — AMLODIPINE BESYLATE 5 MG/1
5 TABLET ORAL DAILY
Qty: 90 TABLET | Refills: 0 | Status: SHIPPED | OUTPATIENT
Start: 2021-12-13 | End: 2022-04-07

## 2021-12-13 RX ORDER — LORAZEPAM 0.5 MG/1
TABLET ORAL
Qty: 30 TABLET | Refills: 0 | Status: SHIPPED | OUTPATIENT
Start: 2021-12-13 | End: 2022-01-14

## 2022-01-01 ENCOUNTER — NURSE TRIAGE (OUTPATIENT)
Dept: NURSING | Facility: CLINIC | Age: 72
End: 2022-01-01

## 2022-01-06 DIAGNOSIS — I10 ESSENTIAL (PRIMARY) HYPERTENSION: ICD-10-CM

## 2022-01-09 DIAGNOSIS — F41.1 GENERALIZED ANXIETY DISORDER: ICD-10-CM

## 2022-01-09 RX ORDER — ATENOLOL 100 MG/1
100 TABLET ORAL DAILY
Qty: 90 TABLET | Refills: 2 | Status: SHIPPED | OUTPATIENT
Start: 2022-01-09 | End: 2022-08-05

## 2022-01-09 NOTE — TELEPHONE ENCOUNTER
"Outpatient Medication Detail     Disp Refills Start End JASON   atenoloL (TENORMIN) 100 MG tablet 30 tablet 6 6/1/2021  No   Sig: TAKE 1 TABLET BY MOUTH EVERY DAY   Sent to pharmacy as: atenoloL 100 mg tablet (TENORMIN)   E-Prescribing Status: Receipt confirmed by pharmacy (6/1/2021  8:34 AM CDT)       Last office visit provider:  8/31/21     Requested Prescriptions   Pending Prescriptions Disp Refills     atenolol (TENORMIN) 100 MG tablet [Pharmacy Med Name: ATENOLOL 100 MG TABLET] 30 tablet 6     Sig: TAKE 1 TABLET BY MOUTH EVERY DAY       Beta-Blockers Protocol Passed - 1/6/2022  7:24 AM        Passed - Blood pressure under 140/90 in past 12 months     BP Readings from Last 3 Encounters:   08/31/21 134/78   06/01/21 (!) 140/80   03/08/21 124/80                 Passed - Patient is age 6 or older        Passed - Recent (12 mo) or future (30 days) visit within the authorizing provider's specialty     Patient has had an office visit with the authorizing provider or a provider within the authorizing providers department within the previous 12 mos or has a future within next 30 days. See \"Patient Info\" tab in inbasket, or \"Choose Columns\" in Meds & Orders section of the refill encounter.              Passed - Medication is active on med list             Mac Alva RN 01/09/22 8:12 AM  "

## 2022-01-12 NOTE — TELEPHONE ENCOUNTER
Routing refill request to provider for review/approval because:  Controlled substance request    Last Written Prescription Date:  12/13/21  Last Fill Quantity: 30,  # refills: 0   Last office visit provider:  8/31/21     Requested Prescriptions   Pending Prescriptions Disp Refills     LORazepam (ATIVAN) 0.5 MG tablet [Pharmacy Med Name: LORAZEPAM 0.5 MG TABLET] 30 tablet 0     Sig: TAKE 1 TABLET BY MOUTH NIGHTLY AS NEEDED FOR ANXIETY.       There is no refill protocol information for this order          Mac Alva RN 01/12/22 11:17 AM

## 2022-01-14 RX ORDER — LORAZEPAM 0.5 MG/1
TABLET ORAL
Qty: 30 TABLET | Refills: 0 | Status: SHIPPED | OUTPATIENT
Start: 2022-01-14 | End: 2022-02-16

## 2022-01-23 NOTE — TELEPHONE ENCOUNTER
Telephone Encounter by Mac Alva RN at 7/5/2021  2:59 PM     Author: Mac Alva RN Service: -- Author Type: Registered Nurse    Filed: 7/5/2021  2:59 PM Encounter Date: 7/5/2021 Status: Signed    : Mac Alva RN (Registered Nurse)       Controlled Substance Refill Request  Medication Name:   Requested Prescriptions     Pending Prescriptions Disp Refills   ? LORazepam (ATIVAN) 0.5 MG tablet [Pharmacy Med Name: LORAZEPAM 0.5 MG TABLET] 30 tablet 0     Sig: TAKE 1 TABLET BY MOUTH AT BEDTIME AS NEEDED FOR ANXIETY.     Date Last Fill: 6/1/21  Requested Pharmacy: [facility]  Submit electronically to pharmacy  Controlled Substance Agreement on file:   Encounter-Level CSA Scan Date:    There are no encounter-level csa scan date.        Last office visit:  6/1/21             
Telephone Encounter by Radha Gillette LPN at 7/5/2021  4:28 PM     Author: Radha Gillette LPN Service: -- Author Type: Licensed Nurse    Filed: 7/5/2021  4:29 PM Encounter Date: 7/5/2021 Status: Signed    : Radha Gillette LPN (Licensed Nurse)       Last seen in follow up by Lucio 6/1/21.  Last filled 6/1/21 #30. Radha Gillette LPN         
full range of motion in all extremities

## 2022-02-13 DIAGNOSIS — F41.1 GENERALIZED ANXIETY DISORDER: ICD-10-CM

## 2022-02-16 RX ORDER — LORAZEPAM 0.5 MG/1
TABLET ORAL
Qty: 30 TABLET | Refills: 0 | Status: SHIPPED | OUTPATIENT
Start: 2022-02-16 | End: 2022-03-19

## 2022-02-16 NOTE — TELEPHONE ENCOUNTER
Routing refill request to provider for review/approval because:  Controlled substance.     Last Written Prescription Date: 1/14/2022  Last Fill Quantity: 30,  # refills: 0   Last office visit provider:  6/1/2021     Requested Prescriptions   Pending Prescriptions Disp Refills     LORazepam (ATIVAN) 0.5 MG tablet [Pharmacy Med Name: LORAZEPAM 0.5 MG TABLET] 30 tablet 0     Sig: TAKE 1 TABLET BY MOUTH NIGHTLY AS NEEDED FOR ANXIETY.       There is no refill protocol information for this order          Neva Hernandez RN 02/16/22 10:45 AM

## 2022-02-19 ENCOUNTER — HEALTH MAINTENANCE LETTER (OUTPATIENT)
Age: 72
End: 2022-02-19

## 2022-03-02 DIAGNOSIS — I10 HYPERTENSION, ESSENTIAL, BENIGN: ICD-10-CM

## 2022-03-02 DIAGNOSIS — G47.00 INSOMNIA, UNSPECIFIED TYPE: Primary | ICD-10-CM

## 2022-03-02 DIAGNOSIS — F41.1 GENERALIZED ANXIETY DISORDER: ICD-10-CM

## 2022-03-02 DIAGNOSIS — M10.9 ACUTE GOUT OF RIGHT KNEE, UNSPECIFIED CAUSE: ICD-10-CM

## 2022-03-02 RX ORDER — TRAZODONE HYDROCHLORIDE 50 MG/1
50 TABLET, FILM COATED ORAL AT BEDTIME
Qty: 90 TABLET | Refills: 0 | Status: SHIPPED | OUTPATIENT
Start: 2022-03-02 | End: 2022-05-31

## 2022-03-02 RX ORDER — LISINOPRIL 20 MG/1
20 TABLET ORAL DAILY
Qty: 90 TABLET | Refills: 0 | Status: SHIPPED | OUTPATIENT
Start: 2022-03-02 | End: 2022-05-31

## 2022-03-02 RX ORDER — ALLOPURINOL 300 MG/1
300 TABLET ORAL DAILY
Qty: 90 TABLET | Refills: 0 | Status: SHIPPED | OUTPATIENT
Start: 2022-03-02 | End: 2022-05-31

## 2022-03-02 RX ORDER — SERTRALINE HYDROCHLORIDE 100 MG/1
TABLET, FILM COATED ORAL
Qty: 135 TABLET | Refills: 1 | Status: SHIPPED | OUTPATIENT
Start: 2022-03-02 | End: 2022-08-05

## 2022-03-02 NOTE — TELEPHONE ENCOUNTER
Patient last seen 8/31/2021 by PCP.  All refills pended except Sertraline. Please indicate which dose Patient is taking the 50mg or 100mg? Pharmacy states 100mg- Please verify.  Theresa Huff LPN

## 2022-03-02 NOTE — TELEPHONE ENCOUNTER
Reason for Call:  Medication or medication refill:    Do you use a [facility]? No. Name of the pharmacy and phone number for the current request:  [facility] [address] [address]  [phone]    Name of the medication requested: allopurinol (ZYLOPRIM) 300 MG tablet    lisinopril (ZESTRIL) 20 MG tablet     sertraline (ZOLOFT) 50 MG tablet - Pharmacy says pt is on 100mg, which one is correct?    traZODone (DESYREL) 50 MG tablet    Other request: None    Can we leave a detailed message on this number? YES    Phone number patient can be reached at: Home number on file [phone] (home)    Best Time: any    Call taken on 3/2/2022 at 12:59 PM by Lopez Wiggins

## 2022-03-19 DIAGNOSIS — E78.5 HYPERLIPIDEMIA, UNSPECIFIED HYPERLIPIDEMIA TYPE: Primary | ICD-10-CM

## 2022-03-19 DIAGNOSIS — F41.1 GENERALIZED ANXIETY DISORDER: ICD-10-CM

## 2022-03-21 RX ORDER — ATORVASTATIN CALCIUM 80 MG/1
80 TABLET, FILM COATED ORAL DAILY
Qty: 90 TABLET | Refills: 1 | Status: SHIPPED | OUTPATIENT
Start: 2022-03-21 | End: 2022-08-05

## 2022-03-21 RX ORDER — LORAZEPAM 0.5 MG/1
0.5 TABLET ORAL DAILY PRN
Qty: 30 TABLET | Refills: 0 | Status: SHIPPED | OUTPATIENT
Start: 2022-03-21 | End: 2022-04-21

## 2022-04-06 DIAGNOSIS — I10 HYPERTENSION, ESSENTIAL, BENIGN: ICD-10-CM

## 2022-04-07 RX ORDER — AMLODIPINE BESYLATE 5 MG/1
5 TABLET ORAL DAILY
Qty: 90 TABLET | Refills: 0 | Status: SHIPPED | OUTPATIENT
Start: 2022-04-07 | End: 2022-07-10

## 2022-04-21 DIAGNOSIS — F41.1 GENERALIZED ANXIETY DISORDER: ICD-10-CM

## 2022-04-21 RX ORDER — LORAZEPAM 0.5 MG/1
0.5 TABLET ORAL DAILY PRN
Qty: 30 TABLET | Refills: 0 | Status: SHIPPED | OUTPATIENT
Start: 2022-04-21 | End: 2022-05-20

## 2022-05-11 NOTE — PROGRESS NOTES
6 month f/u. S/p carotid endarterectomy in 4/12/18 on the right. Pt has leg pain all the time.  obtain an ultrasound of his bmjtc-ly-iqlzuam bypass 8/2019. HTN, CKD. Norvasc, ASA, statin.   Letter sent

## 2022-05-18 DIAGNOSIS — F41.1 GENERALIZED ANXIETY DISORDER: ICD-10-CM

## 2022-05-19 NOTE — TELEPHONE ENCOUNTER
Routing refill request to provider for review/approval because:  Drug not on the [facility] refill protocol     Last Written Prescription Date:  4/21/22  Last Fill Quantity: 30,  # refills: 0   Last office visit provider:  8/31/21     Requested Prescriptions   Pending Prescriptions Disp Refills     LORazepam (ATIVAN) 0.5 MG tablet [Pharmacy Med Name: LORAZEPAM 0.5 MG TABLET] 30 tablet 0     Sig: TAKE 1 TABLET BY MOUTH DAILY AS NEEDED FOR ANXIETY       There is no refill protocol information for this order          Praveena Hester, RN 05/19/22 3:41 PM

## 2022-05-20 RX ORDER — LORAZEPAM 0.5 MG/1
TABLET ORAL
Qty: 30 TABLET | Refills: 0 | Status: SHIPPED | OUTPATIENT
Start: 2022-05-20 | End: 2022-06-27

## 2022-05-31 ENCOUNTER — OFFICE VISIT (OUTPATIENT)
Dept: FAMILY MEDICINE | Facility: CLINIC | Age: 72
End: 2022-05-31
Payer: COMMERCIAL

## 2022-05-31 VITALS
HEIGHT: 67 IN | TEMPERATURE: 97.9 F | SYSTOLIC BLOOD PRESSURE: 110 MMHG | BODY MASS INDEX: 26.21 KG/M2 | HEART RATE: 76 BPM | DIASTOLIC BLOOD PRESSURE: 78 MMHG | WEIGHT: 167 LBS

## 2022-05-31 DIAGNOSIS — G47.00 INSOMNIA, UNSPECIFIED TYPE: ICD-10-CM

## 2022-05-31 DIAGNOSIS — M10.9 ACUTE GOUT OF RIGHT KNEE, UNSPECIFIED CAUSE: ICD-10-CM

## 2022-05-31 DIAGNOSIS — Z00.00 ENCOUNTER FOR MEDICARE ANNUAL WELLNESS EXAM: Primary | ICD-10-CM

## 2022-05-31 DIAGNOSIS — Z12.5 SCREENING FOR PROSTATE CANCER: ICD-10-CM

## 2022-05-31 DIAGNOSIS — Z23 NEED FOR COVID-19 VACCINE: ICD-10-CM

## 2022-05-31 DIAGNOSIS — I10 HYPERTENSION, ESSENTIAL, BENIGN: ICD-10-CM

## 2022-05-31 DIAGNOSIS — Z23 TETANUS-DIPHTHERIA (TD) VACCINATION: ICD-10-CM

## 2022-05-31 DIAGNOSIS — R31.9 HEMATURIA, UNSPECIFIED TYPE: ICD-10-CM

## 2022-05-31 DIAGNOSIS — Z12.11 COLON CANCER SCREENING: ICD-10-CM

## 2022-05-31 DIAGNOSIS — I73.9 PAD (PERIPHERAL ARTERY DISEASE) (H): ICD-10-CM

## 2022-05-31 LAB
ALBUMIN SERPL-MCNC: 3.9 G/DL (ref 3.5–5)
ALP SERPL-CCNC: 64 U/L (ref 45–120)
ALT SERPL W P-5'-P-CCNC: 25 U/L (ref 0–45)
ANION GAP SERPL CALCULATED.3IONS-SCNC: 9 MMOL/L (ref 5–18)
AST SERPL W P-5'-P-CCNC: 23 U/L (ref 0–40)
BILIRUB SERPL-MCNC: 0.4 MG/DL (ref 0–1)
BUN SERPL-MCNC: 19 MG/DL (ref 8–28)
CALCIUM SERPL-MCNC: 9 MG/DL (ref 8.5–10.5)
CHLORIDE BLD-SCNC: 105 MMOL/L (ref 98–107)
CHOLEST SERPL-MCNC: 137 MG/DL
CO2 SERPL-SCNC: 28 MMOL/L (ref 22–31)
CREAT SERPL-MCNC: 1.26 MG/DL (ref 0.7–1.3)
FASTING STATUS PATIENT QL REPORTED: YES
GFR SERPL CREATININE-BSD FRML MDRD: 61 ML/MIN/1.73M2
GLUCOSE BLD-MCNC: 95 MG/DL (ref 70–125)
HDLC SERPL-MCNC: 53 MG/DL
LDLC SERPL CALC-MCNC: 74 MG/DL
POTASSIUM BLD-SCNC: 4.4 MMOL/L (ref 3.5–5)
PROT SERPL-MCNC: 7.1 G/DL (ref 6–8)
PSA SERPL-MCNC: 0.67 UG/L (ref 0–6.5)
SODIUM SERPL-SCNC: 142 MMOL/L (ref 136–145)
TRIGL SERPL-MCNC: 51 MG/DL

## 2022-05-31 PROCEDURE — 90714 TD VACC NO PRESV 7 YRS+ IM: CPT | Performed by: FAMILY MEDICINE

## 2022-05-31 PROCEDURE — 99397 PER PM REEVAL EST PAT 65+ YR: CPT | Mod: 25 | Performed by: FAMILY MEDICINE

## 2022-05-31 PROCEDURE — 90471 IMMUNIZATION ADMIN: CPT | Performed by: FAMILY MEDICINE

## 2022-05-31 PROCEDURE — 91305 COVID-19,PF,PFIZER (12+ YRS): CPT | Performed by: FAMILY MEDICINE

## 2022-05-31 PROCEDURE — 80061 LIPID PANEL: CPT | Performed by: FAMILY MEDICINE

## 2022-05-31 PROCEDURE — 80053 COMPREHEN METABOLIC PANEL: CPT | Performed by: FAMILY MEDICINE

## 2022-05-31 PROCEDURE — 36415 COLL VENOUS BLD VENIPUNCTURE: CPT | Performed by: FAMILY MEDICINE

## 2022-05-31 PROCEDURE — 0054A COVID-19,PF,PFIZER (12+ YRS): CPT | Performed by: FAMILY MEDICINE

## 2022-05-31 PROCEDURE — G0103 PSA SCREENING: HCPCS | Performed by: FAMILY MEDICINE

## 2022-05-31 RX ORDER — LISINOPRIL 20 MG/1
20 TABLET ORAL DAILY
Qty: 90 TABLET | Refills: 0 | Status: SHIPPED | OUTPATIENT
Start: 2022-05-31 | End: 2022-08-05

## 2022-05-31 RX ORDER — TRAZODONE HYDROCHLORIDE 50 MG/1
50 TABLET, FILM COATED ORAL AT BEDTIME
Qty: 90 TABLET | Refills: 0 | Status: SHIPPED | OUTPATIENT
Start: 2022-05-31 | End: 2022-08-04

## 2022-05-31 RX ORDER — ALLOPURINOL 300 MG/1
300 TABLET ORAL DAILY
Qty: 90 TABLET | Refills: 0 | Status: SHIPPED | OUTPATIENT
Start: 2022-05-31 | End: 2022-08-05

## 2022-05-31 ASSESSMENT — ENCOUNTER SYMPTOMS
JOINT SWELLING: 0
ARTHRALGIAS: 0
SORE THROAT: 0
PALPITATIONS: 0
NAUSEA: 0
HEMATOCHEZIA: 0
HEMATURIA: 1
CHILLS: 0
FREQUENCY: 0
PARESTHESIAS: 0
CONSTIPATION: 0
ABDOMINAL PAIN: 0
DYSURIA: 0
EYE PAIN: 0
DIZZINESS: 0
HEADACHES: 0
SHORTNESS OF BREATH: 1
DIARRHEA: 0
NERVOUS/ANXIOUS: 1
WEAKNESS: 0
MYALGIAS: 0
COUGH: 0
FEVER: 0
HEARTBURN: 0

## 2022-05-31 ASSESSMENT — PATIENT HEALTH QUESTIONNAIRE - PHQ9
10. IF YOU CHECKED OFF ANY PROBLEMS, HOW DIFFICULT HAVE THESE PROBLEMS MADE IT FOR YOU TO DO YOUR WORK, TAKE CARE OF THINGS AT HOME, OR GET ALONG WITH OTHER PEOPLE: NOT DIFFICULT AT ALL
SUM OF ALL RESPONSES TO PHQ QUESTIONS 1-9: 5
SUM OF ALL RESPONSES TO PHQ QUESTIONS 1-9: 5

## 2022-05-31 ASSESSMENT — ACTIVITIES OF DAILY LIVING (ADL): CURRENT_FUNCTION: NO ASSISTANCE NEEDED

## 2022-05-31 NOTE — PROGRESS NOTES
SUBJECTIVE:   Young Ordonez is a 72 year old male who presents for Preventive Visit.      Patient has been advised of split billing requirements and indicates understanding: Yes  Are you in the first 12 months of your Medicare coverage?  No    HPI   Is here for his physical exam.  He noted that he is doing well.  He has to do that he feels a lot gabby based on the fact that he has had lot of bad personal lifestyle including smoking and drinking but feels that he does not have any major symptoms.  He did blood in his urine about a couple of months ago which at this time he noted that he does not want to have any evaluations since he no longer has it.  Needed to have his medications refilled.    Do you feel safe in your environment? Yes    Have you ever done Advance Care Planning? (For example, a Health Directive, POLST, or a discussion with a medical provider or your loved ones about your wishes): No, advance care planning information given to patient to review.  Patient declined advance care planning discussion at this time.        Fall risk  Fallen 2 or more times in the past year?: No  Any fall with injury in the past year?: No  click delete button to remove this line now  Cognitive Screening   1) Repeat 3 items (Leader, Season, Table)    2) Clock draw: NORMAL  3) 3 item recall: Recalls 2 objects   Results: NORMAL clock, 1-2 items recalled: COGNITIVE IMPAIRMENT LESS LIKELY    Mini-CogTM Copyright LUIS ANGEL Dasilva. Licensed by the author for use in [facility]; reprinted with permission ([email or web address]). All rights reserved.      Do you have sleep apnea, excessive snoring or daytime drowsiness?: no    Reviewed and updated as needed this visit by clinical staff   Tobacco  Allergies  Meds                Reviewed and updated as needed this visit by Provider                   Social History     Tobacco Use     Smoking status: Former Smoker     Packs/day: 1.50     Types: Cigarettes     Quit date: 1/1/2013      "Years since quittin.4     Smokeless tobacco: Never Used   Substance Use Topics     Alcohol use: No     Comment: Alcoholic Drinks/day: sober since      If you drink alcohol do you typically have >3 drinks per day or >7 drinks per week? No    Alcohol Use 2022   Prescreen: >3 drinks/day or >7 drinks/week? No   No flowsheet data found.            Current providers sharing in care for this patient include:   Patient Care Team:  Ike Valdes MD as PCP - General (Family Practice)  Ike Valdes MD as Assigned PCP    The following health maintenance items are reviewed in Epic and correct as of today:  Health Maintenance Due   Topic Date Due     ZOSTER IMMUNIZATION (2 of 3) 2014     COLORECTAL CANCER SCREENING  2022               Review of Systems  CONSTITUTIONAL: NEGATIVE for fever, chills, change in weight  INTEGUMENTARY/SKIN: NEGATIVE for worrisome rashes, moles or lesions  EYES: NEGATIVE for vision changes or irritation  ENT/MOUTH: NEGATIVE for ear, mouth and throat problems  RESP: NEGATIVE for significant cough or SOB  BREAST: NEGATIVE for masses, tenderness or discharge  CV: NEGATIVE for chest pain, palpitations or peripheral edema  GI: NEGATIVE for nausea, abdominal pain, heartburn, or change in bowel habits  : NEGATIVE for frequency, dysuria, or hematuria  MUSCULOSKELETAL: NEGATIVE for significant arthralgias or myalgia  NEURO: NEGATIVE for weakness, dizziness or paresthesias  ENDOCRINE: NEGATIVE for temperature intolerance, skin/hair changes  HEME: NEGATIVE for bleeding problems  PSYCHIATRIC: NEGATIVE for changes in mood or affect    OBJECTIVE:   /78   Pulse 76   Temp 97.9  F (36.6  C)   Ht 1.702 m (5' 7\")   Wt 75.8 kg (167 lb)   BMI 26.16 kg/m   Estimated body mass index is 26.16 kg/m  as calculated from the following:    Height as of this encounter: 1.702 m (5' 7\").    Weight as of this encounter: 75.8 kg (167 lb).  Physical Exam  GENERAL: healthy, alert and no " distress  EYES: Eyes grossly normal to inspection, PERRL and conjunctivae and sclerae normal  HENT: ear canals and TM's normal, nose and mouth without ulcers or lesions  NECK: no adenopathy, no asymmetry, masses, or scars and thyroid normal to palpation  RESP: lungs clear to auscultation - no rales, rhonchi or wheezes  CV: regular rate and rhythm, normal S1 S2, no S3 or S4, no murmur, click or rub, no peripheral edema and peripheral pulses strong  ABDOMEN: soft, nontender, no hepatosplenomegaly, no masses and bowel sounds normal  MS: no gross musculoskeletal defects noted, no edema  SKIN: no suspicious lesions or rashes  NEURO: Normal strength and tone, mentation intact and speech normal  PSYCH: mentation appears normal, affect normal/bright        ASSESSMENT / PLAN:   Young was seen today for physical.    Diagnoses and all orders for this visit:    Encounter for Medicare annual wellness exam  -     Lipid Profile (Chol, Trig, HDL, LDL calc); Future  -     Comprehensive metabolic panel (BMP + Alb, Alk Phos, ALT, AST, Total. Bili, TP); Future  -     REVIEW OF HEALTH MAINTENANCE PROTOCOL ORDERS  -     Lipid Profile (Chol, Trig, HDL, LDL calc)  -     Comprehensive metabolic panel (BMP + Alb, Alk Phos, ALT, AST, Total. Bili, TP)    Screening for prostate cancer  -     PSA, screen; Future  -     PSA, screen    Hematuria, unspecified type    Tetanus-diphtheria (Td) vaccination  -     TD PRESERV FREE, IM (7+ YRS) (DECAVAC/TENIVAC)    Colon cancer screening  -     Adult Gastro Ref - Procedure Only; Future    Need for COVID-19 vaccine  -     COVID-19,PF,PFIZER (12+ Yrs GRAY LABEL)    PAD (peripheral artery disease) (H)    I did review his health history and reviewed his past records.  I reviewed his health maintenance, he wanted to have a recheck for colonoscopy, but does not want to have lung cancer screening noting that he does not have any symptoms.  Labs were ordered today.  He did update his need for COVID shot as well  "as tetanus booster.  We will follow-up as needed.    Patient has been advised of split billing requirements and indicates understanding: Yes    COUNSELING:  Reviewed preventive health counseling, as reflected in patient instructions  Special attention given to:       Regular exercise       Healthy diet/nutrition    Estimated body mass index is 26.16 kg/m  as calculated from the following:    Height as of this encounter: 1.702 m (5' 7\").    Weight as of this encounter: 75.8 kg (167 lb).    Weight management plan: Discussed healthy diet and exercise guidelines    He reports that he quit smoking about 9 years ago. His smoking use included cigarettes. He smoked 1.50 packs per day. He has never used smokeless tobacco.      Appropriate preventive services were discussed with this patient, including applicable screening as appropriate for cardiovascular disease, diabetes, osteopenia/osteoporosis, and glaucoma.  As appropriate for age/gender, discussed screening for colorectal cancer, prostate cancer, breast cancer, and cervical cancer. Checklist reviewing preventive services available has been given to the patient.    Reviewed patients plan of care and provided an AVS. The Intermediate Care Plan ( asthma action plan, low back pain action plan, and migraine action plan) for Young meets the Care Plan requirement. This Care Plan has been established and reviewed with the Patient.    Counseling Resources:  ATP IV Guidelines  Pooled Cohorts Equation Calculator  Breast Cancer Risk Calculator  Breast Cancer: Medication to Reduce Risk  FRAX Risk Assessment  ICSI Preventive Guidelines  Dietary Guidelines for Americans, 2010  USDA's MyPlate  ASA Prophylaxis  Lung CA Screening    Ike Valdes MD  [facility]    Identified Health Risks:  Answers for HPI/ROS submitted by the patient on 5/31/2022  If you checked off any problems, how difficult have these problems made it for you to do your work, " take care of things at home, or get along with other people?: Not difficult at all  PHQ9 TOTAL SCORE: 5

## 2022-05-31 NOTE — PATIENT INSTRUCTIONS
"  Patient Education   Personalized Prevention Plan  You are due for the preventive services outlined below.  Your care team is available to assist you in scheduling these services.  If you have already completed any of these items, please share that information with your care team to update in your medical record.  Health Maintenance Due   Topic Date Due     ANNUAL REVIEW OF HM ORDERS  Never done     Depression Action Plan  Never done     LUNG CANCER SCREENING  Never done     Zoster (Shingles) Vaccine (2 of 3) 12/05/2014     Diptheria Tetanus Pertussis (DTAP/TDAP/TD) Vaccine (3 - Td or Tdap) 02/26/2020     COVID-19 Vaccine (4 - Booster for Pfizer series) 01/29/2022     Colorectal Cancer Screening  02/22/2022       Depression and Suicide in Older Adults    Nearly 2 million older Americans have some type of depression. Some of them even take their own lives. Yet depression among older adults is often ignored. Learn the warning signs. You may help spare a loved one needless pain. You may also save a life.   What is depression?  Depression is a common and serious illness that affects the way you think and feel. It is not a normal part of aging, nor is it a sign of weakness, a character flaw, or something you can snap out of. Most people with depression need treatment to get better. The most common symptom is a feeling of deep sadness. People who are depressed also may seem tired and listless. And nothing seems to give them pleasure. It s normal to grieve or be sad sometimes. But sadness lessens or passes with time. Depression rarely goes away or improves on its own. A person with clinical depression can't \"snap out of it.\" Other symptoms of depression are:     Sleeping more or less than normal    Eating more or less than normal    Having headaches, stomachaches, or other pains that don t go away    Feeling nervous,  empty,  or worthless    Crying a great deal    Thinking or talking about suicide or death    Loss of " interest in activities previously enjoyed    Social isolation    Feeling confused or forgetful  What causes it?  The causes of depression aren t fully known. But it is thought to result from a complex blend of these factors:     Biochemistry. Certain chemicals in the brain play a role.    Genes. Depression does run in families.    Life stress. Life stresses can also trigger depression in some people. Older adults often face many stressors, such as death of friends or a spouse, health problems, and financial concerns.    Chronic conditions. This includes conditions such as diabetes, heart disease, or cancer. These can cause symptoms of depression. Medicine side effects can cause changes in thoughts and behaviors.  How you can help  Often, depressed people may not want to ask for help. When they do, they may be ignored. Or, they may receive the wrong treatment. You can help by showing parents and older friends love and support. If they seem depressed, don t lecture the person, ignore the symptoms, or discount the symptoms as a  normal  part of aging -which they are not. Get involved, listen, and show interest and support.   Help them understand that depression is a treatable illness. Tell them you can help them find the right treatment. Offer to go to their healthcare provider's appointment with them for support when the symptoms are discussed. With their approval, contact a local mental health center, social service agency, or hospital about services.   You can be an advocate for him or her at healthcare appointments. Many older adults have chronic illnesses that can cause symptoms of depression. Medicine side effects can change thoughts and behaviors. You can help make sure that the healthcare provider looks at all of these factors. He or she should refer your family member or friend to a mental healthcare provider when needed. in some cases, untreated depression can lead to a misdiagnosis. A person may be diagnosed  with a brain disorder such as dementia. If the healthcare provider does not take the issue of depression seriously, help your family member or friend to find another provider.   Don't be afraid to ask  If you think an older person you care about could be suicidal, ask,  Have you thought about suicide?  Most people will tell you the truth. If they say  yes,  they may already have a plan for how and when they will attempt it. Find out as much as you can. The more detailed the plan, and the easier it is to carry out, the more danger the person is in right now. Tell the person you are there for them and do not want them to harm him or herself. Don't wait to get help for the person. Call the person's healthcare provider, local hospital, or emergency services.   To learn more    National Suicide Prevention Lifeline (crisis hotline) 502-666-RVAX (297-828-5221)    National [place] of Mental Zlfaow965-624-3356cbz.[facility].nih.gov    National [place] on Mental Xgqbimn523-480-6034hvc.brea.org    Mental Health Wuuqmiw859-796-5929fic.[facility].org    National Suicide Pgzlkqk032-YFFXZMH (408-317-5359)    Call 911  Never leave the person alone. A person who is actively suicidal needs psychiatric care right away. They will need constant supervision. Never leave the person out of sight. Call 911 or the national 24-hour suicide crisis hotline at 300-769-LVTY (097-272-0771). You can also take the person to the closest emergency room.   [employer] last reviewed this educational content on 5/1/2020 2000-2021 [employer]. All rights reserved. This information is not intended as a substitute for professional medical care. Always follow your healthcare professional's instructions.

## 2022-05-31 NOTE — PROGRESS NOTES
"    The patient's PHQ-9 score is consistent with mild depression. He was provided with information regarding depression and was advised to schedule a follow up appointment in  weeks to further address this issue.  Answers for HPI/ROS submitted by the patient on 5/31/2022  If you checked off any problems, how difficult have these problems made it for you to do your work, take care of things at home, or get along with other people?: Not difficult at all  PHQ9 TOTAL SCORE: 5  In general, how would you rate your overall physical health?: good  Frequency of exercise:: None  Do you usually eat at least 4 servings of fruit and vegetables a day, include whole grains & fiber, and avoid regularly eating high fat or \"junk\" foods? : Yes  Taking medications regularly:: Yes  Medication side effects:: None  Activities of Daily Living: no assistance needed  Home safety: no safety concerns identified  Hearing Impairment:: difficulty following a conversation in a noisy restaurant or crowded room, feel that people are mumbling or not speaking clearly, need to ask people to speak up or repeat themselves, find that men's voices are easier to understand than woman's, difficulty understanding soft or whispered speech  In the past 6 months, have you been bothered by leaking of urine?: No  abdominal pain: No  Blood in stool: No  Blood in urine: Yes  chest pain: No  chills: No  congestion: No  constipation: No  cough: No  diarrhea: No  dizziness: No  ear pain: No  eye pain: No  nervous/anxious: Yes  fever: No  frequency: No  genital sores: No  headaches: No  hearing loss: Yes  heartburn: No  arthralgias: No  joint swelling: No  peripheral edema: No  mood changes: No  myalgias: No  nausea: No  dysuria: No  palpitations: No  Skin sensation changes: No  sore throat: No  urgency: No  rash: No  shortness of breath: Yes  visual disturbance: Yes  weakness: No  impotence: Yes  penile discharge: No  In general, how would you rate your overall mental " or emotional health?: good  Additional concerns today:: Yes

## 2022-06-06 ENCOUNTER — VIRTUAL VISIT (OUTPATIENT)
Dept: FAMILY MEDICINE | Facility: CLINIC | Age: 72
End: 2022-06-06
Payer: COMMERCIAL

## 2022-06-06 DIAGNOSIS — U07.1 INFECTION DUE TO 2019 NOVEL CORONAVIRUS: Primary | ICD-10-CM

## 2022-06-06 PROCEDURE — 99213 OFFICE O/P EST LOW 20 MIN: CPT | Mod: 95 | Performed by: FAMILY MEDICINE

## 2022-06-06 NOTE — PROGRESS NOTES
oYung is a 72 year old who is being evaluated via a billable video visit.      Video Start Time: 3:34 PM    Assessment & Plan     Infection due to 2019 novel coronavirus  -Reviewed benefits and side effects of Paxlovid. Will start on antiviral medication.  -Hold atorvastatin while on Paxlovid. Reduce amlodipine and trazodone by 50% while on Paxlovid.  - nirmatrelvir and ritonavir (PAXLOVID) therapy pack  Dispense: 30 each; Refill: 0  -ED if severe covid symptoms      Angel Sidhu, DO  [facility]    Subjective   Young is a 72 year old who presents for the following health issues     HPI     HX gout, HTN, HLD.  Symptoms started two days ago.  Experiencing congestion, headache, cough, sore throat.  Denies body aches, fevers, chills, SOB.  Tested positive for covid yesterday.  Ibuprofen for symptoms.      Review of Systems         Objective           Vitals:  No vitals were obtained today due to virtual visit.    Physical Exam   GENERAL: Healthy, alert and no distress  EYES: Eyes grossly normal to inspection.  No discharge or erythema, or obvious scleral/conjunctival abnormalities.  RESP: No audible wheeze, cough, or visible cyanosis.  No visible retractions or increased work of breathing.    SKIN: Visible skin clear. No significant rash, abnormal pigmentation or lesions.  NEURO: Cranial nerves grossly intact.  Mentation and speech appropriate for age.  PSYCH: Mentation appears normal, affect normal/bright, judgement and insight intact, normal speech and appearance well-groomed.      Video-Visit Details    Type of service:  Video Visit    Video End Time:3:35 PM    Originating Location (pt. Location): Home    Distant Location (provider location):  [facility]     Platform used for Video Visit: [place]

## 2022-06-21 DIAGNOSIS — F41.1 GENERALIZED ANXIETY DISORDER: ICD-10-CM

## 2022-06-21 NOTE — TELEPHONE ENCOUNTER
Routing refill request to provider for review/approval because:  Controlled substance    Last Written Prescription Date:  5/20/2022  Last Fill Quantity: 30,  # refills: 0   Last office visit provider:  5/31/2022     Requested Prescriptions   Pending Prescriptions Disp Refills     LORazepam (ATIVAN) 0.5 MG tablet [Pharmacy Med Name: LORAZEPAM 0.5 MG TABLET] 30 tablet 0     Sig: TAKE 1 TABLET BY MOUTH EVERY DAY AS NEEDED FOR ANXIETY       There is no refill protocol information for this order          Neva Hernandez RN 06/21/22 3:52 PM

## 2022-06-27 RX ORDER — LORAZEPAM 0.5 MG/1
TABLET ORAL
Qty: 30 TABLET | Refills: 0 | Status: SHIPPED | OUTPATIENT
Start: 2022-06-27 | End: 2022-07-26

## 2022-07-08 DIAGNOSIS — I10 HYPERTENSION, ESSENTIAL, BENIGN: ICD-10-CM

## 2022-07-10 RX ORDER — AMLODIPINE BESYLATE 5 MG/1
5 TABLET ORAL DAILY
Qty: 90 TABLET | Refills: 1 | Status: SHIPPED | OUTPATIENT
Start: 2022-07-10 | End: 2023-01-01

## 2022-07-19 DIAGNOSIS — F41.1 GENERALIZED ANXIETY DISORDER: ICD-10-CM

## 2022-07-19 NOTE — TELEPHONE ENCOUNTER
Former patient of Lucio & has not established care with another provider.  Please assign refill request to covering provider per clinic standard process.    Routing refill request to provider for review/approval because:  Controlled substance request  No PCP    Last Written Prescription Date:  6/27/22  Last Fill Quantity: 30,  # refills: 0   Last office visit provider:  5/31/22     Requested Prescriptions   Pending Prescriptions Disp Refills     LORazepam (ATIVAN) 0.5 MG tablet [Pharmacy Med Name: LORAZEPAM 0.5 MG TABLET] 30 tablet 0     Sig: TAKE 1 TABLET BY MOUTH EVERY DAY AS NEEDED FOR ANXIETY       There is no refill protocol information for this order          Mac Alva RN 07/19/22 11:45 AM

## 2022-07-20 RX ORDER — LORAZEPAM 0.5 MG/1
TABLET ORAL
Qty: 30 TABLET | Refills: 0 | OUTPATIENT
Start: 2022-07-20

## 2022-07-26 RX ORDER — LORAZEPAM 0.5 MG/1
TABLET ORAL
Qty: 30 TABLET | Refills: 1 | Status: SHIPPED | OUTPATIENT
Start: 2022-07-26 | End: 2022-08-05

## 2022-07-29 ENCOUNTER — TELEPHONE (OUTPATIENT)
Dept: FAMILY MEDICINE | Facility: CLINIC | Age: 72
End: 2022-07-29

## 2022-07-29 NOTE — TELEPHONE ENCOUNTER
Prior Authorization Retail Medication Request    Medication/Dose: (RENEWAL) - LORazepam (ATIVAN) 0.5 MG tablet  ICD code (if different than what is on RX):  Generalized anxiety disorder [F41.1]   Previously Tried and Failed:   Rationale:      Insurance Name:  [employer]  Insurance ID:  [id number]    Pharmacy Information (if different than what is on RX)  Name:  [id number] [place] [address]  Phone:  [phone]

## 2022-07-29 NOTE — TELEPHONE ENCOUNTER
Central Prior Authorization Team   Phone: [phone]    Prior Authorization Not Needed per Insurance    Medication: (RENEWAL) - LORazepam (ATIVAN) 0.5 MG tablet--NOT NEEDED  Insurance Company: [employer] - Phone [phone] Fax [phone]  Expected CoPay:      Ph[facility]illing [id number] [place] [address]  Pharmacy Notified: Yes  Patient Notified: Yes    I CALLED INSURANCE TO START A RENEWAL ON THE PA. SHE RAN TEST CLAIMS ALL THE WAY TO 12/31/22 TO GET A REJECT SO SHE COULD START THE PA PROCESS BUT SHE IS GETTING A PAID CLAIM. SHE SAID IT APPEARS TO BE COVERED SINCE THE PATIENT CONTINUES TO GET IT REGULARLY. NO PA IS NEEDED FOR NOW.

## 2022-08-02 DIAGNOSIS — G47.00 INSOMNIA, UNSPECIFIED TYPE: ICD-10-CM

## 2022-08-04 RX ORDER — TRAZODONE HYDROCHLORIDE 50 MG/1
TABLET, FILM COATED ORAL
Qty: 90 TABLET | Refills: 0 | Status: SHIPPED | OUTPATIENT
Start: 2022-08-04 | End: 2022-08-05

## 2022-08-04 NOTE — TELEPHONE ENCOUNTER
"Former patient of Luz Marinane & has not established care with another provider.  Please assign refill request to covering provider per clinic standard process.      Last Written Prescription Date:  5/31/22  Last Fill Quantity: 90,  # refills: 0   Last office visit provider:  5/31/22     Requested Prescriptions   Pending Prescriptions Disp Refills     traZODone (DESYREL) 50 MG tablet [Pharmacy Med Name: TRAZODONE 50 MG TABLET] 90 tablet 0     Sig: TAKE 1 TABLET BY MOUTH AT BEDTIME       Serotonin Modulators Passed - 8/2/2022  3:18 PM        Passed - Recent (12 mo) or future (30 days) visit within the authorizing provider's specialty     Patient has had an office visit with the authorizing provider or a provider within the authorizing providers department within the previous 12 mos or has a future within next 30 days. See \"Patient Info\" tab in inbasket, or \"Choose Columns\" in Meds & Orders section of the refill encounter.              Passed - Medication is active on med list        Passed - Patient is age 18 or older             Praveena Hester RN 08/03/22 8:09 PM  "

## 2022-08-05 ENCOUNTER — OFFICE VISIT (OUTPATIENT)
Dept: FAMILY MEDICINE | Facility: CLINIC | Age: 72
End: 2022-08-05
Payer: COMMERCIAL

## 2022-08-05 VITALS
SYSTOLIC BLOOD PRESSURE: 122 MMHG | WEIGHT: 175.3 LBS | DIASTOLIC BLOOD PRESSURE: 72 MMHG | HEART RATE: 84 BPM | BODY MASS INDEX: 27.46 KG/M2

## 2022-08-05 DIAGNOSIS — E78.5 HYPERLIPIDEMIA, UNSPECIFIED HYPERLIPIDEMIA TYPE: ICD-10-CM

## 2022-08-05 DIAGNOSIS — M10.9 ACUTE GOUT OF RIGHT KNEE, UNSPECIFIED CAUSE: ICD-10-CM

## 2022-08-05 DIAGNOSIS — G47.00 INSOMNIA, UNSPECIFIED TYPE: ICD-10-CM

## 2022-08-05 DIAGNOSIS — Z12.11 SCREEN FOR COLON CANCER: Primary | ICD-10-CM

## 2022-08-05 DIAGNOSIS — I10 ESSENTIAL (PRIMARY) HYPERTENSION: ICD-10-CM

## 2022-08-05 DIAGNOSIS — I10 HYPERTENSION, ESSENTIAL, BENIGN: ICD-10-CM

## 2022-08-05 DIAGNOSIS — F41.1 GENERALIZED ANXIETY DISORDER: ICD-10-CM

## 2022-08-05 DIAGNOSIS — F10.21 HISTORY OF ALCOHOL DEPENDENCE (H): ICD-10-CM

## 2022-08-05 PROCEDURE — 99214 OFFICE O/P EST MOD 30 MIN: CPT | Performed by: INTERNAL MEDICINE

## 2022-08-05 RX ORDER — ATORVASTATIN CALCIUM 80 MG/1
80 TABLET, FILM COATED ORAL DAILY
Qty: 90 TABLET | Refills: 3 | Status: SHIPPED | OUTPATIENT
Start: 2022-08-05 | End: 2023-01-01

## 2022-08-05 RX ORDER — ALLOPURINOL 300 MG/1
300 TABLET ORAL DAILY
Qty: 90 TABLET | Refills: 3 | Status: SHIPPED | OUTPATIENT
Start: 2022-08-05 | End: 2023-01-01

## 2022-08-05 RX ORDER — SERTRALINE HYDROCHLORIDE 100 MG/1
200 TABLET, FILM COATED ORAL DAILY
Qty: 180 TABLET | Refills: 1 | Status: SHIPPED | OUTPATIENT
Start: 2022-08-05 | End: 2022-08-10

## 2022-08-05 RX ORDER — LISINOPRIL 20 MG/1
20 TABLET ORAL DAILY
Qty: 90 TABLET | Refills: 3 | Status: SHIPPED | OUTPATIENT
Start: 2022-08-05 | End: 2023-01-01

## 2022-08-05 RX ORDER — LORAZEPAM 0.5 MG/1
.5-1 TABLET ORAL DAILY PRN
Qty: 60 TABLET | Refills: 1 | Status: SHIPPED | OUTPATIENT
Start: 2022-08-05 | End: 2022-08-10

## 2022-08-05 RX ORDER — TRAZODONE HYDROCHLORIDE 50 MG/1
50 TABLET, FILM COATED ORAL AT BEDTIME
Qty: 90 TABLET | Refills: 3 | Status: SHIPPED | OUTPATIENT
Start: 2022-08-05 | End: 2023-01-01

## 2022-08-05 RX ORDER — ATENOLOL 100 MG/1
100 TABLET ORAL DAILY
Qty: 90 TABLET | Refills: 3 | Status: SHIPPED | OUTPATIENT
Start: 2022-08-05 | End: 2023-01-01

## 2022-08-05 NOTE — PATIENT INSTRUCTIONS
Increase the zoloft (setraline) up to 200 mg per day to see if this helps with symptoms.     We can do the lorazepam 1-2 tablets per day as needed for anxiety    Let me know if things are not improving in the mood in the next 1-2 months.     Other medications refilled as we discussed.    Jigar Crawford MD

## 2022-08-05 NOTE — PROGRESS NOTES
Assessment & Plan     Insomnia, unspecified type  Continue current therapy as working for help with sleep  - traZODone (DESYREL) 50 MG tablet; Take 1 tablet (50 mg) by mouth At Bedtime    Screen for colon cancer  - Colonscopy Screening  Referral; Future    Acute gout of right knee, unspecified cause  Recheck levels today, no current flare  - allopurinol (ZYLOPRIM) 300 MG tablet; Take 1 tablet (300 mg) by mouth daily  - CBC with platelets and differential; Future  - Uric acid; Future    Hypertension, essential, benign  Controlled on current therapy  - lisinopril (ZESTRIL) 20 MG tablet; Take 1 tablet (20 mg) by mouth daily    Essential (primary) hypertension  - atenolol (TENORMIN) 100 MG tablet; Take 1 tablet (100 mg) by mouth daily    Hyperlipidemia, unspecified hyperlipidemia type  stable  - atorvastatin (LIPITOR) 80 MG tablet; Take 1 tablet (80 mg) by mouth daily    Generalized anxiety disorder  Discussed increasing the zoloft. Discussed ways to increase motivation and improve mood, contracted for safety. Ok for continuing lorazepam, reviewed PDMP.  - LORazepam (ATIVAN) 0.5 MG tablet; Take 1-2 tablets (0.5-1 mg) by mouth daily as needed for anxiety TAKE 1 TABLET BY MOUTH EVERY DAY AS NEEDED FOR ANXIETY  - sertraline (ZOLOFT) 100 MG tablet; Take 2 tablets (200 mg) by mouth daily TAKE 1.5 TABLETS BY MOUTH DAILY    History of alcohol dependence- current in remission, no use of ETOH.     Patient Instructions   Increase the zoloft (setraline) up to 200 mg per day to see if this helps with symptoms.     We can do the lorazepam 1-2 tablets per day as needed for anxiety    Let me know if things are not improving in the mood in the next 1-2 months.     Other medications refilled as we discussed.    Jigar Crawford MD                     Return in about 6 weeks (around 9/16/2022) for Evisit.    Jigar Crawford MD  [facility]   Young is a 72 year old, presenting for the  following health issues:  Establish Care (Refill trazodone)      HPI     Mood- feels that could be better on 150 mg of zoloft, feels that this has been not helping as much. Trazodone does help. Uses lorazepam to help with stress- lots of family stress right now. History of ETOH use, no use now. Has been sober.     Hx of MI in 40's- doing well on medications. 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    History of gout- doing well on medications.     Due for colonoscopy    Review of Systems   Constitutional, HEENT, cardiovascular, pulmonary, gi and gu systems are negative, except as otherwise noted.      Objective    /72 (BP Location: Left arm, Patient Position: Sitting, Cuff Size: Adult Regular)   Pulse 84   Wt 79.5 kg (175 lb 4.8 oz)   BMI 27.46 kg/m    Body mass index is 27.46 kg/m .  Physical Exam   GENERAL: healthy, alert and no distress  EYES: Eyes grossly normal to inspection, PERRL and conjunctivae and sclerae normal  NECK: no adenopathy, no asymmetry, masses, or scars and thyroid normal to palpation  RESP: lungs clear to auscultation - no rales, rhonchi or wheezes  CV: regular rate and rhythm, normal S1 S2, no S3 or S4, no murmur, click or rub, no peripheral edema and peripheral pulses strong  MS: no gross musculoskeletal defects noted, no edema  SKIN: no suspicious lesions or rashes  PSYCH: mentation appears normal, affect normal/bright                    .  ..

## 2022-08-09 ENCOUNTER — TELEPHONE (OUTPATIENT)
Dept: FAMILY MEDICINE | Facility: CLINIC | Age: 72
End: 2022-08-09

## 2022-08-09 DIAGNOSIS — F41.1 GENERALIZED ANXIETY DISORDER: ICD-10-CM

## 2022-08-09 NOTE — TELEPHONE ENCOUNTER
8/9/22    Medication Question or Refill        What medication are you calling about (include dose and sig)?: LORazepam (ATIVAN) 0.5 MG tablet & sertraline (ZOLOFT) 100 MG tablet      Controlled Substance Agreement on file:   CSA -- Patient Level:    CSA: None found at the patient level.       Who prescribed the medication?: jeff    Do you need a refill? No    When did you use the medication last? daily    Patient offered an appointment? No    Do you have any questions or concerns?  Yes both meds need clarification on the directions    Preferred Pharmacy:     [facility] [id number] [place] [address] [address]  Phone: [phone] Fax: [phone]      Could we send this information to you in [facility] [place] or would you prefer to receive a phone call?:   No preference   Okay to leave a detailed message?: No at     Can either verbally call pharmacy or fax new script

## 2022-08-10 RX ORDER — SERTRALINE HYDROCHLORIDE 100 MG/1
200 TABLET, FILM COATED ORAL DAILY
Qty: 180 TABLET | Refills: 1 | Status: SHIPPED | OUTPATIENT
Start: 2022-08-10 | End: 2022-10-28

## 2022-08-10 RX ORDER — LORAZEPAM 0.5 MG/1
.5-1 TABLET ORAL DAILY PRN
Qty: 60 TABLET | Refills: 1 | Status: SHIPPED | OUTPATIENT
Start: 2022-08-10 | End: 2022-11-03

## 2022-08-10 NOTE — TELEPHONE ENCOUNTER
LORazepam (ATIVAN) 0.5 MG tablet  0.5-1 mg, DAILY PRN 1 ordered               Summary: Take 1-2 tablets (0.5-1 mg) by mouth daily as needed for anxiety TAKE 1 TABLET BY MOUTH EVERY DAY AS NEEDED FOR ANXIETY        sertraline (ZOLOFT) 100 MG tablet  200 mg, DAILY 1 ordered               Summary: Take 2 tablets (200 mg) by mouth daily TAKE 1.5 TABLETS BY MOUTH DAILY        Looks like there are 2 sigs on both medication. Please clarify which is correct and resend medication to pharmacy.

## 2022-10-25 DIAGNOSIS — F41.1 GENERALIZED ANXIETY DISORDER: ICD-10-CM

## 2022-10-25 NOTE — TELEPHONE ENCOUNTER
Pending Prescriptions:                       Disp   Refills    sertraline (ZOLOFT) 100 MG tablet         180 ta*1            Sig: Take 2 tablets (200 mg) by mouth daily    Ladan MORGAN CMA ([facility])

## 2022-10-28 RX ORDER — SERTRALINE HYDROCHLORIDE 100 MG/1
200 TABLET, FILM COATED ORAL DAILY
Qty: 180 TABLET | Refills: 0 | Status: SHIPPED | OUTPATIENT
Start: 2022-10-28 | End: 2023-01-01

## 2022-10-28 NOTE — TELEPHONE ENCOUNTER
Prescription approved per [facility] Refill Protocol.    Patient due for follow up before next refill, pharmacy informed    DAVID Medina [facility]

## 2022-11-03 RX ORDER — LORAZEPAM 0.5 MG/1
.5-1 TABLET ORAL DAILY PRN
Qty: 60 TABLET | Refills: 1 | Status: SHIPPED | OUTPATIENT
Start: 2022-11-03 | End: 2023-01-01

## 2022-11-29 NOTE — TELEPHONE ENCOUNTER
Patient calls with concerns regarding lower back pain. He reports that the pain came on slowly over the weekend. He did not have any injury. Currently the pain is worse on the right side, radiates into his butt cheek. Patient also reports having lower abdominal pain. Pain is constant and rated a 7-8. Patient has no urinary or bowel concerns. Denies having any pain in his groin or scrotum.    Patient is advised on ED visit per protocol. He verbalizes understanding but would like to wait and talk to his wife and decide what he wants to do. Patient denies further questions or concerns.    Noni Pablo RN  [facility]        Reason for Disposition    Abdominal pain and age > 60 years    Additional Information    Negative: Passed out (i.e., fainted, collapsed and was not responding)    Negative: Shock suspected (e.g., cold/pale/clammy skin, too weak to stand, low BP, rapid pulse)    Negative: Sounds like a life-threatening emergency to the triager    Negative: Major injury to the back (e.g., MVA, fall > 10 feet or 3 meters, penetrating injury, etc.)    Negative: Pain in the upper back over the ribs (rib cage) that radiates (travels) into the chest    Negative: Pain in the upper back over the ribs (rib cage) and worsened by coughing (or clearly increases with breathing)    Negative: Back pain during pregnancy    Negative: SEVERE back pain of sudden onset and age > 60 years    Negative: SEVERE abdominal pain (e.g., excruciating)    Protocols used: BACK PAIN-A-[place]

## 2023-01-01 ENCOUNTER — PREP FOR PROCEDURE (OUTPATIENT)
Dept: CARDIOLOGY | Facility: CLINIC | Age: 73
End: 2023-01-01

## 2023-01-01 ENCOUNTER — ANESTHESIA (OUTPATIENT)
Dept: SURGERY | Facility: CLINIC | Age: 73
End: 2023-01-01
Payer: COMMERCIAL

## 2023-01-01 ENCOUNTER — LAB (OUTPATIENT)
Dept: CARDIOLOGY | Facility: CLINIC | Age: 73
End: 2023-01-01
Payer: COMMERCIAL

## 2023-01-01 ENCOUNTER — ONCOLOGY VISIT (OUTPATIENT)
Dept: ONCOLOGY | Facility: CLINIC | Age: 73
End: 2023-01-01
Attending: INTERNAL MEDICINE
Payer: COMMERCIAL

## 2023-01-01 ENCOUNTER — APPOINTMENT (OUTPATIENT)
Dept: RADIATION ONCOLOGY | Facility: CLINIC | Age: 73
End: 2023-01-01
Attending: RADIOLOGY
Payer: COMMERCIAL

## 2023-01-01 ENCOUNTER — HOSPITAL ENCOUNTER (INPATIENT)
Facility: HOSPITAL | Age: 73
LOS: 5 days | Discharge: HOME | DRG: 280 | End: 2023-11-08
Attending: STUDENT IN AN ORGANIZED HEALTH CARE EDUCATION/TRAINING PROGRAM | Admitting: FAMILY MEDICINE
Payer: COMMERCIAL

## 2023-01-01 ENCOUNTER — PATIENT OUTREACH (OUTPATIENT)
Dept: CARE COORDINATION | Facility: CLINIC | Age: 73
End: 2023-01-01
Payer: COMMERCIAL

## 2023-01-01 ENCOUNTER — TELEPHONE (OUTPATIENT)
Dept: CARDIOLOGY | Facility: CLINIC | Age: 73
End: 2023-01-01

## 2023-01-01 ENCOUNTER — LAB (OUTPATIENT)
Dept: INFUSION THERAPY | Facility: CLINIC | Age: 73
End: 2023-01-01
Attending: INTERNAL MEDICINE
Payer: COMMERCIAL

## 2023-01-01 ENCOUNTER — TELEPHONE (OUTPATIENT)
Dept: CARDIOLOGY | Facility: CLINIC | Age: 73
End: 2023-01-01
Payer: COMMERCIAL

## 2023-01-01 ENCOUNTER — PRE VISIT (OUTPATIENT)
Dept: ONCOLOGY | Facility: HOSPITAL | Age: 73
End: 2023-01-01
Payer: COMMERCIAL

## 2023-01-01 ENCOUNTER — HOSPITAL ENCOUNTER (INPATIENT)
Facility: HOSPITAL | Age: 73
LOS: 6 days | DRG: 323 | End: 2023-11-20
Attending: INTERNAL MEDICINE | Admitting: INTERNAL MEDICINE
Payer: COMMERCIAL

## 2023-01-01 ENCOUNTER — OFFICE VISIT (OUTPATIENT)
Dept: FAMILY MEDICINE | Facility: CLINIC | Age: 73
End: 2023-01-01
Payer: COMMERCIAL

## 2023-01-01 ENCOUNTER — PATIENT OUTREACH (OUTPATIENT)
Dept: ONCOLOGY | Facility: CLINIC | Age: 73
End: 2023-01-01
Payer: COMMERCIAL

## 2023-01-01 ENCOUNTER — HOSPITAL ENCOUNTER (OUTPATIENT)
Dept: MRI IMAGING | Facility: CLINIC | Age: 73
Discharge: HOME | End: 2023-08-16
Attending: INTERNAL MEDICINE | Admitting: INTERNAL MEDICINE
Payer: COMMERCIAL

## 2023-01-01 ENCOUNTER — HOSPITAL ENCOUNTER (EMERGENCY)
Dept: RADIOLOGY | Facility: CLINIC | Age: 73
Discharge: HOME | End: 2023-11-03
Attending: INTERNAL MEDICINE
Payer: COMMERCIAL

## 2023-01-01 ENCOUNTER — ANCILLARY PROCEDURE (OUTPATIENT)
Dept: VASCULAR ULTRASOUND | Facility: CLINIC | Age: 73
End: 2023-01-01
Attending: SURGERY
Payer: COMMERCIAL

## 2023-01-01 ENCOUNTER — APPOINTMENT (OUTPATIENT)
Dept: RADIATION ONCOLOGY | Facility: HOSPITAL | Age: 73
End: 2023-01-01
Attending: RADIOLOGY
Payer: COMMERCIAL

## 2023-01-01 ENCOUNTER — APPOINTMENT (OUTPATIENT)
Dept: RADIOLOGY | Facility: HOSPITAL | Age: 73
DRG: 323 | End: 2023-01-01
Attending: INTERNAL MEDICINE
Payer: COMMERCIAL

## 2023-01-01 ENCOUNTER — PREP FOR PROCEDURE (OUTPATIENT)
Dept: ONCOLOGY | Facility: CLINIC | Age: 73
End: 2023-01-01
Payer: COMMERCIAL

## 2023-01-01 ENCOUNTER — APPOINTMENT (OUTPATIENT)
Dept: CARDIOLOGY | Facility: HOSPITAL | Age: 73
DRG: 280 | End: 2023-01-01
Payer: COMMERCIAL

## 2023-01-01 ENCOUNTER — ANESTHESIA EVENT (OUTPATIENT)
Dept: SURGERY | Facility: CLINIC | Age: 73
End: 2023-01-01
Payer: COMMERCIAL

## 2023-01-01 ENCOUNTER — TELEPHONE (OUTPATIENT)
Dept: INTERVENTIONAL RADIOLOGY/VASCULAR | Facility: CLINIC | Age: 73
End: 2023-01-01

## 2023-01-01 ENCOUNTER — APPOINTMENT (OUTPATIENT)
Dept: CT IMAGING | Facility: CLINIC | Age: 73
DRG: 311 | End: 2023-01-01
Attending: EMERGENCY MEDICINE
Payer: COMMERCIAL

## 2023-01-01 ENCOUNTER — PATIENT OUTREACH (OUTPATIENT)
Dept: ONCOLOGY | Facility: CLINIC | Age: 73
End: 2023-01-01

## 2023-01-01 ENCOUNTER — APPOINTMENT (OUTPATIENT)
Dept: CARDIOLOGY | Facility: HOSPITAL | Age: 73
DRG: 323 | End: 2023-01-01
Attending: INTERNAL MEDICINE
Payer: COMMERCIAL

## 2023-01-01 ENCOUNTER — OFFICE VISIT (OUTPATIENT)
Dept: VASCULAR SURGERY | Facility: CLINIC | Age: 73
End: 2023-01-01
Attending: INTERNAL MEDICINE
Payer: COMMERCIAL

## 2023-01-01 ENCOUNTER — TELEPHONE (OUTPATIENT)
Dept: FAMILY MEDICINE | Facility: CLINIC | Age: 73
End: 2023-01-01
Payer: COMMERCIAL

## 2023-01-01 ENCOUNTER — APPOINTMENT (OUTPATIENT)
Dept: OCCUPATIONAL THERAPY | Facility: HOSPITAL | Age: 73
DRG: 323 | End: 2023-01-01
Attending: INTERNAL MEDICINE
Payer: COMMERCIAL

## 2023-01-01 ENCOUNTER — OFFICE VISIT (OUTPATIENT)
Dept: ONCOLOGY | Facility: CLINIC | Age: 73
End: 2023-01-01
Attending: STUDENT IN AN ORGANIZED HEALTH CARE EDUCATION/TRAINING PROGRAM
Payer: COMMERCIAL

## 2023-01-01 ENCOUNTER — HOSPITAL ENCOUNTER (OUTPATIENT)
Dept: PET IMAGING | Facility: HOSPITAL | Age: 73
Discharge: HOME | End: 2023-08-18
Attending: INTERNAL MEDICINE
Payer: COMMERCIAL

## 2023-01-01 ENCOUNTER — DOCUMENTATION ONLY (OUTPATIENT)
Dept: CARDIOLOGY | Facility: CLINIC | Age: 73
End: 2023-01-01

## 2023-01-01 ENCOUNTER — HOSPITAL ENCOUNTER (OUTPATIENT)
Dept: CT IMAGING | Facility: CLINIC | Age: 73
Discharge: HOME | End: 2023-06-30
Attending: INTERNAL MEDICINE
Payer: COMMERCIAL

## 2023-01-01 ENCOUNTER — HOSPITAL ENCOUNTER (INPATIENT)
Facility: CLINIC | Age: 73
LOS: 1 days | Discharge: ANOTHER HOSPITAL | DRG: 311 | End: 2023-11-03
Attending: EMERGENCY MEDICINE | Admitting: STUDENT IN AN ORGANIZED HEALTH CARE EDUCATION/TRAINING PROGRAM
Payer: COMMERCIAL

## 2023-01-01 ENCOUNTER — TELEPHONE (OUTPATIENT)
Dept: UROLOGY | Facility: CLINIC | Age: 73
End: 2023-01-01
Payer: COMMERCIAL

## 2023-01-01 ENCOUNTER — HOSPITAL ENCOUNTER (OUTPATIENT)
Dept: CT IMAGING | Facility: CLINIC | Age: 73
Discharge: HOME | End: 2023-08-01
Attending: INTERNAL MEDICINE
Payer: COMMERCIAL

## 2023-01-01 ENCOUNTER — MYC MEDICAL ADVICE (OUTPATIENT)
Dept: INTERVENTIONAL RADIOLOGY/VASCULAR | Facility: CLINIC | Age: 73
End: 2023-01-01
Payer: COMMERCIAL

## 2023-01-01 ENCOUNTER — APPOINTMENT (OUTPATIENT)
Dept: OCCUPATIONAL THERAPY | Facility: HOSPITAL | Age: 73
DRG: 323 | End: 2023-01-01
Attending: NURSE PRACTITIONER
Payer: COMMERCIAL

## 2023-01-01 ENCOUNTER — OFFICE VISIT (OUTPATIENT)
Dept: RADIATION ONCOLOGY | Facility: CLINIC | Age: 73
End: 2023-01-01
Attending: INTERNAL MEDICINE
Payer: COMMERCIAL

## 2023-01-01 ENCOUNTER — ANESTHESIA EVENT (OUTPATIENT)
Dept: INTENSIVE CARE | Facility: HOSPITAL | Age: 73
DRG: 323 | End: 2023-01-01
Payer: COMMERCIAL

## 2023-01-01 ENCOUNTER — APPOINTMENT (OUTPATIENT)
Dept: MRI IMAGING | Facility: HOSPITAL | Age: 73
DRG: 280 | End: 2023-01-01
Attending: INTERNAL MEDICINE
Payer: COMMERCIAL

## 2023-01-01 ENCOUNTER — APPOINTMENT (OUTPATIENT)
Dept: PHYSICAL THERAPY | Facility: HOSPITAL | Age: 73
DRG: 323 | End: 2023-01-01
Attending: INTERNAL MEDICINE
Payer: COMMERCIAL

## 2023-01-01 ENCOUNTER — HOSPITAL ENCOUNTER (OUTPATIENT)
Age: 73
End: 2023-01-01
Attending: STUDENT IN AN ORGANIZED HEALTH CARE EDUCATION/TRAINING PROGRAM
Payer: COMMERCIAL

## 2023-01-01 ENCOUNTER — HOSPITAL ENCOUNTER (OUTPATIENT)
Dept: RADIOLOGY | Facility: CLINIC | Age: 73
Discharge: HOME | End: 2023-09-06
Attending: INTERNAL MEDICINE
Payer: COMMERCIAL

## 2023-01-01 ENCOUNTER — HOSPITAL ENCOUNTER (OUTPATIENT)
Facility: CLINIC | Age: 73
Discharge: HOME | End: 2023-08-03
Attending: STUDENT IN AN ORGANIZED HEALTH CARE EDUCATION/TRAINING PROGRAM | Admitting: STUDENT IN AN ORGANIZED HEALTH CARE EDUCATION/TRAINING PROGRAM
Payer: COMMERCIAL

## 2023-01-01 ENCOUNTER — OFFICE VISIT (OUTPATIENT)
Dept: ONCOLOGY | Facility: CLINIC | Age: 73
End: 2023-01-01
Attending: PHYSICIAN ASSISTANT
Payer: COMMERCIAL

## 2023-01-01 ENCOUNTER — MEDICAL CORRESPONDENCE (OUTPATIENT)
Dept: HEALTH INFORMATION MANAGEMENT | Facility: CLINIC | Age: 73
End: 2023-01-01

## 2023-01-01 ENCOUNTER — LAB (OUTPATIENT)
Dept: INFUSION THERAPY | Facility: CLINIC | Age: 73
End: 2023-01-01
Attending: STUDENT IN AN ORGANIZED HEALTH CARE EDUCATION/TRAINING PROGRAM
Payer: COMMERCIAL

## 2023-01-01 ENCOUNTER — HOSPITAL ENCOUNTER (OUTPATIENT)
Dept: RADIOLOGY | Facility: CLINIC | Age: 73
Discharge: HOME | End: 2023-08-01
Attending: RADIOLOGY
Payer: COMMERCIAL

## 2023-01-01 ENCOUNTER — OFFICE VISIT (OUTPATIENT)
Dept: VASCULAR SURGERY | Facility: CLINIC | Age: 73
End: 2023-01-01
Attending: SURGERY
Payer: COMMERCIAL

## 2023-01-01 ENCOUNTER — DOCUMENTATION ONLY (OUTPATIENT)
Dept: ONCOLOGY | Facility: CLINIC | Age: 73
End: 2023-01-01

## 2023-01-01 ENCOUNTER — ANESTHESIA (OUTPATIENT)
Dept: INTENSIVE CARE | Facility: HOSPITAL | Age: 73
DRG: 323 | End: 2023-01-01
Payer: COMMERCIAL

## 2023-01-01 ENCOUNTER — HOSPITAL ENCOUNTER (OUTPATIENT)
Dept: INTERVENTIONAL RADIOLOGY/VASCULAR | Facility: CLINIC | Age: 73
Discharge: HOME | End: 2023-09-11
Attending: INTERNAL MEDICINE | Admitting: RADIOLOGY
Payer: COMMERCIAL

## 2023-01-01 ENCOUNTER — ALLIED HEALTH/NURSE VISIT (OUTPATIENT)
Dept: RADIATION ONCOLOGY | Facility: CLINIC | Age: 73
End: 2023-01-01
Payer: COMMERCIAL

## 2023-01-01 ENCOUNTER — HOSPITAL ENCOUNTER (EMERGENCY)
Facility: CLINIC | Age: 73
Discharge: LEFT BEFORE DISCHARGE | End: 2023-11-03
Attending: EMERGENCY MEDICINE
Payer: COMMERCIAL

## 2023-01-01 ENCOUNTER — PRE VISIT (OUTPATIENT)
Dept: RADIATION ONCOLOGY | Facility: CLINIC | Age: 73
End: 2023-01-01
Payer: COMMERCIAL

## 2023-01-01 VITALS
SYSTOLIC BLOOD PRESSURE: 137 MMHG | RESPIRATION RATE: 18 BRPM | BODY MASS INDEX: 26.03 KG/M2 | WEIGHT: 166.2 LBS | DIASTOLIC BLOOD PRESSURE: 65 MMHG | HEART RATE: 65 BPM | OXYGEN SATURATION: 96 % | TEMPERATURE: 98.8 F

## 2023-01-01 VITALS
SYSTOLIC BLOOD PRESSURE: 131 MMHG | WEIGHT: 155 LBS | BODY MASS INDEX: 23.49 KG/M2 | RESPIRATION RATE: 20 BRPM | OXYGEN SATURATION: 93 % | HEIGHT: 68 IN | HEART RATE: 85 BPM | DIASTOLIC BLOOD PRESSURE: 66 MMHG

## 2023-01-01 VITALS — SYSTOLIC BLOOD PRESSURE: 106 MMHG | HEART RATE: 73 BPM | DIASTOLIC BLOOD PRESSURE: 62 MMHG | TEMPERATURE: 97.7 F

## 2023-01-01 VITALS
WEIGHT: 161 LBS | OXYGEN SATURATION: 95 % | SYSTOLIC BLOOD PRESSURE: 105 MMHG | RESPIRATION RATE: 16 BRPM | HEART RATE: 81 BPM | TEMPERATURE: 98.7 F | DIASTOLIC BLOOD PRESSURE: 67 MMHG | BODY MASS INDEX: 24.48 KG/M2

## 2023-01-01 VITALS
OXYGEN SATURATION: 95 % | SYSTOLIC BLOOD PRESSURE: 110 MMHG | HEART RATE: 64 BPM | HEIGHT: 67 IN | WEIGHT: 168 LBS | TEMPERATURE: 97.9 F | RESPIRATION RATE: 16 BRPM | BODY MASS INDEX: 26.37 KG/M2 | DIASTOLIC BLOOD PRESSURE: 64 MMHG

## 2023-01-01 VITALS
HEART RATE: 63 BPM | HEIGHT: 67 IN | BODY MASS INDEX: 26.78 KG/M2 | DIASTOLIC BLOOD PRESSURE: 66 MMHG | SYSTOLIC BLOOD PRESSURE: 102 MMHG | OXYGEN SATURATION: 96 % | WEIGHT: 170.6 LBS

## 2023-01-01 VITALS
HEART RATE: 67 BPM | WEIGHT: 161.1 LBS | RESPIRATION RATE: 16 BRPM | TEMPERATURE: 97.8 F | DIASTOLIC BLOOD PRESSURE: 58 MMHG | OXYGEN SATURATION: 99 % | BODY MASS INDEX: 25.23 KG/M2 | SYSTOLIC BLOOD PRESSURE: 130 MMHG

## 2023-01-01 VITALS
RESPIRATION RATE: 16 BRPM | OXYGEN SATURATION: 97 % | WEIGHT: 163.5 LBS | DIASTOLIC BLOOD PRESSURE: 69 MMHG | SYSTOLIC BLOOD PRESSURE: 135 MMHG | TEMPERATURE: 97.9 F | BODY MASS INDEX: 25.61 KG/M2 | HEART RATE: 69 BPM

## 2023-01-01 VITALS
WEIGHT: 159.7 LBS | HEART RATE: 75 BPM | OXYGEN SATURATION: 98 % | DIASTOLIC BLOOD PRESSURE: 64 MMHG | TEMPERATURE: 97.9 F | RESPIRATION RATE: 16 BRPM | SYSTOLIC BLOOD PRESSURE: 114 MMHG | BODY MASS INDEX: 25.01 KG/M2

## 2023-01-01 VITALS
RESPIRATION RATE: 16 BRPM | TEMPERATURE: 98.2 F | DIASTOLIC BLOOD PRESSURE: 58 MMHG | SYSTOLIC BLOOD PRESSURE: 111 MMHG | OXYGEN SATURATION: 97 % | BODY MASS INDEX: 26.17 KG/M2 | HEART RATE: 65 BPM | WEIGHT: 167.1 LBS

## 2023-01-01 VITALS
HEIGHT: 67 IN | DIASTOLIC BLOOD PRESSURE: 75 MMHG | SYSTOLIC BLOOD PRESSURE: 120 MMHG | HEART RATE: 60 BPM | WEIGHT: 170 LBS | OXYGEN SATURATION: 95 % | BODY MASS INDEX: 26.68 KG/M2 | RESPIRATION RATE: 16 BRPM

## 2023-01-01 VITALS
RESPIRATION RATE: 23 BRPM | HEART RATE: 53 BPM | SYSTOLIC BLOOD PRESSURE: 150 MMHG | DIASTOLIC BLOOD PRESSURE: 72 MMHG | OXYGEN SATURATION: 99 %

## 2023-01-01 VITALS
OXYGEN SATURATION: 97 % | WEIGHT: 152.3 LBS | RESPIRATION RATE: 20 BRPM | BODY MASS INDEX: 23.16 KG/M2 | HEART RATE: 89 BPM | TEMPERATURE: 98.1 F | DIASTOLIC BLOOD PRESSURE: 67 MMHG | SYSTOLIC BLOOD PRESSURE: 95 MMHG

## 2023-01-01 VITALS
TEMPERATURE: 97.9 F | WEIGHT: 160.8 LBS | RESPIRATION RATE: 18 BRPM | BODY MASS INDEX: 25.18 KG/M2 | OXYGEN SATURATION: 95 % | HEART RATE: 69 BPM | DIASTOLIC BLOOD PRESSURE: 80 MMHG | SYSTOLIC BLOOD PRESSURE: 102 MMHG

## 2023-01-01 VITALS
SYSTOLIC BLOOD PRESSURE: 120 MMHG | BODY MASS INDEX: 25.72 KG/M2 | WEIGHT: 164.2 LBS | TEMPERATURE: 97.7 F | DIASTOLIC BLOOD PRESSURE: 57 MMHG | HEART RATE: 65 BPM | OXYGEN SATURATION: 97 % | RESPIRATION RATE: 16 BRPM

## 2023-01-01 VITALS
HEART RATE: 63 BPM | DIASTOLIC BLOOD PRESSURE: 59 MMHG | RESPIRATION RATE: 18 BRPM | WEIGHT: 166.9 LBS | OXYGEN SATURATION: 96 % | BODY MASS INDEX: 26.14 KG/M2 | SYSTOLIC BLOOD PRESSURE: 112 MMHG | TEMPERATURE: 97.6 F

## 2023-01-01 VITALS — HEART RATE: 90 BPM | SYSTOLIC BLOOD PRESSURE: 119 MMHG | DIASTOLIC BLOOD PRESSURE: 79 MMHG

## 2023-01-01 VITALS
OXYGEN SATURATION: 97 % | SYSTOLIC BLOOD PRESSURE: 119 MMHG | RESPIRATION RATE: 16 BRPM | TEMPERATURE: 97.6 F | HEART RATE: 72 BPM | DIASTOLIC BLOOD PRESSURE: 78 MMHG

## 2023-01-01 VITALS
OXYGEN SATURATION: 96 % | SYSTOLIC BLOOD PRESSURE: 156 MMHG | TEMPERATURE: 97.8 F | HEART RATE: 60 BPM | RESPIRATION RATE: 16 BRPM | DIASTOLIC BLOOD PRESSURE: 75 MMHG

## 2023-01-01 VITALS
RESPIRATION RATE: 14 BRPM | SYSTOLIC BLOOD PRESSURE: 118 MMHG | BODY MASS INDEX: 26.17 KG/M2 | WEIGHT: 167.1 LBS | OXYGEN SATURATION: 95 % | TEMPERATURE: 97.7 F | DIASTOLIC BLOOD PRESSURE: 59 MMHG | HEART RATE: 63 BPM

## 2023-01-01 VITALS
DIASTOLIC BLOOD PRESSURE: 69 MMHG | OXYGEN SATURATION: 92 % | SYSTOLIC BLOOD PRESSURE: 111 MMHG | HEART RATE: 61 BPM | BODY MASS INDEX: 26.06 KG/M2 | HEIGHT: 67 IN | WEIGHT: 166 LBS | TEMPERATURE: 98.1 F

## 2023-01-01 VITALS
HEART RATE: 61 BPM | OXYGEN SATURATION: 94 % | BODY MASS INDEX: 26.42 KG/M2 | DIASTOLIC BLOOD PRESSURE: 60 MMHG | RESPIRATION RATE: 16 BRPM | TEMPERATURE: 97.2 F | SYSTOLIC BLOOD PRESSURE: 149 MMHG | WEIGHT: 168.7 LBS

## 2023-01-01 VITALS
HEIGHT: 68 IN | WEIGHT: 163 LBS | SYSTOLIC BLOOD PRESSURE: 135 MMHG | DIASTOLIC BLOOD PRESSURE: 88 MMHG | HEART RATE: 96 BPM | RESPIRATION RATE: 27 BRPM | TEMPERATURE: 97.9 F | BODY MASS INDEX: 24.71 KG/M2 | OXYGEN SATURATION: 97 %

## 2023-01-01 VITALS
BODY MASS INDEX: 26.23 KG/M2 | WEIGHT: 167.1 LBS | DIASTOLIC BLOOD PRESSURE: 73 MMHG | OXYGEN SATURATION: 95 % | HEIGHT: 67 IN | HEART RATE: 61 BPM | TEMPERATURE: 96.6 F | RESPIRATION RATE: 14 BRPM | SYSTOLIC BLOOD PRESSURE: 157 MMHG

## 2023-01-01 VITALS
RESPIRATION RATE: 18 BRPM | DIASTOLIC BLOOD PRESSURE: 66 MMHG | SYSTOLIC BLOOD PRESSURE: 111 MMHG | WEIGHT: 168.7 LBS | BODY MASS INDEX: 26.48 KG/M2 | HEIGHT: 67 IN | HEART RATE: 62 BPM | OXYGEN SATURATION: 95 %

## 2023-01-01 VITALS
HEART RATE: 59 BPM | OXYGEN SATURATION: 96 % | SYSTOLIC BLOOD PRESSURE: 145 MMHG | DIASTOLIC BLOOD PRESSURE: 70 MMHG | TEMPERATURE: 98.2 F | RESPIRATION RATE: 12 BRPM

## 2023-01-01 VITALS
HEIGHT: 67 IN | HEART RATE: 67 BPM | WEIGHT: 169 LBS | SYSTOLIC BLOOD PRESSURE: 126 MMHG | DIASTOLIC BLOOD PRESSURE: 79 MMHG | BODY MASS INDEX: 26.53 KG/M2 | RESPIRATION RATE: 16 BRPM | OXYGEN SATURATION: 94 %

## 2023-01-01 VITALS
DIASTOLIC BLOOD PRESSURE: 63 MMHG | WEIGHT: 167.8 LBS | TEMPERATURE: 97.7 F | OXYGEN SATURATION: 95 % | HEART RATE: 59 BPM | BODY MASS INDEX: 26.28 KG/M2 | SYSTOLIC BLOOD PRESSURE: 136 MMHG | RESPIRATION RATE: 16 BRPM

## 2023-01-01 VITALS
SYSTOLIC BLOOD PRESSURE: 112 MMHG | HEART RATE: 72 BPM | RESPIRATION RATE: 16 BRPM | WEIGHT: 158.5 LBS | OXYGEN SATURATION: 97 % | TEMPERATURE: 98 F | DIASTOLIC BLOOD PRESSURE: 59 MMHG | BODY MASS INDEX: 24.82 KG/M2

## 2023-01-01 VITALS
SYSTOLIC BLOOD PRESSURE: 117 MMHG | HEART RATE: 62 BPM | OXYGEN SATURATION: 92 % | RESPIRATION RATE: 16 BRPM | DIASTOLIC BLOOD PRESSURE: 65 MMHG | HEIGHT: 67 IN | BODY MASS INDEX: 26.68 KG/M2 | WEIGHT: 170 LBS

## 2023-01-01 VITALS
SYSTOLIC BLOOD PRESSURE: 48 MMHG | TEMPERATURE: 94.8 F | OXYGEN SATURATION: 39 % | HEART RATE: 146 BPM | DIASTOLIC BLOOD PRESSURE: 25 MMHG | HEIGHT: 68 IN | WEIGHT: 163.8 LBS | BODY MASS INDEX: 24.83 KG/M2 | RESPIRATION RATE: 28 BRPM

## 2023-01-01 VITALS
HEART RATE: 60 BPM | SYSTOLIC BLOOD PRESSURE: 120 MMHG | DIASTOLIC BLOOD PRESSURE: 75 MMHG | BODY MASS INDEX: 26.68 KG/M2 | HEIGHT: 67 IN | WEIGHT: 170 LBS | RESPIRATION RATE: 16 BRPM | OXYGEN SATURATION: 95 %

## 2023-01-01 DIAGNOSIS — C34.31 PRIMARY MALIGNANT NEOPLASM OF RIGHT LOWER LOBE OF LUNG (H): Primary | ICD-10-CM

## 2023-01-01 DIAGNOSIS — C34.31 PRIMARY MALIGNANT NEOPLASM OF RIGHT LOWER LOBE OF LUNG (H): ICD-10-CM

## 2023-01-01 DIAGNOSIS — Z51.11 ENCOUNTER FOR ANTINEOPLASTIC CHEMOTHERAPY: Primary | ICD-10-CM

## 2023-01-01 DIAGNOSIS — I20.0 UNSTABLE ANGINA (H): Primary | ICD-10-CM

## 2023-01-01 DIAGNOSIS — Z51.11 ENCOUNTER FOR ANTINEOPLASTIC CHEMOTHERAPY: ICD-10-CM

## 2023-01-01 DIAGNOSIS — M1A.9XX0 CHRONIC GOUT WITHOUT TOPHUS, UNSPECIFIED CAUSE, UNSPECIFIED SITE: ICD-10-CM

## 2023-01-01 DIAGNOSIS — G47.00 INSOMNIA, UNSPECIFIED TYPE: ICD-10-CM

## 2023-01-01 DIAGNOSIS — I65.23 CAROTID STENOSIS, BILATERAL: Primary | ICD-10-CM

## 2023-01-01 DIAGNOSIS — R91.8 PULMONARY NODULES: ICD-10-CM

## 2023-01-01 DIAGNOSIS — C67.9 MALIGNANT NEOPLASM OF URINARY BLADDER, UNSPECIFIED SITE (H): Primary | ICD-10-CM

## 2023-01-01 DIAGNOSIS — Z12.5 SCREENING FOR PROSTATE CANCER: ICD-10-CM

## 2023-01-01 DIAGNOSIS — J44.9 CHRONIC OBSTRUCTIVE PULMONARY DISEASE, UNSPECIFIED COPD TYPE (H): ICD-10-CM

## 2023-01-01 DIAGNOSIS — I65.22 STENOSIS OF LEFT CAROTID ARTERY: ICD-10-CM

## 2023-01-01 DIAGNOSIS — F41.1 GENERALIZED ANXIETY DISORDER: ICD-10-CM

## 2023-01-01 DIAGNOSIS — C34.11 MALIGNANT NEOPLASM OF UPPER LOBE, RIGHT BRONCHUS OR LUNG (H): ICD-10-CM

## 2023-01-01 DIAGNOSIS — I50.20: ICD-10-CM

## 2023-01-01 DIAGNOSIS — I48.91 A-FIB (H): Primary | ICD-10-CM

## 2023-01-01 DIAGNOSIS — C34.11 MALIGNANT NEOPLASM OF UPPER LOBE, RIGHT BRONCHUS OR LUNG (H): Primary | ICD-10-CM

## 2023-01-01 DIAGNOSIS — N18.31 CHRONIC KIDNEY DISEASE, STAGE 3A (H): ICD-10-CM

## 2023-01-01 DIAGNOSIS — R19.7 DIARRHEA, UNSPECIFIED TYPE: ICD-10-CM

## 2023-01-01 DIAGNOSIS — R31.9 HEMATURIA, UNSPECIFIED TYPE: ICD-10-CM

## 2023-01-01 DIAGNOSIS — Z95.5 S/P DRUG ELUTING CORONARY STENT PLACEMENT: Primary | ICD-10-CM

## 2023-01-01 DIAGNOSIS — I73.9 PAD (PERIPHERAL ARTERY DISEASE) (H): ICD-10-CM

## 2023-01-01 DIAGNOSIS — E78.5 HYPERLIPIDEMIA, UNSPECIFIED HYPERLIPIDEMIA TYPE: ICD-10-CM

## 2023-01-01 DIAGNOSIS — D64.81 ANEMIA DUE TO ANTINEOPLASTIC CHEMOTHERAPY: ICD-10-CM

## 2023-01-01 DIAGNOSIS — I65.23 CAROTID STENOSIS, BILATERAL: ICD-10-CM

## 2023-01-01 DIAGNOSIS — R79.89 ELEVATED TROPONIN: ICD-10-CM

## 2023-01-01 DIAGNOSIS — R10.31 ABDOMINAL PAIN, RIGHT LOWER QUADRANT: ICD-10-CM

## 2023-01-01 DIAGNOSIS — I10 HYPERTENSION, ESSENTIAL, BENIGN: ICD-10-CM

## 2023-01-01 DIAGNOSIS — F33.9 RECURRENT MAJOR DEPRESSIVE DISORDER, REMISSION STATUS UNSPECIFIED (H): ICD-10-CM

## 2023-01-01 DIAGNOSIS — I20.0 UNSTABLE ANGINA (H): ICD-10-CM

## 2023-01-01 DIAGNOSIS — C34.91 SQUAMOUS CELL LUNG CANCER, RIGHT (H): ICD-10-CM

## 2023-01-01 DIAGNOSIS — I50.9 ACUTE DECOMPENSATED HEART FAILURE (H): Primary | ICD-10-CM

## 2023-01-01 DIAGNOSIS — C34.12 MALIGNANT NEOPLASM OF UPPER LOBE OF LEFT LUNG (H): ICD-10-CM

## 2023-01-01 DIAGNOSIS — I21.4 NSTEMI (NON-ST ELEVATED MYOCARDIAL INFARCTION) (H): Primary | ICD-10-CM

## 2023-01-01 DIAGNOSIS — R07.9 CHEST PAIN, UNSPECIFIED TYPE: ICD-10-CM

## 2023-01-01 DIAGNOSIS — C34.91 SQUAMOUS CELL LUNG CANCER, RIGHT (H): Primary | ICD-10-CM

## 2023-01-01 DIAGNOSIS — D63.0 ANEMIA IN NEOPLASTIC DISEASE: ICD-10-CM

## 2023-01-01 DIAGNOSIS — J44.9 CHRONIC OBSTRUCTIVE PULMONARY DISEASE, UNSPECIFIED COPD TYPE (H): Primary | ICD-10-CM

## 2023-01-01 DIAGNOSIS — I21.3 ST ELEVATION MI (STEMI) (H): ICD-10-CM

## 2023-01-01 DIAGNOSIS — C67.4 MALIGNANT NEOPLASM OF POSTERIOR WALL OF URINARY BLADDER (H): Primary | ICD-10-CM

## 2023-01-01 DIAGNOSIS — Z09 HOSPITAL DISCHARGE FOLLOW-UP: ICD-10-CM

## 2023-01-01 DIAGNOSIS — T45.1X5A ANEMIA DUE TO ANTINEOPLASTIC CHEMOTHERAPY: ICD-10-CM

## 2023-01-01 DIAGNOSIS — Z95.5 S/P CORONARY ARTERY STENT PLACEMENT: Primary | ICD-10-CM

## 2023-01-01 DIAGNOSIS — J90 PLEURAL EFFUSION, RIGHT: ICD-10-CM

## 2023-01-01 DIAGNOSIS — Z23 NEED FOR PROPHYLACTIC VACCINATION AND INOCULATION AGAINST INFLUENZA: ICD-10-CM

## 2023-01-01 DIAGNOSIS — J90 PLEURAL EFFUSION: ICD-10-CM

## 2023-01-01 DIAGNOSIS — Z00.00 ENCOUNTER FOR MEDICARE ANNUAL WELLNESS EXAM: Primary | ICD-10-CM

## 2023-01-01 DIAGNOSIS — N32.89 BLADDER MASS: ICD-10-CM

## 2023-01-01 DIAGNOSIS — R29.2 ABNORMAL REFLEX: ICD-10-CM

## 2023-01-01 DIAGNOSIS — R06.02 SOB (SHORTNESS OF BREATH): ICD-10-CM

## 2023-01-01 DIAGNOSIS — Z12.11 SCREEN FOR COLON CANCER: ICD-10-CM

## 2023-01-01 DIAGNOSIS — R91.8 PULMONARY NODULES: Primary | ICD-10-CM

## 2023-01-01 DIAGNOSIS — I25.10 ATHEROSCLEROSIS OF NATIVE CORONARY ARTERY OF NATIVE HEART WITHOUT ANGINA PECTORIS: Primary | ICD-10-CM

## 2023-01-01 DIAGNOSIS — R06.09 DYSPNEA ON EXERTION: Primary | ICD-10-CM

## 2023-01-01 DIAGNOSIS — N32.89 BLADDER MASS: Primary | ICD-10-CM

## 2023-01-01 DIAGNOSIS — F10.21 HISTORY OF ALCOHOL DEPENDENCE (H): ICD-10-CM

## 2023-01-01 DIAGNOSIS — J90 PLEURAL EFFUSION: Primary | ICD-10-CM

## 2023-01-01 DIAGNOSIS — C67.4 MALIGNANT NEOPLASM OF POSTERIOR WALL OF URINARY BLADDER (H): ICD-10-CM

## 2023-01-01 LAB
ABO/RH(D): NORMAL
ACT BLD: 230 SECONDS (ref 74–150)
ACT BLD: 318 SECONDS (ref 74–150)
ACT BLD: 380 SECONDS (ref 74–150)
ALBUMIN SERPL BCG-MCNC: 3.6 G/DL (ref 3.5–5.2)
ALBUMIN SERPL BCG-MCNC: 3.8 G/DL (ref 3.5–5.2)
ALBUMIN SERPL BCG-MCNC: 3.8 G/DL (ref 3.5–5.2)
ALBUMIN SERPL BCG-MCNC: 4 G/DL (ref 3.5–5.2)
ALBUMIN SERPL BCG-MCNC: 4.1 G/DL (ref 3.5–5.2)
ALBUMIN SERPL BCG-MCNC: 4.1 G/DL (ref 3.5–5.2)
ALBUMIN SERPL BCG-MCNC: 4.3 G/DL (ref 3.5–5.2)
ALBUMIN SERPL BCG-MCNC: 4.5 G/DL (ref 3.5–5.2)
ALBUMIN SERPL BCG-MCNC: 5.1 G/DL (ref 3.5–5.2)
ALBUMIN UR-MCNC: 20 MG/DL
ALBUMIN UR-MCNC: 30 MG/DL
ALLEN'S TEST: ABNORMAL
ALLEN'S TEST: ABNORMAL
ALP SERPL-CCNC: 67 U/L (ref 40–129)
ALP SERPL-CCNC: 68 U/L (ref 40–129)
ALP SERPL-CCNC: 69 U/L (ref 40–129)
ALP SERPL-CCNC: 72 U/L (ref 40–129)
ALP SERPL-CCNC: 74 U/L (ref 40–150)
ALP SERPL-CCNC: 76 U/L (ref 40–150)
ALP SERPL-CCNC: 77 U/L (ref 40–150)
ALP SERPL-CCNC: 83 U/L (ref 40–150)
ALT SERPL W P-5'-P-CCNC: 12 U/L (ref 0–70)
ALT SERPL W P-5'-P-CCNC: 16 U/L (ref 0–70)
ALT SERPL W P-5'-P-CCNC: 19 U/L (ref 0–70)
ALT SERPL W P-5'-P-CCNC: 23 U/L (ref 10–50)
ALT SERPL W P-5'-P-CCNC: 386 U/L (ref 0–70)
ALT SERPL W P-5'-P-CCNC: 39 U/L (ref 0–70)
ALT SERPL W P-5'-P-CCNC: 54 U/L (ref 0–70)
ALT SERPL W P-5'-P-CCNC: 75 U/L (ref 0–70)
ANION GAP SERPL CALCULATED.3IONS-SCNC: 10 MMOL/L (ref 7–15)
ANION GAP SERPL CALCULATED.3IONS-SCNC: 11 MMOL/L (ref 7–15)
ANION GAP SERPL CALCULATED.3IONS-SCNC: 12 MMOL/L (ref 7–15)
ANION GAP SERPL CALCULATED.3IONS-SCNC: 13 MMOL/L (ref 7–15)
ANION GAP SERPL CALCULATED.3IONS-SCNC: 14 MMOL/L (ref 7–15)
ANION GAP SERPL CALCULATED.3IONS-SCNC: 17 MMOL/L (ref 7–15)
ANION GAP SERPL CALCULATED.3IONS-SCNC: 19 MMOL/L (ref 7–15)
ANION GAP SERPL CALCULATED.3IONS-SCNC: 20 MMOL/L (ref 7–15)
ANION GAP SERPL CALCULATED.3IONS-SCNC: 21 MMOL/L (ref 7–15)
ANION GAP SERPL CALCULATED.3IONS-SCNC: 7 MMOL/L (ref 7–15)
ANION GAP SERPL CALCULATED.3IONS-SCNC: 7 MMOL/L (ref 7–15)
ANION GAP SERPL CALCULATED.3IONS-SCNC: 8 MMOL/L (ref 7–15)
ANION GAP SERPL CALCULATED.3IONS-SCNC: 9 MMOL/L (ref 7–15)
ANION GAP SERPL CALCULATED.3IONS-SCNC: 9 MMOL/L (ref 7–15)
ANTIBODY SCREEN: NEGATIVE
APPEARANCE UR: CLEAR
APPEARANCE UR: CLEAR
AST SERPL W P-5'-P-CCNC: 26 U/L (ref 0–45)
AST SERPL W P-5'-P-CCNC: 27 U/L (ref 0–45)
AST SERPL W P-5'-P-CCNC: 27 U/L (ref 0–45)
AST SERPL W P-5'-P-CCNC: 28 U/L (ref 10–50)
AST SERPL W P-5'-P-CCNC: 52 U/L (ref 0–45)
AST SERPL W P-5'-P-CCNC: 577 U/L (ref 0–45)
AST SERPL W P-5'-P-CCNC: 62 U/L (ref 0–45)
AST SERPL W P-5'-P-CCNC: 81 U/L (ref 0–45)
ATRIAL RATE - MUSE: 102 BPM
ATRIAL RATE - MUSE: 103 BPM
ATRIAL RATE - MUSE: 104 BPM
ATRIAL RATE - MUSE: 106 BPM
ATRIAL RATE - MUSE: 106 BPM
ATRIAL RATE - MUSE: 107 BPM
ATRIAL RATE - MUSE: 87 BPM
ATRIAL RATE - MUSE: 91 BPM
ATRIAL RATE - MUSE: 92 BPM
ATRIAL RATE - MUSE: 93 BPM
ATRIAL RATE - MUSE: 93 BPM
ATRIAL RATE - MUSE: 95 BPM
ATRIAL RATE - MUSE: 98 BPM
BACTERIA #/AREA URNS HPF: ABNORMAL /HPF
BACTERIA BLD CULT: NO GROWTH
BACTERIA BLD CULT: NO GROWTH
BASE EXCESS BLDA CALC-SCNC: -0.2 MMOL/L
BASE EXCESS BLDA CALC-SCNC: -19.3 MMOL/L
BASE EXCESS BLDA CALC-SCNC: 21 MMOL/L
BASE EXCESS BLDV CALC-SCNC: -0.1 MMOL/L
BASE EXCESS BLDV CALC-SCNC: -0.4 MMOL/L
BASE EXCESS BLDV CALC-SCNC: -8.7 MMOL/L
BASO+EOS+MONOS # BLD AUTO: ABNORMAL 10*3/UL
BASO+EOS+MONOS NFR BLD AUTO: ABNORMAL %
BASOPHILS # BLD AUTO: 0 10E3/UL (ref 0–0.2)
BASOPHILS # BLD AUTO: 0.1 10E3/UL (ref 0–0.2)
BASOPHILS NFR BLD AUTO: 0 %
BASOPHILS NFR BLD AUTO: 1 %
BILIRUB DIRECT SERPL-MCNC: 1.04 MG/DL (ref 0–0.3)
BILIRUB DIRECT SERPL-MCNC: 1.28 MG/DL (ref 0–0.3)
BILIRUB DIRECT SERPL-MCNC: 1.58 MG/DL (ref 0–0.3)
BILIRUB SERPL-MCNC: 0.3 MG/DL
BILIRUB SERPL-MCNC: 0.4 MG/DL
BILIRUB SERPL-MCNC: 0.4 MG/DL
BILIRUB SERPL-MCNC: 0.6 MG/DL
BILIRUB SERPL-MCNC: 1.6 MG/DL
BILIRUB SERPL-MCNC: 2 MG/DL
BILIRUB SERPL-MCNC: 2.4 MG/DL
BILIRUB SERPL-MCNC: 3.3 MG/DL
BILIRUB UR QL STRIP: NEGATIVE
BILIRUB UR QL STRIP: NEGATIVE
BLD PROD TYP BPU: NORMAL
BLOOD COMPONENT TYPE: NORMAL
BUN SERPL-MCNC: 11.6 MG/DL (ref 8–23)
BUN SERPL-MCNC: 11.9 MG/DL (ref 8–23)
BUN SERPL-MCNC: 12 MG/DL (ref 8–23)
BUN SERPL-MCNC: 12 MG/DL (ref 8–23)
BUN SERPL-MCNC: 12.9 MG/DL (ref 8–23)
BUN SERPL-MCNC: 13.1 MG/DL (ref 8–23)
BUN SERPL-MCNC: 17.2 MG/DL (ref 8–23)
BUN SERPL-MCNC: 18.6 MG/DL (ref 8–23)
BUN SERPL-MCNC: 19.8 MG/DL (ref 8–23)
BUN SERPL-MCNC: 20.1 MG/DL (ref 8–23)
BUN SERPL-MCNC: 22.1 MG/DL (ref 8–23)
BUN SERPL-MCNC: 22.6 MG/DL (ref 8–23)
BUN SERPL-MCNC: 23.6 MG/DL (ref 8–23)
BUN SERPL-MCNC: 24.3 MG/DL (ref 8–23)
BUN SERPL-MCNC: 27.1 MG/DL (ref 8–23)
BUN SERPL-MCNC: 28.8 MG/DL (ref 8–23)
BUN SERPL-MCNC: 29.7 MG/DL (ref 8–23)
BUN SERPL-MCNC: 35.7 MG/DL (ref 8–23)
BUN SERPL-MCNC: 36.4 MG/DL (ref 8–23)
BUN SERPL-MCNC: 40.1 MG/DL (ref 8–23)
BUN SERPL-MCNC: 46.3 MG/DL (ref 8–23)
C PNEUM DNA SPEC QL NAA+PROBE: NOT DETECTED
CALCIUM SERPL-MCNC: 7.7 MG/DL (ref 8.8–10.2)
CALCIUM SERPL-MCNC: 7.8 MG/DL (ref 8.8–10.2)
CALCIUM SERPL-MCNC: 7.9 MG/DL (ref 8.8–10.2)
CALCIUM SERPL-MCNC: 8.1 MG/DL (ref 8.8–10.2)
CALCIUM SERPL-MCNC: 8.2 MG/DL (ref 8.8–10.2)
CALCIUM SERPL-MCNC: 8.3 MG/DL (ref 8.8–10.2)
CALCIUM SERPL-MCNC: 8.3 MG/DL (ref 8.8–10.2)
CALCIUM SERPL-MCNC: 8.5 MG/DL (ref 8.8–10.2)
CALCIUM SERPL-MCNC: 8.6 MG/DL (ref 8.8–10.2)
CALCIUM SERPL-MCNC: 8.7 MG/DL (ref 8.8–10.2)
CALCIUM SERPL-MCNC: 8.7 MG/DL (ref 8.8–10.2)
CALCIUM SERPL-MCNC: 8.8 MG/DL (ref 8.8–10.2)
CALCIUM SERPL-MCNC: 8.8 MG/DL (ref 8.8–10.2)
CALCIUM SERPL-MCNC: 8.9 MG/DL (ref 8.8–10.2)
CALCIUM SERPL-MCNC: 9.1 MG/DL (ref 8.8–10.2)
CALCIUM SERPL-MCNC: 9.4 MG/DL (ref 8.8–10.2)
CALCIUM SERPL-MCNC: 9.4 MG/DL (ref 8.8–10.2)
CALCIUM SERPL-MCNC: 9.5 MG/DL (ref 8.8–10.2)
CALCIUM SERPL-MCNC: 9.7 MG/DL (ref 8.8–10.2)
CALCIUM SERPL-MCNC: 9.7 MG/DL (ref 8.8–10.2)
CALCIUM SERPL-MCNC: 9.9 MG/DL (ref 8.8–10.2)
CALCIUM, IONIZED MEASURED: 1.08 MMOL/L (ref 1.11–1.3)
CALCIUM, IONIZED MEASURED: 1.16 MMOL/L (ref 1.11–1.3)
CALCIUM, IONIZED MEASURED: 1.2 MMOL/L (ref 1.11–1.3)
CALCIUM, IONIZED MEASURED: 1.25 MMOL/L (ref 1.11–1.3)
CALCIUM, IONIZED MEASURED: 1.31 MMOL/L (ref 1.11–1.3)
CHLORIDE SERPL-SCNC: 100 MMOL/L (ref 98–107)
CHLORIDE SERPL-SCNC: 102 MMOL/L (ref 98–107)
CHLORIDE SERPL-SCNC: 102 MMOL/L (ref 98–107)
CHLORIDE SERPL-SCNC: 103 MMOL/L (ref 98–107)
CHLORIDE SERPL-SCNC: 104 MMOL/L (ref 98–107)
CHLORIDE SERPL-SCNC: 104 MMOL/L (ref 98–107)
CHLORIDE SERPL-SCNC: 105 MMOL/L (ref 98–107)
CHLORIDE SERPL-SCNC: 106 MMOL/L (ref 98–107)
CHLORIDE SERPL-SCNC: 96 MMOL/L (ref 98–107)
CHLORIDE SERPL-SCNC: 96 MMOL/L (ref 98–107)
CHLORIDE SERPL-SCNC: 97 MMOL/L (ref 98–107)
CHLORIDE SERPL-SCNC: 98 MMOL/L (ref 98–107)
CHLORIDE UR-SCNC: <20 MMOL/L
CHOLEST SERPL-MCNC: 122 MG/DL
CHOLEST SERPL-MCNC: 97 MG/DL
CODING SYSTEM: NORMAL
COHGB MFR BLD: 100 % (ref 95–96)
COHGB MFR BLD: 58.3 % (ref 95–96)
COHGB MFR BLD: 94.9 % (ref 95–96)
COLOR UR AUTO: YELLOW
COLOR UR AUTO: YELLOW
CORTIS SERPL-MCNC: 36 UG/DL
CREAT BLD-MCNC: 1.5 MG/DL (ref 0.7–1.3)
CREAT SERPL-MCNC: 0.67 MG/DL (ref 0.67–1.17)
CREAT SERPL-MCNC: 0.71 MG/DL (ref 0.67–1.17)
CREAT SERPL-MCNC: 0.89 MG/DL (ref 0.67–1.17)
CREAT SERPL-MCNC: 0.96 MG/DL (ref 0.67–1.17)
CREAT SERPL-MCNC: 0.96 MG/DL (ref 0.67–1.17)
CREAT SERPL-MCNC: 0.97 MG/DL (ref 0.67–1.17)
CREAT SERPL-MCNC: 1.01 MG/DL (ref 0.67–1.17)
CREAT SERPL-MCNC: 1.03 MG/DL (ref 0.67–1.17)
CREAT SERPL-MCNC: 1.04 MG/DL (ref 0.67–1.17)
CREAT SERPL-MCNC: 1.04 MG/DL (ref 0.67–1.17)
CREAT SERPL-MCNC: 1.05 MG/DL (ref 0.67–1.17)
CREAT SERPL-MCNC: 1.09 MG/DL (ref 0.67–1.17)
CREAT SERPL-MCNC: 1.17 MG/DL (ref 0.67–1.17)
CREAT SERPL-MCNC: 1.19 MG/DL (ref 0.67–1.17)
CREAT SERPL-MCNC: 1.2 MG/DL (ref 0.67–1.17)
CREAT SERPL-MCNC: 1.2 MG/DL (ref 0.67–1.17)
CREAT SERPL-MCNC: 1.3 MG/DL (ref 0.67–1.17)
CREAT SERPL-MCNC: 1.3 MG/DL (ref 0.67–1.17)
CREAT SERPL-MCNC: 1.34 MG/DL (ref 0.67–1.17)
CREAT SERPL-MCNC: 1.36 MG/DL (ref 0.67–1.17)
CREAT SERPL-MCNC: 1.38 MG/DL (ref 0.67–1.17)
CREAT SERPL-MCNC: 1.41 MG/DL (ref 0.67–1.17)
CREAT SERPL-MCNC: 1.5 MG/DL (ref 0.67–1.17)
CREAT SERPL-MCNC: 1.5 MG/DL (ref 0.67–1.17)
CREAT SERPL-MCNC: 1.57 MG/DL (ref 0.67–1.17)
CREAT SERPL-MCNC: 1.67 MG/DL (ref 0.67–1.17)
CREAT SERPL-MCNC: 2.05 MG/DL (ref 0.67–1.17)
CROSSMATCH: NORMAL
DEPRECATED HCO3 PLAS-SCNC: 15 MMOL/L (ref 22–29)
DEPRECATED HCO3 PLAS-SCNC: 19 MMOL/L (ref 22–29)
DEPRECATED HCO3 PLAS-SCNC: 20 MMOL/L (ref 22–29)
DEPRECATED HCO3 PLAS-SCNC: 21 MMOL/L (ref 22–29)
DEPRECATED HCO3 PLAS-SCNC: 22 MMOL/L (ref 22–29)
DEPRECATED HCO3 PLAS-SCNC: 24 MMOL/L (ref 22–29)
DEPRECATED HCO3 PLAS-SCNC: 25 MMOL/L (ref 22–29)
DEPRECATED HCO3 PLAS-SCNC: 26 MMOL/L (ref 22–29)
DEPRECATED HCO3 PLAS-SCNC: 26 MMOL/L (ref 22–29)
DEPRECATED HCO3 PLAS-SCNC: 27 MMOL/L (ref 22–29)
DEPRECATED HCO3 PLAS-SCNC: 27 MMOL/L (ref 22–29)
DEPRECATED HCO3 PLAS-SCNC: 28 MMOL/L (ref 22–29)
DEPRECATED HCO3 PLAS-SCNC: 28 MMOL/L (ref 22–29)
DIASTOLIC BLOOD PRESSURE - MUSE: 63 MMHG
DIASTOLIC BLOOD PRESSURE - MUSE: 70 MMHG
DIASTOLIC BLOOD PRESSURE - MUSE: 82 MMHG
DIASTOLIC BLOOD PRESSURE - MUSE: NORMAL MMHG
EGFRCR SERPLBLD CKD-EPI 2021: 34 ML/MIN/1.73M2
EGFRCR SERPLBLD CKD-EPI 2021: 43 ML/MIN/1.73M2
EGFRCR SERPLBLD CKD-EPI 2021: 46 ML/MIN/1.73M2
EGFRCR SERPLBLD CKD-EPI 2021: 49 ML/MIN/1.73M2
EGFRCR SERPLBLD CKD-EPI 2021: 49 ML/MIN/1.73M2
EGFRCR SERPLBLD CKD-EPI 2021: 53 ML/MIN/1.73M2
EGFRCR SERPLBLD CKD-EPI 2021: 55 ML/MIN/1.73M2
EGFRCR SERPLBLD CKD-EPI 2021: 56 ML/MIN/1.73M2
EGFRCR SERPLBLD CKD-EPI 2021: 58 ML/MIN/1.73M2
EGFRCR SERPLBLD CKD-EPI 2021: 58 ML/MIN/1.73M2
EGFRCR SERPLBLD CKD-EPI 2021: 64 ML/MIN/1.73M2
EGFRCR SERPLBLD CKD-EPI 2021: 66 ML/MIN/1.73M2
EGFRCR SERPLBLD CKD-EPI 2021: 72 ML/MIN/1.73M2
EGFRCR SERPLBLD CKD-EPI 2021: 75 ML/MIN/1.73M2
EGFRCR SERPLBLD CKD-EPI 2021: 76 ML/MIN/1.73M2
EGFRCR SERPLBLD CKD-EPI 2021: 76 ML/MIN/1.73M2
EGFRCR SERPLBLD CKD-EPI 2021: 77 ML/MIN/1.73M2
EGFRCR SERPLBLD CKD-EPI 2021: 79 ML/MIN/1.73M2
EGFRCR SERPLBLD CKD-EPI 2021: 82 ML/MIN/1.73M2
EGFRCR SERPLBLD CKD-EPI 2021: 83 ML/MIN/1.73M2
EGFRCR SERPLBLD CKD-EPI 2021: 83 ML/MIN/1.73M2
EGFRCR SERPLBLD CKD-EPI 2021: 90 ML/MIN/1.73M2
EGFRCR SERPLBLD CKD-EPI 2021: >90 ML/MIN/1.73M2
EGFRCR SERPLBLD CKD-EPI 2021: >90 ML/MIN/1.73M2
EOSINOPHIL # BLD AUTO: 0 10E3/UL (ref 0–0.7)
EOSINOPHIL # BLD AUTO: 0 10E3/UL (ref 0–0.7)
EOSINOPHIL # BLD AUTO: 0.1 10E3/UL (ref 0–0.7)
EOSINOPHIL # BLD AUTO: 0.2 10E3/UL (ref 0–0.7)
EOSINOPHIL # BLD AUTO: 0.3 10E3/UL (ref 0–0.7)
EOSINOPHIL # BLD AUTO: 0.4 10E3/UL (ref 0–0.7)
EOSINOPHIL # BLD AUTO: 0.5 10E3/UL (ref 0–0.7)
EOSINOPHIL NFR BLD AUTO: 0 %
EOSINOPHIL NFR BLD AUTO: 1 %
EOSINOPHIL NFR BLD AUTO: 2 %
EOSINOPHIL NFR BLD AUTO: 3 %
EOSINOPHIL NFR BLD AUTO: 4 %
EOSINOPHIL NFR BLD AUTO: 4 %
EOSINOPHIL NFR BLD AUTO: 5 %
ERYTHROCYTE [DISTWIDTH] IN BLOOD BY AUTOMATED COUNT: 14.4 % (ref 10–15)
ERYTHROCYTE [DISTWIDTH] IN BLOOD BY AUTOMATED COUNT: 14.6 % (ref 10–15)
ERYTHROCYTE [DISTWIDTH] IN BLOOD BY AUTOMATED COUNT: 15 % (ref 10–15)
ERYTHROCYTE [DISTWIDTH] IN BLOOD BY AUTOMATED COUNT: 15.5 % (ref 10–15)
ERYTHROCYTE [DISTWIDTH] IN BLOOD BY AUTOMATED COUNT: 16 % (ref 10–15)
ERYTHROCYTE [DISTWIDTH] IN BLOOD BY AUTOMATED COUNT: 16.4 % (ref 10–15)
ERYTHROCYTE [DISTWIDTH] IN BLOOD BY AUTOMATED COUNT: 16.9 % (ref 10–15)
ERYTHROCYTE [DISTWIDTH] IN BLOOD BY AUTOMATED COUNT: 17.1 % (ref 10–15)
ERYTHROCYTE [DISTWIDTH] IN BLOOD BY AUTOMATED COUNT: 17.8 % (ref 10–15)
ERYTHROCYTE [DISTWIDTH] IN BLOOD BY AUTOMATED COUNT: 17.9 % (ref 10–15)
ERYTHROCYTE [DISTWIDTH] IN BLOOD BY AUTOMATED COUNT: 20 % (ref 10–15)
ERYTHROCYTE [DISTWIDTH] IN BLOOD BY AUTOMATED COUNT: 20 % (ref 10–15)
ERYTHROCYTE [DISTWIDTH] IN BLOOD BY AUTOMATED COUNT: 20.1 % (ref 10–15)
ERYTHROCYTE [DISTWIDTH] IN BLOOD BY AUTOMATED COUNT: 20.2 % (ref 10–15)
ERYTHROCYTE [DISTWIDTH] IN BLOOD BY AUTOMATED COUNT: 20.5 % (ref 10–15)
ERYTHROCYTE [DISTWIDTH] IN BLOOD BY AUTOMATED COUNT: 20.6 % (ref 10–15)
ERYTHROCYTE [DISTWIDTH] IN BLOOD BY AUTOMATED COUNT: 20.7 % (ref 10–15)
ERYTHROCYTE [DISTWIDTH] IN BLOOD BY AUTOMATED COUNT: 20.9 % (ref 10–15)
FLUAV H1 2009 PAND RNA SPEC QL NAA+PROBE: NOT DETECTED
FLUAV H1 RNA SPEC QL NAA+PROBE: NOT DETECTED
FLUAV H3 RNA SPEC QL NAA+PROBE: NOT DETECTED
FLUAV RNA SPEC QL NAA+PROBE: NOT DETECTED
FLUBV RNA SPEC QL NAA+PROBE: NOT DETECTED
GFR SERPL CREATININE-BSD FRML MDRD: 49 ML/MIN/1.73M2
GFR SERPL CREATININE-BSD FRML MDRD: 54 ML/MIN/1.73M2
GLUCOSE BLDC GLUCOMTR-MCNC: 136 MG/DL (ref 70–99)
GLUCOSE BLDC GLUCOMTR-MCNC: 150 MG/DL (ref 70–99)
GLUCOSE BLDC GLUCOMTR-MCNC: 186 MG/DL (ref 70–99)
GLUCOSE BLDC GLUCOMTR-MCNC: 98 MG/DL (ref 70–99)
GLUCOSE SERPL-MCNC: 101 MG/DL (ref 70–99)
GLUCOSE SERPL-MCNC: 105 MG/DL (ref 70–99)
GLUCOSE SERPL-MCNC: 106 MG/DL (ref 70–99)
GLUCOSE SERPL-MCNC: 112 MG/DL (ref 70–99)
GLUCOSE SERPL-MCNC: 112 MG/DL (ref 70–99)
GLUCOSE SERPL-MCNC: 126 MG/DL (ref 70–99)
GLUCOSE SERPL-MCNC: 145 MG/DL (ref 70–99)
GLUCOSE SERPL-MCNC: 160 MG/DL (ref 70–99)
GLUCOSE SERPL-MCNC: 165 MG/DL (ref 70–99)
GLUCOSE SERPL-MCNC: 173 MG/DL (ref 70–99)
GLUCOSE SERPL-MCNC: 205 MG/DL (ref 70–99)
GLUCOSE SERPL-MCNC: 210 MG/DL (ref 70–99)
GLUCOSE SERPL-MCNC: 228 MG/DL (ref 70–99)
GLUCOSE SERPL-MCNC: 92 MG/DL (ref 70–99)
GLUCOSE SERPL-MCNC: 93 MG/DL (ref 70–99)
GLUCOSE SERPL-MCNC: 94 MG/DL (ref 70–99)
GLUCOSE SERPL-MCNC: 95 MG/DL (ref 70–99)
GLUCOSE SERPL-MCNC: 96 MG/DL (ref 70–99)
GLUCOSE SERPL-MCNC: 96 MG/DL (ref 70–99)
GLUCOSE SERPL-MCNC: 97 MG/DL (ref 70–99)
GLUCOSE SERPL-MCNC: 99 MG/DL (ref 70–99)
GLUCOSE UR STRIP-MCNC: NEGATIVE MG/DL
GLUCOSE UR STRIP-MCNC: NEGATIVE MG/DL
HADV DNA SPEC QL NAA+PROBE: NOT DETECTED
HBV SURFACE AG SERPL QL IA: NONREACTIVE
HCO3 BLD-SCNC: 12 MMOL/L (ref 23–29)
HCO3 BLD-SCNC: 23 MMOL/L (ref 23–29)
HCO3 BLD-SCNC: 46 MMOL/L (ref 23–29)
HCO3 BLDV-SCNC: 18 MMOL/L (ref 24–30)
HCO3 BLDV-SCNC: 25 MMOL/L (ref 24–30)
HCO3 BLDV-SCNC: 26 MMOL/L (ref 24–30)
HCOV PNL SPEC NAA+PROBE: NOT DETECTED
HCT VFR BLD AUTO: 22.1 % (ref 40–53)
HCT VFR BLD AUTO: 22.2 % (ref 40–53)
HCT VFR BLD AUTO: 22.6 % (ref 40–53)
HCT VFR BLD AUTO: 23 % (ref 40–53)
HCT VFR BLD AUTO: 23.1 % (ref 40–53)
HCT VFR BLD AUTO: 23.3 % (ref 40–53)
HCT VFR BLD AUTO: 23.3 % (ref 40–53)
HCT VFR BLD AUTO: 24.9 % (ref 40–53)
HCT VFR BLD AUTO: 25.7 % (ref 40–53)
HCT VFR BLD AUTO: 26.1 % (ref 40–53)
HCT VFR BLD AUTO: 26.2 % (ref 40–53)
HCT VFR BLD AUTO: 26.2 % (ref 40–53)
HCT VFR BLD AUTO: 26.5 % (ref 40–53)
HCT VFR BLD AUTO: 27 % (ref 40–53)
HCT VFR BLD AUTO: 28.1 % (ref 40–53)
HCT VFR BLD AUTO: 29.9 % (ref 40–53)
HCT VFR BLD AUTO: 30.7 % (ref 40–53)
HCT VFR BLD AUTO: 32.3 % (ref 40–53)
HCT VFR BLD AUTO: 35.2 % (ref 40–53)
HCT VFR BLD AUTO: 35.7 % (ref 40–53)
HCT VFR BLD AUTO: 38.3 % (ref 40–53)
HDLC SERPL-MCNC: 49 MG/DL
HDLC SERPL-MCNC: 58 MG/DL
HGB BLD-MCNC: 10.2 G/DL (ref 13.3–17.7)
HGB BLD-MCNC: 10.4 G/DL (ref 13.3–17.7)
HGB BLD-MCNC: 10.7 G/DL (ref 13.3–17.7)
HGB BLD-MCNC: 11.8 G/DL (ref 13.3–17.7)
HGB BLD-MCNC: 11.9 G/DL (ref 13.3–17.7)
HGB BLD-MCNC: 13.1 G/DL (ref 13.3–17.7)
HGB BLD-MCNC: 7.2 G/DL (ref 13.3–17.7)
HGB BLD-MCNC: 7.4 G/DL (ref 13.3–17.7)
HGB BLD-MCNC: 7.6 G/DL (ref 13.3–17.7)
HGB BLD-MCNC: 7.6 G/DL (ref 13.3–17.7)
HGB BLD-MCNC: 7.8 G/DL (ref 13.3–17.7)
HGB BLD-MCNC: 7.8 G/DL (ref 13.3–17.7)
HGB BLD-MCNC: 8 G/DL (ref 13.3–17.7)
HGB BLD-MCNC: 8.2 G/DL (ref 13.3–17.7)
HGB BLD-MCNC: 8.5 G/DL (ref 13.3–17.7)
HGB BLD-MCNC: 8.7 G/DL (ref 13.3–17.7)
HGB BLD-MCNC: 8.8 G/DL (ref 13.3–17.7)
HGB BLD-MCNC: 9 G/DL (ref 13.3–17.7)
HGB BLD-MCNC: 9.1 G/DL (ref 13.3–17.7)
HGB BLD-MCNC: 9.4 G/DL (ref 13.3–17.7)
HGB UR QL STRIP: ABNORMAL
HGB UR QL STRIP: NEGATIVE
HMPV RNA SPEC QL NAA+PROBE: NOT DETECTED
HOLD SPECIMEN: NORMAL
HPIV1 RNA SPEC QL NAA+PROBE: NOT DETECTED
HPIV2 RNA SPEC QL NAA+PROBE: NOT DETECTED
HPIV3 RNA SPEC QL NAA+PROBE: NOT DETECTED
HPIV4 RNA SPEC QL NAA+PROBE: NOT DETECTED
HYALINE CASTS: 70 /LPF
IMM GRANULOCYTES # BLD: 0 10E3/UL
IMM GRANULOCYTES # BLD: 0.1 10E3/UL
IMM GRANULOCYTES NFR BLD: 0 %
IMM GRANULOCYTES NFR BLD: 0 %
IMM GRANULOCYTES NFR BLD: 1 %
INR PPP: 1.04 (ref 0.85–1.15)
INTERPRETATION ECG - MUSE: NORMAL
INTERPRETATION: NORMAL
ION CA PH 7.4: 1.05 MMOL/L (ref 1.11–1.3)
ION CA PH 7.4: 1.16 MMOL/L (ref 1.11–1.3)
ION CA PH 7.4: 1.16 MMOL/L (ref 1.11–1.3)
ION CA PH 7.4: 1.19 MMOL/L (ref 1.11–1.3)
ION CA PH 7.4: 1.26 MMOL/L (ref 1.11–1.3)
ISSUE DATE AND TIME: NORMAL
KETONES UR STRIP-MCNC: NEGATIVE MG/DL
KETONES UR STRIP-MCNC: NEGATIVE MG/DL
LAB DIRECTOR COMMENTS: NORMAL
LAB DIRECTOR DISCLAIMER: NORMAL
LAB DIRECTOR INTERPRETATION: NORMAL
LAB DIRECTOR METHODOLOGY: NORMAL
LAB DIRECTOR RESULTS: NORMAL
LACTATE SERPL-SCNC: 0.6 MMOL/L (ref 0.7–2)
LACTATE SERPL-SCNC: 0.7 MMOL/L (ref 0.7–2)
LACTATE SERPL-SCNC: 1.5 MMOL/L (ref 0.7–2)
LACTATE SERPL-SCNC: 1.8 MMOL/L (ref 0.7–2)
LACTATE SERPL-SCNC: 19 MMOL/L (ref 0.7–2)
LACTATE SERPL-SCNC: 2 MMOL/L (ref 0.7–2)
LDLC SERPL CALC-MCNC: 34 MG/DL
LDLC SERPL CALC-MCNC: 47 MG/DL
LEUKOCYTE ESTERASE UR QL STRIP: ABNORMAL
LEUKOCYTE ESTERASE UR QL STRIP: NEGATIVE
LVEF ECHO: NORMAL
LYMPHOCYTES # BLD AUTO: 0.3 10E3/UL (ref 0.8–5.3)
LYMPHOCYTES # BLD AUTO: 0.4 10E3/UL (ref 0.8–5.3)
LYMPHOCYTES # BLD AUTO: 0.4 10E3/UL (ref 0.8–5.3)
LYMPHOCYTES # BLD AUTO: 0.5 10E3/UL (ref 0.8–5.3)
LYMPHOCYTES # BLD AUTO: 0.8 10E3/UL (ref 0.8–5.3)
LYMPHOCYTES # BLD AUTO: 0.8 10E3/UL (ref 0.8–5.3)
LYMPHOCYTES # BLD AUTO: 1.6 10E3/UL (ref 0.8–5.3)
LYMPHOCYTES # BLD AUTO: 2.2 10E3/UL (ref 0.8–5.3)
LYMPHOCYTES # BLD AUTO: 3.6 10E3/UL (ref 0.8–5.3)
LYMPHOCYTES NFR BLD AUTO: 12 %
LYMPHOCYTES NFR BLD AUTO: 13 %
LYMPHOCYTES NFR BLD AUTO: 14 %
LYMPHOCYTES NFR BLD AUTO: 15 %
LYMPHOCYTES NFR BLD AUTO: 18 %
LYMPHOCYTES NFR BLD AUTO: 22 %
LYMPHOCYTES NFR BLD AUTO: 32 %
LYMPHOCYTES NFR BLD AUTO: 8 %
LYMPHOCYTES NFR BLD AUTO: 9 %
M PNEUMO DNA SPEC QL NAA+PROBE: NOT DETECTED
MAGNESIUM SERPL-MCNC: 1.9 MG/DL (ref 1.7–2.3)
MAGNESIUM SERPL-MCNC: 2 MG/DL (ref 1.7–2.3)
MAGNESIUM SERPL-MCNC: 2.1 MG/DL (ref 1.7–2.3)
MAGNESIUM SERPL-MCNC: 2.3 MG/DL (ref 1.7–2.3)
MAGNESIUM SERPL-MCNC: 3.2 MG/DL (ref 1.7–2.3)
MAGNESIUM SERPL-MCNC: 3.4 MG/DL (ref 1.7–2.3)
MAGNESIUM SERPL-MCNC: <0.2 MG/DL (ref 1.7–2.3)
MCH RBC QN AUTO: 30.4 PG (ref 26.5–33)
MCH RBC QN AUTO: 30.4 PG (ref 26.5–33)
MCH RBC QN AUTO: 30.5 PG (ref 26.5–33)
MCH RBC QN AUTO: 30.9 PG (ref 26.5–33)
MCH RBC QN AUTO: 30.9 PG (ref 26.5–33)
MCH RBC QN AUTO: 31.2 PG (ref 26.5–33)
MCH RBC QN AUTO: 31.2 PG (ref 26.5–33)
MCH RBC QN AUTO: 31.4 PG (ref 26.5–33)
MCH RBC QN AUTO: 31.4 PG (ref 26.5–33)
MCH RBC QN AUTO: 31.5 PG (ref 26.5–33)
MCH RBC QN AUTO: 31.6 PG (ref 26.5–33)
MCH RBC QN AUTO: 31.7 PG (ref 26.5–33)
MCH RBC QN AUTO: 31.7 PG (ref 26.5–33)
MCH RBC QN AUTO: 31.8 PG (ref 26.5–33)
MCH RBC QN AUTO: 31.8 PG (ref 26.5–33)
MCH RBC QN AUTO: 31.9 PG (ref 26.5–33)
MCH RBC QN AUTO: 32.1 PG (ref 26.5–33)
MCH RBC QN AUTO: 32.4 PG (ref 26.5–33)
MCH RBC QN AUTO: 32.8 PG (ref 26.5–33)
MCHC RBC AUTO-ENTMCNC: 32.4 G/DL (ref 31.5–36.5)
MCHC RBC AUTO-ENTMCNC: 32.6 G/DL (ref 31.5–36.5)
MCHC RBC AUTO-ENTMCNC: 32.9 G/DL (ref 31.5–36.5)
MCHC RBC AUTO-ENTMCNC: 33.1 G/DL (ref 31.5–36.5)
MCHC RBC AUTO-ENTMCNC: 33.3 G/DL (ref 31.5–36.5)
MCHC RBC AUTO-ENTMCNC: 33.3 G/DL (ref 31.5–36.5)
MCHC RBC AUTO-ENTMCNC: 33.5 G/DL (ref 31.5–36.5)
MCHC RBC AUTO-ENTMCNC: 33.5 G/DL (ref 31.5–36.5)
MCHC RBC AUTO-ENTMCNC: 33.6 G/DL (ref 31.5–36.5)
MCHC RBC AUTO-ENTMCNC: 33.6 G/DL (ref 31.5–36.5)
MCHC RBC AUTO-ENTMCNC: 33.7 G/DL (ref 31.5–36.5)
MCHC RBC AUTO-ENTMCNC: 33.9 G/DL (ref 31.5–36.5)
MCHC RBC AUTO-ENTMCNC: 34.1 G/DL (ref 31.5–36.5)
MCHC RBC AUTO-ENTMCNC: 34.1 G/DL (ref 31.5–36.5)
MCHC RBC AUTO-ENTMCNC: 34.2 G/DL (ref 31.5–36.5)
MCHC RBC AUTO-ENTMCNC: 34.3 G/DL (ref 31.5–36.5)
MCHC RBC AUTO-ENTMCNC: 34.4 G/DL (ref 31.5–36.5)
MCHC RBC AUTO-ENTMCNC: 34.9 G/DL (ref 31.5–36.5)
MCHC RBC AUTO-ENTMCNC: 35.5 G/DL (ref 31.5–36.5)
MCV RBC AUTO: 90 FL (ref 78–100)
MCV RBC AUTO: 91 FL (ref 78–100)
MCV RBC AUTO: 92 FL (ref 78–100)
MCV RBC AUTO: 92 FL (ref 78–100)
MCV RBC AUTO: 93 FL (ref 78–100)
MCV RBC AUTO: 94 FL (ref 78–100)
MCV RBC AUTO: 95 FL (ref 78–100)
MCV RBC AUTO: 95 FL (ref 78–100)
MCV RBC AUTO: 96 FL (ref 78–100)
MCV RBC AUTO: 97 FL (ref 78–100)
MCV RBC AUTO: 97 FL (ref 78–100)
MCV RBC AUTO: 98 FL (ref 78–100)
MONOCYTES # BLD AUTO: 0.2 10E3/UL (ref 0–1.3)
MONOCYTES # BLD AUTO: 0.2 10E3/UL (ref 0–1.3)
MONOCYTES # BLD AUTO: 0.3 10E3/UL (ref 0–1.3)
MONOCYTES # BLD AUTO: 0.4 10E3/UL (ref 0–1.3)
MONOCYTES # BLD AUTO: 0.5 10E3/UL (ref 0–1.3)
MONOCYTES # BLD AUTO: 0.9 10E3/UL (ref 0–1.3)
MONOCYTES # BLD AUTO: 1 10E3/UL (ref 0–1.3)
MONOCYTES NFR BLD AUTO: 6 %
MONOCYTES NFR BLD AUTO: 7 %
MONOCYTES NFR BLD AUTO: 8 %
MONOCYTES NFR BLD AUTO: 9 %
MONOCYTES NFR BLD AUTO: 9 %
MRSA DNA SPEC QL NAA+PROBE: NEGATIVE
MUCOUS THREADS #/AREA URNS LPF: PRESENT /LPF
MUCOUS THREADS #/AREA URNS LPF: PRESENT /LPF
NEUTROPHILS # BLD AUTO: 2.4 10E3/UL (ref 1.6–8.3)
NEUTROPHILS # BLD AUTO: 3 10E3/UL (ref 1.6–8.3)
NEUTROPHILS # BLD AUTO: 3.1 10E3/UL (ref 1.6–8.3)
NEUTROPHILS # BLD AUTO: 3.6 10E3/UL (ref 1.6–8.3)
NEUTROPHILS # BLD AUTO: 3.7 10E3/UL (ref 1.6–8.3)
NEUTROPHILS # BLD AUTO: 4 10E3/UL (ref 1.6–8.3)
NEUTROPHILS # BLD AUTO: 4.9 10E3/UL (ref 1.6–8.3)
NEUTROPHILS # BLD AUTO: 6.4 10E3/UL (ref 1.6–8.3)
NEUTROPHILS # BLD AUTO: 8.3 10E3/UL (ref 1.6–8.3)
NEUTROPHILS NFR BLD AUTO: 55 %
NEUTROPHILS NFR BLD AUTO: 65 %
NEUTROPHILS NFR BLD AUTO: 70 %
NEUTROPHILS NFR BLD AUTO: 72 %
NEUTROPHILS NFR BLD AUTO: 74 %
NEUTROPHILS NFR BLD AUTO: 77 %
NEUTROPHILS NFR BLD AUTO: 78 %
NEUTROPHILS NFR BLD AUTO: 80 %
NEUTROPHILS NFR BLD AUTO: 84 %
NITRATE UR QL: NEGATIVE
NITRATE UR QL: NEGATIVE
NONHDLC SERPL-MCNC: 48 MG/DL
NONHDLC SERPL-MCNC: 64 MG/DL
NRBC # BLD AUTO: 0 10E3/UL
NRBC BLD AUTO-RTO: 0 /100
NT-PROBNP SERPL-MCNC: 6827 PG/ML (ref 0–900)
NT-PROBNP SERPL-MCNC: ABNORMAL PG/ML (ref 0–900)
O2/TOTAL GAS SETTING VFR VENT: 100 %
O2/TOTAL GAS SETTING VFR VENT: 100 %
OXYHGB MFR BLD: 57.7 % (ref 95–96)
OXYHGB MFR BLD: 92.9 % (ref 95–96)
OXYHGB MFR BLD: 97.7 % (ref 95–96)
OXYHGB MFR BLDV: 15.6 % (ref 70–75)
OXYHGB MFR BLDV: 34 % (ref 70–75)
OXYHGB MFR BLDV: 61.5 % (ref 70–75)
P AXIS - MUSE: 56 DEGREES
P AXIS - MUSE: 71 DEGREES
P AXIS - MUSE: 73 DEGREES
P AXIS - MUSE: 78 DEGREES
P AXIS - MUSE: 82 DEGREES
P AXIS - MUSE: 89 DEGREES
P AXIS - MUSE: 93 DEGREES
P AXIS - MUSE: 99 DEGREES
P AXIS - MUSE: NORMAL DEGREES
PATH REPORT.COMMENTS IMP SPEC: ABNORMAL
PATH REPORT.COMMENTS IMP SPEC: NORMAL
PATH REPORT.COMMENTS IMP SPEC: NORMAL
PATH REPORT.COMMENTS IMP SPEC: YES
PATH REPORT.COMMENTS IMP SPEC: YES
PATH REPORT.FINAL DX SPEC: ABNORMAL
PATH REPORT.FINAL DX SPEC: ABNORMAL
PATH REPORT.FINAL DX SPEC: NORMAL
PATH REPORT.FINAL DX SPEC: NORMAL
PATH REPORT.GROSS SPEC: ABNORMAL
PATH REPORT.GROSS SPEC: ABNORMAL
PATH REPORT.GROSS SPEC: NORMAL
PATH REPORT.GROSS SPEC: NORMAL
PATH REPORT.MICROSCOPIC SPEC OTHER STN: ABNORMAL
PATH REPORT.MICROSCOPIC SPEC OTHER STN: ABNORMAL
PATH REPORT.MICROSCOPIC SPEC OTHER STN: NORMAL
PATH REPORT.RELEVANT HX SPEC: ABNORMAL
PATH REPORT.RELEVANT HX SPEC: ABNORMAL
PATH REPORT.RELEVANT HX SPEC: NORMAL
PATH REPORT.RELEVANT HX SPEC: NORMAL
PCO2 BLD: 31 MM HG (ref 35–45)
PCO2 BLD: 44 MM HG (ref 35–45)
PCO2 BLD: 82 MM HG (ref 35–45)
PCO2 BLDV: 40 MM HG (ref 35–50)
PCO2 BLDV: 43 MM HG (ref 35–50)
PCO2 BLDV: 46 MM HG (ref 35–50)
PEEP: 14 CM H2O
PEEP: 18 CM H2O
PH BLD: 7.02 [PH] (ref 7.37–7.44)
PH BLD: 7.36 [PH] (ref 7.37–7.44)
PH BLD: 7.48 [PH] (ref 7.37–7.44)
PH BLDV: 7.27 [PH] (ref 7.35–7.45)
PH BLDV: 7.35 [PH] (ref 7.35–7.45)
PH BLDV: 7.37 [PH] (ref 7.35–7.45)
PH UR STRIP: 5 [PH] (ref 5–7)
PH UR STRIP: 5.5 [PH] (ref 5–8)
PH: 7.32 (ref 7.35–7.45)
PH: 7.34 (ref 7.35–7.45)
PH: 7.34 (ref 7.35–7.45)
PH: 7.35 (ref 7.35–7.45)
PH: 7.4 (ref 7.35–7.45)
PHOSPHATE SERPL-MCNC: 1.5 MG/DL (ref 2.5–4.5)
PHOSPHATE SERPL-MCNC: 2.7 MG/DL (ref 2.5–4.5)
PHOSPHATE SERPL-MCNC: 2.8 MG/DL (ref 2.5–4.5)
PHOSPHATE SERPL-MCNC: 2.9 MG/DL (ref 2.5–4.5)
PHOSPHATE SERPL-MCNC: 3.8 MG/DL (ref 2.5–4.5)
PHOTO IMAGE: ABNORMAL
PHOTO IMAGE: ABNORMAL
PLATELET # BLD AUTO: 101 10E3/UL (ref 150–450)
PLATELET # BLD AUTO: 102 10E3/UL (ref 150–450)
PLATELET # BLD AUTO: 105 10E3/UL (ref 150–450)
PLATELET # BLD AUTO: 106 10E3/UL (ref 150–450)
PLATELET # BLD AUTO: 109 10E3/UL (ref 150–450)
PLATELET # BLD AUTO: 112 10E3/UL (ref 150–450)
PLATELET # BLD AUTO: 144 10E3/UL (ref 150–450)
PLATELET # BLD AUTO: 166 10E3/UL (ref 150–450)
PLATELET # BLD AUTO: 174 10E3/UL (ref 150–450)
PLATELET # BLD AUTO: 199 10E3/UL (ref 150–450)
PLATELET # BLD AUTO: 221 10E3/UL (ref 150–450)
PLATELET # BLD AUTO: 83 10E3/UL (ref 150–450)
PLATELET # BLD AUTO: 83 10E3/UL (ref 150–450)
PLATELET # BLD AUTO: 87 10E3/UL (ref 150–450)
PLATELET # BLD AUTO: 90 10E3/UL (ref 150–450)
PLATELET # BLD AUTO: 92 10E3/UL (ref 150–450)
PLATELET # BLD AUTO: 93 10E3/UL (ref 150–450)
PLATELET # BLD AUTO: 95 10E3/UL (ref 150–450)
PLATELET # BLD AUTO: 98 10E3/UL (ref 150–450)
PO2 BLD: 207 MM HG (ref 75–85)
PO2 BLD: 36 MM HG (ref 75–85)
PO2 BLD: 66 MM HG (ref 75–85)
PO2 BLDV: 17 MM HG (ref 25–47)
PO2 BLDV: 27 MM HG (ref 25–47)
PO2 BLDV: 36 MM HG (ref 25–47)
POTASSIUM SERPL-SCNC: 2.9 MMOL/L (ref 3.4–5.3)
POTASSIUM SERPL-SCNC: 3.2 MMOL/L (ref 3.4–5.3)
POTASSIUM SERPL-SCNC: 3.2 MMOL/L (ref 3.4–5.3)
POTASSIUM SERPL-SCNC: 3.3 MMOL/L (ref 3.4–5.3)
POTASSIUM SERPL-SCNC: 3.4 MMOL/L (ref 3.4–5.3)
POTASSIUM SERPL-SCNC: 3.4 MMOL/L (ref 3.4–5.3)
POTASSIUM SERPL-SCNC: 3.5 MMOL/L (ref 3.4–5.3)
POTASSIUM SERPL-SCNC: 3.6 MMOL/L (ref 3.4–5.3)
POTASSIUM SERPL-SCNC: 3.6 MMOL/L (ref 3.4–5.3)
POTASSIUM SERPL-SCNC: 3.7 MMOL/L (ref 3.4–5.3)
POTASSIUM SERPL-SCNC: 3.7 MMOL/L (ref 3.4–5.3)
POTASSIUM SERPL-SCNC: 3.8 MMOL/L (ref 3.4–5.3)
POTASSIUM SERPL-SCNC: 3.8 MMOL/L (ref 3.4–5.3)
POTASSIUM SERPL-SCNC: 3.9 MMOL/L (ref 3.4–5.3)
POTASSIUM SERPL-SCNC: 4.1 MMOL/L (ref 3.4–5.3)
POTASSIUM SERPL-SCNC: 4.2 MMOL/L (ref 3.4–5.3)
POTASSIUM SERPL-SCNC: 4.3 MMOL/L (ref 3.4–5.3)
POTASSIUM SERPL-SCNC: 4.5 MMOL/L (ref 3.4–5.3)
POTASSIUM SERPL-SCNC: 4.6 MMOL/L (ref 3.4–5.3)
POTASSIUM SERPL-SCNC: 4.7 MMOL/L (ref 3.4–5.3)
POTASSIUM UR-SCNC: 65.4 MMOL/L
PR INTERVAL - MUSE: 170 MS
PR INTERVAL - MUSE: 182 MS
PR INTERVAL - MUSE: 182 MS
PR INTERVAL - MUSE: 188 MS
PR INTERVAL - MUSE: 190 MS
PR INTERVAL - MUSE: 194 MS
PR INTERVAL - MUSE: 194 MS
PR INTERVAL - MUSE: 196 MS
PR INTERVAL - MUSE: 200 MS
PR INTERVAL - MUSE: NORMAL MS
PROCALCITONIN SERPL IA-MCNC: 0.12 NG/ML
PROCALCITONIN SERPL IA-MCNC: 0.92 NG/ML
PROT SERPL-MCNC: 5.3 G/DL (ref 6.4–8.3)
PROT SERPL-MCNC: 5.9 G/DL (ref 6.4–8.3)
PROT SERPL-MCNC: 6 G/DL (ref 6.4–8.3)
PROT SERPL-MCNC: 6.1 G/DL (ref 6.4–8.3)
PROT SERPL-MCNC: 6.1 G/DL (ref 6.4–8.3)
PROT SERPL-MCNC: 6.3 G/DL (ref 6.4–8.3)
PROT SERPL-MCNC: 6.9 G/DL (ref 6.4–8.3)
PROT SERPL-MCNC: 7.3 G/DL (ref 6.4–8.3)
PSA SERPL DL<=0.01 NG/ML-MCNC: 0.64 NG/ML (ref 0–6.5)
QRS DURATION - MUSE: 124 MS
QRS DURATION - MUSE: 126 MS
QRS DURATION - MUSE: 128 MS
QRS DURATION - MUSE: 130 MS
QRS DURATION - MUSE: 134 MS
QRS DURATION - MUSE: 136 MS
QRS DURATION - MUSE: 140 MS
QRS DURATION - MUSE: 140 MS
QRS DURATION - MUSE: 144 MS
QRS DURATION - MUSE: 144 MS
QRS DURATION - MUSE: 150 MS
QT - MUSE: 302 MS
QT - MUSE: 312 MS
QT - MUSE: 360 MS
QT - MUSE: 364 MS
QT - MUSE: 366 MS
QT - MUSE: 380 MS
QT - MUSE: 386 MS
QT - MUSE: 388 MS
QT - MUSE: 390 MS
QT - MUSE: 392 MS
QT - MUSE: 406 MS
QTC - MUSE: 397 MS
QTC - MUSE: 416 MS
QTC - MUSE: 455 MS
QTC - MUSE: 466 MS
QTC - MUSE: 469 MS
QTC - MUSE: 471 MS
QTC - MUSE: 472 MS
QTC - MUSE: 484 MS
QTC - MUSE: 486 MS
QTC - MUSE: 486 MS
QTC - MUSE: 490 MS
QTC - MUSE: 492 MS
QTC - MUSE: 521 MS
R AXIS - MUSE: -25 DEGREES
R AXIS - MUSE: -27 DEGREES
R AXIS - MUSE: -31 DEGREES
R AXIS - MUSE: -32 DEGREES
R AXIS - MUSE: -37 DEGREES
R AXIS - MUSE: -37 DEGREES
R AXIS - MUSE: -43 DEGREES
R AXIS - MUSE: -47 DEGREES
R AXIS - MUSE: -49 DEGREES
R AXIS - MUSE: -50 DEGREES
R AXIS - MUSE: -50 DEGREES
R AXIS - MUSE: -54 DEGREES
R AXIS - MUSE: -55 DEGREES
RADIOLOGIST FLAGS: ABNORMAL
RATE: 20 RR/MIN
RATE: 20 RR/MIN
RBC # BLD AUTO: 2.26 10E6/UL (ref 4.4–5.9)
RBC # BLD AUTO: 2.36 10E6/UL (ref 4.4–5.9)
RBC # BLD AUTO: 2.37 10E6/UL (ref 4.4–5.9)
RBC # BLD AUTO: 2.42 10E6/UL (ref 4.4–5.9)
RBC # BLD AUTO: 2.43 10E6/UL (ref 4.4–5.9)
RBC # BLD AUTO: 2.47 10E6/UL (ref 4.4–5.9)
RBC # BLD AUTO: 2.49 10E6/UL (ref 4.4–5.9)
RBC # BLD AUTO: 2.65 10E6/UL (ref 4.4–5.9)
RBC # BLD AUTO: 2.68 10E6/UL (ref 4.4–5.9)
RBC # BLD AUTO: 2.81 10E6/UL (ref 4.4–5.9)
RBC # BLD AUTO: 2.82 10E6/UL (ref 4.4–5.9)
RBC # BLD AUTO: 2.84 10E6/UL (ref 4.4–5.9)
RBC # BLD AUTO: 2.86 10E6/UL (ref 4.4–5.9)
RBC # BLD AUTO: 2.89 10E6/UL (ref 4.4–5.9)
RBC # BLD AUTO: 2.96 10E6/UL (ref 4.4–5.9)
RBC # BLD AUTO: 3.3 10E6/UL (ref 4.4–5.9)
RBC # BLD AUTO: 3.37 10E6/UL (ref 4.4–5.9)
RBC # BLD AUTO: 3.51 10E6/UL (ref 4.4–5.9)
RBC # BLD AUTO: 3.88 10E6/UL (ref 4.4–5.9)
RBC # BLD AUTO: 3.92 10E6/UL (ref 4.4–5.9)
RBC # BLD AUTO: 4.2 10E6/UL (ref 4.4–5.9)
RBC #/AREA URNS AUTO: ABNORMAL /HPF
RBC URINE: 1 /HPF
RSV RNA SPEC QL NAA+PROBE: NOT DETECTED
RSV RNA SPEC QL NAA+PROBE: NOT DETECTED
RV+EV RNA SPEC QL NAA+PROBE: NOT DETECTED
SA TARGET DNA: NEGATIVE
SAO2 % BLDV: 15.9 % (ref 70–75)
SAO2 % BLDV: 35 % (ref 70–75)
SAO2 % BLDV: 63.1 % (ref 70–75)
SIGNIFICANT RESULTS: NORMAL
SODIUM SERPL-SCNC: 132 MMOL/L (ref 135–145)
SODIUM SERPL-SCNC: 132 MMOL/L (ref 135–145)
SODIUM SERPL-SCNC: 134 MMOL/L (ref 135–145)
SODIUM SERPL-SCNC: 135 MMOL/L (ref 135–145)
SODIUM SERPL-SCNC: 135 MMOL/L (ref 135–145)
SODIUM SERPL-SCNC: 136 MMOL/L (ref 135–145)
SODIUM SERPL-SCNC: 138 MMOL/L (ref 135–145)
SODIUM SERPL-SCNC: 138 MMOL/L (ref 135–145)
SODIUM SERPL-SCNC: 139 MMOL/L (ref 135–145)
SODIUM SERPL-SCNC: 140 MMOL/L (ref 135–145)
SODIUM SERPL-SCNC: 141 MMOL/L (ref 135–145)
SODIUM SERPL-SCNC: 141 MMOL/L (ref 135–145)
SODIUM SERPL-SCNC: 141 MMOL/L (ref 136–145)
SODIUM SERPL-SCNC: 141 MMOL/L (ref 136–145)
SODIUM UR-SCNC: <20 MMOL/L
SP GR UR STRIP: 1.02 (ref 1–1.03)
SP GR UR STRIP: 1.02 (ref 1–1.03)
SPECIMEN DESCRIPTION: NORMAL
SPECIMEN DESCRIPTION: NORMAL
SPECIMEN EXPIRATION DATE: NORMAL
SQUAMOUS #/AREA URNS AUTO: ABNORMAL /LPF
SQUAMOUS EPITHELIAL: 1 /HPF
SYSTOLIC BLOOD PRESSURE - MUSE: 109 MMHG
SYSTOLIC BLOOD PRESSURE - MUSE: 116 MMHG
SYSTOLIC BLOOD PRESSURE - MUSE: 125 MMHG
SYSTOLIC BLOOD PRESSURE - MUSE: NORMAL MMHG
T AXIS - MUSE: 100 DEGREES
T AXIS - MUSE: 106 DEGREES
T AXIS - MUSE: 109 DEGREES
T AXIS - MUSE: 110 DEGREES
T AXIS - MUSE: 110 DEGREES
T AXIS - MUSE: 123 DEGREES
T AXIS - MUSE: 126 DEGREES
T AXIS - MUSE: 126 DEGREES
T AXIS - MUSE: 130 DEGREES
T AXIS - MUSE: 197 DEGREES
T AXIS - MUSE: 30 DEGREES
T AXIS - MUSE: 86 DEGREES
T AXIS - MUSE: 95 DEGREES
TEMPERATURE: 37 DEGREES C
TEST DETAILS, MDL: NORMAL
TRIGL SERPL-MCNC: 72 MG/DL
TRIGL SERPL-MCNC: 86 MG/DL
TROPONIN T SERPL HS-MCNC: 107 NG/L
TROPONIN T SERPL HS-MCNC: 1123 NG/L
TROPONIN T SERPL HS-MCNC: 1345 NG/L
TROPONIN T SERPL HS-MCNC: 1707 NG/L
TROPONIN T SERPL HS-MCNC: 1714 NG/L
TROPONIN T SERPL HS-MCNC: 546 NG/L
TROPONIN T SERPL HS-MCNC: 568 NG/L
TROPONIN T SERPL HS-MCNC: 579 NG/L
TROPONIN T SERPL HS-MCNC: 640 NG/L
TROPONIN T SERPL HS-MCNC: 642 NG/L
TROPONIN T SERPL HS-MCNC: 77 NG/L
TROPONIN T SERPL HS-MCNC: 84 NG/L
TSH SERPL DL<=0.005 MIU/L-ACNC: 2.1 UIU/ML (ref 0.3–4.2)
UFH PPP CHRO-ACNC: 0.21 IU/ML
UFH PPP CHRO-ACNC: 0.3 IU/ML
UFH PPP CHRO-ACNC: 0.31 IU/ML
UFH PPP CHRO-ACNC: 0.48 IU/ML
UFH PPP CHRO-ACNC: 0.51 IU/ML
UNIT ABO/RH: NORMAL
UNIT NUMBER: NORMAL
UNIT STATUS: NORMAL
UNIT TYPE ISBT: 6200
URATE SERPL-MCNC: 5.1 MG/DL (ref 3.4–7)
UROBILINOGEN UR STRIP-ACNC: 0.2 E.U./DL
UROBILINOGEN UR STRIP-MCNC: <2 MG/DL
UUN UR-MCNC: 431 MG/DL (ref 801–1666)
VENTILATION MODE: ABNORMAL
VENTILATION MODE: ABNORMAL
VENTILATOR TIDAL VOLUME: 400 ML
VENTILATOR TIDAL VOLUME: 400 ML
VENTRICULAR RATE- MUSE: 100 BPM
VENTRICULAR RATE- MUSE: 103 BPM
VENTRICULAR RATE- MUSE: 104 BPM
VENTRICULAR RATE- MUSE: 106 BPM
VENTRICULAR RATE- MUSE: 106 BPM
VENTRICULAR RATE- MUSE: 107 BPM
VENTRICULAR RATE- MUSE: 87 BPM
VENTRICULAR RATE- MUSE: 92 BPM
VENTRICULAR RATE- MUSE: 93 BPM
VENTRICULAR RATE- MUSE: 93 BPM
VENTRICULAR RATE- MUSE: 95 BPM
VENTRICULAR RATE- MUSE: 98 BPM
VENTRICULAR RATE- MUSE: 99 BPM
WBC # BLD AUTO: 10 10E3/UL (ref 4–11)
WBC # BLD AUTO: 11.4 10E3/UL (ref 4–11)
WBC # BLD AUTO: 11.9 10E3/UL (ref 4–11)
WBC # BLD AUTO: 13.1 10E3/UL (ref 4–11)
WBC # BLD AUTO: 13.4 10E3/UL (ref 4–11)
WBC # BLD AUTO: 14.2 10E3/UL (ref 4–11)
WBC # BLD AUTO: 14.7 10E3/UL (ref 4–11)
WBC # BLD AUTO: 2.5 10E3/UL (ref 4–11)
WBC # BLD AUTO: 3.1 10E3/UL (ref 4–11)
WBC # BLD AUTO: 3.1 10E3/UL (ref 4–11)
WBC # BLD AUTO: 3.2 10E3/UL (ref 4–11)
WBC # BLD AUTO: 3.6 10E3/UL (ref 4–11)
WBC # BLD AUTO: 3.7 10E3/UL (ref 4–11)
WBC # BLD AUTO: 3.9 10E3/UL (ref 4–11)
WBC # BLD AUTO: 4.5 10E3/UL (ref 4–11)
WBC # BLD AUTO: 5.1 10E3/UL (ref 4–11)
WBC # BLD AUTO: 5.3 10E3/UL (ref 4–11)
WBC # BLD AUTO: 5.6 10E3/UL (ref 4–11)
WBC # BLD AUTO: 5.8 10E3/UL (ref 4–11)
WBC # BLD AUTO: 7 10E3/UL (ref 4–11)
WBC # BLD AUTO: 7.5 10E3/UL (ref 4–11)
WBC #/AREA URNS AUTO: ABNORMAL /HPF
WBC URINE: 1 /HPF

## 2023-01-01 PROCEDURE — 77014 PR CT GUIDE FOR PLACEMENT RADIATION THERAPY FIELDS: CPT | Mod: 26 | Performed by: STUDENT IN AN ORGANIZED HEALTH CARE EDUCATION/TRAINING PROGRAM

## 2023-01-01 PROCEDURE — 210N000001 HC R&B IMCU HEART CARE

## 2023-01-01 PROCEDURE — 77014 PR CT GUIDE FOR PLACEMENT RADIATION THERAPY FIELDS: CPT | Mod: 26 | Performed by: RADIOLOGY

## 2023-01-01 PROCEDURE — 94660 CPAP INITIATION&MGMT: CPT

## 2023-01-01 PROCEDURE — 250N000013 HC RX MED GY IP 250 OP 250 PS 637: Performed by: INTERNAL MEDICINE

## 2023-01-01 PROCEDURE — 77427 RADIATION TX MANAGEMENT X5: CPT | Performed by: RADIOLOGY

## 2023-01-01 PROCEDURE — 83735 ASSAY OF MAGNESIUM: CPT | Performed by: INTERNAL MEDICINE

## 2023-01-01 PROCEDURE — G0463 HOSPITAL OUTPT CLINIC VISIT: HCPCS | Performed by: RADIOLOGY

## 2023-01-01 PROCEDURE — 250N000011 HC RX IP 250 OP 636: Performed by: STUDENT IN AN ORGANIZED HEALTH CARE EDUCATION/TRAINING PROGRAM

## 2023-01-01 PROCEDURE — 93005 ELECTROCARDIOGRAM TRACING: CPT

## 2023-01-01 PROCEDURE — 250N000009 HC RX 250: Performed by: INTERNAL MEDICINE

## 2023-01-01 PROCEDURE — 36591 DRAW BLOOD OFF VENOUS DEVICE: CPT | Performed by: INTERNAL MEDICINE

## 2023-01-01 PROCEDURE — 93010 ELECTROCARDIOGRAM REPORT: CPT | Performed by: INTERNAL MEDICINE

## 2023-01-01 PROCEDURE — 88360 TUMOR IMMUNOHISTOCHEM/MANUAL: CPT | Mod: 26 | Performed by: PATHOLOGY

## 2023-01-01 PROCEDURE — 96417 CHEMO IV INFUS EACH ADDL SEQ: CPT

## 2023-01-01 PROCEDURE — 74178 CT ABD&PLV WO CNTR FLWD CNTR: CPT

## 2023-01-01 PROCEDURE — 92928 PRQ TCAT PLMT NTRAC ST 1 LES: CPT | Mod: LC | Performed by: INTERNAL MEDICINE

## 2023-01-01 PROCEDURE — G0463 HOSPITAL OUTPT CLINIC VISIT: HCPCS | Mod: 25 | Performed by: NURSE PRACTITIONER

## 2023-01-01 PROCEDURE — 86923 COMPATIBILITY TEST ELECTRIC: CPT

## 2023-01-01 PROCEDURE — 77386 HC IMRT TREATMENT DELIVERY, COMPLEX: CPT | Performed by: STUDENT IN AN ORGANIZED HEALTH CARE EDUCATION/TRAINING PROGRAM

## 2023-01-01 PROCEDURE — 99214 OFFICE O/P EST MOD 30 MIN: CPT | Performed by: SURGERY

## 2023-01-01 PROCEDURE — C1769 GUIDE WIRE: HCPCS

## 2023-01-01 PROCEDURE — 96375 TX/PRO/DX INJ NEW DRUG ADDON: CPT

## 2023-01-01 PROCEDURE — 83880 ASSAY OF NATRIURETIC PEPTIDE: CPT

## 2023-01-01 PROCEDURE — 36591 DRAW BLOOD OFF VENOUS DEVICE: CPT

## 2023-01-01 PROCEDURE — 250N000011 HC RX IP 250 OP 636: Mod: JZ

## 2023-01-01 PROCEDURE — 99223 1ST HOSP IP/OBS HIGH 75: CPT | Mod: 25 | Performed by: INTERNAL MEDICINE

## 2023-01-01 PROCEDURE — C9113 INJ PANTOPRAZOLE SODIUM, VIA: HCPCS | Mod: JZ | Performed by: INTERNAL MEDICINE

## 2023-01-01 PROCEDURE — 250N000013 HC RX MED GY IP 250 OP 250 PS 637: Performed by: EMERGENCY MEDICINE

## 2023-01-01 PROCEDURE — 84132 ASSAY OF SERUM POTASSIUM: CPT

## 2023-01-01 PROCEDURE — 999N000157 HC STATISTIC RCP TIME EA 10 MIN

## 2023-01-01 PROCEDURE — 258N000003 HC RX IP 258 OP 636: Performed by: INTERNAL MEDICINE

## 2023-01-01 PROCEDURE — 82310 ASSAY OF CALCIUM: CPT

## 2023-01-01 PROCEDURE — 250N000013 HC RX MED GY IP 250 OP 250 PS 637: Performed by: NURSE PRACTITIONER

## 2023-01-01 PROCEDURE — 84460 ALANINE AMINO (ALT) (SGPT): CPT | Performed by: INTERNAL MEDICINE

## 2023-01-01 PROCEDURE — C1874 STENT, COATED/COV W/DEL SYS: HCPCS | Performed by: INTERNAL MEDICINE

## 2023-01-01 PROCEDURE — 710N000012 HC RECOVERY PHASE 2, PER MINUTE: Performed by: STUDENT IN AN ORGANIZED HEALTH CARE EDUCATION/TRAINING PROGRAM

## 2023-01-01 PROCEDURE — 250N000013 HC RX MED GY IP 250 OP 250 PS 637: Performed by: FAMILY MEDICINE

## 2023-01-01 PROCEDURE — 84300 ASSAY OF URINE SODIUM: CPT | Performed by: INTERNAL MEDICINE

## 2023-01-01 PROCEDURE — 999N000178 HC STATISTIC SUCTION SPUTUM

## 2023-01-01 PROCEDURE — 80061 LIPID PANEL: CPT | Performed by: INTERNAL MEDICINE

## 2023-01-01 PROCEDURE — 99153 MOD SED SAME PHYS/QHP EA: CPT

## 2023-01-01 PROCEDURE — 272N000571 HC SHEATH CR8

## 2023-01-01 PROCEDURE — 77301 RADIOTHERAPY DOSE PLAN IMRT: CPT | Mod: 26 | Performed by: RADIOLOGY

## 2023-01-01 PROCEDURE — 250N000011 HC RX IP 250 OP 636: Performed by: RADIOLOGY

## 2023-01-01 PROCEDURE — 99215 OFFICE O/P EST HI 40 MIN: CPT | Performed by: INTERNAL MEDICINE

## 2023-01-01 PROCEDURE — 85610 PROTHROMBIN TIME: CPT | Performed by: RADIOLOGY

## 2023-01-01 PROCEDURE — 77386 HC IMRT TREATMENT DELIVERY, COMPLEX: CPT | Performed by: RADIOLOGY

## 2023-01-01 PROCEDURE — 80061 LIPID PANEL: CPT

## 2023-01-01 PROCEDURE — 87040 BLOOD CULTURE FOR BACTERIA: CPT

## 2023-01-01 PROCEDURE — 80053 COMPREHEN METABOLIC PANEL: CPT | Performed by: INTERNAL MEDICINE

## 2023-01-01 PROCEDURE — 272N000500 HC NEEDLE CR2

## 2023-01-01 PROCEDURE — 99232 SBSQ HOSP IP/OBS MODERATE 35: CPT | Performed by: INTERNAL MEDICINE

## 2023-01-01 PROCEDURE — 88341 IMHCHEM/IMCYTCHM EA ADD ANTB: CPT | Mod: 26 | Performed by: PATHOLOGY

## 2023-01-01 PROCEDURE — 96367 TX/PROPH/DG ADDL SEQ IV INF: CPT

## 2023-01-01 PROCEDURE — 0JH63XZ INSERTION OF TUNNELED VASCULAR ACCESS DEVICE INTO CHEST SUBCUTANEOUS TISSUE AND FASCIA, PERCUTANEOUS APPROACH: ICD-10-PCS | Performed by: INTERNAL MEDICINE

## 2023-01-01 PROCEDURE — 999N000254 HC STATISTIC VENTILATOR TRANSFER

## 2023-01-01 PROCEDURE — 32408 CORE NDL BX LNG/MED PERQ: CPT

## 2023-01-01 PROCEDURE — P9047 ALBUMIN (HUMAN), 25%, 50ML: HCPCS | Performed by: INTERNAL MEDICINE

## 2023-01-01 PROCEDURE — A9585 GADOBUTROL INJECTION: HCPCS | Mod: JZ | Performed by: INTERNAL MEDICINE

## 2023-01-01 PROCEDURE — 272N000119 HC CATH CR4

## 2023-01-01 PROCEDURE — 97535 SELF CARE MNGMENT TRAINING: CPT | Mod: GO

## 2023-01-01 PROCEDURE — 84484 ASSAY OF TROPONIN QUANT: CPT | Performed by: INTERNAL MEDICINE

## 2023-01-01 PROCEDURE — 74177 CT ABD & PELVIS W/CONTRAST: CPT

## 2023-01-01 PROCEDURE — 36569 INSJ PICC 5 YR+ W/O IMAGING: CPT

## 2023-01-01 PROCEDURE — 99223 1ST HOSP IP/OBS HIGH 75: CPT | Mod: AI

## 2023-01-01 PROCEDURE — 250N000011 HC RX IP 250 OP 636: Mod: JZ | Performed by: INTERNAL MEDICINE

## 2023-01-01 PROCEDURE — 77336 RADIATION PHYSICS CONSULT: CPT | Performed by: RADIOLOGY

## 2023-01-01 PROCEDURE — 88305 TISSUE EXAM BY PATHOLOGIST: CPT | Mod: TC | Performed by: INTERNAL MEDICINE

## 2023-01-01 PROCEDURE — 85027 COMPLETE CBC AUTOMATED: CPT

## 2023-01-01 PROCEDURE — 77338 DESIGN MLC DEVICE FOR IMRT: CPT | Performed by: RADIOLOGY

## 2023-01-01 PROCEDURE — B2111ZZ FLUOROSCOPY OF MULTIPLE CORONARY ARTERIES USING LOW OSMOLAR CONTRAST: ICD-10-PCS | Performed by: INTERNAL MEDICINE

## 2023-01-01 PROCEDURE — 250N000011 HC RX IP 250 OP 636

## 2023-01-01 PROCEDURE — 77470 SPECIAL RADIATION TREATMENT: CPT | Performed by: RADIOLOGY

## 2023-01-01 PROCEDURE — 82565 ASSAY OF CREATININE: CPT

## 2023-01-01 PROCEDURE — 36415 COLL VENOUS BLD VENIPUNCTURE: CPT | Performed by: RADIOLOGY

## 2023-01-01 PROCEDURE — 99222 1ST HOSP IP/OBS MODERATE 55: CPT | Performed by: STUDENT IN AN ORGANIZED HEALTH CARE EDUCATION/TRAINING PROGRAM

## 2023-01-01 PROCEDURE — 250N000011 HC RX IP 250 OP 636: Performed by: INTERNAL MEDICINE

## 2023-01-01 PROCEDURE — G0463 HOSPITAL OUTPT CLINIC VISIT: HCPCS | Mod: 27 | Performed by: INTERNAL MEDICINE

## 2023-01-01 PROCEDURE — 83605 ASSAY OF LACTIC ACID: CPT | Performed by: INTERNAL MEDICINE

## 2023-01-01 PROCEDURE — 80053 COMPREHEN METABOLIC PANEL: CPT

## 2023-01-01 PROCEDURE — 0715T PR PERCUTANEOUS TRANSLUMINAL CORONARY LITHOTRIPSY: CPT | Performed by: INTERNAL MEDICINE

## 2023-01-01 PROCEDURE — 81001 URINALYSIS AUTO W/SCOPE: CPT | Performed by: INTERNAL MEDICINE

## 2023-01-01 PROCEDURE — 250N000012 HC RX MED GY IP 250 OP 636 PS 637: Performed by: INTERNAL MEDICINE

## 2023-01-01 PROCEDURE — 99233 SBSQ HOSP IP/OBS HIGH 50: CPT | Performed by: INTERNAL MEDICINE

## 2023-01-01 PROCEDURE — 82330 ASSAY OF CALCIUM: CPT | Performed by: INTERNAL MEDICINE

## 2023-01-01 PROCEDURE — 83735 ASSAY OF MAGNESIUM: CPT

## 2023-01-01 PROCEDURE — 36415 COLL VENOUS BLD VENIPUNCTURE: CPT

## 2023-01-01 PROCEDURE — 200N000001 HC R&B ICU

## 2023-01-01 PROCEDURE — 77338 DESIGN MLC DEVICE FOR IMRT: CPT | Mod: 26 | Performed by: RADIOLOGY

## 2023-01-01 PROCEDURE — 85027 COMPLETE CBC AUTOMATED: CPT | Performed by: INTERNAL MEDICINE

## 2023-01-01 PROCEDURE — 999N000141 HC STATISTIC PRE-PROCEDURE NURSING ASSESSMENT: Performed by: STUDENT IN AN ORGANIZED HEALTH CARE EDUCATION/TRAINING PROGRAM

## 2023-01-01 PROCEDURE — 36600 WITHDRAWAL OF ARTERIAL BLOOD: CPT

## 2023-01-01 PROCEDURE — C9600 PERC DRUG-EL COR STENT SING: HCPCS | Performed by: INTERNAL MEDICINE

## 2023-01-01 PROCEDURE — 93325 DOPPLER ECHO COLOR FLOW MAPG: CPT | Mod: 26 | Performed by: INTERNAL MEDICINE

## 2023-01-01 PROCEDURE — 370N000017 HC ANESTHESIA TECHNICAL FEE, PER MIN: Performed by: STUDENT IN AN ORGANIZED HEALTH CARE EDUCATION/TRAINING PROGRAM

## 2023-01-01 PROCEDURE — C1769 GUIDE WIRE: HCPCS | Performed by: INTERNAL MEDICINE

## 2023-01-01 PROCEDURE — G0463 HOSPITAL OUTPT CLINIC VISIT: HCPCS | Performed by: SURGERY

## 2023-01-01 PROCEDURE — 99205 OFFICE O/P NEW HI 60 MIN: CPT | Mod: 25 | Performed by: RADIOLOGY

## 2023-01-01 PROCEDURE — 99397 PER PM REEVAL EST PAT 65+ YR: CPT | Mod: 25 | Performed by: INTERNAL MEDICINE

## 2023-01-01 PROCEDURE — 96413 CHEMO IV INFUSION 1 HR: CPT

## 2023-01-01 PROCEDURE — 258N000001 HC RX 258: Performed by: STUDENT IN AN ORGANIZED HEALTH CARE EDUCATION/TRAINING PROGRAM

## 2023-01-01 PROCEDURE — 99213 OFFICE O/P EST LOW 20 MIN: CPT | Performed by: SURGERY

## 2023-01-01 PROCEDURE — 250N000013 HC RX MED GY IP 250 OP 250 PS 637

## 2023-01-01 PROCEDURE — 99233 SBSQ HOSP IP/OBS HIGH 50: CPT | Mod: 24

## 2023-01-01 PROCEDURE — 90662 IIV NO PRSV INCREASED AG IM: CPT | Performed by: INTERNAL MEDICINE

## 2023-01-01 PROCEDURE — 71046 X-RAY EXAM CHEST 2 VIEWS: CPT

## 2023-01-01 PROCEDURE — C1887 CATHETER, GUIDING: HCPCS | Performed by: INTERNAL MEDICINE

## 2023-01-01 PROCEDURE — 99291 CRITICAL CARE FIRST HOUR: CPT | Performed by: INTERNAL MEDICINE

## 2023-01-01 PROCEDURE — 99233 SBSQ HOSP IP/OBS HIGH 50: CPT | Mod: 25 | Performed by: INTERNAL MEDICINE

## 2023-01-01 PROCEDURE — 99213 OFFICE O/P EST LOW 20 MIN: CPT | Performed by: STUDENT IN AN ORGANIZED HEALTH CARE EDUCATION/TRAINING PROGRAM

## 2023-01-01 PROCEDURE — 99232 SBSQ HOSP IP/OBS MODERATE 35: CPT | Mod: 25 | Performed by: INTERNAL MEDICINE

## 2023-01-01 PROCEDURE — 77470 SPECIAL RADIATION TREATMENT: CPT | Mod: 26 | Performed by: RADIOLOGY

## 2023-01-01 PROCEDURE — 36415 COLL VENOUS BLD VENIPUNCTURE: CPT | Performed by: INTERNAL MEDICINE

## 2023-01-01 PROCEDURE — 77334 RADIATION TREATMENT AID(S): CPT | Performed by: RADIOLOGY

## 2023-01-01 PROCEDURE — 99221 1ST HOSP IP/OBS SF/LOW 40: CPT | Performed by: INTERNAL MEDICINE

## 2023-01-01 PROCEDURE — 82565 ASSAY OF CREATININE: CPT | Performed by: INTERNAL MEDICINE

## 2023-01-01 PROCEDURE — 250N000025 HC SEVOFLURANE, PER MIN: Performed by: STUDENT IN AN ORGANIZED HEALTH CARE EDUCATION/TRAINING PROGRAM

## 2023-01-01 PROCEDURE — 80051 ELECTROLYTE PANEL: CPT

## 2023-01-01 PROCEDURE — 250N000011 HC RX IP 250 OP 636: Mod: JZ | Performed by: NURSE PRACTITIONER

## 2023-01-01 PROCEDURE — 99418 PROLNG IP/OBS E/M EA 15 MIN: CPT | Performed by: INTERNAL MEDICINE

## 2023-01-01 PROCEDURE — 5A09357 ASSISTANCE WITH RESPIRATORY VENTILATION, LESS THAN 24 CONSECUTIVE HOURS, CONTINUOUS POSITIVE AIRWAY PRESSURE: ICD-10-PCS | Performed by: INTERNAL MEDICINE

## 2023-01-01 PROCEDURE — 82805 BLOOD GASES W/O2 SATURATION: CPT

## 2023-01-01 PROCEDURE — 84075 ASSAY ALKALINE PHOSPHATASE: CPT | Performed by: INTERNAL MEDICINE

## 2023-01-01 PROCEDURE — 97116 GAIT TRAINING THERAPY: CPT | Mod: GP

## 2023-01-01 PROCEDURE — 82330 ASSAY OF CALCIUM: CPT

## 2023-01-01 PROCEDURE — 3E043XZ INTRODUCTION OF VASOPRESSOR INTO CENTRAL VEIN, PERCUTANEOUS APPROACH: ICD-10-PCS | Performed by: INTERNAL MEDICINE

## 2023-01-01 PROCEDURE — G0463 HOSPITAL OUTPT CLINIC VISIT: HCPCS | Performed by: INTERNAL MEDICINE

## 2023-01-01 PROCEDURE — 85025 COMPLETE CBC W/AUTO DIFF WBC: CPT | Performed by: INTERNAL MEDICINE

## 2023-01-01 PROCEDURE — 84155 ASSAY OF PROTEIN SERUM: CPT | Performed by: INTERNAL MEDICINE

## 2023-01-01 PROCEDURE — 999N000026 HC STATISTIC CARDIOPULM RESUSCITATION

## 2023-01-01 PROCEDURE — 99291 CRITICAL CARE FIRST HOUR: CPT | Mod: 25 | Performed by: INTERNAL MEDICINE

## 2023-01-01 PROCEDURE — 86901 BLOOD TYPING SEROLOGIC RH(D): CPT

## 2023-01-01 PROCEDURE — G0103 PSA SCREENING: HCPCS | Performed by: INTERNAL MEDICINE

## 2023-01-01 PROCEDURE — 93880 EXTRACRANIAL BILAT STUDY: CPT

## 2023-01-01 PROCEDURE — 250N000011 HC RX IP 250 OP 636: Mod: JZ | Performed by: EMERGENCY MEDICINE

## 2023-01-01 PROCEDURE — 80048 BASIC METABOLIC PNL TOTAL CA: CPT | Performed by: INTERNAL MEDICINE

## 2023-01-01 PROCEDURE — 82248 BILIRUBIN DIRECT: CPT | Performed by: INTERNAL MEDICINE

## 2023-01-01 PROCEDURE — 71045 X-RAY EXAM CHEST 1 VIEW: CPT

## 2023-01-01 PROCEDURE — A9585 GADOBUTROL INJECTION: HCPCS | Mod: JZ | Performed by: FAMILY MEDICINE

## 2023-01-01 PROCEDURE — 84132 ASSAY OF SERUM POTASSIUM: CPT | Performed by: INTERNAL MEDICINE

## 2023-01-01 PROCEDURE — 272N000272 HC CONTINUOUS NEBULIZER MICRO PUMP

## 2023-01-01 PROCEDURE — 94002 VENT MGMT INPAT INIT DAY: CPT

## 2023-01-01 PROCEDURE — 77301 RADIOTHERAPY DOSE PLAN IMRT: CPT | Performed by: RADIOLOGY

## 2023-01-01 PROCEDURE — 82805 BLOOD GASES W/O2 SATURATION: CPT | Performed by: INTERNAL MEDICINE

## 2023-01-01 PROCEDURE — 77334 RADIATION TREATMENT AID(S): CPT | Mod: 26 | Performed by: RADIOLOGY

## 2023-01-01 PROCEDURE — 99214 OFFICE O/P EST MOD 30 MIN: CPT | Performed by: INTERNAL MEDICINE

## 2023-01-01 PROCEDURE — 93321 DOPPLER ECHO F-UP/LMTD STD: CPT | Mod: 26 | Performed by: INTERNAL MEDICINE

## 2023-01-01 PROCEDURE — 36620 INSERTION CATHETER ARTERY: CPT | Performed by: INTERNAL MEDICINE

## 2023-01-01 PROCEDURE — 258N000003 HC RX IP 258 OP 636: Performed by: NURSE ANESTHETIST, CERTIFIED REGISTERED

## 2023-01-01 PROCEDURE — 93458 L HRT ARTERY/VENTRICLE ANGIO: CPT | Mod: 26 | Performed by: INTERNAL MEDICINE

## 2023-01-01 PROCEDURE — 99215 OFFICE O/P EST HI 40 MIN: CPT | Mod: 25 | Performed by: INTERNAL MEDICINE

## 2023-01-01 PROCEDURE — 0715T HC PRQ TRLUML CORONARY LITHOTRIPSY: CPT | Performed by: INTERNAL MEDICINE

## 2023-01-01 PROCEDURE — 93005 ELECTROCARDIOGRAM TRACING: CPT | Performed by: INTERNAL MEDICINE

## 2023-01-01 PROCEDURE — 77300 RADIATION THERAPY DOSE PLAN: CPT | Performed by: RADIOLOGY

## 2023-01-01 PROCEDURE — 99238 HOSP IP/OBS DSCHRG MGMT 30/<: CPT | Mod: 25 | Performed by: INTERNAL MEDICINE

## 2023-01-01 PROCEDURE — 83880 ASSAY OF NATRIURETIC PEPTIDE: CPT | Performed by: INTERNAL MEDICINE

## 2023-01-01 PROCEDURE — 71275 CT ANGIOGRAPHY CHEST: CPT

## 2023-01-01 PROCEDURE — 85025 COMPLETE CBC W/AUTO DIFF WBC: CPT

## 2023-01-01 PROCEDURE — 84145 PROCALCITONIN (PCT): CPT

## 2023-01-01 PROCEDURE — 92921 PR PRQ TRLUML CORONARY ANGIOPLASTY ADDL BRANCH: CPT | Mod: LC | Performed by: INTERNAL MEDICINE

## 2023-01-01 PROCEDURE — 85025 COMPLETE CBC W/AUTO DIFF WBC: CPT | Performed by: NURSE PRACTITIONER

## 2023-01-01 PROCEDURE — 85027 COMPLETE CBC AUTOMATED: CPT | Performed by: RADIOLOGY

## 2023-01-01 PROCEDURE — 77001 FLUOROGUIDE FOR VEIN DEVICE: CPT

## 2023-01-01 PROCEDURE — 99222 1ST HOSP IP/OBS MODERATE 55: CPT | Performed by: INTERNAL MEDICINE

## 2023-01-01 PROCEDURE — 93005 ELECTROCARDIOGRAM TRACING: CPT | Performed by: STUDENT IN AN ORGANIZED HEALTH CARE EDUCATION/TRAINING PROGRAM

## 2023-01-01 PROCEDURE — 88342 IMHCHEM/IMCYTCHM 1ST ANTB: CPT | Mod: 26 | Performed by: PATHOLOGY

## 2023-01-01 PROCEDURE — 99231 SBSQ HOSP IP/OBS SF/LOW 25: CPT | Performed by: INTERNAL MEDICINE

## 2023-01-01 PROCEDURE — 85027 COMPLETE CBC AUTOMATED: CPT | Performed by: EMERGENCY MEDICINE

## 2023-01-01 PROCEDURE — P9016 RBC LEUKOCYTES REDUCED: HCPCS

## 2023-01-01 PROCEDURE — 82565 ASSAY OF CREATININE: CPT | Performed by: NURSE PRACTITIONER

## 2023-01-01 PROCEDURE — 999N000122 MR MYOCARDIUM  OVERREAD

## 2023-01-01 PROCEDURE — G0463 HOSPITAL OUTPT CLINIC VISIT: HCPCS | Performed by: STUDENT IN AN ORGANIZED HEALTH CARE EDUCATION/TRAINING PROGRAM

## 2023-01-01 PROCEDURE — 5A1935Z RESPIRATORY VENTILATION, LESS THAN 24 CONSECUTIVE HOURS: ICD-10-PCS | Performed by: NURSE ANESTHETIST, CERTIFIED REGISTERED

## 2023-01-01 PROCEDURE — C1894 INTRO/SHEATH, NON-LASER: HCPCS | Performed by: INTERNAL MEDICINE

## 2023-01-01 PROCEDURE — 999N000208 ECHOCARDIOGRAM COMPLETE

## 2023-01-01 PROCEDURE — 71250 CT THORAX DX C-: CPT

## 2023-01-01 PROCEDURE — 93005 ELECTROCARDIOGRAM TRACING: CPT | Performed by: EMERGENCY MEDICINE

## 2023-01-01 PROCEDURE — 99214 OFFICE O/P EST MOD 30 MIN: CPT | Performed by: NURSE PRACTITIONER

## 2023-01-01 PROCEDURE — 84100 ASSAY OF PHOSPHORUS: CPT | Performed by: INTERNAL MEDICINE

## 2023-01-01 PROCEDURE — 258N000003 HC RX IP 258 OP 636: Performed by: NURSE PRACTITIONER

## 2023-01-01 PROCEDURE — 84450 TRANSFERASE (AST) (SGOT): CPT

## 2023-01-01 PROCEDURE — 360N000076 HC SURGERY LEVEL 3, PER MIN: Performed by: STUDENT IN AN ORGANIZED HEALTH CARE EDUCATION/TRAINING PROGRAM

## 2023-01-01 PROCEDURE — 99222 1ST HOSP IP/OBS MODERATE 55: CPT | Mod: 25 | Performed by: INTERNAL MEDICINE

## 2023-01-01 PROCEDURE — 250N000009 HC RX 250

## 2023-01-01 PROCEDURE — 02HV33Z INSERTION OF INFUSION DEVICE INTO SUPERIOR VENA CAVA, PERCUTANEOUS APPROACH: ICD-10-PCS | Performed by: INTERNAL MEDICINE

## 2023-01-01 PROCEDURE — 85027 COMPLETE CBC AUTOMATED: CPT | Performed by: FAMILY MEDICINE

## 2023-01-01 PROCEDURE — 84133 ASSAY OF URINE POTASSIUM: CPT | Performed by: INTERNAL MEDICINE

## 2023-01-01 PROCEDURE — 82310 ASSAY OF CALCIUM: CPT | Performed by: INTERNAL MEDICINE

## 2023-01-01 PROCEDURE — A9552 F18 FDG: HCPCS | Performed by: INTERNAL MEDICINE

## 2023-01-01 PROCEDURE — 97165 OT EVAL LOW COMPLEX 30 MIN: CPT | Mod: GO

## 2023-01-01 PROCEDURE — 84484 ASSAY OF TROPONIN QUANT: CPT

## 2023-01-01 PROCEDURE — 93306 TTE W/DOPPLER COMPLETE: CPT | Mod: 26 | Performed by: INTERNAL MEDICINE

## 2023-01-01 PROCEDURE — 255N000002 HC RX 255 OP 636: Performed by: INTERNAL MEDICINE

## 2023-01-01 PROCEDURE — 94640 AIRWAY INHALATION TREATMENT: CPT

## 2023-01-01 PROCEDURE — 87340 HEPATITIS B SURFACE AG IA: CPT | Performed by: INTERNAL MEDICINE

## 2023-01-01 PROCEDURE — 999N000287 HC ICU ADULT ROUNDING, EACH 10 MINS

## 2023-01-01 PROCEDURE — 85018 HEMOGLOBIN: CPT | Performed by: INTERNAL MEDICINE

## 2023-01-01 PROCEDURE — 027034Z DILATION OF CORONARY ARTERY, ONE ARTERY WITH DRUG-ELUTING INTRALUMINAL DEVICE, PERCUTANEOUS APPROACH: ICD-10-PCS | Performed by: INTERNAL MEDICINE

## 2023-01-01 PROCEDURE — 99152 MOD SED SAME PHYS/QHP 5/>YRS: CPT | Performed by: INTERNAL MEDICINE

## 2023-01-01 PROCEDURE — 85520 HEPARIN ASSAY: CPT | Performed by: FAMILY MEDICINE

## 2023-01-01 PROCEDURE — 84450 TRANSFERASE (AST) (SGOT): CPT | Performed by: INTERNAL MEDICINE

## 2023-01-01 PROCEDURE — 52235 CYSTOSCOPY AND TREATMENT: CPT | Performed by: STUDENT IN AN ORGANIZED HEALTH CARE EDUCATION/TRAINING PROGRAM

## 2023-01-01 PROCEDURE — 84132 ASSAY OF SERUM POTASSIUM: CPT | Performed by: FAMILY MEDICINE

## 2023-01-01 PROCEDURE — 80048 BASIC METABOLIC PNL TOTAL CA: CPT

## 2023-01-01 PROCEDURE — 99153 MOD SED SAME PHYS/QHP EA: CPT | Performed by: INTERNAL MEDICINE

## 2023-01-01 PROCEDURE — 80048 BASIC METABOLIC PNL TOTAL CA: CPT | Performed by: NURSE PRACTITIONER

## 2023-01-01 PROCEDURE — 36415 COLL VENOUS BLD VENIPUNCTURE: CPT | Performed by: EMERGENCY MEDICINE

## 2023-01-01 PROCEDURE — 999N000009 HC STATISTIC AIRWAY CARE

## 2023-01-01 PROCEDURE — C1788 PORT, INDWELLING, IMP: HCPCS

## 2023-01-01 PROCEDURE — 82533 TOTAL CORTISOL: CPT | Performed by: INTERNAL MEDICINE

## 2023-01-01 PROCEDURE — 99232 SBSQ HOSP IP/OBS MODERATE 35: CPT | Mod: GC

## 2023-01-01 PROCEDURE — 255N000002 HC RX 255 OP 636: Mod: JZ | Performed by: INTERNAL MEDICINE

## 2023-01-01 PROCEDURE — 250N000009 HC RX 250: Performed by: NURSE ANESTHETIST, CERTIFIED REGISTERED

## 2023-01-01 PROCEDURE — 343N000001 HC RX 343: Performed by: INTERNAL MEDICINE

## 2023-01-01 PROCEDURE — 4A023N7 MEASUREMENT OF CARDIAC SAMPLING AND PRESSURE, LEFT HEART, PERCUTANEOUS APPROACH: ICD-10-PCS | Performed by: INTERNAL MEDICINE

## 2023-01-01 PROCEDURE — 82962 GLUCOSE BLOOD TEST: CPT

## 2023-01-01 PROCEDURE — 99291 CRITICAL CARE FIRST HOUR: CPT | Mod: 25

## 2023-01-01 PROCEDURE — 999N000065 XR CHEST PORT 1 VIEW

## 2023-01-01 PROCEDURE — P9047 ALBUMIN (HUMAN), 25%, 50ML: HCPCS

## 2023-01-01 PROCEDURE — 272N000452 HC KIT SHRLOCK 5FR POWER PICC TRIPLE LUMEN

## 2023-01-01 PROCEDURE — 97110 THERAPEUTIC EXERCISES: CPT | Mod: GO

## 2023-01-01 PROCEDURE — 86923 COMPATIBILITY TEST ELECTRIC: CPT | Performed by: NURSE PRACTITIONER

## 2023-01-01 PROCEDURE — 75561 CARDIAC MRI FOR MORPH W/DYE: CPT | Mod: 26 | Performed by: INTERNAL MEDICINE

## 2023-01-01 PROCEDURE — 85014 HEMATOCRIT: CPT | Performed by: INTERNAL MEDICINE

## 2023-01-01 PROCEDURE — 84550 ASSAY OF BLOOD/URIC ACID: CPT | Performed by: INTERNAL MEDICINE

## 2023-01-01 PROCEDURE — 77263 THER RADIOLOGY TX PLNG CPLX: CPT | Performed by: RADIOLOGY

## 2023-01-01 PROCEDURE — 74019 RADEX ABDOMEN 2 VIEWS: CPT

## 2023-01-01 PROCEDURE — 70553 MRI BRAIN STEM W/O & W/DYE: CPT

## 2023-01-01 PROCEDURE — C1725 CATH, TRANSLUMIN NON-LASER: HCPCS | Performed by: INTERNAL MEDICINE

## 2023-01-01 PROCEDURE — 255N000002 HC RX 255 OP 636: Mod: JZ | Performed by: FAMILY MEDICINE

## 2023-01-01 PROCEDURE — 99207 PR NO CHARGE LOS: CPT | Performed by: FAMILY MEDICINE

## 2023-01-01 PROCEDURE — 272N000001 HC OR GENERAL SUPPLY STERILE: Performed by: INTERNAL MEDICINE

## 2023-01-01 PROCEDURE — G0463 HOSPITAL OUTPT CLINIC VISIT: HCPCS | Mod: 25 | Performed by: INTERNAL MEDICINE

## 2023-01-01 PROCEDURE — 93458 L HRT ARTERY/VENTRICLE ANGIO: CPT | Performed by: INTERNAL MEDICINE

## 2023-01-01 PROCEDURE — 78816 PET IMAGE W/CT FULL BODY: CPT | Mod: PI

## 2023-01-01 PROCEDURE — 36415 COLL VENOUS BLD VENIPUNCTURE: CPT | Performed by: NURSE PRACTITIONER

## 2023-01-01 PROCEDURE — 258N000003 HC RX IP 258 OP 636

## 2023-01-01 PROCEDURE — 36591 DRAW BLOOD OFF VENOUS DEVICE: CPT | Performed by: NURSE PRACTITIONER

## 2023-01-01 PROCEDURE — 85520 HEPARIN ASSAY: CPT | Performed by: EMERGENCY MEDICINE

## 2023-01-01 PROCEDURE — G0008 ADMIN INFLUENZA VIRUS VAC: HCPCS | Performed by: INTERNAL MEDICINE

## 2023-01-01 PROCEDURE — 96127 BRIEF EMOTIONAL/BEHAV ASSMT: CPT | Performed by: INTERNAL MEDICINE

## 2023-01-01 PROCEDURE — 99223 1ST HOSP IP/OBS HIGH 75: CPT | Performed by: INTERNAL MEDICINE

## 2023-01-01 PROCEDURE — G0452 MOLECULAR PATHOLOGY INTERPR: HCPCS | Mod: 26 | Performed by: PATHOLOGY

## 2023-01-01 PROCEDURE — 85347 COAGULATION TIME ACTIVATED: CPT

## 2023-01-01 PROCEDURE — 88120 CYTP URNE 3-5 PROBES EA SPEC: CPT | Performed by: PATHOLOGY

## 2023-01-01 PROCEDURE — 97162 PT EVAL MOD COMPLEX 30 MIN: CPT | Mod: GP

## 2023-01-01 PROCEDURE — 75561 CARDIAC MRI FOR MORPH W/DYE: CPT

## 2023-01-01 PROCEDURE — 82436 ASSAY OF URINE CHLORIDE: CPT | Performed by: INTERNAL MEDICINE

## 2023-01-01 PROCEDURE — 02F03ZZ FRAGMENTATION IN CORONARY ARTERY, ONE ARTERY, PERCUTANEOUS APPROACH: ICD-10-PCS | Performed by: INTERNAL MEDICINE

## 2023-01-01 PROCEDURE — 87633 RESP VIRUS 12-25 TARGETS: CPT | Performed by: INTERNAL MEDICINE

## 2023-01-01 PROCEDURE — 99205 OFFICE O/P NEW HI 60 MIN: CPT | Performed by: INTERNAL MEDICINE

## 2023-01-01 PROCEDURE — 250N000011 HC RX IP 250 OP 636: Performed by: NURSE PRACTITIONER

## 2023-01-01 PROCEDURE — P9045 ALBUMIN (HUMAN), 5%, 250 ML: HCPCS | Mod: JZ

## 2023-01-01 PROCEDURE — 99152 MOD SED SAME PHYS/QHP 5/>YRS: CPT

## 2023-01-01 PROCEDURE — 84145 PROCALCITONIN (PCT): CPT | Performed by: INTERNAL MEDICINE

## 2023-01-01 PROCEDURE — 84443 ASSAY THYROID STIM HORMONE: CPT | Performed by: INTERNAL MEDICINE

## 2023-01-01 PROCEDURE — P9016 RBC LEUKOCYTES REDUCED: HCPCS | Performed by: NURSE PRACTITIONER

## 2023-01-01 PROCEDURE — 99281 EMR DPT VST MAYX REQ PHY/QHP: CPT

## 2023-01-01 PROCEDURE — 99207 PR NO BILLABLE SERVICE THIS VISIT: CPT | Performed by: INTERNAL MEDICINE

## 2023-01-01 PROCEDURE — 0124A COVID-19 BIVALENT 12+ (PFIZER): CPT | Performed by: INTERNAL MEDICINE

## 2023-01-01 PROCEDURE — 36556 INSERT NON-TUNNEL CV CATH: CPT | Performed by: INTERNAL MEDICINE

## 2023-01-01 PROCEDURE — 250N000011 HC RX IP 250 OP 636: Performed by: NURSE ANESTHETIST, CERTIFIED REGISTERED

## 2023-01-01 PROCEDURE — 86900 BLOOD TYPING SEROLOGIC ABO: CPT

## 2023-01-01 PROCEDURE — 85014 HEMATOCRIT: CPT

## 2023-01-01 PROCEDURE — 93321 DOPPLER ECHO F-UP/LMTD STD: CPT

## 2023-01-01 PROCEDURE — 84484 ASSAY OF TROPONIN QUANT: CPT | Performed by: EMERGENCY MEDICINE

## 2023-01-01 PROCEDURE — 77300 RADIATION THERAPY DOSE PLAN: CPT | Mod: 26 | Performed by: RADIOLOGY

## 2023-01-01 PROCEDURE — 99236 HOSP IP/OBS SAME DATE HI 85: CPT | Mod: GC

## 2023-01-01 PROCEDURE — 88307 TISSUE EXAM BY PATHOLOGIST: CPT | Mod: TC | Performed by: STUDENT IN AN ORGANIZED HEALTH CARE EDUCATION/TRAINING PROGRAM

## 2023-01-01 PROCEDURE — 99222 1ST HOSP IP/OBS MODERATE 55: CPT | Mod: FS | Performed by: INTERNAL MEDICINE

## 2023-01-01 PROCEDURE — C1753 CATH, INTRAVAS ULTRASOUND: HCPCS | Performed by: INTERNAL MEDICINE

## 2023-01-01 PROCEDURE — C1758 CATHETER, URETERAL: HCPCS | Performed by: STUDENT IN AN ORGANIZED HEALTH CARE EDUCATION/TRAINING PROGRAM

## 2023-01-01 PROCEDURE — 90947 DIALYSIS REPEATED EVAL: CPT

## 2023-01-01 PROCEDURE — 88112 CYTOPATH CELL ENHANCE TECH: CPT | Performed by: PATHOLOGY

## 2023-01-01 PROCEDURE — 99204 OFFICE O/P NEW MOD 45 MIN: CPT | Performed by: STUDENT IN AN ORGANIZED HEALTH CARE EDUCATION/TRAINING PROGRAM

## 2023-01-01 PROCEDURE — 93308 TTE F-UP OR LMTD: CPT | Mod: 26 | Performed by: INTERNAL MEDICINE

## 2023-01-01 PROCEDURE — 250N000009 HC RX 250: Performed by: RADIOLOGY

## 2023-01-01 PROCEDURE — 84155 ASSAY OF PROTEIN SERUM: CPT

## 2023-01-01 PROCEDURE — 87641 MR-STAPH DNA AMP PROBE: CPT | Performed by: INTERNAL MEDICINE

## 2023-01-01 PROCEDURE — 99233 SBSQ HOSP IP/OBS HIGH 50: CPT | Mod: GC

## 2023-01-01 PROCEDURE — 85520 HEPARIN ASSAY: CPT

## 2023-01-01 PROCEDURE — 88305 TISSUE EXAM BY PATHOLOGIST: CPT | Mod: 26 | Performed by: PATHOLOGY

## 2023-01-01 PROCEDURE — 83605 ASSAY OF LACTIC ACID: CPT | Performed by: EMERGENCY MEDICINE

## 2023-01-01 PROCEDURE — 99238 HOSP IP/OBS DSCHRG MGMT 30/<: CPT | Mod: GC

## 2023-01-01 PROCEDURE — 97110 THERAPEUTIC EXERCISES: CPT | Mod: GP

## 2023-01-01 PROCEDURE — 85520 HEPARIN ASSAY: CPT | Performed by: INTERNAL MEDICINE

## 2023-01-01 PROCEDURE — G0463 HOSPITAL OUTPT CLINIC VISIT: HCPCS | Mod: 25

## 2023-01-01 PROCEDURE — 710N000009 HC RECOVERY PHASE 1, LEVEL 1, PER MIN: Performed by: STUDENT IN AN ORGANIZED HEALTH CARE EDUCATION/TRAINING PROGRAM

## 2023-01-01 PROCEDURE — 94645 CONT INHLJ TX EACH ADDL HOUR: CPT

## 2023-01-01 PROCEDURE — 81455 SO/HL 51/>GSAP DNA/DNA&RNA: CPT | Performed by: INTERNAL MEDICINE

## 2023-01-01 PROCEDURE — 86850 RBC ANTIBODY SCREEN: CPT

## 2023-01-01 PROCEDURE — G0463 HOSPITAL OUTPT CLINIC VISIT: HCPCS

## 2023-01-01 PROCEDURE — 70498 CT ANGIOGRAPHY NECK: CPT

## 2023-01-01 PROCEDURE — 88307 TISSUE EXAM BY PATHOLOGIST: CPT | Mod: 26 | Performed by: PATHOLOGY

## 2023-01-01 PROCEDURE — 88333 PATH CONSLTJ SURG CYTO XM 1: CPT | Mod: 26 | Performed by: PATHOLOGY

## 2023-01-01 PROCEDURE — 272N000001 HC OR GENERAL SUPPLY STERILE: Performed by: STUDENT IN AN ORGANIZED HEALTH CARE EDUCATION/TRAINING PROGRAM

## 2023-01-01 PROCEDURE — 255N000002 HC RX 255 OP 636: Performed by: FAMILY MEDICINE

## 2023-01-01 PROCEDURE — 91312 COVID-19 BIVALENT 12+ (PFIZER): CPT | Performed by: INTERNAL MEDICINE

## 2023-01-01 PROCEDURE — 120N000001 HC R&B MED SURG/OB

## 2023-01-01 PROCEDURE — 5A1D90Z PERFORMANCE OF URINARY FILTRATION, CONTINUOUS, GREATER THAN 18 HOURS PER DAY: ICD-10-PCS | Performed by: INTERNAL MEDICINE

## 2023-01-01 PROCEDURE — 84540 ASSAY OF URINE/UREA-N: CPT | Performed by: INTERNAL MEDICINE

## 2023-01-01 PROCEDURE — 70496 CT ANGIOGRAPHY HEAD: CPT

## 2023-01-01 DEVICE — STENT COR ONYX FRONTIER 18X2.50MM ONYXNG25018UX: Type: IMPLANTABLE DEVICE | Status: FUNCTIONAL

## 2023-01-01 RX ORDER — ALBUTEROL SULFATE 0.83 MG/ML
2.5 SOLUTION RESPIRATORY (INHALATION)
Status: DISCONTINUED | OUTPATIENT
Start: 2023-01-01 | End: 2023-01-01 | Stop reason: HOSPADM

## 2023-01-01 RX ORDER — POTASSIUM CHLORIDE 1500 MG/1
40 TABLET, EXTENDED RELEASE ORAL ONCE
Status: COMPLETED | OUTPATIENT
Start: 2023-01-01 | End: 2023-01-01

## 2023-01-01 RX ORDER — LORAZEPAM 2 MG/ML
0.5 INJECTION INTRAMUSCULAR EVERY 4 HOURS PRN
Status: DISCONTINUED | OUTPATIENT
Start: 2023-01-01 | End: 2023-01-01 | Stop reason: HOSPADM

## 2023-01-01 RX ORDER — HEPARIN SODIUM (PORCINE) LOCK FLUSH IV SOLN 100 UNIT/ML 100 UNIT/ML
5-10 SOLUTION INTRAVENOUS ONCE
Status: COMPLETED | OUTPATIENT
Start: 2023-01-01 | End: 2023-01-01

## 2023-01-01 RX ORDER — MEPERIDINE HYDROCHLORIDE 50 MG/ML
25 INJECTION INTRAMUSCULAR; INTRAVENOUS; SUBCUTANEOUS EVERY 30 MIN PRN
Status: DISCONTINUED | OUTPATIENT
Start: 2023-01-01 | End: 2023-01-01 | Stop reason: HOSPADM

## 2023-01-01 RX ORDER — MAGNESIUM HYDROXIDE/ALUMINUM HYDROXICE/SIMETHICONE 120; 1200; 1200 MG/30ML; MG/30ML; MG/30ML
30 SUSPENSION ORAL EVERY 4 HOURS PRN
Status: CANCELLED | OUTPATIENT
Start: 2023-01-01

## 2023-01-01 RX ORDER — ALBUTEROL SULFATE 90 UG/1
1-2 AEROSOL, METERED RESPIRATORY (INHALATION)
Status: CANCELLED
Start: 2023-01-01

## 2023-01-01 RX ORDER — NALOXONE HYDROCHLORIDE 0.4 MG/ML
0.4 INJECTION, SOLUTION INTRAMUSCULAR; INTRAVENOUS; SUBCUTANEOUS
Status: ACTIVE | OUTPATIENT
Start: 2023-01-01 | End: 2023-01-01

## 2023-01-01 RX ORDER — PROCHLORPERAZINE MALEATE 10 MG
10 TABLET ORAL EVERY 6 HOURS PRN
Qty: 30 TABLET | Refills: 2 | Status: SHIPPED | OUTPATIENT
Start: 2023-01-01 | End: 2023-01-01

## 2023-01-01 RX ORDER — DOCUSATE SODIUM 100 MG/1
100 CAPSULE, LIQUID FILLED ORAL DAILY PRN
COMMUNITY

## 2023-01-01 RX ORDER — ALBUTEROL SULFATE 0.83 MG/ML
2.5 SOLUTION RESPIRATORY (INHALATION) EVERY 4 HOURS PRN
Status: DISCONTINUED | OUTPATIENT
Start: 2023-01-01 | End: 2023-01-01 | Stop reason: HOSPADM

## 2023-01-01 RX ORDER — POTASSIUM CHLORIDE 20MEQ/15ML
20 LIQUID (ML) ORAL ONCE
Qty: 15 ML | Refills: 0 | Status: COMPLETED | OUTPATIENT
Start: 2023-01-01 | End: 2023-01-01

## 2023-01-01 RX ORDER — IODIXANOL 320 MG/ML
INJECTION, SOLUTION INTRAVASCULAR
Status: DISCONTINUED | OUTPATIENT
Start: 2023-01-01 | End: 2023-01-01 | Stop reason: HOSPADM

## 2023-01-01 RX ORDER — ACETAMINOPHEN 325 MG/1
650 TABLET ORAL EVERY 4 HOURS PRN
Status: CANCELLED | OUTPATIENT
Start: 2023-01-01

## 2023-01-01 RX ORDER — ALBUTEROL SULFATE 90 UG/1
1-2 AEROSOL, METERED RESPIRATORY (INHALATION)
Status: DISCONTINUED | OUTPATIENT
Start: 2023-01-01 | End: 2023-01-01 | Stop reason: HOSPADM

## 2023-01-01 RX ORDER — EPINEPHRINE 0.1 MG/ML
INJECTION INTRAVENOUS PRN
Status: COMPLETED | OUTPATIENT
Start: 2023-01-01 | End: 2023-01-01

## 2023-01-01 RX ORDER — DIPHENHYDRAMINE HCL 50 MG
50 CAPSULE ORAL ONCE
Status: CANCELLED
Start: 2023-01-01

## 2023-01-01 RX ORDER — SODIUM CHLORIDE 9 MG/ML
INJECTION, SOLUTION INTRAVENOUS CONTINUOUS
Status: DISCONTINUED | OUTPATIENT
Start: 2023-01-01 | End: 2023-01-01 | Stop reason: HOSPADM

## 2023-01-01 RX ORDER — FUROSEMIDE 20 MG
40 TABLET ORAL DAILY
Status: DISCONTINUED | OUTPATIENT
Start: 2023-01-01 | End: 2023-01-01

## 2023-01-01 RX ORDER — SODIUM CHLORIDE, SODIUM LACTATE, POTASSIUM CHLORIDE, CALCIUM CHLORIDE 600; 310; 30; 20 MG/100ML; MG/100ML; MG/100ML; MG/100ML
INJECTION, SOLUTION INTRAVENOUS CONTINUOUS
Status: DISCONTINUED | OUTPATIENT
Start: 2023-01-01 | End: 2023-01-01 | Stop reason: HOSPADM

## 2023-01-01 RX ORDER — FENTANYL CITRATE 50 UG/ML
INJECTION, SOLUTION INTRAMUSCULAR; INTRAVENOUS
Status: DISCONTINUED | OUTPATIENT
Start: 2023-01-01 | End: 2023-01-01 | Stop reason: HOSPADM

## 2023-01-01 RX ORDER — OXYCODONE HYDROCHLORIDE 5 MG/1
5 TABLET ORAL EVERY 4 HOURS PRN
Status: DISCONTINUED | OUTPATIENT
Start: 2023-01-01 | End: 2023-01-01 | Stop reason: HOSPADM

## 2023-01-01 RX ORDER — HEPARIN SODIUM,PORCINE 10 UNIT/ML
5-20 VIAL (ML) INTRAVENOUS DAILY PRN
Status: CANCELLED | OUTPATIENT
Start: 2023-01-01

## 2023-01-01 RX ORDER — DOCUSATE SODIUM 100 MG/1
100 CAPSULE, LIQUID FILLED ORAL DAILY PRN
Status: CANCELLED | OUTPATIENT
Start: 2023-01-01

## 2023-01-01 RX ORDER — LORAZEPAM 2 MG/ML
0.5 INJECTION INTRAMUSCULAR EVERY 4 HOURS PRN
Status: CANCELLED | OUTPATIENT
Start: 2023-01-01

## 2023-01-01 RX ORDER — ONDANSETRON 2 MG/ML
4 INJECTION INTRAMUSCULAR; INTRAVENOUS EVERY 30 MIN PRN
Status: DISCONTINUED | OUTPATIENT
Start: 2023-01-01 | End: 2023-01-01 | Stop reason: HOSPADM

## 2023-01-01 RX ORDER — ISOSORBIDE MONONITRATE 30 MG/1
30 TABLET, EXTENDED RELEASE ORAL DAILY
Status: DISCONTINUED | OUTPATIENT
Start: 2023-01-01 | End: 2023-01-01

## 2023-01-01 RX ORDER — TRAZODONE HYDROCHLORIDE 50 MG/1
50 TABLET, FILM COATED ORAL AT BEDTIME
Qty: 90 TABLET | Refills: 1 | Status: SHIPPED | OUTPATIENT
Start: 2023-01-01

## 2023-01-01 RX ORDER — PIPERACILLIN SODIUM, TAZOBACTAM SODIUM 3; .375 G/15ML; G/15ML
3.38 INJECTION, POWDER, LYOPHILIZED, FOR SOLUTION INTRAVENOUS ONCE
Status: COMPLETED | OUTPATIENT
Start: 2023-01-01 | End: 2023-01-01

## 2023-01-01 RX ORDER — MAGNESIUM SULFATE HEPTAHYDRATE 40 MG/ML
INJECTION, SOLUTION INTRAVENOUS CONTINUOUS PRN
Status: COMPLETED | OUTPATIENT
Start: 2023-01-01 | End: 2023-01-01

## 2023-01-01 RX ORDER — HYDROMORPHONE HCL IN WATER/PF 6 MG/30 ML
0.2 PATIENT CONTROLLED ANALGESIA SYRINGE INTRAVENOUS EVERY 5 MIN PRN
Status: DISCONTINUED | OUTPATIENT
Start: 2023-01-01 | End: 2023-01-01 | Stop reason: HOSPADM

## 2023-01-01 RX ORDER — ALLOPURINOL 300 MG/1
300 TABLET ORAL DAILY
Status: DISCONTINUED | OUTPATIENT
Start: 2023-01-01 | End: 2023-01-01 | Stop reason: HOSPADM

## 2023-01-01 RX ORDER — DIPHENHYDRAMINE HYDROCHLORIDE 50 MG/ML
50 INJECTION INTRAMUSCULAR; INTRAVENOUS
Status: DISCONTINUED | OUTPATIENT
Start: 2023-01-01 | End: 2023-01-01 | Stop reason: HOSPADM

## 2023-01-01 RX ORDER — POTASSIUM CHLORIDE 20MEQ/15ML
40 LIQUID (ML) ORAL ONCE
Qty: 30 ML | Refills: 0 | Status: COMPLETED | OUTPATIENT
Start: 2023-01-01 | End: 2023-01-01

## 2023-01-01 RX ORDER — PROCHLORPERAZINE MALEATE 10 MG
10 TABLET ORAL EVERY 6 HOURS PRN
Status: DISCONTINUED | OUTPATIENT
Start: 2023-01-01 | End: 2023-01-01 | Stop reason: HOSPADM

## 2023-01-01 RX ORDER — LIDOCAINE 40 MG/G
CREAM TOPICAL
Status: CANCELLED | OUTPATIENT
Start: 2023-01-01

## 2023-01-01 RX ORDER — DOPAMINE HYDROCHLORIDE 160 MG/100ML
2-20 INJECTION, SOLUTION INTRAVENOUS CONTINUOUS
Status: DISCONTINUED | OUTPATIENT
Start: 2023-01-01 | End: 2023-01-01 | Stop reason: HOSPADM

## 2023-01-01 RX ORDER — CALCIUM CHLORIDE 100 MG/ML
INJECTION INTRAVENOUS; INTRAVENTRICULAR PRN
Status: COMPLETED | OUTPATIENT
Start: 2023-01-01 | End: 2023-01-01

## 2023-01-01 RX ORDER — ONDANSETRON 4 MG/1
4 TABLET, ORALLY DISINTEGRATING ORAL EVERY 30 MIN PRN
Status: DISCONTINUED | OUTPATIENT
Start: 2023-01-01 | End: 2023-01-01 | Stop reason: HOSPADM

## 2023-01-01 RX ORDER — PIPERACILLIN SODIUM, TAZOBACTAM SODIUM 3; .375 G/15ML; G/15ML
3.38 INJECTION, POWDER, LYOPHILIZED, FOR SOLUTION INTRAVENOUS EVERY 8 HOURS
Status: CANCELLED | OUTPATIENT
Start: 2023-01-01

## 2023-01-01 RX ORDER — ATROPINE SULFATE 0.1 MG/ML
0.5 INJECTION INTRAVENOUS
Status: DISCONTINUED | OUTPATIENT
Start: 2023-01-01 | End: 2023-01-01

## 2023-01-01 RX ORDER — IOPAMIDOL 755 MG/ML
75 INJECTION, SOLUTION INTRAVASCULAR ONCE
Status: COMPLETED | OUTPATIENT
Start: 2023-01-01 | End: 2023-01-01

## 2023-01-01 RX ORDER — POLYETHYLENE GLYCOL 3350 17 G/17G
17 POWDER, FOR SOLUTION ORAL DAILY PRN
Status: DISCONTINUED | OUTPATIENT
Start: 2023-01-01 | End: 2023-01-01 | Stop reason: HOSPADM

## 2023-01-01 RX ORDER — FENTANYL CITRATE 50 UG/ML
25 INJECTION, SOLUTION INTRAMUSCULAR; INTRAVENOUS
Status: DISCONTINUED | OUTPATIENT
Start: 2023-01-01 | End: 2023-01-01

## 2023-01-01 RX ORDER — FLUMAZENIL 0.1 MG/ML
0.2 INJECTION, SOLUTION INTRAVENOUS
Status: ACTIVE | OUTPATIENT
Start: 2023-01-01 | End: 2023-01-01

## 2023-01-01 RX ORDER — SODIUM CHLORIDE 9 MG/ML
INJECTION, SOLUTION INTRAVENOUS CONTINUOUS
Status: CANCELLED | OUTPATIENT
Start: 2023-01-01

## 2023-01-01 RX ORDER — ASPIRIN 81 MG/1
81 TABLET ORAL DAILY
Status: DISCONTINUED | OUTPATIENT
Start: 2023-01-01 | End: 2023-01-01 | Stop reason: HOSPADM

## 2023-01-01 RX ORDER — NOREPINEPHRINE BITARTRATE 0.02 MG/ML
.01-.6 INJECTION, SOLUTION INTRAVENOUS CONTINUOUS
Status: DISCONTINUED | OUTPATIENT
Start: 2023-01-01 | End: 2023-01-01

## 2023-01-01 RX ORDER — NITROGLYCERIN 20 MG/100ML
10-200 INJECTION INTRAVENOUS CONTINUOUS
Status: DISCONTINUED | OUTPATIENT
Start: 2023-01-01 | End: 2023-01-01 | Stop reason: HOSPADM

## 2023-01-01 RX ORDER — EPINEPHRINE 1 MG/ML
0.3 INJECTION, SOLUTION INTRAMUSCULAR; SUBCUTANEOUS EVERY 5 MIN PRN
Status: CANCELLED | OUTPATIENT
Start: 2023-01-01

## 2023-01-01 RX ORDER — HEPARIN SODIUM (PORCINE) LOCK FLUSH IV SOLN 100 UNIT/ML 100 UNIT/ML
5-10 SOLUTION INTRAVENOUS
Status: DISCONTINUED | OUTPATIENT
Start: 2023-01-01 | End: 2023-01-01 | Stop reason: HOSPADM

## 2023-01-01 RX ORDER — CLOPIDOGREL BISULFATE 75 MG/1
75 TABLET ORAL DAILY
Status: DISCONTINUED | OUTPATIENT
Start: 2023-01-01 | End: 2023-01-01

## 2023-01-01 RX ORDER — HEPARIN SODIUM,PORCINE 10 UNIT/ML
5-10 VIAL (ML) INTRAVENOUS
Status: DISCONTINUED | OUTPATIENT
Start: 2023-01-01 | End: 2023-01-01 | Stop reason: HOSPADM

## 2023-01-01 RX ORDER — ALBUTEROL SULFATE 0.83 MG/ML
2.5 SOLUTION RESPIRATORY (INHALATION)
Status: CANCELLED | OUTPATIENT
Start: 2023-01-01

## 2023-01-01 RX ORDER — CEFAZOLIN SODIUM 2 G/100ML
2 INJECTION, SOLUTION INTRAVENOUS
Status: CANCELLED | OUTPATIENT
Start: 2023-01-01

## 2023-01-01 RX ORDER — AMLODIPINE BESYLATE 5 MG/1
5 TABLET ORAL DAILY
Status: DISCONTINUED | OUTPATIENT
Start: 2023-01-01 | End: 2023-01-01

## 2023-01-01 RX ORDER — SODIUM CHLORIDE 9 MG/ML
INJECTION, SOLUTION INTRAVENOUS CONTINUOUS
Status: DISCONTINUED | OUTPATIENT
Start: 2023-01-01 | End: 2023-01-01

## 2023-01-01 RX ORDER — EPINEPHRINE 1 MG/ML
0.3 INJECTION, SOLUTION INTRAMUSCULAR; SUBCUTANEOUS EVERY 5 MIN PRN
Status: DISCONTINUED | OUTPATIENT
Start: 2023-01-01 | End: 2023-01-01 | Stop reason: HOSPADM

## 2023-01-01 RX ORDER — HYDROMORPHONE HCL IN WATER/PF 6 MG/30 ML
0.4 PATIENT CONTROLLED ANALGESIA SYRINGE INTRAVENOUS EVERY 5 MIN PRN
Status: DISCONTINUED | OUTPATIENT
Start: 2023-01-01 | End: 2023-01-01 | Stop reason: HOSPADM

## 2023-01-01 RX ORDER — GLYCOPYRROLATE 0.2 MG/ML
INJECTION, SOLUTION INTRAMUSCULAR; INTRAVENOUS PRN
Status: DISCONTINUED | OUTPATIENT
Start: 2023-01-01 | End: 2023-01-01

## 2023-01-01 RX ORDER — EPINEPHRINE 0.1 MG/ML
INJECTION INTRAVENOUS
Status: DISCONTINUED
Start: 2023-01-01 | End: 2023-01-01 | Stop reason: HOSPADM

## 2023-01-01 RX ORDER — ISOSORBIDE MONONITRATE 30 MG/1
30 TABLET, EXTENDED RELEASE ORAL DAILY
Qty: 30 TABLET | Refills: 1 | Status: SHIPPED | OUTPATIENT
Start: 2023-01-01

## 2023-01-01 RX ORDER — POTASSIUM CHLORIDE 1500 MG/1
20 TABLET, EXTENDED RELEASE ORAL ONCE
Status: COMPLETED | OUTPATIENT
Start: 2023-01-01 | End: 2023-01-01

## 2023-01-01 RX ORDER — NOREPINEPHRINE BITARTRATE 0.06 MG/ML
INJECTION, SOLUTION INTRAVENOUS
Status: DISCONTINUED
Start: 2023-01-01 | End: 2023-01-01 | Stop reason: HOSPADM

## 2023-01-01 RX ORDER — ATORVASTATIN CALCIUM 40 MG/1
80 TABLET, FILM COATED ORAL AT BEDTIME
Status: DISCONTINUED | OUTPATIENT
Start: 2023-01-01 | End: 2023-01-01 | Stop reason: HOSPADM

## 2023-01-01 RX ORDER — FENTANYL CITRATE 50 UG/ML
INJECTION, SOLUTION INTRAMUSCULAR; INTRAVENOUS PRN
Status: COMPLETED | OUTPATIENT
Start: 2023-01-01 | End: 2023-01-01

## 2023-01-01 RX ORDER — METHYLPREDNISOLONE SODIUM SUCCINATE 125 MG/2ML
60 INJECTION, POWDER, LYOPHILIZED, FOR SOLUTION INTRAMUSCULAR; INTRAVENOUS EVERY 6 HOURS
Status: CANCELLED | OUTPATIENT
Start: 2023-01-01

## 2023-01-01 RX ORDER — FENTANYL CITRATE 50 UG/ML
25-50 INJECTION, SOLUTION INTRAMUSCULAR; INTRAVENOUS EVERY 5 MIN PRN
Status: DISCONTINUED | OUTPATIENT
Start: 2023-01-01 | End: 2023-01-01 | Stop reason: HOSPADM

## 2023-01-01 RX ORDER — DOPAMINE HYDROCHLORIDE 160 MG/100ML
2-20 INJECTION, SOLUTION INTRAVENOUS CONTINUOUS
Status: CANCELLED | OUTPATIENT
Start: 2023-01-01

## 2023-01-01 RX ORDER — ASPIRIN 325 MG
325 TABLET, DELAYED RELEASE (ENTERIC COATED) ORAL AT BEDTIME
Status: ON HOLD | COMMUNITY
End: 2023-01-01

## 2023-01-01 RX ORDER — ONDANSETRON 4 MG/1
4 TABLET, ORALLY DISINTEGRATING ORAL EVERY 8 HOURS PRN
Qty: 4 TABLET | Refills: 0 | Status: SHIPPED | OUTPATIENT
Start: 2023-01-01

## 2023-01-01 RX ORDER — IOPAMIDOL 755 MG/ML
100 INJECTION, SOLUTION INTRAVASCULAR ONCE
Status: COMPLETED | OUTPATIENT
Start: 2023-01-01 | End: 2023-01-01

## 2023-01-01 RX ORDER — LOPERAMIDE HCL 2 MG
2 CAPSULE ORAL 4 TIMES DAILY PRN
Qty: 60 CAPSULE | Refills: 1 | Status: SHIPPED | OUTPATIENT
Start: 2023-01-01 | End: 2023-01-01

## 2023-01-01 RX ORDER — TRAZODONE HYDROCHLORIDE 50 MG/1
50 TABLET, FILM COATED ORAL AT BEDTIME
Status: DISCONTINUED | OUTPATIENT
Start: 2023-01-01 | End: 2023-01-01 | Stop reason: HOSPADM

## 2023-01-01 RX ORDER — ATENOLOL 25 MG/1
100 TABLET ORAL DAILY
Status: DISCONTINUED | OUTPATIENT
Start: 2023-01-01 | End: 2023-01-01

## 2023-01-01 RX ORDER — MEPERIDINE HYDROCHLORIDE 50 MG/ML
25 INJECTION INTRAMUSCULAR; INTRAVENOUS; SUBCUTANEOUS EVERY 30 MIN PRN
Status: CANCELLED | OUTPATIENT
Start: 2023-01-01

## 2023-01-01 RX ORDER — HEPARIN SODIUM (PORCINE) LOCK FLUSH IV SOLN 100 UNIT/ML 100 UNIT/ML
5 SOLUTION INTRAVENOUS
Status: DISCONTINUED | OUTPATIENT
Start: 2023-01-01 | End: 2023-01-01 | Stop reason: HOSPADM

## 2023-01-01 RX ORDER — LORAZEPAM 0.5 MG/1
0.5 TABLET ORAL 2 TIMES DAILY PRN
Status: DISCONTINUED | OUTPATIENT
Start: 2023-01-01 | End: 2023-01-01 | Stop reason: HOSPADM

## 2023-01-01 RX ORDER — HEPARIN SODIUM (PORCINE) LOCK FLUSH IV SOLN 100 UNIT/ML 100 UNIT/ML
5 SOLUTION INTRAVENOUS
Status: CANCELLED | OUTPATIENT
Start: 2023-01-01

## 2023-01-01 RX ORDER — ONDANSETRON 4 MG/1
4 TABLET, ORALLY DISINTEGRATING ORAL EVERY 6 HOURS PRN
Status: DISCONTINUED | OUTPATIENT
Start: 2023-01-01 | End: 2023-01-01 | Stop reason: HOSPADM

## 2023-01-01 RX ORDER — CHLORHEXIDINE GLUCONATE ORAL RINSE 1.2 MG/ML
15 SOLUTION DENTAL 2 TIMES DAILY
Status: CANCELLED | OUTPATIENT
Start: 2023-01-01

## 2023-01-01 RX ORDER — NALOXONE HYDROCHLORIDE 0.4 MG/ML
0.4 INJECTION, SOLUTION INTRAMUSCULAR; INTRAVENOUS; SUBCUTANEOUS
Status: DISCONTINUED | OUTPATIENT
Start: 2023-01-01 | End: 2023-01-01 | Stop reason: HOSPADM

## 2023-01-01 RX ORDER — HEPARIN SODIUM,PORCINE 10 UNIT/ML
5-10 VIAL (ML) INTRAVENOUS EVERY 24 HOURS
Status: DISCONTINUED | OUTPATIENT
Start: 2023-01-01 | End: 2023-01-01 | Stop reason: HOSPADM

## 2023-01-01 RX ORDER — PROCHLORPERAZINE 25 MG
12.5 SUPPOSITORY, RECTAL RECTAL EVERY 12 HOURS PRN
Status: DISCONTINUED | OUTPATIENT
Start: 2023-01-01 | End: 2023-01-01 | Stop reason: HOSPADM

## 2023-01-01 RX ORDER — FENTANYL CITRATE 50 UG/ML
INJECTION, SOLUTION INTRAMUSCULAR; INTRAVENOUS PRN
Status: DISCONTINUED | OUTPATIENT
Start: 2023-01-01 | End: 2023-01-01

## 2023-01-01 RX ORDER — LIDOCAINE 40 MG/G
CREAM TOPICAL
Status: DISCONTINUED | OUTPATIENT
Start: 2023-01-01 | End: 2023-01-01 | Stop reason: HOSPADM

## 2023-01-01 RX ORDER — DOBUTAMINE HYDROCHLORIDE 200 MG/100ML
2.5 INJECTION INTRAVENOUS CONTINUOUS
Status: CANCELLED | OUTPATIENT
Start: 2023-01-01

## 2023-01-01 RX ORDER — ACETAMINOPHEN 650 MG/1
650 SUPPOSITORY RECTAL EVERY 4 HOURS PRN
Status: CANCELLED | OUTPATIENT
Start: 2023-01-01

## 2023-01-01 RX ORDER — ONDANSETRON 4 MG/1
4 TABLET, ORALLY DISINTEGRATING ORAL EVERY 8 HOURS PRN
Status: DISCONTINUED | OUTPATIENT
Start: 2023-01-01 | End: 2023-01-01 | Stop reason: HOSPADM

## 2023-01-01 RX ORDER — BUMETANIDE 2 MG/1
2 TABLET ORAL DAILY
Status: DISCONTINUED | OUTPATIENT
Start: 2023-01-01 | End: 2023-01-01

## 2023-01-01 RX ORDER — DIPHENHYDRAMINE HYDROCHLORIDE 50 MG/ML
50 INJECTION INTRAMUSCULAR; INTRAVENOUS
Status: CANCELLED
Start: 2023-01-01

## 2023-01-01 RX ORDER — PIPERACILLIN SODIUM, TAZOBACTAM SODIUM 3; .375 G/15ML; G/15ML
3.38 INJECTION, POWDER, LYOPHILIZED, FOR SOLUTION INTRAVENOUS EVERY 8 HOURS
Status: DISCONTINUED | OUTPATIENT
Start: 2023-01-01 | End: 2023-01-01 | Stop reason: HOSPADM

## 2023-01-01 RX ORDER — FUROSEMIDE 10 MG/ML
40 INJECTION INTRAMUSCULAR; INTRAVENOUS DAILY
Status: DISCONTINUED | OUTPATIENT
Start: 2023-01-01 | End: 2023-01-01

## 2023-01-01 RX ORDER — CLOPIDOGREL BISULFATE 75 MG/1
300 TABLET ORAL ONCE
Qty: 4 TABLET | Refills: 0 | Status: COMPLETED | OUTPATIENT
Start: 2023-01-01 | End: 2023-01-01

## 2023-01-01 RX ORDER — SERTRALINE HYDROCHLORIDE 100 MG/1
200 TABLET, FILM COATED ORAL AT BEDTIME
Status: DISCONTINUED | OUTPATIENT
Start: 2023-01-01 | End: 2023-01-01 | Stop reason: HOSPADM

## 2023-01-01 RX ORDER — FLUMAZENIL 0.1 MG/ML
0.2 INJECTION, SOLUTION INTRAVENOUS
Status: DISCONTINUED | OUTPATIENT
Start: 2023-01-01 | End: 2023-01-01 | Stop reason: HOSPADM

## 2023-01-01 RX ORDER — ASPIRIN 81 MG/1
81 TABLET ORAL DAILY
Status: CANCELLED | OUTPATIENT
Start: 2023-01-01

## 2023-01-01 RX ORDER — IPRATROPIUM BROMIDE AND ALBUTEROL SULFATE 2.5; .5 MG/3ML; MG/3ML
3 SOLUTION RESPIRATORY (INHALATION) 4 TIMES DAILY PRN
Status: CANCELLED | OUTPATIENT
Start: 2023-01-01

## 2023-01-01 RX ORDER — DEXAMETHASONE SODIUM PHOSPHATE 4 MG/ML
INJECTION, SOLUTION INTRA-ARTICULAR; INTRALESIONAL; INTRAMUSCULAR; INTRAVENOUS; SOFT TISSUE PRN
Status: DISCONTINUED | OUTPATIENT
Start: 2023-01-01 | End: 2023-01-01

## 2023-01-01 RX ORDER — METHYLPREDNISOLONE SODIUM SUCCINATE 125 MG/2ML
125 INJECTION, POWDER, LYOPHILIZED, FOR SOLUTION INTRAMUSCULAR; INTRAVENOUS
Status: DISCONTINUED | OUTPATIENT
Start: 2023-01-01 | End: 2023-01-01 | Stop reason: HOSPADM

## 2023-01-01 RX ORDER — SERTRALINE HYDROCHLORIDE 100 MG/1
200 TABLET, FILM COATED ORAL DAILY
Status: DISCONTINUED | OUTPATIENT
Start: 2023-01-01 | End: 2023-01-01 | Stop reason: HOSPADM

## 2023-01-01 RX ORDER — FUROSEMIDE 10 MG/ML
40 INJECTION INTRAMUSCULAR; INTRAVENOUS DAILY
Status: DISCONTINUED | OUTPATIENT
Start: 2023-01-01 | End: 2023-01-01 | Stop reason: HOSPADM

## 2023-01-01 RX ORDER — ASPIRIN 325 MG
325 TABLET, DELAYED RELEASE (ENTERIC COATED) ORAL AT BEDTIME
Status: DISCONTINUED | OUTPATIENT
Start: 2023-01-01 | End: 2023-01-01

## 2023-01-01 RX ORDER — NALOXONE HYDROCHLORIDE 0.4 MG/ML
0.2 INJECTION, SOLUTION INTRAMUSCULAR; INTRAVENOUS; SUBCUTANEOUS
Status: DISCONTINUED | OUTPATIENT
Start: 2023-01-01 | End: 2023-01-01 | Stop reason: HOSPADM

## 2023-01-01 RX ORDER — METOPROLOL SUCCINATE 25 MG/1
25 TABLET, EXTENDED RELEASE ORAL DAILY
Status: DISCONTINUED | OUTPATIENT
Start: 2023-01-01 | End: 2023-01-01 | Stop reason: HOSPADM

## 2023-01-01 RX ORDER — LORAZEPAM 0.5 MG/1
.5-1 TABLET ORAL DAILY PRN
Status: DISCONTINUED | OUTPATIENT
Start: 2023-01-01 | End: 2023-01-01

## 2023-01-01 RX ORDER — HEPARIN SODIUM 10000 [USP'U]/100ML
0-5000 INJECTION, SOLUTION INTRAVENOUS CONTINUOUS
Status: DISCONTINUED | OUTPATIENT
Start: 2023-01-01 | End: 2023-01-01 | Stop reason: HOSPADM

## 2023-01-01 RX ORDER — CEFAZOLIN SODIUM 2 G/100ML
2 INJECTION, SOLUTION INTRAVENOUS
Status: COMPLETED | OUTPATIENT
Start: 2023-01-01 | End: 2023-01-01

## 2023-01-01 RX ORDER — FENTANYL CITRATE 50 UG/ML
25 INJECTION, SOLUTION INTRAMUSCULAR; INTRAVENOUS
Status: CANCELLED | OUTPATIENT
Start: 2023-01-01

## 2023-01-01 RX ORDER — OXYCODONE HYDROCHLORIDE 5 MG/1
10 TABLET ORAL
Status: DISCONTINUED | OUTPATIENT
Start: 2023-01-01 | End: 2023-01-01 | Stop reason: HOSPADM

## 2023-01-01 RX ORDER — ALBUMIN (HUMAN) 12.5 G/50ML
SOLUTION INTRAVENOUS
Status: COMPLETED
Start: 2023-01-01 | End: 2023-01-01

## 2023-01-01 RX ORDER — LORAZEPAM 0.5 MG/1
.5-1 TABLET ORAL DAILY PRN
Qty: 60 TABLET | Refills: 3 | Status: SHIPPED | OUTPATIENT
Start: 2023-01-01 | End: 2023-01-01

## 2023-01-01 RX ORDER — METOPROLOL SUCCINATE 25 MG/1
25 TABLET, EXTENDED RELEASE ORAL DAILY
Qty: 30 TABLET | Refills: 1 | Status: SHIPPED | OUTPATIENT
Start: 2023-01-01

## 2023-01-01 RX ORDER — FLUMAZENIL 0.1 MG/ML
0.2 INJECTION, SOLUTION INTRAVENOUS
Status: DISCONTINUED | OUTPATIENT
Start: 2023-01-01 | End: 2023-01-01

## 2023-01-01 RX ORDER — METHYLPREDNISOLONE SODIUM SUCCINATE 125 MG/2ML
125 INJECTION, POWDER, LYOPHILIZED, FOR SOLUTION INTRAMUSCULAR; INTRAVENOUS
Status: CANCELLED
Start: 2023-01-01

## 2023-01-01 RX ORDER — BUMETANIDE 0.25 MG/ML
2 INJECTION INTRAMUSCULAR; INTRAVENOUS EVERY 6 HOURS
Qty: 16 ML | Refills: 0 | Status: ACTIVE | OUTPATIENT
Start: 2023-01-01 | End: 2023-01-01

## 2023-01-01 RX ORDER — ASPIRIN 81 MG/1
81 TABLET ORAL DAILY
Status: DISCONTINUED | OUTPATIENT
Start: 2023-01-01 | End: 2023-01-01

## 2023-01-01 RX ORDER — ASPIRIN 81 MG/1
162 TABLET ORAL DAILY
Status: DISCONTINUED | OUTPATIENT
Start: 2023-01-01 | End: 2023-01-01

## 2023-01-01 RX ORDER — DEXTROSE MONOHYDRATE 25 G/50ML
25-50 INJECTION, SOLUTION INTRAVENOUS
Status: DISCONTINUED | OUTPATIENT
Start: 2023-01-01 | End: 2023-01-01 | Stop reason: HOSPADM

## 2023-01-01 RX ORDER — LISINOPRIL 2.5 MG/1
2.5 TABLET ORAL DAILY
Status: DISCONTINUED | OUTPATIENT
Start: 2023-01-01 | End: 2023-01-01

## 2023-01-01 RX ORDER — METOPROLOL TARTRATE 1 MG/ML
5 INJECTION, SOLUTION INTRAVENOUS
Status: DISCONTINUED | OUTPATIENT
Start: 2023-01-01 | End: 2023-01-01

## 2023-01-01 RX ORDER — ISOSORBIDE MONONITRATE 30 MG/1
30 TABLET, EXTENDED RELEASE ORAL DAILY
Status: DISCONTINUED | OUTPATIENT
Start: 2023-01-01 | End: 2023-01-01 | Stop reason: HOSPADM

## 2023-01-01 RX ORDER — LIDOCAINE 40 MG/G
CREAM TOPICAL
Status: CANCELLED | OUTPATIENT
Start: 2023-01-01 | End: 2023-01-01

## 2023-01-01 RX ORDER — DOCUSATE SODIUM 100 MG/1
100 CAPSULE, LIQUID FILLED ORAL DAILY PRN
Status: DISCONTINUED | OUTPATIENT
Start: 2023-01-01 | End: 2023-01-01 | Stop reason: HOSPADM

## 2023-01-01 RX ORDER — TRAMADOL HYDROCHLORIDE 50 MG/1
50 TABLET ORAL EVERY 6 HOURS PRN
Status: DISCONTINUED | OUTPATIENT
Start: 2023-01-01 | End: 2023-01-01 | Stop reason: HOSPADM

## 2023-01-01 RX ORDER — ASPIRIN 81 MG/1
243 TABLET, CHEWABLE ORAL ONCE
Status: DISCONTINUED | OUTPATIENT
Start: 2023-01-01 | End: 2023-01-01 | Stop reason: HOSPADM

## 2023-01-01 RX ORDER — NALOXONE HYDROCHLORIDE 0.4 MG/ML
0.4 INJECTION, SOLUTION INTRAMUSCULAR; INTRAVENOUS; SUBCUTANEOUS
Status: DISCONTINUED | OUTPATIENT
Start: 2023-01-01 | End: 2023-01-01

## 2023-01-01 RX ORDER — LISINOPRIL 20 MG/1
20 TABLET ORAL DAILY
Status: DISCONTINUED | OUTPATIENT
Start: 2023-01-01 | End: 2023-01-01

## 2023-01-01 RX ORDER — AMIODARONE HYDROCHLORIDE 200 MG/1
400 TABLET ORAL 2 TIMES DAILY
Status: CANCELLED | OUTPATIENT
Start: 2023-01-01

## 2023-01-01 RX ORDER — ALLOPURINOL 300 MG/1
300 TABLET ORAL DAILY
Qty: 90 TABLET | Refills: 3 | Status: SHIPPED | OUTPATIENT
Start: 2023-01-01

## 2023-01-01 RX ORDER — METHYLPREDNISOLONE SODIUM SUCCINATE 125 MG/2ML
60 INJECTION, POWDER, LYOPHILIZED, FOR SOLUTION INTRAMUSCULAR; INTRAVENOUS EVERY 6 HOURS
Status: DISCONTINUED | OUTPATIENT
Start: 2023-01-01 | End: 2023-01-01 | Stop reason: HOSPADM

## 2023-01-01 RX ORDER — BISACODYL 5 MG
10 TABLET, DELAYED RELEASE (ENTERIC COATED) ORAL DAILY PRN
Status: DISCONTINUED | OUTPATIENT
Start: 2023-01-01 | End: 2023-01-01 | Stop reason: HOSPADM

## 2023-01-01 RX ORDER — NITROGLYCERIN 0.4 MG/1
0.4 TABLET SUBLINGUAL EVERY 5 MIN PRN
Status: CANCELLED | OUTPATIENT
Start: 2023-01-01

## 2023-01-01 RX ORDER — DEXTROSE MONOHYDRATE 25 G/50ML
25-50 INJECTION, SOLUTION INTRAVENOUS
Status: CANCELLED | OUTPATIENT
Start: 2023-01-01

## 2023-01-01 RX ORDER — LORAZEPAM 0.5 MG/1
0.5 TABLET ORAL ONCE
Status: COMPLETED | OUTPATIENT
Start: 2023-01-01 | End: 2023-01-01

## 2023-01-01 RX ORDER — NALOXONE HYDROCHLORIDE 0.4 MG/ML
0.4 INJECTION, SOLUTION INTRAMUSCULAR; INTRAVENOUS; SUBCUTANEOUS
Status: CANCELLED | OUTPATIENT
Start: 2023-01-01

## 2023-01-01 RX ORDER — BUMETANIDE 2 MG/1
2 TABLET ORAL
Status: DISCONTINUED | OUTPATIENT
Start: 2023-01-01 | End: 2023-01-01

## 2023-01-01 RX ORDER — NOREPINEPHRINE BITARTRATE 0.06 MG/ML
.01-.6 INJECTION, SOLUTION INTRAVENOUS CONTINUOUS
Status: CANCELLED | OUTPATIENT
Start: 2023-01-01

## 2023-01-01 RX ORDER — FUROSEMIDE 10 MG/ML
INJECTION INTRAMUSCULAR; INTRAVENOUS
Status: DISCONTINUED | OUTPATIENT
Start: 2023-01-01 | End: 2023-01-01 | Stop reason: HOSPADM

## 2023-01-01 RX ORDER — BUMETANIDE 1 MG/1
1 TABLET ORAL DAILY
Status: DISCONTINUED | OUTPATIENT
Start: 2023-01-01 | End: 2023-01-01

## 2023-01-01 RX ORDER — ASPIRIN 81 MG/1
81 TABLET ORAL DAILY
Qty: 30 TABLET | Refills: 0 | Status: SHIPPED | OUTPATIENT
Start: 2023-01-01

## 2023-01-01 RX ORDER — HEPARIN SODIUM 10000 [USP'U]/100ML
0-5000 INJECTION, SOLUTION INTRAVENOUS CONTINUOUS
Status: DISCONTINUED | OUTPATIENT
Start: 2023-01-01 | End: 2023-01-01

## 2023-01-01 RX ORDER — ALBUMIN (HUMAN) 12.5 G/50ML
50 SOLUTION INTRAVENOUS ONCE
Status: COMPLETED | OUTPATIENT
Start: 2023-01-01 | End: 2023-01-01

## 2023-01-01 RX ORDER — LORAZEPAM 0.5 MG/1
.5-1 TABLET ORAL DAILY PRN
Status: DISCONTINUED | OUTPATIENT
Start: 2023-01-01 | End: 2023-01-01 | Stop reason: HOSPADM

## 2023-01-01 RX ORDER — FENTANYL CITRATE 50 UG/ML
25 INJECTION, SOLUTION INTRAMUSCULAR; INTRAVENOUS EVERY 5 MIN PRN
Status: DISCONTINUED | OUTPATIENT
Start: 2023-01-01 | End: 2023-01-01 | Stop reason: HOSPADM

## 2023-01-01 RX ORDER — ATORVASTATIN CALCIUM 80 MG/1
80 TABLET, FILM COATED ORAL DAILY
Qty: 90 TABLET | Refills: 3 | OUTPATIENT
Start: 2023-01-01

## 2023-01-01 RX ORDER — FENTANYL CITRATE 50 UG/ML
50 INJECTION, SOLUTION INTRAMUSCULAR; INTRAVENOUS ONCE
Status: COMPLETED | OUTPATIENT
Start: 2023-01-01 | End: 2023-01-01

## 2023-01-01 RX ORDER — CLOPIDOGREL BISULFATE 75 MG/1
75 TABLET ORAL DAILY
Status: CANCELLED | OUTPATIENT
Start: 2023-01-01

## 2023-01-01 RX ORDER — FENTANYL CITRATE 50 UG/ML
50 INJECTION, SOLUTION INTRAMUSCULAR; INTRAVENOUS EVERY 5 MIN PRN
Status: DISCONTINUED | OUTPATIENT
Start: 2023-01-01 | End: 2023-01-01 | Stop reason: HOSPADM

## 2023-01-01 RX ORDER — ACETAMINOPHEN 325 MG/1
650 TABLET ORAL EVERY 4 HOURS PRN
Status: DISCONTINUED | OUTPATIENT
Start: 2023-01-01 | End: 2023-01-01 | Stop reason: HOSPADM

## 2023-01-01 RX ORDER — ACETAMINOPHEN 325 MG/1
650 TABLET ORAL EVERY 4 HOURS PRN
Status: DISCONTINUED | OUTPATIENT
Start: 2023-01-01 | End: 2023-01-01

## 2023-01-01 RX ORDER — CLOPIDOGREL BISULFATE 75 MG/1
75 TABLET ORAL DAILY
Status: DISCONTINUED | OUTPATIENT
Start: 2023-01-01 | End: 2023-01-01 | Stop reason: HOSPADM

## 2023-01-01 RX ORDER — POTASSIUM CHLORIDE 29.8 MG/ML
20 INJECTION INTRAVENOUS EVERY 8 HOURS PRN
Status: CANCELLED | OUTPATIENT
Start: 2023-01-01

## 2023-01-01 RX ORDER — AMLODIPINE BESYLATE 5 MG/1
5 TABLET ORAL DAILY
Qty: 90 TABLET | Refills: 3 | Status: ON HOLD | OUTPATIENT
Start: 2023-01-01 | End: 2023-01-01

## 2023-01-01 RX ORDER — NICOTINE POLACRILEX 4 MG
15-30 LOZENGE BUCCAL
Status: DISCONTINUED | OUTPATIENT
Start: 2023-01-01 | End: 2023-01-01 | Stop reason: HOSPADM

## 2023-01-01 RX ORDER — FENTANYL CITRATE 50 UG/ML
25 INJECTION, SOLUTION INTRAMUSCULAR; INTRAVENOUS
Status: DISCONTINUED | OUTPATIENT
Start: 2023-01-01 | End: 2023-01-01 | Stop reason: HOSPADM

## 2023-01-01 RX ORDER — LIDOCAINE HYDROCHLORIDE 20 MG/ML
INJECTION, SOLUTION INFILTRATION; PERINEURAL PRN
Status: DISCONTINUED | OUTPATIENT
Start: 2023-01-01 | End: 2023-01-01

## 2023-01-01 RX ORDER — NICOTINE POLACRILEX 4 MG
15-30 LOZENGE BUCCAL
Status: CANCELLED | OUTPATIENT
Start: 2023-01-01

## 2023-01-01 RX ORDER — OXYCODONE HYDROCHLORIDE 5 MG/1
10 TABLET ORAL EVERY 4 HOURS PRN
Status: DISCONTINUED | OUTPATIENT
Start: 2023-01-01 | End: 2023-01-01 | Stop reason: HOSPADM

## 2023-01-01 RX ORDER — OXYCODONE HYDROCHLORIDE 5 MG/1
5 TABLET ORAL EVERY 4 HOURS PRN
Status: CANCELLED | OUTPATIENT
Start: 2023-01-01

## 2023-01-01 RX ORDER — CLOPIDOGREL BISULFATE 75 MG/1
300 TABLET ORAL ONCE
Status: COMPLETED | OUTPATIENT
Start: 2023-01-01 | End: 2023-01-01

## 2023-01-01 RX ORDER — NITROGLYCERIN 40 MG/1
1 PATCH TRANSDERMAL DAILY
Status: DISCONTINUED | OUTPATIENT
Start: 2023-01-01 | End: 2023-01-01

## 2023-01-01 RX ORDER — CEFEPIME HYDROCHLORIDE 1 G/1
1 INJECTION, POWDER, FOR SOLUTION INTRAMUSCULAR; INTRAVENOUS EVERY 8 HOURS
Status: CANCELLED | OUTPATIENT
Start: 2023-01-01

## 2023-01-01 RX ORDER — IOPAMIDOL 755 MG/ML
83 INJECTION, SOLUTION INTRAVASCULAR ONCE
Status: COMPLETED | OUTPATIENT
Start: 2023-01-01 | End: 2023-01-01

## 2023-01-01 RX ORDER — NALOXONE HYDROCHLORIDE 0.4 MG/ML
0.2 INJECTION, SOLUTION INTRAMUSCULAR; INTRAVENOUS; SUBCUTANEOUS
Status: DISCONTINUED | OUTPATIENT
Start: 2023-01-01 | End: 2023-01-01

## 2023-01-01 RX ORDER — PROCHLORPERAZINE MALEATE 10 MG
10 TABLET ORAL EVERY 6 HOURS PRN
COMMUNITY

## 2023-01-01 RX ORDER — OXYCODONE HYDROCHLORIDE 5 MG/1
5 TABLET ORAL
Status: DISCONTINUED | OUTPATIENT
Start: 2023-01-01 | End: 2023-01-01 | Stop reason: HOSPADM

## 2023-01-01 RX ORDER — PROCHLORPERAZINE MALEATE 5 MG
5 TABLET ORAL EVERY 6 HOURS PRN
Status: DISCONTINUED | OUTPATIENT
Start: 2023-01-01 | End: 2023-01-01 | Stop reason: HOSPADM

## 2023-01-01 RX ORDER — HYDRALAZINE HYDROCHLORIDE 20 MG/ML
2.5-5 INJECTION INTRAMUSCULAR; INTRAVENOUS EVERY 10 MIN PRN
Status: DISCONTINUED | OUTPATIENT
Start: 2023-01-01 | End: 2023-01-01 | Stop reason: HOSPADM

## 2023-01-01 RX ORDER — ACETAMINOPHEN 500 MG
1000 TABLET ORAL EVERY 6 HOURS PRN
COMMUNITY

## 2023-01-01 RX ORDER — CALCIUM GLUCONATE 20 MG/ML
2 INJECTION, SOLUTION INTRAVENOUS EVERY 8 HOURS PRN
Status: DISCONTINUED | OUTPATIENT
Start: 2023-01-01 | End: 2023-01-01 | Stop reason: HOSPADM

## 2023-01-01 RX ORDER — METOPROLOL SUCCINATE 25 MG/1
25 TABLET, EXTENDED RELEASE ORAL DAILY
Status: DISCONTINUED | OUTPATIENT
Start: 2023-01-01 | End: 2023-01-01

## 2023-01-01 RX ORDER — HYDRALAZINE HYDROCHLORIDE 20 MG/ML
10 INJECTION INTRAMUSCULAR; INTRAVENOUS EVERY 4 HOURS PRN
Status: CANCELLED | OUTPATIENT
Start: 2023-01-01

## 2023-01-01 RX ORDER — TOLTERODINE TARTRATE 2 MG/1
2 TABLET, EXTENDED RELEASE ORAL EVERY 12 HOURS PRN
Qty: 30 TABLET | Refills: 0 | Status: SHIPPED | OUTPATIENT
Start: 2023-01-01 | End: 2023-01-01

## 2023-01-01 RX ORDER — ATENOLOL 25 MG/1
100 TABLET ORAL AT BEDTIME
Status: DISCONTINUED | OUTPATIENT
Start: 2023-01-01 | End: 2023-01-01

## 2023-01-01 RX ORDER — FUROSEMIDE 10 MG/ML
60 INJECTION INTRAMUSCULAR; INTRAVENOUS EVERY 8 HOURS
Status: DISCONTINUED | OUTPATIENT
Start: 2023-01-01 | End: 2023-01-01

## 2023-01-01 RX ORDER — SODIUM CHLORIDE, SODIUM LACTATE, POTASSIUM CHLORIDE, CALCIUM CHLORIDE 600; 310; 30; 20 MG/100ML; MG/100ML; MG/100ML; MG/100ML
INJECTION, SOLUTION INTRAVENOUS CONTINUOUS PRN
Status: DISCONTINUED | OUTPATIENT
Start: 2023-01-01 | End: 2023-01-01

## 2023-01-01 RX ORDER — MAGNESIUM SULFATE 4 G/50ML
4 INJECTION INTRAVENOUS ONCE
Status: COMPLETED | OUTPATIENT
Start: 2023-01-01 | End: 2023-01-01

## 2023-01-01 RX ORDER — ATORVASTATIN CALCIUM 40 MG/1
80 TABLET, FILM COATED ORAL DAILY
Status: CANCELLED | OUTPATIENT
Start: 2023-01-01

## 2023-01-01 RX ORDER — BISACODYL 5 MG
5 TABLET, DELAYED RELEASE (ENTERIC COATED) ORAL DAILY PRN
Status: DISCONTINUED | OUTPATIENT
Start: 2023-01-01 | End: 2023-01-01 | Stop reason: HOSPADM

## 2023-01-01 RX ORDER — AMLODIPINE BESYLATE 5 MG/1
5 TABLET ORAL DAILY
Qty: 90 TABLET | Refills: 2 | Status: SHIPPED | OUTPATIENT
Start: 2023-01-01 | End: 2023-01-01

## 2023-01-01 RX ORDER — TRAMADOL HYDROCHLORIDE 50 MG/1
50 TABLET ORAL EVERY 6 HOURS PRN
Status: CANCELLED | OUTPATIENT
Start: 2023-01-01

## 2023-01-01 RX ORDER — HEPARIN SODIUM 200 [USP'U]/100ML
1 INJECTION, SOLUTION INTRAVENOUS CONTINUOUS PRN
Status: DISCONTINUED | OUTPATIENT
Start: 2023-01-01 | End: 2023-01-01 | Stop reason: HOSPADM

## 2023-01-01 RX ORDER — TRAZODONE HYDROCHLORIDE 50 MG/1
50 TABLET, FILM COATED ORAL AT BEDTIME
Qty: 90 TABLET | Refills: 1 | Status: SHIPPED | OUTPATIENT
Start: 2023-01-01 | End: 2023-01-01

## 2023-01-01 RX ORDER — AMLODIPINE BESYLATE 5 MG/1
5 TABLET ORAL AT BEDTIME
Status: DISCONTINUED | OUTPATIENT
Start: 2023-01-01 | End: 2023-01-01 | Stop reason: HOSPADM

## 2023-01-01 RX ORDER — ASPIRIN 325 MG
325 TABLET ORAL ONCE
Status: CANCELLED | OUTPATIENT
Start: 2023-01-01 | End: 2023-01-01

## 2023-01-01 RX ORDER — ALBUMIN (HUMAN) 12.5 G/50ML
25 SOLUTION INTRAVENOUS ONCE
Status: COMPLETED | OUTPATIENT
Start: 2023-01-01 | End: 2023-01-01

## 2023-01-01 RX ORDER — SERTRALINE HYDROCHLORIDE 100 MG/1
200 TABLET, FILM COATED ORAL DAILY
Qty: 180 TABLET | Refills: 1 | Status: SHIPPED | OUTPATIENT
Start: 2023-01-01 | End: 2023-01-01

## 2023-01-01 RX ORDER — LORAZEPAM 0.5 MG/1
.5-1 TABLET ORAL DAILY PRN
Qty: 60 TABLET | Refills: 3 | Status: SHIPPED | OUTPATIENT
Start: 2023-01-01

## 2023-01-01 RX ORDER — ALLOPURINOL 300 MG/1
300 TABLET ORAL AT BEDTIME
Status: DISCONTINUED | OUTPATIENT
Start: 2023-01-01 | End: 2023-01-01 | Stop reason: HOSPADM

## 2023-01-01 RX ORDER — POTASSIUM CHLORIDE 20MEQ/15ML
40 LIQUID (ML) ORAL ONCE
Status: COMPLETED | OUTPATIENT
Start: 2023-01-01 | End: 2023-01-01

## 2023-01-01 RX ORDER — DIPHENHYDRAMINE HCL 50 MG
50 CAPSULE ORAL ONCE
Status: DISCONTINUED | OUTPATIENT
Start: 2023-01-01 | End: 2023-01-01 | Stop reason: HOSPADM

## 2023-01-01 RX ORDER — ATROPINE SULFATE 1 MG/ML
INJECTION, SOLUTION INTRAMUSCULAR; INTRAVENOUS; SUBCUTANEOUS PRN
Status: COMPLETED | OUTPATIENT
Start: 2023-01-01 | End: 2023-01-01

## 2023-01-01 RX ORDER — ATORVASTATIN CALCIUM 80 MG/1
80 TABLET, FILM COATED ORAL DAILY
Qty: 90 TABLET | Refills: 3 | Status: SHIPPED | OUTPATIENT
Start: 2023-01-01

## 2023-01-01 RX ORDER — OXYCODONE HYDROCHLORIDE 5 MG/1
10 TABLET ORAL EVERY 4 HOURS PRN
Status: CANCELLED | OUTPATIENT
Start: 2023-01-01

## 2023-01-01 RX ORDER — POTASSIUM CHLORIDE 29.8 MG/ML
20 INJECTION INTRAVENOUS EVERY 8 HOURS PRN
Status: DISCONTINUED | OUTPATIENT
Start: 2023-01-01 | End: 2023-01-01 | Stop reason: HOSPADM

## 2023-01-01 RX ORDER — ASPIRIN 81 MG/1
243 TABLET, CHEWABLE ORAL ONCE
Status: CANCELLED | OUTPATIENT
Start: 2023-01-01

## 2023-01-01 RX ORDER — CALCIUM GLUCONATE 20 MG/ML
2 INJECTION, SOLUTION INTRAVENOUS EVERY 8 HOURS PRN
Status: CANCELLED | OUTPATIENT
Start: 2023-01-01

## 2023-01-01 RX ORDER — LISINOPRIL 10 MG/1
20 TABLET ORAL AT BEDTIME
Status: DISCONTINUED | OUTPATIENT
Start: 2023-01-01 | End: 2023-01-01 | Stop reason: HOSPADM

## 2023-01-01 RX ORDER — LIDOCAINE HYDROCHLORIDE 10 MG/ML
10 INJECTION, SOLUTION EPIDURAL; INFILTRATION; INTRACAUDAL; PERINEURAL ONCE
Status: COMPLETED | OUTPATIENT
Start: 2023-01-01 | End: 2023-01-01

## 2023-01-01 RX ORDER — LIDOCAINE HYDROCHLORIDE AND EPINEPHRINE 10; 10 MG/ML; UG/ML
1-20 INJECTION, SOLUTION INFILTRATION; PERINEURAL ONCE
Status: COMPLETED | OUTPATIENT
Start: 2023-01-01 | End: 2023-01-01

## 2023-01-01 RX ORDER — PHENYLEPHRINE HCL IN 0.9% NACL 50MG/250ML
.1-6 PLASTIC BAG, INJECTION (ML) INTRAVENOUS CONTINUOUS
Status: DISCONTINUED | OUTPATIENT
Start: 2023-01-01 | End: 2023-01-01 | Stop reason: HOSPADM

## 2023-01-01 RX ORDER — ONDANSETRON 4 MG/1
4 TABLET, ORALLY DISINTEGRATING ORAL EVERY 6 HOURS PRN
Status: CANCELLED | OUTPATIENT
Start: 2023-01-01

## 2023-01-01 RX ORDER — ALLOPURINOL 300 MG/1
300 TABLET ORAL DAILY
Status: CANCELLED | OUTPATIENT
Start: 2023-01-01

## 2023-01-01 RX ORDER — NITROGLYCERIN 0.4 MG/1
0.4 TABLET SUBLINGUAL EVERY 5 MIN PRN
Status: COMPLETED | OUTPATIENT
Start: 2023-01-01 | End: 2023-01-01

## 2023-01-01 RX ORDER — LABETALOL HYDROCHLORIDE 5 MG/ML
10 INJECTION, SOLUTION INTRAVENOUS
Status: DISCONTINUED | OUTPATIENT
Start: 2023-01-01 | End: 2023-01-01 | Stop reason: HOSPADM

## 2023-01-01 RX ORDER — HYDRALAZINE HYDROCHLORIDE 20 MG/ML
10 INJECTION INTRAMUSCULAR; INTRAVENOUS EVERY 4 HOURS PRN
Status: DISCONTINUED | OUTPATIENT
Start: 2023-01-01 | End: 2023-01-01 | Stop reason: HOSPADM

## 2023-01-01 RX ORDER — ACETAMINOPHEN 650 MG/1
650 SUPPOSITORY RECTAL EVERY 6 HOURS PRN
Status: DISCONTINUED | OUTPATIENT
Start: 2023-01-01 | End: 2023-01-01 | Stop reason: HOSPADM

## 2023-01-01 RX ORDER — LIDOCAINE/PRILOCAINE 2.5 %-2.5%
CREAM (GRAM) TOPICAL PRN
Qty: 30 G | Refills: 1 | Status: SHIPPED | OUTPATIENT
Start: 2023-01-01 | End: 2023-01-01

## 2023-01-01 RX ORDER — CLOPIDOGREL BISULFATE 75 MG/1
75 TABLET ORAL DAILY
Qty: 30 TABLET | Refills: 0 | Status: SHIPPED | OUTPATIENT
Start: 2023-01-01

## 2023-01-01 RX ORDER — PHENYLEPHRINE HCL IN 0.9% NACL 50MG/250ML
.1-6 PLASTIC BAG, INJECTION (ML) INTRAVENOUS CONTINUOUS
Status: CANCELLED | OUTPATIENT
Start: 2023-01-01

## 2023-01-01 RX ORDER — HEPARIN SODIUM,PORCINE 10 UNIT/ML
5-20 VIAL (ML) INTRAVENOUS DAILY PRN
Status: DISCONTINUED | OUTPATIENT
Start: 2023-01-01 | End: 2023-01-01 | Stop reason: HOSPADM

## 2023-01-01 RX ORDER — LORAZEPAM 0.5 MG/1
.5-1 TABLET ORAL DAILY PRN
Qty: 60 TABLET | Refills: 0 | Status: SHIPPED | OUTPATIENT
Start: 2023-01-01 | End: 2023-01-01

## 2023-01-01 RX ORDER — HEPARIN SODIUM (PORCINE) LOCK FLUSH IV SOLN 100 UNIT/ML 100 UNIT/ML
500 SOLUTION INTRAVENOUS ONCE
Status: COMPLETED | OUTPATIENT
Start: 2023-01-01 | End: 2023-01-01

## 2023-01-01 RX ORDER — PROPOFOL 10 MG/ML
INJECTION, EMULSION INTRAVENOUS PRN
Status: DISCONTINUED | OUTPATIENT
Start: 2023-01-01 | End: 2023-01-01

## 2023-01-01 RX ORDER — ONDANSETRON 2 MG/ML
4 INJECTION INTRAMUSCULAR; INTRAVENOUS EVERY 6 HOURS PRN
Status: DISCONTINUED | OUTPATIENT
Start: 2023-01-01 | End: 2023-01-01 | Stop reason: HOSPADM

## 2023-01-01 RX ORDER — SODIUM CHLORIDE 9 MG/ML
75 INJECTION, SOLUTION INTRAVENOUS CONTINUOUS
Status: ACTIVE | OUTPATIENT
Start: 2023-01-01 | End: 2023-01-01

## 2023-01-01 RX ORDER — NALOXONE HYDROCHLORIDE 0.4 MG/ML
0.2 INJECTION, SOLUTION INTRAMUSCULAR; INTRAVENOUS; SUBCUTANEOUS
Status: CANCELLED | OUTPATIENT
Start: 2023-01-01

## 2023-01-01 RX ORDER — EPHEDRINE SULFATE 50 MG/ML
INJECTION, SOLUTION INTRAMUSCULAR; INTRAVENOUS; SUBCUTANEOUS PRN
Status: DISCONTINUED | OUTPATIENT
Start: 2023-01-01 | End: 2023-01-01

## 2023-01-01 RX ORDER — NALOXONE HYDROCHLORIDE 0.4 MG/ML
0.2 INJECTION, SOLUTION INTRAMUSCULAR; INTRAVENOUS; SUBCUTANEOUS
Status: ACTIVE | OUTPATIENT
Start: 2023-01-01 | End: 2023-01-01

## 2023-01-01 RX ORDER — ATORVASTATIN CALCIUM 40 MG/1
80 TABLET, FILM COATED ORAL DAILY
Status: DISCONTINUED | OUTPATIENT
Start: 2023-01-01 | End: 2023-01-01 | Stop reason: HOSPADM

## 2023-01-01 RX ORDER — LOPERAMIDE HCL 2 MG
2 CAPSULE ORAL 4 TIMES DAILY PRN
Status: DISCONTINUED | OUTPATIENT
Start: 2023-01-01 | End: 2023-01-01 | Stop reason: HOSPADM

## 2023-01-01 RX ORDER — NITROGLYCERIN 0.4 MG/1
0.4 TABLET SUBLINGUAL EVERY 5 MIN PRN
Status: DISCONTINUED | OUTPATIENT
Start: 2023-01-01 | End: 2023-01-01 | Stop reason: HOSPADM

## 2023-01-01 RX ORDER — ACETAMINOPHEN 325 MG/1
650 TABLET ORAL EVERY 4 HOURS PRN
Qty: 50 TABLET | Refills: 0 | Status: SHIPPED | OUTPATIENT
Start: 2023-01-01 | End: 2023-01-01

## 2023-01-01 RX ORDER — MIDAZOLAM HCL IN 0.9 % NACL/PF 1 MG/ML
1-8 PLASTIC BAG, INJECTION (ML) INTRAVENOUS CONTINUOUS
Status: DISCONTINUED | OUTPATIENT
Start: 2023-01-01 | End: 2023-01-01 | Stop reason: HOSPADM

## 2023-01-01 RX ORDER — FUROSEMIDE 10 MG/ML
20 INJECTION INTRAMUSCULAR; INTRAVENOUS ONCE
Status: COMPLETED | OUTPATIENT
Start: 2023-01-01 | End: 2023-01-01

## 2023-01-01 RX ORDER — MAGNESIUM SULFATE HEPTAHYDRATE 40 MG/ML
2 INJECTION, SOLUTION INTRAVENOUS ONCE
Status: DISCONTINUED | OUTPATIENT
Start: 2023-01-01 | End: 2023-01-01

## 2023-01-01 RX ORDER — ASPIRIN 325 MG
325 TABLET ORAL ONCE
Status: DISCONTINUED | OUTPATIENT
Start: 2023-01-01 | End: 2023-01-01 | Stop reason: HOSPADM

## 2023-01-01 RX ORDER — CEFAZOLIN SODIUM/WATER 2 G/20 ML
2 SYRINGE (ML) INTRAVENOUS SEE ADMIN INSTRUCTIONS
Status: DISCONTINUED | OUTPATIENT
Start: 2023-01-01 | End: 2023-01-01 | Stop reason: HOSPADM

## 2023-01-01 RX ORDER — DIPHENHYDRAMINE HCL 50 MG
50 CAPSULE ORAL ONCE
Qty: 1 CAPSULE | Refills: 0 | Status: DISCONTINUED | OUTPATIENT
Start: 2023-01-01 | End: 2023-01-01 | Stop reason: ALTCHOICE

## 2023-01-01 RX ORDER — ATENOLOL 100 MG/1
100 TABLET ORAL DAILY
Qty: 90 TABLET | Refills: 3 | Status: ON HOLD | OUTPATIENT
Start: 2023-01-01 | End: 2023-01-01

## 2023-01-01 RX ORDER — HYDROMORPHONE HYDROCHLORIDE 1 MG/ML
INJECTION, SOLUTION INTRAMUSCULAR; INTRAVENOUS; SUBCUTANEOUS
Status: COMPLETED
Start: 2023-01-01 | End: 2023-01-01

## 2023-01-01 RX ORDER — TRAZODONE HYDROCHLORIDE 50 MG/1
50 TABLET, FILM COATED ORAL AT BEDTIME
Qty: 90 TABLET | Refills: 3 | OUTPATIENT
Start: 2023-01-01

## 2023-01-01 RX ORDER — AMOXICILLIN 250 MG
1-2 CAPSULE ORAL 2 TIMES DAILY
Qty: 30 TABLET | Refills: 0 | Status: SHIPPED | OUTPATIENT
Start: 2023-01-01 | End: 2023-01-01

## 2023-01-01 RX ORDER — HEPARIN SODIUM 1000 [USP'U]/ML
INJECTION, SOLUTION INTRAVENOUS; SUBCUTANEOUS
Status: DISCONTINUED | OUTPATIENT
Start: 2023-01-01 | End: 2023-01-01 | Stop reason: HOSPADM

## 2023-01-01 RX ORDER — CEFAZOLIN SODIUM 2 G/100ML
2 INJECTION, SOLUTION INTRAVENOUS SEE ADMIN INSTRUCTIONS
Status: CANCELLED | OUTPATIENT
Start: 2023-01-01

## 2023-01-01 RX ORDER — ONDANSETRON 2 MG/ML
4 INJECTION INTRAMUSCULAR; INTRAVENOUS
Status: DISCONTINUED | OUTPATIENT
Start: 2023-01-01 | End: 2023-01-01 | Stop reason: HOSPADM

## 2023-01-01 RX ORDER — HYDROMORPHONE HYDROCHLORIDE 1 MG/ML
0.5 INJECTION, SOLUTION INTRAMUSCULAR; INTRAVENOUS; SUBCUTANEOUS ONCE
Status: COMPLETED | OUTPATIENT
Start: 2023-01-01 | End: 2023-01-01

## 2023-01-01 RX ORDER — IPRATROPIUM BROMIDE AND ALBUTEROL SULFATE 2.5; .5 MG/3ML; MG/3ML
3 SOLUTION RESPIRATORY (INHALATION) 4 TIMES DAILY PRN
Status: DISCONTINUED | OUTPATIENT
Start: 2023-01-01 | End: 2023-01-01 | Stop reason: HOSPADM

## 2023-01-01 RX ORDER — SERTRALINE HYDROCHLORIDE 100 MG/1
200 TABLET, FILM COATED ORAL DAILY
Qty: 180 TABLET | Refills: 1 | Status: SHIPPED | OUTPATIENT
Start: 2023-01-01

## 2023-01-01 RX ORDER — BUMETANIDE 0.25 MG/ML
2 INJECTION INTRAMUSCULAR; INTRAVENOUS EVERY 8 HOURS
Status: COMPLETED | OUTPATIENT
Start: 2023-01-01 | End: 2023-01-01

## 2023-01-01 RX ORDER — NITROGLYCERIN 0.4 MG/1
0.4 TABLET SUBLINGUAL ONCE
Status: DISCONTINUED | OUTPATIENT
Start: 2023-01-01 | End: 2023-01-01 | Stop reason: HOSPADM

## 2023-01-01 RX ORDER — ACETAMINOPHEN 500 MG
1000 TABLET ORAL EVERY 6 HOURS PRN
Status: DISCONTINUED | OUTPATIENT
Start: 2023-01-01 | End: 2023-01-01 | Stop reason: HOSPADM

## 2023-01-01 RX ORDER — MAGNESIUM SULFATE HEPTAHYDRATE 40 MG/ML
2 INJECTION, SOLUTION INTRAVENOUS EVERY 8 HOURS PRN
Status: DISCONTINUED | OUTPATIENT
Start: 2023-01-01 | End: 2023-01-01 | Stop reason: HOSPADM

## 2023-01-01 RX ORDER — ATROPINE SULFATE 0.1 MG/ML
0.5 INJECTION INTRAVENOUS
Status: ACTIVE | OUTPATIENT
Start: 2023-01-01 | End: 2023-01-01

## 2023-01-01 RX ORDER — AMIODARONE HYDROCHLORIDE 200 MG/1
400 TABLET ORAL 2 TIMES DAILY
Status: DISCONTINUED | OUTPATIENT
Start: 2023-01-01 | End: 2023-01-01 | Stop reason: HOSPADM

## 2023-01-01 RX ORDER — ONDANSETRON 2 MG/ML
4 INJECTION INTRAMUSCULAR; INTRAVENOUS EVERY 6 HOURS PRN
Status: CANCELLED | OUTPATIENT
Start: 2023-01-01

## 2023-01-01 RX ORDER — HYDRALAZINE HYDROCHLORIDE 20 MG/ML
10 INJECTION INTRAMUSCULAR; INTRAVENOUS
Status: DISCONTINUED | OUTPATIENT
Start: 2023-01-01 | End: 2023-01-01

## 2023-01-01 RX ORDER — BUMETANIDE 0.25 MG/ML
2 INJECTION INTRAMUSCULAR; INTRAVENOUS ONCE
Qty: 8 ML | Refills: 0 | Status: COMPLETED | OUTPATIENT
Start: 2023-01-01 | End: 2023-01-01

## 2023-01-01 RX ORDER — MIDAZOLAM HCL IN 0.9 % NACL/PF 1 MG/ML
1-8 PLASTIC BAG, INJECTION (ML) INTRAVENOUS CONTINUOUS
Status: CANCELLED | OUTPATIENT
Start: 2023-01-01

## 2023-01-01 RX ORDER — MORPHINE SULFATE 2 MG/ML
1 INJECTION, SOLUTION INTRAMUSCULAR; INTRAVENOUS ONCE
Status: COMPLETED | OUTPATIENT
Start: 2023-01-01 | End: 2023-01-01

## 2023-01-01 RX ORDER — MAGNESIUM SULFATE HEPTAHYDRATE 40 MG/ML
2 INJECTION, SOLUTION INTRAVENOUS EVERY 8 HOURS PRN
Status: CANCELLED | OUTPATIENT
Start: 2023-01-01

## 2023-01-01 RX ORDER — GADOBUTROL 604.72 MG/ML
15 INJECTION INTRAVENOUS ONCE
Status: COMPLETED | OUTPATIENT
Start: 2023-01-01 | End: 2023-01-01

## 2023-01-01 RX ORDER — SERTRALINE HYDROCHLORIDE 100 MG/1
200 TABLET, FILM COATED ORAL DAILY
Status: CANCELLED | OUTPATIENT
Start: 2023-01-01

## 2023-01-01 RX ORDER — GADOBUTROL 604.72 MG/ML
7.5 INJECTION INTRAVENOUS ONCE
Status: COMPLETED | OUTPATIENT
Start: 2023-01-01 | End: 2023-01-01

## 2023-01-01 RX ORDER — ENOXAPARIN SODIUM 100 MG/ML
1 INJECTION SUBCUTANEOUS EVERY 12 HOURS
Status: DISCONTINUED | OUTPATIENT
Start: 2023-01-01 | End: 2023-01-01

## 2023-01-01 RX ORDER — ONDANSETRON 2 MG/ML
INJECTION INTRAMUSCULAR; INTRAVENOUS PRN
Status: DISCONTINUED | OUTPATIENT
Start: 2023-01-01 | End: 2023-01-01

## 2023-01-01 RX ORDER — ALBUMIN (HUMAN) 12.5 G/50ML
50 SOLUTION INTRAVENOUS ONCE
Qty: 200 ML | Refills: 0 | Status: COMPLETED | OUTPATIENT
Start: 2023-01-01 | End: 2023-01-01

## 2023-01-01 RX ORDER — TRAZODONE HYDROCHLORIDE 50 MG/1
50 TABLET, FILM COATED ORAL AT BEDTIME
Status: CANCELLED | OUTPATIENT
Start: 2023-01-01

## 2023-01-01 RX ORDER — ACETAMINOPHEN 325 MG/1
650 TABLET ORAL EVERY 6 HOURS PRN
Status: DISCONTINUED | OUTPATIENT
Start: 2023-01-01 | End: 2023-01-01 | Stop reason: HOSPADM

## 2023-01-01 RX ORDER — CEFAZOLIN SODIUM/WATER 2 G/20 ML
2 SYRINGE (ML) INTRAVENOUS
Status: COMPLETED | OUTPATIENT
Start: 2023-01-01 | End: 2023-01-01

## 2023-01-01 RX ORDER — DOBUTAMINE HYDROCHLORIDE 200 MG/100ML
2.5 INJECTION INTRAVENOUS CONTINUOUS
Status: DISCONTINUED | OUTPATIENT
Start: 2023-01-01 | End: 2023-01-01 | Stop reason: HOSPADM

## 2023-01-01 RX ORDER — LISINOPRIL 20 MG/1
20 TABLET ORAL DAILY
Qty: 90 TABLET | Refills: 3 | Status: SHIPPED | OUTPATIENT
Start: 2023-01-01

## 2023-01-01 RX ADMIN — MIDAZOLAM HYDROCHLORIDE 0.5 MG: 1 INJECTION, SOLUTION INTRAMUSCULAR; INTRAVENOUS at 13:35

## 2023-01-01 RX ADMIN — ALBUMIN HUMAN 25 G: 0.05 INJECTION, SOLUTION INTRAVENOUS at 13:12

## 2023-01-01 RX ADMIN — PACLITAXEL 85 MG: 6 INJECTION, SOLUTION, CONCENTRATE INTRAVENOUS at 13:33

## 2023-01-01 RX ADMIN — LORAZEPAM 1 MG: 0.5 TABLET ORAL at 00:10

## 2023-01-01 RX ADMIN — NOREPINEPHRINE BITARTRATE 0.6 MCG/KG/MIN: 1 INJECTION, SOLUTION, CONCENTRATE INTRAVENOUS at 03:58

## 2023-01-01 RX ADMIN — LORAZEPAM 1 MG: 0.5 TABLET ORAL at 22:07

## 2023-01-01 RX ADMIN — SERTRALINE HYDROCHLORIDE 200 MG: 100 TABLET ORAL at 09:32

## 2023-01-01 RX ADMIN — CALCIUM CHLORIDE INJECTION 1 G: 100 INJECTION, SOLUTION INTRAVENOUS at 06:40

## 2023-01-01 RX ADMIN — SODIUM BICARBONATE 50 MEQ: 84 INJECTION, SOLUTION INTRAVENOUS at 04:13

## 2023-01-01 RX ADMIN — POTASSIUM CHLORIDE 20 MEQ: 1.5 SOLUTION ORAL at 08:23

## 2023-01-01 RX ADMIN — Medication 0.03 MCG/KG/MIN: at 22:24

## 2023-01-01 RX ADMIN — ATORVASTATIN CALCIUM 80 MG: 40 TABLET, FILM COATED ORAL at 09:20

## 2023-01-01 RX ADMIN — Medication 5 MG: at 11:45

## 2023-01-01 RX ADMIN — FUROSEMIDE 20 MG: 10 INJECTION, SOLUTION INTRAMUSCULAR; INTRAVENOUS at 11:40

## 2023-01-01 RX ADMIN — Medication 81 MG: at 08:21

## 2023-01-01 RX ADMIN — TRAZODONE HYDROCHLORIDE 50 MG: 50 TABLET ORAL at 22:07

## 2023-01-01 RX ADMIN — ATORVASTATIN CALCIUM 80 MG: 40 TABLET, FILM COATED ORAL at 08:21

## 2023-01-01 RX ADMIN — POTASSIUM CHLORIDE 12.5 ML/KG/HR: 2 INJECTION, SOLUTION, CONCENTRATE INTRAVENOUS at 00:02

## 2023-01-01 RX ADMIN — SODIUM BICARBONATE 100 MEQ: 84 INJECTION, SOLUTION INTRAVENOUS at 03:58

## 2023-01-01 RX ADMIN — CLOPIDOGREL BISULFATE 75 MG: 75 TABLET ORAL at 08:22

## 2023-01-01 RX ADMIN — PACLITAXEL 85 MG: 6 INJECTION, SOLUTION, CONCENTRATE INTRAVENOUS at 13:08

## 2023-01-01 RX ADMIN — POTASSIUM CHLORIDE 12.5 ML/KG/HR: 2 INJECTION, SOLUTION, CONCENTRATE INTRAVENOUS at 05:36

## 2023-01-01 RX ADMIN — Medication 81 MG: at 08:45

## 2023-01-01 RX ADMIN — CLOPIDOGREL BISULFATE 75 MG: 75 TABLET ORAL at 08:04

## 2023-01-01 RX ADMIN — ALBUMIN HUMAN 50 G: 0.25 SOLUTION INTRAVENOUS at 16:05

## 2023-01-01 RX ADMIN — EPINEPHRINE 1 MG: 0.1 INJECTION INTRACARDIAC; INTRAVENOUS at 02:00

## 2023-01-01 RX ADMIN — SODIUM BICARBONATE 50 MEQ: 84 INJECTION, SOLUTION INTRAVENOUS at 04:30

## 2023-01-01 RX ADMIN — ENOXAPARIN SODIUM 70 MG: 80 INJECTION SUBCUTANEOUS at 22:51

## 2023-01-01 RX ADMIN — FENTANYL CITRATE 50 MCG: 50 INJECTION, SOLUTION INTRAMUSCULAR; INTRAVENOUS at 08:24

## 2023-01-01 RX ADMIN — AMIODARONE HYDROCHLORIDE 150 MG: 1.5 INJECTION, SOLUTION INTRAVENOUS at 09:45

## 2023-01-01 RX ADMIN — Medication 2 G: at 11:03

## 2023-01-01 RX ADMIN — POTASSIUM CHLORIDE 12.5 ML/KG/HR: 2 INJECTION, SOLUTION, CONCENTRATE INTRAVENOUS at 22:26

## 2023-01-01 RX ADMIN — POTASSIUM CHLORIDE 12.5 ML/KG/HR: 2 INJECTION, SOLUTION, CONCENTRATE INTRAVENOUS at 11:13

## 2023-01-01 RX ADMIN — METHYLPREDNISOLONE SODIUM SUCCINATE 62.5 MG: 125 INJECTION, POWDER, FOR SOLUTION INTRAMUSCULAR; INTRAVENOUS at 16:03

## 2023-01-01 RX ADMIN — Medication 0.1 MCG/KG/MIN: at 13:41

## 2023-01-01 RX ADMIN — ALBUMIN HUMAN 50 G: 0.25 SOLUTION INTRAVENOUS at 21:09

## 2023-01-01 RX ADMIN — NOREPINEPHRINE BITARTRATE 0.22 MCG/KG/MIN: 1 INJECTION, SOLUTION, CONCENTRATE INTRAVENOUS at 22:13

## 2023-01-01 RX ADMIN — ONDANSETRON 4 MG: 2 INJECTION INTRAMUSCULAR; INTRAVENOUS at 11:17

## 2023-01-01 RX ADMIN — ALBUMIN HUMAN: 0.25 SOLUTION INTRAVENOUS at 04:29

## 2023-01-01 RX ADMIN — DEXAMETHASONE SODIUM PHOSPHATE: 10 INJECTION, SOLUTION INTRAMUSCULAR; INTRAVENOUS at 10:33

## 2023-01-01 RX ADMIN — DEXAMETHASONE SODIUM PHOSPHATE: 10 INJECTION, SOLUTION INTRAMUSCULAR; INTRAVENOUS at 12:06

## 2023-01-01 RX ADMIN — HYDROMORPHONE HYDROCHLORIDE 0.5 MG: 1 INJECTION, SOLUTION INTRAMUSCULAR; INTRAVENOUS; SUBCUTANEOUS at 16:12

## 2023-01-01 RX ADMIN — HEPARIN 5 ML: 100 SYRINGE at 15:28

## 2023-01-01 RX ADMIN — LORAZEPAM 1 MG: 0.5 TABLET ORAL at 01:22

## 2023-01-01 RX ADMIN — POTASSIUM CHLORIDE 12.5 ML/KG/HR: 2 INJECTION, SOLUTION, CONCENTRATE INTRAVENOUS at 16:47

## 2023-01-01 RX ADMIN — EPINEPHRINE 1 MG: 0.1 INJECTION INTRACARDIAC; INTRAVENOUS at 04:43

## 2023-01-01 RX ADMIN — MORPHINE SULFATE 1 MG: 2 INJECTION, SOLUTION INTRAMUSCULAR; INTRAVENOUS at 11:45

## 2023-01-01 RX ADMIN — DOBUTAMINE HYDROCHLORIDE 2.5 MCG/KG/MIN: 200 INJECTION INTRAVENOUS at 21:21

## 2023-01-01 RX ADMIN — DEXAMETHASONE SODIUM PHOSPHATE: 10 INJECTION, SOLUTION INTRAMUSCULAR; INTRAVENOUS at 13:02

## 2023-01-01 RX ADMIN — IOPAMIDOL 75 ML: 755 INJECTION, SOLUTION INTRAVENOUS at 14:50

## 2023-01-01 RX ADMIN — NOREPINEPHRINE BITARTRATE 0.6 MCG/KG/MIN: 1 INJECTION, SOLUTION, CONCENTRATE INTRAVENOUS at 03:52

## 2023-01-01 RX ADMIN — CLOPIDOGREL BISULFATE 75 MG: 75 TABLET ORAL at 08:21

## 2023-01-01 RX ADMIN — ALBUMIN HUMAN: 0.25 SOLUTION INTRAVENOUS at 04:13

## 2023-01-01 RX ADMIN — IPRATROPIUM BROMIDE AND ALBUTEROL SULFATE 3 ML: .5; 3 SOLUTION RESPIRATORY (INHALATION) at 07:58

## 2023-01-01 RX ADMIN — SERTRALINE HYDROCHLORIDE 200 MG: 100 TABLET ORAL at 09:20

## 2023-01-01 RX ADMIN — ATORVASTATIN CALCIUM 80 MG: 40 TABLET, FILM COATED ORAL at 08:45

## 2023-01-01 RX ADMIN — EPINEPHRINE 1 MG: 0.1 INJECTION INTRACARDIAC; INTRAVENOUS at 04:15

## 2023-01-01 RX ADMIN — POTASSIUM CHLORIDE 20 MEQ: 1500 TABLET, EXTENDED RELEASE ORAL at 09:32

## 2023-01-01 RX ADMIN — POTASSIUM CHLORIDE 12.5 ML/KG/HR: 2 INJECTION, SOLUTION, CONCENTRATE INTRAVENOUS at 17:47

## 2023-01-01 RX ADMIN — SODIUM CHLORIDE 250 ML: 9 INJECTION, SOLUTION INTRAVENOUS at 12:24

## 2023-01-01 RX ADMIN — CLOPIDOGREL BISULFATE 300 MG: 75 TABLET ORAL at 16:09

## 2023-01-01 RX ADMIN — POTASSIUM CHLORIDE 20 MEQ: 1500 TABLET, EXTENDED RELEASE ORAL at 20:49

## 2023-01-01 RX ADMIN — SODIUM CHLORIDE 250 ML: 9 INJECTION, SOLUTION INTRAVENOUS at 12:25

## 2023-01-01 RX ADMIN — LISINOPRIL 2.5 MG: 2.5 TABLET ORAL at 08:46

## 2023-01-01 RX ADMIN — FAMOTIDINE 20 MG: 10 INJECTION INTRAVENOUS at 10:36

## 2023-01-01 RX ADMIN — POTASSIUM CHLORIDE 40 MEQ: 1.5 SOLUTION ORAL at 05:14

## 2023-01-01 RX ADMIN — ATORVASTATIN CALCIUM 80 MG: 40 TABLET, FILM COATED ORAL at 08:01

## 2023-01-01 RX ADMIN — NITROGLYCERIN 1 PATCH: 0.2 PATCH TRANSDERMAL at 17:53

## 2023-01-01 RX ADMIN — POTASSIUM CHLORIDE 12.5 ML/KG/HR: 2 INJECTION, SOLUTION, CONCENTRATE INTRAVENOUS at 00:04

## 2023-01-01 RX ADMIN — FUROSEMIDE 40 MG: 20 TABLET ORAL at 08:45

## 2023-01-01 RX ADMIN — DEXAMETHASONE SODIUM PHOSPHATE: 10 INJECTION, SOLUTION INTRAMUSCULAR; INTRAVENOUS at 11:14

## 2023-01-01 RX ADMIN — EPINEPHRINE 1 MG: 0.1 INJECTION INTRACARDIAC; INTRAVENOUS at 04:36

## 2023-01-01 RX ADMIN — ALBUMIN HUMAN 25 G: 0.25 SOLUTION INTRAVENOUS at 00:32

## 2023-01-01 RX ADMIN — MAGNESIUM SULFATE HEPTAHYDRATE 2 G: 40 INJECTION, SOLUTION INTRAVENOUS at 22:29

## 2023-01-01 RX ADMIN — POTASSIUM CHLORIDE 12.5 ML/KG/HR: 2 INJECTION, SOLUTION, CONCENTRATE INTRAVENOUS at 17:46

## 2023-01-01 RX ADMIN — LIDOCAINE HYDROCHLORIDE 10 ML: 10 INJECTION, SOLUTION EPIDURAL; INFILTRATION; INTRACAUDAL; PERINEURAL at 16:13

## 2023-01-01 RX ADMIN — SUGAMMADEX 200 MG: 100 INJECTION, SOLUTION INTRAVENOUS at 11:44

## 2023-01-01 RX ADMIN — DIPHENHYDRAMINE HYDROCHLORIDE 50 MG: 50 INJECTION, SOLUTION INTRAMUSCULAR; INTRAVENOUS at 12:26

## 2023-01-01 RX ADMIN — POTASSIUM CHLORIDE 12.5 ML/KG/HR: 2 INJECTION, SOLUTION, CONCENTRATE INTRAVENOUS at 16:49

## 2023-01-01 RX ADMIN — CALCIUM CHLORIDE INJECTION 1 G: 100 INJECTION, SOLUTION INTRAVENOUS at 02:00

## 2023-01-01 RX ADMIN — SODIUM CHLORIDE: 9 INJECTION, SOLUTION INTRAVENOUS at 12:44

## 2023-01-01 RX ADMIN — SERTRALINE HYDROCHLORIDE 200 MG: 100 TABLET ORAL at 08:05

## 2023-01-01 RX ADMIN — ALBUMIN (HUMAN) 25 G: 12.5 SOLUTION INTRAVENOUS at 02:10

## 2023-01-01 RX ADMIN — Medication 5 MG: at 11:25

## 2023-01-01 RX ADMIN — Medication 81 MG: at 08:22

## 2023-01-01 RX ADMIN — ATORVASTATIN CALCIUM 80 MG: 40 TABLET, FILM COATED ORAL at 08:20

## 2023-01-01 RX ADMIN — SODIUM CHLORIDE 250 ML: 9 INJECTION, SOLUTION INTRAVENOUS at 12:43

## 2023-01-01 RX ADMIN — GADOBUTROL 15 ML: 604.72 INJECTION INTRAVENOUS at 11:20

## 2023-01-01 RX ADMIN — ALBUMIN HUMAN 25 G: 0.25 SOLUTION INTRAVENOUS at 02:15

## 2023-01-01 RX ADMIN — LIDOCAINE HYDROCHLORIDE 50 MG: 20 INJECTION, SOLUTION INFILTRATION; PERINEURAL at 11:09

## 2023-01-01 RX ADMIN — MIDAZOLAM HYDROCHLORIDE 0.5 MG: 1 INJECTION, SOLUTION INTRAMUSCULAR; INTRAVENOUS at 13:57

## 2023-01-01 RX ADMIN — HEPARIN SODIUM 900 UNITS/HR: 10000 INJECTION, SOLUTION INTRAVENOUS at 03:25

## 2023-01-01 RX ADMIN — MIDAZOLAM HYDROCHLORIDE 2 MG/HR: 1 INJECTION, SOLUTION INTRAVENOUS at 03:20

## 2023-01-01 RX ADMIN — DIPHENHYDRAMINE HYDROCHLORIDE 50 MG: 50 INJECTION, SOLUTION INTRAMUSCULAR; INTRAVENOUS at 12:48

## 2023-01-01 RX ADMIN — HEPARIN 5 ML: 100 SYRINGE at 13:38

## 2023-01-01 RX ADMIN — SERTRALINE HYDROCHLORIDE 200 MG: 100 TABLET ORAL at 08:01

## 2023-01-01 RX ADMIN — LORAZEPAM 1 MG: 0.5 TABLET ORAL at 22:28

## 2023-01-01 RX ADMIN — POTASSIUM CHLORIDE 12.5 ML/KG/HR: 2 INJECTION, SOLUTION, CONCENTRATE INTRAVENOUS at 11:10

## 2023-01-01 RX ADMIN — PACLITAXEL 85 MG: 6 INJECTION, SOLUTION, CONCENTRATE INTRAVENOUS at 11:44

## 2023-01-01 RX ADMIN — GLYCOPYRROLATE 0.1 MG: 0.2 INJECTION, SOLUTION INTRAMUSCULAR; INTRAVENOUS at 11:17

## 2023-01-01 RX ADMIN — POTASSIUM CHLORIDE 20 MEQ: 1500 TABLET, EXTENDED RELEASE ORAL at 07:54

## 2023-01-01 RX ADMIN — METOPROLOL SUCCINATE 25 MG: 25 TABLET, EXTENDED RELEASE ORAL at 09:09

## 2023-01-01 RX ADMIN — ALBUMIN (HUMAN) 25 G: 12.5 SOLUTION INTRAVENOUS at 02:20

## 2023-01-01 RX ADMIN — OXYCODONE HYDROCHLORIDE 5 MG: 5 TABLET ORAL at 02:00

## 2023-01-01 RX ADMIN — FENTANYL CITRATE 50 MCG: 50 INJECTION, SOLUTION INTRAMUSCULAR; INTRAVENOUS at 13:20

## 2023-01-01 RX ADMIN — HEPARIN SODIUM 1050 UNITS/HR: 10000 INJECTION, SOLUTION INTRAVENOUS at 01:09

## 2023-01-01 RX ADMIN — SERTRALINE HYDROCHLORIDE 200 MG: 100 TABLET ORAL at 07:43

## 2023-01-01 RX ADMIN — NOREPINEPHRINE BITARTRATE 0.25 MCG/KG/MIN: 1 INJECTION, SOLUTION, CONCENTRATE INTRAVENOUS at 17:35

## 2023-01-01 RX ADMIN — FENTANYL CITRATE 25 MCG: 50 INJECTION, SOLUTION INTRAMUSCULAR; INTRAVENOUS at 13:56

## 2023-01-01 RX ADMIN — BUMETANIDE 1 MG: 1 TABLET ORAL at 08:20

## 2023-01-01 RX ADMIN — ALBUMIN HUMAN 25 G: 0.25 SOLUTION INTRAVENOUS at 02:20

## 2023-01-01 RX ADMIN — FAMOTIDINE 20 MG: 10 INJECTION INTRAVENOUS at 13:00

## 2023-01-01 RX ADMIN — HEPARIN 5 ML: 100 SYRINGE at 14:14

## 2023-01-01 RX ADMIN — OXYCODONE HYDROCHLORIDE 10 MG: 5 TABLET ORAL at 07:37

## 2023-01-01 RX ADMIN — TRAZODONE HYDROCHLORIDE 50 MG: 50 TABLET ORAL at 20:49

## 2023-01-01 RX ADMIN — ONDANSETRON 4 MG: 2 INJECTION INTRAMUSCULAR; INTRAVENOUS at 01:51

## 2023-01-01 RX ADMIN — PERFLUTREN 2.5 ML: 6.52 INJECTION, SUSPENSION INTRAVENOUS at 09:18

## 2023-01-01 RX ADMIN — SODIUM CHLORIDE 500 ML: 9 INJECTION, SOLUTION INTRAVENOUS at 22:16

## 2023-01-01 RX ADMIN — CALCIUM CHLORIDE INJECTION 1 G: 100 INJECTION, SOLUTION INTRAVENOUS at 02:03

## 2023-01-01 RX ADMIN — FUROSEMIDE 40 MG: 10 INJECTION, SOLUTION INTRAMUSCULAR; INTRAVENOUS at 17:47

## 2023-01-01 RX ADMIN — EPINEPHRINE 1 MG: 0.1 INJECTION INTRACARDIAC; INTRAVENOUS at 04:30

## 2023-01-01 RX ADMIN — PIPERACILLIN AND TAZOBACTAM 3.38 G: 3; .375 INJECTION, POWDER, FOR SOLUTION INTRAVENOUS at 21:03

## 2023-01-01 RX ADMIN — SERTRALINE HYDROCHLORIDE 200 MG: 100 TABLET ORAL at 08:20

## 2023-01-01 RX ADMIN — LORAZEPAM 1 MG: 0.5 TABLET ORAL at 00:24

## 2023-01-01 RX ADMIN — SERTRALINE HYDROCHLORIDE 200 MG: 100 TABLET ORAL at 08:46

## 2023-01-01 RX ADMIN — CLOPIDOGREL BISULFATE 75 MG: 75 TABLET ORAL at 08:02

## 2023-01-01 RX ADMIN — CALCIUM CHLORIDE INJECTION 1 G: 100 INJECTION, SOLUTION INTRAVENOUS at 02:04

## 2023-01-01 RX ADMIN — HEPARIN SODIUM 1050 UNITS/HR: 10000 INJECTION, SOLUTION INTRAVENOUS at 23:15

## 2023-01-01 RX ADMIN — ALBUMIN HUMAN 25 G: 0.25 SOLUTION INTRAVENOUS at 02:10

## 2023-01-01 RX ADMIN — OXYCODONE HYDROCHLORIDE 5 MG: 5 TABLET ORAL at 14:13

## 2023-01-01 RX ADMIN — AMIODARONE HYDROCHLORIDE 400 MG: 200 TABLET ORAL at 09:16

## 2023-01-01 RX ADMIN — OXYCODONE HYDROCHLORIDE 5 MG: 5 TABLET ORAL at 06:18

## 2023-01-01 RX ADMIN — ATORVASTATIN CALCIUM 80 MG: 40 TABLET, FILM COATED ORAL at 08:46

## 2023-01-01 RX ADMIN — PHENYLEPHRINE HYDROCHLORIDE 50 MCG: 10 INJECTION INTRAVENOUS at 11:24

## 2023-01-01 RX ADMIN — METOPROLOL SUCCINATE 25 MG: 25 TABLET, EXTENDED RELEASE ORAL at 09:21

## 2023-01-01 RX ADMIN — OXYCODONE HYDROCHLORIDE 5 MG: 5 TABLET ORAL at 20:56

## 2023-01-01 RX ADMIN — SERTRALINE HYDROCHLORIDE 200 MG: 100 TABLET ORAL at 08:21

## 2023-01-01 RX ADMIN — EPINEPHRINE 1 MG: 0.1 INJECTION INTRACARDIAC; INTRAVENOUS at 04:20

## 2023-01-01 RX ADMIN — POTASSIUM CHLORIDE 20 MEQ: 1500 TABLET, EXTENDED RELEASE ORAL at 08:45

## 2023-01-01 RX ADMIN — DOBUTAMINE HYDROCHLORIDE 2.5 MCG/KG/MIN: 200 INJECTION INTRAVENOUS at 20:05

## 2023-01-01 RX ADMIN — LIDOCAINE HYDROCHLORIDE 5 ML: 10 INJECTION, SOLUTION INFILTRATION; PERINEURAL at 13:23

## 2023-01-01 RX ADMIN — NITROGLYCERIN 0.4 MG: 0.4 TABLET SUBLINGUAL at 17:49

## 2023-01-01 RX ADMIN — IOPAMIDOL 83 ML: 755 INJECTION, SOLUTION INTRAVENOUS at 14:11

## 2023-01-01 RX ADMIN — SERTRALINE HYDROCHLORIDE 200 MG: 100 TABLET ORAL at 08:45

## 2023-01-01 RX ADMIN — SODIUM CHLORIDE 250 ML: 9 INJECTION, SOLUTION INTRAVENOUS at 10:20

## 2023-01-01 RX ADMIN — APIXABAN 5 MG: 5 TABLET, FILM COATED ORAL at 20:56

## 2023-01-01 RX ADMIN — LOPERAMIDE HYDROCHLORIDE 2 MG: 2 CAPSULE ORAL at 15:20

## 2023-01-01 RX ADMIN — Medication 12.5 MG: at 21:27

## 2023-01-01 RX ADMIN — CALCIUM GLUCONATE 2 G: 20 INJECTION, SOLUTION INTRAVENOUS at 21:52

## 2023-01-01 RX ADMIN — DOCUSATE SODIUM 100 MG: 100 CAPSULE, LIQUID FILLED ORAL at 01:40

## 2023-01-01 RX ADMIN — EPINEPHRINE 1 MG: 0.1 INJECTION INTRACARDIAC; INTRAVENOUS at 02:03

## 2023-01-01 RX ADMIN — CLOPIDOGREL BISULFATE 300 MG: 75 TABLET ORAL at 17:50

## 2023-01-01 RX ADMIN — INFLUENZA A VIRUS A/VICTORIA/4897/2022 IVR-238 (H1N1) ANTIGEN (FORMALDEHYDE INACTIVATED), INFLUENZA A VIRUS A/DARWIN/9/2021 SAN-010 (H3N2) ANTIGEN (FORMALDEHYDE INACTIVATED), INFLUENZA B VIRUS B/PHUKET/3073/2013 ANTIGEN (FORMALDEHYDE INACTIVATED), AND INFLUENZA B VIRUS B/MICHIGAN/01/2021 ANTIGEN (FORMALDEHYDE INACTIVATED) 0.7 ML: 60; 60; 60; 60 INJECTION, SUSPENSION INTRAMUSCULAR at 15:25

## 2023-01-01 RX ADMIN — CARBOPLATIN 150 MG: 10 INJECTION, SOLUTION INTRAVENOUS at 14:40

## 2023-01-01 RX ADMIN — FAMOTIDINE 20 MG: 10 INJECTION INTRAVENOUS at 12:24

## 2023-01-01 RX ADMIN — TRAZODONE HYDROCHLORIDE 50 MG: 50 TABLET ORAL at 21:04

## 2023-01-01 RX ADMIN — LORAZEPAM 1 MG: 0.5 TABLET ORAL at 08:53

## 2023-01-01 RX ADMIN — OXYCODONE HYDROCHLORIDE 5 MG: 5 TABLET ORAL at 12:36

## 2023-01-01 RX ADMIN — POTASSIUM CHLORIDE 40 MEQ: 1.5 SOLUTION ORAL at 15:44

## 2023-01-01 RX ADMIN — TRAZODONE HYDROCHLORIDE 50 MG: 50 TABLET ORAL at 21:21

## 2023-01-01 RX ADMIN — TRAZODONE HYDROCHLORIDE 50 MG: 50 TABLET ORAL at 23:10

## 2023-01-01 RX ADMIN — CEFAZOLIN SODIUM 2 G: 2 INJECTION, SOLUTION INTRAVENOUS at 12:45

## 2023-01-01 RX ADMIN — TRAZODONE HYDROCHLORIDE 50 MG: 50 TABLET ORAL at 23:25

## 2023-01-01 RX ADMIN — TRAZODONE HYDROCHLORIDE 50 MG: 50 TABLET ORAL at 21:27

## 2023-01-01 RX ADMIN — PERFLUTREN 2 ML: 6.52 INJECTION, SUSPENSION INTRAVENOUS at 08:40

## 2023-01-01 RX ADMIN — PACLITAXEL 85 MG: 6 INJECTION, SOLUTION, CONCENTRATE INTRAVENOUS at 12:48

## 2023-01-01 RX ADMIN — SODIUM CHLORIDE 250 ML: 9 INJECTION, SOLUTION INTRAVENOUS at 10:30

## 2023-01-01 RX ADMIN — ALLOPURINOL 300 MG: 300 TABLET ORAL at 08:21

## 2023-01-01 RX ADMIN — LORAZEPAM 0.5 MG: 0.5 TABLET ORAL at 23:40

## 2023-01-01 RX ADMIN — METHYLPREDNISOLONE SODIUM SUCCINATE 62.5 MG: 125 INJECTION, POWDER, FOR SOLUTION INTRAMUSCULAR; INTRAVENOUS at 21:04

## 2023-01-01 RX ADMIN — LISINOPRIL 2.5 MG: 2.5 TABLET ORAL at 10:00

## 2023-01-01 RX ADMIN — ACETAMINOPHEN 650 MG: 325 TABLET ORAL at 20:44

## 2023-01-01 RX ADMIN — FUROSEMIDE 40 MG: 10 INJECTION, SOLUTION INTRAMUSCULAR; INTRAVENOUS at 08:24

## 2023-01-01 RX ADMIN — Medication 81 MG: at 08:23

## 2023-01-01 RX ADMIN — ALBUMIN (HUMAN) 25 G: 12.5 SOLUTION INTRAVENOUS at 02:15

## 2023-01-01 RX ADMIN — ALBUMIN HUMAN 25 G: 0.25 SOLUTION INTRAVENOUS at 02:12

## 2023-01-01 RX ADMIN — TRAZODONE HYDROCHLORIDE 50 MG: 50 TABLET ORAL at 22:50

## 2023-01-01 RX ADMIN — FENTANYL CITRATE 100 MCG: 50 INJECTION INTRAMUSCULAR; INTRAVENOUS at 02:05

## 2023-01-01 RX ADMIN — FUROSEMIDE 60 MG: 10 INJECTION, SOLUTION INTRAMUSCULAR; INTRAVENOUS at 00:12

## 2023-01-01 RX ADMIN — SODIUM BICARBONATE 50 MEQ: 84 INJECTION, SOLUTION INTRAVENOUS at 00:32

## 2023-01-01 RX ADMIN — INSULIN ASPART 1 UNITS: 100 INJECTION, SOLUTION INTRAVENOUS; SUBCUTANEOUS at 23:22

## 2023-01-01 RX ADMIN — METOPROLOL SUCCINATE 25 MG: 25 TABLET, EXTENDED RELEASE ORAL at 05:44

## 2023-01-01 RX ADMIN — AMIODARONE HYDROCHLORIDE 1 MG/MIN: 1.8 INJECTION, SOLUTION INTRAVENOUS at 09:49

## 2023-01-01 RX ADMIN — MAGNESIUM SULFATE HEPTAHYDRATE 2 G: 40 INJECTION, SOLUTION INTRAVENOUS at 04:20

## 2023-01-01 RX ADMIN — EPINEPHRINE 1 MG: 0.1 INJECTION INTRACARDIAC; INTRAVENOUS at 04:13

## 2023-01-01 RX ADMIN — APIXABAN 5 MG: 5 TABLET, FILM COATED ORAL at 21:03

## 2023-01-01 RX ADMIN — PHENYLEPHRINE HYDROCHLORIDE 100 MCG: 10 INJECTION INTRAVENOUS at 11:28

## 2023-01-01 RX ADMIN — TRAMADOL HYDROCHLORIDE 50 MG: 50 TABLET, COATED ORAL at 23:11

## 2023-01-01 RX ADMIN — DEXAMETHASONE SODIUM PHOSPHATE: 10 INJECTION, SOLUTION INTRAMUSCULAR; INTRAVENOUS at 12:39

## 2023-01-01 RX ADMIN — CALCIUM CHLORIDE INJECTION 1 G: 100 INJECTION, SOLUTION INTRAVENOUS at 04:15

## 2023-01-01 RX ADMIN — POTASSIUM CHLORIDE 200 ML/HR: 2 INJECTION, SOLUTION, CONCENTRATE INTRAVENOUS at 19:45

## 2023-01-01 RX ADMIN — ROCURONIUM BROMIDE 40 MG: 50 INJECTION, SOLUTION INTRAVENOUS at 11:09

## 2023-01-01 RX ADMIN — Medication 0.5 MCG/KG/MIN: at 01:27

## 2023-01-01 RX ADMIN — DIPHENHYDRAMINE HYDROCHLORIDE 50 MG: 50 INJECTION, SOLUTION INTRAMUSCULAR; INTRAVENOUS at 10:46

## 2023-01-01 RX ADMIN — CARBOPLATIN 140 MG: 10 INJECTION, SOLUTION INTRAVENOUS at 13:38

## 2023-01-01 RX ADMIN — Medication 12.5 MG: at 08:45

## 2023-01-01 RX ADMIN — PACLITAXEL 85 MG: 6 INJECTION, SOLUTION, CONCENTRATE INTRAVENOUS at 12:08

## 2023-01-01 RX ADMIN — EPINEPHRINE 1 MG: 0.1 INJECTION INTRACARDIAC; INTRAVENOUS at 04:40

## 2023-01-01 RX ADMIN — AMIODARONE HYDROCHLORIDE 400 MG: 200 TABLET ORAL at 21:03

## 2023-01-01 RX ADMIN — CLOPIDOGREL BISULFATE 75 MG: 75 TABLET ORAL at 07:44

## 2023-01-01 RX ADMIN — HEPARIN SODIUM 2 BAG: 200 INJECTION, SOLUTION INTRAVENOUS at 14:07

## 2023-01-01 RX ADMIN — DOPAMINE HYDROCHLORIDE 2 MCG/KG/MIN: 160 INJECTION, SOLUTION INTRAVENOUS at 02:15

## 2023-01-01 RX ADMIN — MAGNESIUM SULFATE HEPTAHYDRATE 4 G: 80 INJECTION, SOLUTION INTRAVENOUS at 22:04

## 2023-01-01 RX ADMIN — DIPHENHYDRAMINE HYDROCHLORIDE 50 MG: 50 INJECTION, SOLUTION INTRAMUSCULAR; INTRAVENOUS at 13:05

## 2023-01-01 RX ADMIN — ISOSORBIDE MONONITRATE 30 MG: 30 TABLET, EXTENDED RELEASE ORAL at 09:20

## 2023-01-01 RX ADMIN — DIPHENHYDRAMINE HYDROCHLORIDE 50 MG: 50 INJECTION, SOLUTION INTRAMUSCULAR; INTRAVENOUS at 10:51

## 2023-01-01 RX ADMIN — Medication 0.18 MCG/KG/MIN: at 20:02

## 2023-01-01 RX ADMIN — ISOSORBIDE MONONITRATE 30 MG: 30 TABLET, EXTENDED RELEASE ORAL at 09:32

## 2023-01-01 RX ADMIN — NITROGLYCERIN 0.4 MG: 0.4 TABLET SUBLINGUAL at 14:45

## 2023-01-01 RX ADMIN — Medication 81 MG: at 08:02

## 2023-01-01 RX ADMIN — Medication 5 MG: at 11:28

## 2023-01-01 RX ADMIN — ACETAMINOPHEN 650 MG: 325 TABLET ORAL at 04:05

## 2023-01-01 RX ADMIN — TRAZODONE HYDROCHLORIDE 50 MG: 50 TABLET ORAL at 21:03

## 2023-01-01 RX ADMIN — AMIODARONE HYDROCHLORIDE 0.5 MG/MIN: 1.8 INJECTION, SOLUTION INTRAVENOUS at 04:06

## 2023-01-01 RX ADMIN — LIDOCAINE HYDROCHLORIDE 2 ML: 10 INJECTION, SOLUTION EPIDURAL; INFILTRATION; INTRACAUDAL; PERINEURAL at 21:15

## 2023-01-01 RX ADMIN — SODIUM BICARBONATE 50 MEQ: 84 INJECTION, SOLUTION INTRAVENOUS at 04:14

## 2023-01-01 RX ADMIN — VASOPRESSIN 2.4 UNITS/HR: 20 INJECTION, SOLUTION INTRAMUSCULAR; SUBCUTANEOUS at 18:12

## 2023-01-01 RX ADMIN — CLOPIDOGREL BISULFATE 75 MG: 75 TABLET ORAL at 09:31

## 2023-01-01 RX ADMIN — ATORVASTATIN CALCIUM 80 MG: 40 TABLET, FILM COATED ORAL at 09:31

## 2023-01-01 RX ADMIN — SODIUM BICARBONATE 50 MEQ: 84 INJECTION, SOLUTION INTRAVENOUS at 04:35

## 2023-01-01 RX ADMIN — ALLOPURINOL 300 MG: 300 TABLET ORAL at 08:20

## 2023-01-01 RX ADMIN — Medication 12.5 MG: at 08:40

## 2023-01-01 RX ADMIN — PACLITAXEL 85 MG: 6 INJECTION, SOLUTION, CONCENTRATE INTRAVENOUS at 11:43

## 2023-01-01 RX ADMIN — DEXAMETHASONE SODIUM PHOSPHATE: 10 INJECTION, SOLUTION INTRAMUSCULAR; INTRAVENOUS at 12:43

## 2023-01-01 RX ADMIN — HEPARIN 5 ML: 100 SYRINGE at 14:40

## 2023-01-01 RX ADMIN — Medication 81 MG: at 07:44

## 2023-01-01 RX ADMIN — ISOSORBIDE MONONITRATE 30 MG: 30 TABLET, EXTENDED RELEASE ORAL at 08:45

## 2023-01-01 RX ADMIN — AMIODARONE HYDROCHLORIDE 0.5 MG/MIN: 1.8 INJECTION, SOLUTION INTRAVENOUS at 16:17

## 2023-01-01 RX ADMIN — APIXABAN 5 MG: 5 TABLET, FILM COATED ORAL at 08:04

## 2023-01-01 RX ADMIN — ISOSORBIDE MONONITRATE 30 MG: 30 TABLET, EXTENDED RELEASE ORAL at 09:51

## 2023-01-01 RX ADMIN — ALLOPURINOL 300 MG: 300 TABLET ORAL at 07:43

## 2023-01-01 RX ADMIN — HEPARIN 5 ML: 100 SYRINGE at 15:15

## 2023-01-01 RX ADMIN — ANGIOTENSIN II 80 NG/KG/MIN: 2.5 INJECTION INTRAVENOUS at 02:43

## 2023-01-01 RX ADMIN — Medication 12.5 MG: at 20:49

## 2023-01-01 RX ADMIN — ALLOPURINOL 300 MG: 300 TABLET ORAL at 08:45

## 2023-01-01 RX ADMIN — SODIUM BICARBONATE 50 MEQ: 84 INJECTION, SOLUTION INTRAVENOUS at 02:04

## 2023-01-01 RX ADMIN — POTASSIUM CHLORIDE 12.5 ML/KG/HR: 2 INJECTION, SOLUTION, CONCENTRATE INTRAVENOUS at 22:25

## 2023-01-01 RX ADMIN — SODIUM BICARBONATE 50 MEQ: 84 INJECTION, SOLUTION INTRAVENOUS at 04:18

## 2023-01-01 RX ADMIN — HEPARIN 500 UNITS: 100 SYRINGE at 14:14

## 2023-01-01 RX ADMIN — OXYCODONE HYDROCHLORIDE 5 MG: 5 TABLET ORAL at 20:25

## 2023-01-01 RX ADMIN — TRAZODONE HYDROCHLORIDE 50 MG: 50 TABLET ORAL at 22:28

## 2023-01-01 RX ADMIN — ALBUMIN (HUMAN) 25 G: 12.5 SOLUTION INTRAVENOUS at 00:32

## 2023-01-01 RX ADMIN — FAMOTIDINE 20 MG: 10 INJECTION INTRAVENOUS at 12:43

## 2023-01-01 RX ADMIN — Medication 81 MG: at 09:32

## 2023-01-01 RX ADMIN — CLOPIDOGREL BISULFATE 75 MG: 75 TABLET ORAL at 09:21

## 2023-01-01 RX ADMIN — CARBOPLATIN 155 MG: 10 INJECTION, SOLUTION INTRAVENOUS at 13:37

## 2023-01-01 RX ADMIN — Medication 50 MEQ: at 00:32

## 2023-01-01 RX ADMIN — CARBOPLATIN 165 MG: 10 INJECTION, SOLUTION INTRAVENOUS at 14:19

## 2023-01-01 RX ADMIN — ALLOPURINOL 300 MG: 300 TABLET ORAL at 08:47

## 2023-01-01 RX ADMIN — METOPROLOL TARTRATE 12.5 MG: 25 TABLET, FILM COATED ORAL at 17:50

## 2023-01-01 RX ADMIN — APIXABAN 5 MG: 5 TABLET, FILM COATED ORAL at 09:11

## 2023-01-01 RX ADMIN — SODIUM CHLORIDE 1000 ML: 9 INJECTION, SOLUTION INTRAVENOUS at 09:41

## 2023-01-01 RX ADMIN — NITROGLYCERIN 0.4 MG: 0.4 TABLET SUBLINGUAL at 14:08

## 2023-01-01 RX ADMIN — SODIUM CHLORIDE, POTASSIUM CHLORIDE, SODIUM LACTATE AND CALCIUM CHLORIDE: 600; 310; 30; 20 INJECTION, SOLUTION INTRAVENOUS at 09:58

## 2023-01-01 RX ADMIN — FENTANYL CITRATE 50 MCG: 50 INJECTION, SOLUTION INTRAMUSCULAR; INTRAVENOUS at 03:01

## 2023-01-01 RX ADMIN — ALLOPURINOL 300 MG: 300 TABLET ORAL at 08:05

## 2023-01-01 RX ADMIN — DEXAMETHASONE SODIUM PHOSPHATE: 10 INJECTION, SOLUTION INTRAMUSCULAR; INTRAVENOUS at 11:00

## 2023-01-01 RX ADMIN — VASOPRESSIN 2.4 UNITS/HR: 20 INJECTION, SOLUTION INTRAMUSCULAR; SUBCUTANEOUS at 23:08

## 2023-01-01 RX ADMIN — MIDAZOLAM 2 MG: 1 INJECTION INTRAMUSCULAR; INTRAVENOUS at 02:10

## 2023-01-01 RX ADMIN — SERTRALINE HYDROCHLORIDE 200 MG: 100 TABLET ORAL at 08:24

## 2023-01-01 RX ADMIN — HEPARIN 5 ML: 100 SYRINGE at 19:10

## 2023-01-01 RX ADMIN — EPINEPHRINE 0.1 MCG/KG/MIN: 1 INJECTION INTRAMUSCULAR; INTRAVENOUS; SUBCUTANEOUS at 02:25

## 2023-01-01 RX ADMIN — GADOBUTROL 7.5 ML: 604.72 INJECTION INTRAVENOUS at 14:57

## 2023-01-01 RX ADMIN — GLYCOPYRROLATE 0.1 MG: 0.2 INJECTION, SOLUTION INTRAMUSCULAR; INTRAVENOUS at 11:21

## 2023-01-01 RX ADMIN — ATORVASTATIN CALCIUM 80 MG: 40 TABLET, FILM COATED ORAL at 08:03

## 2023-01-01 RX ADMIN — MIDAZOLAM HYDROCHLORIDE 2 MG: 1 INJECTION, SOLUTION INTRAMUSCULAR; INTRAVENOUS at 03:03

## 2023-01-01 RX ADMIN — LORAZEPAM 0.5 MG: 0.5 TABLET ORAL at 22:50

## 2023-01-01 RX ADMIN — BUMETANIDE 2 MG: 0.25 INJECTION INTRAMUSCULAR; INTRAVENOUS at 06:34

## 2023-01-01 RX ADMIN — ALLOPURINOL 300 MG: 300 TABLET ORAL at 09:31

## 2023-01-01 RX ADMIN — CLOPIDOGREL BISULFATE 75 MG: 75 TABLET ORAL at 08:24

## 2023-01-01 RX ADMIN — FAMOTIDINE 20 MG: 10 INJECTION INTRAVENOUS at 11:58

## 2023-01-01 RX ADMIN — PACLITAXEL 85 MG: 6 INJECTION, SOLUTION, CONCENTRATE INTRAVENOUS at 13:35

## 2023-01-01 RX ADMIN — HEPARIN 5 ML: 100 SYRINGE at 14:43

## 2023-01-01 RX ADMIN — SODIUM CHLORIDE 250 ML: 9 INJECTION, SOLUTION INTRAVENOUS at 11:58

## 2023-01-01 RX ADMIN — LORAZEPAM 0.5 MG: 0.5 TABLET ORAL at 08:40

## 2023-01-01 RX ADMIN — VASOPRESSIN 2.4 UNITS/HR: 20 INJECTION, SOLUTION INTRAMUSCULAR; SUBCUTANEOUS at 22:19

## 2023-01-01 RX ADMIN — CARBOPLATIN 160 MG: 10 INJECTION, SOLUTION INTRAVENOUS at 14:07

## 2023-01-01 RX ADMIN — APIXABAN 5 MG: 5 TABLET, FILM COATED ORAL at 22:04

## 2023-01-01 RX ADMIN — Medication 81 MG: at 09:21

## 2023-01-01 RX ADMIN — HEPARIN SODIUM 900 UNITS/HR: 10000 INJECTION, SOLUTION INTRAVENOUS at 14:12

## 2023-01-01 RX ADMIN — POTASSIUM CHLORIDE 40 MEQ: 1500 TABLET, EXTENDED RELEASE ORAL at 14:32

## 2023-01-01 RX ADMIN — CARBOPLATIN 145 MG: 10 INJECTION, SOLUTION INTRAVENOUS at 14:46

## 2023-01-01 RX ADMIN — CALCIUM CHLORIDE INJECTION 1 G: 100 INJECTION, SOLUTION INTRAVENOUS at 04:30

## 2023-01-01 RX ADMIN — HEPARIN 5 ML: 100 SYRINGE at 10:56

## 2023-01-01 RX ADMIN — SODIUM PHOSPHATE, MONOBASIC, MONOHYDRATE AND SODIUM PHOSPHATE, DIBASIC, ANHYDROUS 15 MMOL: 142; 276 INJECTION, SOLUTION INTRAVENOUS at 22:39

## 2023-01-01 RX ADMIN — FLUDEOXYGLUCOSE F-18 10.62 MILLICURIE: 500 INJECTION, SOLUTION INTRAVENOUS at 13:16

## 2023-01-01 RX ADMIN — ENOXAPARIN SODIUM 70 MG: 80 INJECTION SUBCUTANEOUS at 10:25

## 2023-01-01 RX ADMIN — POTASSIUM CHLORIDE 40 MEQ: 1500 TABLET, EXTENDED RELEASE ORAL at 11:25

## 2023-01-01 RX ADMIN — ATORVASTATIN CALCIUM 80 MG: 40 TABLET, FILM COATED ORAL at 07:43

## 2023-01-01 RX ADMIN — Medication 20 NG/KG/MIN: at 02:20

## 2023-01-01 RX ADMIN — IOPAMIDOL 75 ML: 755 INJECTION, SOLUTION INTRAVENOUS at 13:04

## 2023-01-01 RX ADMIN — FENTANYL CITRATE 25 MCG: 50 INJECTION, SOLUTION INTRAMUSCULAR; INTRAVENOUS at 13:36

## 2023-01-01 RX ADMIN — FENTANYL CITRATE 100 MCG: 50 INJECTION, SOLUTION INTRAMUSCULAR; INTRAVENOUS at 11:09

## 2023-01-01 RX ADMIN — ALBUMIN HUMAN 50 G: 0.25 SOLUTION INTRAVENOUS at 09:29

## 2023-01-01 RX ADMIN — CARBOPLATIN 150 MG: 10 INJECTION, SOLUTION INTRAVENOUS at 12:50

## 2023-01-01 RX ADMIN — Medication 81 MG: at 08:04

## 2023-01-01 RX ADMIN — PANTOPRAZOLE SODIUM 40 MG: 40 INJECTION, POWDER, FOR SOLUTION INTRAVENOUS at 07:43

## 2023-01-01 RX ADMIN — ATORVASTATIN CALCIUM 80 MG: 40 TABLET, FILM COATED ORAL at 08:24

## 2023-01-01 RX ADMIN — BUMETANIDE 2 MG: 0.25 INJECTION INTRAMUSCULAR; INTRAVENOUS at 08:46

## 2023-01-01 RX ADMIN — OXYCODONE HYDROCHLORIDE 5 MG: 5 TABLET ORAL at 09:44

## 2023-01-01 RX ADMIN — METOPROLOL SUCCINATE 25 MG: 25 TABLET, EXTENDED RELEASE ORAL at 09:31

## 2023-01-01 RX ADMIN — ATROPINE SULFATE 1 MG: 1 INJECTION, SOLUTION INTRAMUSCULAR; INTRAVENOUS; SUBCUTANEOUS at 04:20

## 2023-01-01 RX ADMIN — POTASSIUM CHLORIDE 200 ML/HR: 2 INJECTION, SOLUTION, CONCENTRATE INTRAVENOUS at 17:48

## 2023-01-01 RX ADMIN — PIPERACILLIN AND TAZOBACTAM 3.38 G: 3; .375 INJECTION, POWDER, FOR SOLUTION INTRAVENOUS at 15:46

## 2023-01-01 RX ADMIN — MIDAZOLAM HYDROCHLORIDE 1 MG: 1 INJECTION, SOLUTION INTRAMUSCULAR; INTRAVENOUS at 13:04

## 2023-01-01 RX ADMIN — SODIUM CHLORIDE 250 ML: 9 INJECTION, SOLUTION INTRAVENOUS at 11:14

## 2023-01-01 RX ADMIN — ALLOPURINOL 300 MG: 300 TABLET ORAL at 09:20

## 2023-01-01 RX ADMIN — HEPARIN 5 ML: 100 SYRINGE at 14:55

## 2023-01-01 RX ADMIN — DEXAMETHASONE SODIUM PHOSPHATE 4 MG: 4 INJECTION, SOLUTION INTRA-ARTICULAR; INTRALESIONAL; INTRAMUSCULAR; INTRAVENOUS; SOFT TISSUE at 11:09

## 2023-01-01 RX ADMIN — ALBUMIN HUMAN 50 G: 0.25 SOLUTION INTRAVENOUS at 15:43

## 2023-01-01 RX ADMIN — LIDOCAINE HYDROCHLORIDE,EPINEPHRINE BITARTRATE 15 ML: 10; .01 INJECTION, SOLUTION INFILTRATION; PERINEURAL at 13:32

## 2023-01-01 RX ADMIN — POTASSIUM CHLORIDE 12.5 ML/KG/HR: 2 INJECTION, SOLUTION, CONCENTRATE INTRAVENOUS at 05:38

## 2023-01-01 RX ADMIN — VASOPRESSIN 2.4 UNITS/HR: 20 INJECTION, SOLUTION INTRAMUSCULAR; SUBCUTANEOUS at 06:09

## 2023-01-01 RX ADMIN — OXYCODONE HYDROCHLORIDE 5 MG: 5 TABLET ORAL at 12:21

## 2023-01-01 RX ADMIN — AMIODARONE HYDROCHLORIDE 0.5 MG/MIN: 1.8 INJECTION, SOLUTION INTRAVENOUS at 15:44

## 2023-01-01 RX ADMIN — DIPHENHYDRAMINE HYDROCHLORIDE 50 MG: 50 INJECTION INTRAMUSCULAR; INTRAVENOUS at 11:42

## 2023-01-01 RX ADMIN — DIPHENHYDRAMINE HYDROCHLORIDE 50 MG: 50 INJECTION, SOLUTION INTRAMUSCULAR; INTRAVENOUS at 12:24

## 2023-01-01 RX ADMIN — PROPOFOL 150 MG: 10 INJECTION, EMULSION INTRAVENOUS at 11:09

## 2023-01-01 RX ADMIN — Medication 50 MCG/HR: at 03:20

## 2023-01-01 RX ADMIN — BUMETANIDE 2 MG: 0.25 INJECTION INTRAMUSCULAR; INTRAVENOUS at 16:26

## 2023-01-01 RX ADMIN — ALLOPURINOL 300 MG: 300 TABLET ORAL at 08:24

## 2023-01-01 RX ADMIN — MIDAZOLAM 1 MG: 1 INJECTION INTRAMUSCULAR; INTRAVENOUS at 08:25

## 2023-01-01 RX ADMIN — APIXABAN 5 MG: 5 TABLET, FILM COATED ORAL at 09:37

## 2023-01-01 RX ADMIN — ALLOPURINOL 300 MG: 300 TABLET ORAL at 08:01

## 2023-01-01 RX ADMIN — LORAZEPAM 0.5 MG: 0.5 TABLET ORAL at 23:25

## 2023-01-01 RX ADMIN — Medication 12.5 MG: at 08:49

## 2023-01-01 RX ADMIN — FAMOTIDINE 20 MG: 10 INJECTION INTRAVENOUS at 10:42

## 2023-01-01 RX ADMIN — ALBUMIN HUMAN 50 G: 0.25 SOLUTION INTRAVENOUS at 06:25

## 2023-01-01 RX ADMIN — TRAZODONE HYDROCHLORIDE 50 MG: 50 TABLET ORAL at 01:52

## 2023-01-01 RX ADMIN — SODIUM BICARBONATE 50 MEQ: 84 INJECTION, SOLUTION INTRAVENOUS at 04:21

## 2023-01-01 RX ADMIN — ALBUMIN (HUMAN) 25 G: 12.5 SOLUTION INTRAVENOUS at 02:12

## 2023-01-01 RX ADMIN — POTASSIUM CHLORIDE 40 MEQ: 1500 TABLET, EXTENDED RELEASE ORAL at 06:44

## 2023-01-01 RX ADMIN — ALBUMIN HUMAN 50 G: 0.25 SOLUTION INTRAVENOUS at 06:12

## 2023-01-01 RX ADMIN — AMIODARONE HYDROCHLORIDE 0.5 MG/MIN: 1.8 INJECTION, SOLUTION INTRAVENOUS at 04:15

## 2023-01-01 RX ADMIN — INSULIN ASPART 1 UNITS: 100 INJECTION, SOLUTION INTRAVENOUS; SUBCUTANEOUS at 20:28

## 2023-01-01 RX ADMIN — OXYCODONE HYDROCHLORIDE 5 MG: 5 TABLET ORAL at 16:58

## 2023-01-01 RX ADMIN — FUROSEMIDE 60 MG: 10 INJECTION, SOLUTION INTRAMUSCULAR; INTRAVENOUS at 16:05

## 2023-01-01 RX ADMIN — FAMOTIDINE 20 MG: 10 INJECTION INTRAVENOUS at 11:00

## 2023-01-01 ASSESSMENT — ACTIVITIES OF DAILY LIVING (ADL)
ADLS_ACUITY_SCORE: 22
ADLS_ACUITY_SCORE: 35
ADLS_ACUITY_SCORE: 30
ADLS_ACUITY_SCORE: 37
ADLS_ACUITY_SCORE: 22
ADLS_ACUITY_SCORE: 22
ADLS_ACUITY_SCORE: 37
ADLS_ACUITY_SCORE: 22
ADLS_ACUITY_SCORE: 22
ADLS_ACUITY_SCORE: 26
DIFFICULTY_EATING/SWALLOWING: NO
ADLS_ACUITY_SCORE: 26
ADLS_ACUITY_SCORE: 23
ADLS_ACUITY_SCORE: 26
ADLS_ACUITY_SCORE: 35
ADLS_ACUITY_SCORE: 26
ADLS_ACUITY_SCORE: 22
ADLS_ACUITY_SCORE: 27
ADLS_ACUITY_SCORE: 26
ADLS_ACUITY_SCORE: 22
WEAR_GLASSES_OR_BLIND: NO
ADLS_ACUITY_SCORE: 26
ADLS_ACUITY_SCORE: 26
ADLS_ACUITY_SCORE: 22
ADLS_ACUITY_SCORE: 23
ADLS_ACUITY_SCORE: 30
ADLS_ACUITY_SCORE: 23
ADLS_ACUITY_SCORE: 22
ADLS_ACUITY_SCORE: 26
ADLS_ACUITY_SCORE: 22
ADLS_ACUITY_SCORE: 26
ADLS_ACUITY_SCORE: 26
ADLS_ACUITY_SCORE: 30
ADLS_ACUITY_SCORE: 26
ADLS_ACUITY_SCORE: 37
ADLS_ACUITY_SCORE: 26
ADLS_ACUITY_SCORE: 23
ADLS_ACUITY_SCORE: 26
ADLS_ACUITY_SCORE: 28
ADLS_ACUITY_SCORE: 23
ADLS_ACUITY_SCORE: 26
CHANGE_IN_FUNCTIONAL_STATUS_SINCE_ONSET_OF_CURRENT_ILLNESS/INJURY: YES
ADLS_ACUITY_SCORE: 26
ADLS_ACUITY_SCORE: 37
ADLS_ACUITY_SCORE: 35
ADLS_ACUITY_SCORE: 30
DEPENDENT_IADLS:: INDEPENDENT
ADLS_ACUITY_SCORE: 23
ADLS_ACUITY_SCORE: 27
ADLS_ACUITY_SCORE: 26
ADLS_ACUITY_SCORE: 22
ADLS_ACUITY_SCORE: 26
ADLS_ACUITY_SCORE: 37
ADLS_ACUITY_SCORE: 22
ADLS_ACUITY_SCORE: 22
ADLS_ACUITY_SCORE: 26
FALL_HISTORY_WITHIN_LAST_SIX_MONTHS: YES
ADLS_ACUITY_SCORE: 23
ADLS_ACUITY_SCORE: 37
ADLS_ACUITY_SCORE: 23
DOING_ERRANDS_INDEPENDENTLY_DIFFICULTY: NO
ADLS_ACUITY_SCORE: 45
ADLS_ACUITY_SCORE: 30
ADLS_ACUITY_SCORE: 26
ADLS_ACUITY_SCORE: 22
ADLS_ACUITY_SCORE: 27
CONCENTRATING,_REMEMBERING_OR_MAKING_DECISIONS_DIFFICULTY: NO
ADLS_ACUITY_SCORE: 27
ADLS_ACUITY_SCORE: 22
ADLS_ACUITY_SCORE: 22
ADLS_ACUITY_SCORE: 26
ADLS_ACUITY_SCORE: 37
ADLS_ACUITY_SCORE: 22
ADLS_ACUITY_SCORE: 26
ADLS_ACUITY_SCORE: 22
ADLS_ACUITY_SCORE: 26
ADLS_ACUITY_SCORE: 22
ADLS_ACUITY_SCORE: 22
NUMBER_OF_TIMES_PATIENT_HAS_FALLEN_WITHIN_LAST_SIX_MONTHS: 2
ADLS_ACUITY_SCORE: 26
ADLS_ACUITY_SCORE: 22
ADLS_ACUITY_SCORE: 35
ADLS_ACUITY_SCORE: 27
ADLS_ACUITY_SCORE: 23
ADLS_ACUITY_SCORE: 22
ADLS_ACUITY_SCORE: 37
ADLS_ACUITY_SCORE: 23
ADLS_ACUITY_SCORE: 26
ADLS_ACUITY_SCORE: 23
ADLS_ACUITY_SCORE: 22
ADLS_ACUITY_SCORE: 27
ADLS_ACUITY_SCORE: 26
ADLS_ACUITY_SCORE: 23
ADLS_ACUITY_SCORE: 35
ADLS_ACUITY_SCORE: 22
ADLS_ACUITY_SCORE: 27
ADLS_ACUITY_SCORE: 35
ADLS_ACUITY_SCORE: 23
ADLS_ACUITY_SCORE: 22
ADLS_ACUITY_SCORE: 26
ADLS_ACUITY_SCORE: 22
ADLS_ACUITY_SCORE: 27
ADLS_ACUITY_SCORE: 27
DEPENDENT_IADLS:: INDEPENDENT
ADLS_ACUITY_SCORE: 22
ADLS_ACUITY_SCORE: 33
ADLS_ACUITY_SCORE: 27
ADLS_ACUITY_SCORE: 22
ADLS_ACUITY_SCORE: 30
ADLS_ACUITY_SCORE: 26
ADLS_ACUITY_SCORE: 26
ADLS_ACUITY_SCORE: 22
ADLS_ACUITY_SCORE: 30
ADLS_ACUITY_SCORE: 30
ADLS_ACUITY_SCORE: 37
ADLS_ACUITY_SCORE: 27
ADLS_ACUITY_SCORE: 37
ADLS_ACUITY_SCORE: 26
ADLS_ACUITY_SCORE: 26
ADLS_ACUITY_SCORE: 22
DIFFICULTY_COMMUNICATING: NO
CURRENT_FUNCTION: NO ASSISTANCE NEEDED
ADLS_ACUITY_SCORE: 22
ADLS_ACUITY_SCORE: 26
ADLS_ACUITY_SCORE: 23
ADLS_ACUITY_SCORE: 23
ADLS_ACUITY_SCORE: 26
ADLS_ACUITY_SCORE: 22
ADLS_ACUITY_SCORE: 26
ADLS_ACUITY_SCORE: 28
ADLS_ACUITY_SCORE: 26
ADLS_ACUITY_SCORE: 26
ADLS_ACUITY_SCORE: 22

## 2023-01-01 ASSESSMENT — ENCOUNTER SYMPTOMS
EYE PAIN: 0
HEMATOCHEZIA: 0
COUGH: 0
FEVER: 0
ABDOMINAL PAIN: 1
SHORTNESS OF BREATH: 0
PARESTHESIAS: 0
FREQUENCY: 0
CHILLS: 0
CONSTIPATION: 1
WEAKNESS: 0
DIZZINESS: 0
NERVOUS/ANXIOUS: 1
HEADACHES: 0
SORE THROAT: 0
HEMATURIA: 1
JOINT SWELLING: 0
HEARTBURN: 0
NAUSEA: 0
ARTHRALGIAS: 0
PALPITATIONS: 0
DYSURIA: 0
MYALGIAS: 0
DIARRHEA: 0

## 2023-01-01 ASSESSMENT — PATIENT HEALTH QUESTIONNAIRE - PHQ9
SUM OF ALL RESPONSES TO PHQ QUESTIONS 1-9: 6
10. IF YOU CHECKED OFF ANY PROBLEMS, HOW DIFFICULT HAVE THESE PROBLEMS MADE IT FOR YOU TO DO YOUR WORK, TAKE CARE OF THINGS AT HOME, OR GET ALONG WITH OTHER PEOPLE: NOT DIFFICULT AT ALL
SUM OF ALL RESPONSES TO PHQ QUESTIONS 1-9: 6

## 2023-01-01 ASSESSMENT — PAIN SCALES - GENERAL
PAINLEVEL: NO PAIN (0)
PAINLEVEL: NO PAIN (0)
PAINLEVEL: EXTREME PAIN (8)
PAINLEVEL: NO PAIN (0)
PAINLEVEL: MODERATE PAIN (5)
PAINLEVEL: NO PAIN (0)

## 2023-01-01 ASSESSMENT — ANXIETY QUESTIONNAIRES
GAD7 TOTAL SCORE: 4
7. FEELING AFRAID AS IF SOMETHING AWFUL MIGHT HAPPEN: NOT AT ALL
1. FEELING NERVOUS, ANXIOUS, OR ON EDGE: SEVERAL DAYS
GAD7 TOTAL SCORE: 4
2. NOT BEING ABLE TO STOP OR CONTROL WORRYING: NEARLY EVERY DAY
7. FEELING AFRAID AS IF SOMETHING AWFUL MIGHT HAPPEN: NOT AT ALL
4. TROUBLE RELAXING: NOT AT ALL
6. BECOMING EASILY ANNOYED OR IRRITABLE: NOT AT ALL
5. BEING SO RESTLESS THAT IT IS HARD TO SIT STILL: NOT AT ALL
8. IF YOU CHECKED OFF ANY PROBLEMS, HOW DIFFICULT HAVE THESE MADE IT FOR YOU TO DO YOUR WORK, TAKE CARE OF THINGS AT HOME, OR GET ALONG WITH OTHER PEOPLE?: NOT DIFFICULT AT ALL
3. WORRYING TOO MUCH ABOUT DIFFERENT THINGS: NOT AT ALL
GAD7 TOTAL SCORE: 4
IF YOU CHECKED OFF ANY PROBLEMS ON THIS QUESTIONNAIRE, HOW DIFFICULT HAVE THESE PROBLEMS MADE IT FOR YOU TO DO YOUR WORK, TAKE CARE OF THINGS AT HOME, OR GET ALONG WITH OTHER PEOPLE: NOT DIFFICULT AT ALL

## 2023-01-01 ASSESSMENT — COPD QUESTIONNAIRES: COPD: 1

## 2023-01-01 ASSESSMENT — EJECTION FRACTION: EF_VALUE: .3

## 2023-01-03 NOTE — TELEPHONE ENCOUNTER
"Last Written Prescription Date:  7/10/22  Last Fill Quantity: 90,  # refills: 1   Last office visit provider:  8/5/22     Requested Prescriptions   Pending Prescriptions Disp Refills     amLODIPine (NORVASC) 5 MG tablet 90 tablet 1     Sig: Take 1 tablet (5 mg) by mouth daily       Calcium Channel Blockers Protocol  Passed - 1/2/2023 12:17 PM        Passed - Blood pressure under 140/90 in past 12 months     BP Readings from Last 3 Encounters:   08/05/22 122/72   05/31/22 110/78   08/31/21 134/78                 Passed - Recent (12 mo) or future (30 days) visit within the authorizing provider's specialty     Patient has had an office visit with the authorizing provider or a provider within the authorizing providers department within the previous 12 mos or has a future within next 30 days. See \"Patient Info\" tab in inbasket, or \"Choose Columns\" in Meds & Orders section of the refill encounter.              Passed - Medication is active on med list        Passed - Patient is age 18 or older        Passed - Normal serum creatinine on file in past 12 months     Recent Labs   Lab Test 05/31/22  1502   CR 1.26       Ok to refill medication if creatinine is low               Agnieszka Langley RN 01/02/23 8:05 PM  "

## 2023-01-19 NOTE — TELEPHONE ENCOUNTER
Routing refill request to provider for review/approval because:  Drug interaction warning: sertraline and trazodone-- high Randa Rodríguez RN

## 2023-01-20 NOTE — TELEPHONE ENCOUNTER
This refill request is a duplicate request, previously received or sent.  Sent denial notification to pharmacy.  Randa Rodríguez RN  [facility]

## 2023-02-05 NOTE — TELEPHONE ENCOUNTER
"Drug interaction warning    Last Written Prescription Date:  10/28/22  Last Fill Quantity: 180,  # refills: 0   Last office visit provider:  8/5/22     Requested Prescriptions   Pending Prescriptions Disp Refills     sertraline (ZOLOFT) 100 MG tablet 180 tablet 0     Sig: Take 2 tablets (200 mg) by mouth daily       SSRIs Protocol Passed - 2/3/2023 10:46 AM        Passed - Recent (12 mo) or future (30 days) visit within the authorizing provider's specialty     Patient has had an office visit with the authorizing provider or a provider within the authorizing providers department within the previous 12 mos or has a future within next 30 days. See \"Patient Info\" tab in inbasket, or \"Choose Columns\" in Meds & Orders section of the refill encounter.              Passed - Medication is active on med list        Passed - Patient is age 18 or older             Praveena Hester RN 02/04/23 7:41 PM  "

## 2023-02-15 NOTE — TELEPHONE ENCOUNTER
Please inform the patient that I did look at his CT scan, and the report from the Radiologist did not show any cause for the pain that you are experiencing.  Looking at it myself to have some amount of stool though that may not be causing the pain but I will encourage use of laxatives to see if that will be helpful.  Also there is a mention of a nodule noted on the right lung base which is measuring about 5 mm and which is thought to be benign.  For this will have a recheck with a CT scan of the chest in 6 months.  Please let me know if there is any question.       TELEMETRY

## 2023-06-05 NOTE — PROGRESS NOTES
"SUBJECTIVE:   CC: Young is an 73 year old who presents for preventative health visit.       6/5/2023     2:20 PM   Additional Questions   Roomed by Theresa WILDE LPN     Healthy Habits:     In general, how would you rate your overall health?  Good    Frequency of exercise:  2-3 days/week    Duration of exercise:  Less than 15 minutes    Do you usually eat at least 4 servings of fruit and vegetables a day, include whole grains    & fiber and avoid regularly eating high fat or \"junk\" foods?  No    Taking medications regularly:  Yes    Medication side effects:  None    Ability to successfully perform activities of daily living:  No assistance needed    Home Safety:  No safety concerns identified    Hearing Impairment:  Difficulty following a conversation in a noisy restaurant or crowded room, feel that people are mumbling or not speaking clearly, difficulty following dialogue in the theater, need to ask people to speak up or repeat themselves and difficulty understanding soft or whispered speech    In the past 6 months, have you been bothered by leaking of urine?  No    In general, how would you rate your overall mental or emotional health?  Good      PHQ-2 Total Score: 3    Additional concerns today:  No    Mood- has been on the sertraline- feels that the 200 mg is working. Using lorazepam - using this about daily feels that this is working well.     Patient with history of endarterectomy on right side.  He does have known carotid stenosis on the left side.  Does appear to be due for repeat imaging.  He does take daily aspirin, he is on atorvastatin, blood pressure has been well controlled with amlodipine and lisinopril.    Using trazodone to help with sleep.    Using lorazepam at times to help with anxiety.          He has not had chest pain shortness of breath or lower extremity edema.        Social History     Tobacco Use     Smoking status: Former     Packs/day: 1.50     Types: Cigarettes     Quit date: 1/1/2013     " "Years since quitting: 10.4     Smokeless tobacco: Never   Vaping Use     Vaping status: Not on file   Substance Use Topics     Alcohol use: No     Comment: Alcoholic Drinks/day: sober since 1984 6/5/2023     2:35 PM   Alcohol Use   Prescreen: >3 drinks/day or >7 drinks/week? Not Applicable          View : No data to display.                Last PSA:   PSA   Date Value Ref Range Status   07/13/2007 0.59 0 - 4 ug/L Final     Prostate Specific Antigen Screen   Date Value Ref Range Status   05/31/2022 0.67 0.00 - 6.50 ug/L Final     Patient is reviewed and not on chronic opioid therapy  Reviewed orders with patient. Reviewed health maintenance and updated orders accordingly - Yes  Lab work is in process    Reviewed and updated as needed this visit by clinical staff   Tobacco  Allergies  Meds              Reviewed and updated as needed this visit by Provider                     Review of Systems   Constitutional: Negative for chills and fever.   HENT: Positive for hearing loss. Negative for congestion, ear pain and sore throat.    Eyes: Positive for visual disturbance. Negative for pain.   Respiratory: Negative for cough and shortness of breath.    Cardiovascular: Negative for chest pain, palpitations and peripheral edema.   Gastrointestinal: Positive for abdominal pain and constipation. Negative for diarrhea, heartburn, hematochezia and nausea.   Genitourinary: Positive for hematuria and impotence. Negative for dysuria, frequency, genital sores, penile discharge and urgency.   Musculoskeletal: Negative for arthralgias, joint swelling and myalgias.   Skin: Negative for rash.   Neurological: Negative for dizziness, weakness, headaches and paresthesias.   Psychiatric/Behavioral: Negative for mood changes. The patient is nervous/anxious.          OBJECTIVE:   /66 (BP Location: Left arm, Patient Position: Sitting, Cuff Size: Adult Regular)   Pulse 63   Ht 1.702 m (5' 7\")   Wt 77.4 kg (170 lb 9.6 oz)  "  SpO2 96%   BMI 26.72 kg/m      Physical Exam  General: alert, interactive, NAD  HEENT: sclerae clear normal tympanic membranes.  Neck: supple, noted carotid bruit on the left side.  CV: RRR, no m/r/c  Resp: clear, no wheezing, easy work of breathing  Abdomen: +bs, non-tender healed prior surgical scars  Skin no acute lesions noted  Ext: warm and well perfused,  no edema  Psych: appropriate affect  Neuro: no focal deficits noted.          Diagnostic Test Results:  Labs reviewed in Epic    ASSESSMENT/PLAN:       ICD-10-CM    1. Encounter for Medicare annual wellness exam  Z00.00       2. Chronic gout without tophus, unspecified cause, unspecified site  M1A.9XX0 allopurinol (ZYLOPRIM) 300 MG tablet   Patient has been doing well without recent flare.  We will continue allopurinol therapy uric acid and other labs for monitoring today.  Uric acid     CBC with platelets and differential     Uric acid     CBC with platelets and differential      3. Hypertension, essential, benign  I10 lisinopril (ZESTRIL) 20 MG tablet     Comprehensive metabolic panel   Blood pressure appears well controlled continue lisinopril and amlodipine.  Labs for monitoring.  amLODIPine (NORVASC) 5 MG tablet     Comprehensive metabolic panel      4. History of alcohol dependence (H)  F10.21  Patient has been abstinent for long standing time.  Doing well with this.      5. Essential (primary) hypertension  I10 atenolol (TENORMIN) 100 MG tablet      6. Hyperlipidemia, unspecified hyperlipidemia type  E78.5 atorvastatin (LIPITOR) 80 MG tablet   Patient has been stable on atorvastatin continue current therapy.   7. Generalized anxiety disorder  F41.1 LORazepam (ATIVAN) 0.5 MG tablet   Doing well with intermittent use of lorazepam with Zoloft.  Using trazodone for help with sleep.  sertraline (ZOLOFT) 100 MG tablet      8. Recurrent major depressive disorder, remission status unspecified (H)  F33.9  Overall patient feels that mood is stable denies SI  or HI so getting enjoyment out of life.      9. Insomnia, unspecified type  G47.00 traZODone (DESYREL) 50 MG tablet      10. Chronic obstructive pulmonary disease, unspecified COPD type (H)  J44.9 CT Chest w/o Contrast   Patient was noted on prior imaging to have several nodules that need follow-up for.  He currently has no symptoms of COPD.  Will evaluate with imaging.  Was noted in prior notes to have concern for COPD lung evaluation imaging will help us look more in detail to this.   11. Screen for colon cancer  Z12.11 Colonoscopy Screening  Referral      12. Chronic kidney disease, stage 3a (H)  N18.31  Continue lab monitoring avoid nephrotoxic agents.      13. Hematuria, unspecified type  R31.9 UA Macroscopic with reflex to Microscopic and Culture     Cytology non gyn   Noted on patient's by review of systems.  Had 1 episode of hematuria this resolved on own.  Has not been ongoing issues.  We will repeat evaluation today he did have a CT of abdomen that was done in 2019 that did not show acute masses.  UA Macroscopic with reflex to Microscopic and Culture     Cytology non gyn     UA Microscopic with Reflex to Culture     FISH bladder cancer      14. PAD (peripheral artery disease) (H)  I73.9 Lipid panel reflex to direct LDL Non-fasting   Patient with known carotid artery stenosis.  He has approximately 70% stenosis of the left internal carotid artery  Lipid panel reflex to direct LDL Non-fasting      15. Screening for prostate cancer  Z12.5 PSA, screen     PSA, screen      16. Abnormal reflex  R29.2 TSH with free T4 reflex     TSH with free T4 reflex      17. Stenosis of left carotid artery  I65.22 CTA Head Neck with Contrast   As noted above due for repeat imaging and will have patient follow-up with vascular medicine.  Vascular Surgery Referral      18. Decreased ejection fraction  I50.20 Echocardiogram Complete   Patient was noted to have decreased ejection fraction on echo done in August 2020 will  repeat evaluation today last EF was 45 to 50%.  Remote history of nuclear med stress done nothing recent that was noted.  Does have coronary artery calcification noted on imaging.   19. Pulmonary nodules  R91.8 CT Chest w/o Contrast   Was noted to have prior nodules in 2020 that appeared due for follow-up.  About 5 mm in size.  Ordered repeat imaging.       42 minutes spent in addition to Medicare wellness exam on patient's evaluation today.      COUNSELING:   Reviewed preventive health counseling, as reflected in patient instructions        He reports that he quit smoking about 10 years ago. His smoking use included cigarettes. He smoked an average of 1.5 packs per day. He has never used smokeless tobacco.        Jigar Crawford MD  [facility]  Answers for HPI/ROS submitted by the patient on 6/5/2023  If you checked off any problems, how difficult have these problems made it for you to do your work, take care of things at home, or get along with other people?: Not difficult at all  PHQ9 TOTAL SCORE: 6  SANTA 7 TOTAL SCORE: 4

## 2023-06-05 NOTE — PATIENT INSTRUCTIONS
Check at your pharmacy for your shingles vaccine. You can call the number on the back of insurance card to see if covered in clinic.     COVID booster today.     Labs today as we discussed.     They should call to set up the heart ultrasound.     They should call to set up with the neck study to look at blood flow as well.     We will have vascular see you again after the studies are back.     Jigar Crawford MD        Understanding USDA MyPlate  The USDA has guidelines to help you make healthy food choices. These are called MyPlate. MyPlate shows the food groups that make up healthy meals using the image of a place setting. Before you eat, think about the healthiest choices for what to put on your plate or in your cup or bowl. To learn more about building a healthy plate, visit www.choosemyplate.gov.     The food groups  Fruits. Any fruit or 100% fruit juice counts as part of the Fruit Group. Fruits may be fresh, canned, frozen, or dried, and may be whole, cut-up, or pureed. Make 1/2 of your plate fruits and vegetables.  Vegetables. Any vegetable or 100% vegetable juice counts as a member of the Vegetable Group. Vegetables may be fresh, frozen, canned, or dried. They can be served raw or cooked and may be whole, cut-up, or mashed. Make 1/2 of your plate fruits and vegetables.  Grains. All foods made from grains are part of the Grains Group. These include wheat, rice, oats, cornmeal, and barley. Grains are often used to make foods such as bread, pasta, oatmeal, cereal, tortillas, and grits. Grains should be no more than 1/4 of your plate. At least half of your grains should be whole grains.  Protein. This group includes meat, poultry, seafood, beans and peas, eggs, processed soy products (such as tofu), nuts (including nut butters), and seeds. Make protein choices no more than 1/4 of your plate. Meat and poultry choices should be lean or low fat.  Dairy. The Dairy Group includes all fluid milk products and foods made  from milk that contain calcium, such as yogurt and cheese. (Foods that have little calcium, such as cream, butter, and cream cheese, are not part of this group.) Most dairy choices should be low-fat or fat-free.  Oils. Oils aren't a food group, but they do contain essential nutrients. However it's important to watch your intake of oils. These are fats that are liquid at room temperature. They include canola, corn, olive, soybean, vegetable, and sunflower oil. Foods that are mainly oil include mayonnaise, certain salad dressings, and soft margarines. You likely already get your daily oil allowance from the foods you eat.  Things to limit  Eating healthy also means limiting these things in your diet:  Salt (sodium). Many processed foods have a lot of sodium. To keep sodium intake down, eat fresh vegetables, meats, poultry, and seafood when possible. Purchase low-sodium, reduced-sodium, or no-salt-added food products at the store. And don't add salt to your meals at home. Instead, season them with herbs and spices such as dill, oregano, cumin, and paprika. Or try adding flavor with lemon or lime zest and juice.  Saturated fat. Saturated fats are most often found in animal products such as beef, pork, and chicken. They are often solid at room temperature, such as butter. To reduce your saturated fat intake, choose leaner cuts of meat and poultry. And try healthier cooking methods such as grilling, broiling, roasting, or baking. For a simple lower-fat swap, use plain nonfat yogurt instead of mayonnaise when making potato salad or macaroni salad.  Added sugars. These are sugars added to foods. They are in foods such as ice cream, candy, soda, fruit drinks, sports drinks, energy drinks, cookies, pastries, jams, and syrups. Cut down on added sugars by sharing sweet treats with a family member or friend. You can also choose fruit for dessert, and drink water or other unsweetened beverages.  Kaye last reviewed this  educational content on 6/1/2020 2000-2022 [employer]. All rights reserved. This information is not intended as a substitute for professional medical care. Always follow your healthcare professional's instructions.          Signs of Hearing Loss  Hearing loss is a problem shared by many people. In fact, it's one of the most common health problems, particularly as people age. Most people aged 65 and older have some hearing loss. By age 80, almost everyone does. Hearing loss often occurs slowly over the years. So, you may not realize your hearing has gotten worse.   When sudden hearing loss occurs, it's important to contact your healthcare provider right away. Your provider will do a medical exam and a hearing exam as soon as possible. This is to help find the cause and type of your sudden hearing loss. Based on your diagnosis, your healthcare provider will discuss possible treatments.      Hearing much better with one ear can be a sign of hearing loss.     Have your hearing checked  Call your healthcare provider if you:   Have to strain to hear normal conversation  Have to watch other people s faces very carefully to follow what they re saying  Need to ask people to repeat what they ve said  Often misunderstand what people are saying  Turn the volume of the television or radio up so high that others complain  Feel that people are mumbling when they re talking to you  Find that the effort to hear leaves you feeling tired and irritated  Notice, when using the phone, that you hear better with one ear than the other  Kaye last reviewed this educational content on 6/1/2022 2000-2022 [employer]. All rights reserved. This information is not intended as a substitute for professional medical care. Always follow your healthcare professional's instructions.          Depression and Suicide in Older Adults  Nearly 2 million older adults in the [place] have some type of depression. Some of them  "even take their own lives. Yet depression among older adults is often ignored. Learning about the warning signs of depression may help spare a loved one needless pain. You may also save a life.   What is depression?  Depression is a common and serious illness. It affects the way you think and feel. It is not a normal part of aging. It is not a sign of weakness, a character flaw, or something you can \"snap out of.\" Most people with depression need treatment to get better. The most common symptom is a feeling of deep sadness. People who are depressed also may seem tired and listless. And nothing seems to give them pleasure. It s normal to grieve or be sad sometimes. But sadness lessens or passes with time. Depression rarely goes away or improves on its own. Other symptoms of depression are:   Sleeping more or less than normal  Eating more or less than normal  Having headaches, stomachaches, or other pains that don t go away  Feeling nervous,  empty,  or worthless  Crying a lot  Thinking or talking about suicide or death  Loss of interest in activities previously enjoyed  Social isolation  Feeling confused or forgetful  What causes it?  The causes of depression aren t fully known. But it is thought to result from a complex blend of these factors:   Biochemistry. Certain chemicals in the brain play a role.  Genes. Depression does run in families.  Life stress. Life stresses can also trigger depression in some people. Older adults often face many stressors. These may include isolation, the death of friends or a spouse, health problems, and financial concerns.  Chronic health conditions. This includes diabetes, heart disease, or cancer. These can cause symptoms of depression. Medicine side effects can cause changes in thoughts and behaviors.  Giving support    Depressed people often may not want to ask for help. When they do, they may be ignored. Or they may get the wrong treatment. You can help by showing parents and " older friends love and support. If they seem depressed, don t lecture the person or ignore the symptoms. Don't discount the symptoms as a  normal  part of aging. They are not. Get involved, listen, and show interest and support.   Help them understand that depression is a treatable illness. Tell them you can help them find the right treatment. Offer to go to their healthcare provider's appointment with them for support when the symptoms are discussed. With their approval, contact a local mental health center, social service agency, or hospital about services.   Helping at healthcare visits  You can be an advocate for them at healthcare appointments. Many older adults have chronic illnesses. Many of these can cause symptoms of depression. Medicine side effects can change thoughts and behaviors.   You can help make sure that the healthcare provider looks at all of these factors. They should refer your family member or friend to a mental healthcare provider when needed. In some cases, untreated depression can lead to a misdiagnosis. A person may be diagnosed with a brain disorder such as dementia. If the healthcare provider does not take the issue of depression seriously, help your family member or friend find another provider.   Asking about self-harm thoughts  If you think an older person you care about could be suicidal, ask,  Have you thought about suicide?  Most people will tell you the truth. If they say yes, they may already have a plan for how and when they will attempt it. Find out as much as you can. The more detailed the plan, and the easier it is to carry out, the more danger the person is in right now. Tell the person you are there for them and you do not want them to get harmed. Don't wait to get help for the person. Call the person's healthcare provider, local hospital, or emergency services.   Call 988 in a crisis   Never leave the person alone. A person who is actively suicidal needs crisis care right  away. They need constant supervision. Never leave the person out of sight. Call 988 Tell the crisis counselor you need help for a person who is thinking about suicide. The counselor will help you get the right level of crisis help. You may be advised to take the person to the nearest emergency room.   The National Suicide Prevention Lifeline is available at 988, or 054-626-XJFP (477-542-3840), or www.suicidepreKod[place] Alarisline.org. When you call or text 988, you will be connected to trained counselors who are part of the National Suicide Prevention Lifeline network. An online chat option is also available. Lifeline is free and available 24/7.   To learn more  National Suicide Prevention Lifeline at www.suicideAlibabaline.org  or 733-759-DZHP (596-113-6063)  National [place] on Mental Illness at www.brea.org  or 449-813-WNGQ (721-656-4419)  [facility] [place] at www.nmha.org  or 414-834-8444  National [place] of Mental Health at www.nimh.nih.gov  or 206-254-2638    Kaye last reviewed this educational content on 7/1/2022 2000-2022 [employer]. All rights reserved. This information is not intended as a substitute for professional medical care. Always follow your healthcare professional's instructions.

## 2023-06-05 NOTE — PROGRESS NOTES
"    The patient was counseled and encouraged to consider modifying their diet and eating habits. He was provided with information on recommended healthy diet options.  The patient was provided with written information regarding signs of hearing loss.  The patient's PHQ-9 score is consistent with mild depression. He was provided with information regarding depression and was advised to schedule a follow up appointment in 12 weeks to further address this issue.  Answers for HPI/ROS submitted by the patient on 6/5/2023  If you checked off any problems, how difficult have these problems made it for you to do your work, take care of things at home, or get along with other people?: Not difficult at all  PHQ9 TOTAL SCORE: 6  SANTA 7 TOTAL SCORE: 4  In general, how would you rate your overall physical health?: good  Frequency of exercise:: 2-3 days/week  Do you usually eat at least 4 servings of fruit and vegetables a day, include whole grains & fiber, and avoid regularly eating high fat or \"junk\" foods? : No  Taking medications regularly:: Yes  Medication side effects:: None  Activities of Daily Living: no assistance needed  Home safety: no safety concerns identified  Hearing Impairment:: difficulty following a conversation in a noisy restaurant or crowded room, feel that people are mumbling or not speaking clearly, difficulty following dialogue in the theater, need to ask people to speak up or repeat themselves, difficulty understanding soft or whispered speech  In the past 6 months, have you been bothered by leaking of urine?: No  abdominal pain: Yes  Blood in stool: No  Blood in urine: Yes  chest pain: No  chills: No  congestion: No  constipation: Yes  cough: No  diarrhea: No  dizziness: No  ear pain: No  eye pain: No  nervous/anxious: Yes  fever: No  frequency: No  genital sores: No  headaches: No  hearing loss: Yes  heartburn: No  arthralgias: No  joint swelling: No  peripheral edema: No  mood changes: No  myalgias: " No  nausea: No  dysuria: No  palpitations: No  Skin sensation changes: No  sore throat: No  urgency: No  rash: No  shortness of breath: No  visual disturbance: Yes  weakness: No  impotence: Yes  penile discharge: No  In general, how would you rate your overall mental or emotional health?: good  Additional concerns today:: No  Duration of exercise:: Less than 15 minutes

## 2023-06-06 PROBLEM — F33.9 RECURRENT MAJOR DEPRESSIVE DISORDER, REMISSION STATUS UNSPECIFIED (H): Status: ACTIVE | Noted: 2023-01-01

## 2023-06-06 PROBLEM — I50.20: Status: ACTIVE | Noted: 2023-01-01

## 2023-06-06 PROBLEM — N18.31 CHRONIC KIDNEY DISEASE, STAGE 3A (H): Status: ACTIVE | Noted: 2023-01-01

## 2023-06-06 PROBLEM — J44.9 CHRONIC OBSTRUCTIVE PULMONARY DISEASE, UNSPECIFIED COPD TYPE (H): Status: ACTIVE | Noted: 2023-01-01

## 2023-06-06 PROBLEM — I65.22 STENOSIS OF LEFT CAROTID ARTERY: Status: ACTIVE | Noted: 2023-01-01

## 2023-06-06 PROBLEM — F10.21 HISTORY OF ALCOHOL DEPENDENCE (H): Status: ACTIVE | Noted: 2022-08-06

## 2023-06-29 NOTE — PATIENT INSTRUCTIONS
Martha Vidal,    Thank you for entrusting your care with us today. After your visit today with MD Ted Meraz this is the plan that was discussed at your appointment.    Complete your CTA of head and neck that you have scheduled for 6/30/23. Follow up after completed with Dr. Meraz with a video visit on 7/3/23.      I am including additional information on these things and our contact information if you have any questions or concerns.   Please do not hesitate to reach out to us if you felt we did not answer your questions or you are unsure of the treatment plan after your visit today. Our number is [phone].Thank you for trusting us with your care.     Again thank you for your time.     Patient Education     Carotid Artery Disease  What is carotid artery disease?   The carotid arteries are the main blood vessels that send blood and oxygen to the brain. When these vessels become narrowed, it s called carotid artery disease. It may also be called carotid artery stenosis. The narrowing is caused by atherosclerosis. This is the buildup of fatty deposits, calcium, fibrous tissue and other cell debris that lines the inside of the artery.. Carotid artery disease is like coronary artery disease. In that disease, buildup occurs in the arteries of the heart. That may cause a heart attack.   Carotid artery disease reduces the flow of oxygen to the brain. The brain needs a constant supply of oxygen to work. Even a brief pause in blood supply can cause problems. Brain cells start to die after just a few minutes without blood or oxygen. If the narrowing of the carotid arteries is severe enough that blood flow is blocked, it can cause a stroke. If a piece of plaque breaks off it can block blood flow to the brain. This too can cause a stroke.   What causes carotid artery disease?   Atherosclerosis causes most carotid artery disease. In this condition, fatty deposits, calcium, fibrous tissue, and other cell debris build  up along the lining of the arteries. This is called plaque. The plaque narrows the insides of the arteries. This decreases blood flow or fully blocks the flow of blood to the brain.   Who is at risk for carotid artery disease?   Risk factors linked with atherosclerosis include:   Older age  Male  Family history  Race  Genetic factors  High cholesterol  High blood pressure  Smoking  Diabetes  Overweight  Diet high in saturated fat  Lack of exercise  These factors increase a person's risk. But they don't always cause the disease. Knowing your risk factors can help you make lifestyle changes. You can work with your healthcare provider to reduce the chance you will get the disease.   What are the symptoms of carotid artery disease?   The disease may have no symptoms. In some cases, the first sign of the disease is a transient ischemic attack (TIA) or stroke.   A TIA is a sudden, short-term loss of blood flow to a part of the brain. It usually lasts a few minutes to an hour. Symptoms go away fully within 24 hours. There are no lasting effects. When symptoms continue, it is a stroke. Symptoms of a TIA or stroke may include:   Sudden weakness or clumsiness of an arm or leg on 1 side of the body  Sudden paralysis of an arm or leg on 1 side of the body  Loss of coordination or movement  Confusion, loss of ability to concentrate   Dizziness, fainting, or headache  Numbness or loss of feeling in the face or in an arm or leg  Temporary loss of vision or blurred vision  Inability to speak clearly or slurred speech  If you or a loved one has any of these symptoms, call for medical help right away. A TIA may be a warning sign that a stroke is about to occur. But TIAs don't precede all strokes.   The symptoms of a TIA and stroke are the same. A stroke is loss of blood flow (ischemia) to the brain that lasts long enough to cause brain damage. Brain cells start to die after just a few minutes without oxygen.   The effects after a  stroke depends on the size and place in the brain that had loss of blood flow. This may include problems with:   Moving  Speaking  Thinking  Remembering  Bowel and bladder function  Eating  Emotional control  Other vital body functions  Recovery also depends on the size and place of the stroke. A stroke may result in long-term problems, such as weakness in an arm or leg. It may cause paralysis, loss of speech, or even death.   The symptoms of carotid artery disease may look like other health problems. See your healthcare provider for a diagnosis.   How is carotid artery disease diagnosed?   Your healthcare provider will ask about your health history. They will do a physical exam. You will need tests. These may include:   Listening to the carotid arteries. For this test, your healthcare provider places a stethoscope over the carotid artery. This is done to listen for a sound called a bruit (BREW-ee). This sound is made when blood passes through a narrowed artery. A bruit can be a sign of atherosclerosis but an artery may be diseased and not make this sound.  Carotid artery duplex scan.  This test is done to assess the blood flow of the carotid arteries. A probe called a transducer sends out ultrasonic sound waves. The transducer is also like a microphone. It is placed on the carotid arteries at certain locations and angles. The sound waves move through the skin and other body tissues to the blood vessels. The sound waves echo off of the blood cells. The transducer sends the waves to an amplifier. Your healthcare provider can hear the sound waves. Not enough or no sounds may mean blood flow is blocked.  MRI. This test uses large magnets, radio waves, and a computer to make detailed images of tissues in the body without using X-rays. For this test, you lie inside a big tube while magnets pass around your body. It s very loud.   MR angiography (MRA).  This test uses MRI technology and IV contrast dye to make the blood  vessels visible. Contrast dye causes blood vessels to show up well on the MRI image. This is so the doctor can see them.  CT angiography (CTA).  This test uses X-rays and a computer along with contrast dye to make detailed images of the body. A CTA shows pictures of blood vessels and tissues. It helps find narrowed blood vessels.    Angiography. This test is used to see how blocked the carotid arteries are. It is done by taking X-ray images while a contrast dye is injected. The contrast dye shows the shape and flow of blood through the arteries as X-ray images are made.  How is carotid artery disease treated?   Treatment will depend on your symptoms, age, and general health. It will also depend on how severe the condition is.   If a carotid artery is less than half narrowed, it is often treated with medicine and lifestyle changes. If the artery is between 50% and 70% narrowed, medicine or surgery may be done.   Medical treatment may include the information below:   Lifestyle changes  Quitting smoking.  This can reduce the risk for carotid artery disease and cardiovascular disease. All nicotine products constrict the blood vessels. This includes electronic cigarettes. This decreases blood flow through the arteries.  Lowering cholesterol.  Eat a low-fat, low-cholesterol diet. Eat plenty of vegetables, lean meats (no red meats), fruits, and high-fiber grains. Don't eat processed foods, or foods high in saturated and trans-fats. When diet and exercise are not enough to control cholesterol, you may need medicines.  Lowering blood sugar.  High blood sugar (glucose) can cause damage to the lining of the carotid arteries. Control glucose levels through a low-sugar diet, and regular exercise. If you have diabetes, you may need medicine or other treatment.  Exercising. Lack of exercise can cause weight gain. It can raise blood pressure and cholesterol. Exercise can help you keep a healthy weight and reduce risks for carotid  artery disease.  Lowering blood pressure. High blood pressure causes wear and tear and inflammation in blood vessels. This raises the risk for artery narrowing. Blood pressure should be below 140/90 mm/Hg for most people. People with diabetes may need even lower blood pressure.    Medicines  Medicines that may be used include:   Antiplatelets.These medicines make platelets in the blood less able to stick together and cause clots. These medicines include aspirin, clopidogrel, and dipyridamole.  Cholesterol medicines.Statins are a type of medicines that lower cholesterol. They include simvastatin and atorvastatin. Studies have shown that some statins can decrease the thickness of the carotid artery wall. This can increase the size of the opening of the artery.  Blood pressure medicines. Several types of medicines work to lower blood pressure.  If a carotid artery is narrowed from 50% to 70%, you may need stronger treatment, especially if you have symptoms.   Surgery is usually advised for carotid narrowing of more than 70%. Surgery lowers the risk for stroke after symptoms such as TIA or minor stroke.   Types of surgery include:  Carotid endarterectomy (CEA). This is surgery to remove plaque and blood clots from the carotid arteries. CEA may help prevent a stroke in people who have symptoms and a narrowing of 70% or more.  Carotid artery angioplasty with stenting (DARNELL). This is an option for people who are unable to have CEA. It uses a very small tube (catheter). This tube is put into a blood vessel in the groin. It is pushed up to the carotid arteries. Once the tube is in place, a small balloon is inflated at the tip of it. This opens the artery. Then a stent is put in place. A stent is a thin, metal-mesh tube. It is used to hold the artery open.  What are possible complications of carotid artery disease?   The main complication is a stroke. A stroke can cause serious disability. and may cause death.   Can carotid  artery disease be prevented?   You can prevent or delay the disease like you would prevent heart disease. This includes:   Diet changes. Eat a healthy diet It should include plenty of fresh fruits and vegetables. Eat lean meats such as poultry and fish Eat low-fat or non-fat dairy foods. Limit your intake of salt, sugar, processed foods, saturated fats, and alcohol.   Exercise.Aim for 40 minutes of moderate to vigorous physical activity at least 3 to 4 days per week, or as directed by your healthcare provider.  Manage your weight. If you are overweight, take steps to lose weight.   Quit smoking. If you smoke, break the habit. Enroll in a stop-smoking program. This can improve your chances of success. Ask your healthcare provider about prescription medicine.  Control stress. Learn ways to manage stress in your home and work life.  When should I call my healthcare provider?   Learn the symptoms of stroke. Have your family members also learn them. If you think you are having symptoms of a stroke, call 911 right away.   Key points about carotid artery disease  Carotid artery disease is when plaque builds up causing narrowing of the carotid arteries. These arteries send oxygen-rich blood from the heart to the brain.  Narrowing of the carotid arteries can cause a stroke. Symptoms of a stroke should be treated right away.  Eating a healthy diet is one way to reduce the risk of carotid artery disease. Exercise, quitting smoking, blood pressure control, and medicine can also help.  Opening the carotid arteries can be done with a surgery or with angioplasty and a stent.  Carotid artery disease may not have symptoms But if you have important risk factors, see your healthcare provider for screening and diagnosis.    Next steps  Tips to help you get the most from a visit to your healthcare provider:  Know the reason for your visit and what you want to happen.  Before your visit, write down questions you want answered.  Bring  someone with you to help you ask questions and remember what your provider tells you.  At the visit, write down the name of a new diagnosis, and any new medicines, treatments, or tests. Also write down any new instructions your provider gives you.  Know why a new medicine or treatment is prescribed, and how it will help you. Also know what the side effects are.  Ask if your condition can be treated in other ways.  Know why a test or procedure is recommended and what the results could mean.  Know what to expect if you do not take the medicine or have the test or procedure.  If you have a follow-up appointment, write down the date, time, and purpose for that visit.  Know how you can contact your provider if you have questions.  [employer] last reviewed this educational content on 2/1/2021 2000-2022 [employer]. All rights reserved. This information is not intended as a substitute for professional medical care. Always follow your healthcare professional's instructions.    Patient Education     Computed Tomography Angiography (CTA)  Computed tomography angiography (CTA) is an imaging test. It uses X-rays and computer technology to make detailed pictures of your arteries (blood vessels). Before the test, an X-ray dye (contrast medium) is injected into your vein. The dye makes it easier to see your blood vessels on the X-ray. Pictures are then taken with the CT scanner. A computer turns the images into 2-D and 3-D pictures.      CTA can make 3D images, such as the carotid arteries shown here.     Why CTA is done  CTA may be used to:  Check arteries in your belly, neck, lungs, pelvis, kidneys, or brain.  Look for a ballooning of the blood vessel wall (aneurysm) or a tear (dissection).  Check if a tube (stent) used to keep an artery open is working well.  Find damage to your arteries due to injuries.  Collect details on blood vessels that supply blood to tumors.  Getting ready for your test  Tell your  healthcare provider if you:   Have diabetes  Have kidney disease  Are allergic to X-ray dye or other medicines  Are pregnant, think you may be pregnant, or are breastfeeding  Are taking any medicines, herbs, or supplements, including prescription medicines, illegal drugs, and over-the-counter medicines such as aspirin or ibuprofen  Follow any directions you are given for not eating or drinking before the CTA. Follow any other instructions from your healthcare provider.   During your test  You will be asked to remove any hair clips, jewelry, false teeth, or other metal items that could show up on the X-ray.  You will lie down on the scanning table. An IV line will be put in a vein in your arm or hand.  The scanning table will be properly placed. The part of your body being checked will be inside the doughnut-shaped CT scanner.  One image may be taken first to be sure you are in the proper position for the test.  The IV will be hooked up to an automatic injection machine. This controls how often and how fast the X-ray dye is injected. The injection may continue during part of the exam.  The dye will be put into your vein through the IV line. You may feel warmth through your body when the dye is injected.  You can t move while the X-rays are being taken. Pillows and foam pads may be used to help you stay still. You will be told to hold your breath for 10 to 25 seconds at a time.  A single scan may take several minutes. You may need more than one scan.    After your test  Drink plenty of fluids to help flush the X-ray dye from your body.  You may eat as soon as you want to.    Possible risks  All procedures have some risks. A CTA has some possible risks. These include:   Problems due to the X-ray dye, such as an allergic reaction or kidney damage  Skin damage from leaking X-ray dye near where the IV was put in  "[employer]" last reviewed this educational content on 8/1/2022 2000-2022 [employer]. All rights  reserved. This information is not intended as a substitute for professional medical care. Always follow your healthcare professional's instructions.

## 2023-06-29 NOTE — PROGRESS NOTES
[facility]        Patient is here for a consult to discuss Carotid stenosis. Patient has no symptoms    Pt is currently taking Statin and aspirin     /62   Pulse 73   Temp 97.7  F (36.5  C)         Mee Alfaro MA

## 2023-06-29 NOTE — PROGRESS NOTES
VASCULAR SURGERY PROGRESS NOTE   VASCULAR SURGEON: Ted Meraz MD, RPVI     LOCATION:  [facility]     Young Ordonez   Medical Record #:  [id number]  YOB: 1950  Age:  73 year old     Date of Service: 6/29/2023    PRIMARY CARE PROVIDER: Jigar Crawford      Reason for visit:  Consult; carotid stenosis    IMPRESSION:  73 year old male seen for asymptomatic high-grade left common carotid and internal common carotid high grade stenosis; based on velocity criteria.  Patient does have a history of right carotid endarterectomy in the past and also aortobifemoral bypass  RECOMMENDATION/RISKS:  Continue best medical management therapy with ASA and statin. Proceed with CT head & neck as scheduled tomorrow and we will discuss the results via video visit on Monday. Treatment options for carotid stenosis that were discussed were; TCAR vs open vascular repair. Offered to order JOSE's to further evaluate the left foot pain, patient declined at this time. Advised patient to monitor symptoms and report any changes.     HPI:  Young Ordonez is a 73 year old male who was seen today for carotid stenosis.  Patient is asymptomatic from neurological standpoint and denies any symptoms of TIA, stroke, or amaurosis    REVIEW OF SYSTEMS:    A 12 point ROS was reviewed and is negative.     PHH:    Past Medical History:   Diagnosis Date     Acute myocardial infarction, unspecified site, episode of care unspecified 1995     Arthritis      Cardiomyopathy (H)      Claudication (H)      COPD (chronic obstructive pulmonary disease) (H)      Coronary artery disease      Depression      DEPRESSIVE DISORDER NEC 4/29/2008     Essential hypertension, benign      Gout      Gout attack 5/25/2017     Hepatitis C carrier (H)      HTN (hypertension)      Hyperlipidemia      Infectious viral hepatitis     carrier of viral hepatitis     Low back pain     chronic     MI (myocardial infarction) (H) 1995     Obesity       Other and unspecified hyperlipidemia      PAD (peripheral artery disease) (H)      Peyronie's disease           Past Surgical History:   Procedure Laterality Date     ANGIOPLASTY  1995     ANKLE FRACTURE SURGERY Left      BYPASS GRAFT AORTOFEMORAL N/A 5/17/2017    Procedure: AORTOBIFEMORAL BYPASS ;  Surgeon: Ilir FERRO MD;  Location: [facility];  Service:      CAROTID ENDARTERECTOMY Right 4/12/2018    Procedure: RIGHT CAROTID ENDARTERECTOMY WITH EEG;  Surgeon: Sergei Lemus MD;  Location: [facility] OR;  Service:      FRACTURE SURGERY       IR AORTIC ARCH 4 VESSEL ANGIOGRAM  11/9/2009     IR CAROTID ANGIOGRAM  11/9/2009     IR CAROTID ANGIOGRAM  11/9/2009     IR EXTREMITY ANGIOGRAM BILATERAL  4/6/2017     MANDIBLE SURGERY       ZZC NONSPECIFIC PROCEDURE  1995    MI -- angioplasty       ALLERGIES:  No known allergies    MEDS:    Current Outpatient Medications:      allopurinol (ZYLOPRIM) 300 MG tablet, Take 1 tablet (300 mg) by mouth daily, Disp: 90 tablet, Rfl: 3     amLODIPine (NORVASC) 5 MG tablet, Take 1 tablet (5 mg) by mouth daily, Disp: 90 tablet, Rfl: 3     aspirin (ASA) 325 MG tablet, Take 1 tablet (325 mg) by mouth daily Only start taking this dose of aspirin AFTER you have finished the 20 days of Plavix., Disp: 90 tablet, Rfl: 1     atenolol (TENORMIN) 100 MG tablet, Take 1 tablet (100 mg) by mouth daily, Disp: 90 tablet, Rfl: 3     atorvastatin (LIPITOR) 80 MG tablet, Take 1 tablet (80 mg) by mouth daily, Disp: 90 tablet, Rfl: 3     lisinopril (ZESTRIL) 20 MG tablet, Take 1 tablet (20 mg) by mouth daily, Disp: 90 tablet, Rfl: 3     LORazepam (ATIVAN) 0.5 MG tablet, Take 1-2 tablets (0.5-1 mg) by mouth daily as needed for anxiety, Disp: 60 tablet, Rfl: 3     sertraline (ZOLOFT) 100 MG tablet, Take 2 tablets (200 mg) by mouth daily, Disp: 180 tablet, Rfl: 1     traZODone (DESYREL) 50 MG tablet, Take 1 tablet (50 mg) by mouth At Bedtime, Disp: 90 tablet, Rfl: 1    SOCIAL HABITS:     History   Smoking Status     Former     Packs/day: 1.50     Types: Cigarettes     Quit date: 1/1/2013   Smokeless Tobacco     Never     Social History    Substance and Sexual Activity      Alcohol use: No        Comment: Alcoholic Drinks/day: sober since 1984      History   Drug Use No       FAMILY HISTORY:    Family History   Problem Relation Age of Onset     Cancer Mother      Respiratory Father         d:age 59     Cerebrovascular Disease Father      Hypertension Father      Family History Negative Brother         b:1960     Heart Disease Paternal Grandmother      Emphysema Paternal Grandfather      Diabetes Other         paternal uncle     Glaucoma No family hx of      Macular Degeneration No family hx of        PE:  There were no vitals taken for this visit.  Wt Readings from Last 1 Encounters:   06/05/23 170 lb 9.6 oz (77.4 kg)     There is no height or weight on file to calculate BMI.    EXAM:  GENERAL: This is a well-developed 73 year old male who appears his stated age  EYES: Grossly normal.  MOUTH: Buccal mucosa normal   CARDIAC: Normal   CHEST/LUNG: Clear to auscultation bilaterally  GASTROINTESINAL soft nontender nondistended  MUSCULOSKELETAL: Grossly normal and both lower extremities are intact.  HEME/LYMPH: No lymphedema  NEUROLOGIC: Focally intact, Alert and oriented x 3.   PSYCH: appropriate affect  INTEGUMENT: No open lesions or ulcers      DIAGNOSTIC STUDIES:     Images:  US Carotid Bilateral    Result Date: 6/27/2023  Table formatting from the original result was not included. BILATERAL CAROTID ULTRASOUND (Date: 06/27/23) Indication: Surveillance of right carotid endarterectomy/left ICA stenosis. Carotid bruit Previous: 05/06/2019 Symptoms: Hypertension, MI, Heart Disease, and Previous Smoker Right Endarterectomy: 04/12/2018 TECHNIQUE: Duplex exam performed utilizing 2D gray-scale imaging, Doppler interrogation with color-flow and spectral waveform analysis. Right Velocity  Chart (cm/sec)  Location Right DISTAL CCA-PS 73 DISTAL CCA- ED 16 PROX ICA-PS 64 PROX ICA-ED 15 ECA-PS 88 ECA-ED 8 Vertebral- Antegrade 71 Subclavian A- Biphasic 277 Ratio ICA/CCA-PS 0.87 Ratio ICA/CCA-ED 0.93 Left Velocity  Chart (cm/sec) Location Left MID CCA- MID CCA-ED 41 DISTAL CCA- DISTAL CCA- ED 27 PROX ICA- PROX ICA-ED 98 ECA- ECA-ED 19 Vertebral- Antegrade 26 Subclavian A- Monophasic 225 Ratio ICA/CCA-PS 3.15 Ratio ICA/CCA-ED 3.62 FINDINGS: RIGHT: There is minimal  atheromatous plaque.  LEFT: There is predominantly echogenic atheromatous plaque.  RIGHT: Vertebral artery and subclavian artery waveforms are antegrade. LEFT: Vertebral artery and subclavian artery waveforms are antegrade. Impression:  1. Less than 50% diameter stenosis of the right ICA relative to the proximal ICA diameter. 2. 70-99% diameter stenosis of the left ICA relative to the proximal ICA diameter based on peak systolic velocities or 50-69% based on ratio. Elevated peak systolic velocities in the left mid common carotid artery (307 cm/s). Evaluation based on velocities and NASCET criteria Category PSV (cm/s)  Plaque Imaging EDV (cm/s)  ICA/CCA Ratio Normal,  <125  None <40 <2 Mild <50% <125 < than 50% DR stenosis <40 <2 Moderate Disease 50-69% 125-230 > than 50% DR stenosis  2.0-4.0 Severe->70% but less than near occlusion >230 > than 50% DR stenosis >100 >4.0 Near Occlusion May be low or undetectable Visible, extensive Variable Variable Occlusion No Flow Visible with no detectable lumen N/A N/A Plaquetype Description Type 1 Uniformly echolucent Type 2 Predominantly echolucent >50% Type 3 Predominantly echogenic >50% Type 4 Uniformly echogenic Type 5 Unclassified due to poor visualization Ulcer Visible Ulcerative Plaque       LABS:      Sodium   Date Value Ref Range Status   06/05/2023 141 136 - 145 mmol/L Final   05/31/2022 142 136 - 145 mmol/L Final   08/20/2020 140 133 - 144 mmol/L Final   08/20/2020 140 136 - 145 mmol/L  Final   07/13/2007 140 133 - 144 mmol/L Final   04/12/2004 143 133 - 144 mmol/L Final     Urea Nitrogen   Date Value Ref Range Status   06/05/2023 24.3 (H) 8.0 - 23.0 mg/dL Final   05/31/2022 19 8 - 28 mg/dL Final   08/20/2020 22 7 - 30 mg/dL Final   08/20/2020 27 8 - 28 mg/dL Final   07/08/2019 20 8 - 22 mg/dL Final   07/13/2007 16 7 - 30 mg/dL Final   04/12/2004 20 7 - 30 mg/dL Final     Hemoglobin   Date Value Ref Range Status   06/05/2023 13.1 (L) 13.3 - 17.7 g/dL Final   08/20/2020 13.7 13.3 - 17.7 g/dL Final   08/20/2020 14.2 14.0 - 18.0 g/dL Final   04/13/2018 12.4 (L) 14.0 - 18.0 g/dL Final   07/13/2007 12.2 (L) 13.3 - 17.7 g/dL Final   04/05/2004 14.7 13.3 - 17.7 g/dL Final     Platelet Count   Date Value Ref Range Status   06/05/2023 221 150 - 450 10e3/uL Final   08/20/2020 195 150 - 450 10e9/L Final   08/20/2020 202 140 - 440 thou/uL Final   04/13/2018 194 140 - 440 thou/uL Final   07/13/2007 145 (L) 150 - 450 10e9/L Final   04/05/2004 227 150 - 450 10e9/L Final     INR   Date Value Ref Range Status   08/20/2020 1.17 (H) 0.86 - 1.14 Final   08/20/2020 1.09 0.90 - 1.10 Final   04/10/2018 1.09 0.90 - 1.10 Final       30 minutes spent on the day of encounter doing chart review, history and exam, documentation, and further activities as noted.     Ted Meraz MD, [facility]    VASCULAR SURGERY

## 2023-07-06 NOTE — TELEPHONE ENCOUNTER
Called and spoke to patient, he had just gone over CT results with vascular surgery and is aware of bladder mass.  Orders placed for urology and oncology, pt is aware and will schedule    Any Zamora PA-C

## 2023-07-06 NOTE — PROGRESS NOTES
[facility]        Patient is here for a  follow up  to discuss carotid stenosis. Patient has no symptoms. Pt had CTA done 6/30/23.     Pt is currently taking Aspirin and Statin.    There were no vitals taken for this visit.    The provider has been notified that the patient has no concerns.     Questions patient would like addressed today are: N/A.    Refills are needed: No    Has homecare services and agency name:  Rosalba BROWN RN

## 2023-07-06 NOTE — TELEPHONE ENCOUNTER
Tried to call patient and no answer, left message for patient to call back.     Huddle with provider covering today for me and please look at CT scans- may consider offering a clinic appointment to review with a provider as well.       He will need referral to urology and hematology/oncology.

## 2023-07-06 NOTE — PROGRESS NOTES
New Patient Oncology Nurse Navigator Note     Referring provider:   Any Zamora PA-C  [facility]      Referred to (specialty): Medical Oncology    Date Referral Received:   07/06/23     Evaluation for :   bladder mass, possible metastatic disease      Clinical History (per Nurse review of records provided):    Patient was seen by the vascular clinic.  Recent imaging identified multiple right lung masses as well as a bladder mass, suspicious for metastatic cancer.  Referral to medical oncology and urology.      EXAM: CTA HEAD NECK W CONTRAST  LOCATION: [facility]  DATE: 6/30/2023  IMPRESSION:   HEAD CT:  1.  Mild age-related changes without acute intracranial process.     HEAD CTA:   1.  No large vessel occlusion or hemodynamically significant stenosis.     NECK CTA:  1.  Critical stenosis at the left carotid bifurcation with greater than 90% luminal narrowing, similar in appearance compared to 08/20/2020.  2.  Ulcerated plaque at the proximal descending thoracic aorta.  3.  Remaining atherosclerotic changes in the neck including severe proximal vertebral artery stenosis are also similar in appearance compared to 2020.  4.  New bilateral upper lobe pulmonary nodules since 2020, suspicious for malignancy. This is characterized on the corresponding same day CT chest.  IMPRESSION:   HEAD CT:  1.  Mild age-related changes without acute intracranial process.     HEAD CTA:   1.  No large vessel occlusion or hemodynamically significant stenosis.     NECK CTA:  1.  Critical stenosis at the left carotid bifurcation with greater than 90% luminal narrowing, similar in appearance compared to 08/20/2020.  2.  Ulcerated plaque at the proximal descending thoracic aorta.  3.  Remaining atherosclerotic changes in the neck including severe proximal vertebral artery stenosis are also similar in appearance compared to 2020.  4.  New bilateral upper lobe  pulmonary nodules since 2020, suspicious for malignancy. This is characterized on the corresponding same day CT chest.    EXAM: CT UROGRAM WO and W CONTRAST  LOCATION: [facility]  DATE: 6/30/2023  IMPRESSION:  1.  2.1 cm bladder mass consistent with urothelial neoplasm.  2.  2.4 cm right lower lobe pulmonary nodule suspicious for metastatic disease. Chest CT recommended for further evaluation.    EXAM: CT CHEST WITHOUT CONTRAST  LOCATION: [facility]  DATE: 06/30/2023  IMPRESSION:   1.  Multiple bilateral pulmonary nodules including 2.3 cm right upper lobe spiculated nodule, which could be primary or metastatic nodule, and multiple other likely metastatic nodules measure up to 2.1 cm in the right lower lobe.  2.  Mildly prominent mediastinal lymph nodes, indeterminate, could be reactive or metastatic.  3.  Extensive atherosclerotic vascular calcification of the coronary arteries.     Records Location (Care Everywhere, Media, etc.):   Epic      Additional testing needed prior to consult:     I called patient to discuss referral to medical oncology.  I left message for him to return my call.  First available that I have for appointments is 7/20, with Dr. Crowe @ .  I am sending a message to inquire if additional work up is desired.  I see he also has appointment with urology 8/1/23.    Addended: Urology appointment has been moved up to 7/18/23.

## 2023-07-06 NOTE — PROGRESS NOTES
Vascular Surgery Clinic Followup Note    LOCATION:  [facility]    Young Ordonez  Medical Record #:  [id number]  YOB: 1950  Age:  73 year old     Date of Service: 7/6/2023         Assessment and Plan:    73 year old male seen for asymptomatic high-grade left common carotid and internal carotid artery stenosis, which is now confirmed on CTA neck.  He does have history of right carotid endarterectomy as well as aortobifemoral bypass.    Recent imaging also identified multiple right lung masses as well as a bladder mass, suspicious for metastatic cancer.  As we discussed, we would not offer elective carotid endarterectomy in the setting of asymptomatic disease if his lifespan were to be less than 5 years.  If however he is found to have cancer with a favorable prognosis and the lifespan of more than 5 years would be happy to offer carotid endarterectomy.    He will be referred to urology and oncology and will follow-up in vascular surgery clinic in 2 months.  Patient understands that if he were to develop stroke symptoms he should call 911 or present to the emergency department.    Patient was seen and discussed with staff, Dr. Meraz.    Cuauhtemoc Lloyd MD           Interval History:   History 7 presents to clinic today to discuss recent CTA neck performed for high-grade asymptomatic left carotid artery stenosis that was first seen carotid duplex.  He has previously undergone right carotid endarterectomy.  Notes recent hematuria and had CT urogram as well as noncontrast scan of his chest for lung cancer screening.  Unfortunately, several masses all around or little larger than 2 cm in the right upper, the lower lobes, and bladder.  CTA of the neck is high-grade stenosis of the right common carotid as well as the right ICA, though this does not appear significantly changed from his previous scan in 2020.  He remains asymptomatic and denies any vision changes, word finding  difficulties, unilateral numbness or weakness.  He does have a previous aortobifemoral bypass and has had chronic pain and weakness in his left foot but declines any examination or further investigation of this today, including getting ABIs.          Medications:     Current Outpatient Medications:      allopurinol (ZYLOPRIM) 300 MG tablet, Take 1 tablet (300 mg) by mouth daily, Disp: 90 tablet, Rfl: 3     amLODIPine (NORVASC) 5 MG tablet, Take 1 tablet (5 mg) by mouth daily, Disp: 90 tablet, Rfl: 3     aspirin (ASA) 325 MG tablet, Take 1 tablet (325 mg) by mouth daily Only start taking this dose of aspirin AFTER you have finished the 20 days of Plavix., Disp: 90 tablet, Rfl: 1     atenolol (TENORMIN) 100 MG tablet, Take 1 tablet (100 mg) by mouth daily, Disp: 90 tablet, Rfl: 3     atorvastatin (LIPITOR) 80 MG tablet, Take 1 tablet (80 mg) by mouth daily, Disp: 90 tablet, Rfl: 3     lisinopril (ZESTRIL) 20 MG tablet, Take 1 tablet (20 mg) by mouth daily, Disp: 90 tablet, Rfl: 3     LORazepam (ATIVAN) 0.5 MG tablet, Take 1-2 tablets (0.5-1 mg) by mouth daily as needed for anxiety, Disp: 60 tablet, Rfl: 3     sertraline (ZOLOFT) 100 MG tablet, Take 2 tablets (200 mg) by mouth daily, Disp: 180 tablet, Rfl: 1     traZODone (DESYREL) 50 MG tablet, Take 1 tablet (50 mg) by mouth At Bedtime, Disp: 90 tablet, Rfl: 1         Physical Exam:   BP (!) 145/70   Pulse 59   Temp 98.2  F (36.8  C) (Oral)   Resp 12   SpO2 96%   Wt Readings from Last 1 Encounters:   06/05/23 77.4 kg (170 lb 9.6 oz)     There is no height or weight on file to calculate BMI.    EXAM:  Sitting comfortably in chair, awake, alert, appropriate  Nonlabored breathing room air  Palpable radial pulses bilaterally  Speech fluent, tongue midline, normal strength sensation in all 4 extremities.          Data:   Imaging:  CTA Head Neck with Contrast    Result Date: 7/2/2023  EXAM: CTA HEAD NECK W CONTRAST LOCATION: [facility] DATE:  6/30/2023 INDICATION: History of carotid stenosis recheck evaluation COMPARISON: CTA head and neck and MRI brain 08/20/2020, CT chest 06/30/2023. CONTRAST: ISOVUE 370 75 mL TECHNIQUE: Head and neck CT angiogram with IV contrast. Noncontrast head CT followed by axial helical CT images of the head and neck vessels obtained during the arterial phase of intravenous contrast administration. Axial 2D reconstructed images and multiplanar 3D MIP reconstructed images of the head and neck vessels were performed by the technologist. Dose reduction techniques were used. All stenosis measurements made according to NASCET criteria unless otherwise specified. FINDINGS: NONCONTRAST HEAD CT: INTRACRANIAL CONTENTS: No intracranial hemorrhage, extraaxial collection, or mass effect.  No CT evidence of acute infarct. Mild presumed chronic small vessel ischemic changes.  Small chronic left cerebellar and right caudate head lacunar infarcts, unchanged. Mild generalized volume loss. No hydrocephalus. VISUALIZED ORBITS/SINUSES/MASTOIDS: No intraorbital abnormality. No significant paranasal sinus mucosal disease. No middle ear or mastoid effusion. BONES/SOFT TISSUES: No acute abnormality. HEAD CTA: ANTERIOR CIRCULATION: No significant stenosis, occlusion, aneurysm, or high flow vascular malformation. Standard [place] anatomy. POSTERIOR CIRCULATION: No significant stenosis, occlusion, aneurysm, or high flow vascular malformation. Balanced vertebral arteries supply a normal basilar artery. DURAL VENOUS SINUSES: Expected enhancement of the major dural venous sinuses. NECK CTA: RIGHT CAROTID: Soft and calcified atherosclerotic plaque results in less than 25% stenosis in the common carotid artery. No significant internal carotid artery stenosis. No dissection. LEFT CAROTID: Calcified atherosclerotic plaque results in critical, 90% or greater, stenosis at the carotid bifurcation. Soft and calcified plaque in the common carotid artery with  less than 50% luminal narrowing. No dissection. VERTEBRAL ARTERIES:  Severe stenosis of the proximal bilateral vertebral arteries from calcified plaque. Balanced vertebral arteries. AORTIC ARCH: Classic aortic arch anatomy. Irregular, likely ulcerated plaque at the aortic arch. NONVASCULAR STRUCTURES: Bilateral upper lobe nodules described on the corresponding same day CT chest. These are new since 2020.     IMPRESSION: HEAD CT: 1.  Mild age-related changes without acute intracranial process. HEAD CTA: 1.  No large vessel occlusion or hemodynamically significant stenosis. NECK CTA: 1.  Critical stenosis at the left carotid bifurcation with greater than 90% luminal narrowing, similar in appearance compared to 08/20/2020. 2.  Ulcerated plaque at the proximal descending thoracic aorta. 3.  Remaining atherosclerotic changes in the neck including severe proximal vertebral artery stenosis are also similar in appearance compared to 2020. 4.  New bilateral upper lobe pulmonary nodules since 2020, suspicious for malignancy. This is characterized on the corresponding same day CT chest.    CT Chest w/o Contrast    Result Date: 7/1/2023  EXAM: CT CHEST WITHOUT CONTRAST LOCATION: [facility] DATE: 06/30/2023 INDICATION: Chronic obstructive pulmonary disease, unspecified COPD type (H), Pulmonary nodules. COMPARISON: Chest x-ray on 08/20/2020. TECHNIQUE: CT chest without IV contrast. Multiplanar reformats were obtained. Dose reduction techniques were used. CONTRAST: None. FINDINGS: LUNGS AND PLEURA: No pleural effusion or pneumothorax. There is approximately 2.3 x 2.2 cm right upper lobe spiculated nodule, could be primary metastatic nodule. Other scattered pulmonary nodules including 1.1 cm left upper lobe nodule (series 5 image 31) and 2.1 cm right lower lobe nodule, likely metastatic. MEDIASTINUM/AXILLAE: Mild cardiomegaly. No significant pericardial effusion. Multiple prominent mediastinal lymph nodes  including 1.1 cm short axis precarinal lymph node (series 4 image 58). CORONARY ARTERY CALCIFICATION: Extensive. UPPER ABDOMEN: Limited evaluation of the upper abdomen due to lack of coverage and contrast. Cholelithiasis. Please refer to concurrently performed CT urogram for findings related to the upper abdomen. MUSCULOSKELETAL: Multilevel degenerative changes of the spine. No suspicious osseous lesion.     IMPRESSION: 1.  Multiple bilateral pulmonary nodules including 2.3 cm right upper lobe spiculated nodule, which could be primary or metastatic nodule, and multiple other likely metastatic nodules measure up to 2.1 cm in the right lower lobe. 2.  Mildly prominent mediastinal lymph nodes, indeterminate, could be reactive or metastatic. 3.  Extensive atherosclerotic vascular calcification of the coronary arteries.     CT Urogram wo & w Contrast    Result Date: 7/1/2023  EXAM: CT UROGRAM WO and W CONTRAST LOCATION: [facility] DATE: 6/30/2023 INDICATION:  Hematuria, unspecified type COMPARISON: 02/04/2019 TECHNIQUE: CT scan of the abdomen and pelvis using urogram technique with pre contrast, post contrast, and delayed images. Multiplanar reformats were obtained. Dose reduction techniques were used. CONTRAST: ISOVUE 370 75 mL FINDINGS: LOWER CHEST: Increasing linear fibrotic change in both lung bases. New right lower lobe mass measuring 2.4 cm, image 42 series 4. 2 to 3 mm pleural-based nodule in the right lower lobe, image 33 unchanged. Advanced coronary artery calcifications. Old left ventricular infarct. HEPATOBILIARY: Cholelithiasis. Incidental hepatic cyst. PANCREAS: Unremarkable SPLEEN: Tiny inferior pole low-attenuation lesion, likely a cyst or hemangioma ADRENAL GLANDS: Unremarkable RIGHT KIDNEY/URETER: No suspicious right renal mass lesions or nephroureterolithiasis. LEFT KIDNEY/URETER: No suspicious left renal masses or nephroureterolithiasis. BLADDER: Right bladder mass posteriorly  measuring 2.1 cm, image 207, near the UVJ. Small anterior bladder diverticulum. BOWEL: Normal caliber appendix. No bowel obstruction. Uncomplicated diverticulosis. LYMPH NODES: Scattered subcentimeter retroperitoneal nodes. VASCULATURE: Chronic bilateral iliac occlusion, status post aorto bifemoral bypass. PELVIC ORGANS: Mild prostatic hypertrophy, including median lobe hypertrophy into the bladder base. MUSCULOSKELETAL: No suspicious lytic or blastic lesions.     IMPRESSION: 1.  2.1 cm bladder mass consistent with urothelial neoplasm. 2.  2.4 cm right lower lobe pulmonary nodule suspicious for metastatic disease. Chest CT recommended for further evaluation. [Access Center: Ensure report delivery] This report will be copied to the [facility] [place] to ensure a provider acknowledges the finding. Access Center is available Monday through Friday 8am-3:30 pm.    US Carotid Bilateral    Result Date: 6/27/2023  Table formatting from the original result was not included. BILATERAL CAROTID ULTRASOUND (Date: 06/27/23) Indication: Surveillance of right carotid endarterectomy/left ICA stenosis. Carotid bruit Previous: 05/06/2019 Symptoms: Hypertension, MI, Heart Disease, and Previous Smoker Right Endarterectomy: 04/12/2018 TECHNIQUE: Duplex exam performed utilizing 2D gray-scale imaging, Doppler interrogation with color-flow and spectral waveform analysis. Right Velocity  Chart (cm/sec) Location Right DISTAL CCA-PS 73 DISTAL CCA- ED 16 PROX ICA-PS 64 PROX ICA-ED 15 ECA-PS 88 ECA-ED 8 Vertebral- Antegrade 71 Subclavian A- Biphasic 277 Ratio ICA/CCA-PS 0.87 Ratio ICA/CCA-ED 0.93 Left Velocity  Chart (cm/sec) Location Left MID CCA- MID CCA-ED 41 DISTAL CCA- DISTAL CCA- ED 27 PROX ICA- PROX ICA-ED 98 ECA- ECA-ED 19 Vertebral- Antegrade 26 Subclavian A- Monophasic 225 Ratio ICA/CCA-PS 3.15 Ratio ICA/CCA-ED 3.62 FINDINGS: RIGHT: There is minimal  atheromatous plaque.  LEFT: There is predominantly  echogenic atheromatous plaque.  RIGHT: Vertebral artery and subclavian artery waveforms are antegrade. LEFT: Vertebral artery and subclavian artery waveforms are antegrade. Impression:  1. Less than 50% diameter stenosis of the right ICA relative to the proximal ICA diameter. 2. 70-99% diameter stenosis of the left ICA relative to the proximal ICA diameter based on peak systolic velocities or 50-69% based on ratio. Elevated peak systolic velocities in the left mid common carotid artery (307 cm/s). Evaluation based on velocities and NASCET criteria Category PSV (cm/s)  Plaque Imaging EDV (cm/s)  ICA/CCA Ratio Normal,  <125  None <40 <2 Mild <50% <125 < than 50% DR stenosis <40 <2 Moderate Disease 50-69% 125-230 > than 50% DR stenosis  2.0-4.0 Severe->70% but less than near occlusion >230 > than 50% DR stenosis >100 >4.0 Near Occlusion May be low or undetectable Visible, extensive Variable Variable Occlusion No Flow Visible with no detectable lumen N/A N/A Plaquetype Description Type 1 Uniformly echolucent Type 2 Predominantly echolucent >50% Type 3 Predominantly echogenic >50% Type 4 Uniformly echogenic Type 5 Unclassified due to poor visualization Ulcer Visible Ulcerative Plaque              Cuauhtemoc Lloyd MD

## 2023-07-11 NOTE — TELEPHONE ENCOUNTER
S-(situation):   ----- Message from TORO Catherine CNP sent at 7/10/2023  5:27 PM CDT -----  IMPRESSION:  1.  2.1 cm bladder mass consistent with urothelial neoplasm.  2.  2.4 cm right lower lobe pulmonary nodule suspicious for metastatic disease. Chest CT recommended for further evaluation.    Communicate findings - I recommend follow up with urologist given symptoms and findings. Please let me know if patient would like me to place a referral.     TORO Catherine CNP on 7/10/2023 at 5:27 PM    B-(background): LOV with PCP 6/5/2023. Urology referral placed at that time:    Oncology nurse navigator note from 7/6:    Note from vascular surgery 7/6:        A-(assessment): Appears that patient is aware of results, has appointment with provider who specializes in bladder cancer on 7/18/23.    R-(recommendations): Routing to provider to inform, verify if further action necessary from primary care at this time.

## 2023-07-14 NOTE — PROGRESS NOTES
Addendum     July 14, 2023  LVM for pt that Dr Crowe will be able to see him for consult on 7/18 at noon, provided callback number to schedule this -- coordinating same day as his current appt with Dr Mitchell. NPS will call and schedule when confirmed with pt.  Future Appointments   Date Time Provider Department Center   7/18/2023  2:30 PM German Mitchell MD [facility]   9/7/2023  1:00 PM Ted Meraz MD [facility] [place]     Valeri Barth, RN, BSN, OCN  Hematology/Oncology Nurse Navigator  [facility]  [phone] / [phone]

## 2023-07-14 NOTE — TELEPHONE ENCOUNTER
I see they are being seen on 7/18/23 - that the appointment was moved up - we are all set.  Thank you    TORO Catherine CNP on 7/14/2023 at 5:16 PM

## 2023-07-18 NOTE — LETTER
"    7/18/2023         RE: Young Ordonez  [address] [facility] [address]        Dear Colleague,    Thank you for referring your patient, Young Ordonez, to the [facility]. Please see a copy of my visit note below.    Urology Rooming Note    July 18, 2023 2:14 PM   Young Ordonez is a 73 year old male who presents for:    Chief Complaint   Patient presents with     urology clinic     New consult bladder cancer     Initial Vitals: Ht 1.702 m (5' 7\")   Wt 77.1 kg (170 lb)   BMI 26.63 kg/m   Estimated body mass index is 26.63 kg/m  as calculated from the following:    Height as of this encounter: 1.702 m (5' 7\").    Weight as of this encounter: 77.1 kg (170 lb). Body surface area is 1.91 meters squared.  Data Unavailable Comment: Data Unavailable     Allergies reviewed: Yes  Medications reviewed: Yes    Medications: Medication refills not needed today.  Pharmacy name entered into [employer]:    [facility] [id number] [place] [facility] [address] [facility] [address] [place] [facility] [address] [facility] [address]      Jackie Umaña          Chief Complaint:    Bladder Tumor           Consult or Referral:     Mr. Young Ordonez is a 73 year old male seen at the request of Dr. Zamora.         History of Present Illness:     Young Ordonez is a 73 year old male being seen for evaluation of recently diagnosed bladder mass.  Duration of problem:  1 month  Previous treatments: None yet     accompanied by his spouse  Reviewed previous notes from Dr. Kt Vidal presents today for evaluation of recently diagnosed bladder mass on a CT angio for his vascular disease  He subsequently has had a CT urogram and a CT chest  He has prior history of hematuria  Currently his urine is normal  CT chest was also suggestive of multiple pulmonary nodules which could be metastatic  Young is a prior smoker  He has had prior vascular " surgery with aortobifemoral bypass and carotid endarterectomy             Past Medical History:     Past Medical History:   Diagnosis Date     Acute myocardial infarction, unspecified site, episode of care unspecified 1995     Arthritis      Cardiomyopathy (H)      Claudication (H)      COPD (chronic obstructive pulmonary disease) (H)      Coronary artery disease      Depression      DEPRESSIVE DISORDER NEC 4/29/2008     Essential hypertension, benign      Gout      Gout attack 5/25/2017     Hepatitis C carrier (H)      HTN (hypertension)      Hyperlipidemia      Infectious viral hepatitis     carrier of viral hepatitis     Low back pain     chronic     MI (myocardial infarction) (H) 1995     Obesity      Other and unspecified hyperlipidemia      PAD (peripheral artery disease) (H)      Peyronie's disease             Past Surgical History:     Past Surgical History:   Procedure Laterality Date     ANGIOPLASTY  1995     ANKLE FRACTURE SURGERY Left      BYPASS GRAFT AORTOFEMORAL N/A 5/17/2017    Procedure: AORTOBIFEMORAL BYPASS ;  Surgeon: Ilir FERRO MD;  Location: [facility];  Service:      CAROTID ENDARTERECTOMY Right 4/12/2018    Procedure: RIGHT CAROTID ENDARTERECTOMY WITH EEG;  Surgeon: Sergei Lemus MD;  Location: [facility];  Service:      FRACTURE SURGERY       IR AORTIC ARCH 4 VESSEL ANGIOGRAM  11/9/2009     IR CAROTID ANGIOGRAM  11/9/2009     IR CAROTID ANGIOGRAM  11/9/2009     IR EXTREMITY ANGIOGRAM BILATERAL  4/6/2017     MANDIBLE SURGERY       ZZC NONSPECIFIC PROCEDURE  1995    MI -- angioplasty            Medications     Current Outpatient Medications   Medication     allopurinol (ZYLOPRIM) 300 MG tablet     amLODIPine (NORVASC) 5 MG tablet     aspirin (ASA) 325 MG tablet     atenolol (TENORMIN) 100 MG tablet     atorvastatin (LIPITOR) 80 MG tablet     lisinopril (ZESTRIL) 20 MG tablet     LORazepam (ATIVAN) 0.5 MG tablet     sertraline (ZOLOFT) 100 MG tablet     traZODone  (DESYREL) 50 MG tablet     No current facility-administered medications for this visit.            Family History:     Family History   Problem Relation Age of Onset     Cancer Mother      Respiratory Father         d:age 59     Cerebrovascular Disease Father      Hypertension Father      Family History Negative Brother         b:1960     Heart Disease Paternal Grandmother      Emphysema Paternal Grandfather      Diabetes Other         paternal uncle     Glaucoma No family hx of      Macular Degeneration No family hx of             Social History:     Social History     Socioeconomic History     Marital status:      Spouse name: Mitra     Number of children: 2     Years of education: 12     Highest education level: Not on file   Occupational History     Occupation:      Comment: [employer]   Tobacco Use     Smoking status: Former     Packs/day: 1.50     Types: Cigarettes     Quit date: 1/1/2013     Years since quitting: 10.5     Smokeless tobacco: Never   Substance and Sexual Activity     Alcohol use: No     Comment: Alcoholic Drinks/day: sober since 1984     Drug use: No     Sexual activity: Yes     Partners: Female   Other Topics Concern     Not on file   Social History Narrative     Not on file     Social Determinants of Health     Financial Resource Strain: Not on file   Food Insecurity: Not on file   Transportation Needs: Not on file   Physical Activity: Not on file   Stress: Not on file   Social Connections: Not on file   Intimate Partner Violence: Not on file   Housing Stability: Not on file            Allergies:   No known allergies         Review of Systems:  From intake questionnaire     Skin: negative  Eyes: negative  Ears/Nose/Throat: negative  Respiratory: No shortness of breath, dyspnea on exertion, cough, or hemoptysis  Cardiovascular: No chest pain or palpitations  Gastrointestinal: negative; no nausea/vomiting, constipation or diarrhea  Genitourinary: as per  "HPI  Musculoskeletal: negative  Neurologic: negative  Psychiatric: negative  Hematologic/Lymphatic/Immunologic: negative  Endocrine: negative         Physical Exam:     Patient is a 73 year old  male   Vitals: Blood pressure 117/65, pulse 62, resp. rate 16, height 1.702 m (5' 7\"), weight 77.1 kg (170 lb), SpO2 92 %.  Constitutional: Body mass index is 26.63 kg/m .  Alert, no acute distress, oriented, conversant  Eyes: no scleral icterus; extraocular muscles intact, moist conjunctivae  Neck: trachea midline, no thyromegaly  Ears/nose/mouth: throat/mouth:normal, good dentition  Respiratory: no respiratory distress, or pursed lip breathing  Cardiovascular: pulses strong and intact; no obvious jugular venous distension present  Gastrointestinal: soft, nontender, no organomegaly or masses,   Lymphatics: No inguinal adenopathy  Musculoskeletal: extremities normal, no peripheral edema  Skin: no suspicious lesions or rashes  Neuro: Alert, oriented, speech and mentation normal  Psych: affect and mood normal, alert and oriented to person, place and time  Gait: Normal  : deferred      Labs and Pathology:    The following labs were reviewed by me and discussed with the patient:  UA: Abnormal:    Latest Reference Range & Units 06/05/23 15:22   Color Urine Colorless, Straw, Light Yellow, Yellow  Yellow   Appearance Urine Clear  Clear   Glucose Urine Negative mg/dL Negative   Bilirubin Urine Negative  Negative   Ketones Urine Negative mg/dL Negative   Specific Gravity Urine 1.005 - 1.030  1.020   pH Urine 5.0 - 8.0  5.5   Protein Albumin Urine Negative mg/dL 30 !   Urobilinogen Urine 0.2, 1.0 E.U./dL 0.2   Nitrite Urine Negative  Negative   Blood Urine Negative  Moderate !   Leukocyte Esterase Urine Negative  Trace !   WBC Urine 0-5 /HPF /HPF 5-10 !   RBC Urine 0-2 /HPF /HPF 2-5 !   Bacteria Urine None Seen /HPF Few !   Squamous Epithelial /LPF Urine None Seen /LPF Moderate !   Mucus Urine None Seen /LPF Present !   !: Data is " abnormal  Significant for   Lab Results   Component Value Date    CR 1.38 06/05/2023    CR 1.26 05/31/2022    CR 1.07 08/20/2020    CR 1.4 08/20/2020    CR 1.45 08/20/2020    CR 1.21 07/08/2019    CR 1.34 02/01/2019    CR 1.00 04/13/2018    CR 1.28 04/12/2018    CR 1.20 03/27/2018    CR 1.31 01/16/2018    CR 1.46 04/06/2017    CR 1.15 07/13/2007    CR 1.30 04/12/2004    CR 1.20 04/05/2004    CR 1.2 08/25/2003     PSA   Date Value Ref Range Status   07/13/2007 0.59 0 - 4 ug/L Final   04/05/2004 0.35 0 - 4 ug/L Final     Prostate Specific Antigen Screen   Date Value Ref Range Status   06/05/2023 0.64 0.00 - 6.50 ng/mL Final   05/31/2022 0.67 0.00 - 6.50 ug/L Final   11/19/2019 0.7 0.0 - 4.5 ng/mL Final   03/27/2018 0.4 0.0 - 4.5 ng/mL Final   02/22/2011 1.04 <4.01 ng/mL Final             Imaging:    The following imaging exams were independently viewed and interpreted by me and discussed with patient:  CT Scan Abd/Pelvis: Abnormal:   I reviewed the CT images were discussed with the patient  Normal bilateral kidneys  No hydronephrosis  2.5 cm sized bladder mass on the right posterior lateral wall close to the UVJ  CT chest: Abnormal:   IMPRESSION:   1.  Multiple bilateral pulmonary nodules including 2.3 cm right upper lobe spiculated nodule, which could be primary or metastatic nodule, and multiple other likely metastatic nodules measure up to 2.1 cm in the right lower lobe.  2.  Mildly prominent mediastinal lymph nodes, indeterminate, could be reactive or metastatic.  3.  Extensive atherosclerotic vascular calcification of the coronary arteries.             Assessment and Plan:     Malignant neoplasm of urinary bladder, unspecified site (H)  We discussed in detail about significance of the bladder mass and the need to have a pathological diagnosis  We also talked about transurethral resection of bladder tumor which would be the primary mode of treating this tumor followed by additional measures depending on the  results of the pathology  We discussed in detail about the resection, need for possible hospital stay, possible need for bladder irrigation and possible discharge with a Boswell catheter  Complications of the procedure including bleeding, need for transfusion, urinary tract infection and rare complications like bladder perforation were discussed  Since the mass is close to the right ureteral orifice I also discussed with him about the possibility of needing a ureteral stent in case we have to resect the right ureteral orifice at the time of the surgery  Both him and his wife expressed understanding and were agreeable with the plan  We will set up a follow-up visit subsequent to the resection to discuss path results  - Adult Urology  Referral    Pulmonary nodules  He does have multiple lung nodules based on the CT results  Discussed with him that it could be possibly metastatic from the bladder tumor or could be primary lung cancer considering the fact that he is a longtime smoker though he has quit 10 years ago  He is scheduled to see Dr. Crowe and I discussed with him and his wife that he would probably need biopsies from the pulmonary nodules depending on the discussions with Dr. Crowe  Adult Urology  Referral    PAD (peripheral artery disease) (H)   Stenosis of left carotid artery   Chronic obstructive pulmonary disease, unspecified COPD type (H)  He does have significant other comorbidities possibly resulted from his prior smoking history which may have an impact on the surgical recovery  He expressed understanding of this and he currently follows up with his primary care as well along with vascular surgery    Plan:  Schedule for transurethral resection of bladder tumor  Follow-up to discuss path results    Orders  No orders of the defined types were placed in this encounter.      German Mitchell MD  [facility]      ==========================    Additional Billing and  Coding Information:  Review of external notes as documented above   Review of the result(s) of each unique test - UA, creatinine, CT chest    Independent interpretation of a test performed by another physician/other qualified health care professional (not separately reported) -   Reviewed the CT urogram images and discussed them with the patient with implications on surgery    Discussion of management or test interpretation with external physician/other qualified healthcare professional/appropriate source -           25 minutes spent by me on the date of the encounter doing chart review, review of test results, interpretation of tests, patient visit, documentation, and discussion with family     ==========================      Again, thank you for allowing me to participate in the care of your patient.        Sincerely,        German Mitchell MD

## 2023-07-18 NOTE — PATIENT INSTRUCTIONS
We will schedule him for transurethral resection of bladder tumor in the next available day  Plan for follow-up subsequent to that  He needs to see the oncologist for lung nodules.

## 2023-07-18 NOTE — PROGRESS NOTES
"Urology Rooming Note    July 18, 2023 2:14 PM   Young Ordonez is a 73 year old male who presents for:    Chief Complaint   Patient presents with     urology clinic     New consult bladder cancer     Initial Vitals: Ht 1.702 m (5' 7\")   Wt 77.1 kg (170 lb)   BMI 26.63 kg/m   Estimated body mass index is 26.63 kg/m  as calculated from the following:    Height as of this encounter: 1.702 m (5' 7\").    Weight as of this encounter: 77.1 kg (170 lb). Body surface area is 1.91 meters squared.  Data Unavailable Comment: Data Unavailable     Allergies reviewed: Yes  Medications reviewed: Yes    Medications: Medication refills not needed today.  Pharmacy name entered into [facility]:    [facility] [address] [place], [place] - [address] [facility] [address] [place] [facility] [address] [facility] [address] [place] [address]      Jackie Umaña  "

## 2023-07-18 NOTE — PROGRESS NOTES
7/18/23   I called in attempt again to reach patient to discuss referral to medical oncology.  Dr. Crowe is still willing to see patient today at noon.  I was unable to reach patient.  I left our new patient scheduling phone number on message, for patient to call to schedule.  I requested if he calls before noon today, that new patient scheduling let me know and he can still be scheduled for appointment today at noon.  If he does not call back in time, he should see Dr. Crowe in next available new spot.  I am alerting Cait, RNCC for Dr. Mitchell of this situation as well.    7/18/23 1450  Patient showed for his urology appointment.  Dr. Crowe is able to see him on 7/25 @ 1130.  I have updated instructions for new patient scheduling and requested they just schedule appointment.  Rosalva, Dr. Crowe's RNCC, has discussed this appointment with patient.

## 2023-07-20 NOTE — TELEPHONE ENCOUNTER
RECORDS STATUS - ALL OTHER DIAGNOSIS      RECORDS RECEIVED FROM: EPIC   DATE RECEIVED:    NOTES STATUS DETAILS   OFFICE NOTE from referring provider Epic 7/6/23: Any Zamora PA-C   OFFICE NOTE from other specialist Epic 7/18/23: Dr. German Mitchell  7/6/23: Dr. Ted Meraz   MEDICATION LIST [facility]    LABS     ANYTHING RELATED TO DIAGNOSIS Epic Most recent from 6/5/23   IMAGING (NEED IMAGES & REPORT)     CT SCANS PACS 6/30/23: CT Head Neck  6/30/23: CT Urogram  6/30/23: CT Chest

## 2023-07-25 NOTE — LETTER
"    7/25/2023         RE: Young Ordonez  [address] [facility] [address]        Dear Colleague,    Thank you for referring your patient, Young Ordonez, to the [facility]. Please see a copy of my visit note below.    Oncology Rooming Note    July 25, 2023 11:23 AM   Young Ordonez is a 73 year old male who presents for:    Chief Complaint   Patient presents with     Oncology Clinic Visit     Bladder Cancer     Initial Vitals: BP (!) 149/60   Pulse 61   Temp 97.2  F (36.2  C)   Resp 16   Wt 76.5 kg (168 lb 11.2 oz)   SpO2 94%   BMI 26.42 kg/m   Estimated body mass index is 26.42 kg/m  as calculated from the following:    Height as of 7/18/23: 1.702 m (5' 7\").    Weight as of this encounter: 76.5 kg (168 lb 11.2 oz). Body surface area is 1.9 meters squared.  No Pain (0) Comment: Data Unavailable   No LMP for male patient.  Allergies reviewed: Yes  Medications reviewed: Yes    Medications: Medication refills not needed today.  Pharmacy name entered into [place] [facility]:    [facility] [address] [place], [place] - [address] [facility] [address] [place] [address] [facility] [address]    Clinical concerns: None       Yuki Carrillo LPN               [facility] Consult Note    Patient: Young Ordonez  MRN: [id number]  Date of Service: Jul 25, 2023       Reason for Visit    Chief Complaint   Patient presents with     Oncology Clinic Visit     Bladder Cancer         Assessment/Plan    ECOG Performance 0 - Independent    #.  A 2.3 x 2.2 cm right upper lobe and additional bilateral pulmonary nodules, suspicious for metastatic disease or primary lung cancer  #.  A right bladder mass of 2.1 cm  #.  Past smoker  #.  Significant vascular disease     I reviewed his complex clinical course.  I reviewed the CT scan images and reviewed with the patient.  I discussed that these lung findings " are concerning for malignant process of either primary or metastatic disease.  I recommended biopsy of the pulmonary nodule.     He also met with Dr. Mitchell from urology and plan for cystoscopic evaluation and biopsy of the bladder mass.   Follow-up with me a couple weeks after completion of biopsy to review the next step in management.    Encounter Diagnoses:    Problem List Items Addressed This Visit    None  Visit Diagnoses     Pulmonary nodules    -  Primary    Relevant Orders    CT Lung Mediastinum Biopsy (Completed)    Bladder mass              ______________________________________________________________________________    Staging History   Cancer Staging   No matching staging information was found for the patient.      History    Mr. Young Ordonez is a very pleasant 73 year old male presented today accompanied by his wife, Mitra.  He is here for further evaluation of multiple pulmonary nodules and a bladder mass found by CT scan during evaluation for his vascular disease.  He does not have any specific pulmonary symptoms.  He does not have dysuria.  He had history of hematuria.  He is on aspirin but he is not on any anticoag coagulants.  He does not have unintentional weight loss.  No appetite changes.  He is a retired .  He quit smoking about 10 years ago.  He smoked for 50+ PPY.  He is a recovering alcoholic.  He does smoke marijuana for recreation.     Review of systems.  Apart from describing in history, the remainder of comprehensive ROS was negative.    Past History    Past Medical History:   Diagnosis Date     Acute myocardial infarction, unspecified site, episode of care unspecified 01/01/1995     Arthritis      Cardiomyopathy (H)      Cerebral artery occlusion with cerebral infarction (H) 2018    lost vision in right eye     Claudication (H)      COPD (chronic obstructive pulmonary disease) (H)      Coronary artery disease      Depression      DEPRESSIVE DISORDER NEC 04/29/2008      Essential hypertension, benign      Gout      Gout attack 05/25/2017     Hepatitis C carrier (H)      HTN (hypertension)      Hyperlipidemia      Infectious viral hepatitis     carrier of viral hepatitis     Low back pain     chronic     MI (myocardial infarction) (H) 01/01/1995     Obesity      Other and unspecified hyperlipidemia      PAD (peripheral artery disease) (H)      Peyronie's disease      Past Surgical History:   Procedure Laterality Date     ANGIOPLASTY  1995     ANKLE FRACTURE SURGERY Left      BYPASS GRAFT AORTOFEMORAL N/A 5/17/2017    Procedure: AORTOBIFEMORAL BYPASS ;  Surgeon: Ilir FERRO MD;  Location: [facility] OR;  Service:      CAROTID ENDARTERECTOMY Right 4/12/2018    Procedure: RIGHT CAROTID ENDARTERECTOMY WITH EEG;  Surgeon: Sergei Lemus MD;  Location: [facility] OR;  Service:      CYSTOSCOPY, TRANSURETHRAL RESECTION (TUR) TUMOR BLADDER, COMBINED N/A 8/3/2023    Procedure: Transurethral resection of bladder tumor 2 cm to 5 cm;  Surgeon: German Mitchell MD;  Location: [facility] OR     FRACTURE SURGERY       IR AORTIC ARCH 4 VESSEL ANGIOGRAM  11/9/2009     IR CAROTID ANGIOGRAM  11/9/2009     IR CAROTID ANGIOGRAM  11/9/2009     IR EXTREMITY ANGIOGRAM BILATERAL  4/6/2017     MANDIBLE SURGERY       ZZC NONSPECIFIC PROCEDURE  1995    MI -- angioplasty     Family History   Problem Relation Age of Onset     Cancer Mother      Respiratory Father         d:age 59     Cerebrovascular Disease Father      Hypertension Father      Family History Negative Brother         b:1960     Heart Disease Paternal Grandmother      Emphysema Paternal Grandfather      Diabetes Other         paternal uncle     Glaucoma No family hx of      Macular Degeneration No family hx of      Social History     Socioeconomic History     Marital status:      Spouse name: Mitra     Number of children: 2     Years of education: 12     Highest education level: None   Occupational History     Occupation: [employer]       Comment: [employer]   Tobacco Use     Smoking status: Former     Packs/day: 1.50     Types: Cigarettes     Quit date: 1/1/2013     Years since quitting: 10.5     Smokeless tobacco: Never   Substance and Sexual Activity     Alcohol use: No     Comment: Alcoholic Drinks/day: sober since 1984     Drug use: No     Sexual activity: Yes     Partners: Female       Allergies    Allergies   Allergen Reactions     No Known Allergies        Physical Exam    BP (!) 149/60   Pulse 61   Temp 97.2  F (36.2  C)   Resp 16   Wt 76.5 kg (168 lb 11.2 oz)   SpO2 94%   BMI 26.42 kg/m      General: alert, awake, not in acute distress  HEENT: Head: Normal, normocephalic, atraumatic.  Eye: Normal external eye, conjunctiva, lids cornea, NARESH.  Nose: Normal external nose, mucus membranes and septum.  Pharynx: Normal buccal mucosa. Normal pharynx.  Neck / Thyroid: Supple, no masses, nodes, nodules or enlargement.  Lymphatics: No abnormally enlarged lymph nodes.  Chest: Normal chest wall and respirations. Clear to auscultation.  Abdomen: abdomen is soft without significant tenderness, masses, organomegaly or guarding  Extremities: normal strength, tone, and muscle mass  Skin: normal. no rash or abnormalities  CNS: non focal.    Lab Results    Recent Results (from the past 168 hour(s))   CBC with platelets   Result Value Ref Range    WBC Count 10.0 4.0 - 11.0 10e3/uL    RBC Count 3.88 (L) 4.40 - 5.90 10e6/uL    Hemoglobin 11.8 (L) 13.3 - 17.7 g/dL    Hematocrit 35.2 (L) 40.0 - 53.0 %    MCV 91 78 - 100 fL    MCH 30.4 26.5 - 33.0 pg    MCHC 33.5 31.5 - 36.5 g/dL    RDW 14.4 10.0 - 15.0 %    Platelet Count 174 150 - 450 10e3/uL   INR   Result Value Ref Range    INR 1.04 0.85 - 1.15   Surgical Pathology Exam   Result Value Ref Range    Case Report       Surgical Pathology Report                         Case: [id number]                                  Authorizing Provider:  Glenn Crowe MD        Collected:            08/01/2023 08:38 AM          Ordering Location:     [facility]          Received:            08/01/2023 09:00 AM                                 [facility] CT                                                   Pathologist:           Anderson Mathis MD                                                        Specimen:    Lung, Upper Lobe, Right                                                                    Final Diagnosis       A(A) Lung, Upper Lobe, Right. :  -Non-small cell carcinoma (squamous cell carcinoma)  - Pending MDL lung testing (addendum)        Comment       The clinical history of a 2.1 cm bladder mass consistent with urothelial neoplasm and a 2.4 cm right lower lobe pulmonary nodule suspicious for metastatic disease is reviewed.  The morphology, immunophenotype and clinical presentation favor a primary lung rather than a metastatic urothelial carcinoma.  I am happy to review any subsequent data.  We can certainly attempt CD44 immunohistochemistry which, if negative, would provide further evidence against evidence against (basal type) urothelial carcinoma.    Cytology and histology demonstrate squamous cell carcinoma with partial keratinization; the tumor cells are moderately to poorly differentiated and are variably positive for CK7, p63 and p53.  Cannot rule out focal staining for TTF-1 and CD56.  GATA3 and CK20 are negative arguing against (luminal type) urothelial carcinoma.    RESULT FOR IMMUNOHISTOCHEMICAL VENTANA CLONE  PD-L1 ASSAY  TUMOR PROPORTION SCORE (TPS): 25 %  INTERPRETATION: LOW PD-L1 EXPRESSION (TPS >/=1-49%)  COMMENT:  This [employer]  PD-L1 immunohistochemistry antibody assay is a laboratory developed test to be used for patients with non-small cell lung carcinoma (NSCLC), gastric or gastroesophageal junction (GEJ) adenocarcinoma, urothelial carcinomas, melanomas, liver, breast and brain tumors who are being considered for treatment with Keytruda  (Pembrolizumab), an anti-PD-1 immune checkpoint inhibitor. The [employer]  PD-L1 assay has been validated by the [facility] against the FDA approved clinical trial-validated PharmDx 22C3 PD-L1 assay. Evidence suggests that the level of PD-L1 expression in the tumor cell population by immunohistochemistry is a major predictor of response to checkpoint inhibitor therapy. Previous studies demonstrate a high correlation between PD-L1 immunohistochemistry expression data obtained with PD-L1 clones Dako 22C3 and [employer]  in NSCLC. (References: Lancet 387:1540-50, 2016; [facility] 375:1823-33, 2016; J Clin Oncol 34:4102-9. 2016; J Thorac Oncol 12:1654-63, 2017; [facility] PD-L1 2018 assessment)  Scoring system: The tumor proportion score (TPS) is determined by enumeration of the percentage of PD-L1 tumor cells with any amount of membrane positivity expressed as a whole number relative to all viable tumor cells in the specimen. The scoring system for PD-L1 expression is divided into three groups: a) High expressor, for tumors with TPS >/= 50%; b) Low expressor, for tumors with TPS of >/=1%-49%; and c) Negative, for tumors with TPS <1%. If CPS score is reported, it is calculated based on the following formula: [(PD-L1 positive tumor cells + PD-L1 positive mononuclear inflammatory cells)/Total tumor cells] x 100.  Assay conditions:  - Fixation and processing: 10% neutral buffered formalin, paraffin embedded.  - Staining method: [employer] predilute monoclonal PD-L1 antibody clone , standard heat induced epitope retrieval in cell conditioning 1 (CC1 - EDTA, alkaline pH), primary antibody incubation 16 minutes, [employer] Optiview detection kit, and [employer] BenchMark Ultra automated instrument.  - Minimum tumor cell requirement: >/= 100 viable tumor cells present in the specimen.  - Positive and negative controls react appropriately.         Clinical Information        Lung nodule (Pulmonary nodule) Dx: Pulmonary nodules [R91.8 (ICD-10-CM)][      Gross Description       A(A). Lung, Upper Lobe, Right, :  Site #1, Episode #1, # Passes 5: NO ADEQUACY. CR    Biopsy was performed under CT guidance by Dr. Lester Fahrner with 5 pass(es) from which:    3 Air-dried smear(s)  1 cell/tissue block slides are prepared and placed into formalin at 0838.    Assisted by:  MARIBEL Gutierrez      Microscopic Description       Microscopic examination performed, substantiating the above diagnosis.       MCRS Yes (A) N/A    Performing Labs       The technical component of this testing was completed at [facility]      Case Images     Surgical Pathology Exam   Result Value Ref Range    Case Report       Surgical Pathology Report                         Case: [id number]                                  Authorizing Provider:  German Mitchell MD    Collected:           08/03/2023 11:34 AM          Ordering Location:     [facility]   Received:            08/03/2023 11:58 AM                                 Main OR                                                                      Pathologist:           Son Genao MD                                                                           Specimens:   A) - Urinary Bladder, BLADDER TUMOR                                                                 B) - Urinary Bladder, Base of Tumor, BLADDER TUMOR BASE                                    Final Diagnosis       A.  Urinary bladder, not otherwise specified, biopsy-  Papillary transitional cell carcinoma, low-grade, no evidence of invasive malignancy, lamina propria and muscularis propria identified    B.  Urinary bladder, base of tumor, excision-  Benign bladder tissue with chronic inflammatory change, no evidence of malignancy      Clinical  "Information       Procedure:  Cystoscopy with transurethral resection bladder tumor  Pre-op Diagnosis: Malignant neoplasm of posterior wall of urinary bladder (H) [C67.4]  Post-op Diagnosis: C67.4 - Malignant neoplasm of posterior wall of urinary bladder (H) [ICD-10-CM]      Gross Description       A(1). Urinary Bladder, BLADDER TUMOR:  The specimen is received in formalin labeled with the patient's name, medical record number and other identifying information and designated \"bladder tumor\". It consists of a 2.6 cm aggregate of tan-pink tissue.  The specimen is submitted entirely in 2 cassettes.    B(2). Urinary Bladder, Base of Tumor, BLADDER TUMOR BASE:  The specimen is received in formalin labeled with the patient's name, medical record number and other identifying information and designated \"urinary bladder, base of tumor\". It consists of 2 tan-pink portions of tissue, 0.6-0.8 cm.  The specimen is submitted entirely in 1 cassette.   [Murray Higgins]      Microscopic Description       A, B.  Microscopic performed      MCRS Yes (A) N/A    Performing Labs       The technical component of this testing was completed at [facility]      Case Images         Imaging Results    XR Chest Port 1 View    Result Date: 8/1/2023  EXAM: XR CHEST PORT 1 VIEW LOCATION: [facility] DATE: 8/1/2023 INDICATION: Chest Biopsy COMPARISON: CT-guided right upper lobe lung nodule biopsy today. Chest CT 06/30/2023.     IMPRESSION: Previously biopsied right upper lobe nodule again demonstrated. Additional nodule in the right lower lobe on prior chest CT less well seen by radiographic technique. No visible pneumothorax. Mild left basilar atelectasis. Top normal heart size. Calcified plaque of the aortic arch.    CT Lung Mediastinum Biopsy    Result Date: 8/1/2023  EXAM: 1. PERCUTANEOUS BIOPSY RIGHT UPPER LOBE 2. CT GUIDANCE 3. CONSCIOUS SEDATION " LOCATION: [facility] DATE: 8/1/2023 INDICATION: Lung nodule (Pulmonary nodule) TECHNIQUE: Dose reduction techniques were used. PROCEDURE: Informed consent obtained. Site marked. Prior images reviewed. Required items made available. Patient identity confirmed verbally and with arm band. Patient reevaluated immediately before administering sedation. Universal protocol was followed. Time out performed. The site was prepped and draped in sterile fashion. 6 mL of 1% lidocaine was infused into the local soft tissues. Using standard technique and under direct CT guidance, a 20-gauge biopsy device was used to obtain 5  core biopsies. Tissue was submitted to Pathology and was adequate by preliminary review by a pathologist. The patient tolerated the procedure well. No immediate complications. SEDATION: Versed 1  mg. Fentanyl 50 mcg. The procedure was performed with administration intravenous conscious sedation with appropriate preoperative, intraoperative, and postoperative evaluation. 30  minutes of supervised face to face conscious sedation time was provided by a radiology nurse under my direct supervision.     IMPRESSION: 1.  Successful CT-guided biopsy of the right upper lobe nodule with moderate sedation . Reference CPT Codes: 23680, 00495, 30471, 41357    60 minutes spent on the date of the encounter doing chart review, history and exam, documentation, communication of the treatment plan with the care team and further activities as noted above.    Signed by: Glenn Crowe MD         Again, thank you for allowing me to participate in the care of your patient.        Sincerely,        Glenn Crowe MD

## 2023-07-25 NOTE — PROGRESS NOTES
[facility] Hematology and Oncology Consult Note    Patient: Young Ordonez  MRN: [id number]  Date of Service: Jul 25, 2023       Reason for Visit    Chief Complaint   Patient presents with     Oncology Clinic Visit     Bladder Cancer         Assessment/Plan    ECOG Performance 0 - Independent    #.  A 2.3 x 2.2 cm right upper lobe and additional bilateral pulmonary nodules, suspicious for metastatic disease or primary lung cancer  #.  A right bladder mass of 2.1 cm  #.  Past smoker  #.  Significant vascular disease     I reviewed his complex clinical course.  I reviewed the CT scan images and reviewed with the patient.  I discussed that these lung findings are concerning for malignant process of either primary or metastatic disease.  I recommended biopsy of the pulmonary nodule.     He also met with Dr. Mitchell from urology and plan for cystoscopic evaluation and biopsy of the bladder mass.   Follow-up with me a couple weeks after completion of biopsy to review the next step in management.    Encounter Diagnoses:    Problem List Items Addressed This Visit    None  Visit Diagnoses     Pulmonary nodules    -  Primary    Relevant Orders    CT Lung Mediastinum Biopsy (Completed)    Bladder mass              ______________________________________________________________________________    Staging History   Cancer Staging   No matching staging information was found for the patient.      History    Mr. Young Ordonez is a very pleasant 73 year old male presented today accompanied by his wife, Mitra.  He is here for further evaluation of multiple pulmonary nodules and a bladder mass found by CT scan during evaluation for his vascular disease.  He does not have any specific pulmonary symptoms.  He does not have dysuria.  He had history of hematuria.  He is on aspirin but he is not on any anticoag coagulants.  He does not have unintentional weight loss.  No appetite changes.  He is a retired .  He quit  smoking about 10 years ago.  He smoked for 50+ PPY.  He is a recovering alcoholic.  He does smoke marijuana for recreation.     Review of systems.  Apart from describing in history, the remainder of comprehensive ROS was negative.    Past History    Past Medical History:   Diagnosis Date     Acute myocardial infarction, unspecified site, episode of care unspecified 01/01/1995     Arthritis      Cardiomyopathy (H)      Cerebral artery occlusion with cerebral infarction (H) 2018    lost vision in right eye     Claudication (H)      COPD (chronic obstructive pulmonary disease) (H)      Coronary artery disease      Depression      DEPRESSIVE DISORDER NEC 04/29/2008     Essential hypertension, benign      Gout      Gout attack 05/25/2017     Hepatitis C carrier (H)      HTN (hypertension)      Hyperlipidemia      Infectious viral hepatitis     carrier of viral hepatitis     Low back pain     chronic     MI (myocardial infarction) (H) 01/01/1995     Obesity      Other and unspecified hyperlipidemia      PAD (peripheral artery disease) (H)      Peyronie's disease      Past Surgical History:   Procedure Laterality Date     ANGIOPLASTY  1995     ANKLE FRACTURE SURGERY Left      BYPASS GRAFT AORTOFEMORAL N/A 5/17/2017    Procedure: AORTOBIFEMORAL BYPASS ;  Surgeon: Ilir FERRO MD;  Location: [facility];  Service:      CAROTID ENDARTERECTOMY Right 4/12/2018    Procedure: RIGHT CAROTID ENDARTERECTOMY WITH EEG;  Surgeon: Sergei Lemus MD;  Location: [facility] OR;  Service:      CYSTOSCOPY, TRANSURETHRAL RESECTION (TUR) TUMOR BLADDER, COMBINED N/A 8/3/2023    Procedure: Transurethral resection of bladder tumor 2 cm to 5 cm;  Surgeon: German Mitchell MD;  Location:  OR     FRACTURE SURGERY       IR AORTIC ARCH 4 VESSEL ANGIOGRAM  11/9/2009     IR CAROTID ANGIOGRAM  11/9/2009     IR CAROTID ANGIOGRAM  11/9/2009     IR EXTREMITY ANGIOGRAM BILATERAL  4/6/2017     MANDIBLE SURGERY       C NONSPECIFIC  PROCEDURE  1995    MI -- angioplasty     Family History   Problem Relation Age of Onset     Cancer Mother      Respiratory Father         d:age 59     Cerebrovascular Disease Father      Hypertension Father      Family History Negative Brother         b:1960     Heart Disease Paternal Grandmother      Emphysema Paternal Grandfather      Diabetes Other         paternal uncle     Glaucoma No family hx of      Macular Degeneration No family hx of      Social History     Socioeconomic History     Marital status:      Spouse name: Mitra     Number of children: 2     Years of education: 12     Highest education level: None   Occupational History     Occupation:      Comment: [employer]   Tobacco Use     Smoking status: Former     Packs/day: 1.50     Types: Cigarettes     Quit date: 1/1/2013     Years since quitting: 10.5     Smokeless tobacco: Never   Substance and Sexual Activity     Alcohol use: No     Comment: Alcoholic Drinks/day: sober since 1984     Drug use: No     Sexual activity: Yes     Partners: Female       Allergies    Allergies   Allergen Reactions     No Known Allergies        Physical Exam    BP (!) 149/60   Pulse 61   Temp 97.2  F (36.2  C)   Resp 16   Wt 76.5 kg (168 lb 11.2 oz)   SpO2 94%   BMI 26.42 kg/m      General: alert, awake, not in acute distress  HEENT: Head: Normal, normocephalic, atraumatic.  Eye: Normal external eye, conjunctiva, lids cornea, NARESH.  Nose: Normal external nose, mucus membranes and septum.  Pharynx: Normal buccal mucosa. Normal pharynx.  Neck / Thyroid: Supple, no masses, nodes, nodules or enlargement.  Lymphatics: No abnormally enlarged lymph nodes.  Chest: Normal chest wall and respirations. Clear to auscultation.  Abdomen: abdomen is soft without significant tenderness, masses, organomegaly or guarding  Extremities: normal strength, tone, and muscle mass  Skin: normal. no rash or abnormalities  CNS: non focal.    Lab Results    Recent Results  (from the past 168 hour(s))   CBC with platelets   Result Value Ref Range    WBC Count 10.0 4.0 - 11.0 10e3/uL    RBC Count 3.88 (L) 4.40 - 5.90 10e6/uL    Hemoglobin 11.8 (L) 13.3 - 17.7 g/dL    Hematocrit 35.2 (L) 40.0 - 53.0 %    MCV 91 78 - 100 fL    MCH 30.4 26.5 - 33.0 pg    MCHC 33.5 31.5 - 36.5 g/dL    RDW 14.4 10.0 - 15.0 %    Platelet Count 174 150 - 450 10e3/uL   INR   Result Value Ref Range    INR 1.04 0.85 - 1.15   Surgical Pathology Exam   Result Value Ref Range    Case Report       Surgical Pathology Report                         Case: [id number]                                  Authorizing Provider:  Glenn Crowe MD        Collected:           08/01/2023 08:38 AM          Ordering Location:     [facility]          Received:            08/01/2023 09:00 AM                                 [facility] CT                                                   Pathologist:           Anderson Mathis MD                                                        Specimen:    Lung, Upper Lobe, Right                                                                    Final Diagnosis       A(A) Lung, Upper Lobe, Right. :  -Non-small cell carcinoma (squamous cell carcinoma)  - Pending MDL lung testing (addendum)        Comment       The clinical history of a 2.1 cm bladder mass consistent with urothelial neoplasm and a 2.4 cm right lower lobe pulmonary nodule suspicious for metastatic disease is reviewed.  The morphology, immunophenotype and clinical presentation favor a primary lung rather than a metastatic urothelial carcinoma.  I am happy to review any subsequent data.  We can certainly attempt CD44 immunohistochemistry which, if negative, would provide further evidence against evidence against (basal type) urothelial carcinoma.    Cytology and histology demonstrate squamous cell carcinoma with partial keratinization; the tumor cells are moderately to poorly differentiated and are variably  positive for CK7, p63 and p53.  Cannot rule out focal staining for TTF-1 and CD56.  GATA3 and CK20 are negative arguing against (luminal type) urothelial carcinoma.    RESULT FOR IMMUNOHISTOCHEMICAL VENTANA CLONE  PD-L1 ASSAY  TUMOR PROPORTION SCORE (TPS): 25 %  INTERPRETATION: LOW PD-L1 EXPRESSION (TPS >/=1-49%)  COMMENT:  This Cooper  PD-L1 immunohistochemistry antibody assay is a laboratory developed test to be used for patients with non-small cell lung carcinoma (NSCLC), gastric or gastroesophageal junction (GEJ) adenocarcinoma, urothelial carcinomas, melanomas, liver, breast and brain tumors who are being considered for treatment with Keytruda (Pembrolizumab), an anti-PD-1 immune checkpoint inhibitor. The Cooper  PD-L1 assay has been validated by the [facility] against the FDA approved clinical trial-validated PharmDx 22C3 PD-L1 assay. Evidence suggests that the level of PD-L1 expression in the tumor cell population by immunohistochemistry is a major predictor of response to checkpoint inhibitor therapy. Previous studies demonstrate a high correlation between PD-L1 immunohistochemistry expression data obtained with PD-L1 clones Dako 22C3 and Cooper  in NSCLC. (References: Lancet 387:1540-50, 2016; :1823-33, 2016; J Clin Oncol 34:4102-9. 2016; J Thorac Oncol 12:1654-63, 2017; [facility] PD-L1 2018 assessment)  Scoring system: The tumor proportion score (TPS) is determined by enumeration of the percentage of PD-L1 tumor cells with any amount of membrane positivity expressed as a whole number relative to all viable tumor cells in the specimen. The scoring system for PD-L1 expression is divided into three groups: a) High expressor, for tumors with TPS >/= 50%; b) Low expressor, for tumors with TPS of >/=1%-49%; and c) Negative, for tumors with TPS <1%. If CPS score is reported, it is calculated based on the following formula:  [(PD-L1 positive tumor cells + PD-L1 positive mononuclear inflammatory cells)/Total tumor cells] x 100.  Assay conditions:  - Fixation and processing: 10% neutral buffered formalin, paraffin embedded.  - Staining method: [place] predilute monoclonal PD-L1 antibody clone , standard heat induced epitope retrieval in cell conditioning 1 (CC1 - EDTA, alkaline pH), primary antibody incubation 16 minutes, [place] Optiview detection kit, and [place] BenchMark Ultra automated instrument.  - Minimum tumor cell requirement: >/= 100 viable tumor cells present in the specimen.  - Positive and negative controls react appropriately.         Clinical Information       Lung nodule (Pulmonary nodule) Dx: Pulmonary nodules [R91.8 (ICD-10-CM)][      Gross Description       A(A). Lung, Upper Lobe, Right, :  Site #1, Episode #1, # Passes 5: NO ADEQUACY. CR    Biopsy was performed under CT guidance by Dr. Lester Fahrner with 5 pass(es) from which:    3 Air-dried smear(s)  1 cell/tissue block slides are prepared and placed into formalin at 0838.    Assisted by:  MARIBEL Gutierrez      Microscopic Description       Microscopic examination performed, substantiating the above diagnosis.       MCRS Yes (A) N/A    Performing Labs       The technical component of this testing was completed at [facility]      Case Images     Surgical Pathology Exam   Result Value Ref Range    Case Report       Surgical Pathology Report                         Case: [id number]                                  Authorizing Provider:  German Mitchell MD    Collected:           08/03/2023 11:34 AM          Ordering Location:     [facility]   Received:            08/03/2023 11:58 AM                                 Main OR                                                                      Pathologist:           Son Genao,                                                       "                    MD                                                                           Specimens:   A) - Urinary Bladder, BLADDER TUMOR                                                                 B) - Urinary Bladder, Base of Tumor, BLADDER TUMOR BASE                                    Final Diagnosis       A.  Urinary bladder, not otherwise specified, biopsy-  Papillary transitional cell carcinoma, low-grade, no evidence of invasive malignancy, lamina propria and muscularis propria identified    B.  Urinary bladder, base of tumor, excision-  Benign bladder tissue with chronic inflammatory change, no evidence of malignancy      Clinical Information       Procedure:  Cystoscopy with transurethral resection bladder tumor  Pre-op Diagnosis: Malignant neoplasm of posterior wall of urinary bladder (H) [C67.4]  Post-op Diagnosis: C67.4 - Malignant neoplasm of posterior wall of urinary bladder (H) [ICD-10-CM]      Gross Description       A(1). Urinary Bladder, BLADDER TUMOR:  The specimen is received in formalin labeled with the patient's name, medical record number and other identifying information and designated \"bladder tumor\". It consists of a 2.6 cm aggregate of tan-pink tissue.  The specimen is submitted entirely in 2 cassettes.    B(2). Urinary Bladder, Base of Tumor, BLADDER TUMOR BASE:  The specimen is received in formalin labeled with the patient's name, medical record number and other identifying information and designated \"urinary bladder, base of tumor\". It consists of 2 tan-pink portions of tissue, 0.6-0.8 cm.  The specimen is submitted entirely in 1 cassette.   [Murray Higgins]      Microscopic Description       A, B.  Microscopic performed      MCRS Yes (A) N/A    Performing Labs       The technical component of this testing was completed at [facility]      Case Images         Imaging Results    XR Chest Port 1 " View    Result Date: 8/1/2023  EXAM: XR CHEST PORT 1 VIEW LOCATION: [facility] DATE: 8/1/2023 INDICATION: Chest Biopsy COMPARISON: CT-guided right upper lobe lung nodule biopsy today. Chest CT 06/30/2023.     IMPRESSION: Previously biopsied right upper lobe nodule again demonstrated. Additional nodule in the right lower lobe on prior chest CT less well seen by radiographic technique. No visible pneumothorax. Mild left basilar atelectasis. Top normal heart size. Calcified plaque of the aortic arch.    CT Lung Mediastinum Biopsy    Result Date: 8/1/2023  EXAM: 1. PERCUTANEOUS BIOPSY RIGHT UPPER LOBE 2. CT GUIDANCE 3. CONSCIOUS SEDATION LOCATION: [facility] DATE: 8/1/2023 INDICATION: Lung nodule (Pulmonary nodule) TECHNIQUE: Dose reduction techniques were used. PROCEDURE: Informed consent obtained. Site marked. Prior images reviewed. Required items made available. Patient identity confirmed verbally and with arm band. Patient reevaluated immediately before administering sedation. Universal protocol was followed. Time out performed. The site was prepped and draped in sterile fashion. 6 mL of 1% lidocaine was infused into the local soft tissues. Using standard technique and under direct CT guidance, a 20-gauge biopsy device was used to obtain 5  core biopsies. Tissue was submitted to Pathology and was adequate by preliminary review by a pathologist. The patient tolerated the procedure well. No immediate complications. SEDATION: Versed 1  mg. Fentanyl 50 mcg. The procedure was performed with administration intravenous conscious sedation with appropriate preoperative, intraoperative, and postoperative evaluation. 30  minutes of supervised face to face conscious sedation time was provided by a radiology nurse under my direct supervision.     IMPRESSION: 1.  Successful CT-guided biopsy of the right upper lobe nodule with moderate sedation . Reference CPT Codes: 56812, 42586,  46236, 31948    60 minutes spent on the date of the encounter doing chart review, history and exam, documentation, communication of the treatment plan with the care team and further activities as noted above.    Signed by: Glenn Crowe MD

## 2023-07-25 NOTE — PROGRESS NOTES
Chief Complaint:    Bladder Tumor           Consult or Referral:     Mr. Young Ordonez is a 73 year old male seen at the request of Dr. Zamora.         History of Present Illness:     Young Ordonez is a 73 year old male being seen for evaluation of recently diagnosed bladder mass.  Duration of problem:  1 month  Previous treatments: None yet     accompanied by his spouse  Reviewed previous notes from Dr. Kt Vidal presents today for evaluation of recently diagnosed bladder mass on a CT angio for his vascular disease  He subsequently has had a CT urogram and a CT chest  He has prior history of hematuria  Currently his urine is normal  CT chest was also suggestive of multiple pulmonary nodules which could be metastatic  Young is a prior smoker  He has had prior vascular surgery with aortobifemoral bypass and carotid endarterectomy             Past Medical History:     Past Medical History:   Diagnosis Date    Acute myocardial infarction, unspecified site, episode of care unspecified 1995    Arthritis     Cardiomyopathy (H)     Claudication (H)     COPD (chronic obstructive pulmonary disease) (H)     Coronary artery disease     Depression     DEPRESSIVE DISORDER NEC 4/29/2008    Essential hypertension, benign     Gout     Gout attack 5/25/2017    Hepatitis C carrier (H)     HTN (hypertension)     Hyperlipidemia     Infectious viral hepatitis     carrier of viral hepatitis    Low back pain     chronic    MI (myocardial infarction) (H) 1995    Obesity     Other and unspecified hyperlipidemia     PAD (peripheral artery disease) (H)     Peyronie's disease             Past Surgical History:     Past Surgical History:   Procedure Laterality Date    ANGIOPLASTY  1995    ANKLE FRACTURE SURGERY Left     BYPASS GRAFT AORTOFEMORAL N/A 5/17/2017    Procedure: AORTOBIFEMORAL BYPASS ;  Surgeon: Ilir FERRO MD;  Location: [facility];  Service:     CAROTID ENDARTERECTOMY Right 4/12/2018     Procedure: RIGHT CAROTID ENDARTERECTOMY WITH EEG;  Surgeon: Sergei Lemus MD;  Location: [facility] OR;  Service:     FRACTURE SURGERY      IR AORTIC ARCH 4 VESSEL ANGIOGRAM  11/9/2009    IR CAROTID ANGIOGRAM  11/9/2009    IR CAROTID ANGIOGRAM  11/9/2009    IR EXTREMITY ANGIOGRAM BILATERAL  4/6/2017    MANDIBLE SURGERY      ZZC NONSPECIFIC PROCEDURE  1995    MI -- angioplasty            Medications     Current Outpatient Medications   Medication    allopurinol (ZYLOPRIM) 300 MG tablet    amLODIPine (NORVASC) 5 MG tablet    aspirin (ASA) 325 MG tablet    atenolol (TENORMIN) 100 MG tablet    atorvastatin (LIPITOR) 80 MG tablet    lisinopril (ZESTRIL) 20 MG tablet    LORazepam (ATIVAN) 0.5 MG tablet    sertraline (ZOLOFT) 100 MG tablet    traZODone (DESYREL) 50 MG tablet     No current facility-administered medications for this visit.            Family History:     Family History   Problem Relation Age of Onset    Cancer Mother     Respiratory Father         d:age 59    Cerebrovascular Disease Father     Hypertension Father     Family History Negative Brother         b:1960    Heart Disease Paternal Grandmother     Emphysema Paternal Grandfather     Diabetes Other         paternal uncle    Glaucoma No family hx of     Macular Degeneration No family hx of             Social History:     Social History     Socioeconomic History    Marital status:      Spouse name: Mitra    Number of children: 2    Years of education: 12    Highest education level: Not on file   Occupational History    Occupation:      Comment: [employer]   Tobacco Use    Smoking status: Former     Packs/day: 1.50     Types: Cigarettes     Quit date: 1/1/2013     Years since quitting: 10.5    Smokeless tobacco: Never   Substance and Sexual Activity    Alcohol use: No     Comment: Alcoholic Drinks/day: sober since 1984    Drug use: No    Sexual activity: Yes     Partners: Female   Other Topics Concern    Not on file   Social  "History Narrative    Not on file     Social Determinants of Health     Financial Resource Strain: Not on file   Food Insecurity: Not on file   Transportation Needs: Not on file   Physical Activity: Not on file   Stress: Not on file   Social Connections: Not on file   Intimate Partner Violence: Not on file   Housing Stability: Not on file            Allergies:   No known allergies         Review of Systems:  From intake questionnaire     Skin: negative  Eyes: negative  Ears/Nose/Throat: negative  Respiratory: No shortness of breath, dyspnea on exertion, cough, or hemoptysis  Cardiovascular: No chest pain or palpitations  Gastrointestinal: negative; no nausea/vomiting, constipation or diarrhea  Genitourinary: as per HPI  Musculoskeletal: negative  Neurologic: negative  Psychiatric: negative  Hematologic/Lymphatic/Immunologic: negative  Endocrine: negative         Physical Exam:     Patient is a 73 year old  male   Vitals: Blood pressure 117/65, pulse 62, resp. rate 16, height 1.702 m (5' 7\"), weight 77.1 kg (170 lb), SpO2 92 %.  Constitutional: Body mass index is 26.63 kg/m .  Alert, no acute distress, oriented, conversant  Eyes: no scleral icterus; extraocular muscles intact, moist conjunctivae  Neck: trachea midline, no thyromegaly  Ears/nose/mouth: throat/mouth:normal, good dentition  Respiratory: no respiratory distress, or pursed lip breathing  Cardiovascular: pulses strong and intact; no obvious jugular venous distension present  Gastrointestinal: soft, nontender, no organomegaly or masses,   Lymphatics: No inguinal adenopathy  Musculoskeletal: extremities normal, no peripheral edema  Skin: no suspicious lesions or rashes  Neuro: Alert, oriented, speech and mentation normal  Psych: affect and mood normal, alert and oriented to person, place and time  Gait: Normal  : deferred      Labs and Pathology:    The following labs were reviewed by me and discussed with the patient:  UA: Abnormal:    Latest Reference " Range & Units 06/05/23 15:22   Color Urine Colorless, Straw, Light Yellow, Yellow  Yellow   Appearance Urine Clear  Clear   Glucose Urine Negative mg/dL Negative   Bilirubin Urine Negative  Negative   Ketones Urine Negative mg/dL Negative   Specific Gravity Urine 1.005 - 1.030  1.020   pH Urine 5.0 - 8.0  5.5   Protein Albumin Urine Negative mg/dL 30 !   Urobilinogen Urine 0.2, 1.0 E.U./dL 0.2   Nitrite Urine Negative  Negative   Blood Urine Negative  Moderate !   Leukocyte Esterase Urine Negative  Trace !   WBC Urine 0-5 /HPF /HPF 5-10 !   RBC Urine 0-2 /HPF /HPF 2-5 !   Bacteria Urine None Seen /HPF Few !   Squamous Epithelial /LPF Urine None Seen /LPF Moderate !   Mucus Urine None Seen /LPF Present !   !: Data is abnormal  Significant for   Lab Results   Component Value Date    CR 1.38 06/05/2023    CR 1.26 05/31/2022    CR 1.07 08/20/2020    CR 1.4 08/20/2020    CR 1.45 08/20/2020    CR 1.21 07/08/2019    CR 1.34 02/01/2019    CR 1.00 04/13/2018    CR 1.28 04/12/2018    CR 1.20 03/27/2018    CR 1.31 01/16/2018    CR 1.46 04/06/2017    CR 1.15 07/13/2007    CR 1.30 04/12/2004    CR 1.20 04/05/2004    CR 1.2 08/25/2003     PSA   Date Value Ref Range Status   07/13/2007 0.59 0 - 4 ug/L Final   04/05/2004 0.35 0 - 4 ug/L Final     Prostate Specific Antigen Screen   Date Value Ref Range Status   06/05/2023 0.64 0.00 - 6.50 ng/mL Final   05/31/2022 0.67 0.00 - 6.50 ug/L Final   11/19/2019 0.7 0.0 - 4.5 ng/mL Final   03/27/2018 0.4 0.0 - 4.5 ng/mL Final   02/22/2011 1.04 <4.01 ng/mL Final             Imaging:    The following imaging exams were independently viewed and interpreted by me and discussed with patient:  CT Scan Abd/Pelvis: Abnormal:   I reviewed the CT images were discussed with the patient  Normal bilateral kidneys  No hydronephrosis  2.5 cm sized bladder mass on the right posterior lateral wall close to the UVJ  CT chest: Abnormal:   IMPRESSION:   1.  Multiple bilateral pulmonary nodules including 2.3 cm  right upper lobe spiculated nodule, which could be primary or metastatic nodule, and multiple other likely metastatic nodules measure up to 2.1 cm in the right lower lobe.  2.  Mildly prominent mediastinal lymph nodes, indeterminate, could be reactive or metastatic.  3.  Extensive atherosclerotic vascular calcification of the coronary arteries.             Assessment and Plan:     Malignant neoplasm of urinary bladder, unspecified site (H)  We discussed in detail about significance of the bladder mass and the need to have a pathological diagnosis  We also talked about transurethral resection of bladder tumor which would be the primary mode of treating this tumor followed by additional measures depending on the results of the pathology  We discussed in detail about the resection, need for possible hospital stay, possible need for bladder irrigation and possible discharge with a Boswell catheter  Complications of the procedure including bleeding, need for transfusion, urinary tract infection and rare complications like bladder perforation were discussed  Since the mass is close to the right ureteral orifice I also discussed with him about the possibility of needing a ureteral stent in case we have to resect the right ureteral orifice at the time of the surgery  Both him and his wife expressed understanding and were agreeable with the plan  We will set up a follow-up visit subsequent to the resection to discuss path results  - Adult Urology [facility] Referral    Pulmonary nodules  He does have multiple lung nodules based on the CT results  Discussed with him that it could be possibly metastatic from the bladder tumor or could be primary lung cancer considering the fact that he is a longtime smoker though he has quit 10 years ago  He is scheduled to see Dr. Crowe and I discussed with him and his wife that he would probably need biopsies from the pulmonary nodules depending on the discussions with Dr. Crowe  Adult Urology   Referral    PAD (peripheral artery disease) (H)   Stenosis of left carotid artery   Chronic obstructive pulmonary disease, unspecified COPD type (H)  He does have significant other comorbidities possibly resulted from his prior smoking history which may have an impact on the surgical recovery  He expressed understanding of this and he currently follows up with his primary care as well along with vascular surgery    Plan:  Schedule for transurethral resection of bladder tumor  Follow-up to discuss path results    Orders  No orders of the defined types were placed in this encounter.      German Mitchell MD  [facility]      ==========================    Additional Billing and Coding Information:  Review of external notes as documented above   Review of the result(s) of each unique test - UA, creatinine, CT chest    Independent interpretation of a test performed by another physician/other qualified health care professional (not separately reported) -   Reviewed the CT urogram images and discussed them with the patient with implications on surgery    Discussion of management or test interpretation with external physician/other qualified healthcare professional/appropriate source -           25 minutes spent by me on the date of the encounter doing chart review, review of test results, interpretation of tests, patient visit, documentation, and discussion with family     ==========================

## 2023-07-25 NOTE — PROGRESS NOTES
[facility]: Cancer Care                                                                                          Called Young to follow up as he was at  Onc clinic today for consult wit Dr. Crowe and writer missed him while he was here. Noted that Urology surgery scheduler called him on 7/19 to arrange surgery with Dr. Coles for resection of the bladder tumor. He states that he also need a BX on his LN, Shared with him that these are two separate procedures. Provided him with the number to the surgery scheduled and requested that he call and schedule his surgery. Inquired if he is checking his messages and he shared that he does not check his VM message but does check his text messages. 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 He stated that he will start checking his Vms. He has the number to urology and will call surgery scheduler today. Willwatch for surgery to be scheduled.    Signature:  Cait Branham RN

## 2023-07-25 NOTE — PROGRESS NOTES
"Oncology Rooming Note    July 25, 2023 11:23 AM   Young Ordonez is a 73 year old male who presents for:    Chief Complaint   Patient presents with    Oncology Clinic Visit     Bladder Cancer     Initial Vitals: BP (!) 149/60   Pulse 61   Temp 97.2  F (36.2  C)   Resp 16   Wt 76.5 kg (168 lb 11.2 oz)   SpO2 94%   BMI 26.42 kg/m   Estimated body mass index is 26.42 kg/m  as calculated from the following:    Height as of 7/18/23: 1.702 m (5' 7\").    Weight as of this encounter: 76.5 kg (168 lb 11.2 oz). Body surface area is 1.9 meters squared.  No Pain (0) Comment: Data Unavailable   No LMP for male patient.  Allergies reviewed: Yes  Medications reviewed: Yes    Medications: Medication refills not needed today.  Pharmacy name entered into [employer]    [facility] [address] [place], [place] [facility] [address] [facility] [address] [place] [facility] [address] [facility] [address] [place] [address]    Clinical concerns: None       Yuki Carrillo LPN             "

## 2023-08-01 NOTE — PROCEDURES
[facility]    Procedure: Computed tomography guided right upper lobe lung biopsy with moderate sedation    Date/Time: 8/1/2023 9:05 AM    Performed by: Fahrner, Lester, MD  Authorized by: Fahrner, Lester, MD      UNIVERSAL PROTOCOL   Site Marked: Yes  Prior Images Obtained and Reviewed:  Yes  Required items: Required blood products, implants, devices and special equipment available    Patient identity confirmed:  Verbally with patient  Patient was reevaluated immediately before administering moderate or deep sedation or anesthesia  Confirmation Checklist:  Patient's identity using two indicators, relevant allergies, procedure was appropriate and matched the consent or emergent situation and correct equipment/implants were available  Time out: Immediately prior to the procedure a time out was called    Universal Protocol: the Joint Commission Universal Protocol was followed    Preparation: Patient was prepped and draped in usual sterile fashion    ESBL (mL):  0     ANESTHESIA    Anesthesia:  Local infiltration  Local Anesthetic:  Lidocaine 1% without epinephrine  Anesthetic Total (mL):  6      SEDATION  Patient Sedated: Yes    Sedation Type:  Moderate (conscious) sedation  Sedation:  Midazolam, fentanyl and see MAR for details  Vital signs: Vital signs monitored during sedation    See dictated procedure note for full details.    PROCEDURE    Patient Tolerance:  Patient tolerated the procedure well with no immediate complications  Length of time physician/provider present for 1:1 monitoring during sedation: 30

## 2023-08-01 NOTE — PRE-PROCEDURE
GENERAL PRE-PROCEDURE:   Procedure:  Computed tomography guided right lung biopsy with moderate sedation and possible chest tube placement  Date/Time:  8/1/2023 8:10 AM    Verbal consent obtained?: Yes    Written consent obtained?: Yes    Risks and benefits: Risks, benefits and alternatives were discussed    DC Plan: Appropriate discharge home plan in place for patients who are going home after procedure   Consent given by:  Patient  Patient states understanding of procedure being performed: Yes    Patient's understanding of procedure matches consent: Yes    Procedure consent matches procedure scheduled: Yes    Expected level of sedation:  Moderate  Appropriately NPO:  Yes  ASA Class:  2  Mallampati  :  Grade 1- soft palate, uvula, tonsillar pillars, and posterior pharyngeal wall visible  Lungs:  Lungs clear with good breath sounds bilaterally  Heart:  Normal heart sounds and rate  History & Physical reviewed:  History and physical reviewed and no updates needed  Statement of review:  I have reviewed the lab findings, diagnostic data, medications, and the plan for sedation

## 2023-08-01 NOTE — PROGRESS NOTES
Patient Name: Young Ordonez  Medical Record Number: [id number]  Today's Date: 8/1/2023    Procedure: CT Lung Biopsy  Proceduralist: Dr Fahrner  Pathology present: Yes    Procedure Start: 0824  Procedure end: 0840  Sedation medications administered: Fentanyl 50mcg Versed 1mg     Report given to: NA  : No    Other Notes: Pt arrived to CT room 1 from pre/post rm 3. Consent reviewed. Pt denies any questions or concerns regarding procedure. Pt positioned supine and monitored per protocol. Pt tolerated procedure without any noted complications. Pt transferred back to pre/post rm 3.    Discharge Instructions discussed with patient and wife and all questions answered.    Za Rivera RN

## 2023-08-02 NOTE — TELEPHONE ENCOUNTER
POST CALL    Spoke with: Young Ordonez  Call attempt: 1  Message left: No  Any pain: No  Any fever: No  Any redness/swelling/ abnormal drainage around puncture site: No  Were you instructed well enough to take care of yourself at home: Yes  Are you satisfied with the care you received: Yes  Any additional concerns or questions: No  IR nurse triage line number provided: Yes    Post call completed.   August 2, 2023 11:30 AM  Dinora Tao RN

## 2023-08-03 NOTE — ANESTHESIA POSTPROCEDURE EVALUATION
Patient: Young Ordonez    Procedure: Procedure(s):  Cystoscopy with transurethral resection bladder tumor       Anesthesia Type:  General    Note:  Disposition: Outpatient   Postop Pain Control: Uneventful            Sign Out: Well controlled pain   PONV: No   Neuro/Psych: Uneventful            Sign Out: Acceptable/Baseline neuro status   Airway/Respiratory: Uneventful            Sign Out: Acceptable/Baseline resp. status   CV/Hemodynamics: Uneventful            Sign Out: Acceptable CV status; No obvious hypovolemia; No obvious fluid overload   Other NRE:    DID A NON-ROUTINE EVENT OCCUR? No           Last vitals:  Vitals Value Taken Time   /76 08/03/23 1245   Temp 97  F (36.1  C) 08/03/23 1215   Pulse 65 08/03/23 1247   Resp 19 08/03/23 1248   SpO2 93 % 08/03/23 1246   Vitals shown include unvalidated device data.    Electronically Signed By: Silvia Miller MD  August 3, 2023  1:14 PM

## 2023-08-03 NOTE — ANESTHESIA PREPROCEDURE EVALUATION
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)     lost vision in right eye    Claudication (H)     COPD (chronic obstructive pulmonary disease) (H)     Coronary artery disease     Depression     DEPRESSIVE DISORDER NEC 2008    Essential hypertension, benign     Gout     Gout attack 2017    Hepatitis C carrier (H)     HTN (hypertension)     Hyperlipidemia     Infectious viral hepatitis     carrier of viral hepatitis    Low back pain     chronic    MI (myocardial infarction) (H) 1995    Obesity     Other and unspecified hyperlipidemia     PAD (peripheral artery disease) (H)     Peyronie's disease       Past Surgical History:   Procedure Laterality Date    ANGIOPLASTY      ANKLE FRACTURE SURGERY Left     BYPASS GRAFT AORTOFEMORAL N/A 2017    Procedure: AORTOBIFEMORAL BYPASS ;  Surgeon: Ilir FERRO MD;  Location: [facility] OR;  Service:     CAROTID ENDARTERECTOMY Right 2018    Procedure: RIGHT CAROTID ENDARTERECTOMY WITH EEG;  Surgeon: Sergei Lemus MD;  Location: [facility] OR;  Service:     FRACTURE SURGERY      IR AORTIC ARCH 4 VESSEL ANGIOGRAM  2009    IR CAROTID ANGIOGRAM  2009    IR CAROTID ANGIOGRAM  2009    IR EXTREMITY ANGIOGRAM BILATERAL  2017    MANDIBLE SURGERY      ZZC NONSPECIFIC PROCEDURE      MI -- angioplasty      Allergies   Allergen Reactions    No Known Allergies       Social History     Tobacco Use    Smoking status: Former     Packs/day: 1.50     Types: Cigarettes     Quit date: 2013      Years since quitting: 10.5    Smokeless tobacco: Never   Substance Use Topics    Alcohol use: No     Comment: Alcoholic Drinks/day: sober since 1984      Wt Readings from Last 1 Encounters:   08/03/23 75.8 kg (167 lb 1.6 oz)        Anesthesia Evaluation            ROS/MED HX  ENT/Pulmonary:     (+)                         COPD,              Neurologic:     (+)          CVA,                      Cardiovascular: Comment: Echo 2020  Interpretation Summary  Mildly (EF 45-50%) reduced left ventricular function is present. Akinesis and  thinning of the mid inferoseptal and anteroseptal segments and all apical  segments, most likely due to a distal LAD infarct. There is no LV thrombus.  Global right ventricular function is normal.  A small patent foramen ovale was demonstrated by agitated saline bubble  study .  The inferior vena cava was normal in size with preserved respiratory  variability.  No pericardial effusion is present.     There is no prior study for direct comparison.      (+)  hypertension- Peripheral Vascular Disease (aortobifemoral bypass and carotid endarterectomy)-  CAD - past MI - -      CHF                                METS/Exercise Tolerance:     Hematologic:       Musculoskeletal:       GI/Hepatic:       Renal/Genitourinary:     (+) renal disease, type: CRI,            Endo:       Psychiatric/Substance Use:       Infectious Disease:       Malignancy:   (+) Malignancy (bladder cancer, multiple lung masses),     Other:            Physical Exam    Airway        Mallampati: II   TM distance: > 3 FB   Neck ROM: full   Mouth opening: > 3 cm    Respiratory Devices and Support         Dental           Cardiovascular   cardiovascular exam normal          Pulmonary   pulmonary exam normal                OUTSIDE LABS:  CBC:   Lab Results   Component Value Date    WBC 10.0 08/01/2023    WBC 11.9 (H) 06/05/2023    HGB 11.8 (L) 08/01/2023    HGB 13.1 (L) 06/05/2023    HCT 35.2 (L) 08/01/2023    HCT 38.3 (L)  06/05/2023     08/01/2023     06/05/2023     BMP:   Lab Results   Component Value Date     06/05/2023     05/31/2022    POTASSIUM 4.1 06/05/2023    POTASSIUM 4.4 05/31/2022    CHLORIDE 102 06/05/2023    CHLORIDE 105 05/31/2022    CO2 27 06/05/2023    CO2 28 05/31/2022    BUN 24.3 (H) 06/05/2023    BUN 19 05/31/2022    CR 1.38 (H) 06/05/2023    CR 1.26 05/31/2022    GLC 93 06/05/2023    GLC 95 05/31/2022     COAGS:   Lab Results   Component Value Date    PTT 31 08/20/2020    INR 1.04 08/01/2023     POC:   Lab Results   Component Value Date    BGM 95 08/20/2020     HEPATIC:   Lab Results   Component Value Date    ALBUMIN 4.5 06/05/2023    PROTTOTAL 7.3 06/05/2023    ALT 23 06/05/2023    AST 28 06/05/2023    ALKPHOS 67 06/05/2023    BILITOTAL 0.4 06/05/2023     OTHER:   Lab Results   Component Value Date    A1C 5.4 08/20/2020    KALIE 9.9 06/05/2023    PHOS 2.6 08/20/2020    MAG 2.4 (H) 08/21/2020    TSH 2.10 06/05/2023    T4 0.73 07/13/2007    CRP 7.8 08/20/2020    SED 6 08/20/2020       Anesthesia Plan    ASA Status:  3    NPO Status:  NPO Appropriate    Anesthesia Type: General.     - Airway: LMA   Induction: Intravenous.           Consents            Postoperative Care    Pain management: Oral pain medications, IV analgesics, Multi-modal analgesia.   PONV prophylaxis: Ondansetron (or other 5HT-3), Dexamethasone or Solumedrol     Comments:                Silvia Miller MD

## 2023-08-03 NOTE — PROGRESS NOTES
"SPIRITUAL HEALTH SERVICES Progress Note  RH Pre-Op    Saw pt, Young, per pre-surgery visit request. His wife, Mitra, was also present. Young and Mitra reported that Young had both lung and bladder cancer, and that it's been a time of \"hard news.\" Young doesn't identify as Episcopal, but identifies more so as \"spiritual\" and welcomed prayer with me.  Pt reported that he understood he would be staying \"overnight\" at the hospital after his surgery.     Plan: I and other chaplains are available for further support. I informed pt on how to request a  should further support be needed.    Gamaliel Rivers   Intern    [facility] routine referrals *[phone]  [facility] available 24/7 for emergent requests/referrals, either by paging the on-call  or by entering an ASAP/STAT consult in Epic (this will also page the on-call ).    "

## 2023-08-03 NOTE — OP NOTE
OPERATIVE REPORT    PREOPERATIVE DIAGNOSIS:   1) Bladder tumor  2) lung cancer    POSTOPERATIVE DIAGNOSIS:  Same    PROCEDURE PERFORMED: Transurethral resection of Bladder Tumor 2 cm to 5 cm     ATTENDING SURGEON: German Mitchell MD  FINDINGS: 3 cm tumor located on the posterior lateral wall of the urinary bladder.  Right ureteral orifice was not involved    ANESTHESIA: General   INTRAVENOUS FLUIDS: See dictated anesthesia record  ESTIMATED BLOOD LOSS: 5 mL.     SPECIMENS:   1. Bladder tumor chips  2. Base of bladder tumor    DRAINS:   16-Nicaraguan 2 -way catheter.   None    INDICATIONS FOR PROCEDURE: Young Ordonez is a(n) 73 year old male who was seen in consultation for bladder tumor. After discussion of the risks, benefits and alternatives of the procedure, the patient agreed to proceed with transurethral resection of his bladder tumor.     DESCRIPTION OF PROCEDURE: After verifying informed consent, the patient was taken to the operating room. Adequate anesthesia was induced, the patient was placed in the dorsal lithotomy position and prepped and draped in standard sterile fashion. A timeout was performed to verify correct patient and procedure. Pneumo boots and perioperative antibiotics were in place before the procedure commenced.     A 22-Nicaraguan rigid cystoscope was inserted into a well lubricated urethra. We began by performing a white light cystourethroscopy. The urethra was unremarkable. The ureteral orifices were in their orthotopic positions bilaterally.  Single bladder tumor appeared papillary and was located on the right posterior lateral wall just lateral to the ureteral orifice    We then introduced the 26-Nicaraguan bipolar Gyrus resectoscope. We resected tissue down to possible using cut electrocautery. Once we had completed our dissection, we evacuated the specimens. We then reintroduced the resectoscope to ensure meticulous hemostasis.     A 16-Nicaraguan 2-way catheter was placed, and 10 mL was instilled  into the balloon. Catheter was hooked up to continuous irrigation which was clear;  the patient was awoken from anesthesia and transferred to the recovery room in stable condition.     POSTOPERATIVE PLAN:   1.  Nurse visit on 8/7/2023 for catheter removal at [facility]  2.  Follow up in 1-2 weeks with me in clinic to review pathology    German Mitchell MD

## 2023-08-03 NOTE — ANESTHESIA CARE TRANSFER NOTE
Patient: Young Ordonez    Procedure: Procedure(s):  Cystoscopy with transurethral resection bladder tumor       Diagnosis: Malignant neoplasm of posterior wall of urinary bladder (H) [C67.4]  Diagnosis Additional Information: No value filed.    Anesthesia Type:   General     Note:    Oropharynx: oropharynx clear of all foreign objects  Level of Consciousness: awake  Oxygen Supplementation: face mask  Level of Supplemental Oxygen (L/min / FiO2): 6  Independent Airway: airway patency satisfactory and stable  Dentition: dentition unchanged  Vital Signs Stable: post-procedure vital signs reviewed and stable  Report to RN Given: handoff report given  Patient transferred to: PACU    Handoff Report: Identifed the Patient, Identified the Reponsible Provider, Reviewed the pertinent medical history, Discussed the surgical course, Reviewed Intra-OP anesthesia mangement and issues during anesthesia, Set expectations for post-procedure period and Allowed opportunity for questions and acknowledgement of understanding      Vitals:  Vitals Value Taken Time   /95    Temp 37    Pulse 74    Resp 33 08/03/23 1151   SpO2 98 % 08/03/23 1151   Vitals shown include unvalidated device data.    Electronically Signed By: TORO Durham CRNA  August 3, 2023  11:53 AM

## 2023-08-03 NOTE — DISCHARGE INSTRUCTIONS
GENERAL ANESTHESIA  ADULT DISCHARGE INSTRUCTIONS   SPECIAL PRECAUTIONS FOR 24 HOURS AFTER SURGERY    IT IS NOT UNUSUAL TO FEEL LIGHT-HEADED OR FAINT, UP TO 24 HOURS AFTER SURGERY OR WHILE TAKING PAIN MEDICATION.  IF YOU HAVE THESE SYMPTOMS; SIT FOR A FEW MINUTES BEFORE STANDING AND HAVE SOMEONE ASSIST YOU WHEN YOU GET UP TO WALK OR USE THE BATHROOM.    YOU SHOULD REST AND RELAX FOR THE NEXT 24 HOURS AND YOU MUST MAKE ARRANGEMENTS TO HAVE SOMEONE STAY WITH YOU FOR AT LEAST 24 HOURS AFTER YOUR DISCHARGE.  AVOID HAZARDOUS AND STRENUOUS ACTIVITIES.  DO NOT MAKE IMPORTANT DECISIONS FOR 24 HOURS.    DO NOT DRIVE ANY VEHICLE OR OPERATE MECHANICAL EQUIPMENT FOR 24 HOURS FOLLOWING THE END OF YOUR SURGERY.  EVEN THOUGH YOU MAY FEEL NORMAL, YOUR REACTIONS MAY BE AFFECTED BY THE MEDICATION YOU HAVE RECEIVED.    DO NOT DRINK ALCOHOLIC BEVERAGES FOR 24 HOURS FOLLOWING YOUR SURGERY.    DRINK CLEAR LIQUIDS (APPLE JUICE, GINGER ALE, 7-UP, BROTH, ETC.).  PROGRESS TO YOUR REGULAR DIET AS YOU FEEL ABLE.    YOU MAY HAVE A DRY MOUTH, A SORE THROAT, MUSCLES ACHES OR TROUBLE SLEEPING.  THESE SHOULD GO AWAY AFTER 24 HOURS.    CALL YOUR DOCTOR FOR ANY OF THE FOLLOWING:  SIGNS OF INFECTION (FEVER, GROWING TENDERNESS AT THE SURGERY SITE, A LARGE AMOUNT OF DRAINAGE OR BLEEDING, SEVERE PAIN, FOUL-SMELLING DRAINAGE, REDNESS OR SWELLING.    IT HAS BEEN OVER 8 TO 10 HOURS SINCE SURGERY AND YOU ARE STILL NOT ABLE TO URINATE (PASS WATER).        CYSTOSCOPY DISCHARGE INSTRUCTIONS  [facility]  JACKIE HOYT, & AXEL   [phone]    YOU MAY GO BACK TO YOUR NORMAL DIET AND ACTIVITY, UNLESS YOUR DOCTOR TELLS YOU NOT TO.    FOR THE NEXT TWO DAYS, YOU MAY NOTICE:    SOME BLOOD IN YOUR URINE.  SOME BURNING WHEN YOU URINATE.  AN URGE TO URINATE MORE OFTEN.  BLADDER SPASMS.    THESE ARE NORMAL AFTER THE PROCEDURE.  THEY SHOULD GO AWAY AFTER A DAY OR TWO.  TO RELIEVE THESE PROBLEMS:     DRINK 6 TO 8 LARGE GLASSES OF WATER EACH DAY  (INCLUDES DRINKS AT MEALS).  THIS WILL HELP CLEAR THE URINE.    TAKE WARM BATHS TO RELIEVE PAIN AND BLADDER SPASMS.  DO NOT ADD ANYTHING TO THE BATH WATER.    YOUR DOCTOR MAY PRESCRIBE PAIN MEDICINE.  YOU MAY ALSO TAKE TYLENOL (ACETAMINOPHEN) FOR PAIN.    CALL YOUR SURGEON IF YOU HAVE:    A FEVER OVER 101 DEGREES.  CHECK YOUR TEMPERATURE UNDER YOUR TONGUE.    CHILLS.    FAILURE TO URINATE (NO URINE COMES OUT WHEN YOU TRY TO USE THE TOILET).  TRY SOAKING IN A BATHTUB FULL OF WARM WATER.  IF STILL NO URINE, CALL YOUR DOCTOR.    A LOT OF BLOOD IN THE URINE, OR BLOOD CLOTS LARGER THAN A NICKEL.      PAIN IN THE BACK OR BELLY AREA (ABDOMEN).    PAIN OR SPASMS THAT ARE NOT RELIEVED BY WARM TUB BATHS AND PAIN MEDICINE.      SEVERE PAIN, BURNING OR OTHER PROBLEMS WHILE PASSING URINE.    PAIN THAT GETS WORSE AFTER TWO DAYS.       TRANSURETHRAL RESECTION OF PROSTATE (TURP) AND TRANSURETHRAL RESECTION OF BLADDER TUMOR (TURBT) DISCHARGE INSTRUCTIONS  [facility]  JACKIE HOYT, & AXEL   [phone]    Following a Transurethral Resection of Prostate or Transurethral Resection of Bladder Tumor, it will take six to eight weeks for your prostate and bladder areas to heal completely.  You may pass small blood clots or small pieces of tissue during this time, but this is not unusual.    For The First Six Weeks:   Always empty your bladder whenever you feel the urge to urinate.  Do not hold urine in your bladder.     Avoid heavy lifting, heavy physical exertion, long walks or long trips.  Short walks and stair climbing are permissible.    Do not strain to have a bowel movement.  If bowels become constipated, take two tablespoons of Milk of Magnesia daily until movement is normal or soft.      Drink six glasses of liquid every day.  Include at least three glasses of water in this total amount.    Eat a well-balanced diet.  Follow the diet you were on before surgery.    Other Important Information:  Do not drive  a car for two weeks after your surgery.  Use caution in winter driving and avoid driving related activities, such as tire changing or shoveling snow.    Do not engage in intercourse for at least four weeks or until your prostate or bladder has healed.  Check with your doctor at your first routine post-operative appointment to determine if your prostate has healed.    Take medications as prescribed by your doctor.    Call Your Doctor If You:  Are unable to pass urine.    Have painful voiding, loss of bladder control or increased frequency of urination.    Have chills or fever.    Pass large clots or your urine remains bloody during urination.  It is not unusual for urine to be blood tinged when starting the stream.    See your doctor for a post-operative checkup in six weeks.  Call ahead to the office for an appointment as soon as you are discharged.  The telephone number is [phone]. Any additional questions may be directed to your physician.      [facility]  [address]  [place]  [address] [phone]    [facility]  [address], [address]  [place]  [address] [phone]

## 2023-08-04 NOTE — TELEPHONE ENCOUNTER
Prior Authorization Approval    Medication: ONDANSETRON 4 MG PO TBDP  Authorization Effective Date: 7/5/2023  Authorization Expiration Date: 8/3/2024  Approved Dose/Quantity:   Reference #:     Insurance Company: [employer] - Phone [phone] Fax [phone]  Expected CoPay:       CoPay Card Available:      Financial Assistance Needed:   Which Pharmacy is filling the prescription: [facility] [address] [facility]  Pharmacy Notified: Yes  Patient Notified: Yes **Instructed pharmacy to notify patient when script is ready to /ship.**

## 2023-08-04 NOTE — TELEPHONE ENCOUNTER
PA Initiation    Medication: ONDANSETRON 4 MG PO TBDP  Insurance Company: [employer] - Phone [phone] Fax [phone]  Pharmacy Filling the Rx: [facility] [address] [facility]  Filling Pharmacy Phone: [phone]  Filling Pharmacy Fax: [phone]  Start Date: 8/4/2023

## 2023-08-04 NOTE — TELEPHONE ENCOUNTER
[facility] Call Center    Phone Message    May a detailed message be left on voicemail: yes     Reason for Call: Medication Question or concern regarding medication   Prescription Clarification  Name of Medication: ondansetron (ZOFRAN ODT) 4 MG ODT tab [85750] (Order [id number])  Prescribing Provider: Dr. Mitchell   Pharmacy: [facility]   What on the order needs clarification? Needs prior autho as the day supply is 2 days or less. Please reach out to Miroslava      Action Taken: Message routed to:  Other: Urology    Travel Screening: Not Applicable

## 2023-08-08 NOTE — PROGRESS NOTES
Young  presented at clinic todayfor a vail catheter removal, post surgery on 8/3/2023 ,  as requested by Dr. Mitchell.  Patient's catheter was drained of 100 ml of  clear yellow clear urine. Sterile syringe was attached to the catheter and the balloon was fully deflated. The catheter was removed with ease and patient tolerated the procedure well.    Teaching done with patient verbally as where to call or go if pain, fever, or unable to urinate post catheter removal. Contact information was provided for urology triage number, [phone].    Post op follow up appointment was scheduled with Dr. Mitchell on 8/15/2023   to review the surgical pathology results. Patient stated understanding and will keep their future scheduled appointments/Cait Branham RN

## 2023-08-15 NOTE — PROGRESS NOTES
"Urology Rooming Note    August 15, 2023 11:28 AM   Young Ordonez is a 73 year old male who presents for:    Chief Complaint   Patient presents with    urology clinic     Post op follow up     Initial Vitals: /75   Pulse 60   Resp 16   Ht 1.702 m (5' 7\")   Wt 77.1 kg (170 lb)   SpO2 95%   BMI 26.63 kg/m   Estimated body mass index is 26.63 kg/m  as calculated from the following:    Height as of this encounter: 1.702 m (5' 7\").    Weight as of this encounter: 77.1 kg (170 lb). Body surface area is 1.91 meters squared.  No Pain (0) Comment: Data Unavailable     Allergies reviewed: Yes  Medications reviewed: Yes    Medications: Medication refills not needed today.  Pharmacy name entered into [facility]:    [facility] [address] [place], [place] - [address]  [facility] [address] [place] [facility] [address] [place] [address] PHARM[address]      Jackie Umaña  "

## 2023-08-15 NOTE — LETTER
"    8/15/2023         RE: Young Ordonez  [address] [facility] [address]        Dear Colleague,    Thank you for referring your patient, Young Ordonez, to the [facility]. Please see a copy of my visit note below.    Oncology Rooming Note    August 15, 2023 11:25 AM   Young Ordonez is a 73 year old male who presents for:    Chief Complaint   Patient presents with     Oncology Clinic Visit     Lung cancer     Initial Vitals: /75   Pulse 60   Resp 16   Ht 1.702 m (5' 7\")   Wt 77.1 kg (170 lb)   SpO2 95%   BMI 26.63 kg/m   Estimated body mass index is 26.63 kg/m  as calculated from the following:    Height as of this encounter: 1.702 m (5' 7\").    Weight as of this encounter: 77.1 kg (170 lb). Body surface area is 1.91 meters squared.  No Pain (0) Comment: Data Unavailable   No LMP for male patient.  Allergies reviewed: Yes  Medications reviewed: Yes    Medications: Medication refills not needed today.  Pharmacy name entered into [employer]:    [facility] [address] [place], [place] - [address] [facility] [address] [place] [facility] [address] [place] [address] PHARMAC[address]    Clinical concerns:  biopsy results       Jackie Umaña              [facility] Hematology and Oncology Progress Note    Patient: Young Ordonez  MRN: [id number]  Date of Service: Aug 15, 2023         Reason for Visit    Chief Complaint   Patient presents with     Oncology Clinic Visit     Lung cancer       Assessment and Plan     Cancer Staging   No matching staging information was found for the patient.    ECOG Performance    0 - Independent     Pain  Pain Score: No Pain (0)    #.  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  A 2.3 x 2.2 cm right upper lobe and additional bilateral pulmonary nodules, suspicious for metastatic disease or " primary lung cancer  #.  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  #.  Past smoker  #.  Significant vascular disease     I reviewed the pathology results of the right upper lobe lung mass and bladder mass biopsy.  They are 2 separate primary cancer.  He has non-small cell lung cancer of the right upper lobe and nonmuscle invasive bladder cancer.      Regarding non-small cell lung cancer, I recommended him to complete PET scan.  I discussed with him that current finding is concerning for multiple primary lung cancer versus metastatic lung cancer.  Awaiting molecular results.  I recommended him to complete MRI of the brain as part of the staging for lung cancer.  Follow-up would be in about a week to determine the stage of the lung cancer and molecular results, to discuss treatment options.     Regarding his bladder cancer, I informed him that he has early stage nonmuscle invasive bladder cancer.  He has been seeing Dr. Mithcell and he will continue to manage and monitor.  I also explained to him that he does not need systemic therapy for bladder cancer due to noninvasive state.    Discussed plan of care with Dr. Mitchell.  Encounter Diagnoses:    Problem List Items Addressed This Visit    None  Visit Diagnoses       Squamous cell lung cancer, right (H)    -  Primary    Relevant Orders    MR Brain w/o & w Contrast (Completed)    Malignant neoplasm of upper lobe, right bronchus or lung (H)                   CC: Jigar Crawford MD   ______________________________________________________________________________  Diagnosis  8/2023-right upper lobe of the lung, right lower lobe with thoracic lymph node metastases.  Additional mildly FDG avid spiculated nodule in the periphery of the left upper lobe.   -Right upper lobe lung mass biopsy showed non-small cell carcinoma (squamous cell carcinoma).  TPS 25%, low PD-L1 expression    8/3/2023-biopsy of the bladder tumor showed  papillary transitional carcinoma, low-grade, no evidence of invasive malignancy.    Treatment to date  TBD    History of Present Illness    . Young Ordonez presented today accompanied by his wife, Mitra.  He is here to review the biopsy result.  He does not have any concerns today.    Review of systems  Apart from describing in HPI, the remainder of comprehensive ROS was negative.    Past History    Past Medical History:   Diagnosis Date     Acute myocardial infarction, unspecified site, episode of care unspecified 01/01/1995     Arthritis      Cardiomyopathy (H)      Cerebral artery occlusion with cerebral infarction (H) 2018    lost vision in right eye     Claudication (H)      COPD (chronic obstructive pulmonary disease) (H)      Coronary artery disease      Depression      DEPRESSIVE DISORDER NEC 04/29/2008     Essential hypertension, benign      Gout      Gout attack 05/25/2017     Hepatitis C carrier (H)      HTN (hypertension)      Hyperlipidemia      Infectious viral hepatitis     carrier of viral hepatitis     Low back pain     chronic     MI (myocardial infarction) (H) 01/01/1995     Obesity      Other and unspecified hyperlipidemia      PAD (peripheral artery disease) (H)      Peyronie's disease        Past Surgical History:   Procedure Laterality Date     ANGIOPLASTY  1995     ANKLE FRACTURE SURGERY Left      BYPASS GRAFT AORTOFEMORAL N/A 5/17/2017    Procedure: AORTOBIFEMORAL BYPASS ;  Surgeon: Ilir FERRO MD;  Location: [facility];  Service:      CAROTID ENDARTERECTOMY Right 4/12/2018    Procedure: RIGHT CAROTID ENDARTERECTOMY WITH EEG;  Surgeon: Sergei Lemus MD;  Location: [facility];  Service:      CYSTOSCOPY, TRANSURETHRAL RESECTION (TUR) TUMOR BLADDER, COMBINED N/A 8/3/2023    Procedure: Transurethral resection of bladder tumor 2 cm to 5 cm;  Surgeon: German Mitchell MD;  Location:  OR     FRACTURE SURGERY       IR AORTIC ARCH 4 VESSEL ANGIOGRAM  11/9/2009     IR  "CAROTID ANGIOGRAM  11/9/2009     IR CAROTID ANGIOGRAM  11/9/2009     IR EXTREMITY ANGIOGRAM BILATERAL  4/6/2017     MANDIBLE SURGERY       ZZC NONSPECIFIC PROCEDURE  1995    MI -- angioplasty       Physical Exam    /75   Pulse 60   Resp 16   Ht 1.702 m (5' 7\")   Wt 77.1 kg (170 lb)   SpO2 95%   BMI 26.63 kg/m      General: alert, awake, not in acute distress  HEENT: Head: Normal, normocephalic, atraumatic.  Eye: Normal external eye, conjunctiva, lids cornea, NARESH.  Nose: Normal external nose, mucus membranes and septum.  Pharynx: Normal buccal mucosa. Normal pharynx.  Neck / Thyroid: Supple, no masses, nodes, nodules or enlargement.  Lymphatics: No abnormally enlarged lymph nodes.  Extremities: normal strength, tone, and muscle mass  Skin: normal. no rash or abnormalities  CNS: non focal.    Lab Results    Recent Results (from the past 168 hour(s))   Glucose by meter   Result Value Ref Range    GLUCOSE BY METER POCT 98 70 - 99 mg/dL   Creatinine POCT   Result Value Ref Range    Creatinine POCT 1.5 (H) 0.7 - 1.3 mg/dL    GFR, ESTIMATED POCT 49 (L) >60 mL/min/1.73m2       Imaging    CT Chest/Abdomen/Pelvis w Contrast    Result Date: 8/18/2023  EXAM: PET ONCOLOGY WHOLE BODY, CT CHEST/ABDOMEN/PELVIS W CONTRAST LOCATION: [facility] DATE: 8/18/2023 INDICATION: Initial treatment planning and staging for malignant neoplasm of upper lobe, right bronchus or lung COMPARISON: MRI brain dated 08/16/2023 and thoracic CT dated 06/30/2023 CONTRAST: 83 mL Isovue-370 TECHNIQUE: Serum glucose level 98 mg/dL. One hour post intravenous administration of 10.6 mCi F-18 FDG, PET imaging was performed from the skull vertex to feet, utilizing attenuation correction with concurrent axial CT and PET/CT image fusion. Separate diagnostic CT of the chest, abdomen, and pelvis was performed. Dose reduction techniques were used. PET/CT FINDINGS: Spiculated FDG avid mass in the anterior right upper lobe tethering " the anterior visceral pleura measuring 3.0 x 2.2 x 2.2 cm (max SUV 17.1) and additional FDG avid nodule in the right lower lobe abutting the lateral visceral pleura measuring 2.4 x 1.9 2.1 cm (max SUV 13.4) with FDG avid right interlobar station 11R (max SUV 8.6), subcarinal station 7 (max SUV 9.4), right lower paratracheal station 4 (max SUV 3.3) lymph nodes suspicious for synchronous right upper/lower lobe primary  lung neoplasms with thoracic lymph node metastases. Additional mildly FDG avid spiculated nodule in the periphery of the left upper lobe measuring 1.1 x 0.5 cm (max SUV 3.2) may represent additional synchronous neoplasm. Mild inflammatory change associated with the aortobifemoral bypass graft. CT FINDINGS: Mild senescent intracranial changes. Moderate carotid artery bifurcation calcification. Moderate coronary artery calcium. Cholelithiasis. Aortobifemoral bypass graft. Non-FDG avid thickening in the urinary bladder. Multilevel degenerative changes of the spine. Lower extremities are unremarkable.     IMPRESSION: 1. Findings suspicious for synchronous primary lung neoplasms involving the right upper and right lower lobes with thoracic lymph node metastases. 2. Additional mildly FDG avid spiculated nodule in the periphery of the left upper lobe may represent additional early lung neoplasm.    PET Oncology Whole Body    Result Date: 8/18/2023  EXAM: PET ONCOLOGY WHOLE BODY, CT CHEST/ABDOMEN/PELVIS W CONTRAST LOCATION: [facility] DATE: 8/18/2023 INDICATION: Initial treatment planning and staging for malignant neoplasm of upper lobe, right bronchus or lung COMPARISON: MRI brain dated 08/16/2023 and thoracic CT dated 06/30/2023 CONTRAST: 83 mL Isovue-370 TECHNIQUE: Serum glucose level 98 mg/dL. One hour post intravenous administration of 10.6 mCi F-18 FDG, PET imaging was performed from the skull vertex to feet, utilizing attenuation correction with concurrent axial CT and PET/CT  image fusion. Separate diagnostic CT of the chest, abdomen, and pelvis was performed. Dose reduction techniques were used. PET/CT FINDINGS: Spiculated FDG avid mass in the anterior right upper lobe tethering the anterior visceral pleura measuring 3.0 x 2.2 x 2.2 cm (max SUV 17.1) and additional FDG avid nodule in the right lower lobe abutting the lateral visceral pleura measuring 2.4 x 1.9 2.1 cm (max SUV 13.4) with FDG avid right interlobar station 11R (max SUV 8.6), subcarinal station 7 (max SUV 9.4), right lower paratracheal station 4 (max SUV 3.3) lymph nodes suspicious for synchronous right upper/lower lobe primary  lung neoplasms with thoracic lymph node metastases. Additional mildly FDG avid spiculated nodule in the periphery of the left upper lobe measuring 1.1 x 0.5 cm (max SUV 3.2) may represent additional synchronous neoplasm. Mild inflammatory change associated with the aortobifemoral bypass graft. CT FINDINGS: Mild senescent intracranial changes. Moderate carotid artery bifurcation calcification. Moderate coronary artery calcium. Cholelithiasis. Aortobifemoral bypass graft. Non-FDG avid thickening in the urinary bladder. Multilevel degenerative changes of the spine. Lower extremities are unremarkable.     IMPRESSION: 1. Findings suspicious for synchronous primary lung neoplasms involving the right upper and right lower lobes with thoracic lymph node metastases. 2. Additional mildly FDG avid spiculated nodule in the periphery of the left upper lobe may represent additional early lung neoplasm.    MR Brain w/o & w Contrast    Result Date: 8/16/2023  EXAM: MR BRAIN W/O and W CONTRAST LOCATION: [facility] DATE: 8/16/2023 INDICATION:  Squamous cell lung cancer, right (H) COMPARISON: CT head 06/30/2023 CONTRAST: 7.5ml Gadavist TECHNIQUE: Routine multiplanar multisequence head MRI without and with intravenous contrast. FINDINGS: INTRACRANIAL CONTENTS: No acute or subacute infarct. No  mass, acute hemorrhage, or extra-axial fluid collections. Tiny focus of cortically-based encephalomalacia and adjacent gliosis involving the right superior frontal gyrus medially. Chronic appearing lacunar infarct of the anterior right caudate nucleus, and presumed minor microvascular ischemic change within the cerebral white matter. Additional small presumed chronic lacunar infarct of the superior left cerebellum. Minor cerebral volume loss. Normal position of the cerebellar tonsils. No pathologic contrast enhancement. SELLA: No abnormality accounting for technique. OSSEOUS STRUCTURES/SOFT TISSUES: Normal marrow signal. The major intracranial vascular flow voids are maintained. ORBITS: No abnormality accounting for technique. SINUSES/MASTOIDS: Mucosal thickening primarily involving the ethmoid air cells. No middle ear or mastoid effusion.     IMPRESSION: 1.  No evidence for acute intracranial process or definite intracranial metastasis. 2.  Generalized brain atrophy and presumed chronic ischemic changes including scattered small infarcts as detailed above. 3.  Mild inflammatory changes of the paranasal sinuses.     XR Chest Port 1 View    Result Date: 8/1/2023  EXAM: XR CHEST PORT 1 VIEW LOCATION: [facility] DATE: 8/1/2023 INDICATION: Chest Biopsy COMPARISON: CT-guided right upper lobe lung nodule biopsy today. Chest CT 06/30/2023.     IMPRESSION: Previously biopsied right upper lobe nodule again demonstrated. Additional nodule in the right lower lobe on prior chest CT less well seen by radiographic technique. No visible pneumothorax. Mild left basilar atelectasis. Top normal heart size. Calcified plaque of the aortic arch.    CT Lung Mediastinum Biopsy    Result Date: 8/1/2023  EXAM: 1. PERCUTANEOUS BIOPSY RIGHT UPPER LOBE 2. CT GUIDANCE 3. CONSCIOUS SEDATION LOCATION: [facility] DATE: 8/1/2023 INDICATION: Lung nodule (Pulmonary nodule) TECHNIQUE: Dose reduction  techniques were used. PROCEDURE: Informed consent obtained. Site marked. Prior images reviewed. Required items made available. Patient identity confirmed verbally and with arm band. Patient reevaluated immediately before administering sedation. Universal protocol was followed. Time out performed. The site was prepped and draped in sterile fashion. 6 mL of 1% lidocaine was infused into the local soft tissues. Using standard technique and under direct CT guidance, a 20-gauge biopsy device was used to obtain 5  core biopsies. Tissue was submitted to Pathology and was adequate by preliminary review by a pathologist. The patient tolerated the procedure well. No immediate complications. SEDATION: Versed 1  mg. Fentanyl 50 mcg. The procedure was performed with administration intravenous conscious sedation with appropriate preoperative, intraoperative, and postoperative evaluation. 30  minutes of supervised face to face conscious sedation time was provided by a radiology nurse under my direct supervision.     IMPRESSION: 1.  Successful CT-guided biopsy of the right upper lobe nodule with moderate sedation . Reference CPT Codes: 47195, 15135, 74810, 41977     40 minutes spent on the date of the encounter doing chart review, history and exam, documentation, communication of the treatment plan with the care team and further activities as noted above.    Signed by: Glenn Crowe MD      Again, thank you for allowing me to participate in the care of your patient.        Sincerely,        Glenn Crowe MD

## 2023-08-15 NOTE — PROGRESS NOTES
"Oncology Rooming Note    August 15, 2023 11:25 AM   Young Ordonez is a 73 year old male who presents for:    Chief Complaint   Patient presents with    Oncology Clinic Visit     Lung cancer     Initial Vitals: /75   Pulse 60   Resp 16   Ht 1.702 m (5' 7\")   Wt 77.1 kg (170 lb)   SpO2 95%   BMI 26.63 kg/m   Estimated body mass index is 26.63 kg/m  as calculated from the following:    Height as of this encounter: 1.702 m (5' 7\").    Weight as of this encounter: 77.1 kg (170 lb). Body surface area is 1.91 meters squared.  No Pain (0) Comment: Data Unavailable   No LMP for male patient.  Allergies reviewed: Yes  Medications reviewed: Yes    Medications: Medication refills not needed today.  Pharmacy name entered into [facility]:    [facility] [address] [place], [place] - [address]  [facility] [address] [place] [facility] [address] [place] [address] PHARMACY [place], [place] - [address] [place] [address]    Clinical concerns:  biopsy results       Jackie Umaña            "

## 2023-08-15 NOTE — PROGRESS NOTES
[facility] Hematology and Oncology Progress Note    Patient: Young Ordonez  MRN: [id number]  Date of Service: Aug 15, 2023         Reason for Visit    Chief Complaint   Patient presents with    Oncology Clinic Visit     Lung cancer       Assessment and Plan     Cancer Staging   No matching staging information was found for the patient.    ECOG Performance    0 - Independent     Pain  Pain Score: No Pain (0)    #.  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  A 2.3 x 2.2 cm right upper lobe and additional bilateral pulmonary nodules, suspicious for metastatic disease or primary lung cancer  #.  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  #.  Past smoker  #.  Significant vascular disease     I reviewed the pathology results of the right upper lobe lung mass and bladder mass biopsy.  They are 2 separate primary cancer.  He has non-small cell lung cancer of the right upper lobe and nonmuscle invasive bladder cancer.      Regarding non-small cell lung cancer, I recommended him to complete PET scan.  I discussed with him that current finding is concerning for multiple primary lung cancer versus metastatic lung cancer.  Awaiting molecular results.  I recommended him to complete MRI of the brain as part of the staging for lung cancer.  Follow-up would be in about a week to determine the stage of the lung cancer and molecular results, to discuss treatment options.     Regarding his bladder cancer, I informed him that he has early stage nonmuscle invasive bladder cancer.  He has been seeing Dr. Mitchell and he will continue to manage and monitor.  I also explained to him that he does not need systemic therapy for bladder cancer due to noninvasive state.    Discussed plan of care with Dr. Mitchell.  Encounter Diagnoses:    Problem List Items Addressed This Visit     None  Visit Diagnoses       Squamous cell lung cancer, right (H)    -  Primary    Relevant Orders    MR Brain w/o & w Contrast (Completed)    Malignant neoplasm of upper lobe, right bronchus or lung (H)                   CC: Jigar Crawford MD   ______________________________________________________________________________  Diagnosis  8/2023-right upper lobe of the lung, right lower lobe with thoracic lymph node metastases.  Additional mildly FDG avid spiculated nodule in the periphery of the left upper lobe.   -Right upper lobe lung mass biopsy showed non-small cell carcinoma (squamous cell carcinoma).  TPS 25%, low PD-L1 expression    8/3/2023-biopsy of the bladder tumor showed papillary transitional carcinoma, low-grade, no evidence of invasive malignancy.    Treatment to date  TBD    History of Present Illness    Mr. Young Ordonez presented today accompanied by his wife, Mitra.  He is here to review the biopsy result.  He does not have any concerns today.    Review of systems  Apart from describing in HPI, the remainder of comprehensive ROS was negative.    Past History    Past Medical History:   Diagnosis Date    Acute myocardial infarction, unspecified site, episode of care unspecified 01/01/1995    Arthritis     Cardiomyopathy (H)     Cerebral artery occlusion with cerebral infarction (H) 2018    lost vision in right eye    Claudication (H)     COPD (chronic obstructive pulmonary disease) (H)     Coronary artery disease     Depression     DEPRESSIVE DISORDER NEC 04/29/2008    Essential hypertension, benign     Gout     Gout attack 05/25/2017    Hepatitis C carrier (H)     HTN (hypertension)     Hyperlipidemia     Infectious viral hepatitis     carrier of viral hepatitis    Low back pain     chronic    MI (myocardial infarction) (H) 01/01/1995    Obesity     Other and unspecified hyperlipidemia     PAD (peripheral artery disease) (H)     Peyronie's disease        Past Surgical History:   Procedure  "Laterality Date    ANGIOPLASTY  1995    ANKLE FRACTURE SURGERY Left     BYPASS GRAFT AORTOFEMORAL N/A 5/17/2017    Procedure: AORTOBIFEMORAL BYPASS ;  Surgeon: Ilir FERRO MD;  Location: [facility];  Service:     CAROTID ENDARTERECTOMY Right 4/12/2018    Procedure: RIGHT CAROTID ENDARTERECTOMY WITH EEG;  Surgeon: Sergei Lemus MD;  Location: [facility];  Service:     CYSTOSCOPY, TRANSURETHRAL RESECTION (TUR) TUMOR BLADDER, COMBINED N/A 8/3/2023    Procedure: Transurethral resection of bladder tumor 2 cm to 5 cm;  Surgeon: German Mitchell MD;  Location: [facility] OR    FRACTURE SURGERY      IR AORTIC ARCH 4 VESSEL ANGIOGRAM  11/9/2009    IR CAROTID ANGIOGRAM  11/9/2009    IR CAROTID ANGIOGRAM  11/9/2009    IR EXTREMITY ANGIOGRAM BILATERAL  4/6/2017    MANDIBLE SURGERY      ZZC NONSPECIFIC PROCEDURE  1995    MI -- angioplasty       Physical Exam    /75   Pulse 60   Resp 16   Ht 1.702 m (5' 7\")   Wt 77.1 kg (170 lb)   SpO2 95%   BMI 26.63 kg/m      General: alert, awake, not in acute distress  HEENT: Head: Normal, normocephalic, atraumatic.  Eye: Normal external eye, conjunctiva, lids cornea, NARESH.  Nose: Normal external nose, mucus membranes and septum.  Pharynx: Normal buccal mucosa. Normal pharynx.  Neck / Thyroid: Supple, no masses, nodes, nodules or enlargement.  Lymphatics: No abnormally enlarged lymph nodes.  Extremities: normal strength, tone, and muscle mass  Skin: normal. no rash or abnormalities  CNS: non focal.    Lab Results    Recent Results (from the past 168 hour(s))   Glucose by meter   Result Value Ref Range    GLUCOSE BY METER POCT 98 70 - 99 mg/dL   Creatinine POCT   Result Value Ref Range    Creatinine POCT 1.5 (H) 0.7 - 1.3 mg/dL    GFR, ESTIMATED POCT 49 (L) >60 mL/min/1.73m2       Imaging    CT Chest/Abdomen/Pelvis w Contrast    Result Date: 8/18/2023  EXAM: PET ONCOLOGY WHOLE BODY, CT CHEST/ABDOMEN/PELVIS W CONTRAST LOCATION: [facility] " DATE: 8/18/2023 INDICATION: Initial treatment planning and staging for malignant neoplasm of upper lobe, right bronchus or lung COMPARISON: MRI brain dated 08/16/2023 and thoracic CT dated 06/30/2023 CONTRAST: 83 mL Isovue-370 TECHNIQUE: Serum glucose level 98 mg/dL. One hour post intravenous administration of 10.6 mCi F-18 FDG, PET imaging was performed from the skull vertex to feet, utilizing attenuation correction with concurrent axial CT and PET/CT image fusion. Separate diagnostic CT of the chest, abdomen, and pelvis was performed. Dose reduction techniques were used. PET/CT FINDINGS: Spiculated FDG avid mass in the anterior right upper lobe tethering the anterior visceral pleura measuring 3.0 x 2.2 x 2.2 cm (max SUV 17.1) and additional FDG avid nodule in the right lower lobe abutting the lateral visceral pleura measuring 2.4 x 1.9 2.1 cm (max SUV 13.4) with FDG avid right interlobar station 11R (max SUV 8.6), subcarinal station 7 (max SUV 9.4), right lower paratracheal station 4 (max SUV 3.3) lymph nodes suspicious for synchronous right upper/lower lobe primary  lung neoplasms with thoracic lymph node metastases. Additional mildly FDG avid spiculated nodule in the periphery of the left upper lobe measuring 1.1 x 0.5 cm (max SUV 3.2) may represent additional synchronous neoplasm. Mild inflammatory change associated with the aortobifemoral bypass graft. CT FINDINGS: Mild senescent intracranial changes. Moderate carotid artery bifurcation calcification. Moderate coronary artery calcium. Cholelithiasis. Aortobifemoral bypass graft. Non-FDG avid thickening in the urinary bladder. Multilevel degenerative changes of the spine. Lower extremities are unremarkable.     IMPRESSION: 1. Findings suspicious for synchronous primary lung neoplasms involving the right upper and right lower lobes with thoracic lymph node metastases. 2. Additional mildly FDG avid spiculated nodule in the periphery of the left upper lobe may  represent additional early lung neoplasm.    PET Oncology Whole Body    Result Date: 8/18/2023  EXAM: PET ONCOLOGY WHOLE BODY, CT CHEST/ABDOMEN/PELVIS W CONTRAST LOCATION: [facility] DATE: 8/18/2023 INDICATION: Initial treatment planning and staging for malignant neoplasm of upper lobe, right bronchus or lung COMPARISON: MRI brain dated 08/16/2023 and thoracic CT dated 06/30/2023 CONTRAST: 83 mL Isovue-370 TECHNIQUE: Serum glucose level 98 mg/dL. One hour post intravenous administration of 10.6 mCi F-18 FDG, PET imaging was performed from the skull vertex to feet, utilizing attenuation correction with concurrent axial CT and PET/CT image fusion. Separate diagnostic CT of the chest, abdomen, and pelvis was performed. Dose reduction techniques were used. PET/CT FINDINGS: Spiculated FDG avid mass in the anterior right upper lobe tethering the anterior visceral pleura measuring 3.0 x 2.2 x 2.2 cm (max SUV 17.1) and additional FDG avid nodule in the right lower lobe abutting the lateral visceral pleura measuring 2.4 x 1.9 2.1 cm (max SUV 13.4) with FDG avid right interlobar station 11R (max SUV 8.6), subcarinal station 7 (max SUV 9.4), right lower paratracheal station 4 (max SUV 3.3) lymph nodes suspicious for synchronous right upper/lower lobe primary  lung neoplasms with thoracic lymph node metastases. Additional mildly FDG avid spiculated nodule in the periphery of the left upper lobe measuring 1.1 x 0.5 cm (max SUV 3.2) may represent additional synchronous neoplasm. Mild inflammatory change associated with the aortobifemoral bypass graft. CT FINDINGS: Mild senescent intracranial changes. Moderate carotid artery bifurcation calcification. Moderate coronary artery calcium. Cholelithiasis. Aortobifemoral bypass graft. Non-FDG avid thickening in the urinary bladder. Multilevel degenerative changes of the spine. Lower extremities are unremarkable.     IMPRESSION: 1. Findings suspicious for  synchronous primary lung neoplasms involving the right upper and right lower lobes with thoracic lymph node metastases. 2. Additional mildly FDG avid spiculated nodule in the periphery of the left upper lobe may represent additional early lung neoplasm.    MR Brain w/o & w Contrast    Result Date: 8/16/2023  EXAM: MR BRAIN W/O and W CONTRAST LOCATION: [facility] DATE: 8/16/2023 INDICATION:  Squamous cell lung cancer, right (H) COMPARISON: CT head 06/30/2023 CONTRAST: 7.5ml Gadavist TECHNIQUE: Routine multiplanar multisequence head MRI without and with intravenous contrast. FINDINGS: INTRACRANIAL CONTENTS: No acute or subacute infarct. No mass, acute hemorrhage, or extra-axial fluid collections. Tiny focus of cortically-based encephalomalacia and adjacent gliosis involving the right superior frontal gyrus medially. Chronic appearing lacunar infarct of the anterior right caudate nucleus, and presumed minor microvascular ischemic change within the cerebral white matter. Additional small presumed chronic lacunar infarct of the superior left cerebellum. Minor cerebral volume loss. Normal position of the cerebellar tonsils. No pathologic contrast enhancement. SELLA: No abnormality accounting for technique. OSSEOUS STRUCTURES/SOFT TISSUES: Normal marrow signal. The major intracranial vascular flow voids are maintained. ORBITS: No abnormality accounting for technique. SINUSES/MASTOIDS: Mucosal thickening primarily involving the ethmoid air cells. No middle ear or mastoid effusion.     IMPRESSION: 1.  No evidence for acute intracranial process or definite intracranial metastasis. 2.  Generalized brain atrophy and presumed chronic ischemic changes including scattered small infarcts as detailed above. 3.  Mild inflammatory changes of the paranasal sinuses.     XR Chest Port 1 View    Result Date: 8/1/2023  EXAM: XR CHEST PORT 1 VIEW LOCATION: [facility] DATE: 8/1/2023  INDICATION: Chest Biopsy COMPARISON: CT-guided right upper lobe lung nodule biopsy today. Chest CT 06/30/2023.     IMPRESSION: Previously biopsied right upper lobe nodule again demonstrated. Additional nodule in the right lower lobe on prior chest CT less well seen by radiographic technique. No visible pneumothorax. Mild left basilar atelectasis. Top normal heart size. Calcified plaque of the aortic arch.    CT Lung Mediastinum Biopsy    Result Date: 8/1/2023  EXAM: 1. PERCUTANEOUS BIOPSY RIGHT UPPER LOBE 2. CT GUIDANCE 3. CONSCIOUS SEDATION LOCATION: [facility] DATE: 8/1/2023 INDICATION: Lung nodule (Pulmonary nodule) TECHNIQUE: Dose reduction techniques were used. PROCEDURE: Informed consent obtained. Site marked. Prior images reviewed. Required items made available. Patient identity confirmed verbally and with arm band. Patient reevaluated immediately before administering sedation. Universal protocol was followed. Time out performed. The site was prepped and draped in sterile fashion. 6 mL of 1% lidocaine was infused into the local soft tissues. Using standard technique and under direct CT guidance, a 20-gauge biopsy device was used to obtain 5  core biopsies. Tissue was submitted to Pathology and was adequate by preliminary review by a pathologist. The patient tolerated the procedure well. No immediate complications. SEDATION: Versed 1  mg. Fentanyl 50 mcg. The procedure was performed with administration intravenous conscious sedation with appropriate preoperative, intraoperative, and postoperative evaluation. 30  minutes of supervised face to face conscious sedation time was provided by a radiology nurse under my direct supervision.     IMPRESSION: 1.  Successful CT-guided biopsy of the right upper lobe nodule with moderate sedation . Reference CPT Codes: 59514, 20757, 34505, 91518     40 minutes spent on the date of the encounter doing chart review, history and exam, documentation,  communication of the treatment plan with the care team and further activities as noted above.    Signed by: Glenn Crowe MD

## 2023-08-15 NOTE — LETTER
"    8/15/2023         RE: Young Ordonez  [address] [facility] [address]        Dear Colleague,    Thank you for referring your patient, Young Ordonez, to the [facility]. Please see a copy of my visit note below.    Urology Rooming Note    August 15, 2023 11:28 AM   Young Ordonez is a 73 year old male who presents for:    Chief Complaint   Patient presents with     urology clinic     Post op follow up     Initial Vitals: /75   Pulse 60   Resp 16   Ht 1.702 m (5' 7\")   Wt 77.1 kg (170 lb)   SpO2 95%   BMI 26.63 kg/m   Estimated body mass index is 26.63 kg/m  as calculated from the following:    Height as of this encounter: 1.702 m (5' 7\").    Weight as of this encounter: 77.1 kg (170 lb). Body surface area is 1.91 meters squared.  No Pain (0) Comment: Data Unavailable     Allergies reviewed: Yes  Medications reviewed: Yes    Medications: Medication refills not needed today.  Pharmacy name entered into [employer]:    [facility] [address] [place] [facility] [address] [facility] [address] [place] [address] [facility] [address]      Jackie Umaña    CHIEF COMPLAINT   It was my pleasure to see Young Ordonez who is a 73 year old male for follow-up of TURBT with intravesicular Mitomycin-C placement.      HPI:  Young Ordonez is a 73 year old male being seen for postop follow-up.  Duration of problem: Few weeks  Previous treatments: TURBT with intravesical mitomycin     accompanied by his spouse  Reviewed previous notes from Dr. Sebastien Vidal is here to review the pathology and decide on further follow-up  He has been doing well since the catheter came out     Exam:  /75   Pulse 60   Resp 16   Ht 1.702 m (5' 7\")   Wt 77.1 kg (170 lb)   SpO2 95%   BMI 26.63 kg/m    General: age-appropriate appearing male in NAD sitting in an exam chair  Resp: no respiratory distress  CV: heart rate " regular  Abdomen: Degree of obesity is mild. Abdomen is soft and nontender. No organomegaly. .  : not performed  Neuro: grossly non focal. Normal reflexes  Motor: excellent strength throughout    Review of Imaging:  The following imaging exams were independently viewed and interpreted by me and discussed with patient:      Review of Labs:  The following labs were reviewed by me and discussed with the patient:  Surgical pathology: Abnormal: Low-grade urothelial carcinoma without any invasive component    Assessment & Plan    Malignant neoplasm of posterior wall of urinary bladder (H)  Discussed with Young and his wife about low-grade bladder cancer  He already has received intravesical Mitomycin-C at the time of the surgery  Based on the pathology I do not think he needs further intravesical treatment.  We could consider weekly intravesical mitomycin for 6 weeks apart I do not think it will add further to reducing the risk in the current context and additionally may interfere with his lung cancer treatment.  Would recommend follow-up in 3 months for cystoscopy, further cystoscopy will be based on findings in the next evaluation      German Mitchell MD  [facility]      ==========================    Additional Billing and Coding Information:  Review of external notes as documented above   Review of the result(s) of each unique test - surgical pathology    Independent interpretation of a test performed by another physician/other qualified health care professional (not separately reported) -       Discussion of management or test interpretation with external physician/other qualified healthcare professional/appropriate source -           12 minutes spent by me on the date of the encounter doing chart review, review of test results, interpretation of tests, patient visit, documentation, and discussion with family     ==========================      Again, thank you for allowing me to  participate in the care of your patient.        Sincerely,        German Mitchell MD

## 2023-08-15 NOTE — PROGRESS NOTES
"CHIEF COMPLAINT   It was my pleasure to see Young Ordonez who is a 73 year old male for follow-up of TURBT with intravesicular Mitomycin-C placement.      HPI:  Young Ordonez is a 73 year old male being seen for postop follow-up.  Duration of problem: Few weeks  Previous treatments: TURBT with intravesical mitomycin     accompanied by his spouse  Reviewed previous notes from Dr. Crowe  Young is here to review the pathology and decide on further follow-up  He has been doing well since the catheter came out     Exam:  /75   Pulse 60   Resp 16   Ht 1.702 m (5' 7\")   Wt 77.1 kg (170 lb)   SpO2 95%   BMI 26.63 kg/m    General: age-appropriate appearing male in NAD sitting in an exam chair  Resp: no respiratory distress  CV: heart rate regular  Abdomen: Degree of obesity is mild. Abdomen is soft and nontender. No organomegaly. .  : not performed  Neuro: grossly non focal. Normal reflexes  Motor: excellent strength throughout    Review of Imaging:  The following imaging exams were independently viewed and interpreted by me and discussed with patient:      Review of Labs:  The following labs were reviewed by me and discussed with the patient:  Surgical pathology: Abnormal: Low-grade urothelial carcinoma without any invasive component    Assessment & Plan     Malignant neoplasm of posterior wall of urinary bladder (H)  Discussed with Young and his wife about low-grade bladder cancer  He already has received intravesical Mitomycin-C at the time of the surgery  Based on the pathology I do not think he needs further intravesical treatment.  We could consider weekly intravesical mitomycin for 6 weeks apart I do not think it will add further to reducing the risk in the current context and additionally may interfere with his lung cancer treatment.  Would recommend follow-up in 3 months for cystoscopy, further cystoscopy will be based on findings in the next evaluation      German Mitchell MD  [facility] " [facility]      ==========================    Additional Billing and Coding Information:  Review of external notes as documented above   Review of the result(s) of each unique test - surgical pathology    Independent interpretation of a test performed by another physician/other qualified health care professional (not separately reported) -       Discussion of management or test interpretation with external physician/other qualified healthcare professional/appropriate source -           12 minutes spent by me on the date of the encounter doing chart review, review of test results, interpretation of tests, patient visit, documentation, and discussion with family     ==========================

## 2023-08-23 NOTE — PROGRESS NOTES
"Oncology Rooming Note    August 23, 2023 2:24 PM   Young Ordonez is a 73 year old male who presents for:    Chief Complaint   Patient presents with    Oncology Clinic Visit     Squamous cell lung cancer, right      Initial Vitals: /79   Pulse 67   Resp 16   Ht 1.702 m (5' 7\")   Wt 76.7 kg (169 lb)   SpO2 94%   BMI 26.47 kg/m   Estimated body mass index is 26.47 kg/m  as calculated from the following:    Height as of this encounter: 1.702 m (5' 7\").    Weight as of this encounter: 76.7 kg (169 lb). Body surface area is 1.9 meters squared.  No Pain (0) Comment: Data Unavailable   No LMP for male patient.  Allergies reviewed: Yes  Medications reviewed: Yes    Medications: Medication refills not needed today.  Pharmacy name entered into [employer]:    [facility] [address] [place], [place] - [address]  [facility] [address] [place] [facility] [address] [place] [address] PHARMACY [place], [place] - [address] [place] [address]    Clinical concerns:  1 week follow up results       Jackie Umaña              "

## 2023-08-23 NOTE — LETTER
8/23/2023         RE: Young Ordonez  [address] [facility] [address]        Dear Colleague,    Thank you for referring your patient, Young Ordonez, to the [facility]. Please see a copy of my visit note below.    [facility] Hematology and Oncology Progress Note    Patient: Young Ordonez  MRN: [id number]  Date of Service: Aug 23, 2023         Reason for Visit    Chief Complaint   Patient presents with     Oncology Clinic Visit     Squamous cell lung cancer, right        Assessment and Plan     Cancer Staging   Primary malignant neoplasm of right lower lobe of lung (H)  Staging form: Lung, AJCC 8th Edition  - Clinical stage from 8/30/2023: Stage IIIB (cT4(2), cN2, cM0) - Signed by Glenn Crowe MD on 9/3/2023      ECOG Performance    0 - Independent     Pain  Pain Score: No Pain (0)    #.  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  A 2.3 x 2.2 cm right upper lobe and additional bilateral pulmonary nodules, suspicious for metastatic disease or primary lung cancer  #.  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  #.  Past smoker  #.  Significant vascular disease     I reviewed the PET scan images and report in detail.  After discussion with our radiologist and radiation oncologist it is determined that he has stage IIIb right lung cancer with likely synchronous left lung cancer of 1.1 x 0.5 cm.  The left upper lobe lung nodule is too small for biopsy.   Recommendation is to proceed with concurrent chemoradiation for definitive treatment for right lung cancer and monitoring of left lung cancer in the meantime.    Reviewed plan of care with Dr. Gonsales, radiation oncology and radiologist.    Encounter Diagnoses:    Problem List Items Addressed This Visit    None  Visit Diagnoses       Malignant neoplasm of upper  lobe, right bronchus or lung (H)    -  Primary    Relevant Orders    Radiation Therapy Referral               CC: Jigar Crawford MD   ______________________________________________________________________________  Diagnosis  8/2023-right upper lobe of the lung, right lower lobe with thoracic lymph node metastases.  Additional mildly FDG avid spiculated nodule in the periphery of the left upper lobe.   -Right upper lobe lung mass biopsy showed non-small cell carcinoma (squamous cell carcinoma).  TPS 25%, low PD-L1 expression.  Fusion negative. CDKN2A 58fs positive.  No targetable mutation.     8/3/2023-biopsy of the bladder tumor showed papillary transitional carcinoma, low-grade, no evidence of invasive malignancy.    8/18/2023-PET scan showed 3 x 2.2 x 2.2 cm anterior right upper lobe mass with additional FDG avid nodule in the right lower lobe measuring 2.4 x 1.1 x 2.1 cm.  FDG avid right 11 R, 7, right lower station 4 lymph nodes were suspicious for synchronous right upper/lower primary lung neoplasm with thoracic lymph node metastasis.  He has additional FDG avid left upper lobe lesion of 1.1 x 0.5 cm likely representing additional synchronous neoplasm.    8/16/2023-MRI brain was negative for intracranial metastatic disease.    Treatment to date  TBD    History of Present Illness    Mr. Young Ordonez presented today accompanied by his wife, Mitra.  He is here to review the biopsy result.  He does not have any concerns today.    Review of systems  Apart from describing in HPI, the remainder of comprehensive ROS was negative.    Past History    Past Medical History:   Diagnosis Date     Acute myocardial infarction, unspecified site, episode of care unspecified 01/01/1995     Arthritis      Cardiomyopathy (H)      Cerebral artery occlusion with cerebral infarction (H) 2018    lost vision in right eye     Claudication (H)      COPD (chronic obstructive pulmonary disease) (H)      Coronary artery disease       "Depression      DEPRESSIVE DISORDER NEC 04/29/2008     Essential hypertension, benign      Gout      Gout attack 05/25/2017     Hepatitis C carrier (H)      HTN (hypertension)      Hyperlipidemia      Infectious viral hepatitis     carrier of viral hepatitis     Low back pain     chronic     MI (myocardial infarction) (H) 01/01/1995     Obesity      Other and unspecified hyperlipidemia      PAD (peripheral artery disease) (H)      Peyronie's disease        Past Surgical History:   Procedure Laterality Date     ANGIOPLASTY  1995     ANKLE FRACTURE SURGERY Left      BYPASS GRAFT AORTOFEMORAL N/A 5/17/2017    Procedure: AORTOBIFEMORAL BYPASS ;  Surgeon: Ilir FERRO MD;  Location: [facility];  Service:      CAROTID ENDARTERECTOMY Right 4/12/2018    Procedure: RIGHT CAROTID ENDARTERECTOMY WITH EEG;  Surgeon: Sergei Lemus MD;  Location: [facility];  Service:      CYSTOSCOPY, TRANSURETHRAL RESECTION (TUR) TUMOR BLADDER, COMBINED N/A 8/3/2023    Procedure: Transurethral resection of bladder tumor 2 cm to 5 cm;  Surgeon: German Mitchell MD;  Location:  OR     FRACTURE SURGERY       IR AORTIC ARCH 4 VESSEL ANGIOGRAM  11/9/2009     IR CAROTID ANGIOGRAM  11/9/2009     IR CAROTID ANGIOGRAM  11/9/2009     IR EXTREMITY ANGIOGRAM BILATERAL  4/6/2017     MANDIBLE SURGERY       ZZC NONSPECIFIC PROCEDURE  1995    MI -- angioplasty       Physical Exam    /79   Pulse 67   Resp 16   Ht 1.702 m (5' 7\")   Wt 76.7 kg (169 lb)   SpO2 94%   BMI 26.47 kg/m      General: alert, awake, not in acute distress  HEENT: Head: Normal, normocephalic, atraumatic.  Eye: Normal external eye, conjunctiva, lids cornea, NARESH.  Nose: Normal external nose, mucus membranes and septum.  Pharynx: Normal buccal mucosa. Normal pharynx.  Neck / Thyroid: Supple, no masses, nodes, nodules or enlargement.  Lymphatics: No abnormally enlarged lymph nodes.  Extremities: normal strength, tone, and muscle mass  Skin: normal. no " rash or abnormalities  CNS: non focal.    Lab Results    No results found for this or any previous visit (from the past 168 hour(s)).      Imaging    CT Chest/Abdomen/Pelvis w Contrast    Result Date: 8/18/2023  EXAM: PET ONCOLOGY WHOLE BODY, CT CHEST/ABDOMEN/PELVIS W CONTRAST LOCATION: [facility] DATE: 8/18/2023 INDICATION: Initial treatment planning and staging for malignant neoplasm of upper lobe, right bronchus or lung COMPARISON: MRI brain dated 08/16/2023 and thoracic CT dated 06/30/2023 CONTRAST: 83 mL Isovue-370 TECHNIQUE: Serum glucose level 98 mg/dL. One hour post intravenous administration of 10.6 mCi F-18 FDG, PET imaging was performed from the skull vertex to feet, utilizing attenuation correction with concurrent axial CT and PET/CT image fusion. Separate diagnostic CT of the chest, abdomen, and pelvis was performed. Dose reduction techniques were used. PET/CT FINDINGS: Spiculated FDG avid mass in the anterior right upper lobe tethering the anterior visceral pleura measuring 3.0 x 2.2 x 2.2 cm (max SUV 17.1) and additional FDG avid nodule in the right lower lobe abutting the lateral visceral pleura measuring 2.4 x 1.9 2.1 cm (max SUV 13.4) with FDG avid right interlobar station 11R (max SUV 8.6), subcarinal station 7 (max SUV 9.4), right lower paratracheal station 4 (max SUV 3.3) lymph nodes suspicious for synchronous right upper/lower lobe primary  lung neoplasms with thoracic lymph node metastases. Additional mildly FDG avid spiculated nodule in the periphery of the left upper lobe measuring 1.1 x 0.5 cm (max SUV 3.2) may represent additional synchronous neoplasm. Mild inflammatory change associated with the aortobifemoral bypass graft. CT FINDINGS: Mild senescent intracranial changes. Moderate carotid artery bifurcation calcification. Moderate coronary artery calcium. Cholelithiasis. Aortobifemoral bypass graft. Non-FDG avid thickening in the urinary bladder. Multilevel  degenerative changes of the spine. Lower extremities are unremarkable.     IMPRESSION: 1. Findings suspicious for synchronous primary lung neoplasms involving the right upper and right lower lobes with thoracic lymph node metastases. 2. Additional mildly FDG avid spiculated nodule in the periphery of the left upper lobe may represent additional early lung neoplasm.    PET Oncology Whole Body    Result Date: 8/18/2023  EXAM: PET ONCOLOGY WHOLE BODY, CT CHEST/ABDOMEN/PELVIS W CONTRAST LOCATION: [facility] DATE: 8/18/2023 INDICATION: Initial treatment planning and staging for malignant neoplasm of upper lobe, right bronchus or lung COMPARISON: MRI brain dated 08/16/2023 and thoracic CT dated 06/30/2023 CONTRAST: 83 mL Isovue-370 TECHNIQUE: Serum glucose level 98 mg/dL. One hour post intravenous administration of 10.6 mCi F-18 FDG, PET imaging was performed from the skull vertex to feet, utilizing attenuation correction with concurrent axial CT and PET/CT image fusion. Separate diagnostic CT of the chest, abdomen, and pelvis was performed. Dose reduction techniques were used. PET/CT FINDINGS: Spiculated FDG avid mass in the anterior right upper lobe tethering the anterior visceral pleura measuring 3.0 x 2.2 x 2.2 cm (max SUV 17.1) and additional FDG avid nodule in the right lower lobe abutting the lateral visceral pleura measuring 2.4 x 1.9 2.1 cm (max SUV 13.4) with FDG avid right interlobar station 11R (max SUV 8.6), subcarinal station 7 (max SUV 9.4), right lower paratracheal station 4 (max SUV 3.3) lymph nodes suspicious for synchronous right upper/lower lobe primary  lung neoplasms with thoracic lymph node metastases. Additional mildly FDG avid spiculated nodule in the periphery of the left upper lobe measuring 1.1 x 0.5 cm (max SUV 3.2) may represent additional synchronous neoplasm. Mild inflammatory change associated with the aortobifemoral bypass graft. CT FINDINGS: Mild senescent  intracranial changes. Moderate carotid artery bifurcation calcification. Moderate coronary artery calcium. Cholelithiasis. Aortobifemoral bypass graft. Non-FDG avid thickening in the urinary bladder. Multilevel degenerative changes of the spine. Lower extremities are unremarkable.     IMPRESSION: 1. Findings suspicious for synchronous primary lung neoplasms involving the right upper and right lower lobes with thoracic lymph node metastases. 2. Additional mildly FDG avid spiculated nodule in the periphery of the left upper lobe may represent additional early lung neoplasm.    MR Brain w/o & w Contrast    Result Date: 8/16/2023  EXAM: MR BRAIN W/O and W CONTRAST LOCATION: [facility] DATE: 8/16/2023 INDICATION:  Squamous cell lung cancer, right (H) COMPARISON: CT head 06/30/2023 CONTRAST: 7.5ml Gadavist TECHNIQUE: Routine multiplanar multisequence head MRI without and with intravenous contrast. FINDINGS: INTRACRANIAL CONTENTS: No acute or subacute infarct. No mass, acute hemorrhage, or extra-axial fluid collections. Tiny focus of cortically-based encephalomalacia and adjacent gliosis involving the right superior frontal gyrus medially. Chronic appearing lacunar infarct of the anterior right caudate nucleus, and presumed minor microvascular ischemic change within the cerebral white matter. Additional small presumed chronic lacunar infarct of the superior left cerebellum. Minor cerebral volume loss. Normal position of the cerebellar tonsils. No pathologic contrast enhancement. SELLA: No abnormality accounting for technique. OSSEOUS STRUCTURES/SOFT TISSUES: Normal marrow signal. The major intracranial vascular flow voids are maintained. ORBITS: No abnormality accounting for technique. SINUSES/MASTOIDS: Mucosal thickening primarily involving the ethmoid air cells. No middle ear or mastoid effusion.     IMPRESSION: 1.  No evidence for acute intracranial process or definite intracranial metastasis.  "2.  Generalized brain atrophy and presumed chronic ischemic changes including scattered small infarcts as detailed above. 3.  Mild inflammatory changes of the paranasal sinuses.      40 minutes spent on the date of the encounter doing chart review, history and exam, documentation, communication of the treatment plan with the care team and further activities as noted above.      Signed by: Glenn Crowe MD    Oncology Rooming Note    August 23, 2023 2:24 PM   Young Ordonez is a 73 year old male who presents for:    Chief Complaint   Patient presents with     Oncology Clinic Visit     Squamous cell lung cancer, right      Initial Vitals: /79   Pulse 67   Resp 16   Ht 1.702 m (5' 7\")   Wt 76.7 kg (169 lb)   SpO2 94%   BMI 26.47 kg/m   Estimated body mass index is 26.47 kg/m  as calculated from the following:    Height as of this encounter: 1.702 m (5' 7\").    Weight as of this encounter: 76.7 kg (169 lb). Body surface area is 1.9 meters squared.  No Pain (0) Comment: Data Unavailable   No LMP for male patient.  Allergies reviewed: Yes  Medications reviewed: Yes    Medications: Medication refills not needed today.  Pharmacy name entered into [facility]:    [facility] [id number] [place], [place] - [address] [facility] [address] [place] [address] [facility] [address]    Clinical concerns:  1 week follow up results       Jackie Umaña                Again, thank you for allowing me to participate in the care of your patient.        Sincerely,        Glenn Crowe MD  "

## 2023-08-23 NOTE — PROGRESS NOTES
[facility] Hematology and Oncology Progress Note    Patient: Young Ordonez  MRN: [id number]  Date of Service: Aug 23, 2023         Reason for Visit    Chief Complaint   Patient presents with    Oncology Clinic Visit     Squamous cell lung cancer, right        Assessment and Plan     Cancer Staging   Primary malignant neoplasm of right lower lobe of lung (H)  Staging form: Lung, AJCC 8th Edition  - Clinical stage from 8/30/2023: Stage IIIB (cT4(2), cN2, cM0) - Signed by Glenn Crowe MD on 9/3/2023      ECOG Performance    0 - Independent     Pain  Pain Score: No Pain (0)    #.  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  A 2.3 x 2.2 cm right upper lobe and additional bilateral pulmonary nodules, suspicious for metastatic disease or primary lung cancer  #.  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  #.  Past smoker  #.  Significant vascular disease     I reviewed the PET scan images and report in detail.  After discussion with our radiologist and radiation oncologist it is determined that he has stage IIIb right lung cancer with likely synchronous left lung cancer of 1.1 x 0.5 cm.  The left upper lobe lung nodule is too small for biopsy.   Recommendation is to proceed with concurrent chemoradiation for definitive treatment for right lung cancer and monitoring of left lung cancer in the meantime.    Reviewed plan of care with Dr. Gonsales, radiation oncology and radiologist.    Encounter Diagnoses:    Problem List Items Addressed This Visit    None  Visit Diagnoses       Malignant neoplasm of upper lobe, right bronchus or lung (H)    -  Primary    Relevant Orders    Radiation Therapy Referral               CC: Jigar Crawford MD   ______________________________________________________________________________  Diagnosis  8/2023-right upper lobe of the  lung, right lower lobe with thoracic lymph node metastases.  Additional mildly FDG avid spiculated nodule in the periphery of the left upper lobe.   -Right upper lobe lung mass biopsy showed non-small cell carcinoma (squamous cell carcinoma).  TPS 25%, low PD-L1 expression.  Fusion negative. CDKN2A 58fs positive.  No targetable mutation.     8/3/2023-biopsy of the bladder tumor showed papillary transitional carcinoma, low-grade, no evidence of invasive malignancy.    8/18/2023-PET scan showed 3 x 2.2 x 2.2 cm anterior right upper lobe mass with additional FDG avid nodule in the right lower lobe measuring 2.4 x 1.1 x 2.1 cm.  FDG avid right 11 R, 7, right lower station 4 lymph nodes were suspicious for synchronous right upper/lower primary lung neoplasm with thoracic lymph node metastasis.  He has additional FDG avid left upper lobe lesion of 1.1 x 0.5 cm likely representing additional synchronous neoplasm.    8/16/2023-MRI brain was negative for intracranial metastatic disease.    Treatment to date  TBD    History of Present Illness    Mr. Young Ordonez presented today accompanied by his wife, Mitra.  He is here to review the biopsy result.  He does not have any concerns today.    Review of systems  Apart from describing in HPI, the remainder of comprehensive ROS was negative.    Past History    Past Medical History:   Diagnosis Date    Acute myocardial infarction, unspecified site, episode of care unspecified 01/01/1995    Arthritis     Cardiomyopathy (H)     Cerebral artery occlusion with cerebral infarction (H) 2018    lost vision in right eye    Claudication (H)     COPD (chronic obstructive pulmonary disease) (H)     Coronary artery disease     Depression     DEPRESSIVE DISORDER NEC 04/29/2008    Essential hypertension, benign     Gout     Gout attack 05/25/2017    Hepatitis C carrier (H)     HTN (hypertension)     Hyperlipidemia     Infectious viral hepatitis     carrier of viral hepatitis    Low back pain   "   chronic    MI (myocardial infarction) (H) 01/01/1995    Obesity     Other and unspecified hyperlipidemia     PAD (peripheral artery disease) (H)     Peyronie's disease        Past Surgical History:   Procedure Laterality Date    ANGIOPLASTY  1995    ANKLE FRACTURE SURGERY Left     BYPASS GRAFT AORTOFEMORAL N/A 5/17/2017    Procedure: AORTOBIFEMORAL BYPASS ;  Surgeon: Ilir FERRO MD;  Location: [facility];  Service:     CAROTID ENDARTERECTOMY Right 4/12/2018    Procedure: RIGHT CAROTID ENDARTERECTOMY WITH EEG;  Surgeon: Sergei Lemus MD;  Location: [facility];  Service:     CYSTOSCOPY, TRANSURETHRAL RESECTION (TUR) TUMOR BLADDER, COMBINED N/A 8/3/2023    Procedure: Transurethral resection of bladder tumor 2 cm to 5 cm;  Surgeon: German Mitchell MD;  Location:  OR    FRACTURE SURGERY      IR AORTIC ARCH 4 VESSEL ANGIOGRAM  11/9/2009    IR CAROTID ANGIOGRAM  11/9/2009    IR CAROTID ANGIOGRAM  11/9/2009    IR EXTREMITY ANGIOGRAM BILATERAL  4/6/2017    MANDIBLE SURGERY      ZZC NONSPECIFIC PROCEDURE  1995    MI -- angioplasty       Physical Exam    /79   Pulse 67   Resp 16   Ht 1.702 m (5' 7\")   Wt 76.7 kg (169 lb)   SpO2 94%   BMI 26.47 kg/m      General: alert, awake, not in acute distress  HEENT: Head: Normal, normocephalic, atraumatic.  Eye: Normal external eye, conjunctiva, lids cornea, NARESH.  Nose: Normal external nose, mucus membranes and septum.  Pharynx: Normal buccal mucosa. Normal pharynx.  Neck / Thyroid: Supple, no masses, nodes, nodules or enlargement.  Lymphatics: No abnormally enlarged lymph nodes.  Extremities: normal strength, tone, and muscle mass  Skin: normal. no rash or abnormalities  CNS: non focal.    Lab Results    No results found for this or any previous visit (from the past 168 hour(s)).      Imaging    CT Chest/Abdomen/Pelvis w Contrast    Result Date: 8/18/2023  EXAM: PET ONCOLOGY WHOLE BODY, CT CHEST/ABDOMEN/PELVIS W CONTRAST LOCATION: [facility] DATE: 8/18/2023 INDICATION: Initial treatment planning and staging for malignant neoplasm of upper lobe, right bronchus or lung COMPARISON: MRI brain dated 08/16/2023 and thoracic CT dated 06/30/2023 CONTRAST: 83 mL Isovue-370 TECHNIQUE: Serum glucose level 98 mg/dL. One hour post intravenous administration of 10.6 mCi F-18 FDG, PET imaging was performed from the skull vertex to feet, utilizing attenuation correction with concurrent axial CT and PET/CT image fusion. Separate diagnostic CT of the chest, abdomen, and pelvis was performed. Dose reduction techniques were used. PET/CT FINDINGS: Spiculated FDG avid mass in the anterior right upper lobe tethering the anterior visceral pleura measuring 3.0 x 2.2 x 2.2 cm (max SUV 17.1) and additional FDG avid nodule in the right lower lobe abutting the lateral visceral pleura measuring 2.4 x 1.9 2.1 cm (max SUV 13.4) with FDG avid right interlobar station 11R (max SUV 8.6), subcarinal station 7 (max SUV 9.4), right lower paratracheal station 4 (max SUV 3.3) lymph nodes suspicious for synchronous right upper/lower lobe primary  lung neoplasms with thoracic lymph node metastases. Additional mildly FDG avid spiculated nodule in the periphery of the left upper lobe measuring 1.1 x 0.5 cm (max SUV 3.2) may represent additional synchronous neoplasm. Mild inflammatory change associated with the aortobifemoral bypass graft. CT FINDINGS: Mild senescent intracranial changes. Moderate carotid artery bifurcation calcification. Moderate coronary artery calcium. Cholelithiasis. Aortobifemoral bypass graft. Non-FDG avid thickening in the urinary bladder. Multilevel degenerative changes of the spine. Lower extremities are unremarkable.     IMPRESSION: 1. Findings suspicious for synchronous primary lung neoplasms involving the right upper and right lower lobes with thoracic lymph node metastases. 2. Additional mildly FDG avid spiculated nodule in the periphery of  the left upper lobe may represent additional early lung neoplasm.    PET Oncology Whole Body    Result Date: 8/18/2023  EXAM: PET ONCOLOGY WHOLE BODY, CT CHEST/ABDOMEN/PELVIS W CONTRAST LOCATION: [facility] DATE: 8/18/2023 INDICATION: Initial treatment planning and staging for malignant neoplasm of upper lobe, right bronchus or lung COMPARISON: MRI brain dated 08/16/2023 and thoracic CT dated 06/30/2023 CONTRAST: 83 mL Isovue-370 TECHNIQUE: Serum glucose level 98 mg/dL. One hour post intravenous administration of 10.6 mCi F-18 FDG, PET imaging was performed from the skull vertex to feet, utilizing attenuation correction with concurrent axial CT and PET/CT image fusion. Separate diagnostic CT of the chest, abdomen, and pelvis was performed. Dose reduction techniques were used. PET/CT FINDINGS: Spiculated FDG avid mass in the anterior right upper lobe tethering the anterior visceral pleura measuring 3.0 x 2.2 x 2.2 cm (max SUV 17.1) and additional FDG avid nodule in the right lower lobe abutting the lateral visceral pleura measuring 2.4 x 1.9 2.1 cm (max SUV 13.4) with FDG avid right interlobar station 11R (max SUV 8.6), subcarinal station 7 (max SUV 9.4), right lower paratracheal station 4 (max SUV 3.3) lymph nodes suspicious for synchronous right upper/lower lobe primary  lung neoplasms with thoracic lymph node metastases. Additional mildly FDG avid spiculated nodule in the periphery of the left upper lobe measuring 1.1 x 0.5 cm (max SUV 3.2) may represent additional synchronous neoplasm. Mild inflammatory change associated with the aortobifemoral bypass graft. CT FINDINGS: Mild senescent intracranial changes. Moderate carotid artery bifurcation calcification. Moderate coronary artery calcium. Cholelithiasis. Aortobifemoral bypass graft. Non-FDG avid thickening in the urinary bladder. Multilevel degenerative changes of the spine. Lower extremities are unremarkable.     IMPRESSION: 1.  Findings suspicious for synchronous primary lung neoplasms involving the right upper and right lower lobes with thoracic lymph node metastases. 2. Additional mildly FDG avid spiculated nodule in the periphery of the left upper lobe may represent additional early lung neoplasm.    MR Brain w/o & w Contrast    Result Date: 8/16/2023  EXAM: MR BRAIN W/O and W CONTRAST LOCATION: [facility] DATE: 8/16/2023 INDICATION:  Squamous cell lung cancer, right (H) COMPARISON: CT head 06/30/2023 CONTRAST: 7.5ml Gadavist TECHNIQUE: Routine multiplanar multisequence head MRI without and with intravenous contrast. FINDINGS: INTRACRANIAL CONTENTS: No acute or subacute infarct. No mass, acute hemorrhage, or extra-axial fluid collections. Tiny focus of cortically-based encephalomalacia and adjacent gliosis involving the right superior frontal gyrus medially. Chronic appearing lacunar infarct of the anterior right caudate nucleus, and presumed minor microvascular ischemic change within the cerebral white matter. Additional small presumed chronic lacunar infarct of the superior left cerebellum. Minor cerebral volume loss. Normal position of the cerebellar tonsils. No pathologic contrast enhancement. SELLA: No abnormality accounting for technique. OSSEOUS STRUCTURES/SOFT TISSUES: Normal marrow signal. The major intracranial vascular flow voids are maintained. ORBITS: No abnormality accounting for technique. SINUSES/MASTOIDS: Mucosal thickening primarily involving the ethmoid air cells. No middle ear or mastoid effusion.     IMPRESSION: 1.  No evidence for acute intracranial process or definite intracranial metastasis. 2.  Generalized brain atrophy and presumed chronic ischemic changes including scattered small infarcts as detailed above. 3.  Mild inflammatory changes of the paranasal sinuses.      40 minutes spent on the date of the encounter doing chart review, history and exam, documentation, communication of  the treatment plan with the care team and further activities as noted above.      Signed by: Glenn Crowe MD

## 2023-08-25 NOTE — PROGRESS NOTES
New Patient Oncology Nurse Navigator Note     Referring provider: Dr. Glenn Crowe    Referring Clinic/Organization: [facility]  Referred to: Radiation Oncology at [place]  Requested provider (if applicable): First available - did not specify   Referral Received: 08/25/23       Evaluation for :   Diagnosis   C34.11 (ICD-10-CM) - Malignant neoplasm of upper lobe, right bronchus or lung (H)        Clinical History (per Nurse review of records provided):      08/01/2023 Surgical Pathology (bookmarked) showed:   Final Diagnosis   A(A) Lung, Upper Lobe, Right. :  -Non-small cell carcinoma (squamous cell carcinoma)  - Pending MDL lung testing (addendum)      Electronically signed by Anderson Mathis MD on 8/2/2023 at  9:58 AM   RESULT FOR IMMUNOHISTOCHEMICAL VENTANA CLONE  PD-L1 ASSAY  TUMOR PROPORTION SCORE (TPS): 25 %  INTERPRETATION: LOW PD-L1 EXPRESSION (TPS >/=1-49%)    08/16/2023 MR Brain (bookmarked) showed:   IMPRESSION:  1.  No evidence for acute intracranial process or definite intracranial metastasis.  2.  Generalized brain atrophy and presumed chronic ischemic changes including scattered small infarcts as detailed above.  3.  Mild inflammatory changes of the paranasal sinuses.    08/18/2023 PET CT (bookmarked) showed:   IMPRESSION:     1. Findings suspicious for synchronous primary lung neoplasms involving the right upper and right lower lobes with thoracic lymph node metastases.     2. Additional mildly FDG avid spiculated nodule in the periphery of the left upper lobe may represent additional early lung neoplasm.    Clinical Assessment / Barriers to Care (Per Nurse):    Tobacco History    Smoking Status  Former Quit Date  1/1/2013 Smoking Frequency  1.50 packs/day Smoking Tobacco Type  Cigarettes quit in 1/1/2013     Records Location: [facility]     Records Needed: None  Additional testing needed prior to consult: None    Joanne Fraser, BSN, RN, OCN  [facility] Oncology Nurse  Navigator  [phone] / [phone]

## 2023-08-28 NOTE — TELEPHONE ENCOUNTER
MEDICAL RECORDS REQUEST   Radiation Oncology  [address] [place] [address]  Fax: [phone]          FUTURE VISIT INFORMATION                                                   Young Ordonez, : 1950 scheduled for future visit at [facility] Radiation Oncology    RECORDS REQUESTED FOR VISIT                                                     LUNG     OFFICE NOTE from medical oncologist Epic 23: Dr. Glenn Crowe   OPERATIVE/BIOPSY REPORTS [facility] 23: CT LUNG MEDIASTINUM BX   MEDICATION LIST [facility]    LABS     GENOMIC TESTING Epic NGS:   23: [id number]    PATHOLOGY REPORTS Report in Epic 23: [id number]    ANYTHING RELATED TO DIAGNOSIS Epic    IMAGING (NEED IMAGES & REPORT)     CT SCANS PACS 23: CT Chest Abd Pel  23: CT Lung Bx  23: CT Chest   MRI PACS 23: MR Brain   XRAYS PACS 23: XR Chest   PET PACS 23: PET Whole Body

## 2023-08-30 PROBLEM — C34.31 PRIMARY MALIGNANT NEOPLASM OF RIGHT LOWER LOBE OF LUNG (H): Status: ACTIVE | Noted: 2023-01-01

## 2023-08-30 NOTE — PROGRESS NOTES
[facility] Radiation Oncology Consult Note     Patient: Young Ordonez  MRN: [id number]  Date of Service: 08/31/2023          Glenn Crowe MD  [address] [place] [address]       Dear Dr. Crowe:    Thank you very much for referring this patient for consideration of radiotherapy. As you know Mr. 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 He was seen today for consideration of radiation in conjunction with chemotherapy.     HISTORY OF PRESENT ILLNESS:   Mr. 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  As as part of a staging work-up he had a CT scan which showed a suspicious nodule.  He went on to have a PET/CT on 8/18/2023 which showed a spiculated FDG avid mass in the right upper lobe measuring 3 cm and an additional FDG avid mass in the right lower lobe measuring 2.4 cm with accompanying right stations 11,7 and 4 metastases.  In addition, there was a left upper lobe 1.1 cm mildly FDG avid nodule.  A staging MRI of his brain was negative for metastases.    He comes today for consideration of radiation to the tumors on the right  side and accompanying lymph nodes with chemotherapy.    CHEMOTHERAPY HISTORY: Concurrent Chemotherapy: Yes    RADIATION THERAPY HISTORY: Prior Radiation: No    IMPLANTED CARDIAC DEVICE: none         Current Outpatient Medications   Medication Sig Dispense Refill    acetaminophen (TYLENOL) 325 MG tablet Take 2 tablets (650 mg) by mouth every 4 hours as needed for mild pain 50 tablet 0    allopurinol (ZYLOPRIM) 300 MG tablet Take 1 tablet (300 mg) by mouth daily 90 tablet 3    amLODIPine (NORVASC) 5 MG tablet Take 1 tablet (5 mg) by mouth daily 90 tablet 3    aspirin  (ASA) 325 MG tablet Take 1 tablet (325 mg) by mouth daily Only start taking this dose of aspirin AFTER you have finished the 20 days of Plavix. 90 tablet 1    atenolol (TENORMIN) 100 MG tablet Take 1 tablet (100 mg) by mouth daily 90 tablet 3    atorvastatin (LIPITOR) 80 MG tablet Take 1 tablet (80 mg) by mouth daily 90 tablet 3    lisinopril (ZESTRIL) 20 MG tablet Take 1 tablet (20 mg) by mouth daily 90 tablet 3    LORazepam (ATIVAN) 0.5 MG tablet Take 1-2 tablets (0.5-1 mg) by mouth daily as needed for anxiety 60 tablet 3    senna-docusate (SENOKOT-S/PERICOLACE) 8.6-50 MG tablet Take 1-2 tablets by mouth 2 times daily 30 tablet 0    sertraline (ZOLOFT) 100 MG tablet Take 2 tablets (200 mg) by mouth daily 180 tablet 1    tolterodine (DETROL) 2 MG tablet Take 1 tablet (2 mg) by mouth every 12 hours as needed for incontinence (Spasms) 30 tablet 0    traZODone (DESYREL) 50 MG tablet Take 1 tablet (50 mg) by mouth At Bedtime 90 tablet 1    ondansetron (ZOFRAN ODT) 4 MG ODT tab Take 1 tablet (4 mg) by mouth every 8 hours as needed for nausea (Patient not taking: Reported on 8/15/2023) 4 tablet 0     Past Medical History:   Diagnosis Date    Acute myocardial infarction, unspecified site, episode of care unspecified 01/01/1995    Arthritis     Cardiomyopathy (H)     Cerebral artery occlusion with cerebral infarction (H) 2018    lost vision in right eye    Claudication (H)     COPD (chronic obstructive pulmonary disease) (H)     Coronary artery disease     Depression     DEPRESSIVE DISORDER NEC 04/29/2008    Essential hypertension, benign     Gout     Gout attack 05/25/2017    Hepatitis C carrier (H)     HTN (hypertension)     Hyperlipidemia     Infectious viral hepatitis     carrier of viral hepatitis    Low back pain     chronic    MI (myocardial infarction) (H) 01/01/1995    Obesity     Other and unspecified hyperlipidemia     PAD (peripheral artery disease) (H)     Peyronie's disease      Past Surgical History:    Procedure Laterality Date    ANGIOPLASTY  1995    ANKLE FRACTURE SURGERY Left     BYPASS GRAFT AORTOFEMORAL N/A 5/17/2017    Procedure: AORTOBIFEMORAL BYPASS ;  Surgeon: Ilir FERRO MD;  Location: [facility] OR;  Service:     CAROTID ENDARTERECTOMY Right 4/12/2018    Procedure: RIGHT CAROTID ENDARTERECTOMY WITH EEG;  Surgeon: Sergei Lemus MD;  Location: [facility] OR;  Service:     CYSTOSCOPY, TRANSURETHRAL RESECTION (TUR) TUMOR BLADDER, COMBINED N/A 8/3/2023    Procedure: Transurethral resection of bladder tumor 2 cm to 5 cm;  Surgeon: German Mitchell MD;  Location: [facility] OR    FRACTURE SURGERY      IR AORTIC ARCH 4 VESSEL ANGIOGRAM  11/9/2009    IR CAROTID ANGIOGRAM  11/9/2009    IR CAROTID ANGIOGRAM  11/9/2009    IR EXTREMITY ANGIOGRAM BILATERAL  4/6/2017    MANDIBLE SURGERY      ZZC NONSPECIFIC PROCEDURE  1995    MI -- angioplasty     Allergies   Allergen Reactions    No Known Allergies      Family History   Problem Relation Age of Onset    Cancer Mother     Respiratory Father         d:age 59    Cerebrovascular Disease Father     Hypertension Father     Family History Negative Brother         b:1960    Heart Disease Paternal Grandmother     Emphysema Paternal Grandfather     Diabetes Other         paternal uncle    Glaucoma No family hx of     Macular Degeneration No family hx of      Social History     Socioeconomic History    Marital status:      Spouse name: Mitra    Number of children: 2    Years of education: 12    Highest education level: Not on file   Occupational History    Occupation:      Comment: [employer]   Tobacco Use    Smoking status: Former     Packs/day: 1.50     Types: Cigarettes     Quit date: 1/1/2013     Years since quitting: 10.6    Smokeless tobacco: Never   Substance and Sexual Activity    Alcohol use: No     Comment: Alcoholic Drinks/day: sober since 1984    Drug use: Yes     Types: Marijuana     Comment: marijuana sometimes    Sexual  activity: Yes     Partners: Female   Other Topics Concern    Not on file   Social History Narrative    Not on file     Social Determinants of Health     Financial Resource Strain: Not on file   Food Insecurity: Not on file   Transportation Needs: Not on file   Physical Activity: Not on file   Stress: Not on file   Social Connections: Not on file   Intimate Partner Violence: Not on file   Housing Stability: Not on file        REVIEW OF SYMPTOMS:  A full 14-point review of systems was performed. Pertinent findings are noted in the HPI.    General  Constitutional  Constitutional (WDL): Exceptions to WDL  Fatigue: Fatigue relieved by rest  EENT  Eye Disorders  Eye Disorder (WDL): Exceptions to WDL (wears reading glasses)  Blurred Vision: Intervention not indicated (blind right eye)  Ear Disorders  Ear Disorder (WDL): Exceptions to WDL  Tinnitus: Mild symptoms OR intervention not indicated  Respiratory  Respiratory  Respiratory (WDL): Exceptions to WDL  Cough: Mild symptoms OR nonprescription intervention indicated (dry)  Cardiovascular  Cardiovascular  Cardiovascular (WDL): Exceptions to WDL (no pacemaker, hx femoral bypass, blocked carotid artery)  Gastrointestinal  Gastrointestinal  Gastrointestinal (WDL): Exceptions to WDL  Constipation: Occasional or intermittent symptoms OR occasional use of stool softeners, laxatives, dietary modification, or enema  Musculoskeletal  Musculoskeletal and Connective Tissue Disorders  Musculoskeletal & Connective (WDL): All musculoskeletal & connective elements are within defined limits  Integumentary  Integumentary  Integumentary (WDL): All integumentary elements are within defined limits  Neurological  Neurosensory  Neurosensory (WDL): Exceptions to WDL  Peripheral Motor Neuropathy: Asymptomatic OR clinical or diagnostic observations only (left foot)  Peripheral Sensory Neuropathy: Asymptomatic (left foot)  Genitourinary/Reproductive  Genitourinary  Genitourinary (WDL): Exceptions  to WDL (hx bladder cancer)  Urinary Frequency: Present  Lymphatic  Lymph System Disorders  Lymph (WDL): All lymph elements are within defined limits  Pain  Pain Score: No Pain (0)  AUA Assessment                                                              Accompanied by  Accompanied By: spouse (wife Mitra)    ECOG Status: 0 - Independent        Recent Labs: No results found for this or any previous visit (from the past 168 hour(s)).    Imaging: Imaging results 6 weeks:CT Chest/Abdomen/Pelvis w Contrast    Result Date: 8/18/2023  EXAM: PET ONCOLOGY WHOLE BODY, CT CHEST/ABDOMEN/PELVIS W CONTRAST LOCATION: [facility] DATE: 8/18/2023 INDICATION: Initial treatment planning and staging for malignant neoplasm of upper lobe, right bronchus or lung COMPARISON: MRI brain dated 08/16/2023 and thoracic CT dated 06/30/2023 CONTRAST: 83 mL Isovue-370 TECHNIQUE: Serum glucose level 98 mg/dL. One hour post intravenous administration of 10.6 mCi F-18 FDG, PET imaging was performed from the skull vertex to feet, utilizing attenuation correction with concurrent axial CT and PET/CT image fusion. Separate diagnostic CT of the chest, abdomen, and pelvis was performed. Dose reduction techniques were used. PET/CT FINDINGS: Spiculated FDG avid mass in the anterior right upper lobe tethering the anterior visceral pleura measuring 3.0 x 2.2 x 2.2 cm (max SUV 17.1) and additional FDG avid nodule in the right lower lobe abutting the lateral visceral pleura measuring 2.4 x 1.9 2.1 cm (max SUV 13.4) with FDG avid right interlobar station 11R (max SUV 8.6), subcarinal station 7 (max SUV 9.4), right lower paratracheal station 4 (max SUV 3.3) lymph nodes suspicious for synchronous right upper/lower lobe primary  lung neoplasms with thoracic lymph node metastases. Additional mildly FDG avid spiculated nodule in the periphery of the left upper lobe measuring 1.1 x 0.5 cm (max SUV 3.2) may represent additional synchronous  neoplasm. Mild inflammatory change associated with the aortobifemoral bypass graft. CT FINDINGS: Mild senescent intracranial changes. Moderate carotid artery bifurcation calcification. Moderate coronary artery calcium. Cholelithiasis. Aortobifemoral bypass graft. Non-FDG avid thickening in the urinary bladder. Multilevel degenerative changes of the spine. Lower extremities are unremarkable.     IMPRESSION: 1. Findings suspicious for synchronous primary lung neoplasms involving the right upper and right lower lobes with thoracic lymph node metastases. 2. Additional mildly FDG avid spiculated nodule in the periphery of the left upper lobe may represent additional early lung neoplasm.    PET Oncology Whole Body    Result Date: 8/18/2023  EXAM: PET ONCOLOGY WHOLE BODY, CT CHEST/ABDOMEN/PELVIS W CONTRAST LOCATION: [facility] DATE: 8/18/2023 INDICATION: Initial treatment planning and staging for malignant neoplasm of upper lobe, right bronchus or lung COMPARISON: MRI brain dated 08/16/2023 and thoracic CT dated 06/30/2023 CONTRAST: 83 mL Isovue-370 TECHNIQUE: Serum glucose level 98 mg/dL. One hour post intravenous administration of 10.6 mCi F-18 FDG, PET imaging was performed from the skull vertex to feet, utilizing attenuation correction with concurrent axial CT and PET/CT image fusion. Separate diagnostic CT of the chest, abdomen, and pelvis was performed. Dose reduction techniques were used. PET/CT FINDINGS: Spiculated FDG avid mass in the anterior right upper lobe tethering the anterior visceral pleura measuring 3.0 x 2.2 x 2.2 cm (max SUV 17.1) and additional FDG avid nodule in the right lower lobe abutting the lateral visceral pleura measuring 2.4 x 1.9 2.1 cm (max SUV 13.4) with FDG avid right interlobar station 11R (max SUV 8.6), subcarinal station 7 (max SUV 9.4), right lower paratracheal station 4 (max SUV 3.3) lymph nodes suspicious for synchronous right upper/lower lobe primary  lung  neoplasms with thoracic lymph node metastases. Additional mildly FDG avid spiculated nodule in the periphery of the left upper lobe measuring 1.1 x 0.5 cm (max SUV 3.2) may represent additional synchronous neoplasm. Mild inflammatory change associated with the aortobifemoral bypass graft. CT FINDINGS: Mild senescent intracranial changes. Moderate carotid artery bifurcation calcification. Moderate coronary artery calcium. Cholelithiasis. Aortobifemoral bypass graft. Non-FDG avid thickening in the urinary bladder. Multilevel degenerative changes of the spine. Lower extremities are unremarkable.     IMPRESSION: 1. Findings suspicious for synchronous primary lung neoplasms involving the right upper and right lower lobes with thoracic lymph node metastases. 2. Additional mildly FDG avid spiculated nodule in the periphery of the left upper lobe may represent additional early lung neoplasm.    MR Brain w/o & w Contrast    Result Date: 8/16/2023  EXAM: MR BRAIN W/O and W CONTRAST LOCATION: [facility] DATE: 8/16/2023 INDICATION:  Squamous cell lung cancer, right (H) COMPARISON: CT head 06/30/2023 CONTRAST: 7.5ml Gadavist TECHNIQUE: Routine multiplanar multisequence head MRI without and with intravenous contrast. FINDINGS: INTRACRANIAL CONTENTS: No acute or subacute infarct. No mass, acute hemorrhage, or extra-axial fluid collections. Tiny focus of cortically-based encephalomalacia and adjacent gliosis involving the right superior frontal gyrus medially. Chronic appearing lacunar infarct of the anterior right caudate nucleus, and presumed minor microvascular ischemic change within the cerebral white matter. Additional small presumed chronic lacunar infarct of the superior left cerebellum. Minor cerebral volume loss. Normal position of the cerebellar tonsils. No pathologic contrast enhancement. SELLA: No abnormality accounting for technique. OSSEOUS STRUCTURES/SOFT TISSUES: Normal marrow signal. The  major intracranial vascular flow voids are maintained. ORBITS: No abnormality accounting for technique. SINUSES/MASTOIDS: Mucosal thickening primarily involving the ethmoid air cells. No middle ear or mastoid effusion.     IMPRESSION: 1.  No evidence for acute intracranial process or definite intracranial metastasis. 2.  Generalized brain atrophy and presumed chronic ischemic changes including scattered small infarcts as detailed above. 3.  Mild inflammatory changes of the paranasal sinuses.     XR Chest Port 1 View    Result Date: 8/1/2023  EXAM: XR CHEST PORT 1 VIEW LOCATION: [facility] DATE: 8/1/2023 INDICATION: Chest Biopsy COMPARISON: CT-guided right upper lobe lung nodule biopsy today. Chest CT 06/30/2023.     IMPRESSION: Previously biopsied right upper lobe nodule again demonstrated. Additional nodule in the right lower lobe on prior chest CT less well seen by radiographic technique. No visible pneumothorax. Mild left basilar atelectasis. Top normal heart size. Calcified plaque of the aortic arch.    CT Lung Mediastinum Biopsy    Result Date: 8/1/2023  EXAM: 1. PERCUTANEOUS BIOPSY RIGHT UPPER LOBE 2. CT GUIDANCE 3. CONSCIOUS SEDATION LOCATION: [facility] DATE: 8/1/2023 INDICATION: Lung nodule (Pulmonary nodule) TECHNIQUE: Dose reduction techniques were used. PROCEDURE: Informed consent obtained. Site marked. Prior images reviewed. Required items made available. Patient identity confirmed verbally and with arm band. Patient reevaluated immediately before administering sedation. Universal protocol was followed. Time out performed. The site was prepped and draped in sterile fashion. 6 mL of 1% lidocaine was infused into the local soft tissues. Using standard technique and under direct CT guidance, a 20-gauge biopsy device was used to obtain 5  core biopsies. Tissue was submitted to Pathology and was adequate by preliminary review by a pathologist. The patient  tolerated the procedure well. No immediate complications. SEDATION: Versed 1  mg. Fentanyl 50 mcg. The procedure was performed with administration intravenous conscious sedation with appropriate preoperative, intraoperative, and postoperative evaluation. 30  minutes of supervised face to face conscious sedation time was provided by a radiology nurse under my direct supervision.     IMPRESSION: 1.  Successful CT-guided biopsy of the right upper lobe nodule with moderate sedation . Reference CPT Codes: 41921, 56178, 00146, 83206     Pathology:   No results found for this or any previous visit (from the past 8760 hour(s)).              Objective:          PHYSICAL EXAMINATION:    /63 (BP Location: Right arm, Patient Position: Sitting, Cuff Size: Adult Regular)   Pulse 59   Temp 97.7  F (36.5  C) (Oral)   Resp 16   Wt 76.1 kg (167 lb 12.8 oz)   SpO2 95%   BMI 26.28 kg/m      Gen: Alert, in NAD  Eyes: PER sclera anicteric  HENT: NC/AT  Neck: Supple  Pulm: No increased work of breathing, speaks in complete sentences  CV: Well-perfused, no cyanosis   Musculoskeletal: No MSK defects noted.  Skin: Normal color and turgor  Neurologic: Nonfocal  Psychiatric: Appropriate mood and affect    Intent of Therapy: Curative  Side effects that may occur during or within weeks after Radiation Therapy    Fatigue and general weakness   Darkening, irritation, itchiness, redness, dryness and peeling of the skin of the chest  Loss of chest and armpit hair  Painful swallowing limiting solid and liquid intake and causing dehydration  Nausea, vomiting and decrease in appetite    Side effects that may occur months or years after Radiation Therapy     Development of another tumor or cancer  Lung inflammation or fibrosis causing cough, fever, and shortness of breath   Fracture of ribs  Permanent narrowing or obstruction of the esophagus  Fluid compressing the lung (pleural effusion)  Coronary artery blockage causing angina pain or a  heart attack  Thickening, telangiectasias (development of spider like blood vessels in the skin) and ulceration of the skin of the chest  Poor healing after a trauma or surgery in the irradiated areas  Nerve damage resulting in loss of strength or sensation    The risks, benefits and alternatives to radiation therapy were outlined with the patient. All questions were answered and a consent was signed.                      Impression   (C34.31) Primary malignant neoplasm of right lower lobe of lung (H)  (primary encounter diagnosis)    Comment:   He has separate right lung tumors,   1) 3cm  in the upper lobe,   2) 2.4cm in the lower lobe with accompanying hilar and mediastinal lymphadenopathy.      Left lung  1.1cm  left upper lobe lesion. Mildly FDG avid.     Plan: We will attempt to treat both tumors in the right lung as well as the accompanying mediastinal adenopathy the left upper lobe lesion we can treat later with SBRT as indicated.     Cancer Staging   Primary malignant neoplasm of right lower lobe of lung (H)  Staging form: Lung, AJCC 8th Edition  - Clinical stage from 8/30/2023: Stage EDWIN (cT4, cN2, pM1a) - Signed by Ifeoma Angel MD on 8/30/2023      Assessment & Plan:   73-year-old male with synchronous malignancies involving the bladder and the lung.  None muscle-invasive bladder cancer removed surgically and had an intraoperative instillation of intravesicular Mitomycin-C.      Biopsy of the right upper lobe shows squamous cell carcinoma of lung origin. The staging is difficult in that if the JAYCOB is a separate primary (adenocarcinoma) then would  be stage III, for now however I included the left upper lobe lesion which makes him a stage Edwin.    The right lower lobe malignancy is responsible for the adenopathy so that will be primary field of radiation and then we will attempt to do a static field of the right upper lobe lesion with concurrent chemotherapy.  If for some reason we are unable  to do that we can treat it later with SBRT.    The left upper lobe is early in its evolution and we can likely go back and treat that with consolidative SBRT after treatment of the right-sided malignancies.    Total time of this visit, including time spent face-to-face with patient was 80 min , complex record review, review of diagnostic tests and information, consideration and discussion of significant complications based on comorbidities, discussion with providers involved in the care of the patients, review of literature.     Sincerely,      Ifeoma Angel MD  Department of Radiation Oncology   [facility] Radiation Oncology  Tel: [phone]  Page: [phone]    [facility]  [address] [place] [address]     [place] [id number] [facility] [place] [address]    CC:  Patient Care Team:  Jigar Crawford MD as PCP - General (Internal Medicine - Pediatrics)  Jigar Crawford MD as Assigned PCP  German Mitchell MD as MD (Urology)  Ted Meraz MD as Assigned Heart and Vascular Provider  German Mitchell MD as Assigned Surgical Provider  Glenn Crowe MD as Assigned Cancer Care Provider  Ifeoma Angel MD as MD (Radiation Oncology)

## 2023-08-31 NOTE — TELEPHONE ENCOUNTER
Rx filled 6/5/23 qty 90 with 3 refills.    Annie Wiggins, RN, BSN  8/31/2023 at 2:01 PM  [facility]

## 2023-08-31 NOTE — LETTER
8/31/2023         RE: Young Ordonez  [address]  [facility] [address]        Dear Colleague,    Thank you for referring your patient, Young Ordonez, to the [facility] RADIATION ONCOLOGY [place]. Please see a copy of my visit note below.    [facility] Radiation Oncology Consult Note     Patient: Young Ordonez  MRN: [id number]  Date of Service: 08/31/2023          Glenn Crowe MD  [address] [place] [address]       Dear Dr. Crowe:    Thank you very much for referring this patient for consideration of radiotherapy. As you know Mr. 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 He was seen today for consideration of radiation in conjunction with chemotherapy.     HISTORY OF PRESENT ILLNESS:   Mr. 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  As as part of a staging work-up he had a CT scan which showed a suspicious nodule.  He went on to have a PET/CT on 8/18/2023 which showed a spiculated FDG avid mass in the right upper lobe measuring 3 cm and an additional FDG avid mass in the right lower lobe measuring 2.4 cm with accompanying right stations 11,7 and 4 metastases.  In addition, there was a left upper lobe 1.1 cm mildly FDG avid nodule.  A staging MRI of his brain was negative for metastases.    He comes today for consideration of radiation to the tumors on the right  side and accompanying lymph nodes with chemotherapy.    CHEMOTHERAPY HISTORY: Concurrent Chemotherapy: Yes    RADIATION THERAPY HISTORY: Prior Radiation: No    IMPLANTED CARDIAC DEVICE: none         Current Outpatient Medications   Medication Sig Dispense Refill     acetaminophen (TYLENOL) 325 MG tablet Take 2  tablets (650 mg) by mouth every 4 hours as needed for mild pain 50 tablet 0     allopurinol (ZYLOPRIM) 300 MG tablet Take 1 tablet (300 mg) by mouth daily 90 tablet 3     amLODIPine (NORVASC) 5 MG tablet Take 1 tablet (5 mg) by mouth daily 90 tablet 3     aspirin (ASA) 325 MG tablet Take 1 tablet (325 mg) by mouth daily Only start taking this dose of aspirin AFTER you have finished the 20 days of Plavix. 90 tablet 1     atenolol (TENORMIN) 100 MG tablet Take 1 tablet (100 mg) by mouth daily 90 tablet 3     atorvastatin (LIPITOR) 80 MG tablet Take 1 tablet (80 mg) by mouth daily 90 tablet 3     lisinopril (ZESTRIL) 20 MG tablet Take 1 tablet (20 mg) by mouth daily 90 tablet 3     LORazepam (ATIVAN) 0.5 MG tablet Take 1-2 tablets (0.5-1 mg) by mouth daily as needed for anxiety 60 tablet 3     senna-docusate (SENOKOT-S/PERICOLACE) 8.6-50 MG tablet Take 1-2 tablets by mouth 2 times daily 30 tablet 0     sertraline (ZOLOFT) 100 MG tablet Take 2 tablets (200 mg) by mouth daily 180 tablet 1     tolterodine (DETROL) 2 MG tablet Take 1 tablet (2 mg) by mouth every 12 hours as needed for incontinence (Spasms) 30 tablet 0     traZODone (DESYREL) 50 MG tablet Take 1 tablet (50 mg) by mouth At Bedtime 90 tablet 1     ondansetron (ZOFRAN ODT) 4 MG ODT tab Take 1 tablet (4 mg) by mouth every 8 hours as needed for nausea (Patient not taking: Reported on 8/15/2023) 4 tablet 0     Past Medical History:   Diagnosis Date     Acute myocardial infarction, unspecified site, episode of care unspecified 01/01/1995     Arthritis      Cardiomyopathy (H)      Cerebral artery occlusion with cerebral infarction (H) 2018    lost vision in right eye     Claudication (H)      COPD (chronic obstructive pulmonary disease) (H)      Coronary artery disease      Depression      DEPRESSIVE DISORDER NEC 04/29/2008     Essential hypertension, benign      Gout      Gout attack 05/25/2017     Hepatitis C carrier (H)      HTN (hypertension)       Hyperlipidemia      Infectious viral hepatitis     carrier of viral hepatitis     Low back pain     chronic     MI (myocardial infarction) (H) 01/01/1995     Obesity      Other and unspecified hyperlipidemia      PAD (peripheral artery disease) (H)      Peyronie's disease      Past Surgical History:   Procedure Laterality Date     ANGIOPLASTY  1995     ANKLE FRACTURE SURGERY Left      BYPASS GRAFT AORTOFEMORAL N/A 5/17/2017    Procedure: AORTOBIFEMORAL BYPASS ;  Surgeon: Ilir FERRO MD;  Location: [facility] Main OR;  Service:      CAROTID ENDARTERECTOMY Right 4/12/2018    Procedure: RIGHT CAROTID ENDARTERECTOMY WITH EEG;  Surgeon: Sergei Lemus MD;  Location: [facility] Main OR;  Service:      CYSTOSCOPY, TRANSURETHRAL RESECTION (TUR) TUMOR BLADDER, COMBINED N/A 8/3/2023    Procedure: Transurethral resection of bladder tumor 2 cm to 5 cm;  Surgeon: German Mitchell MD;  Location: [facility] OR     FRACTURE SURGERY       IR AORTIC ARCH 4 VESSEL ANGIOGRAM  11/9/2009     IR CAROTID ANGIOGRAM  11/9/2009     IR CAROTID ANGIOGRAM  11/9/2009     IR EXTREMITY ANGIOGRAM BILATERAL  4/6/2017     MANDIBLE SURGERY       ZZC NONSPECIFIC PROCEDURE  1995    MI -- angioplasty     Allergies   Allergen Reactions     No Known Allergies      Family History   Problem Relation Age of Onset     Cancer Mother      Respiratory Father         d:age 59     Cerebrovascular Disease Father      Hypertension Father      Family History Negative Brother         b:1960     Heart Disease Paternal Grandmother      Emphysema Paternal Grandfather      Diabetes Other         paternal uncle     Glaucoma No family hx of      Macular Degeneration No family hx of      Social History     Socioeconomic History     Marital status:      Spouse name: Mitra     Number of children: 2     Years of education: 12     Highest education level: Not on file   Occupational History     Occupation:      Comment: [employer]   Tobacco Use     Smoking  status: Former     Packs/day: 1.50     Types: Cigarettes     Quit date: 1/1/2013     Years since quitting: 10.6     Smokeless tobacco: Never   Substance and Sexual Activity     Alcohol use: No     Comment: Alcoholic Drinks/day: sober since 1984     Drug use: Yes     Types: Marijuana     Comment: marijuana sometimes     Sexual activity: Yes     Partners: Female   Other Topics Concern     Not on file   Social History Narrative     Not on file     Social Determinants of Health     Financial Resource Strain: Not on file   Food Insecurity: Not on file   Transportation Needs: Not on file   Physical Activity: Not on file   Stress: Not on file   Social Connections: Not on file   Intimate Partner Violence: Not on file   Housing Stability: Not on file        REVIEW OF SYMPTOMS:  A full 14-point review of systems was performed. Pertinent findings are noted in the HPI.    General  Constitutional  Constitutional (WDL): Exceptions to WDL  Fatigue: Fatigue relieved by rest  EENT  Eye Disorders  Eye Disorder (WDL): Exceptions to WDL (wears reading glasses)  Blurred Vision: Intervention not indicated (blind right eye)  Ear Disorders  Ear Disorder (WDL): Exceptions to WDL  Tinnitus: Mild symptoms OR intervention not indicated  Respiratory  Respiratory  Respiratory (WDL): Exceptions to WDL  Cough: Mild symptoms OR nonprescription intervention indicated (dry)  Cardiovascular  Cardiovascular  Cardiovascular (WDL): Exceptions to WDL (no pacemaker, hx femoral bypass, blocked carotid artery)  Gastrointestinal  Gastrointestinal  Gastrointestinal (WDL): Exceptions to WDL  Constipation: Occasional or intermittent symptoms OR occasional use of stool softeners, laxatives, dietary modification, or enema  Musculoskeletal  Musculoskeletal and Connective Tissue Disorders  Musculoskeletal & Connective (WDL): All musculoskeletal & connective elements are within defined limits  Integumentary  Integumentary  Integumentary (WDL): All integumentary  elements are within defined limits  Neurological  Neurosensory  Neurosensory (WDL): Exceptions to WDL  Peripheral Motor Neuropathy: Asymptomatic OR clinical or diagnostic observations only (left foot)  Peripheral Sensory Neuropathy: Asymptomatic (left foot)  Genitourinary/Reproductive  Genitourinary  Genitourinary (WDL): Exceptions to WDL (hx bladder cancer)  Urinary Frequency: Present  Lymphatic  Lymph System Disorders  Lymph (WDL): All lymph elements are within defined limits  Pain  Pain Score: No Pain (0)  AUA Assessment                                                              Accompanied by  Accompanied By: spouse (wife Mitra)    ECOG Status: 0 - Independent        Recent Labs: No results found for this or any previous visit (from the past 168 hour(s)).    Imaging: Imaging results 6 weeks:CT Chest/Abdomen/Pelvis w Contrast    Result Date: 8/18/2023  EXAM: PET ONCOLOGY WHOLE BODY, CT CHEST/ABDOMEN/PELVIS W CONTRAST LOCATION: [facility] DATE: 8/18/2023 INDICATION: Initial treatment planning and staging for malignant neoplasm of upper lobe, right bronchus or lung COMPARISON: MRI brain dated 08/16/2023 and thoracic CT dated 06/30/2023 CONTRAST: 83 mL Isovue-370 TECHNIQUE: Serum glucose level 98 mg/dL. One hour post intravenous administration of 10.6 mCi F-18 FDG, PET imaging was performed from the skull vertex to feet, utilizing attenuation correction with concurrent axial CT and PET/CT image fusion. Separate diagnostic CT of the chest, abdomen, and pelvis was performed. Dose reduction techniques were used. PET/CT FINDINGS: Spiculated FDG avid mass in the anterior right upper lobe tethering the anterior visceral pleura measuring 3.0 x 2.2 x 2.2 cm (max SUV 17.1) and additional FDG avid nodule in the right lower lobe abutting the lateral visceral pleura measuring 2.4 x 1.9 2.1 cm (max SUV 13.4) with FDG avid right interlobar station 11R (max SUV 8.6), subcarinal station 7 (max SUV 9.4),  right lower paratracheal station 4 (max SUV 3.3) lymph nodes suspicious for synchronous right upper/lower lobe primary  lung neoplasms with thoracic lymph node metastases. Additional mildly FDG avid spiculated nodule in the periphery of the left upper lobe measuring 1.1 x 0.5 cm (max SUV 3.2) may represent additional synchronous neoplasm. Mild inflammatory change associated with the aortobifemoral bypass graft. CT FINDINGS: Mild senescent intracranial changes. Moderate carotid artery bifurcation calcification. Moderate coronary artery calcium. Cholelithiasis. Aortobifemoral bypass graft. Non-FDG avid thickening in the urinary bladder. Multilevel degenerative changes of the spine. Lower extremities are unremarkable.     IMPRESSION: 1. Findings suspicious for synchronous primary lung neoplasms involving the right upper and right lower lobes with thoracic lymph node metastases. 2. Additional mildly FDG avid spiculated nodule in the periphery of the left upper lobe may represent additional early lung neoplasm.    PET Oncology Whole Body    Result Date: 8/18/2023  EXAM: PET ONCOLOGY WHOLE BODY, CT CHEST/ABDOMEN/PELVIS W CONTRAST LOCATION: [facility] DATE: 8/18/2023 INDICATION: Initial treatment planning and staging for malignant neoplasm of upper lobe, right bronchus or lung COMPARISON: MRI brain dated 08/16/2023 and thoracic CT dated 06/30/2023 CONTRAST: 83 mL Isovue-370 TECHNIQUE: Serum glucose level 98 mg/dL. One hour post intravenous administration of 10.6 mCi F-18 FDG, PET imaging was performed from the skull vertex to feet, utilizing attenuation correction with concurrent axial CT and PET/CT image fusion. Separate diagnostic CT of the chest, abdomen, and pelvis was performed. Dose reduction techniques were used. PET/CT FINDINGS: Spiculated FDG avid mass in the anterior right upper lobe tethering the anterior visceral pleura measuring 3.0 x 2.2 x 2.2 cm (max SUV 17.1) and additional FDG avid  nodule in the right lower lobe abutting the lateral visceral pleura measuring 2.4 x 1.9 2.1 cm (max SUV 13.4) with FDG avid right interlobar station 11R (max SUV 8.6), subcarinal station 7 (max SUV 9.4), right lower paratracheal station 4 (max SUV 3.3) lymph nodes suspicious for synchronous right upper/lower lobe primary  lung neoplasms with thoracic lymph node metastases. Additional mildly FDG avid spiculated nodule in the periphery of the left upper lobe measuring 1.1 x 0.5 cm (max SUV 3.2) may represent additional synchronous neoplasm. Mild inflammatory change associated with the aortobifemoral bypass graft. CT FINDINGS: Mild senescent intracranial changes. Moderate carotid artery bifurcation calcification. Moderate coronary artery calcium. Cholelithiasis. Aortobifemoral bypass graft. Non-FDG avid thickening in the urinary bladder. Multilevel degenerative changes of the spine. Lower extremities are unremarkable.     IMPRESSION: 1. Findings suspicious for synchronous primary lung neoplasms involving the right upper and right lower lobes with thoracic lymph node metastases. 2. Additional mildly FDG avid spiculated nodule in the periphery of the left upper lobe may represent additional early lung neoplasm.    MR Brain w/o & w Contrast    Result Date: 8/16/2023  EXAM: MR BRAIN W/O and W CONTRAST LOCATION: [facility] DATE: 8/16/2023 INDICATION:  Squamous cell lung cancer, right (H) COMPARISON: CT head 06/30/2023 CONTRAST: 7.5ml Gadavist TECHNIQUE: Routine multiplanar multisequence head MRI without and with intravenous contrast. FINDINGS: INTRACRANIAL CONTENTS: No acute or subacute infarct. No mass, acute hemorrhage, or extra-axial fluid collections. Tiny focus of cortically-based encephalomalacia and adjacent gliosis involving the right superior frontal gyrus medially. Chronic appearing lacunar infarct of the anterior right caudate nucleus, and presumed minor microvascular ischemic change  within the cerebral white matter. Additional small presumed chronic lacunar infarct of the superior left cerebellum. Minor cerebral volume loss. Normal position of the cerebellar tonsils. No pathologic contrast enhancement. SELLA: No abnormality accounting for technique. OSSEOUS STRUCTURES/SOFT TISSUES: Normal marrow signal. The major intracranial vascular flow voids are maintained. ORBITS: No abnormality accounting for technique. SINUSES/MASTOIDS: Mucosal thickening primarily involving the ethmoid air cells. No middle ear or mastoid effusion.     IMPRESSION: 1.  No evidence for acute intracranial process or definite intracranial metastasis. 2.  Generalized brain atrophy and presumed chronic ischemic changes including scattered small infarcts as detailed above. 3.  Mild inflammatory changes of the paranasal sinuses.     XR Chest Port 1 View    Result Date: 8/1/2023  EXAM: XR CHEST PORT 1 VIEW LOCATION: [facility] DATE: 8/1/2023 INDICATION: Chest Biopsy COMPARISON: CT-guided right upper lobe lung nodule biopsy today. Chest CT 06/30/2023.     IMPRESSION: Previously biopsied right upper lobe nodule again demonstrated. Additional nodule in the right lower lobe on prior chest CT less well seen by radiographic technique. No visible pneumothorax. Mild left basilar atelectasis. Top normal heart size. Calcified plaque of the aortic arch.    CT Lung Mediastinum Biopsy    Result Date: 8/1/2023  EXAM: 1. PERCUTANEOUS BIOPSY RIGHT UPPER LOBE 2. CT GUIDANCE 3. CONSCIOUS SEDATION LOCATION: [facility] DATE: 8/1/2023 INDICATION: Lung nodule (Pulmonary nodule) TECHNIQUE: Dose reduction techniques were used. PROCEDURE: Informed consent obtained. Site marked. Prior images reviewed. Required items made available. Patient identity confirmed verbally and with arm band. Patient reevaluated immediately before administering sedation. Universal protocol was followed. Time out performed. The site  was prepped and draped in sterile fashion. 6 mL of 1% lidocaine was infused into the local soft tissues. Using standard technique and under direct CT guidance, a 20-gauge biopsy device was used to obtain 5  core biopsies. Tissue was submitted to Pathology and was adequate by preliminary review by a pathologist. The patient tolerated the procedure well. No immediate complications. SEDATION: Versed 1  mg. Fentanyl 50 mcg. The procedure was performed with administration intravenous conscious sedation with appropriate preoperative, intraoperative, and postoperative evaluation. 30  minutes of supervised face to face conscious sedation time was provided by a radiology nurse under my direct supervision.     IMPRESSION: 1.  Successful CT-guided biopsy of the right upper lobe nodule with moderate sedation . Reference CPT Codes: 32026, 97735, 47580, 06701     Pathology:   No results found for this or any previous visit (from the past 8760 hour(s)).              Objective:          PHYSICAL EXAMINATION:    /63 (BP Location: Right arm, Patient Position: Sitting, Cuff Size: Adult Regular)   Pulse 59   Temp 97.7  F (36.5  C) (Oral)   Resp 16   Wt 76.1 kg (167 lb 12.8 oz)   SpO2 95%   BMI 26.28 kg/m      Gen: Alert, in NAD  Eyes: PER sclera anicteric  HENT: NC/AT  Neck: Supple  Pulm: No increased work of breathing, speaks in complete sentences  CV: Well-perfused, no cyanosis   Musculoskeletal: No MSK defects noted.  Skin: Normal color and turgor  Neurologic: Nonfocal  Psychiatric: Appropriate mood and affect    Intent of Therapy: Curative  Side effects that may occur during or within weeks after Radiation Therapy    Fatigue and general weakness   Darkening, irritation, itchiness, redness, dryness and peeling of the skin of the chest  Loss of chest and armpit hair  Painful swallowing limiting solid and liquid intake and causing dehydration  Nausea, vomiting and decrease in appetite    Side effects that may occur months  or years after Radiation Therapy     Development of another tumor or cancer  Lung inflammation or fibrosis causing cough, fever, and shortness of breath   Fracture of ribs  Permanent narrowing or obstruction of the esophagus  Fluid compressing the lung (pleural effusion)  Coronary artery blockage causing angina pain or a heart attack  Thickening, telangiectasias (development of spider like blood vessels in the skin) and ulceration of the skin of the chest  Poor healing after a trauma or surgery in the irradiated areas  Nerve damage resulting in loss of strength or sensation    The risks, benefits and alternatives to radiation therapy were outlined with the patient. All questions were answered and a consent was signed.                      Impression   (C34.31) Primary malignant neoplasm of right lower lobe of lung (H)  (primary encounter diagnosis)    Comment:   He has separate right lung tumors,   1) 3cm  in the upper lobe,   2) 2.4cm in the lower lobe with accompanying hilar and mediastinal lymphadenopathy.      Left lung  1.1cm  left upper lobe lesion. Mildly FDG avid.     Plan: We will attempt to treat both tumors in the right lung as well as the accompanying mediastinal adenopathy the left upper lobe lesion we can treat later with SBRT as indicated.     Cancer Staging   Primary malignant neoplasm of right lower lobe of lung (H)  Staging form: Lung, AJCC 8th Edition  - Clinical stage from 8/30/2023: Stage EDWIN (cT4, cN2, pM1a) - Signed by Ifeoma Angel MD on 8/30/2023      Assessment & Plan:   73-year-old male with synchronous malignancies involving the bladder and the lung.  None muscle-invasive bladder cancer removed surgically and had an intraoperative instillation of intravesicular Mitomycin-C.      Biopsy of the right upper lobe shows squamous cell carcinoma of lung origin. The staging is difficult in that if the JAYCOB is a separate primary (adenocarcinoma) then would  be stage III, for now  however I included the left upper lobe lesion which makes him a stage Surjit.    The right lower lobe malignancy is responsible for the adenopathy so that will be primary field of radiation and then we will attempt to do a static field of the right upper lobe lesion with concurrent chemotherapy.  If for some reason we are unable to do that we can treat it later with SBRT.    The left upper lobe is early in its evolution and we can likely go back and treat that with consolidative SBRT after treatment of the right-sided malignancies.    Total time of this visit, including time spent face-to-face with patient was 80 min , complex record review, review of diagnostic tests and information, consideration and discussion of significant complications based on comorbidities, discussion with providers involved in the care of the patients, review of literature.     Sincerely,      Ifeoma Angel MD  Department of Radiation Oncology   [facility] Radiation Oncology  Tel: [phone]  Page: [phone]    [facility]  [address] [place] [address]     [address]    [place] [address]    CC:  Patient Care Team:  Jigar Crawford MD as PCP - General (Internal Medicine - Pediatrics)  Jigar Crawford MD as Assigned PCP  German Mitchell MD as MD (Urology)  Ted Meraz MD as Assigned Heart and Vascular Provider  German Mitchell MD as Assigned Surgical Provider  Glenn Crowe MD as Assigned Cancer Care Provider  Ifeoma Angel MD as MD (Radiation Oncology)          No pacemaker/ICD  No prior radiation therapy treatment    Oncology Rooming Note    August 31, 2023 9:22 AM   Young Ordonez is a 73 year old male who presents for:    Chief Complaint   Patient presents with     Oncology Clinic Visit     Consult with Dr. Angel     Initial Vitals: /63 (BP Location: Right arm, Patient Position: Sitting, Cuff Size: Adult Regular)    "Pulse 59   Temp 97.7  F (36.5  C) (Oral)   Resp 16   Wt 76.1 kg (167 lb 12.8 oz)   SpO2 95%   BMI 26.28 kg/m   Estimated body mass index is 26.28 kg/m  as calculated from the following:    Height as of 8/23/23: 1.702 m (5' 7\").    Weight as of this encounter: 76.1 kg (167 lb 12.8 oz). Body surface area is 1.9 meters squared.  No Pain (0) Comment: Data Unavailable   No LMP for male patient.  Allergies reviewed: Yes  Medications reviewed: No    Medications: Medication refills not needed today.  Pharmacy name entered into [facility]:    [facility] [address] [place] [facility] [id number] [place] [id number] [place] [address] [facility] [place], [place] - [facility]    Clinical concerns: Patient here ambulatory accompanied by wife for radiation consult for his lung cancer.  15 minutes spent in review of radiation process and potential side effects.  Written information given for review.  Patient denies a lot of symptoms at this time.  Seen by Dr. Angel.  Plan return to clinic for follow-up and/or CT simulation as directed by provider.   Dr. Angel was notified.    Radiation Therapy Patient Education    Person involved with teaching: Patient and Wife    Patient educational needs for self management of treatment-related side effects assessment completed.  [facility] Patient Ed tab contains Patient Learning Assessment    Education Materials Given  Radiation Therapy to the Chest Guidelines, Oncology Supportive Care Services , Welcome Letter, Insurance PA Information, Map [facility] Parking, Radiation Therapy and You, Understanding Radiation Therapy, Radiation Therapy Team, Radiation Therapy Treatment, Radiation Therapy: Managing Short-Term Side Effects, Skin Care During Radiation Therapy, and Radiation Therapy: Your Daily Life    Educational Topics Discussed  Side effects expected and Skin care    Response To Teaching  More review necessary    GYN Only  Vaginal " Dilator-given and educated: N/A    Referrals sent: None    Chemotherapy?  Yes: Dr. Crowe medical oncologist for chemotherapy.          Kary Rajput RN                  Again, thank you for allowing me to participate in the care of your patient.        Sincerely,        Ifeoma Angel MD

## 2023-08-31 NOTE — PROGRESS NOTES
"No pacemaker/ICD  No prior radiation therapy treatment    Oncology Rooming Note    August 31, 2023 9:22 AM   Young Ordonez is a 73 year old male who presents for:    Chief Complaint   Patient presents with    Oncology Clinic Visit     Consult with Dr. Angel     Initial Vitals: /63 (BP Location: Right arm, Patient Position: Sitting, Cuff Size: Adult Regular)   Pulse 59   Temp 97.7  F (36.5  C) (Oral)   Resp 16   Wt 76.1 kg (167 lb 12.8 oz)   SpO2 95%   BMI 26.28 kg/m   Estimated body mass index is 26.28 kg/m  as calculated from the following:    Height as of 8/23/23: 1.702 m (5' 7\").    Weight as of this encounter: 76.1 kg (167 lb 12.8 oz). Body surface area is 1.9 meters squared.  No Pain (0) Comment: Data Unavailable   No LMP for male patient.  Allergies reviewed: Yes  Medications reviewed: No    Medications: Medication refills not needed today.  Pharmacy name entered into [facility]:    [facility] [address] [place] [facility] [id number] [place] [address] [place] [address] [facility]    Clinical concerns: Patient here ambulatory accompanied by wife for radiation consult for his lung cancer.  15 minutes spent in review of radiation process and potential side effects.  Written information given for review.  Patient denies a lot of symptoms at this time.  Seen by Dr. Angel.  Plan return to clinic for follow-up and/or CT simulation as directed by provider.   Dr. Angel was notified.    Radiation Therapy Patient Education    Person involved with teaching: Patient and Wife    Patient educational needs for self management of treatment-related side effects assessment completed.  [facility] Patient Ed tab contains Patient Learning Assessment    Education Materials Given  Radiation Therapy to the Chest Guidelines, Oncology Supportive Care Services , Welcome Letter, Insurance [place] Information, Map [facility], Radiation Therapy " and You, Understanding Radiation Therapy, Radiation Therapy Team, Radiation Therapy Treatment, Radiation Therapy: Managing Short-Term Side Effects, Skin Care During Radiation Therapy, and Radiation Therapy: Your Daily Life    Educational Topics Discussed  Side effects expected and Skin care    Response To Teaching  More review necessary    GYN Only  Vaginal Dilator-given and educated: N/A    Referrals sent: None    Chemotherapy?  Yes: Dr. Crowe medical oncologist for chemotherapy.          Kary Rajput RN

## 2023-09-03 PROBLEM — C34.12 MALIGNANT NEOPLASM OF UPPER LOBE OF LEFT LUNG (H): Status: ACTIVE | Noted: 2023-01-01

## 2023-09-06 PROBLEM — Z51.11 ENCOUNTER FOR ANTINEOPLASTIC CHEMOTHERAPY: Status: ACTIVE | Noted: 2023-01-01

## 2023-09-06 NOTE — LETTER
"    9/6/2023         RE: Young Ordonez  [address] [facility] [address]        Dear Colleague,    Thank you for referring your patient, Young Ordonez, to the [facility]. Please see a copy of my visit note below.    Oncology Rooming Note    September 6, 2023 1:49 PM   Young Ordonez is a 73 year old male who presents for:    Chief Complaint   Patient presents with     Oncology Clinic Visit     Discuss treatment plan      Initial Vitals: /69 (BP Location: Left arm, Patient Position: Sitting, Cuff Size: Adult Regular)   Pulse 61   Temp 98.1  F (36.7  C) (Oral)   Ht 1.702 m (5' 7\")   Wt 75.3 kg (166 lb)   SpO2 92%   BMI 26.00 kg/m   Estimated body mass index is 26 kg/m  as calculated from the following:    Height as of this encounter: 1.702 m (5' 7\").    Weight as of this encounter: 75.3 kg (166 lb). Body surface area is 1.89 meters squared.  Moderate Pain (5) Comment: Data Unavailable   No LMP for male patient.  Allergies reviewed: Yes  Medications reviewed: Yes    Medications: Medication refills not needed today.  Pharmacy name entered into [facility]:    [facility] [address] [place], [place] - [address] [place] [id number] [place] [address] PHARMACY [place], [place] - [address] [place] [address]    Clinical concerns: 1. Abdominal Pain     Marlen Okeefe MA              [facility] Hematology and Oncology Progress Note    Patient: Young Ordonez  MRN: [id number]  Date of Service: Sep 6, 2023         Reason for Visit    Chief Complaint   Patient presents with     Oncology Clinic Visit     Discuss treatment plan        Assessment and Plan     Cancer Staging   Primary malignant neoplasm of right lower lobe of lung (H)  Staging form: Lung, AJCC 8th Edition  - Clinical stage from 8/30/2023: Stage IIIB (cT4(2), cN2, cM0) - Signed by Glenn Crowe MD on 9/3/2023      ECOG Performance    0 - Independent     Pain  Pain Score: Moderate " Pain (5)  Pain Loc: Abdomen    #.  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  A 2.3 x 2.2 cm right upper lobe and additional bilateral pulmonary nodules, suspicious for metastatic disease or primary lung cancer  #.  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  #.  Past smoker  #.  Significant vascular disease     Today, I reviewed the chemotherapy schedule and side effects in detail for carboplatin/paclitaxel as part of the definitive chemoradiation and treatment of right upper lobe lung cancer.  He agreed with the plan.  Treatment plan was placed.  Take home medications were sent.   Will have a port placement.   Return to clinic on 9/18/2023 on day #1 chemoradiation.    #.  Subacute abdominal pain, progressive in the last few weeks.   He had the same symptoms when he had PET scan.  It was unremarkable finding.  I recommended to obtain abdominal x-ray today to evaluate for bowel obstruction.  I also discussed about the possible constipation causing abdominal pain.   Review x-ray.  It did not show any evidence of bowel obstruction.  Continue supportive care.    Encounter Diagnoses:    Problem List Items Addressed This Visit          Oncology Diagnoses    Primary malignant neoplasm of right lower lobe of lung (H)    Relevant Medications    prochlorperazine (COMPAZINE) 10 MG tablet (Start on 9/17/2023)    lidocaine-prilocaine (EMLA) 2.5-2.5 % external cream    Other Relevant Orders    Infusion Appointment Request       Other    Encounter for antineoplastic chemotherapy    Relevant Medications    prochlorperazine (COMPAZINE) 10 MG tablet (Start on 9/17/2023)    lidocaine-prilocaine (EMLA) 2.5-2.5 % external cream    Other Relevant Orders    Infusion Appointment Request     Other Visit Diagnoses       Squamous cell lung cancer, right (H)    -  Primary    Relevant  Medications    lidocaine-prilocaine (EMLA) 2.5-2.5 % external cream    Other Relevant Orders    IR Chest Port Placement > 5 Yrs of Age (Completed)    Abdominal pain, right lower quadrant        Relevant Orders    XR Abdomen 2 Views (Completed)               CC: Jigar Crawford MD   ______________________________________________________________________________  Diagnosis  8/2023-right upper lobe of the lung, right lower lobe with thoracic lymph node metastases.  Additional mildly FDG avid spiculated nodule in the periphery of the left upper lobe.   -Right upper lobe lung mass biopsy showed non-small cell carcinoma (squamous cell carcinoma).  TPS 25%, low PD-L1 expression.  Fusion negative. CDKN2A 58fs positive.  No targetable mutation.     8/3/2023-biopsy of the bladder tumor showed papillary transitional carcinoma, low-grade, no evidence of invasive malignancy.    8/18/2023-PET scan showed 3 x 2.2 x 2.2 cm anterior right upper lobe mass with additional FDG avid nodule in the right lower lobe measuring 2.4 x 1.1 x 2.1 cm.  FDG avid right 11 R, 7, right lower station 4 lymph nodes were suspicious for synchronous right upper/lower primary lung neoplasm with thoracic lymph node metastasis.  He has additional FDG avid left upper lobe lesion of 1.1 x 0.5 cm likely representing additional synchronous neoplasm.    8/16/2023-MRI brain was negative for intracranial metastatic disease.    Treatment to date  9/18/2023-plan to start definitive chemoradiation (weekly carboplatin/paclitaxel) for right upper lobe lung cancer.   -Left upper lobe lung cancer is plan to treat with consolidative SBRT after the treatment of right lung cancer.    History of Present Illness    Mr. Young Ordonez presented today accompanied by his wife, Mitra.  He is here to review the biopsy result.  He does not have any concerns today.    Review of systems  Apart from describing in HPI, the remainder of comprehensive ROS was negative.    Past  History    Past Medical History:   Diagnosis Date     Acute myocardial infarction, unspecified site, episode of care unspecified 01/01/1995     Arthritis      Cardiomyopathy (H)      Cerebral artery occlusion with cerebral infarction (H) 2018    lost vision in right eye     Claudication (H)      COPD (chronic obstructive pulmonary disease) (H)      Coronary artery disease      Depression      DEPRESSIVE DISORDER NEC 04/29/2008     Essential hypertension, benign      Gout      Gout attack 05/25/2017     Hepatitis C carrier (H)      HTN (hypertension)      Hyperlipidemia      Infectious viral hepatitis     carrier of viral hepatitis     Low back pain     chronic     MI (myocardial infarction) (H) 01/01/1995     Obesity      Other and unspecified hyperlipidemia      PAD (peripheral artery disease) (H)      Peyronie's disease        Past Surgical History:   Procedure Laterality Date     ANGIOPLASTY  1995     ANKLE FRACTURE SURGERY Left      BYPASS GRAFT AORTOFEMORAL N/A 5/17/2017    Procedure: AORTOBIFEMORAL BYPASS ;  Surgeon: Ilir FERRO MD;  Location: [facility];  Service:      CAROTID ENDARTERECTOMY Right 4/12/2018    Procedure: RIGHT CAROTID ENDARTERECTOMY WITH EEG;  Surgeon: Sergei Lemus MD;  Location: [facility];  Service:      CYSTOSCOPY, TRANSURETHRAL RESECTION (TUR) TUMOR BLADDER, COMBINED N/A 8/3/2023    Procedure: Transurethral resection of bladder tumor 2 cm to 5 cm;  Surgeon: German Mitchell MD;  Location:  OR     FRACTURE SURGERY       IR AORTIC ARCH 4 VESSEL ANGIOGRAM  11/9/2009     IR CAROTID ANGIOGRAM  11/9/2009     IR CAROTID ANGIOGRAM  11/9/2009     IR CHEST PORT PLACEMENT > 5 YRS OF AGE  9/11/2023     IR EXTREMITY ANGIOGRAM BILATERAL  4/6/2017     MANDIBLE SURGERY       ZZC NONSPECIFIC PROCEDURE  1995    MI -- angioplasty       Physical Exam    /69 (BP Location: Left arm, Patient Position: Sitting, Cuff Size: Adult Regular)   Pulse 61   Temp 98.1  F (36.7  C)  "(Oral)   Ht 1.702 m (5' 7\")   Wt 75.3 kg (166 lb)   SpO2 92%   BMI 26.00 kg/m      General: alert, awake, not in acute distress  HEENT: Head: Normal, normocephalic, atraumatic.  Eye: Normal external eye, conjunctiva, lids cornea, NARESH.  Nose: Normal external nose, mucus membranes and septum.  Pharynx: Normal buccal mucosa. Normal pharynx.  Neck / Thyroid: Supple, no masses, nodes, nodules or enlargement.  Lymphatics: No abnormally enlarged lymph nodes.  Extremities: normal strength, tone, and muscle mass  Skin: normal. no rash or abnormalities  CNS: non focal.    Lab Results    No results found for this or any previous visit (from the past 168 hour(s)).      Imaging    IR Chest Port Placement > 5 Yrs of Age    Result Date: 9/11/2023  [place] RADIOLOGY LOCATION: [facility] DATE: 9/11/2023 PROCEDURE: IMPLANTABLE VENOUS CHEST PORT PLACEMENT (POWER INJECTABLE) INTERVENTIONAL RADIOLOGIST: ROD Solorzano MD. INDICATION: 73 year old male PMH HTN, HLD, obesity, MI, CVA, PAD, COPD and recent dx of non-small cell carcinoma of the RIGHT upper lobe with likely lymph node mets, synchronous RLL and JAYCOB neoplasms and a RIGHT bladder mass 2/2 non-muscle invasive papillary transitional carcinoma. CONSENT: The risks, benefits and alternatives of implantable venous chest port placement were discussed with the patient in detail. All questions were answered. Informed consent was given to proceed with the procedure. MODERATE SEDATION: Versed 2 mg IV; Fentanyl 100 mcg IV.  During the timeout, immediately prior to the administration of medications, the patient was reassessed for adequacy to receive conscious sedation. Under physician supervision, Versed and fentanyl were administered for moderate sedation. Pulse oximetry, heart rate and blood pressure were continuously monitored by an independent trained observer. The physician spent 63 minutes of face-to-face sedation time with the patient. CONTRAST: " None. ANTIBIOTICS: Ancef 2 g IV. ADDITIONAL MEDICATIONS: Heparin 500 U IV FLUOROSCOPIC TIME: 10.4 minutes. RADIATION DOSE: Air Kerma: 84 mGy. COMPLICATIONS: No immediate complications. STERILE BARRIER TECHNIQUE: Maximum sterile barrier technique was used. Cutaneous antisepsis was performed at the operative site with application of 2% chlorhexidine and large sterile drape. Prior to the procedure, the  and assistant performed hand hygiene and wore hat, mask, sterile gown, and sterile gloves during the entire procedure. PROCEDURE: Limited left neck ultrasound demonstrated a patent internal jugular vein; images saved of the permanent patient medical record. The overlying skin and subcutaneous soft tissues were anesthetized using 1% lidocaine with epinephrine. Under ultrasound guidance, the left internal jugular vein was accessed using a micropuncture needle; images saved of the permanent patient medical record. Access was secured using a 4 [place] transitional sheath. A [employer] guidewire was advanced into the distal IVC. Our attention was then turned to the chest wall site. The overlying skin and subcutaneous soft tissues were anesthetized using 1% lidocaine with epinephrine. Using combination of sharp and blunt dissection a subcutaneous pocket was created. The catheter tubing was tunneled in an antegrade fashion from the port pocket to the dermatotomy site. Under direct fluoroscopic visualization, a peel-away sheath was advanced over the wire and positioned within the superior vena cava. Through the peel-away sheath, the catheter tubing was advanced until the tip was in the right atrium. The catheter tubing was cut to length and attached firmly to the port. The port was placed within the subcutaneous pocket and tested. The port tubing was then primed with 500 units of heparin. The port pocket incision was closed with layered absorbable sutures and surgical glue. The dermatotomy site was closed with surgical  glue.     IMPRESSION:  1.  Successful implantable venous chest port placement. PLAN: 1. Port is ready to be used.     XR Abdomen 2 Views    Result Date: 9/7/2023  EXAM: XR ABDOMEN 2 VIEWS LOCATION: [facility] DATE: 9/6/2023 INDICATION:  Abdominal pain, right lower quadrant COMPARISON: None.     IMPRESSION: No bowel obstruction. There is a moderate retained colonic stool burden. No free air. Several calcifications overlap the left abdomen with no definitive renal stones evident. Vascular calcifications are present both in the upper abdomen and in the aortobiiliac and common femoral arteries. Degenerative changes of the spine.    CT Chest/Abdomen/Pelvis w Contrast    Result Date: 8/18/2023  EXAM: PET ONCOLOGY WHOLE BODY, CT CHEST/ABDOMEN/PELVIS W CONTRAST LOCATION: [facility] DATE: 8/18/2023 INDICATION: Initial treatment planning and staging for malignant neoplasm of upper lobe, right bronchus or lung COMPARISON: MRI brain dated 08/16/2023 and thoracic CT dated 06/30/2023 CONTRAST: 83 mL Isovue-370 TECHNIQUE: Serum glucose level 98 mg/dL. One hour post intravenous administration of 10.6 mCi F-18 FDG, PET imaging was performed from the skull vertex to feet, utilizing attenuation correction with concurrent axial CT and PET/CT image fusion. Separate diagnostic CT of the chest, abdomen, and pelvis was performed. Dose reduction techniques were used. PET/CT FINDINGS: Spiculated FDG avid mass in the anterior right upper lobe tethering the anterior visceral pleura measuring 3.0 x 2.2 x 2.2 cm (max SUV 17.1) and additional FDG avid nodule in the right lower lobe abutting the lateral visceral pleura measuring 2.4 x 1.9 2.1 cm (max SUV 13.4) with FDG avid right interlobar station 11R (max SUV 8.6), subcarinal station 7 (max SUV 9.4), right lower paratracheal station 4 (max SUV 3.3) lymph nodes suspicious for synchronous right upper/lower lobe primary  lung neoplasms with  thoracic lymph node metastases. Additional mildly FDG avid spiculated nodule in the periphery of the left upper lobe measuring 1.1 x 0.5 cm (max SUV 3.2) may represent additional synchronous neoplasm. Mild inflammatory change associated with the aortobifemoral bypass graft. CT FINDINGS: Mild senescent intracranial changes. Moderate carotid artery bifurcation calcification. Moderate coronary artery calcium. Cholelithiasis. Aortobifemoral bypass graft. Non-FDG avid thickening in the urinary bladder. Multilevel degenerative changes of the spine. Lower extremities are unremarkable.     IMPRESSION: 1. Findings suspicious for synchronous primary lung neoplasms involving the right upper and right lower lobes with thoracic lymph node metastases. 2. Additional mildly FDG avid spiculated nodule in the periphery of the left upper lobe may represent additional early lung neoplasm.    PET Oncology Whole Body    Result Date: 8/18/2023  EXAM: PET ONCOLOGY WHOLE BODY, CT CHEST/ABDOMEN/PELVIS W CONTRAST LOCATION: [facility] DATE: 8/18/2023 INDICATION: Initial treatment planning and staging for malignant neoplasm of upper lobe, right bronchus or lung COMPARISON: MRI brain dated 08/16/2023 and thoracic CT dated 06/30/2023 CONTRAST: 83 mL Isovue-370 TECHNIQUE: Serum glucose level 98 mg/dL. One hour post intravenous administration of 10.6 mCi F-18 FDG, PET imaging was performed from the skull vertex to feet, utilizing attenuation correction with concurrent axial CT and PET/CT image fusion. Separate diagnostic CT of the chest, abdomen, and pelvis was performed. Dose reduction techniques were used. PET/CT FINDINGS: Spiculated FDG avid mass in the anterior right upper lobe tethering the anterior visceral pleura measuring 3.0 x 2.2 x 2.2 cm (max SUV 17.1) and additional FDG avid nodule in the right lower lobe abutting the lateral visceral pleura measuring 2.4 x 1.9 2.1 cm (max SUV 13.4) with FDG avid right  interlobar station 11R (max SUV 8.6), subcarinal station 7 (max SUV 9.4), right lower paratracheal station 4 (max SUV 3.3) lymph nodes suspicious for synchronous right upper/lower lobe primary  lung neoplasms with thoracic lymph node metastases. Additional mildly FDG avid spiculated nodule in the periphery of the left upper lobe measuring 1.1 x 0.5 cm (max SUV 3.2) may represent additional synchronous neoplasm. Mild inflammatory change associated with the aortobifemoral bypass graft. CT FINDINGS: Mild senescent intracranial changes. Moderate carotid artery bifurcation calcification. Moderate coronary artery calcium. Cholelithiasis. Aortobifemoral bypass graft. Non-FDG avid thickening in the urinary bladder. Multilevel degenerative changes of the spine. Lower extremities are unremarkable.     IMPRESSION: 1. Findings suspicious for synchronous primary lung neoplasms involving the right upper and right lower lobes with thoracic lymph node metastases. 2. Additional mildly FDG avid spiculated nodule in the periphery of the left upper lobe may represent additional early lung neoplasm.    MR Brain w/o & w Contrast    Result Date: 8/16/2023  EXAM: MR BRAIN W/O and W CONTRAST LOCATION: [facility] DATE: 8/16/2023 INDICATION:  Squamous cell lung cancer, right (H) COMPARISON: CT head 06/30/2023 CONTRAST: 7.5ml Gadavist TECHNIQUE: Routine multiplanar multisequence head MRI without and with intravenous contrast. FINDINGS: INTRACRANIAL CONTENTS: No acute or subacute infarct. No mass, acute hemorrhage, or extra-axial fluid collections. Tiny focus of cortically-based encephalomalacia and adjacent gliosis involving the right superior frontal gyrus medially. Chronic appearing lacunar infarct of the anterior right caudate nucleus, and presumed minor microvascular ischemic change within the cerebral white matter. Additional small presumed chronic lacunar infarct of the superior left cerebellum. Minor cerebral  volume loss. Normal position of the cerebellar tonsils. No pathologic contrast enhancement. SELLA: No abnormality accounting for technique. OSSEOUS STRUCTURES/SOFT TISSUES: Normal marrow signal. The major intracranial vascular flow voids are maintained. ORBITS: No abnormality accounting for technique. SINUSES/MASTOIDS: Mucosal thickening primarily involving the ethmoid air cells. No middle ear or mastoid effusion.     IMPRESSION: 1.  No evidence for acute intracranial process or definite intracranial metastasis. 2.  Generalized brain atrophy and presumed chronic ischemic changes including scattered small infarcts as detailed above. 3.  Mild inflammatory changes of the paranasal sinuses.      40 minutes spent on the date of the encounter doing chart review, history and exam, documentation, communication of the treatment plan with the care team and further activities as noted above.    Signed by: Glenn Crowe MD      Again, thank you for allowing me to participate in the care of your patient.        Sincerely,        Glenn Crowe MD

## 2023-09-06 NOTE — PROGRESS NOTES
"Oncology Rooming Note    September 6, 2023 1:49 PM   Young Ordonez is a 73 year old male who presents for:    Chief Complaint   Patient presents with    Oncology Clinic Visit     Discuss treatment plan      Initial Vitals: /69 (BP Location: Left arm, Patient Position: Sitting, Cuff Size: Adult Regular)   Pulse 61   Temp 98.1  F (36.7  C) (Oral)   Ht 1.702 m (5' 7\")   Wt 75.3 kg (166 lb)   SpO2 92%   BMI 26.00 kg/m   Estimated body mass index is 26 kg/m  as calculated from the following:    Height as of this encounter: 1.702 m (5' 7\").    Weight as of this encounter: 75.3 kg (166 lb). Body surface area is 1.89 meters squared.  Moderate Pain (5) Comment: Data Unavailable   No LMP for male patient.  Allergies reviewed: Yes  Medications reviewed: Yes    Medications: Medication refills not needed today.  Pharmacy name entered into [facility]:    [facility] [address] [place], [place] - [address]  [facility] [address] [place] [facility] [address] [facility]AC[facility] [place], [place] - [address] [facility]    Clinical concerns: 1. Abdominal Pain     Marlen Okeefe MA            "

## 2023-09-06 NOTE — PROGRESS NOTES
[facility] Hematology and Oncology Progress Note    Patient: Young Ordonez  MRN: [id number]  Date of Service: Sep 6, 2023         Reason for Visit    Chief Complaint   Patient presents with    Oncology Clinic Visit     Discuss treatment plan        Assessment and Plan     Cancer Staging   Primary malignant neoplasm of right lower lobe of lung (H)  Staging form: Lung, AJCC 8th Edition  - Clinical stage from 8/30/2023: Stage IIIB (cT4(2), cN2, cM0) - Signed by Glenn Crowe MD on 9/3/2023      ECOG Performance    0 - Independent     Pain  Pain Score: Moderate Pain (5)  Pain Loc: Abdomen    #.  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  A 2.3 x 2.2 cm right upper lobe and additional bilateral pulmonary nodules, suspicious for metastatic disease or primary lung cancer  #.  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  #.  Past smoker  #.  Significant vascular disease     Today, I reviewed the chemotherapy schedule and side effects in detail for carboplatin/paclitaxel as part of the definitive chemoradiation and treatment of right upper lobe lung cancer.  He agreed with the plan.  Treatment plan was placed.  Take home medications were sent.   Will have a port placement.   Return to clinic on 9/18/2023 on day #1 chemoradiation.    #.  Subacute abdominal pain, progressive in the last few weeks.   He had the same symptoms when he had PET scan.  It was unremarkable finding.  I recommended to obtain abdominal x-ray today to evaluate for bowel obstruction.  I also discussed about the possible constipation causing abdominal pain.   Review x-ray.  It did not show any evidence of bowel obstruction.  Continue supportive care.    Encounter Diagnoses:    Problem List Items Addressed This Visit          Oncology Diagnoses    Primary malignant neoplasm of right lower  lobe of lung (H)    Relevant Medications    prochlorperazine (COMPAZINE) 10 MG tablet (Start on 9/17/2023)    lidocaine-prilocaine (EMLA) 2.5-2.5 % external cream    Other Relevant Orders    Infusion Appointment Request       Other    Encounter for antineoplastic chemotherapy    Relevant Medications    prochlorperazine (COMPAZINE) 10 MG tablet (Start on 9/17/2023)    lidocaine-prilocaine (EMLA) 2.5-2.5 % external cream    Other Relevant Orders    Infusion Appointment Request     Other Visit Diagnoses       Squamous cell lung cancer, right (H)    -  Primary    Relevant Medications    lidocaine-prilocaine (EMLA) 2.5-2.5 % external cream    Other Relevant Orders    IR Chest Port Placement > 5 Yrs of Age (Completed)    Abdominal pain, right lower quadrant        Relevant Orders    XR Abdomen 2 Views (Completed)               CC: Jigar Crawford MD   ______________________________________________________________________________  Diagnosis  8/2023-right upper lobe of the lung, right lower lobe with thoracic lymph node metastases.  Additional mildly FDG avid spiculated nodule in the periphery of the left upper lobe.   -Right upper lobe lung mass biopsy showed non-small cell carcinoma (squamous cell carcinoma).  TPS 25%, low PD-L1 expression.  Fusion negative. CDKN2A 58fs positive.  No targetable mutation.     8/3/2023-biopsy of the bladder tumor showed papillary transitional carcinoma, low-grade, no evidence of invasive malignancy.    8/18/2023-PET scan showed 3 x 2.2 x 2.2 cm anterior right upper lobe mass with additional FDG avid nodule in the right lower lobe measuring 2.4 x 1.1 x 2.1 cm.  FDG avid right 11 R, 7, right lower station 4 lymph nodes were suspicious for synchronous right upper/lower primary lung neoplasm with thoracic lymph node metastasis.  He has additional FDG avid left upper lobe lesion of 1.1 x 0.5 cm likely representing additional synchronous neoplasm.    8/16/2023-MRI brain was negative for  intracranial metastatic disease.    Treatment to date  9/18/2023-plan to start definitive chemoradiation (weekly carboplatin/paclitaxel) for right upper lobe lung cancer.   -Left upper lobe lung cancer is plan to treat with consolidative SBRT after the treatment of right lung cancer.    History of Present Illness    Mr. Young Ordonez presented today accompanied by his wife, Mitra.  He is here to review the biopsy result.  He does not have any concerns today.    Review of systems  Apart from describing in HPI, the remainder of comprehensive ROS was negative.    Past History    Past Medical History:   Diagnosis Date    Acute myocardial infarction, unspecified site, episode of care unspecified 01/01/1995    Arthritis     Cardiomyopathy (H)     Cerebral artery occlusion with cerebral infarction (H) 2018    lost vision in right eye    Claudication (H)     COPD (chronic obstructive pulmonary disease) (H)     Coronary artery disease     Depression     DEPRESSIVE DISORDER NEC 04/29/2008    Essential hypertension, benign     Gout     Gout attack 05/25/2017    Hepatitis C carrier (H)     HTN (hypertension)     Hyperlipidemia     Infectious viral hepatitis     carrier of viral hepatitis    Low back pain     chronic    MI (myocardial infarction) (H) 01/01/1995    Obesity     Other and unspecified hyperlipidemia     PAD (peripheral artery disease) (H)     Peyronie's disease        Past Surgical History:   Procedure Laterality Date    ANGIOPLASTY  1995    ANKLE FRACTURE SURGERY Left     BYPASS GRAFT AORTOFEMORAL N/A 5/17/2017    Procedure: AORTOBIFEMORAL BYPASS ;  Surgeon: Ilir FERRO MD;  Location: [facility] OR;  Service:     CAROTID ENDARTERECTOMY Right 4/12/2018    Procedure: RIGHT CAROTID ENDARTERECTOMY WITH EEG;  Surgeon: Sergei Lemus MD;  Location: [facility] OR;  Service:     CYSTOSCOPY, TRANSURETHRAL RESECTION (TUR) TUMOR BLADDER, COMBINED N/A 8/3/2023    Procedure: Transurethral resection of bladder  "tumor 2 cm to 5 cm;  Surgeon: German Mitchell MD;  Location: [facility] OR    FRACTURE SURGERY      IR AORTIC ARCH 4 VESSEL ANGIOGRAM  11/9/2009    IR CAROTID ANGIOGRAM  11/9/2009    IR CAROTID ANGIOGRAM  11/9/2009    IR CHEST PORT PLACEMENT > 5 YRS OF AGE  9/11/2023    IR EXTREMITY ANGIOGRAM BILATERAL  4/6/2017    MANDIBLE SURGERY      ZZC NONSPECIFIC PROCEDURE  1995    MI -- angioplasty       Physical Exam    /69 (BP Location: Left arm, Patient Position: Sitting, Cuff Size: Adult Regular)   Pulse 61   Temp 98.1  F (36.7  C) (Oral)   Ht 1.702 m (5' 7\")   Wt 75.3 kg (166 lb)   SpO2 92%   BMI 26.00 kg/m      General: alert, awake, not in acute distress  HEENT: Head: Normal, normocephalic, atraumatic.  Eye: Normal external eye, conjunctiva, lids cornea, NARESH.  Nose: Normal external nose, mucus membranes and septum.  Pharynx: Normal buccal mucosa. Normal pharynx.  Neck / Thyroid: Supple, no masses, nodes, nodules or enlargement.  Lymphatics: No abnormally enlarged lymph nodes.  Extremities: normal strength, tone, and muscle mass  Skin: normal. no rash or abnormalities  CNS: non focal.    Lab Results    No results found for this or any previous visit (from the past 168 hour(s)).      Imaging    IR Chest Port Placement > 5 Yrs of Age    Result Date: 9/11/2023  [facility] RADIOLOGY LOCATION: [facility] DATE: 9/11/2023 PROCEDURE: IMPLANTABLE VENOUS CHEST PORT PLACEMENT (POWER INJECTABLE) INTERVENTIONAL RADIOLOGIST: ROD Solorzano MD. INDICATION: 73 year old male PMH HTN, HLD, obesity, MI, CVA, PAD, COPD and recent dx of non-small cell carcinoma of the RIGHT upper lobe with likely lymph node mets, synchronous RLL and JAYCOB neoplasms and a RIGHT bladder mass 2/2 non-muscle invasive papillary transitional carcinoma. CONSENT: The risks, benefits and alternatives of implantable venous chest port placement were discussed with the patient in detail. All questions were answered. Informed consent " was given to proceed with the procedure. MODERATE SEDATION: Versed 2 mg IV; Fentanyl 100 mcg IV.  During the timeout, immediately prior to the administration of medications, the patient was reassessed for adequacy to receive conscious sedation. Under physician supervision, Versed and fentanyl were administered for moderate sedation. Pulse oximetry, heart rate and blood pressure were continuously monitored by an independent trained observer. The physician spent 63 minutes of face-to-face sedation time with the patient. CONTRAST: None. ANTIBIOTICS: Ancef 2 g IV. ADDITIONAL MEDICATIONS: Heparin 500 U IV FLUOROSCOPIC TIME: 10.4 minutes. RADIATION DOSE: Air Kerma: 84 mGy. COMPLICATIONS: No immediate complications. STERILE BARRIER TECHNIQUE: Maximum sterile barrier technique was used. Cutaneous antisepsis was performed at the operative site with application of 2% chlorhexidine and large sterile drape. Prior to the procedure, the  and assistant performed hand hygiene and wore hat, mask, sterile gown, and sterile gloves during the entire procedure. PROCEDURE: Limited left neck ultrasound demonstrated a patent internal jugular vein; images saved of the permanent patient medical record. The overlying skin and subcutaneous soft tissues were anesthetized using 1% lidocaine with epinephrine. Under ultrasound guidance, the left internal jugular vein was accessed using a micropuncture needle; images saved of the permanent patient medical record. Access was secured using a 4 Ukrainian transitional sheath. A Zipzoomson guidewire was advanced into the distal IVC. Our attention was then turned to the chest wall site. The overlying skin and subcutaneous soft tissues were anesthetized using 1% lidocaine with epinephrine. Using combination of sharp and blunt dissection a subcutaneous pocket was created. The catheter tubing was tunneled in an antegrade fashion from the port pocket to the dermatotomy site. Under direct fluoroscopic  visualization, a peel-away sheath was advanced over the wire and positioned within the superior vena cava. Through the peel-away sheath, the catheter tubing was advanced until the tip was in the right atrium. The catheter tubing was cut to length and attached firmly to the port. The port was placed within the subcutaneous pocket and tested. The port tubing was then primed with 500 units of heparin. The port pocket incision was closed with layered absorbable sutures and surgical glue. The dermatotomy site was closed with surgical glue.     IMPRESSION:  1.  Successful implantable venous chest port placement. PLAN: 1. Port is ready to be used.     XR Abdomen 2 Views    Result Date: 9/7/2023  EXAM: XR ABDOMEN 2 VIEWS LOCATION: [facility] DATE: 9/6/2023 INDICATION:  Abdominal pain, right lower quadrant COMPARISON: None.     IMPRESSION: No bowel obstruction. There is a moderate retained colonic stool burden. No free air. Several calcifications overlap the left abdomen with no definitive renal stones evident. Vascular calcifications are present both in the upper abdomen and in the aortobiiliac and common femoral arteries. Degenerative changes of the spine.    CT Chest/Abdomen/Pelvis w Contrast    Result Date: 8/18/2023  EXAM: PET ONCOLOGY WHOLE BODY, CT CHEST/ABDOMEN/PELVIS W CONTRAST LOCATION: [facility] DATE: 8/18/2023 INDICATION: Initial treatment planning and staging for malignant neoplasm of upper lobe, right bronchus or lung COMPARISON: MRI brain dated 08/16/2023 and thoracic CT dated 06/30/2023 CONTRAST: 83 mL Isovue-370 TECHNIQUE: Serum glucose level 98 mg/dL. One hour post intravenous administration of 10.6 mCi F-18 FDG, PET imaging was performed from the skull vertex to feet, utilizing attenuation correction with concurrent axial CT and PET/CT image fusion. Separate diagnostic CT of the chest, abdomen, and pelvis was performed. Dose reduction techniques were  used. PET/CT FINDINGS: Spiculated FDG avid mass in the anterior right upper lobe tethering the anterior visceral pleura measuring 3.0 x 2.2 x 2.2 cm (max SUV 17.1) and additional FDG avid nodule in the right lower lobe abutting the lateral visceral pleura measuring 2.4 x 1.9 2.1 cm (max SUV 13.4) with FDG avid right interlobar station 11R (max SUV 8.6), subcarinal station 7 (max SUV 9.4), right lower paratracheal station 4 (max SUV 3.3) lymph nodes suspicious for synchronous right upper/lower lobe primary  lung neoplasms with thoracic lymph node metastases. Additional mildly FDG avid spiculated nodule in the periphery of the left upper lobe measuring 1.1 x 0.5 cm (max SUV 3.2) may represent additional synchronous neoplasm. Mild inflammatory change associated with the aortobifemoral bypass graft. CT FINDINGS: Mild senescent intracranial changes. Moderate carotid artery bifurcation calcification. Moderate coronary artery calcium. Cholelithiasis. Aortobifemoral bypass graft. Non-FDG avid thickening in the urinary bladder. Multilevel degenerative changes of the spine. Lower extremities are unremarkable.     IMPRESSION: 1. Findings suspicious for synchronous primary lung neoplasms involving the right upper and right lower lobes with thoracic lymph node metastases. 2. Additional mildly FDG avid spiculated nodule in the periphery of the left upper lobe may represent additional early lung neoplasm.    PET Oncology Whole Body    Result Date: 8/18/2023  EXAM: PET ONCOLOGY WHOLE BODY, CT CHEST/ABDOMEN/PELVIS W CONTRAST LOCATION: [facility] DATE: 8/18/2023 INDICATION: Initial treatment planning and staging for malignant neoplasm of upper lobe, right bronchus or lung COMPARISON: MRI brain dated 08/16/2023 and thoracic CT dated 06/30/2023 CONTRAST: 83 mL Isovue-370 TECHNIQUE: Serum glucose level 98 mg/dL. One hour post intravenous administration of 10.6 mCi F-18 FDG, PET imaging was performed from the  skull vertex to feet, utilizing attenuation correction with concurrent axial CT and PET/CT image fusion. Separate diagnostic CT of the chest, abdomen, and pelvis was performed. Dose reduction techniques were used. PET/CT FINDINGS: Spiculated FDG avid mass in the anterior right upper lobe tethering the anterior visceral pleura measuring 3.0 x 2.2 x 2.2 cm (max SUV 17.1) and additional FDG avid nodule in the right lower lobe abutting the lateral visceral pleura measuring 2.4 x 1.9 2.1 cm (max SUV 13.4) with FDG avid right interlobar station 11R (max SUV 8.6), subcarinal station 7 (max SUV 9.4), right lower paratracheal station 4 (max SUV 3.3) lymph nodes suspicious for synchronous right upper/lower lobe primary  lung neoplasms with thoracic lymph node metastases. Additional mildly FDG avid spiculated nodule in the periphery of the left upper lobe measuring 1.1 x 0.5 cm (max SUV 3.2) may represent additional synchronous neoplasm. Mild inflammatory change associated with the aortobifemoral bypass graft. CT FINDINGS: Mild senescent intracranial changes. Moderate carotid artery bifurcation calcification. Moderate coronary artery calcium. Cholelithiasis. Aortobifemoral bypass graft. Non-FDG avid thickening in the urinary bladder. Multilevel degenerative changes of the spine. Lower extremities are unremarkable.     IMPRESSION: 1. Findings suspicious for synchronous primary lung neoplasms involving the right upper and right lower lobes with thoracic lymph node metastases. 2. Additional mildly FDG avid spiculated nodule in the periphery of the left upper lobe may represent additional early lung neoplasm.    MR Brain w/o & w Contrast    Result Date: 8/16/2023  EXAM: MR BRAIN W/O and W CONTRAST LOCATION: [facility] DATE: 8/16/2023 INDICATION:  Squamous cell lung cancer, right (H) COMPARISON: CT head 06/30/2023 CONTRAST: 7.5ml Gadavist TECHNIQUE: Routine multiplanar multisequence head MRI without and  with intravenous contrast. FINDINGS: INTRACRANIAL CONTENTS: No acute or subacute infarct. No mass, acute hemorrhage, or extra-axial fluid collections. Tiny focus of cortically-based encephalomalacia and adjacent gliosis involving the right superior frontal gyrus medially. Chronic appearing lacunar infarct of the anterior right caudate nucleus, and presumed minor microvascular ischemic change within the cerebral white matter. Additional small presumed chronic lacunar infarct of the superior left cerebellum. Minor cerebral volume loss. Normal position of the cerebellar tonsils. No pathologic contrast enhancement. SELLA: No abnormality accounting for technique. OSSEOUS STRUCTURES/SOFT TISSUES: Normal marrow signal. The major intracranial vascular flow voids are maintained. ORBITS: No abnormality accounting for technique. SINUSES/MASTOIDS: Mucosal thickening primarily involving the ethmoid air cells. No middle ear or mastoid effusion.     IMPRESSION: 1.  No evidence for acute intracranial process or definite intracranial metastasis. 2.  Generalized brain atrophy and presumed chronic ischemic changes including scattered small infarcts as detailed above. 3.  Mild inflammatory changes of the paranasal sinuses.      40 minutes spent on the date of the encounter doing chart review, history and exam, documentation, communication of the treatment plan with the care team and further activities as noted above.    Signed by: Glenn Crowe MD

## 2023-09-08 NOTE — PROGRESS NOTES
[facility]: Cancer Care Short Note                                    Discussion with Patient:                                                      Reviewed xray results from 9/6/23 with Dr. Crowe.  Patient has a moderate amount of stool.  She said patient should clean out bowel, increase activity, and stay well hydrated, increase fiber in diet.      Assessment:                                                      Xray results from 9/6/23:    IMPRESSION: No bowel obstruction. There is a moderate retained colonic stool burden. No free air. Several calcifications overlap the left abdomen with no definitive renal stones evident. Vascular calcifications are present both in the upper abdomen and   in the aortobiiliac and common femoral arteries. Degenerative changes of the spine.    Intervention/Education provided during outreach:                                                       Called patient with results. Wife, Mitra, answered the phone.  Patient asleep.  Consent to communicate on file for Mitra from 8/31/20.  Results and plan given. Mitra reports he took his stool softener yesterday and had good BM yesterday.      Mitra is patient's appointment on 9/18/23 to start chemotherapy.  Will meet with them in person on this day.    Patient to follow up as scheduled at next appt    Signature:  Rosalva Chavez RN

## 2023-09-08 NOTE — H&P
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     Plans for chemotherapy, radiation.    Scheduled for Port PLACEMENT.  No laterality specified in initial order.     Imaging:   Narrative & Impression   EXAM: PET ONCOLOGY WHOLE BODY, CT CHEST/ABDOMEN/PELVIS W CONTRAST  LOCATION: [facility]  DATE: 8/18/2023     INDICATION: Initial treatment planning and staging for malignant neoplasm of upper lobe, right bronchus or lung  COMPARISON: MRI brain dated 08/16/2023 and thoracic CT dated 06/30/2023  CONTRAST: 83 mL [id number]  TECHNIQUE: Serum glucose level 98 mg/dL. One hour post intravenous administration of 10.6 mCi F-18 FDG, PET imaging was performed from the skull vertex to feet, utilizing attenuation correction with concurrent axial CT and PET/CT image fusion. Separate   diagnostic CT of the chest, abdomen, and pelvis was performed. Dose reduction techniques were used.     PET/CT FINDINGS: Spiculated FDG avid mass in the anterior right upper lobe tethering the anterior visceral pleura measuring 3.0 x 2.2 x 2.2 cm (max SUV 17.1) and additional FDG avid nodule in the right lower lobe abutting the lateral visceral pleura   measuring 2.4 x 1.9 2.1 cm (max SUV 13.4) with FDG avid right interlobar station 11R (max SUV 8.6), subcarinal station 7 (max SUV 9.4), right lower paratracheal station 4 (max SUV 3.3) lymph nodes suspicious for synchronous right upper/lower lobe primary   lung neoplasms with thoracic lymph node metastases.     Additional mildly FDG avid spiculated nodule in the periphery of the left upper lobe  measuring 1.1 x 0.5 cm (max SUV 3.2) may represent additional synchronous neoplasm. Mild inflammatory change associated with the aortobifemoral bypass graft.      CT FINDINGS: Mild senescent intracranial changes. Moderate carotid artery bifurcation calcification. Moderate coronary artery calcium. Cholelithiasis. Aortobifemoral bypass graft. Non-FDG avid thickening in the urinary bladder. Multilevel degenerative   changes of the spine. Lower extremities are unremarkable.                                                                      IMPRESSION:     1. Findings suspicious for synchronous primary lung neoplasms involving the right upper and right lower lobes with thoracic lymph node metastases.     2. Additional mildly FDG avid spiculated nodule in the periphery of the left upper lobe may represent additional early lung neoplasm.         NPO Status: Unable to assess  Anticoagulation/Antiplatelets/Bleeding tendencies:  Aspirin 325 mg PO every day- no hold required    Antibiotics: Ancef 2 g IV x1 ordered for procedure     Review of Systems: Unable to assess    PMH:  Past Medical History:   Diagnosis Date     Acute myocardial infarction, unspecified site, episode of care unspecified 01/01/1995     Arthritis      Cardiomyopathy (H)      Cerebral artery occlusion with cerebral infarction (H) 2018    lost vision in right eye     Claudication (H)      COPD (chronic obstructive pulmonary disease) (H)      Coronary artery disease      Depression      DEPRESSIVE DISORDER NEC 04/29/2008     Essential hypertension, benign      Gout      Gout attack 05/25/2017     Hepatitis C carrier (H)      HTN (hypertension)      Hyperlipidemia      Infectious viral hepatitis     carrier of viral hepatitis     Low back pain     chronic     MI (myocardial infarction) (H) 01/01/1995     Obesity      Other and unspecified hyperlipidemia      PAD (peripheral artery disease) (H)      Peyronie's disease        PSH:  Past Surgical History:    Procedure Laterality Date     ANGIOPLASTY  1995     ANKLE FRACTURE SURGERY Left      BYPASS GRAFT AORTOFEMORAL N/A 5/17/2017    Procedure: AORTOBIFEMORAL BYPASS ;  Surgeon: Ilir FERRO MD;  Location: [facility] Main OR;  Service:      CAROTID ENDARTERECTOMY Right 4/12/2018    Procedure: RIGHT CAROTID ENDARTERECTOMY WITH EEG;  Surgeon: Sergei Lemus MD;  Location: [facility] OR;  Service:      CYSTOSCOPY, TRANSURETHRAL RESECTION (TUR) TUMOR BLADDER, COMBINED N/A 8/3/2023    Procedure: Transurethral resection of bladder tumor 2 cm to 5 cm;  Surgeon: German Mitchell MD;  Location: [facility] OR     FRACTURE SURGERY       IR AORTIC ARCH 4 VESSEL ANGIOGRAM  11/9/2009     IR CAROTID ANGIOGRAM  11/9/2009     IR CAROTID ANGIOGRAM  11/9/2009     IR EXTREMITY ANGIOGRAM BILATERAL  4/6/2017     MANDIBLE SURGERY       ZZC NONSPECIFIC PROCEDURE  1995    MI -- angioplasty       ALLERGIES:  Allergies   Allergen Reactions     No Known Allergies        MEDICATIONS:  Current Outpatient Medications   Medication     acetaminophen (TYLENOL) 325 MG tablet     allopurinol (ZYLOPRIM) 300 MG tablet     amLODIPine (NORVASC) 5 MG tablet     aspirin (ASA) 325 MG tablet     atenolol (TENORMIN) 100 MG tablet     atorvastatin (LIPITOR) 80 MG tablet     lidocaine-prilocaine (EMLA) 2.5-2.5 % external cream     lisinopril (ZESTRIL) 20 MG tablet     LORazepam (ATIVAN) 0.5 MG tablet     ondansetron (ZOFRAN ODT) 4 MG ODT tab     [START ON 9/17/2023] prochlorperazine (COMPAZINE) 10 MG tablet     senna-docusate (SENOKOT-S/PERICOLACE) 8.6-50 MG tablet     sertraline (ZOLOFT) 100 MG tablet     tolterodine (DETROL) 2 MG tablet     traZODone (DESYREL) 50 MG tablet     No current facility-administered medications for this encounter.         LABS:  INR   Date Value Ref Range Status   08/01/2023 1.04 0.85 - 1.15 Final   08/20/2020 1.17 (H) 0.86 - 1.14 Final      Hemoglobin   Date Value Ref Range Status   08/01/2023 11.8 (L) 13.3 - 17.7 g/dL Final    08/20/2020 13.7 13.3 - 17.7 g/dL Final   ]  Platelet Count   Date Value Ref Range Status   08/01/2023 174 150 - 450 10e3/uL Final   08/20/2020 195 150 - 450 10e9/L Final     Creatinine   Date Value Ref Range Status   06/05/2023 1.38 (H) 0.67 - 1.17 mg/dL Final   08/20/2020 1.07 0.66 - 1.25 mg/dL Final     Creatinine POCT   Date Value Ref Range Status   08/18/2023 1.5 (H) 0.7 - 1.3 mg/dL Final     Potassium   Date Value Ref Range Status   06/05/2023 4.1 3.4 - 5.3 mmol/L Final   05/31/2022 4.4 3.5 - 5.0 mmol/L Final   08/21/2020 3.4 3.4 - 5.3 mmol/L Final         EXAM:  There were no vitals filed for this visit.      Pre-Sedation Assessment:  Unable to assess    Code Status: FULL CODE per chart review      ASSESSMENT:  73 year old male     - PMH per above including lung and bladder cancers, plans for chemotherapy and radiation therapy    Scheduled for port PLACEMENT    PLAN:    Port PLACEMENT, with sedation- laterality at proceduralist discretion     - PENDING physical examination, airway assessment, and consent   - Pre procedure orders entered        RAMONA MORA NP  Interventional Radiology  [phone]

## 2023-09-11 NOTE — DISCHARGE INSTRUCTIONS
Port Placement Procedure Discharge Instructions:  You had a port placed. A port is a small medical device that is placed under the skin and is connected to a vein with a catheter (thin, flexible tube). Ports can be used to administer IV medications (including chemotherapy), fluids or blood products or for blood lab draws. Please follow the below instructions after your procedure:    Care Instructions:  - If you received sedation for your procedure, do not drive or operate heavy machinery for the rest of the day.  - You may shower beginning tomorrow (post procedure day #1). Do not scrub site until well healed; pat dry gently with a towel.  - You likely have skin adhesive over your port site. Skin adhesive works like a bandage to keep the site covered and protected. Do not use antibiotic ointment or creams/lotions over adhesive as it can break it down. The skin adhesive will peel off on its own (typically in 5-14 days).  - Avoid submerging the port site under water (ex: tub baths, Jacuzzis, lakes, hot tubs and pools) for 10 days or until your site is well healed.  - You may have some discomfort, minimal swelling, redness and/or bruising at your port site/procedure site. You may take over the counter pain medication for discomfort (follow the package directions) or apply an ice pack wrapped in a towel over the site (rotating 20 minutes with ice pack on and 20 minutes with ice pack off) for comfort as needed. It can take several days for these to resolve.  - Avoid heavy lifting (greater than 10 pounds) and strenuous activities for 2 days following your procedure.   - If you experience significant bleeding at site, apply pressure with hands above the clavicle bone, sit upright and seek immediate medical assistance.  - Ports need to be flushed approximately every 4-6 weeks, if not being used more frequently. Follow up with the provider who ordered your port placement for further instructions for this.    Seek medical  evaluation or contact [facility] ([phone])*** if you experience the following:   - Uncontrolled bleeding from port site  - Fever (greater than 101 F (38.3C))  - Purulent (yellow/green/foul smelling) drainage from port insertion site  - Increasing pain at port site  - Increasing redness at port site          * Recovery After Conscious Sedation (Adult)  We gave you medicine by vein to make you sleepy or relaxed during your procedure. This may have included both a pain medicine and sleeping medicine. Most of the effects have worn off. But you may still feel sleepy for the next 6 to 8 hours.  Home care  Follow these guidelines when you get home:  You may feel sleepy and clumsy and have poor balance for the next few hours.  A responsible adult should stay with you for the next 8 hours. This person should make sure your condition doesn t get worse.  Don't drink any alcohol for the next 24 hours.  Don't drive, operate dangerous machinery, make important business or personal decisions or sign legal documents during the next 24 hours.  You may vomit (throw up) if you eat too soon after the procedure. If this happens, drink small amounts of water, juice or clear broth. Wait to try solid food until you no longer have nausea (upset stomach).  Note: Your care team may tell you not to take any medicine by mouth for pain or sleep in the next 4 hours. These medicines may react with the medicines you had in the hospital. This could cause a much stronger response than usual.  Follow-up care  Follow up with your care team if you are not alert and back to your usual level of activity within 12 hours.  When to seek medical advice  Call your care team right away if any of these occur:  You still feel sleepy or clumsy after 12 hours, or your sleepiness gets worse  Weakness or dizziness gets worse  Repeated vomiting  If you can't be woken up and someone is staying with you, they should call 911.  For informational purposes  only. Not to replace the advice of your health care provider.  Copyright   2018 [facility]. All rights reserved.

## 2023-09-11 NOTE — PROCEDURES
[facility]    Procedure: Left chest wall port placement    Date/Time: 9/11/2023 2:25 PM    Performed by: Harsha Solorzano MD  Authorized by: Harsha Solorzano MD      UNIVERSAL PROTOCOL   Site Marked: NA  Prior Images Obtained and Reviewed:  Yes  Required items: Required blood products, implants, devices and special equipment available    Patient identity confirmed:  Verbally with patient, arm band, provided demographic data and hospital-assigned identification number  Patient was reevaluated immediately before administering moderate or deep sedation or anesthesia  Confirmation Checklist:  Patient's identity using two indicators, relevant allergies, procedure was appropriate and matched the consent or emergent situation and correct equipment/implants were available  Time out: Immediately prior to the procedure a time out was called    Universal Protocol: the Joint Commission Universal Protocol was followed    Preparation: Patient was prepped and draped in usual sterile fashion       ANESTHESIA    Anesthesia:  Local infiltration  Local Anesthetic:  Lidocaine 1% without epinephrine      SEDATION  Patient Sedated: Yes    Sedation Type:  Moderate (conscious) sedation  Vital signs: Vital signs monitored during sedation    See dictated procedure note for full details.  Findings: Successful left chest wall port placement    Specimens: none    Complications: None    Condition: Stable      PROCEDURE    Patient Tolerance:  Patient tolerated the procedure well with no immediate complications  Length of time physician/provider present for 1:1 monitoring during sedation: 60    Kenroy Solorzano MD  Interventional Radiology

## 2023-09-11 NOTE — PRE-PROCEDURE
GENERAL PRE-PROCEDURE:   Procedure:  Left chest port placement  Date/Time:  9/11/2023 12:51 PM    Written consent obtained?: Yes    Risks and benefits: Risks, benefits and alternatives were discussed    Consent given by:  Patient  Patient states understanding of procedure being performed: Yes    Patient's understanding of procedure matches consent: Yes    Procedure consent matches procedure scheduled: Yes    Expected level of sedation:  Moderate  Appropriately NPO:  Yes  ASA Class:  2  Mallampati  :  Grade 2- soft palate, base of uvula, tonsillar pillars, and portion of posterior pharyngeal wall visible  Lungs:  Other (comment)  Lung exam comment:  Breathing comfortably on RA  Heart:  Other (comment)  Heart exam comment:  Regular rate  History & Physical reviewed:  History and physical reviewed and no updates needed  Statement of review:  I have reviewed the lab findings, diagnostic data, medications, and the plan for sedation    Kenroy Solorzano MD  Interventional Radiology

## 2023-09-11 NOTE — IP AVS SNAPSHOT
[facility] Interventional Radiology  [address] [address]  Phone: [phone]  Fax: [phone]                              After Visit Summary   9/11/2023    Yougn Ordonez   MRN: [id number]           After Visit Summary Signature Page    I have received my discharge instructions, and my questions have been answered. I have discussed any challenges I see with this plan with the nurse or doctor.    ..........................................................................................................................................  Patient/Patient Representative Signature      ..........................................................................................................................................  Patient Representative Print Name and Relationship to Patient    ..................................................               ................................................  Date                                   Time    ..........................................................................................................................................  Reviewed by Signature/Title    ...................................................              ..............................................  Date                                               Time    22EPIC Rev 08/18

## 2023-09-11 NOTE — PROGRESS NOTES
Patient Name: Young Ordonez  Medical Record Number: [id number]  Today's Date: 9/11/2023    Procedure: port placement with sedation  Proceduralist: Dr. Kenroy Solorzano      Sedation medications administered: 2 mg midazolam and 100 mcg fentanyl   Sedation time: 63 minutes    Report given to: pre/post RN  : none    Other Notes: Pt arrived to IR room 1 from Pre/post bay 2. Consent reviewed. Pt denies any questions or concerns regarding procedure. Pt positioned supine and monitored per protocol. Pt tolerated procedure without any noted complications. VSS on monitor. Pt transferred back to Pre/post bay 2.    Discharge instructions reviewed with patient and wife, Mitra; AVS provided.  Patient escorted to car via wheelchair and discharged without incident, to home with wife (Mitra) as .

## 2023-09-18 NOTE — PROGRESS NOTES
"Oncology Rooming Note    September 18, 2023 9:13 AM   Young Ordonez is a 73 year old male who presents for:    Chief Complaint   Patient presents with    Oncology Clinic Visit     Primary malignant neoplasm of right lower lobe of lung     Initial Vitals: /66   Pulse 62   Resp 18   Ht 1.702 m (5' 7\")   Wt 76.5 kg (168 lb 11.2 oz)   SpO2 95%   BMI 26.42 kg/m   Estimated body mass index is 26.42 kg/m  as calculated from the following:    Height as of this encounter: 1.702 m (5' 7\").    Weight as of this encounter: 76.5 kg (168 lb 11.2 oz). Body surface area is 1.9 meters squared.  No Pain (0) Comment: Data Unavailable   No LMP for male patient.  Allergies reviewed: Yes  Medications reviewed: Yes    Medications: Medication refills not needed today.  Pharmacy name entered into [employer]:    [facility] [address] [place], [place] [facility] [address] [facility] [address] [place] [facility] [address] [facility] [address] [place] [address]    Clinical concerns:  labs and chemo start      Jackie Umaña            "

## 2023-09-18 NOTE — PROGRESS NOTES
Infusion Nursing Note:  Young Ordonez presents today for D1C1 Taxol and Carboplatin.    Patient seen by provider today: Yes: Mac Barnes and Radiation   present during visit today: Not Applicable.    Note: Pt tolerated infusion well and slept a lot after the benadryl. Pt was given zofran/dex, benadryl, and pepcid as pre meds. .      Intravenous Access:  Implanted Port.    Treatment Conditions:  Lab Results   Component Value Date    HGB 11.9 (L) 09/18/2023    WBC 11.4 (H) 09/18/2023    ANEU 1.9 07/13/2007    ANEUTAUTO 6.4 09/18/2023     09/18/2023        Lab Results   Component Value Date     06/05/2023    POTASSIUM 4.1 06/05/2023    MAG 2.4 (H) 08/21/2020    CR 1.34 (H) 09/18/2023    KALIE 9.9 06/05/2023    BILITOTAL 0.4 06/05/2023    ALBUMIN 4.5 06/05/2023    ALT 23 06/05/2023    AST 28 06/05/2023       Results reviewed, labs MET treatment parameters, ok to proceed with treatment.      Post Infusion Assessment:  Patient tolerated infusion without incident.  Blood return noted pre and post infusion.  Access discontinued per protocol.   Port inserted on 9/11 and still ecchymotic    Discharge Plan:   Patient and/or family verbalized understanding of discharge instructions and all questions answered.      Maryann Murdock RN       The patient attended chemotherapy class today.  The patient was educated on:  -Chemotherapy: what it is and how it works  -Described a typical day at the treatment center and what the patient can expect  -Discussed port access and functions of the port   -Chemotherapy side effects and appropriate management of these side effects. SE discussed included and not limited to: Fatigue, nausea and vomiting, constipation, diarrhea, mucositis, alopecia, peripheral neuropathy, pain, anemia, thrombocytopenia, and neutropenia.    -Reasons to call the physician, including, fever>100.5, shaking chills, unusual bleeding or bruising, shortness of breath, vomiting>12hours, nausea  >24 hours, unable to eat/drink >24 hours, painful urination, blood in urine or stool, soreness at the IV site, and painful mouth sores.  The triage phone number was given to the patient and the patient was encouraged to call with any questions.  The patient was given a tour of the infusion center.  All questions were addressed to the best of my abilities and the patient was encouraged to call with any questions. Pt was given a new thermometer.

## 2023-09-18 NOTE — LETTER
"    9/18/2023         RE: Young Ordonez  [address] [facility] [address]        Dear Colleague,    Thank you for referring your patient, Young Ordonez, to the [facility] CANCER [facility]. Please see a copy of my visit note below.    Oncology Rooming Note    September 18, 2023 9:13 AM   Young Ordonez is a 73 year old male who presents for:    Chief Complaint   Patient presents with     Oncology Clinic Visit     Primary malignant neoplasm of right lower lobe of lung     Initial Vitals: /66   Pulse 62   Resp 18   Ht 1.702 m (5' 7\")   Wt 76.5 kg (168 lb 11.2 oz)   SpO2 95%   BMI 26.42 kg/m   Estimated body mass index is 26.42 kg/m  as calculated from the following:    Height as of this encounter: 1.702 m (5' 7\").    Weight as of this encounter: 76.5 kg (168 lb 11.2 oz). Body surface area is 1.9 meters squared.  No Pain (0) Comment: Data Unavailable   No LMP for male patient.  Allergies reviewed: Yes  Medications reviewed: Yes    Medications: Medication refills not needed today.  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 Significant vascular disease.  Young presents accompanied by his wife, Mitra, who works at [employer] part-time.  Young feels ready to begin planned concurrent chemo/radiation therapy.  No pain - no analgesics.  Appetite good - weight quite stable the past couple months.  Stable " PS.  Denies cough, fever, chills, unusual headaches, visual or mentation disturbance, bowel or bladder issues, rash.  CBC - mild leukocytosis.  HgB stable @ 11.9. Differential and Plts WNL.  Creatinine slightly elevated @ 1.34 with slightly decreased GFR @ 56.   Instructed to push oral fluids.  Acceptable hematologies and metabolic panel.  I reviewed planned chemotherapy schedule and side effects encouraging patient to be proactive in treating any nausea or bowel issues.  He has picked up his antiemetic Rx.  Proceed with week #1 carboplatin (AUC 2) + paclitaxel (45 mg/m2) as part of the definitive chemoradiation and treatment of (R) lung malignancy.  Instructed to contact us with fever > 100.4F.  Weekly f/up with labs per treatment plan.  Left upper lobe lung cancer is planned to be treated with consolidative SBRT after the treatment of right lung cancer.             2. Non-muscle invasive papillary transitional carcinoma.   08/03/2023 removed surgically followed by intraoperative instillation of intravesicular Mitomycin-C.   Follows with Dr Mitchell, Urology.    Oncologic History:    Stage IIIB (cT4(2), cN2, cM0) non-small cell carcinoma of the right upper lobe with likely lymph node metastasis, synchronous right lower lobe and left upper lobe lung neoplasm.  A 2.3 x 2.2 cm right upper lobe and additional bilateral pulmonary nodules, suspicious for metastatic disease or primary lung cancer.  The staging is difficult in that if the JAYCOB is a separate primary he would  be stage III, however a stage Surjit if including the left upper lobe lesion.  8/2023-right upper lobe of the lung, right lower lobe with thoracic lymph node metastases.  Additional mildly FDG avid spiculated nodule in the periphery of the left upper lobe.  Right upper lobe lung mass biopsy showed non-small cell carcinoma (squamous cell carcinoma).  TPS 25%, low PD-L1 expression.  Fusion negative. CDKN2A 58fs positive.  No targetable mutation.     8/3/2023-biopsy of the bladder tumor showed papillary transitional carcinoma, low-grade, no evidence of invasive malignancy.  8/16/2023-MRI brain was negative for intracranial metastatic disease.  8/18/2023-PET scan showed 3 x 2.2 x 2.2 cm anterior right upper lobe mass with additional FDG avid nodule in the right lower lobe measuring 2.4 x 1.1 x 2.1 cm.  FDG avid right 11 R, 7, right lower station 4 lymph nodes were suspicious for synchronous right upper/lower primary lung neoplasm with thoracic lymph node metastasis.  He has additional FDG avid left upper lobe lesion of 1.1 x 0.5 cm likely representing additional synchronous neoplasm.  09/18/2023 - D1 carboplatin (AUC 2) + paclitaxel (45 mg/m2) as part of the definitive chemoradiation treatment for the (R) lung tumor.     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     Mac Barnes, CNP     CC: Jigar Crawford MD   ______________________________________________________________________________    Review of Systems:    No fever or night sweats.  No loss of weight.  No lumps or bumps anywhere.  No unusual headaches or eyesight issues.  No dizziness.  No bleeding from the nose.  No sores in the mouth. No problems with swallowing.  No chest pain. No shortness of breath. No cough.  No abdominal pain. No nausea or vomiting.  No  diarrhea or constipation.  No blood in stool or black colored stools.  No problems passing urine.  No numbness or tingling in hands or feet.  No skin rashes.    A 14 point review of systems is otherwise negative.    Past History:    Past Medical History:   Diagnosis Date     Acute myocardial infarction, unspecified site, episode of care unspecified 01/01/1995     Arthritis      Cardiomyopathy (H)      Cerebral artery occlusion with cerebral infarction (H) 2018    lost vision in right eye     Claudication (H)      COPD (chronic obstructive pulmonary disease) (H)      Coronary artery disease      Depression      DEPRESSIVE DISORDER NEC 04/29/2008     Essential hypertension, benign      Gout      Gout attack 05/25/2017     Hepatitis C carrier (H)      HTN (hypertension)      Hyperlipidemia      Infectious viral hepatitis     carrier of viral hepatitis     Low back pain     chronic     MI (myocardial infarction) (H) 01/01/1995     Obesity      Other and unspecified hyperlipidemia      PAD (peripheral artery disease) (H)      Peyronie's disease        Past Surgical History:   Procedure Laterality Date     ANGIOPLASTY  1995     ANKLE FRACTURE SURGERY Left      BYPASS GRAFT AORTOFEMORAL N/A 5/17/2017    Procedure: AORTOBIFEMORAL BYPASS ;  Surgeon: Ilir FERRO MD;  Location: [facility];  Service:      CAROTID ENDARTERECTOMY Right 4/12/2018    Procedure: RIGHT CAROTID ENDARTERECTOMY WITH EEG;  Surgeon: Sergei Lemus MD;  Location: [facility];  Service:      CYSTOSCOPY, TRANSURETHRAL RESECTION (TUR) TUMOR BLADDER, COMBINED N/A 8/3/2023    Procedure: Transurethral resection of bladder tumor 2 cm to 5 cm;  Surgeon: German Mitchell MD;  Location:  OR     FRACTURE SURGERY       IR AORTIC ARCH 4 VESSEL ANGIOGRAM  11/9/2009     IR CAROTID ANGIOGRAM  11/9/2009     IR CAROTID ANGIOGRAM  11/9/2009     IR CHEST PORT PLACEMENT > 5 YRS OF AGE  9/11/2023     IR EXTREMITY ANGIOGRAM BILATERAL  4/6/2017      "MANDIBLE SURGERY       ZC NONSPECIFIC PROCEDURE  1995    MI -- angioplasty     Physical Exam:    /66   Pulse 62   Resp 18   Ht 1.702 m (5' 7\")   Wt 76.5 kg (168 lb 11.2 oz)   SpO2 95%   BMI 26.42 kg/m      GENERAL:   Alert and oriented. Seated comfortably. In no distress.    HEENT:   Atraumatic and normocephalic.  NARESH.  EOMI.  No pallor.  No icterus.  No mucosal lesions.    LYMPH NODES:  No palpable cervical, axillary or inguinal lymphadenopathy.    CHEST:   Lungs clear to auscultation bilaterally.    Port-a-cath - (L) chest, no s/s infection.    CVS:    S1 and S2 heard. Regular rate and rhythm.  No murmur or gallop or rub heard.  No peripheral edema.    ABDOMEN:   Soft. Not tender. Not distended.  No palpable hepatomegaly or splenomegaly, masses or ascites    EXTREMITIES:  Warm.    SKIN:     No rash, or bruising or purpura noted.     Lab Results:    Reviewed with patient.    Recent Results (from the past 168 hour(s))   Creatinine   Result Value Ref Range    Creatinine 1.34 (H) 0.67 - 1.17 mg/dL    GFR Estimate 56 (L) >60 mL/min/1.73m2   CBC with platelets and differential   Result Value Ref Range    WBC Count 11.4 (H) 4.0 - 11.0 10e3/uL    RBC Count 3.92 (L) 4.40 - 5.90 10e6/uL    Hemoglobin 11.9 (L) 13.3 - 17.7 g/dL    Hematocrit 35.7 (L) 40.0 - 53.0 %    MCV 91 78 - 100 fL    MCH 30.4 26.5 - 33.0 pg    MCHC 33.3 31.5 - 36.5 g/dL    RDW 14.6 10.0 - 15.0 %    Platelet Count 199 150 - 450 10e3/uL    % Neutrophils 55 %    % Lymphocytes 32 %    % Monocytes 8 %    % Eosinophils 4 %    % Basophils 1 %    % Immature Granulocytes 0 %    NRBCs per 100 WBC 0 <1 /100    Absolute Neutrophils 6.4 1.6 - 8.3 10e3/uL    Absolute Lymphocytes 3.6 0.8 - 5.3 10e3/uL    Absolute Monocytes 0.9 0.0 - 1.3 10e3/uL    Absolute Eosinophils 0.5 0.0 - 0.7 10e3/uL    Absolute Basophils 0.1 0.0 - 0.2 10e3/uL    Absolute Immature Granulocytes 0.1 <=0.4 10e3/uL    Absolute NRBCs 0.0 10e3/uL     Imaging:    Reviewed imaging " personally and with patient.    IR Chest Port Placement > 5 Yrs of Age    Result Date: 9/11/2023  [place] [facility] LOCATION: [facility] DATE: 9/11/2023 PROCEDURE: IMPLANTABLE VENOUS CHEST PORT PLACEMENT (POWER INJECTABLE) INTERVENTIONAL RADIOLOGIST: ROD Solorzano MD. INDICATION: 73 year old male PMH HTN, HLD, obesity, MI, CVA, PAD, COPD and recent dx of non-small cell carcinoma of the RIGHT upper lobe with likely lymph node mets, synchronous RLL and JAYCOB neoplasms and a RIGHT bladder mass 2/2 non-muscle invasive papillary transitional carcinoma. CONSENT: The risks, benefits and alternatives of implantable venous chest port placement were discussed with the patient in detail. All questions were answered. Informed consent was given to proceed with the procedure. MODERATE SEDATION: Versed 2 mg IV; Fentanyl 100 mcg IV.  During the timeout, immediately prior to the administration of medications, the patient was reassessed for adequacy to receive conscious sedation. Under physician supervision, Versed and fentanyl were administered for moderate sedation. Pulse oximetry, heart rate and blood pressure were continuously monitored by an independent trained observer. The physician spent 63 minutes of face-to-face sedation time with the patient. CONTRAST: None. ANTIBIOTICS: Ancef 2 g IV. ADDITIONAL MEDICATIONS: Heparin 500 U IV FLUOROSCOPIC TIME: 10.4 minutes. RADIATION DOSE: Air Kerma: 84 mGy. COMPLICATIONS: No immediate complications. STERILE BARRIER TECHNIQUE: Maximum sterile barrier technique was used. Cutaneous antisepsis was performed at the operative site with application of 2% chlorhexidine and large sterile drape. Prior to the procedure, the  and assistant performed hand hygiene and wore hat, mask, sterile gown, and sterile gloves during the entire procedure. PROCEDURE: Limited left neck ultrasound demonstrated a patent internal jugular vein; images saved of the permanent patient  medical record. The overlying skin and subcutaneous soft tissues were anesthetized using 1% lidocaine with epinephrine. Under ultrasound guidance, the left internal jugular vein was accessed using a micropuncture needle; images saved of the permanent patient medical record. Access was secured using a 4 Cymro transitional sheath. A barcoo guidewire was advanced into the distal IVC. Our attention was then turned to the chest wall site. The overlying skin and subcutaneous soft tissues were anesthetized using 1% lidocaine with epinephrine. Using combination of sharp and blunt dissection a subcutaneous pocket was created. The catheter tubing was tunneled in an antegrade fashion from the port pocket to the dermatotomy site. Under direct fluoroscopic visualization, a peel-away sheath was advanced over the wire and positioned within the superior vena cava. Through the peel-away sheath, the catheter tubing was advanced until the tip was in the right atrium. The catheter tubing was cut to length and attached firmly to the port. The port was placed within the subcutaneous pocket and tested. The port tubing was then primed with 500 units of heparin. The port pocket incision was closed with layered absorbable sutures and surgical glue. The dermatotomy site was closed with surgical glue.     IMPRESSION:  1.  Successful implantable venous chest port placement. PLAN: 1. Port is ready to be used.     XR Abdomen 2 Views    Result Date: 9/7/2023  EXAM: XR ABDOMEN 2 VIEWS LOCATION: [facility] DATE: 9/6/2023 INDICATION:  Abdominal pain, right lower quadrant COMPARISON: None.     IMPRESSION: No bowel obstruction. There is a moderate retained colonic stool burden. No free air. Several calcifications overlap the left abdomen with no definitive renal stones evident. Vascular calcifications are present both in the upper abdomen and in the aortobiiliac and common femoral arteries. Degenerative changes of the spine.          Again, thank you for allowing me to participate in the care of your patient.        Sincerely,        Mac Barnes CNP

## 2023-09-18 NOTE — PROGRESS NOTES
RADIATION ONCOLOGY WEEKLY TREATMENT VISIT NOTE      Assessment / Impression     Primary malignant neoplasm of right lower lobe of lung (H) [C34.31]     Cancer Staging   Primary malignant neoplasm of right lower lobe of lung (H)  Staging form: Lung, AJCC 8th Edition  - Clinical stage from 2023: Stage IIIB (cT4(2), cN2, cM0) - Signed by Glenn Crowe MD on 9/3/2023       Tolerating radiation therapy well.  All questions and concerns addressed.    Plan:     Continue radiation treatment as prescribed.  Radiation:   Site: R lung  Concurrent Therapy: Yes  Protocol: Taxol. carboplatin  Today's Dose: 200  Total Dose for Thoracic: 6600  Today's Fraction/Total Fraction Thoracic: 33      Subjective:      HPI: Young Ordonez is a 73 year old male with  Primary malignant neoplasm of right lower lobe of lung (H) [C34.31]    The following portions of the patient's history were reviewed and updated as appropriate: allergies, current medications, past family history, past medical history, past social history, past surgical history and problem list.    Assessment                  Body Site:  Thoracic Site: R lung  Concurrent Therapy: Yes  Protocol: Taxol. carboplatin  Today's Dose: 200  Total Dose for Thoracic: 6600  Today's Fraction/Total Fraction Thoracic: 33  Constipation: 0: None  Diarrhea W/O Colostomy: 0: None  Dyspnea: 0: Normal                                   Sexuality Alteration                    Emotional Alteration       Comfort Alteration   KPS: 100 % Normal, no complaints  Fatigue (ONS scale): 0: No Fatigue  Pain Location: denies   Nutrition Alteration   Anorexia: 0: None  Nausea: 0: None  Vomitin: None  Skin Alteration   Skin Sensation: 0: No problem  Skin Reaction: 0: None  AUA Assessment                                           Accompanied by       Objective:     Exam:     There were no vitals filed for this visit.    Wt Readings from Last 8 Encounters:   23 76.5 kg (168 lb 11.2 oz)    09/06/23 75.3 kg (166 lb)   08/31/23 76.1 kg (167 lb 12.8 oz)   08/23/23 76.7 kg (169 lb)   08/15/23 77.1 kg (170 lb)   08/15/23 77.1 kg (170 lb)   08/03/23 75.8 kg (167 lb 1.6 oz)   07/25/23 76.5 kg (168 lb 11.2 oz)       General: Alert and oriented, in no acute distress  Young has no Erythema.    Treatment Summary to Date    Aria chart and setup information reviewed    Ifemoa Angel MD

## 2023-09-18 NOTE — LETTER
2023         RE: Young Ordonez  [address] [facility] [address]        Dear Colleague,    Thank you for referring your patient, Young Ordonez, to the [facility] RADIATION ONCOLOGY [place]. Please see a copy of my visit note below.    RADIATION ONCOLOGY WEEKLY TREATMENT VISIT NOTE      Assessment / Impression     Primary malignant neoplasm of right lower lobe of lung (H) [C34.31]     Cancer Staging   Primary malignant neoplasm of right lower lobe of lung (H)  Staging form: Lung, AJCC 8th Edition  - Clinical stage from 2023: Stage IIIB (cT4(2), cN2, cM0) - Signed by Glenn Crowe MD on 9/3/2023       Tolerating radiation therapy well.  All questions and concerns addressed.    Plan:     Continue radiation treatment as prescribed.  Radiation:   Site: R lung  Concurrent Therapy: Yes  Protocol: Taxol. carboplatin  Today's Dose: 200  Total Dose for Thoracic: 6600  Today's Fraction/Total Fraction Thoracic: 33      Subjective:      HPI: Young Ordonez is a 73 year old male with  Primary malignant neoplasm of right lower lobe of lung (H) [C34.31]    The following portions of the patient's history were reviewed and updated as appropriate: allergies, current medications, past family history, past medical history, past social history, past surgical history and problem list.    Assessment                  Body Site:  Thoracic Site: R lung  Concurrent Therapy: Yes  Protocol: Taxol. carboplatin  Today's Dose: 200  Total Dose for Thoracic: 6600  Today's Fraction/Total Fraction Thoracic: 33  Constipation: 0: None  Diarrhea W/O Colostomy: 0: None  Dyspnea: 0: Normal                                   Sexuality Alteration                    Emotional Alteration       Comfort Alteration   KPS: 100 % Normal, no complaints  Fatigue (ONS scale): 0: No Fatigue  Pain Location: denies   Nutrition Alteration   Anorexia: 0: None  Nausea: 0: None  Vomitin: None  Skin Alteration   Skin Sensation: 0:  No problem  Skin Reaction: 0: None  AUA Assessment                                           Accompanied by       Objective:     Exam:     There were no vitals filed for this visit.    Wt Readings from Last 8 Encounters:   09/18/23 76.5 kg (168 lb 11.2 oz)   09/06/23 75.3 kg (166 lb)   08/31/23 76.1 kg (167 lb 12.8 oz)   08/23/23 76.7 kg (169 lb)   08/15/23 77.1 kg (170 lb)   08/15/23 77.1 kg (170 lb)   08/03/23 75.8 kg (167 lb 1.6 oz)   07/25/23 76.5 kg (168 lb 11.2 oz)       General: Alert and oriented, in no acute distress  Young has no Erythema.    Treatment Summary to Date    Aria chart and setup information reviewed    Ifeoma Angel MD      Again, thank you for allowing me to participate in the care of your patient.        Sincerely,        Ifeoma Angel MD

## 2023-09-18 NOTE — PROGRESS NOTES
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 Significant vascular disease.  Young presents accompanied by his wife, Mitra, who works at [employer] part-time.  Young feels ready to begin planned concurrent chemo/radiation therapy.  No pain - no analgesics.  Appetite good - weight quite stable the past couple months.  Stable PS.  Denies cough, fever, chills, unusual headaches, visual or mentation disturbance, bowel or bladder issues, rash.  CBC - mild leukocytosis.  HgB stable @ 11.9. Differential and Plts WNL.  Creatinine slightly elevated @ 1.34 with slightly decreased GFR @ 56.   Instructed to push oral fluids.  Acceptable hematologies and metabolic panel.  I reviewed planned chemotherapy schedule and side effects encouraging patient to be proactive in treating any nausea or bowel issues.  He has picked up his antiemetic Rx.  Proceed with week #1 carboplatin (AUC 2) + paclitaxel (45 mg/m2) as part of the definitive chemoradiation and treatment of (R) lung malignancy.  Instructed to contact us with fever > 100.4F.  Weekly f/up with labs per treatment plan.  Left upper lobe lung cancer is planned to be treated with consolidative SBRT after the treatment of right lung cancer.             2. Non-muscle invasive papillary transitional carcinoma.   08/03/2023 removed surgically followed by intraoperative instillation of intravesicular Mitomycin-C.   Follows with Dr Mitchell, Urology.    Oncologic History:    Stage IIIB (cT4(2), cN2, cM0) non-small cell carcinoma of the right upper lobe with likely lymph node metastasis, synchronous right lower lobe and left upper lobe lung neoplasm.  A 2.3 x 2.2 cm right upper lobe and additional  bilateral pulmonary nodules, suspicious for metastatic disease or primary lung cancer.  The staging is difficult in that if the JAYCOB is a separate primary he would  be stage III, however a stage Surjit if including the left upper lobe lesion.  8/2023-right upper lobe of the lung, right lower lobe with thoracic lymph node metastases.  Additional mildly FDG avid spiculated nodule in the periphery of the left upper lobe.  Right upper lobe lung mass biopsy showed non-small cell carcinoma (squamous cell carcinoma).  TPS 25%, low PD-L1 expression.  Fusion negative. CDKN2A 58fs positive.  No targetable mutation.    8/3/2023-biopsy of the bladder tumor showed papillary transitional carcinoma, low-grade, no evidence of invasive malignancy.  8/16/2023-MRI brain was negative for intracranial metastatic disease.  8/18/2023-PET scan showed 3 x 2.2 x 2.2 cm anterior right upper lobe mass with additional FDG avid nodule in the right lower lobe measuring 2.4 x 1.1 x 2.1 cm.  FDG avid right 11 R, 7, right lower station 4 lymph nodes were suspicious for synchronous right upper/lower primary lung neoplasm with thoracic lymph node metastasis.  He has additional FDG avid left upper lobe lesion of 1.1 x 0.5 cm likely representing additional synchronous neoplasm.  09/18/2023 - D1 carboplatin (AUC 2) + paclitaxel (45 mg/m2) as part of the definitive chemoradiation treatment for the (R) lung tumor.     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     Mac Barnes, CNP     CC: Jigar Crawford MD   ______________________________________________________________________________    Review of Systems:    No fever or night sweats.  No loss of weight.  No lumps or bumps anywhere.  No unusual headaches or eyesight issues.  No dizziness.  No bleeding from the nose.  No sores in the mouth. No problems with swallowing.  No chest pain. No shortness of breath. No cough.  No abdominal pain. No nausea or vomiting.  No diarrhea or constipation.  No blood in stool or black colored stools.  No problems passing urine.  No numbness or tingling in hands or feet.  No skin rashes.    A 14 point review of systems is otherwise negative.    Past History:    Past Medical History:   Diagnosis Date    Acute myocardial infarction, unspecified site, episode of care unspecified 01/01/1995    Arthritis     Cardiomyopathy (H)     Cerebral artery occlusion with cerebral infarction (H) 2018    lost vision in right eye    Claudication (H)     COPD (chronic obstructive pulmonary disease) (H)     Coronary artery disease     Depression     DEPRESSIVE DISORDER NEC 04/29/2008    Essential hypertension, benign     Gout     Gout attack 05/25/2017    Hepatitis C carrier (H)     HTN (hypertension)     Hyperlipidemia     Infectious viral hepatitis     carrier of viral hepatitis    Low back pain     chronic    MI (myocardial infarction) (H) 01/01/1995    Obesity     Other and unspecified hyperlipidemia     PAD (peripheral artery disease) (H)     Peyronie's disease        Past Surgical History:   Procedure Laterality Date    ANGIOPLASTY  1995    ANKLE FRACTURE SURGERY Left     BYPASS GRAFT AORTOFEMORAL N/A 5/17/2017    Procedure: AORTOBIFEMORAL BYPASS ;  Surgeon: Ilir FERRO MD;  Location: [facility];  Service:     CAROTID ENDARTERECTOMY Right  "4/12/2018    Procedure: RIGHT CAROTID ENDARTERECTOMY WITH EEG;  Surgeon: Sergei Lemus MD;  Location: [facility] OR;  Service:     CYSTOSCOPY, TRANSURETHRAL RESECTION (TUR) TUMOR BLADDER, COMBINED N/A 8/3/2023    Procedure: Transurethral resection of bladder tumor 2 cm to 5 cm;  Surgeon: German Mitchell MD;  Location: [facility] OR    FRACTURE SURGERY      IR AORTIC ARCH 4 VESSEL ANGIOGRAM  11/9/2009    IR CAROTID ANGIOGRAM  11/9/2009    IR CAROTID ANGIOGRAM  11/9/2009    IR CHEST PORT PLACEMENT > 5 YRS OF AGE  9/11/2023    IR EXTREMITY ANGIOGRAM BILATERAL  4/6/2017    MANDIBLE SURGERY      ZZC NONSPECIFIC PROCEDURE  1995    MI -- angioplasty     Physical Exam:    /66   Pulse 62   Resp 18   Ht 1.702 m (5' 7\")   Wt 76.5 kg (168 lb 11.2 oz)   SpO2 95%   BMI 26.42 kg/m      GENERAL:   Alert and oriented. Seated comfortably. In no distress.    HEENT:   Atraumatic and normocephalic.  NARESH.  EOMI.  No pallor.  No icterus.  No mucosal lesions.    LYMPH NODES:  No palpable cervical, axillary or inguinal lymphadenopathy.    CHEST:   Lungs clear to auscultation bilaterally.    Port-a-cath - (L) chest, no s/s infection.    CVS:    S1 and S2 heard. Regular rate and rhythm.  No murmur or gallop or rub heard.  No peripheral edema.    ABDOMEN:   Soft. Not tender. Not distended.  No palpable hepatomegaly or splenomegaly, masses or ascites    EXTREMITIES:  Warm.    SKIN:     No rash, or bruising or purpura noted.     Lab Results:    Reviewed with patient.    Recent Results (from the past 168 hour(s))   Creatinine   Result Value Ref Range    Creatinine 1.34 (H) 0.67 - 1.17 mg/dL    GFR Estimate 56 (L) >60 mL/min/1.73m2   CBC with platelets and differential   Result Value Ref Range    WBC Count 11.4 (H) 4.0 - 11.0 10e3/uL    RBC Count 3.92 (L) 4.40 - 5.90 10e6/uL    Hemoglobin 11.9 (L) 13.3 - 17.7 g/dL    Hematocrit 35.7 (L) 40.0 - 53.0 %    MCV 91 78 - 100 fL    MCH 30.4 26.5 - 33.0 pg    MCHC 33.3 31.5 - 36.5 g/dL    " RDW 14.6 10.0 - 15.0 %    Platelet Count 199 150 - 450 10e3/uL    % Neutrophils 55 %    % Lymphocytes 32 %    % Monocytes 8 %    % Eosinophils 4 %    % Basophils 1 %    % Immature Granulocytes 0 %    NRBCs per 100 WBC 0 <1 /100    Absolute Neutrophils 6.4 1.6 - 8.3 10e3/uL    Absolute Lymphocytes 3.6 0.8 - 5.3 10e3/uL    Absolute Monocytes 0.9 0.0 - 1.3 10e3/uL    Absolute Eosinophils 0.5 0.0 - 0.7 10e3/uL    Absolute Basophils 0.1 0.0 - 0.2 10e3/uL    Absolute Immature Granulocytes 0.1 <=0.4 10e3/uL    Absolute NRBCs 0.0 10e3/uL     Imaging:    Reviewed imaging personally and with patient.    IR Chest Port Placement > 5 Yrs of Age    Result Date: 9/11/2023  [place] [facility] LOCATION: [facility] DATE: 9/11/2023 PROCEDURE: IMPLANTABLE VENOUS CHEST PORT PLACEMENT (POWER INJECTABLE) INTERVENTIONAL RADIOLOGIST: ROD Solorzano MD. INDICATION: 73 year old male PMH HTN, HLD, obesity, MI, CVA, PAD, COPD and recent dx of non-small cell carcinoma of the RIGHT upper lobe with likely lymph node mets, synchronous RLL and JAYCOB neoplasms and a RIGHT bladder mass 2/2 non-muscle invasive papillary transitional carcinoma. CONSENT: The risks, benefits and alternatives of implantable venous chest port placement were discussed with the patient in detail. All questions were answered. Informed consent was given to proceed with the procedure. MODERATE SEDATION: Versed 2 mg IV; Fentanyl 100 mcg IV.  During the timeout, immediately prior to the administration of medications, the patient was reassessed for adequacy to receive conscious sedation. Under physician supervision, Versed and fentanyl were administered for moderate sedation. Pulse oximetry, heart rate and blood pressure were continuously monitored by an independent trained observer. The physician spent 63 minutes of face-to-face sedation time with the patient. CONTRAST: None. ANTIBIOTICS: Ancef 2 g IV. ADDITIONAL MEDICATIONS: Heparin 500 U IV  FLUOROSCOPIC TIME: 10.4 minutes. RADIATION DOSE: Air Kerma: 84 mGy. COMPLICATIONS: No immediate complications. STERILE BARRIER TECHNIQUE: Maximum sterile barrier technique was used. Cutaneous antisepsis was performed at the operative site with application of 2% chlorhexidine and large sterile drape. Prior to the procedure, the  and assistant performed hand hygiene and wore hat, mask, sterile gown, and sterile gloves during the entire procedure. PROCEDURE: Limited left neck ultrasound demonstrated a patent internal jugular vein; images saved of the permanent patient medical record. The overlying skin and subcutaneous soft tissues were anesthetized using 1% lidocaine with epinephrine. Under ultrasound guidance, the left internal jugular vein was accessed using a micropuncture needle; images saved of the permanent patient medical record. Access was secured using a 4 Albanian transitional sheath. A Adform guidewire was advanced into the distal IVC. Our attention was then turned to the chest wall site. The overlying skin and subcutaneous soft tissues were anesthetized using 1% lidocaine with epinephrine. Using combination of sharp and blunt dissection a subcutaneous pocket was created. The catheter tubing was tunneled in an antegrade fashion from the port pocket to the dermatotomy site. Under direct fluoroscopic visualization, a peel-away sheath was advanced over the wire and positioned within the superior vena cava. Through the peel-away sheath, the catheter tubing was advanced until the tip was in the right atrium. The catheter tubing was cut to length and attached firmly to the port. The port was placed within the subcutaneous pocket and tested. The port tubing was then primed with 500 units of heparin. The port pocket incision was closed with layered absorbable sutures and surgical glue. The dermatotomy site was closed with surgical glue.     IMPRESSION:  1.  Successful implantable venous chest port placement.  PLAN: 1. Port is ready to be used.     XR Abdomen 2 Views    Result Date: 9/7/2023  EXAM: XR ABDOMEN 2 VIEWS LOCATION: [facility] DATE: 9/6/2023 INDICATION:  Abdominal pain, right lower quadrant COMPARISON: None.     IMPRESSION: No bowel obstruction. There is a moderate retained colonic stool burden. No free air. Several calcifications overlap the left abdomen with no definitive renal stones evident. Vascular calcifications are present both in the upper abdomen and in the aortobiiliac and common femoral arteries. Degenerative changes of the spine.

## 2023-09-18 NOTE — PATIENT INSTRUCTIONS
Recent Results (from the past 24 hour(s))   Creatinine    Collection Time: 09/18/23  8:34 AM   Result Value Ref Range    Creatinine 1.34 (H) 0.67 - 1.17 mg/dL    GFR Estimate 56 (L) >60 mL/min/1.73m2   CBC with platelets and differential    Collection Time: 09/18/23  8:34 AM   Result Value Ref Range    WBC Count 11.4 (H) 4.0 - 11.0 10e3/uL    RBC Count 3.92 (L) 4.40 - 5.90 10e6/uL    Hemoglobin 11.9 (L) 13.3 - 17.7 g/dL    Hematocrit 35.7 (L) 40.0 - 53.0 %    MCV 91 78 - 100 fL    MCH 30.4 26.5 - 33.0 pg    MCHC 33.3 31.5 - 36.5 g/dL    RDW 14.6 10.0 - 15.0 %    Platelet Count 199 150 - 450 10e3/uL    % Neutrophils 55 %    % Lymphocytes 32 %    % Monocytes 8 %    % Eosinophils 4 %    % Basophils 1 %    % Immature Granulocytes 0 %    NRBCs per 100 WBC 0 <1 /100    Absolute Neutrophils 6.4 1.6 - 8.3 10e3/uL    Absolute Lymphocytes 3.6 0.8 - 5.3 10e3/uL    Absolute Monocytes 0.9 0.0 - 1.3 10e3/uL    Absolute Eosinophils 0.5 0.0 - 0.7 10e3/uL    Absolute Basophils 0.1 0.0 - 0.2 10e3/uL    Absolute Immature Granulocytes 0.1 <=0.4 10e3/uL    Absolute NRBCs 0.0 10e3/uL

## 2023-09-21 NOTE — PROGRESS NOTES
[facility]: Cancer Care Short Note                                    Discussion with Patient:                                                      Called patient to see how doing after new treatment start on 9/18/23. Left message to return call.    9/28/23:  Called patient. See assessment below.    Assessment:                                                      OP ONC Non-Small Cell Lung Cancer - CARBOplatin / PACLitaxel + Radiation on 9/21/23.    Patient reports he is feeling good. Eating and drinking well. Urinating and having Bms without difficulty.  Patient reports Dr. Angel said she was happy with how things were looking with Radiation.     Intervention/Education provided during outreach:                                                       Patient denies needs from RNCC today.  Instructed to call if has further questions, symptoms, concerns.  Patient verbalized understanding.    Patient to follow up as scheduled at next appt    Signature:  Rosalva Chavez RN

## 2023-09-25 NOTE — LETTER
9/25/2023         RE: Young Ordonez  [address] [facility] [address]        Dear Colleague,    Thank you for referring your patient, Young Ordonez, to the [facility] RADIATION ONCOLOGY [place]. Please see a copy of my visit note below.    RADIATION ONCOLOGY WEEKLY TREATMENT VISIT NOTE      Assessment / Impression     Primary malignant neoplasm of right lower lobe of lung (H) [C34.31]     Cancer Staging   Primary malignant neoplasm of right lower lobe of lung (H)  Staging form: Lung, AJCC 8th Edition  - Clinical stage from 8/30/2023: Stage IIIB (cT4(2), cN2, cM0) - Signed by Glenn Crowe MD on 9/3/2023       Tolerating radiation therapy well.  All questions and concerns addressed.      Plan:     Continue radiation treatment as prescribed.  Radiation:   Site: Rt. Lung  Stereotactic Radiosurgery: No  Concurrent Therapy: Yes (taxol/carbo)  Protocol: Taxol. carboplatin  Today's Dose: 1200  Total Dose for Thoracic: 6600  Today's Fraction/Total Fraction Thoracic: 6/33      Subjective:      HPI: Young Ordonez is a 73 year old male with  Primary malignant neoplasm of right lower lobe of lung (H) [C34.31]    The following portions of the patient's history were reviewed and updated as appropriate: allergies, current medications, past family history, past medical history, past social history, past surgical history and problem list.    Assessment                  Body Site:  Thoracic Site: Rt. Lung  Stereotactic Radiosurgery: No  Concurrent Therapy: Yes (taxol/carbo)  Today's Dose: 1200  Total Dose for Thoracic: 6600  Today's Fraction/Total Fraction Thoracic: 6/33  Constipation: 0: None  Diarrhea W/O Colostomy: 0: None  Dyspnea: 0: Normal                                   Sexuality Alteration                    Emotional Alteration       Comfort Alteration   KPS: 100 % Normal, no complaints  Fatigue (ONS scale): 0: No Fatigue  Pain Location: denies   Nutrition Alteration   Anorexia: 1: Loss  of appetite  Nausea: 1: Able to eat  Vomitin: None  Weight: 75.8 kg (167 lb 1.6 oz)  Skin Alteration   Skin Sensation: 0: No problem  Skin Reaction: 0: None  AUA Assessment                                           Accompanied by       Objective:     Exam:     Vitals:    23 1048   BP: 111/58   Pulse: 65   Resp: 16   Temp: 98.2  F (36.8  C)   TempSrc: Oral   SpO2: 97%   Weight: 75.8 kg (167 lb 1.6 oz)   PainSc: No Pain (0)       Wt Readings from Last 8 Encounters:   23 75.8 kg (167 lb 1.6 oz)   23 76.5 kg (168 lb 11.2 oz)   23 75.3 kg (166 lb)   23 76.1 kg (167 lb 12.8 oz)   23 76.7 kg (169 lb)   08/15/23 77.1 kg (170 lb)   08/15/23 77.1 kg (170 lb)   23 75.8 kg (167 lb 1.6 oz)       General: Alert and oriented, in no acute distress  Young has no Erythema.    Treatment Summary to Date    Aria chart and setup information reviewed    Ifeoma Angel MD      Again, thank you for allowing me to participate in the care of your patient.        Sincerely,        Ifeoma Angel MD

## 2023-09-25 NOTE — PROGRESS NOTES
RADIATION ONCOLOGY WEEKLY TREATMENT VISIT NOTE      Assessment / Impression     Primary malignant neoplasm of right lower lobe of lung (H) [C34.31]     Cancer Staging   Primary malignant neoplasm of right lower lobe of lung (H)  Staging form: Lung, AJCC 8th Edition  - Clinical stage from 2023: Stage IIIB (cT4(2), cN2, cM0) - Signed by Glenn Crowe MD on 9/3/2023       Tolerating radiation therapy well.  All questions and concerns addressed.      Plan:     Continue radiation treatment as prescribed.  Radiation:   Site: Rt. Lung  Stereotactic Radiosurgery: No  Concurrent Therapy: Yes (taxol/carbo)  Protocol: Taxol. carboplatin  Today's Dose: 1200  Total Dose for Thoracic: 6600  Today's Fraction/Total Fraction Thoracic:       Subjective:      HPI: Young Ordonez is a 73 year old male with  Primary malignant neoplasm of right lower lobe of lung (H) [C34.31]    The following portions of the patient's history were reviewed and updated as appropriate: allergies, current medications, past family history, past medical history, past social history, past surgical history and problem list.    Assessment                  Body Site:  Thoracic Site: Rt. Lung  Stereotactic Radiosurgery: No  Concurrent Therapy: Yes (taxol/carbo)  Today's Dose: 1200  Total Dose for Thoracic: 6600  Today's Fraction/Total Fraction Thoracic:   Constipation: 0: None  Diarrhea W/O Colostomy: 0: None  Dyspnea: 0: Normal                                   Sexuality Alteration                    Emotional Alteration       Comfort Alteration   KPS: 100 % Normal, no complaints  Fatigue (ONS scale): 0: No Fatigue  Pain Location: denies   Nutrition Alteration   Anorexia: 1: Loss of appetite  Nausea: 1: Able to eat  Vomitin: None  Weight: 75.8 kg (167 lb 1.6 oz)  Skin Alteration   Skin Sensation: 0: No problem  Skin Reaction: 0: None  AUA Assessment                                           Accompanied by       Objective:     Exam:      Vitals:    09/25/23 1048   BP: 111/58   Pulse: 65   Resp: 16   Temp: 98.2  F (36.8  C)   TempSrc: Oral   SpO2: 97%   Weight: 75.8 kg (167 lb 1.6 oz)   PainSc: No Pain (0)       Wt Readings from Last 8 Encounters:   09/25/23 75.8 kg (167 lb 1.6 oz)   09/18/23 76.5 kg (168 lb 11.2 oz)   09/06/23 75.3 kg (166 lb)   08/31/23 76.1 kg (167 lb 12.8 oz)   08/23/23 76.7 kg (169 lb)   08/15/23 77.1 kg (170 lb)   08/15/23 77.1 kg (170 lb)   08/03/23 75.8 kg (167 lb 1.6 oz)       General: Alert and oriented, in no acute distress  Young has no Erythema.    Treatment Summary to Date    Aria chart and setup information reviewed    Ifeoma Angel MD

## 2023-09-26 NOTE — LETTER
9/26/2023         RE: Young Ordonez  [address] [facility] [address]        Dear Colleague,    Thank you for referring your patient, Young Ordonez, to the [facility]. Please see a copy of my visit note below.    [facility] H[facility]ology and Oncology Progress Note    Patient: Young Ordonez  MRN: [id number]  Date of Service: Sep 26, 2023         Reason for Visit    Chief Complaint   Patient presents with     Oncology Clinic Visit     Primary malignant neoplasm of right lobe of lung       Assessment and Plan     Cancer Staging   Primary malignant neoplasm of right lower lobe of lung (H)  Staging form: Lung, AJCC 8th Edition  - Clinical stage from 8/30/2023: Stage IIIB (cT4(2), cN2, cM0) - Signed by Glenn Crowe MD on 9/3/2023      ECOG Performance    0 - Independent     Pain  Pain Score: No Pain (0)    #.  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  A 2.3 x 2.2 cm right upper lobe and additional bilateral pulmonary nodules, suspicious for metastatic disease or primary lung cancer  #.  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  #.  Past smoker  #.  Significant vascular disease     He is here for cycle #2 weekly carboplatin/paclitaxel with radiation.  He has expected side effects of fatigue.  No intolerable side effects from chemotherapy at this point.  I reviewed his labs.  CBC and CMP are fairly unremarkable.  Recommended to continue to monitor side effects of chemotherapy including signs and symptoms of infection and neuropathy.   He will proceed with cycle #2 weekly carboplatin/paclitaxel.   Return to clinic next week with labs and infusion appointment.  He will have provider visit every 2 weeks.  He is advised to call us sooner with any concerns.    Encounter Diagnoses:    Problem List Items Addressed This  Visit          Oncology Diagnoses    Primary malignant neoplasm of right lower lobe of lung (H)    Relevant Orders    Infusion Appointment Request    Infusion Appointment Request       Other    Encounter for antineoplastic chemotherapy - Primary    Relevant Orders    Infusion Appointment Request    Infusion Appointment Request            CC: Jigar Crawford MD   ______________________________________________________________________________  Diagnosis  8/2023-right upper lobe of the lung, right lower lobe with thoracic lymph node metastases.  Additional mildly FDG avid spiculated nodule in the periphery of the left upper lobe.   -Right upper lobe lung mass biopsy showed non-small cell carcinoma (squamous cell carcinoma).  TPS 25%, low PD-L1 expression.  Fusion negative. CDKN2A 58fs positive.  No targetable mutation.     8/3/2023-biopsy of the bladder tumor showed papillary transitional carcinoma, low-grade, no evidence of invasive malignancy.    8/18/2023-PET scan showed 3 x 2.2 x 2.2 cm anterior right upper lobe mass with additional FDG avid nodule in the right lower lobe measuring 2.4 x 1.1 x 2.1 cm.  FDG avid right 11 R, 7, right lower station 4 lymph nodes were suspicious for synchronous right upper/lower primary lung neoplasm with thoracic lymph node metastasis.  He has additional FDG avid left upper lobe lesion of 1.1 x 0.5 cm likely representing additional synchronous neoplasm.    8/16/2023-MRI brain was negative for intracranial metastatic disease.    Treatment to date  9/18/2023- definitive chemoradiation (weekly carboplatin/paclitaxel) for right upper lobe lung cancer.   -Left upper lobe lung cancer is plan to be treated with consolidative SBRT after the treatment of right lung cancer.    History of Present Illness    . Young Ordonez presented today accompanied by his wife, Mitra.      He started chemoradiation a week ago.  He told me that his tumor has already shrunk by radiation imaging.  He  reported fatigue from chemotherapy.  No nausea, vomiting, diarrhea.  No neuropathy.      Review of systems  Apart from describing in HPI, the remainder of comprehensive ROS was negative.    Past History    Past Medical History:   Diagnosis Date     Acute myocardial infarction, unspecified site, episode of care unspecified 01/01/1995     Arthritis      Cardiomyopathy (H)      Cerebral artery occlusion with cerebral infarction (H) 2018    lost vision in right eye     Claudication (H)      COPD (chronic obstructive pulmonary disease) (H)      Coronary artery disease      Depression      DEPRESSIVE DISORDER NEC 04/29/2008     Essential hypertension, benign      Gout      Gout attack 05/25/2017     Hepatitis C carrier (H)      HTN (hypertension)      Hyperlipidemia      Infectious viral hepatitis     carrier of viral hepatitis     Low back pain     chronic     MI (myocardial infarction) (H) 01/01/1995     Obesity      Other and unspecified hyperlipidemia      PAD (peripheral artery disease) (H)      Peyronie's disease        Past Surgical History:   Procedure Laterality Date     ANGIOPLASTY  1995     ANKLE FRACTURE SURGERY Left      BYPASS GRAFT AORTOFEMORAL N/A 5/17/2017    Procedure: AORTOBIFEMORAL BYPASS ;  Surgeon: Ilir FERRO MD;  Location: [facility];  Service:      CAROTID ENDARTERECTOMY Right 4/12/2018    Procedure: RIGHT CAROTID ENDARTERECTOMY WITH EEG;  Surgeon: Sergei Lemus MD;  Location: [facility] OR;  Service:      CYSTOSCOPY, TRANSURETHRAL RESECTION (TUR) TUMOR BLADDER, COMBINED N/A 8/3/2023    Procedure: Transurethral resection of bladder tumor 2 cm to 5 cm;  Surgeon: German Mitchell MD;  Location:  OR     FRACTURE SURGERY       IR AORTIC ARCH 4 VESSEL ANGIOGRAM  11/9/2009     IR CAROTID ANGIOGRAM  11/9/2009     IR CAROTID ANGIOGRAM  11/9/2009     IR CHEST PORT PLACEMENT > 5 YRS OF AGE  9/11/2023     IR EXTREMITY ANGIOGRAM BILATERAL  4/6/2017     MANDIBLE SURGERY       [facility]  "NONSPECIFIC PROCEDURE  1995    MI -- angioplasty       Physical Exam    /64   Pulse 64   Temp 97.9  F (36.6  C)   Resp 16   Ht 1.702 m (5' 7\")   Wt 76.2 kg (168 lb)   SpO2 95%   BMI 26.31 kg/m      General: alert, awake, not in acute distress  HEENT: Head: Normal, normocephalic, atraumatic.  Eye: Normal external eye, conjunctiva, lids cornea, NARESH.  Pharynx: Normal buccal mucosa. Normal pharynx.  Neck / Thyroid: Supple, no masses, nodes, nodules or enlargement.  Lymphatics: No abnormally enlarged lymph nodes.  Lungs are clear to auscultation bilaterally.    Abdomen: abdomen is soft without significant tenderness, masses, organomegaly or guarding  Extremities: normal strength, tone, and muscle mass  Skin: normal. no rash or abnormalities  CNS: non focal.  Normal gait.    Lab Results    Recent Results (from the past 168 hour(s))   Comprehensive metabolic panel (BMP + Alb, Alk Phos, ALT, AST, Total. Bili, TP)   Result Value Ref Range    Sodium 141 136 - 145 mmol/L    Potassium 3.7 3.4 - 5.3 mmol/L    Carbon Dioxide (CO2) 28 22 - 29 mmol/L    Anion Gap 8 7 - 15 mmol/L    Urea Nitrogen 18.6 8.0 - 23.0 mg/dL    Creatinine 1.17 0.67 - 1.17 mg/dL    GFR Estimate 66 >60 mL/min/1.73m2    Calcium 8.9 8.8 - 10.2 mg/dL    Chloride 105 98 - 107 mmol/L    Glucose 92 70 - 99 mg/dL    Alkaline Phosphatase 69 40 - 129 U/L    AST 26 0 - 45 U/L    ALT 12 0 - 70 U/L    Protein Total 6.3 (L) 6.4 - 8.3 g/dL    Albumin 3.8 3.5 - 5.2 g/dL    Bilirubin Total 0.3 <=1.2 mg/dL   CBC with platelets and differential   Result Value Ref Range    WBC Count 7.5 4.0 - 11.0 10e3/uL    RBC Count 3.51 (L) 4.40 - 5.90 10e6/uL    Hemoglobin 10.7 (L) 13.3 - 17.7 g/dL    Hematocrit 32.3 (L) 40.0 - 53.0 %    MCV 92 78 - 100 fL    MCH 30.5 26.5 - 33.0 pg    MCHC 33.1 31.5 - 36.5 g/dL    RDW 14.6 10.0 - 15.0 %    Platelet Count 166 150 - 450 10e3/uL    % Neutrophils 65 %    % Lymphocytes 22 %    % Monocytes 7 %    % Eosinophils 5 %    % Basophils 0 " %    % Immature Granulocytes 1 %    NRBCs per 100 WBC 0 <1 /100    Absolute Neutrophils 4.9 1.6 - 8.3 10e3/uL    Absolute Lymphocytes 1.6 0.8 - 5.3 10e3/uL    Absolute Monocytes 0.5 0.0 - 1.3 10e3/uL    Absolute Eosinophils 0.4 0.0 - 0.7 10e3/uL    Absolute Basophils 0.0 0.0 - 0.2 10e3/uL    Absolute Immature Granulocytes 0.1 <=0.4 10e3/uL    Absolute NRBCs 0.0 10e3/uL         Imaging    IR Chest Port Placement > 5 Yrs of Age    Result Date: 9/11/2023  [place] LOCATION: [facility] DATE: 9/11/2023 PROCEDURE: IMPLANTABLE VENOUS CHEST PORT PLACEMENT (POWER INJECTABLE) INTERVENTIONAL RADIOLOGIST: ROD Solorzano MD. INDICATION: 73 year old male PMH HTN, HLD, obesity, MI, CVA, PAD, COPD and recent dx of non-small cell carcinoma of the RIGHT upper lobe with likely lymph node mets, synchronous RLL and JAYCOB neoplasms and a RIGHT bladder mass 2/2 non-muscle invasive papillary transitional carcinoma. CONSENT: The risks, benefits and alternatives of implantable venous chest port placement were discussed with the patient in detail. All questions were answered. Informed consent was given to proceed with the procedure. MODERATE SEDATION: Versed 2 mg IV; Fentanyl 100 mcg IV.  During the timeout, immediately prior to the administration of medications, the patient was reassessed for adequacy to receive conscious sedation. Under physician supervision, Versed and fentanyl were administered for moderate sedation. Pulse oximetry, heart rate and blood pressure were continuously monitored by an independent trained observer. The physician spent 63 minutes of face-to-face sedation time with the patient. CONTRAST: None. ANTIBIOTICS: Ancef 2 g IV. ADDITIONAL MEDICATIONS: Heparin 500 U IV FLUOROSCOPIC TIME: 10.4 minutes. RADIATION DOSE: Air Kerma: 84 mGy. COMPLICATIONS: No immediate complications. STERILE BARRIER TECHNIQUE: Maximum sterile barrier technique was used. Cutaneous antisepsis was performed at  the operative site with application of 2% chlorhexidine and large sterile drape. Prior to the procedure, the  and assistant performed hand hygiene and wore hat, mask, sterile gown, and sterile gloves during the entire procedure. PROCEDURE: Limited left neck ultrasound demonstrated a patent internal jugular vein; images saved of the permanent patient medical record. The overlying skin and subcutaneous soft tissues were anesthetized using 1% lidocaine with epinephrine. Under ultrasound guidance, the left internal jugular vein was accessed using a micropuncture needle; images saved of the permanent patient medical record. Access was secured using a 4 Danish transitional sheath. A Visual Pro 360 guidewire was advanced into the distal IVC. Our attention was then turned to the chest wall site. The overlying skin and subcutaneous soft tissues were anesthetized using 1% lidocaine with epinephrine. Using combination of sharp and blunt dissection a subcutaneous pocket was created. The catheter tubing was tunneled in an antegrade fashion from the port pocket to the dermatotomy site. Under direct fluoroscopic visualization, a peel-away sheath was advanced over the wire and positioned within the superior vena cava. Through the peel-away sheath, the catheter tubing was advanced until the tip was in the right atrium. The catheter tubing was cut to length and attached firmly to the port. The port was placed within the subcutaneous pocket and tested. The port tubing was then primed with 500 units of heparin. The port pocket incision was closed with layered absorbable sutures and surgical glue. The dermatotomy site was closed with surgical glue.     IMPRESSION:  1.  Successful implantable venous chest port placement. PLAN: 1. Port is ready to be used.     XR Abdomen 2 Views    Result Date: 9/7/2023  EXAM: XR ABDOMEN 2 VIEWS LOCATION: [facility] DATE: 9/6/2023 INDICATION:  Abdominal pain, right lower  "quadrant COMPARISON: None.     IMPRESSION: No bowel obstruction. There is a moderate retained colonic stool burden. No free air. Several calcifications overlap the left abdomen with no definitive renal stones evident. Vascular calcifications are present both in the upper abdomen and in the aortobiiliac and common femoral arteries. Degenerative changes of the spine.     30 minutes spent on the date of the encounter doing chart review, history and exam, documentation, discussion on continue monitoring of side effects of chemotherapy, communication of the treatment plan with the care team and further activities as noted above.    Signed by: Glenn Crowe MD    Oncology Rooming Note    September 26, 2023 9:50 AM   Young Ordonez is a 73 year old male who presents for:    Chief Complaint   Patient presents with     Oncology Clinic Visit     Primary malignant neoplasm of right lobe of lung     Initial Vitals: /64   Pulse 64   Temp 97.9  F (36.6  C)   Resp 16   Ht 1.702 m (5' 7\")   Wt 76.2 kg (168 lb)   SpO2 95%   BMI 26.31 kg/m   Estimated body mass index is 26.31 kg/m  as calculated from the following:    Height as of this encounter: 1.702 m (5' 7\").    Weight as of this encounter: 76.2 kg (168 lb). Body surface area is 1.9 meters squared.  No Pain (0) Comment: Data Unavailable   No LMP for male patient.  Allergies reviewed: Yes  Medications reviewed: Yes    Medications: Medication refills not needed today.  Pharmacy name entered into [facility]:    [facility] [id number] [place] [facility] [address] [facility] [address] [place] [address] [facility] [address]    Clinical concerns:  labs and treatment      Jackie Umaña                Again, thank you for allowing me to participate in the care of your patient.        Sincerely,        Glenn Crowe MD  "

## 2023-09-26 NOTE — PROGRESS NOTES
"Oncology Rooming Note    September 26, 2023 9:50 AM   Young Ordonez is a 73 year old male who presents for:    Chief Complaint   Patient presents with    Oncology Clinic Visit     Primary malignant neoplasm of right lobe of lung     Initial Vitals: /64   Pulse 64   Temp 97.9  F (36.6  C)   Resp 16   Ht 1.702 m (5' 7\")   Wt 76.2 kg (168 lb)   SpO2 95%   BMI 26.31 kg/m   Estimated body mass index is 26.31 kg/m  as calculated from the following:    Height as of this encounter: 1.702 m (5' 7\").    Weight as of this encounter: 76.2 kg (168 lb). Body surface area is 1.9 meters squared.  No Pain (0) Comment: Data Unavailable   No LMP for male patient.  Allergies reviewed: Yes  Medications reviewed: Yes    Medications: Medication refills not needed today.  Pharmacy name entered into [facility]:    [facility] [id number] [place] [facility] [address] [facility] [address] [place] [address] [facility]ARMAC[facility] [address]    Clinical concerns:  labs and treatment      Jackie Umaña              "

## 2023-09-26 NOTE — PROGRESS NOTES
[facility] Progress Note    Patient: Young Ordonez  MRN: [id number]  Date of Service: Sep 26, 2023         Reason for Visit    Chief Complaint   Patient presents with    Oncology Clinic Visit     Primary malignant neoplasm of right lobe of lung       Assessment and Plan     Cancer Staging   Primary malignant neoplasm of right lower lobe of lung (H)  Staging form: Lung, AJCC 8th Edition  - Clinical stage from 8/30/2023: Stage IIIB (cT4(2), cN2, cM0) - Signed by Glenn Crowe MD on 9/3/2023      ECOG Performance    0 - Independent     Pain  Pain Score: No Pain (0)    #.  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  A 2.3 x 2.2 cm right upper lobe and additional bilateral pulmonary nodules, suspicious for metastatic disease or primary lung cancer  #.  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  #.  Past smoker  #.  Significant vascular disease     He is here for cycle #2 weekly carboplatin/paclitaxel with radiation.  He has expected side effects of fatigue.  No intolerable side effects from chemotherapy at this point.  I reviewed his labs.  CBC and CMP are fairly unremarkable.  Recommended to continue to monitor side effects of chemotherapy including signs and symptoms of infection and neuropathy.   He will proceed with cycle #2 weekly carboplatin/paclitaxel.   Return to clinic next week with labs and infusion appointment.  He will have provider visit every 2 weeks.  He is advised to call us sooner with any concerns.    Encounter Diagnoses:    Problem List Items Addressed This Visit          Oncology Diagnoses    Primary malignant neoplasm of right lower lobe of lung (H)    Relevant Orders    Infusion Appointment Request    Infusion Appointment Request       Other    Encounter for antineoplastic chemotherapy - Primary     Relevant Orders    Infusion Appointment Request    Infusion Appointment Request            CC: Jigar Crawford MD   ______________________________________________________________________________  Diagnosis  8/2023-right upper lobe of the lung, right lower lobe with thoracic lymph node metastases.  Additional mildly FDG avid spiculated nodule in the periphery of the left upper lobe.   -Right upper lobe lung mass biopsy showed non-small cell carcinoma (squamous cell carcinoma).  TPS 25%, low PD-L1 expression.  Fusion negative. CDKN2A 58fs positive.  No targetable mutation.     8/3/2023-biopsy of the bladder tumor showed papillary transitional carcinoma, low-grade, no evidence of invasive malignancy.    8/18/2023-PET scan showed 3 x 2.2 x 2.2 cm anterior right upper lobe mass with additional FDG avid nodule in the right lower lobe measuring 2.4 x 1.1 x 2.1 cm.  FDG avid right 11 R, 7, right lower station 4 lymph nodes were suspicious for synchronous right upper/lower primary lung neoplasm with thoracic lymph node metastasis.  He has additional FDG avid left upper lobe lesion of 1.1 x 0.5 cm likely representing additional synchronous neoplasm.    8/16/2023-MRI brain was negative for intracranial metastatic disease.    Treatment to date  9/18/2023- definitive chemoradiation (weekly carboplatin/paclitaxel) for right upper lobe lung cancer.   -Left upper lobe lung cancer is plan to be treated with consolidative SBRT after the treatment of right lung cancer.    History of Present Illness    Mr. Yonug Ordonez presented today accompanied by his wife, Mitra.      He started chemoradiation a week ago.  He told me that his tumor has already shrunk by radiation imaging.  He reported fatigue from chemotherapy.  No nausea, vomiting, diarrhea.  No neuropathy.      Review of systems  Apart from describing in HPI, the remainder of comprehensive ROS was negative.    Past History    Past Medical History:   Diagnosis Date    Acute  "myocardial infarction, unspecified site, episode of care unspecified 01/01/1995    Arthritis     Cardiomyopathy (H)     Cerebral artery occlusion with cerebral infarction (H) 2018    lost vision in right eye    Claudication (H)     COPD (chronic obstructive pulmonary disease) (H)     Coronary artery disease     Depression     DEPRESSIVE DISORDER NEC 04/29/2008    Essential hypertension, benign     Gout     Gout attack 05/25/2017    Hepatitis C carrier (H)     HTN (hypertension)     Hyperlipidemia     Infectious viral hepatitis     carrier of viral hepatitis    Low back pain     chronic    MI (myocardial infarction) (H) 01/01/1995    Obesity     Other and unspecified hyperlipidemia     PAD (peripheral artery disease) (H)     Peyronie's disease        Past Surgical History:   Procedure Laterality Date    ANGIOPLASTY  1995    ANKLE FRACTURE SURGERY Left     BYPASS GRAFT AORTOFEMORAL N/A 5/17/2017    Procedure: AORTOBIFEMORAL BYPASS ;  Surgeon: Ilir FERRO MD;  Location: [facility];  Service:     CAROTID ENDARTERECTOMY Right 4/12/2018    Procedure: RIGHT CAROTID ENDARTERECTOMY WITH EEG;  Surgeon: Sergei Lemus MD;  Location: [facility];  Service:     CYSTOSCOPY, TRANSURETHRAL RESECTION (TUR) TUMOR BLADDER, COMBINED N/A 8/3/2023    Procedure: Transurethral resection of bladder tumor 2 cm to 5 cm;  Surgeon: German Mitchell MD;  Location: [facility] OR    FRACTURE SURGERY      IR AORTIC ARCH 4 VESSEL ANGIOGRAM  11/9/2009    IR CAROTID ANGIOGRAM  11/9/2009    IR CAROTID ANGIOGRAM  11/9/2009    IR CHEST PORT PLACEMENT > 5 YRS OF AGE  9/11/2023    IR EXTREMITY ANGIOGRAM BILATERAL  4/6/2017    MANDIBLE SURGERY      ZZC NONSPECIFIC PROCEDURE  1995    MI -- angioplasty       Physical Exam    /64   Pulse 64   Temp 97.9  F (36.6  C)   Resp 16   Ht 1.702 m (5' 7\")   Wt 76.2 kg (168 lb)   SpO2 95%   BMI 26.31 kg/m      General: alert, awake, not in acute distress  HEENT: Head: Normal, normocephalic, " atraumatic.  Eye: Normal external eye, conjunctiva, lids cornea, NARESH.  Pharynx: Normal buccal mucosa. Normal pharynx.  Neck / Thyroid: Supple, no masses, nodes, nodules or enlargement.  Lymphatics: No abnormally enlarged lymph nodes.  Lungs are clear to auscultation bilaterally.    Abdomen: abdomen is soft without significant tenderness, masses, organomegaly or guarding  Extremities: normal strength, tone, and muscle mass  Skin: normal. no rash or abnormalities  CNS: non focal.  Normal gait.    Lab Results    Recent Results (from the past 168 hour(s))   Comprehensive metabolic panel (BMP + Alb, Alk Phos, ALT, AST, Total. Bili, TP)   Result Value Ref Range    Sodium 141 136 - 145 mmol/L    Potassium 3.7 3.4 - 5.3 mmol/L    Carbon Dioxide (CO2) 28 22 - 29 mmol/L    Anion Gap 8 7 - 15 mmol/L    Urea Nitrogen 18.6 8.0 - 23.0 mg/dL    Creatinine 1.17 0.67 - 1.17 mg/dL    GFR Estimate 66 >60 mL/min/1.73m2    Calcium 8.9 8.8 - 10.2 mg/dL    Chloride 105 98 - 107 mmol/L    Glucose 92 70 - 99 mg/dL    Alkaline Phosphatase 69 40 - 129 U/L    AST 26 0 - 45 U/L    ALT 12 0 - 70 U/L    Protein Total 6.3 (L) 6.4 - 8.3 g/dL    Albumin 3.8 3.5 - 5.2 g/dL    Bilirubin Total 0.3 <=1.2 mg/dL   CBC with platelets and differential   Result Value Ref Range    WBC Count 7.5 4.0 - 11.0 10e3/uL    RBC Count 3.51 (L) 4.40 - 5.90 10e6/uL    Hemoglobin 10.7 (L) 13.3 - 17.7 g/dL    Hematocrit 32.3 (L) 40.0 - 53.0 %    MCV 92 78 - 100 fL    MCH 30.5 26.5 - 33.0 pg    MCHC 33.1 31.5 - 36.5 g/dL    RDW 14.6 10.0 - 15.0 %    Platelet Count 166 150 - 450 10e3/uL    % Neutrophils 65 %    % Lymphocytes 22 %    % Monocytes 7 %    % Eosinophils 5 %    % Basophils 0 %    % Immature Granulocytes 1 %    NRBCs per 100 WBC 0 <1 /100    Absolute Neutrophils 4.9 1.6 - 8.3 10e3/uL    Absolute Lymphocytes 1.6 0.8 - 5.3 10e3/uL    Absolute Monocytes 0.5 0.0 - 1.3 10e3/uL    Absolute Eosinophils 0.4 0.0 - 0.7 10e3/uL    Absolute Basophils 0.0 0.0 - 0.2 10e3/uL     Absolute Immature Granulocytes 0.1 <=0.4 10e3/uL    Absolute NRBCs 0.0 10e3/uL         Imaging    IR Chest Port Placement > 5 Yrs of Age    Result Date: 9/11/2023  [place] [facility] LOCATION: [facility] DATE: 9/11/2023 PROCEDURE: IMPLANTABLE VENOUS CHEST PORT PLACEMENT (POWER INJECTABLE) INTERVENTIONAL RADIOLOGIST: ROD Solorzano MD. INDICATION: 73 year old male PMH HTN, HLD, obesity, MI, CVA, PAD, COPD and recent dx of non-small cell carcinoma of the RIGHT upper lobe with likely lymph node mets, synchronous RLL and JAYCOB neoplasms and a RIGHT bladder mass 2/2 non-muscle invasive papillary transitional carcinoma. CONSENT: The risks, benefits and alternatives of implantable venous chest port placement were discussed with the patient in detail. All questions were answered. Informed consent was given to proceed with the procedure. MODERATE SEDATION: Versed 2 mg IV; Fentanyl 100 mcg IV.  During the timeout, immediately prior to the administration of medications, the patient was reassessed for adequacy to receive conscious sedation. Under physician supervision, Versed and fentanyl were administered for moderate sedation. Pulse oximetry, heart rate and blood pressure were continuously monitored by an independent trained observer. The physician spent 63 minutes of face-to-face sedation time with the patient. CONTRAST: None. ANTIBIOTICS: Ancef 2 g IV. ADDITIONAL MEDICATIONS: Heparin 500 U IV FLUOROSCOPIC TIME: 10.4 minutes. RADIATION DOSE: Air Kerma: 84 mGy. COMPLICATIONS: No immediate complications. STERILE BARRIER TECHNIQUE: Maximum sterile barrier technique was used. Cutaneous antisepsis was performed at the operative site with application of 2% chlorhexidine and large sterile drape. Prior to the procedure, the  and assistant performed hand hygiene and wore hat, mask, sterile gown, and sterile gloves during the entire procedure. PROCEDURE: Limited left neck ultrasound demonstrated a  patent internal jugular vein; images saved of the permanent patient medical record. The overlying skin and subcutaneous soft tissues were anesthetized using 1% lidocaine with epinephrine. Under ultrasound guidance, the left internal jugular vein was accessed using a micropuncture needle; images saved of the permanent patient medical record. Access was secured using a 4 Italian transitional sheath. A "[employer] guidewire was advanced into the distal IVC. Our attention was then turned to the chest wall site. The overlying skin and subcutaneous soft tissues were anesthetized using 1% lidocaine with epinephrine. Using combination of sharp and blunt dissection a subcutaneous pocket was created. The catheter tubing was tunneled in an antegrade fashion from the port pocket to the dermatotomy site. Under direct fluoroscopic visualization, a peel-away sheath was advanced over the wire and positioned within the superior vena cava. Through the peel-away sheath, the catheter tubing was advanced until the tip was in the right atrium. The catheter tubing was cut to length and attached firmly to the port. The port was placed within the subcutaneous pocket and tested. The port tubing was then primed with 500 units of heparin. The port pocket incision was closed with layered absorbable sutures and surgical glue. The dermatotomy site was closed with surgical glue.     IMPRESSION:  1.  Successful implantable venous chest port placement. PLAN: 1. Port is ready to be used.     XR Abdomen 2 Views    Result Date: 9/7/2023  EXAM: XR ABDOMEN 2 VIEWS LOCATION: [facility] DATE: 9/6/2023 INDICATION:  Abdominal pain, right lower quadrant COMPARISON: None.     IMPRESSION: No bowel obstruction. There is a moderate retained colonic stool burden. No free air. Several calcifications overlap the left abdomen with no definitive renal stones evident. Vascular calcifications are present both in the upper abdomen and in the  aortobiiliac and common femoral arteries. Degenerative changes of the spine.     30 minutes spent on the date of the encounter doing chart review, history and exam, documentation, discussion on continue monitoring of side effects of chemotherapy, communication of the treatment plan with the care team and further activities as noted above.    Signed by: Glenn Crowe MD

## 2023-09-26 NOTE — PROGRESS NOTES
Infusion Nursing Note:  Young Ordonez presents today for D8 C1.    Patient seen by provider today: No   present during visit today: Not Applicable.    Note: Tolerated treatment well today, offers no complaints.  Discharge to home ambulating per self..      Intravenous Access:  Implanted Port.    Treatment Conditions:  Results reviewed, labs MET treatment parameters, ok to proceed with treatment.      Post Infusion Assessment:  Patient tolerated infusion without incident.  Blood return noted pre and post infusion.  Site patent and intact, free from redness, edema or discomfort.  No evidence of extravasations.  Access discontinued per protocol.       Discharge Plan:   Patient and/or family verbalized understanding of discharge instructions and all questions answered.      Itzel Rueda RN

## 2023-10-02 NOTE — LETTER
10/2/2023         RE: Young Ordonez  [address] [id number]        Dear Colleague,    Thank you for referring your patient, Young Ordonez, to the [facility] RADIATION ONCOL[facility]Y [place]. Please see a copy of my visit note below.    RADIATION ONCOLOGY WEEKLY TREATMENT VISIT NOTE      Assessment / Impression     Primary malignant neoplasm of right lower lobe of lung (H) [C34.31]     Cancer Staging   Primary malignant neoplasm of right lower lobe of lung (H)  Staging form: Lung, AJCC 8th Edition  - Clinical stage from 8/30/2023: Stage IIIB (cT4(2), cN2, cM0) - Signed by Glenn Crowe MD on 9/3/2023       Tolerating chemo/radiation therapy well.  All questions and concerns addressed.   No pain     Plan:     Continue radiation treatment as prescribed.  Radiation:   Site: right lung  Stereotactic Radiosurgery: No  Concurrent Therapy: Yes  Protocol: Taxol. carboplatin  Today's Dose: 2200  Total Dose for Thoracic: 6600  Today's Fraction/Total Fraction Thoracic: 11/33      Subjective:      HPI: Young Ordonez is a 73 year old male with  Primary malignant neoplasm of right lower lobe of lung (H) [C34.31]    The following portions of the patient's history were reviewed and updated as appropriate: allergies, current medications, past family history, past medical history, past social history, past surgical history and problem list.    Assessment                  Body Site:  Thoracic Site: right lung  Stereotactic Radiosurgery: No  Concurrent Therapy: Yes  Today's Dose: 2200  Total Dose for Thoracic: 6600  Today's Fraction/Total Fraction Thoracic: 11/33  Constipation: 0: None  Diarrhea W/O Colostomy: 0: None  Dyspnea: 0: Normal                                   Sexuality Alteration                    Emotional Alteration       Comfort Alteration   KPS: 80% Can perform normal activity with effort, some signs of disease  Fatigue (ONS scale): 2: Mild Fatigue  Pain Location: 0   Nutrition  Alteration   Anorexia: 0: None  Nausea: 0: None  Vomitin: None  Weight: 75.8 kg (167 lb 1.6 oz)  Skin Alteration   Skin Sensation: 0: No problem  Skin Reaction: 0: None  AUA Assessment                                           Accompanied by       Objective:     Exam:     Vitals:    10/02/23 1103   BP: 118/59   Pulse: 63   Resp: 14   Temp: 97.7  F (36.5  C)   TempSrc: Oral   SpO2: 95%   Weight: 75.8 kg (167 lb 1.6 oz)   PainSc: No Pain (0)       Wt Readings from Last 8 Encounters:   10/02/23 75.8 kg (167 lb 1.6 oz)   23 76.2 kg (168 lb)   23 75.8 kg (167 lb 1.6 oz)   23 76.5 kg (168 lb 11.2 oz)   23 75.3 kg (166 lb)   23 76.1 kg (167 lb 12.8 oz)   23 76.7 kg (169 lb)   08/15/23 77.1 kg (170 lb)       General: Alert and oriented, in no acute distress  Young has no Erythema.    Treatment Summary to Date    Aria chart and setup information reviewed    Ifeoma Angel MD      Again, thank you for allowing me to participate in the care of your patient.        Sincerely,        Ifeoma Angel MD

## 2023-10-02 NOTE — PROGRESS NOTES
RADIATION ONCOLOGY WEEKLY TREATMENT VISIT NOTE      Assessment / Impression     Primary malignant neoplasm of right lower lobe of lung (H) [C34.31]     Cancer Staging   Primary malignant neoplasm of right lower lobe of lung (H)  Staging form: Lung, AJCC 8th Edition  - Clinical stage from 2023: Stage IIIB (cT4(2), cN2, cM0) - Signed by Glenn Crowe MD on 9/3/2023       Tolerating chemo/radiation therapy well.  All questions and concerns addressed.   No pain     Plan:     Continue radiation treatment as prescribed.  Radiation:   Site: right lung  Stereotactic Radiosurgery: No  Concurrent Therapy: Yes  Protocol: Taxol. carboplatin  Today's Dose: 2200  Total Dose for Thoracic: 6600  Today's Fraction/Total Fraction Thoracic:       Subjective:      HPI: Young Ordonez is a 73 year old male with  Primary malignant neoplasm of right lower lobe of lung (H) [C34.31]    The following portions of the patient's history were reviewed and updated as appropriate: allergies, current medications, past family history, past medical history, past social history, past surgical history and problem list.    Assessment                  Body Site:  Thoracic Site: right lung  Stereotactic Radiosurgery: No  Concurrent Therapy: Yes  Today's Dose: 2200  Total Dose for Thoracic: 6600  Today's Fraction/Total Fraction Thoracic:   Constipation: 0: None  Diarrhea W/O Colostomy: 0: None  Dyspnea: 0: Normal                                   Sexuality Alteration                    Emotional Alteration       Comfort Alteration   KPS: 80% Can perform normal activity with effort, some signs of disease  Fatigue (ONS scale): 2: Mild Fatigue  Pain Location: 0   Nutrition Alteration   Anorexia: 0: None  Nausea: 0: None  Vomitin: None  Weight: 75.8 kg (167 lb 1.6 oz)  Skin Alteration   Skin Sensation: 0: No problem  Skin Reaction: 0: None  AUA Assessment                                           Accompanied by       Objective:  Subjective:       Patient ID: Geeta Li is a 23 y.o. female.    Chief Complaint: Follow-up (ER follow-up)    23-year-old  female patient with Patient Active Problem List:     ADD (attention deficit disorder)     Non-toxic multinodular goiter     PFO (patent foramen ovale)     History of cardioembolic cerebrovascular accident (CVA)     Anxiety     Insomnia  Here for ER follow-up from [facility] from last night.  Patient reported that she was working, yesterday evening around 8:35 pm patient was sitting in her car and eating her lunch, and felt that her left arm was numb , persisted for a long time which prompted her to go to [facility] ER, reported that she started to get feeling after 12 30 and still feels weak but gradually improving.  Patient reported that she had CT scan done in the ER which was normal.  Patient reported that she was told that probably she may have a pinched nerve , but denies any neck pain at this time  Reported that she was low in potassium and was replaced  Patient denies of any muscle cramps but has been having weakness to the left arm, patient had similar symptoms when she had a history of cardioembolic stroke in the past  Has appointment with neurology next week  Patient with very tiny PFO has been evaluated by Cardiology recently and no further intervention needed at this time and has been taking aspirin 325 mg and Plavix 75 mg daily  Has been taking her Adderall for ADD regularly  Denies any excessive stress      Review of Systems   Constitutional: Negative for fatigue.   Eyes: Negative for visual disturbance.   Respiratory: Negative for shortness of breath.    Cardiovascular: Negative for chest pain and leg swelling.   Gastrointestinal: Negative for abdominal pain, nausea and vomiting.   Musculoskeletal: Negative for myalgias, neck pain and neck stiffness.   Skin: Negative for rash.   Neurological: Positive for weakness and numbness. Negative for      Exam:     Vitals:    10/02/23 1103   BP: 118/59   Pulse: 63   Resp: 14   Temp: 97.7  F (36.5  C)   TempSrc: Oral   SpO2: 95%   Weight: 75.8 kg (167 lb 1.6 oz)   PainSc: No Pain (0)       Wt Readings from Last 8 Encounters:   10/02/23 75.8 kg (167 lb 1.6 oz)   09/26/23 76.2 kg (168 lb)   09/25/23 75.8 kg (167 lb 1.6 oz)   09/18/23 76.5 kg (168 lb 11.2 oz)   09/06/23 75.3 kg (166 lb)   08/31/23 76.1 kg (167 lb 12.8 oz)   08/23/23 76.7 kg (169 lb)   08/15/23 77.1 kg (170 lb)       General: Alert and oriented, in no acute distress  Young has no Erythema.    Treatment Summary to Date    Aria chart and setup information reviewed    Ifeoma Angel MD   "light-headedness and headaches.   Psychiatric/Behavioral: Positive for decreased concentration. Negative for dysphoric mood and sleep disturbance. The patient is not nervous/anxious.          /68 (BP Location: Right arm, Patient Position: Sitting)   Pulse 92   Temp 97.8 °F (36.6 °C) (Tympanic)   Ht 5' 3" (1.6 m)   Wt 73.2 kg (161 lb 6 oz)   LMP 07/23/2018 (Exact Date)   SpO2 99%   BMI 28.59 kg/m²   Objective:      Physical Exam   Constitutional: She is oriented to person, place, and time. She appears well-developed and well-nourished.   HENT:   Head: Normocephalic and atraumatic.   Mouth/Throat: Oropharynx is clear and moist.   Cardiovascular: Normal rate, regular rhythm and normal heart sounds.    No murmur heard.  Pulmonary/Chest: Effort normal and breath sounds normal. She has no wheezes.   Abdominal: Soft. Bowel sounds are normal. There is no tenderness.   Musculoskeletal: She exhibits no edema or tenderness.   No paraspinal cervical muscle tenderness noted on exam today   Neurological: She is alert and oriented to person, place, and time.   Hand  1+ in the left hand, and strength in the left arm 4/5 when compared to the right arm 5/5   Skin: Skin is warm and dry. No rash noted.   Psychiatric: She has a normal mood and affect.         Assessment:       1. Left arm numbness    2. Hypokalemia    3. Follow-up exam    4. History of cardioembolic cerebrovascular accident (CVA)    5. PFO (patent foramen ovale)    6. Attention deficit disorder, unspecified hyperactivity presence        Plan:   Left arm numbness  -     Ambulatory Referral to Physical/Occupational Therapy  Hypokalemia  -     Basic metabolic panel; Future; Expected date: 07/25/2018  -     Lipid panel; Future; Expected date: 07/25/2018  Follow-up exam  History of cardioembolic cerebrovascular accident (CVA)  -     Ambulatory Referral to Physical/Occupational Therapy  PFO (patent foramen ovale)    Will obtain medical records from [facility] to look into the CT scan.   Patient reports all her testing has come back normal  Will recheck potassium levels as it was low in the past for which patient has been replenished in the ER  Secondary to left arm weakness when get started with physical therapy  Patient was advised to discuss further with Neurology as scheduled next week    Attention deficit disorder, unspecified hyperactivity presence-currently taking Adderall 15 mg daily    Reviewed [place] ER records showing CT scan chronic lacunar  infarct within the right thalamus but no acute intracranial abnormality  And workup has been stable patient has been discharged with possibility of cervical radiculopathy and no suspicion of CVA at this time

## 2023-10-03 NOTE — PROGRESS NOTES
Infusion Nursing Note:  Young Ordonez presents today for [id number] carboplatin.    Patient seen by provider today: No   present during visit today: Not Applicable.    Note: pt received zofran/dex, pepcid, and benadryl as pre meds.      Intravenous Access:  Implanted Port.    Treatment Conditions:  Lab Results   Component Value Date    HGB 10.4 (L) 10/03/2023    WBC 5.1 10/03/2023    ANEU 1.9 07/13/2007    ANEUTAUTO 3.7 10/03/2023     (L) 10/03/2023        Lab Results   Component Value Date     09/26/2023    POTASSIUM 3.7 09/26/2023    MAG 2.4 (H) 08/21/2020    CR 1.09 10/03/2023    KALIE 8.9 09/26/2023    BILITOTAL 0.3 09/26/2023    ALBUMIN 3.8 09/26/2023    ALT 12 09/26/2023    AST 26 09/26/2023         Post Infusion Assessment:  Patient tolerated infusion without incident.  Blood return noted pre and post infusion.  Site patent and intact, free from redness, edema or discomfort.  Access discontinued per protocol.       Discharge Plan:   Patient and/or family verbalized understanding of discharge instructions and all questions answered.      Maryann Murdock RN

## 2023-10-09 NOTE — LETTER
10/9/2023         RE: Young Ordonez  [address] [facility] [address]        Dear Colleague,    Thank you for referring your patient, Young Ordonez, to the [facility] RADIATION ONCOLOGY [place]. Please see a copy of my visit note below.    RADIATION ONCOLOGY WEEKLY TREATMENT VISIT NOTE      Assessment / Impression     Primary malignant neoplasm of right lower lobe of lung (H) [C34.31]     Cancer Staging   Primary malignant neoplasm of right lower lobe of lung (H)  Staging form: Lung, AJCC 8th Edition  - Clinical stage from 8/30/2023: Stage IIIB (cT4(2), cN2, cM0) - Signed by Glenn Crowe MD on 9/3/2023       Tolerating radiation therapy well.  All questions and concerns addressed.  Continues with weekly carbo/taxol    Plan:     Continue radiation treatment as prescribed.  Radiation:   Site: Rt. Lung  Stereotactic Radiosurgery: No  Concurrent Therapy: Yes (taxol/carbo)  Protocol: Taxol. carboplatin  Today's Dose: 3200  Total Dose for Thoracic: 6600  Today's Fraction/Total Fraction Thoracic: 16/33        Subjective:      HPI: Young Ordonez is a 73 year old male with  Primary malignant neoplasm of right lower lobe of lung (H) [C34.31]    The following portions of the patient's history were reviewed and updated as appropriate: allergies, current medications, past family history, past medical history, past social history, past surgical history and problem list.    Assessment                  Body Site:  Thoracic Site: Rt. Lung  Stereotactic Radiosurgery: No  Concurrent Therapy: Yes (taxol/carbo)  Today's Dose: 3200  Total Dose for Thoracic: 6600  Today's Fraction/Total Fraction Thoracic: 16/33  Voice Chances/Stridor/Larynx: 0: Normal  Pharynx and Esphogaus: 0: No change over baseline  Constipation: 1: Requiring stool softner or dietary modifation  Diarrhea W/O Colostomy: 0: None  Hemoptysis: 0: None  Dyspnea: 0: Normal                                   Sexuality Alteration                     Emotional Alteration    Copin: Effective  Comfort Alteration   KPS: 80% Can perform normal activity with effort, some signs of disease  Fatigue (ONS scale): 4: Moderate Fatigue  Pain Location: denies   Nutrition Alteration   Anorexia: 0: None  Nausea: 0: None  Vomitin: None  Weight: 74.5 kg (164 lb 3.2 oz)  Pharynx and Esphogaus: 0: No change over baseline  Skin Alteration   Skin Sensation: 0: No problem  Skin Reaction: 0: None  AUA Assessment                                           Accompanied by       Objective:     Exam:     Vitals:    10/09/23 1051   BP: 120/57   Pulse: 65   Resp: 16   Temp: 97.7  F (36.5  C)   TempSrc: Oral   SpO2: 97%   Weight: 74.5 kg (164 lb 3.2 oz)   PainSc: No Pain (0)       Wt Readings from Last 8 Encounters:   10/09/23 74.5 kg (164 lb 3.2 oz)   10/03/23 75.7 kg (166 lb 14.4 oz)   10/02/23 75.8 kg (167 lb 1.6 oz)   23 76.2 kg (168 lb)   23 75.8 kg (167 lb 1.6 oz)   23 76.5 kg (168 lb 11.2 oz)   23 75.3 kg (166 lb)   23 76.1 kg (167 lb 12.8 oz)       General: Alert and oriented, in no acute distress  Young has no Erythema.    Treatment Summary to Date    Aria chart and setup information reviewed    Ifeoma Angel MD      Again, thank you for allowing me to participate in the care of your patient.        Sincerely,        Ifeoma Angel MD

## 2023-10-09 NOTE — PROGRESS NOTES
RADIATION ONCOLOGY WEEKLY TREATMENT VISIT NOTE      Assessment / Impression     Primary malignant neoplasm of right lower lobe of lung (H) [C34.31]     Cancer Staging   Primary malignant neoplasm of right lower lobe of lung (H)  Staging form: Lung, AJCC 8th Edition  - Clinical stage from 2023: Stage IIIB (cT4(2), cN2, cM0) - Signed by Glenn Crowe MD on 9/3/2023       Tolerating radiation therapy well.  All questions and concerns addressed.  Continues with weekly carbo/taxol    Plan:     Continue radiation treatment as prescribed.  Radiation:   Site: Rt. Lung  Stereotactic Radiosurgery: No  Concurrent Therapy: Yes (taxol/carbo)  Protocol: Taxol. carboplatin  Today's Dose: 3200  Total Dose for Thoracic: 6600  Today's Fraction/Total Fraction Thoracic:         Subjective:      HPI: Young Ordonez is a 73 year old male with  Primary malignant neoplasm of right lower lobe of lung (H) [C34.31]    The following portions of the patient's history were reviewed and updated as appropriate: allergies, current medications, past family history, past medical history, past social history, past surgical history and problem list.    Assessment                  Body Site:  Thoracic Site: Rt. Lung  Stereotactic Radiosurgery: No  Concurrent Therapy: Yes (taxol/carbo)  Today's Dose: 3200  Total Dose for Thoracic: 6600  Today's Fraction/Total Fraction Thoracic:   Voice Chances/Stridor/Larynx: 0: Normal  Pharynx and Esphogaus: 0: No change over baseline  Constipation: 1: Requiring stool softner or dietary modifation  Diarrhea W/O Colostomy: 0: None  Hemoptysis: 0: None  Dyspnea: 0: Normal                                   Sexuality Alteration                    Emotional Alteration    Copin: Effective  Comfort Alteration   KPS: 80% Can perform normal activity with effort, some signs of disease  Fatigue (ONS scale): 4: Moderate Fatigue  Pain Location: denies   Nutrition Alteration   Anorexia: 0:  None  Nausea: 0: None  Vomitin: None  Weight: 74.5 kg (164 lb 3.2 oz)  Pharynx and Esphogaus: 0: No change over baseline  Skin Alteration   Skin Sensation: 0: No problem  Skin Reaction: 0: None  AUA Assessment                                           Accompanied by       Objective:     Exam:     Vitals:    10/09/23 1051   BP: 120/57   Pulse: 65   Resp: 16   Temp: 97.7  F (36.5  C)   TempSrc: Oral   SpO2: 97%   Weight: 74.5 kg (164 lb 3.2 oz)   PainSc: No Pain (0)       Wt Readings from Last 8 Encounters:   10/09/23 74.5 kg (164 lb 3.2 oz)   10/03/23 75.7 kg (166 lb 14.4 oz)   10/02/23 75.8 kg (167 lb 1.6 oz)   23 76.2 kg (168 lb)   23 75.8 kg (167 lb 1.6 oz)   23 76.5 kg (168 lb 11.2 oz)   23 75.3 kg (166 lb)   23 76.1 kg (167 lb 12.8 oz)       General: Alert and oriented, in no acute distress  Young has no Erythema.    Treatment Summary to Date    Aria chart and setup information reviewed    Ifeoma Angel MD

## 2023-10-10 NOTE — LETTER
10/10/2023         RE: Young Ordonez  [address] [facility] [address]        Dear Colleague,    Thank you for referring your patient, Young Ordonez, to the [facility] CANCER [facility]. Please see a copy of my visit note below.    [facility] Hematology and Oncology Progress Note    Patient: Young Ordonez  MRN: [id number]  Date of Service: 10/10/2023        Reason for Visit    Chief Complaint   Patient presents with     Oncology Clinic Visit     Primary malignant neoplasm of right lower lobe of lung (H)       Assessment and Plan     Cancer Staging   Primary malignant neoplasm of right lower lobe of lung (H)  Staging form: Lung, AJCC 8th Edition  - Clinical stage from 8/30/2023: Stage IIIB (cT4(2), cN2, cM0) - Signed by Glenn Crowe MD on 9/3/2023    Lung cancer, right upper lobe, right lower lobe, Left upper lobe: unclear if multiple primaries or metastatic disease. For now, we are treating as multiple primaries. He is getting concurrent chemo/radiation for right side. On weekly taxol, carboplatin. Today is week 4. Radiation today is 17/33 fractions.  Tolerating things well.  He will continue weekly treatment and will be seen again by a provider in 2 weeks.  For the left upper lobe, we will do SRS once the right side is treated.     Bladder cancer, low grade, non muscle invasive. Pt is s/p TURBT with intravesicular mitomycin C on 8/3/23. Currently on observation with Dr. Mitchell. Will do cytoscopies every 3 months.     Anemia: chemo induced and usually cumulative. Overall asymptomatic except fatigue. Continue to monitor.     Fatigue: likely chemo induced and is cumulative. Encourage good hydration, healthy diet with good protein. Also encourage daily exercise and to be as active as possible.     Renal insufficiency: likely from medications. Avoid nephrotoxic medications.  Stay hydrated.  Keep blood pressure well controlled. Will dose carboplatin based on kidney dysfunction      ECOG  Performance    1 - Can't do physically strenuous work, but fully ambyulatory and can do light sedentary work    Distress Screening (within last 30 days)    1. How concerned are you about your ability to eat? : 0  2. How concerned are you about unintended weight loss or your current weight? : 0  3. How concerned are you about feeling depressed or very sad? : 0  4. How concerned are you about feeling anxious or very scared? : 0  5. Do you struggle with the loss of meaning and fadi in your life? : Not at all  6. How concerned are you about work and home life issues that may be affected by your cancer? : 0  7. How concerned are you about knowing what resources are available to help you? : 0  8. Do you currently have what you would describe as Jew or spiritual struggles?: Not at all       Pain  Pain Score: No Pain (0)    Problem List    Patient Active Problem List   Diagnosis     Essential hypertension, benign     Acute myocardial infarction (H)     Hyperlipidemia     Depressive disorder, not elsewhere classified     Ischemic stroke (H)     PAD (peripheral artery disease) (H24)     History of alcohol dependence (H)     Chronic obstructive pulmonary disease, unspecified COPD type (H)     Recurrent major depressive disorder, remission status unspecified (H24)     Chronic kidney disease, stage 3a (H)     Stenosis of left carotid artery     Decreased ejection fraction     Primary malignant neoplasm of right lower lobe of lung (H)     Encounter for antineoplastic chemotherapy        ______________________________________________________________________________    History of Present Illness    Oncologist: Dr. Crowe.     Diagnosis  Lung Cancer  8/2023-right upper lobe of the lung, right lower lobe with thoracic lymph node metastases.  Additional mildly FDG avid spiculated nodule in the periphery of the left upper lobe.              -Right upper lobe lung mass biopsy showed non-small cell carcinoma (squamous cell carcinoma).   TPS 25%, low PD-L1 expression.  Fusion negative. CDKN2A 58fs positive.  No targetable mutation.    8/18/2023-PET scan showed 3 x 2.2 x 2.2 cm anterior right upper lobe mass with additional FDG avid nodule in the right lower lobe measuring 2.4 x 1.1 x 2.1 cm.  FDG avid right 11 R, 7, right lower station 4 lymph nodes were suspicious for synchronous right upper/lower primary lung neoplasm with thoracic lymph node metastasis.  He has additional FDG avid left upper lobe lesion of 1.1 x 0.5 cm likely representing additional synchronous neoplasm.  8/16/2023-MRI brain was negative for intracranial metastatic disease.    Bladder cancer:    8/3/2023-biopsy of the bladder tumor showed papillary transitional carcinoma, low-grade, no evidence of invasive malignancy.     Treatment to date  Lung Cancer  9/18/2023- definitive chemoradiation (weekly carboplatin/paclitaxel) for right upper lobe lung cancer.              -Left upper lobe lung cancer is plan to be treated with consolidative SBRT after the treatment of right lung cancer    Bladder Cancer  8/3/23: TURBT with intravesicular Mitomycin C. Currently on observation with cytoscopies planned for every 3 months.     Interim History:     Here today for a follow-up visit and to continue on chemo.  He has now been on the treatment for about 3 weeks.  He states that overall is going well.  His biggest complaint is that he is having more fatigue the longer he is on the treatment.  He says he just does not have a lot of stamina.  He also does get a little bit of shortness with breath on exertion.  He denies any chest pain or palpitations.  Denies any weight loss.  Denies any nausea or vomiting.  Denies any new bone or back pain.  He states that the radiation oncologist did tell him that he is already responding pretty nicely to the treatment so he is happy to hear that.      Review of Systems    Pertinent items are noted in HPI.    Past History    Past Medical History:   Diagnosis  Date     Acute myocardial infarction, unspecified site, episode of care unspecified 01/01/1995     Arthritis      Cardiomyopathy (H)      Cerebral artery occlusion with cerebral infarction (H) 2018    lost vision in right eye     Claudication (H24)      COPD (chronic obstructive pulmonary disease) (H)      Coronary artery disease      Depression      DEPRESSIVE DISORDER NEC 04/29/2008     Essential hypertension, benign      Gout      Gout attack 05/25/2017     Hepatitis C carrier (H)      HTN (hypertension)      Hyperlipidemia      Infectious viral hepatitis     carrier of viral hepatitis     Low back pain     chronic     MI (myocardial infarction) (H) 01/01/1995     Obesity      Other and unspecified hyperlipidemia      PAD (peripheral artery disease) (H24)      Peyronie's disease        PHYSICAL EXAM:  /65   Pulse 65   Temp 98.8  F (37.1  C)   Resp 18   Wt 75.4 kg (166 lb 3.2 oz)   SpO2 96%   BMI 26.03 kg/m    GENERAL: no acute distress. Cooperative in conversation. Here alone  RESP: lungs are clear bilaterally per auscultation. Regular respiratory rate. No wheezes or rhonchi.  CV: Regular, rate and rhythm. No murmurs.  ABD: soft, nontender.    MUSCULOSKELETAL: No lower extremity swelling.   NEURO: non focal. Alert and oriented x3.   PSYCH: within normal limits. No depression or anxiety.  SKIN: warm dry intact         Lab Results    Recent Results (from the past 168 hour(s))   Creatinine   Result Value Ref Range    Creatinine 1.30 (H) 0.67 - 1.17 mg/dL    GFR Estimate 58 (L) >60 mL/min/1.73m2   CBC with platelets and differential   Result Value Ref Range    WBC Count 5.3 4.0 - 11.0 10e3/uL    RBC Count 3.30 (L) 4.40 - 5.90 10e6/uL    Hemoglobin 10.2 (L) 13.3 - 17.7 g/dL    Hematocrit 29.9 (L) 40.0 - 53.0 %    MCV 91 78 - 100 fL    MCH 30.9 26.5 - 33.0 pg    MCHC 34.1 31.5 - 36.5 g/dL    RDW 15.0 10.0 - 15.0 %    Platelet Count 112 (L) 150 - 450 10e3/uL    % Neutrophils 74 %    % Lymphocytes 14 %    %  Monocytes 8 %    Mids % (Monos, Eos, Basos)      % Eosinophils 2 %    % Basophils 1 %    % Immature Granulocytes 1 %    NRBCs per 100 WBC 0 <1 /100    Absolute Neutrophils 4.0 1.6 - 8.3 10e3/uL    Absolute Lymphocytes 0.8 0.8 - 5.3 10e3/uL    Absolute Monocytes 0.4 0.0 - 1.3 10e3/uL    Mids Abs (Monos, Eos, Basos)      Absolute Eosinophils 0.1 0.0 - 0.7 10e3/uL    Absolute Basophils 0.1 0.0 - 0.2 10e3/uL    Absolute Immature Granulocytes 0.0 <=0.4 10e3/uL    Absolute NRBCs 0.0 10e3/uL         Imaging    IR Chest Port Placement > 5 Yrs of Age    Result Date: 9/11/2023  [facility] LOCATION: [facility] DATE: 9/11/2023 PROCEDURE: IMPLANTABLE VENOUS CHEST PORT PLACEMENT (POWER INJECTABLE) INTERVENTIONAL RADIOLOGIST: ROD Solorzano MD. INDICATION: 73 year old male PMH HTN, HLD, obesity, MI, CVA, PAD, COPD and recent dx of non-small cell carcinoma of the RIGHT upper lobe with likely lymph node mets, synchronous RLL and JAYCOB neoplasms and a RIGHT bladder mass 2/2 non-muscle invasive papillary transitional carcinoma. CONSENT: The risks, benefits and alternatives of implantable venous chest port placement were discussed with the patient in detail. All questions were answered. Informed consent was given to proceed with the procedure. MODERATE SEDATION: Versed 2 mg IV; Fentanyl 100 mcg IV.  During the timeout, immediately prior to the administration of medications, the patient was reassessed for adequacy to receive conscious sedation. Under physician supervision, Versed and fentanyl were administered for moderate sedation. Pulse oximetry, heart rate and blood pressure were continuously monitored by an independent trained observer. The physician spent 63 minutes of face-to-face sedation time with the patient. CONTRAST: None. ANTIBIOTICS: Ancef 2 g IV. ADDITIONAL MEDICATIONS: Heparin 500 U IV FLUOROSCOPIC TIME: 10.4 minutes. RADIATION DOSE: Air Kerma: 84 mGy. COMPLICATIONS: No immediate  complications. STERILE BARRIER TECHNIQUE: Maximum sterile barrier technique was used. Cutaneous antisepsis was performed at the operative site with application of 2% chlorhexidine and large sterile drape. Prior to the procedure, the  and assistant performed hand hygiene and wore hat, mask, sterile gown, and sterile gloves during the entire procedure. PROCEDURE: Limited left neck ultrasound demonstrated a patent internal jugular vein; images saved of the permanent patient medical record. The overlying skin and subcutaneous soft tissues were anesthetized using 1% lidocaine with epinephrine. Under ultrasound guidance, the left internal jugular vein was accessed using a micropuncture needle; images saved of the permanent patient medical record. Access was secured using a 4 [place]ian transitional sheath. A Workec guidewire was advanced into the distal IVC. Our attention was then turned to the chest wall site. The overlying skin and subcutaneous soft tissues were anesthetized using 1% lidocaine with epinephrine. Using combination of sharp and blunt dissection a subcutaneous pocket was created. The catheter tubing was tunneled in an antegrade fashion from the port pocket to the dermatotomy site. Under direct fluoroscopic visualization, a peel-away sheath was advanced over the wire and positioned within the superior vena cava. Through the peel-away sheath, the catheter tubing was advanced until the tip was in the right atrium. The catheter tubing was cut to length and attached firmly to the port. The port was placed within the subcutaneous pocket and tested. The port tubing was then primed with 500 units of heparin. The port pocket incision was closed with layered absorbable sutures and surgical glue. The dermatotomy site was closed with surgical glue.     IMPRESSION:  1.  Successful implantable venous chest port placement. PLAN: 1. Port is ready to be used.    Total time spent with patient in face to face time, chart  "review and documentation was 35 minutes.        Signed by: TORO Teresa CNP    Oncology Rooming Note    October 10, 2023 11:36 AM   Young Ordonez is a 73 year old male who presents for:    Chief Complaint   Patient presents with     Oncology Clinic Visit     Primary malignant neoplasm of right lower lobe of lung (H)     Initial Vitals: /65   Pulse 65   Temp 98.8  F (37.1  C)   Resp 18   Wt 75.4 kg (166 lb 3.2 oz)   SpO2 96%   BMI 26.03 kg/m   Estimated body mass index is 26.03 kg/m  as calculated from the following:    Height as of 9/26/23: 1.702 m (5' 7\").    Weight as of this encounter: 75.4 kg (166 lb 3.2 oz). Body surface area is 1.89 meters squared.  No Pain (0) Comment: Data Unavailable   No LMP for male patient.  Allergies reviewed: Yes  Medications reviewed: Yes    Medications: Medication refills not needed today.  Pharmacy name entered into [facility]:    [facility] [id number] [place] [facility] [address] [facility] [address] [place] [address] [facility]    Clinical concerns: None       Yuki Carrillo LPN                 Again, thank you for allowing me to participate in the care of your patient.        Sincerely,        TORO Teresa CNP  "

## 2023-10-10 NOTE — PROGRESS NOTES
Infusion Nursing Note:  Young Ordonez presents today for C1, D22 of his chemotherapy plan.    Patient seen by provider today: Yes: CARROLL Hill.   present during visit today: Not Applicable.    Note: Will arrived ambulatory by himself for treatment following his office visit with CARROLL Hill. Plan of care reviewed and Will has no questions.  Pre medications pepcid, zofran/dexamethasone and IV Benadryl (IV per patient preference) administered 30 minutes prior to treatment.  Flu shot administered IM in right deltoid.    Intravenous Access:  Implanted Port accessed and labs drawn per DAVID Cavanaugh.    Treatment Conditions:  Lab Results   Component Value Date    HGB 10.2 (L) 10/10/2023    WBC 5.3 10/10/2023    ANEU 1.9 07/13/2007    ANEUTAUTO 4.0 10/10/2023     (L) 10/10/2023        Lab Results   Component Value Date     09/26/2023    POTASSIUM 3.7 09/26/2023    MAG 2.4 (H) 08/21/2020    CR 1.30 (H) 10/10/2023    KALIE 8.9 09/26/2023    BILITOTAL 0.3 09/26/2023    ALBUMIN 3.8 09/26/2023    ALT 12 09/26/2023    AST 26 09/26/2023       Results reviewed, labs MET treatment parameters, ok to proceed with treatment.    Post Infusion Assessment:  Patient tolerated infusion without incident.  Blood return noted pre and post infusion.  Site patent and intact, free from redness, edema or discomfort.  Access discontinued per protocol.     Discharge Plan:   Patient will return October 17th for next appointment.  Patient discharged in stable condition accompanied by: self.  Departure Mode: Ambulatory.      Tasneem Roman RN

## 2023-10-10 NOTE — PROGRESS NOTES
"Oncology Rooming Note    October 10, 2023 11:36 AM   Young Ordonez is a 73 year old male who presents for:    Chief Complaint   Patient presents with    Oncology Clinic Visit     Primary malignant neoplasm of right lower lobe of lung (H)     Initial Vitals: /65   Pulse 65   Temp 98.8  F (37.1  C)   Resp 18   Wt 75.4 kg (166 lb 3.2 oz)   SpO2 96%   BMI 26.03 kg/m   Estimated body mass index is 26.03 kg/m  as calculated from the following:    Height as of 9/26/23: 1.702 m (5' 7\").    Weight as of this encounter: 75.4 kg (166 lb 3.2 oz). Body surface area is 1.89 meters squared.  No Pain (0) Comment: Data Unavailable   No LMP for male patient.  Allergies reviewed: Yes  Medications reviewed: Yes    Medications: Medication refills not needed today.  Pharmacy name entered into [employer]:    [facility] [id number] [place] [facility] [address]  [facility] [address] [place] [facility] [address] [place] [address] PHARMACY [address]    Clinical concerns: None       Yuki Carrillo LPN             "

## 2023-10-10 NOTE — PROGRESS NOTES
[facility] Hematology and Oncology Progress Note    Patient: Young Ordonez  MRN: [id number]  Date of Service: 10/10/2023        Reason for Visit    Chief Complaint   Patient presents with    Oncology Clinic Visit     Primary malignant neoplasm of right lower lobe of lung (H)       Assessment and Plan     Cancer Staging   Primary malignant neoplasm of right lower lobe of lung (H)  Staging form: Lung, AJCC 8th Edition  - Clinical stage from 8/30/2023: Stage IIIB (cT4(2), cN2, cM0) - Signed by Glenn Crowe MD on 9/3/2023    Lung cancer, right upper lobe, right lower lobe, Left upper lobe: unclear if multiple primaries or metastatic disease. For now, we are treating as multiple primaries. He is getting concurrent chemo/radiation for right side. On weekly taxol, carboplatin. Today is week 4. Radiation today is 17/33 fractions.  Tolerating things well.  He will continue weekly treatment and will be seen again by a provider in 2 weeks.  For the left upper lobe, we will do SRS once the right side is treated.     Bladder cancer, low grade, non muscle invasive. Pt is s/p TURBT with intravesicular mitomycin C on 8/3/23. Currently on observation with Dr. Mitchell. Will do cytoscopies every 3 months.     Anemia: chemo induced and usually cumulative. Overall asymptomatic except fatigue. Continue to monitor.     Fatigue: likely chemo induced and is cumulative. Encourage good hydration, healthy diet with good protein. Also encourage daily exercise and to be as active as possible.     Renal insufficiency: likely from medications. Avoid nephrotoxic medications.  Stay hydrated.  Keep blood pressure well controlled. Will dose carboplatin based on kidney dysfunction      ECOG Performance    1 - Can't do physically strenuous work, but fully ambyulatory and can do light sedentary work    Distress Screening (within last 30 days)    1. How concerned are you about your ability to eat? : 0  2. How concerned are you about unintended  weight loss or your current weight? : 0  3. How concerned are you about feeling depressed or very sad? : 0  4. How concerned are you about feeling anxious or very scared? : 0  5. Do you struggle with the loss of meaning and fadi in your life? : Not at all  6. How concerned are you about work and home life issues that may be affected by your cancer? : 0  7. How concerned are you about knowing what resources are available to help you? : 0  8. Do you currently have what you would describe as Zoroastrianism or spiritual struggles?: Not at all       Pain  Pain Score: No Pain (0)    Problem List    Patient Active Problem List   Diagnosis    Essential hypertension, benign    Acute myocardial infarction (H)    Hyperlipidemia    Depressive disorder, not elsewhere classified    Ischemic stroke (H)    PAD (peripheral artery disease) (H24)    History of alcohol dependence (H)    Chronic obstructive pulmonary disease, unspecified COPD type (H)    Recurrent major depressive disorder, remission status unspecified (H24)    Chronic kidney disease, stage 3a (H)    Stenosis of left carotid artery    Decreased ejection fraction    Primary malignant neoplasm of right lower lobe of lung (H)    Encounter for antineoplastic chemotherapy        ______________________________________________________________________________    History of Present Illness    Oncologist: Dr. Crowe.     Diagnosis  Lung Cancer  8/2023-right upper lobe of the lung, right lower lobe with thoracic lymph node metastases.  Additional mildly FDG avid spiculated nodule in the periphery of the left upper lobe.              -Right upper lobe lung mass biopsy showed non-small cell carcinoma (squamous cell carcinoma).  TPS 25%, low PD-L1 expression.  Fusion negative. CDKN2A 58fs positive.  No targetable mutation.    8/18/2023-PET scan showed 3 x 2.2 x 2.2 cm anterior right upper lobe mass with additional FDG avid nodule in the right lower lobe measuring 2.4 x 1.1 x 2.1 cm.  FDG  avid right 11 R, 7, right lower station 4 lymph nodes were suspicious for synchronous right upper/lower primary lung neoplasm with thoracic lymph node metastasis.  He has additional FDG avid left upper lobe lesion of 1.1 x 0.5 cm likely representing additional synchronous neoplasm.  8/16/2023-MRI brain was negative for intracranial metastatic disease.    Bladder cancer:    8/3/2023-biopsy of the bladder tumor showed papillary transitional carcinoma, low-grade, no evidence of invasive malignancy.     Treatment to date  Lung Cancer  9/18/2023- definitive chemoradiation (weekly carboplatin/paclitaxel) for right upper lobe lung cancer.              -Left upper lobe lung cancer is plan to be treated with consolidative SBRT after the treatment of right lung cancer    Bladder Cancer  8/3/23: TURBT with intravesicular Mitomycin C. Currently on observation with cytoscopies planned for every 3 months.     Interim History:     Here today for a follow-up visit and to continue on chemo.  He has now been on the treatment for about 3 weeks.  He states that overall is going well.  His biggest complaint is that he is having more fatigue the longer he is on the treatment.  He says he just does not have a lot of stamina.  He also does get a little bit of shortness with breath on exertion.  He denies any chest pain or palpitations.  Denies any weight loss.  Denies any nausea or vomiting.  Denies any new bone or back pain.  He states that the radiation oncologist did tell him that he is already responding pretty nicely to the treatment so he is happy to hear that.      Review of Systems    Pertinent items are noted in HPI.    Past History    Past Medical History:   Diagnosis Date    Acute myocardial infarction, unspecified site, episode of care unspecified 01/01/1995    Arthritis     Cardiomyopathy (H)     Cerebral artery occlusion with cerebral infarction (H) 2018    lost vision in right eye    Claudication (H24)     COPD (chronic  obstructive pulmonary disease) (H)     Coronary artery disease     Depression     DEPRESSIVE DISORDER NEC 04/29/2008    Essential hypertension, benign     Gout     Gout attack 05/25/2017    Hepatitis C carrier (H)     HTN (hypertension)     Hyperlipidemia     Infectious viral hepatitis     carrier of viral hepatitis    Low back pain     chronic    MI (myocardial infarction) (H) 01/01/1995    Obesity     Other and unspecified hyperlipidemia     PAD (peripheral artery disease) (H24)     Peyronie's disease        PHYSICAL EXAM:  /65   Pulse 65   Temp 98.8  F (37.1  C)   Resp 18   Wt 75.4 kg (166 lb 3.2 oz)   SpO2 96%   BMI 26.03 kg/m    GENERAL: no acute distress. Cooperative in conversation. Here alone  RESP: lungs are clear bilaterally per auscultation. Regular respiratory rate. No wheezes or rhonchi.  CV: Regular, rate and rhythm. No murmurs.  ABD: soft, nontender.    MUSCULOSKELETAL: No lower extremity swelling.   NEURO: non focal. Alert and oriented x3.   PSYCH: within normal limits. No depression or anxiety.  SKIN: warm dry intact         Lab Results    Recent Results (from the past 168 hour(s))   Creatinine   Result Value Ref Range    Creatinine 1.30 (H) 0.67 - 1.17 mg/dL    GFR Estimate 58 (L) >60 mL/min/1.73m2   CBC with platelets and differential   Result Value Ref Range    WBC Count 5.3 4.0 - 11.0 10e3/uL    RBC Count 3.30 (L) 4.40 - 5.90 10e6/uL    Hemoglobin 10.2 (L) 13.3 - 17.7 g/dL    Hematocrit 29.9 (L) 40.0 - 53.0 %    MCV 91 78 - 100 fL    MCH 30.9 26.5 - 33.0 pg    MCHC 34.1 31.5 - 36.5 g/dL    RDW 15.0 10.0 - 15.0 %    Platelet Count 112 (L) 150 - 450 10e3/uL    % Neutrophils 74 %    % Lymphocytes 14 %    % Monocytes 8 %    Mids % (Monos, Eos, Basos)      % Eosinophils 2 %    % Basophils 1 %    % Immature Granulocytes 1 %    NRBCs per 100 WBC 0 <1 /100    Absolute Neutrophils 4.0 1.6 - 8.3 10e3/uL    Absolute Lymphocytes 0.8 0.8 - 5.3 10e3/uL    Absolute Monocytes 0.4 0.0 - 1.3 10e3/uL     Mids Abs (Monos, Eos, Basos)      Absolute Eosinophils 0.1 0.0 - 0.7 10e3/uL    Absolute Basophils 0.1 0.0 - 0.2 10e3/uL    Absolute Immature Granulocytes 0.0 <=0.4 10e3/uL    Absolute NRBCs 0.0 10e3/uL         Imaging    IR Chest Port Placement > 5 Yrs of Age    Result Date: 9/11/2023  [facility] LOCATION: [facility] DATE: 9/11/2023 PROCEDURE: IMPLANTABLE VENOUS CHEST PORT PLACEMENT (POWER INJECTABLE) INTERVENTIONAL RADIOLOGIST: ROD Solorzano MD. INDICATION: 73 year old male PMH HTN, HLD, obesity, MI, CVA, PAD, COPD and recent dx of non-small cell carcinoma of the RIGHT upper lobe with likely lymph node mets, synchronous RLL and JAYCOB neoplasms and a RIGHT bladder mass 2/2 non-muscle invasive papillary transitional carcinoma. CONSENT: The risks, benefits and alternatives of implantable venous chest port placement were discussed with the patient in detail. All questions were answered. Informed consent was given to proceed with the procedure. MODERATE SEDATION: Versed 2 mg IV; Fentanyl 100 mcg IV.  During the timeout, immediately prior to the administration of medications, the patient was reassessed for adequacy to receive conscious sedation. Under physician supervision, Versed and fentanyl were administered for moderate sedation. Pulse oximetry, heart rate and blood pressure were continuously monitored by an independent trained observer. The physician spent 63 minutes of face-to-face sedation time with the patient. CONTRAST: None. ANTIBIOTICS: Ancef 2 g IV. ADDITIONAL MEDICATIONS: Heparin 500 U IV FLUOROSCOPIC TIME: 10.4 minutes. RADIATION DOSE: Air Kerma: 84 mGy. COMPLICATIONS: No immediate complications. STERILE BARRIER TECHNIQUE: Maximum sterile barrier technique was used. Cutaneous antisepsis was performed at the operative site with application of 2% chlorhexidine and large sterile drape. Prior to the procedure, the  and assistant performed hand hygiene and wore  hat, mask, sterile gown, and sterile gloves during the entire procedure. PROCEDURE: Limited left neck ultrasound demonstrated a patent internal jugular vein; images saved of the permanent patient medical record. The overlying skin and subcutaneous soft tissues were anesthetized using 1% lidocaine with epinephrine. Under ultrasound guidance, the left internal jugular vein was accessed using a micropuncture needle; images saved of the permanent patient medical record. Access was secured using a 4 [place]uv[place] transitional sheath. A [employer] guidewire was advanced into the distal IVC. Our attention was then turned to the chest wall site. The overlying skin and subcutaneous soft tissues were anesthetized using 1% lidocaine with epinephrine. Using combination of sharp and blunt dissection a subcutaneous pocket was created. The catheter tubing was tunneled in an antegrade fashion from the port pocket to the dermatotomy site. Under direct fluoroscopic visualization, a peel-away sheath was advanced over the wire and positioned within the superior vena cava. Through the peel-away sheath, the catheter tubing was advanced until the tip was in the right atrium. The catheter tubing was cut to length and attached firmly to the port. The port was placed within the subcutaneous pocket and tested. The port tubing was then primed with 500 units of heparin. The port pocket incision was closed with layered absorbable sutures and surgical glue. The dermatotomy site was closed with surgical glue.     IMPRESSION:  1.  Successful implantable venous chest port placement. PLAN: 1. Port is ready to be used.    Total time spent with patient in face to face time, chart review and documentation was 35 minutes.        Signed by: TORO Teresa CNP

## 2023-10-11 NOTE — PROGRESS NOTES
"[facility]: Cancer Care - RN CC Symptom Call          Situation                                                               Patient called and left message on Cancer Care Triage voicemail that he's not going to make it in for his \"daily xray of his chest\". He said he is feeling really sick. He asked for call back.       Called patient.  See assessment below.    Assessment                                                      Presenting Problem: Constipation  Cancer Diagnosis: lung cancer  Cancer treatment & last dose:   Infusion given in last 28 days       Administered MAR Action Medication Dose Rate Visit    09/18/2023 11:43 New Bag PACLitaxel (TAXOL) 85 mg in sodium chloride 0.9% in non-PVC container 239.17 mL infusion 85 mg 239.2 mL/hr Infusion Therapy Visit on 09/18/2023 in [facility]    09/18/2023 13:38 New Bag CARBOplatin 140 mg in sodium chloride 0.9 % 124 mL infusion 140 mg 248 mL/hr Infusion Therapy Visit on 09/18/2023 in [facility]    09/26/2023 12:08 New Bag PACLitaxel (TAXOL) 85 mg in sodium chloride 0.9% in non-PVC container 289.17 mL infusion 85 mg 289.2 mL/hr Infusion Therapy Visit on 09/26/2023 in [facility]    09/26/2023 13:37 New Bag CARBOplatin 155 mg in sodium chloride 0.9 % 125.5 mL infusion 155 mg 251 mL/hr Infusion Therapy Visit on 09/26/2023 in [facility]    10/03/2023 12:48 New Bag PACLitaxel (TAXOL) 85 mg in sodium chloride 0.9% in non-PVC container 289.17 mL infusion 85 mg 289.2 mL/hr Infusion Therapy Visit on 10/03/2023 in [facility]    10/03/2023 14:07 New Bag CARBOplatin 160 mg in sodium chloride 0.9 % 126 mL infusion 160 mg 252 mL/hr Infusion Therapy Visit on 10/03/2023 in [facility]    10/10/2023 13:35 New Bag PACLitaxel (TAXOL) 85 mg in sodium chloride 0.9% in non-PVC container 289.17 mL infusion 85 " mg 289.2 mL/hr Infusion Therapy Visit on 10/10/2023 in [facility]    10/10/2023 14:46 New Bag CARBOplatin 145 mg in sodium chloride 0.9 % 124.5 mL infusion 145 mg 249 mL/hr Infusion Therapy Visit on 10/10/2023 in [facility]            Subjective/Objective: Patient reports constipation. Last BM early this morning He said it was hard. He said it was like trying to poop a piece of lava out. He said he took 2 laxatives early this morning.   Patient reports abd discomfort. He said his stomach is girgling. He said he is passing gas.                     Intervention                                                      Homecare instructions:   Patient has Senna S and will start taking 1 tablet twice a day. Told patient may need to adjust depending on consistency and frequency of stools.  If not good BM this afternoon, he was instructed to take miralax.  If no good BM by tomorrow morning, instructed to take another dose of miralax. If still no good BM by late morning/early afternoon, he is to call Cancer Care Triage for further management.     Patient said he's canceling his Radiation appointment today because doesn't want to be here when he finally has a BM. He said he will be here tomorrow.    Plan                                                      See above.    Nuha Chavez, Scheduling, notified Radiation Therapist that patient canceled Radiation appointment today.     Patient verbalizes understanding of and agrees with plan? YES    Signature:  Rosalva Chavez RN      Protocol used: Cancer Care Nursing Triage - Constipation

## 2023-10-16 NOTE — PROGRESS NOTES
RADIATION ONCOLOGY WEEKLY TREATMENT VISIT NOTE      Assessment / Impression     Primary malignant neoplasm of right lower lobe of lung (H) [C34.31]     Cancer Staging   Primary malignant neoplasm of right lower lobe of lung (H)  Staging form: Lung, AJCC 8th Edition  - Clinical stage from 2023: Stage IIIB (cT4(2), cN2, cM0) - Signed by Glenn Crowe MD on 9/3/2023       Tolerating radiation therapy well.  All questions and concerns addressed.  No pain. Denies esophagitis.     Plan:       Continue radiation treatment as prescribed.  Radiation:   Site: R lung  Stereotactic Radiosurgery: No  Concurrent Therapy: Yes (taxol/carbo)  Protocol: Taxol. carboplatin  Today's Dose: 4000  Total Dose for Thoracic: 6600  Today's Fraction/Total Fraction Thoracic:       Subjective:      HPI: Young Ordonez is a 73 year old male with  Primary malignant neoplasm of right lower lobe of lung (H) [C34.31]    The following portions of the patient's history were reviewed and updated as appropriate: allergies, current medications, past family history, past medical history, past social history, past surgical history and problem list.    Assessment                  Body Site:  Thoracic Site: R lung  Stereotactic Radiosurgery: No  Concurrent Therapy: Yes (taxol/carbo)  Today's Dose: 4000  Total Dose for Thoracic: 6600  Today's Fraction/Total Fraction Thoracic:   Voice Chances/Stridor/Larynx: 0: Normal  Pharynx and Esphogaus: 0: No change over baseline  Constipation: 0: None  Diarrhea W/O Colostomy: 0: None  Hemoptysis: 0: None  Dyspnea: 0: Normal                                   Sexuality Alteration                    Emotional Alteration    Copin: Effective  Comfort Alteration   KPS: 80% Can perform normal activity with effort, some signs of disease  Fatigue (ONS scale): 4: Moderate Fatigue   Nutrition Alteration   Anorexia: 0: None  Nausea: 0: None  Vomitin: None  Weight: 74.2 kg (163 lb 8 oz)  Pharynx  and Esphogaus: 0: No change over baseline  Skin Alteration   Skin Sensation: 1:Pruitis  Skin Reaction: 0: None  AUA Assessment                                           Accompanied by       Objective:     Exam:     Vitals:    10/16/23 1107   BP: 135/69   Pulse: 69   Resp: 16   Temp: 97.9  F (36.6  C)   SpO2: 97%   Weight: 74.2 kg (163 lb 8 oz)       Wt Readings from Last 8 Encounters:   10/16/23 74.2 kg (163 lb 8 oz)   10/10/23 75.4 kg (166 lb 3.2 oz)   10/09/23 74.5 kg (164 lb 3.2 oz)   10/03/23 75.7 kg (166 lb 14.4 oz)   10/02/23 75.8 kg (167 lb 1.6 oz)   09/26/23 76.2 kg (168 lb)   09/25/23 75.8 kg (167 lb 1.6 oz)   09/18/23 76.5 kg (168 lb 11.2 oz)       General: Alert and oriented, in no acute distress  Young has no Erythema.    Treatment Summary to Date    Aria chart and setup information reviewed    Ifeoma Angel MD

## 2023-10-16 NOTE — LETTER
10/16/2023         RE: Young Ordonez  [address] [facility] [address]        Dear Colleague,    Thank you for referring your patient, Young Ordonez, to the [facility] RADIATION ONCOLOGY [place]. Please see a copy of my visit note below.    RADIATION ONCOLOGY WEEKLY TREATMENT VISIT NOTE      Assessment / Impression     Primary malignant neoplasm of right lower lobe of lung (H) [C34.31]     Cancer Staging   Primary malignant neoplasm of right lower lobe of lung (H)  Staging form: Lung, AJCC 8th Edition  - Clinical stage from 2023: Stage IIIB (cT4(2), cN2, cM0) - Signed by Glenn Crowe MD on 9/3/2023       Tolerating radiation therapy well.  All questions and concerns addressed.  No pain. Denies esophagitis.     Plan:       Continue radiation treatment as prescribed.  Radiation:   Site: R lung  Stereotactic Radiosurgery: No  Concurrent Therapy: Yes (taxol/carbo)  Protocol: Taxol. carboplatin  Today's Dose: 4000  Total Dose for Thoracic: 6600  Today's Fraction/Total Fraction Thoracic:       Subjective:      HPI: Young Ordonez is a 73 year old male with  Primary malignant neoplasm of right lower lobe of lung (H) [C34.31]    The following portions of the patient's history were reviewed and updated as appropriate: allergies, current medications, past family history, past medical history, past social history, past surgical history and problem list.    Assessment                  Body Site:  Thoracic Site: R lung  Stereotactic Radiosurgery: No  Concurrent Therapy: Yes (taxol/carbo)  Today's Dose: 4000  Total Dose for Thoracic: 6600  Today's Fraction/Total Fraction Thoracic:   Voice Chances/Stridor/Larynx: 0: Normal  Pharynx and Esphogaus: 0: No change over baseline  Constipation: 0: None  Diarrhea W/O Colostomy: 0: None  Hemoptysis: 0: None  Dyspnea: 0: Normal                                   Sexuality Alteration                    Emotional Alteration    Copin:  Effective  Comfort Alteration   KPS: 80% Can perform normal activity with effort, some signs of disease  Fatigue (ONS scale): 4: Moderate Fatigue   Nutrition Alteration   Anorexia: 0: None  Nausea: 0: None  Vomitin: None  Weight: 74.2 kg (163 lb 8 oz)  Pharynx and Esphogaus: 0: No change over baseline  Skin Alteration   Skin Sensation: 1:Pruitis  Skin Reaction: 0: None  AUA Assessment                                           Accompanied by       Objective:     Exam:     Vitals:    10/16/23 1107   BP: 135/69   Pulse: 69   Resp: 16   Temp: 97.9  F (36.6  C)   SpO2: 97%   Weight: 74.2 kg (163 lb 8 oz)       Wt Readings from Last 8 Encounters:   10/16/23 74.2 kg (163 lb 8 oz)   10/10/23 75.4 kg (166 lb 3.2 oz)   10/09/23 74.5 kg (164 lb 3.2 oz)   10/03/23 75.7 kg (166 lb 14.4 oz)   10/02/23 75.8 kg (167 lb 1.6 oz)   23 76.2 kg (168 lb)   23 75.8 kg (167 lb 1.6 oz)   23 76.5 kg (168 lb 11.2 oz)       General: Alert and oriented, in no acute distress  Young has no Erythema.    Treatment Summary to Date    Aria chart and setup information reviewed    Ifeoma Angel MD      Again, thank you for allowing me to participate in the care of your patient.        Sincerely,        Ifeoma Angel MD

## 2023-10-17 NOTE — PROGRESS NOTES
Infusion Nursing Note:  Young Ordonez presents today for Taxol & Carbo.    Patient seen by provider today: No   present during visit today: Not Applicable.    Note: Patient was pre-medicated with Decadron, Pepcid, and IV Benadryl.      Intravenous Access:  Implanted Port.    Treatment Conditions:  Results reviewed, labs MET treatment parameters, ok to proceed with treatment.      Post Infusion Assessment:  Patient tolerated infusion without incident.       Discharge Plan:   Patient discharged in stable condition accompanied by: lula Fraser RN

## 2023-10-23 NOTE — PROGRESS NOTES
RADIATION ONCOLOGY WEEKLY TREATMENT VISIT NOTE      Assessment / Impression     Primary malignant neoplasm of right lower lobe of lung (H) [C34.31]     Cancer Staging   Primary malignant neoplasm of right lower lobe of lung (H)  Staging form: Lung, AJCC 8th Edition  - Clinical stage from 2023: Stage IIIB (cT4(2), cN2, cM0) - Signed by Glenn Crowe MD on 9/3/2023       Tolerating radiation therapy well.  All questions and concerns addressed.  Constipation lat BM a couple days ago.     Plan:   Sennekot and stool softeners discussed.   Continue radiation treatment as prescribed.  Pleural effusion spontaneously resolving without tap or diuretics     Radiation:   Site: Rt. Lung  Stereotactic Radiosurgery: No  Concurrent Therapy: Yes (taxol/carbo)  Today's Dose: 5000  Total Dose for Thoracic: 6000  Today's Fraction/Total Fraction Thoracic:       Subjective:      HPI: Young Ordonez is a 73 year old male with  Primary malignant neoplasm of right lower lobe of lung (H) [C34.31]    The following portions of the patient's history were reviewed and updated as appropriate: allergies, current medications, past family history, past medical history, past social history, past surgical history and problem list.    Assessment                  Body Site:  Thoracic Site: Rt. Lung  Stereotactic Radiosurgery: No  Concurrent Therapy: Yes (taxol/carbo)  Today's Dose: 5000  Total Dose for Thoracic: 6000  Today's Fraction/Total Fraction Thoracic:   Voice Chances/Stridor/Larynx: 0: Normal  Pharynx and Esphogaus: 0: No change over baseline  Constipation: 0: None  Diarrhea W/O Colostomy: 0: None  Hemoptysis: 0: None  Dyspnea: 0: Normal                                   Sexuality Alteration                    Emotional Alteration    Copin: Effective  Comfort Alteration   KPS: 80% Can perform normal activity with effort, some signs of disease  Fatigue (ONS scale): 4: Moderate Fatigue;7: Extreme Fatigue  Pain  Location: denies   Nutrition Alteration   Anorexia: 1: Loss of appetite  Nausea: 0: None  Vomitin: None  Weight: 73.1 kg (161 lb 1.6 oz)  Pharynx and Esphogaus: 0: No change over baseline  Skin Alteration   Skin Sensation: 1:Pruitis  Skin Reaction: 0: None  AUA Assessment                                           Accompanied by       Objective:     Exam:     Vitals:    10/23/23 1102   BP: 130/58   Pulse: 67   Resp: 16   Temp: 97.8  F (36.6  C)   TempSrc: Oral   SpO2: 99%   Weight: 73.1 kg (161 lb 1.6 oz)   PainSc: No Pain (0)       Wt Readings from Last 8 Encounters:   10/23/23 73.1 kg (161 lb 1.6 oz)   10/16/23 74.2 kg (163 lb 8 oz)   10/10/23 75.4 kg (166 lb 3.2 oz)   10/09/23 74.5 kg (164 lb 3.2 oz)   10/03/23 75.7 kg (166 lb 14.4 oz)   10/02/23 75.8 kg (167 lb 1.6 oz)   23 76.2 kg (168 lb)   23 75.8 kg (167 lb 1.6 oz)       General: Alert and oriented, in no acute distress  Young has no Erythema.    Treatment Summary to Date    Aria chart and setup information reviewed    Ifeoma Angel MD

## 2023-10-23 NOTE — LETTER
10/23/2023         RE: Yuong Ordonez  [address] [facility] [address]        Dear Colleague,    Thank you for referring your patient, Young Ordonez, to the [facility] RADIATION ONCOLOGY [place]. Please see a copy of my visit note below.    RADIATION ONCOLOGY WEEKLY TREATMENT VISIT NOTE      Assessment / Impression     Primary malignant neoplasm of right lower lobe of lung (H) [C34.31]     Cancer Staging   Primary malignant neoplasm of right lower lobe of lung (H)  Staging form: Lung, AJCC 8th Edition  - Clinical stage from 8/30/2023: Stage IIIB (cT4(2), cN2, cM0) - Signed by Glenn Crowe MD on 9/3/2023       Tolerating radiation therapy well.  All questions and concerns addressed.  Constipation lat BM a couple days ago.     Plan:   Sennekot and stool softeners discussed.   Continue radiation treatment as prescribed.  Pleural effusion spontaneously resolving without tap or diuretics     Radiation:   Site: Rt. Lung  Stereotactic Radiosurgery: No  Concurrent Therapy: Yes (taxol/carbo)  Today's Dose: 5000  Total Dose for Thoracic: 6000  Today's Fraction/Total Fraction Thoracic: 25/33      Subjective:      HPI: Young Ordonez is a 73 year old male with  Primary malignant neoplasm of right lower lobe of lung (H) [C34.31]    The following portions of the patient's history were reviewed and updated as appropriate: allergies, current medications, past family history, past medical history, past social history, past surgical history and problem list.    Assessment                  Body Site:  Thoracic Site: Rt. Lung  Stereotactic Radiosurgery: No  Concurrent Therapy: Yes (taxol/carbo)  Today's Dose: 5000  Total Dose for Thoracic: 6000  Today's Fraction/Total Fraction Thoracic: 25/33  Voice Chances/Stridor/Larynx: 0: Normal  Pharynx and Esphogaus: 0: No change over baseline  Constipation: 0: None  Diarrhea W/O Colostomy: 0: None  Hemoptysis: 0: None  Dyspnea: 0: Normal                                    Sexuality Alteration                    Emotional Alteration    Copin: Effective  Comfort Alteration   KPS: 80% Can perform normal activity with effort, some signs of disease  Fatigue (ONS scale): 4: Moderate Fatigue;7: Extreme Fatigue  Pain Location: denies   Nutrition Alteration   Anorexia: 1: Loss of appetite  Nausea: 0: None  Vomitin: None  Weight: 73.1 kg (161 lb 1.6 oz)  Pharynx and Esphogaus: 0: No change over baseline  Skin Alteration   Skin Sensation: 1:Pruitis  Skin Reaction: 0: None  AUA Assessment                                           Accompanied by       Objective:     Exam:     Vitals:    10/23/23 1102   BP: 130/58   Pulse: 67   Resp: 16   Temp: 97.8  F (36.6  C)   TempSrc: Oral   SpO2: 99%   Weight: 73.1 kg (161 lb 1.6 oz)   PainSc: No Pain (0)       Wt Readings from Last 8 Encounters:   10/23/23 73.1 kg (161 lb 1.6 oz)   10/16/23 74.2 kg (163 lb 8 oz)   10/10/23 75.4 kg (166 lb 3.2 oz)   10/09/23 74.5 kg (164 lb 3.2 oz)   10/03/23 75.7 kg (166 lb 14.4 oz)   10/02/23 75.8 kg (167 lb 1.6 oz)   23 76.2 kg (168 lb)   23 75.8 kg (167 lb 1.6 oz)       General: Alert and oriented, in no acute distress  Young has no Erythema.    Treatment Summary to Date    Aria chart and setup information reviewed    Ifeoma Angel MD      Again, thank you for allowing me to participate in the care of your patient.        Sincerely,        Ifeoma Angel MD

## 2023-10-24 NOTE — PROGRESS NOTES
Infusion Nursing Note:  Young Ordonez presents today for Cycle 1, Day 36 of his chemotherapy treatment plan.    Patient seen by provider today: Yes: Dr. Crowe   present during visit today: Not Applicable.    Note: Will arrived ambulatory by himself for treatment following his office visit with Dr. Crowe. Plan of care reviewed and Will has no questions.  Pre medications pepcid, zofran/dexamethasone and IV Benadryl (IV per patient preference) administered 30 minutes prior to treatment.    Intravenous Access:  Implanted Port accessed and labs drawn per DAVID Santos.    Treatment Conditions:  Lab Results   Component Value Date    HGB 8.8 (L) 10/24/2023    WBC 3.9 (L) 10/24/2023    ANEU 1.9 07/13/2007    ANEUTAUTO 3.0 10/24/2023    PLT 98 (L) 10/24/2023        Lab Results   Component Value Date     09/26/2023    POTASSIUM 3.7 09/26/2023    MAG 2.4 (H) 08/21/2020    CR 1.19 (H) 10/24/2023    KALIE 8.9 09/26/2023    BILITOTAL 0.3 09/26/2023    ALBUMIN 3.8 09/26/2023    ALT 12 09/26/2023    AST 26 09/26/2023       Results reviewed, labs MET treatment parameters, ok to proceed with treatment.    Post Infusion Assessment:  Patient tolerated infusion without incident.  Blood return noted pre and post infusion.  Site patent and intact, free from redness, edema or discomfort.  No evidence of extravasations.  Access discontinued per protocol.     Discharge Plan:   Copy of AVS reviewed with patient and/or family.  Patient will return October 31st for next appointment.  Patient discharged in stable condition accompanied by: self.  Departure Mode: Ambulatory.      Tasneem Roman RN

## 2023-10-24 NOTE — PROGRESS NOTES
"Oncology Rooming Note    October 24, 2023 9:23 AM   Young Ordonez is a 73 year old male who presents for:    Chief Complaint   Patient presents with    Oncology Clinic Visit     Primary malignant neoplasm of right lower lobe of lung (H)     Initial Vitals: /80   Pulse 69   Temp 97.9  F (36.6  C)   Resp 18   Wt 72.9 kg (160 lb 12.8 oz)   SpO2 95%   BMI 25.18 kg/m   Estimated body mass index is 25.18 kg/m  as calculated from the following:    Height as of 9/26/23: 1.702 m (5' 7\").    Weight as of this encounter: 72.9 kg (160 lb 12.8 oz). Body surface area is 1.86 meters squared.  No Pain (0) Comment: Data Unavailable   No LMP for male patient.  Allergies reviewed: Yes  Medications reviewed: Yes    Medications: Medication refills not needed today.  Pharmacy name entered into [employer]:    [facility] [address] [place] [facility] [address] [place] [place] [facility] [address] [place] [facility] [address] [place] [address] PHARMAC[address]    Clinical concerns: None       Yuki Carrillo LPN             "

## 2023-10-24 NOTE — LETTER
10/24/2023         RE: Young Ordonez  [address] [facility] [address]        Dear Colleague,    Thank you for referring your patient, Young Ordonez, to the [facility] CANCER [facility]. Please see a copy of my visit note below.    [facility] Hematology and Oncology Progress Note    Patient: Young Ordonez  MRN: [id number]  Date of Service: Oct 24, 2023         Reason for Visit    Chief Complaint   Patient presents with     Oncology Clinic Visit     Primary malignant neoplasm of right lower lobe of lung (H)       Assessment and Plan     Cancer Staging   Primary malignant neoplasm of right lower lobe of lung (H)  Staging form: Lung, AJCC 8th Edition  - Clinical stage from 8/30/2023: Stage IIIB (cT4(2), cN2, cM0) - Signed by Glenn Crowe MD on 9/3/2023      ECOG Performance    0 - Independent     Pain  Pain Score: No Pain (0)    #.  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  A 2.3 x 2.2 cm right upper lobe and additional bilateral pulmonary nodules, suspicious for metastatic disease or primary lung cancer  #.  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 S/p TURBT with intravesical mitomycin on 8/3/2023. On clinical surveillance by Dr. Mitchell.  #.  Past smoker  #.  Significant vascular disease     He is currently on weekly chemotherapy concurrently with radiation.  He has some accumulated and expected side effects with fatigue and poor appetite.  I reviewed his counts and electrolytes, renal function and liver function today.  They are overall adequate to proceed with chemotherapy however I informed him that he has progressive but expectant decline in hemoglobin as well as platelet.    We will continue to closely monitor side effects of chemotherapy.     Return to clinic next week with labs and infusion appointment which will be  the last dose of chemotherapy with radiation.  Recommended to obtain CT scan in 4 weeks and follow-up with me after.  If there is no evidence of disease progression, we will plan to start adjuvant durvalumab.   He is advised to call us sooner with any concerns.    Encounter Diagnoses:    Problem List Items Addressed This Visit          Oncology Diagnoses    Primary malignant neoplasm of right lower lobe of lung (H) - Primary    Relevant Orders    CT Chest/Abdomen/Pelvis w Contrast    Infusion Appointment Request       Other    Encounter for antineoplastic chemotherapy    Relevant Orders    Infusion Appointment Request              CC: Jigar Crawford MD   ______________________________________________________________________________  Diagnosis  8/2023-right upper lobe of the lung, right lower lobe with thoracic lymph node metastases.  Additional mildly FDG avid spiculated nodule in the periphery of the left upper lobe.   -Right upper lobe lung mass biopsy showed non-small cell carcinoma (squamous cell carcinoma).  TPS 25%, low PD-L1 expression.  Fusion negative. CDKN2A 58fs positive.  No targetable mutation.     8/3/2023-A right bladder mass of 2.1 cm which biopsy of the bladder tumor showed papillary transitional carcinoma, low-grade, no evidence of invasive malignancy.    8/18/2023-PET scan showed 3 x 2.2 x 2.2 cm anterior right upper lobe mass with additional FDG avid nodule in the right lower lobe measuring 2.4 x 1.1 x 2.1 cm.  FDG avid right 11 R, 7, right lower station 4 lymph nodes were suspicious for synchronous right upper/lower primary lung neoplasm with thoracic lymph node metastasis.  He has additional FDG avid left upper lobe lesion of 1.1 x 0.5 cm likely representing additional synchronous neoplasm.    8/16/2023-MRI brain was negative for intracranial metastatic disease.    Treatment to date  8/3/2023- S/p TURBT with intravesical mitomycin    9/18/2023- definitive chemoradiation (weekly  carboplatin/paclitaxel) for right upper lobe lung cancer.   -Left upper lobe lung cancer is plan to be treated with consolidative SBRT after the treatment of right lung cancer.    History of Present Illness    . Young Ordonez presented today unaccompanied.    He reported poor appetite.  No nausea or vomiting.  No sensory changes or neuropathy.  He does not have any pain.  He continues to have dysuria.    Review of systems  Apart from describing in HPI, the remainder of comprehensive ROS was negative.    Past History    Past Medical History:   Diagnosis Date     Acute myocardial infarction, unspecified site, episode of care unspecified 01/01/1995     Arthritis      Cardiomyopathy (H)      Cerebral artery occlusion with cerebral infarction (H) 2018    lost vision in right eye     Claudication (H24)      COPD (chronic obstructive pulmonary disease) (H)      Coronary artery disease      Depression      DEPRESSIVE DISORDER NEC 04/29/2008     Essential hypertension, benign      Gout      Gout attack 05/25/2017     Hepatitis C carrier (H)      HTN (hypertension)      Hyperlipidemia      Infectious viral hepatitis     carrier of viral hepatitis     Low back pain     chronic     MI (myocardial infarction) (H) 01/01/1995     Obesity      Other and unspecified hyperlipidemia      PAD (peripheral artery disease) (H24)      Peyronie's disease        Past Surgical History:   Procedure Laterality Date     ANGIOPLASTY  1995     ANKLE FRACTURE SURGERY Left      BYPASS GRAFT AORTOFEMORAL N/A 5/17/2017    Procedure: AORTOBIFEMORAL BYPASS ;  Surgeon: Ilir FERRO MD;  Location: [facility];  Service:      CAROTID ENDARTERECTOMY Right 4/12/2018    Procedure: RIGHT CAROTID ENDARTERECTOMY WITH EEG;  Surgeon: Sergei Lemus MD;  Location: [facility];  Service:      CYSTOSCOPY, TRANSURETHRAL RESECTION (TUR) TUMOR BLADDER, COMBINED N/A 8/3/2023    Procedure: Transurethral resection of bladder tumor 2 cm to 5 cm;   Surgeon: German Mitchell MD;  Location: [facility] OR     FRACTURE SURGERY       IR AORTIC ARCH 4 VESSEL ANGIOGRAM  11/9/2009     IR CAROTID ANGIOGRAM  11/9/2009     IR CAROTID ANGIOGRAM  11/9/2009     IR CHEST PORT PLACEMENT > 5 YRS OF AGE  9/11/2023     IR EXTREMITY ANGIOGRAM BILATERAL  4/6/2017     MANDIBLE SURGERY       ZC NONSPECIFIC PROCEDURE  1995    MI -- angioplasty       Physical Exam    /80   Pulse 69   Temp 97.9  F (36.6  C)   Resp 18   Wt 72.9 kg (160 lb 12.8 oz)   SpO2 95%   BMI 25.18 kg/m      General: alert, awake, not in acute distress  HEENT: Head: Normal, normocephalic, atraumatic.  Eye: Normal external eye, conjunctiva, lids cornea, NARESH.  Pharynx: Normal buccal mucosa. Normal pharynx.  Neck / Thyroid: Supple, no masses, nodes, nodules or enlargement.  Lymphatics: No abnormally enlarged lymph nodes.  Chest: Normal chest wall and respirations. Clear to auscultation.  Heart: S1 S2 RRR.   Abdomen: abdomen is soft without significant tenderness, masses, organomegaly or guarding  Extremities: normal strength, tone, and muscle mass  Skin: normal. no rash or abnormalities  CNS: non focal.    Lab Results    Recent Results (from the past 168 hour(s))   Creatinine   Result Value Ref Range    Creatinine 1.19 (H) 0.67 - 1.17 mg/dL    GFR Estimate 64 >60 mL/min/1.73m2   CBC with platelets and differential   Result Value Ref Range    WBC Count 3.9 (L) 4.0 - 11.0 10e3/uL    RBC Count 2.82 (L) 4.40 - 5.90 10e6/uL    Hemoglobin 8.8 (L) 13.3 - 17.7 g/dL    Hematocrit 26.2 (L) 40.0 - 53.0 %    MCV 93 78 - 100 fL    MCH 31.2 26.5 - 33.0 pg    MCHC 33.6 31.5 - 36.5 g/dL    RDW 16.0 (H) 10.0 - 15.0 %    Platelet Count 98 (L) 150 - 450 10e3/uL    % Neutrophils 78 %    % Lymphocytes 13 %    % Monocytes 6 %    % Eosinophils 1 %    % Basophils 1 %    % Immature Granulocytes 1 %    NRBCs per 100 WBC 0 <1 /100    Absolute Neutrophils 3.0 1.6 - 8.3 10e3/uL    Absolute Lymphocytes 0.5 (L) 0.8 - 5.3 10e3/uL     "Absolute Monocytes 0.3 0.0 - 1.3 10e3/uL    Absolute Eosinophils 0.1 0.0 - 0.7 10e3/uL    Absolute Basophils 0.0 0.0 - 0.2 10e3/uL    Absolute Immature Granulocytes 0.0 <=0.4 10e3/uL    Absolute NRBCs 0.0 10e3/uL         Imaging    No results found.    30 minutes spent on the date of the encounter doing chart review, history and exam, documentation, communication of the treatment plan with the care team and further activities as noted above.    Signed by: Glenn Crowe MD    Oncology Rooming Note    October 24, 2023 9:23 AM   Young Ordonez is a 73 year old male who presents for:    Chief Complaint   Patient presents with     Oncology Clinic Visit     Primary malignant neoplasm of right lower lobe of lung (H)     Initial Vitals: /80   Pulse 69   Temp 97.9  F (36.6  C)   Resp 18   Wt 72.9 kg (160 lb 12.8 oz)   SpO2 95%   BMI 25.18 kg/m   Estimated body mass index is 25.18 kg/m  as calculated from the following:    Height as of 9/26/23: 1.702 m (5' 7\").    Weight as of this encounter: 72.9 kg (160 lb 12.8 oz). Body surface area is 1.86 meters squared.  No Pain (0) Comment: Data Unavailable   No LMP for male patient.  Allergies reviewed: Yes  Medications reviewed: Yes    Medications: Medication refills not needed today.  Pharmacy name entered into [facility]:    [facility] [address] [place] [facility] [address] [facility] [address] [place] [address]ARMAC[address]    Clinical concerns: None       Yuki Carrillo LPN                 Again, thank you for allowing me to participate in the care of your patient.        Sincerely,        Glenn Crowe MD  "

## 2023-10-24 NOTE — PROGRESS NOTES
[facility] Hematology and Oncology Progress Note    Patient: Young Ordonez  MRN: [id number]  Date of Service: Oct 24, 2023         Reason for Visit    Chief Complaint   Patient presents with    Oncology Clinic Visit     Primary malignant neoplasm of right lower lobe of lung (H)       Assessment and Plan     Cancer Staging   Primary malignant neoplasm of right lower lobe of lung (H)  Staging form: Lung, AJCC 8th Edition  - Clinical stage from 8/30/2023: Stage IIIB (cT4(2), cN2, cM0) - Signed by Glenn Crowe MD on 9/3/2023      ECOG Performance    0 - Independent     Pain  Pain Score: No Pain (0)    #.  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  A 2.3 x 2.2 cm right upper lobe and additional bilateral pulmonary nodules, suspicious for metastatic disease or primary lung cancer  #.  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 S/p TURBT with intravesical mitomycin on 8/3/2023. On clinical surveillance by Dr. Mitchell.  #.  Past smoker  #.  Significant vascular disease     He is currently on weekly chemotherapy concurrently with radiation.  He has some accumulated and expected side effects with fatigue and poor appetite.  I reviewed his counts and electrolytes, renal function and liver function today.  They are overall adequate to proceed with chemotherapy however I informed him that he has progressive but expectant decline in hemoglobin as well as platelet.    We will continue to closely monitor side effects of chemotherapy.     Return to clinic next week with labs and infusion appointment which will be the last dose of chemotherapy with radiation.  Recommended to obtain CT scan in 4 weeks and follow-up with me after.  If there is no evidence of disease progression, we will plan to start adjuvant durvalumab.   He is advised to call us sooner with any  concerns.    Encounter Diagnoses:    Problem List Items Addressed This Visit          Oncology Diagnoses    Primary malignant neoplasm of right lower lobe of lung (H) - Primary    Relevant Orders    CT Chest/Abdomen/Pelvis w Contrast    Infusion Appointment Request       Other    Encounter for antineoplastic chemotherapy    Relevant Orders    Infusion Appointment Request              CC: Jigar Crawford MD   ______________________________________________________________________________  Diagnosis  8/2023-right upper lobe of the lung, right lower lobe with thoracic lymph node metastases.  Additional mildly FDG avid spiculated nodule in the periphery of the left upper lobe.   -Right upper lobe lung mass biopsy showed non-small cell carcinoma (squamous cell carcinoma).  TPS 25%, low PD-L1 expression.  Fusion negative. CDKN2A 58fs positive.  No targetable mutation.     8/3/2023-A right bladder mass of 2.1 cm which biopsy of the bladder tumor showed papillary transitional carcinoma, low-grade, no evidence of invasive malignancy.    8/18/2023-PET scan showed 3 x 2.2 x 2.2 cm anterior right upper lobe mass with additional FDG avid nodule in the right lower lobe measuring 2.4 x 1.1 x 2.1 cm.  FDG avid right 11 R, 7, right lower station 4 lymph nodes were suspicious for synchronous right upper/lower primary lung neoplasm with thoracic lymph node metastasis.  He has additional FDG avid left upper lobe lesion of 1.1 x 0.5 cm likely representing additional synchronous neoplasm.    8/16/2023-MRI brain was negative for intracranial metastatic disease.    Treatment to date  8/3/2023- S/p TURBT with intravesical mitomycin    9/18/2023- definitive chemoradiation (weekly carboplatin/paclitaxel) for right upper lobe lung cancer.   -Left upper lobe lung cancer is plan to be treated with consolidative SBRT after the treatment of right lung cancer.    History of Present Illness    Mr. Young Ordonez presented today  unaccompanied.    He reported poor appetite.  No nausea or vomiting.  No sensory changes or neuropathy.  He does not have any pain.  He continues to have dysuria.    Review of systems  Apart from describing in HPI, the remainder of comprehensive ROS was negative.    Past History    Past Medical History:   Diagnosis Date    Acute myocardial infarction, unspecified site, episode of care unspecified 01/01/1995    Arthritis     Cardiomyopathy (H)     Cerebral artery occlusion with cerebral infarction (H) 2018    lost vision in right eye    Claudication (H24)     COPD (chronic obstructive pulmonary disease) (H)     Coronary artery disease     Depression     DEPRESSIVE DISORDER NEC 04/29/2008    Essential hypertension, benign     Gout     Gout attack 05/25/2017    Hepatitis C carrier (H)     HTN (hypertension)     Hyperlipidemia     Infectious viral hepatitis     carrier of viral hepatitis    Low back pain     chronic    MI (myocardial infarction) (H) 01/01/1995    Obesity     Other and unspecified hyperlipidemia     PAD (peripheral artery disease) (H24)     Peyronie's disease        Past Surgical History:   Procedure Laterality Date    ANGIOPLASTY  1995    ANKLE FRACTURE SURGERY Left     BYPASS GRAFT AORTOFEMORAL N/A 5/17/2017    Procedure: AORTOBIFEMORAL BYPASS ;  Surgeon: Ilir FERRO MD;  Location: [facility];  Service:     CAROTID ENDARTERECTOMY Right 4/12/2018    Procedure: RIGHT CAROTID ENDARTERECTOMY WITH EEG;  Surgeon: Sergei Lemus MD;  Location: [facility];  Service:     CYSTOSCOPY, TRANSURETHRAL RESECTION (TUR) TUMOR BLADDER, COMBINED N/A 8/3/2023    Procedure: Transurethral resection of bladder tumor 2 cm to 5 cm;  Surgeon: German Mitchell MD;  Location:  OR    FRACTURE SURGERY      IR AORTIC ARCH 4 VESSEL ANGIOGRAM  11/9/2009    IR CAROTID ANGIOGRAM  11/9/2009    IR CAROTID ANGIOGRAM  11/9/2009    IR CHEST PORT PLACEMENT > 5 YRS OF AGE  9/11/2023    IR EXTREMITY ANGIOGRAM  BILATERAL  4/6/2017    MANDIBLE SURGERY      [facility] NONSPECIFIC PROCEDURE  1995    MI -- angioplasty       Physical Exam    /80   Pulse 69   Temp 97.9  F (36.6  C)   Resp 18   Wt 72.9 kg (160 lb 12.8 oz)   SpO2 95%   BMI 25.18 kg/m      General: alert, awake, not in acute distress  HEENT: Head: Normal, normocephalic, atraumatic.  Eye: Normal external eye, conjunctiva, lids cornea, NARESH.  Pharynx: Normal buccal mucosa. Normal pharynx.  Neck / Thyroid: Supple, no masses, nodes, nodules or enlargement.  Lymphatics: No abnormally enlarged lymph nodes.  Chest: Normal chest wall and respirations. Clear to auscultation.  Heart: S1 S2 RRR.   Abdomen: abdomen is soft without significant tenderness, masses, organomegaly or guarding  Extremities: normal strength, tone, and muscle mass  Skin: normal. no rash or abnormalities  CNS: non focal.    Lab Results    Recent Results (from the past 168 hour(s))   Creatinine   Result Value Ref Range    Creatinine 1.19 (H) 0.67 - 1.17 mg/dL    GFR Estimate 64 >60 mL/min/1.73m2   CBC with platelets and differential   Result Value Ref Range    WBC Count 3.9 (L) 4.0 - 11.0 10e3/uL    RBC Count 2.82 (L) 4.40 - 5.90 10e6/uL    Hemoglobin 8.8 (L) 13.3 - 17.7 g/dL    Hematocrit 26.2 (L) 40.0 - 53.0 %    MCV 93 78 - 100 fL    MCH 31.2 26.5 - 33.0 pg    MCHC 33.6 31.5 - 36.5 g/dL    RDW 16.0 (H) 10.0 - 15.0 %    Platelet Count 98 (L) 150 - 450 10e3/uL    % Neutrophils 78 %    % Lymphocytes 13 %    % Monocytes 6 %    % Eosinophils 1 %    % Basophils 1 %    % Immature Granulocytes 1 %    NRBCs per 100 WBC 0 <1 /100    Absolute Neutrophils 3.0 1.6 - 8.3 10e3/uL    Absolute Lymphocytes 0.5 (L) 0.8 - 5.3 10e3/uL    Absolute Monocytes 0.3 0.0 - 1.3 10e3/uL    Absolute Eosinophils 0.1 0.0 - 0.7 10e3/uL    Absolute Basophils 0.0 0.0 - 0.2 10e3/uL    Absolute Immature Granulocytes 0.0 <=0.4 10e3/uL    Absolute NRBCs 0.0 10e3/uL         Imaging    No results found.    30 minutes spent on the date of  the encounter doing chart review, history and exam, documentation, communication of the treatment plan with the care team and further activities as noted above.    Signed by: Glenn Crowe MD

## 2023-10-30 NOTE — LETTER
10/30/2023         RE: Young Ordonez  [address] [place] [facility] [address]        Dear Colleague,    Thank you for referring your patient, Young Ordonez, to the [facility] RADIATION ONCOLOGY [place]. Please see a copy of my visit note below.    RADIATION ONCOLOGY WEEKLY TREATMENT VISIT NOTE      Assessment / Impression     Primary malignant neoplasm of right lower lobe of lung (H) [C34.31]     Cancer Staging   Primary malignant neoplasm of right lower lobe of lung (H)  Staging form: Lung, AJCC 8th Edition  - Clinical stage from 8/30/2023: Stage IIIB (cT4(2), cN2, cM0) - Signed by Glenn Crowe MD on 9/3/2023       Tolerating radiation therapy well.  All questions and concerns addressed.  Had explosive diarrhea last week, missed 3 days last week due to illness, no fever, no vomiting, resolved spontaneously   Feels back to baseline.      Plan:   Added CMP to labs for tomorrow in case electrolyte inbalance.  Treatment calendar adjusted for loss of three days last week, now finishing 11/7    Continue radiation treatment as prescribed.  Radiation:   Site: Rt Lung  Stereotactic Radiosurgery: No  Concurrent Therapy: Yes (taxol/carbo)  Today's Dose: 5400  Total Dose for Thoracic: 6600  Today's Fraction/Total Fraction Thoracic: 27/33      Subjective:      HPI: Young Ordonez is a 73 year old male with  Primary malignant neoplasm of right lower lobe of lung (H) [C34.31]    The following portions of the patient's history were reviewed and updated as appropriate: allergies, current medications, past family history, past medical history, past social history, past surgical history and problem list.    Assessment                  Body Site:  Thoracic Site: Rt Lung  Stereotactic Radiosurgery: No  Concurrent Therapy: Yes (taxol/carbo)  Today's Dose: 5400  Total Dose for Thoracic: 6600  Today's Fraction/Total Fraction Thoracic: 27/33  Voice Chances/Stridor/Larynx: 0: Normal  Pharynx and Esphogaus: 1: Tolerates  "regular diet (occasional \"catch\" in esoph, states this is normal for her)  Constipation: 0: None  Diarrhea w/Colostomy: 1: Mild increase in loose, watery colostomy output compared with pretreatment  Diarrhea W/O Colostomy: 0: None  Hemoptysis: 0: None  Dyspnea: 0: Normal                                   Sexuality Alteration                    Emotional Alteration    Copin: Effective  Comfort Alteration   KPS: 80% Can perform normal activity with effort, some signs of disease  Fatigue (ONS scale): 4: Moderate Fatigue  Pain Location: denies   Nutrition Alteration   Anorexia: 1: Loss of appetite  Nausea: 0: None  Vomitin: None  Weight: 71.9 kg (158 lb 8 oz)  Pharynx and Esphogaus: 1: Tolerates regular diet (occasional \"catch\" in esoph, states this is normal for her)  Skin Alteration   Skin Sensation: 1:Pruitis  Skin Reaction: 0: None  AUA Assessment                                           Accompanied by       Objective:     Exam:     Vitals:    10/30/23 1112   BP: 112/59   Pulse: 72   Resp: 16   Temp: 98  F (36.7  C)   SpO2: 97%   Weight: 71.9 kg (158 lb 8 oz)       Wt Readings from Last 8 Encounters:   10/30/23 71.9 kg (158 lb 8 oz)   10/24/23 72.9 kg (160 lb 12.8 oz)   10/23/23 73.1 kg (161 lb 1.6 oz)   10/16/23 74.2 kg (163 lb 8 oz)   10/10/23 75.4 kg (166 lb 3.2 oz)   10/09/23 74.5 kg (164 lb 3.2 oz)   10/03/23 75.7 kg (166 lb 14.4 oz)   10/02/23 75.8 kg (167 lb 1.6 oz)       General: Alert and oriented, in no acute distress  Young has no Erythema.    Treatment Summary to Date    Aria chart and setup information reviewed    Ifeoma Angel MD      Again, thank you for allowing me to participate in the care of your patient.        Sincerely,        Ifeoma Angel MD  "

## 2023-10-30 NOTE — PROGRESS NOTES
"RADIATION ONCOLOGY WEEKLY TREATMENT VISIT NOTE      Assessment / Impression     Primary malignant neoplasm of right lower lobe of lung (H) [C34.31]     Cancer Staging   Primary malignant neoplasm of right lower lobe of lung (H)  Staging form: Lung, AJCC 8th Edition  - Clinical stage from 8/30/2023: Stage IIIB (cT4(2), cN2, cM0) - Signed by Glenn Crowe MD on 9/3/2023       Tolerating radiation therapy well.  All questions and concerns addressed.  Had explosive diarrhea last week, missed 3 days last week due to illness, no fever, no vomiting, resolved spontaneously   Feels back to baseline.      Plan:   Added CMP to labs for tomorrow in case electrolyte inbalance.  Treatment calendar adjusted for loss of three days last week, now finishing 11/7    Continue radiation treatment as prescribed.  Radiation:   Site: Rt Lung  Stereotactic Radiosurgery: No  Concurrent Therapy: Yes (taxol/carbo)  Today's Dose: 5400  Total Dose for Thoracic: 6600  Today's Fraction/Total Fraction Thoracic: 27/33      Subjective:      HPI: Young Ordonez is a 73 year old male with  Primary malignant neoplasm of right lower lobe of lung (H) [C34.31]    The following portions of the patient's history were reviewed and updated as appropriate: allergies, current medications, past family history, past medical history, past social history, past surgical history and problem list.    Assessment                  Body Site:  Thoracic Site: Rt Lung  Stereotactic Radiosurgery: No  Concurrent Therapy: Yes (taxol/carbo)  Today's Dose: 5400  Total Dose for Thoracic: 6600  Today's Fraction/Total Fraction Thoracic: 27/33  Voice Chances/Stridor/Larynx: 0: Normal  Pharynx and Esphogaus: 1: Tolerates regular diet (occasional \"catch\" in esoph, states this is normal for her)  Constipation: 0: None  Diarrhea w/Colostomy: 1: Mild increase in loose, watery colostomy output compared with pretreatment  Diarrhea W/O Colostomy: 0: None  Hemoptysis: 0: None  Dyspnea: 0: " "Normal                                   Sexuality Alteration                    Emotional Alteration    Copin: Effective  Comfort Alteration   KPS: 80% Can perform normal activity with effort, some signs of disease  Fatigue (ONS scale): 4: Moderate Fatigue  Pain Location: denies   Nutrition Alteration   Anorexia: 1: Loss of appetite  Nausea: 0: None  Vomitin: None  Weight: 71.9 kg (158 lb 8 oz)  Pharynx and Esphogaus: 1: Tolerates regular diet (occasional \"catch\" in esoph, states this is normal for her)  Skin Alteration   Skin Sensation: 1:Pruitis  Skin Reaction: 0: None  AUA Assessment                                           Accompanied by       Objective:     Exam:     Vitals:    10/30/23 1112   BP: 112/59   Pulse: 72   Resp: 16   Temp: 98  F (36.7  C)   SpO2: 97%   Weight: 71.9 kg (158 lb 8 oz)       Wt Readings from Last 8 Encounters:   10/30/23 71.9 kg (158 lb 8 oz)   10/24/23 72.9 kg (160 lb 12.8 oz)   10/23/23 73.1 kg (161 lb 1.6 oz)   10/16/23 74.2 kg (163 lb 8 oz)   10/10/23 75.4 kg (166 lb 3.2 oz)   10/09/23 74.5 kg (164 lb 3.2 oz)   10/03/23 75.7 kg (166 lb 14.4 oz)   10/02/23 75.8 kg (167 lb 1.6 oz)       General: Alert and oriented, in no acute distress  Young has no Erythema.    Treatment Summary to Date    Aria chart and setup information reviewed    Ifeoma Angel MD  "

## 2023-10-31 NOTE — PROGRESS NOTES
Infusion Nursing Note:  Young Ordonez presents today for C1. D43 of his chemotherapy plan.    Patient seen by provider today: No   present during visit today: Not Applicable.    Note: Will borreroved ambulatory by himself for treatment. Plan of care reviewed and he has no questions.  Zofran/dexamethasone, pepcid, and IV Benadryl (per patient preference) administered 30 minutes prior to treatment as ordered.    Intravenous Access:  Implanted Port accessed and labs drawn per DAVID Cavanaugh.    Treatment Conditions:  Lab Results   Component Value Date    HGB 8.5 (L) 10/31/2023    WBC 3.1 (L) 10/31/2023    ANEU 1.9 07/13/2007    ANEUTAUTO 2.4 10/31/2023     (L) 10/31/2023        Lab Results   Component Value Date     10/31/2023    POTASSIUM 3.6 10/31/2023    MAG 2.4 (H) 08/21/2020    CR 1.20 (H) 10/31/2023    CR 1.20 (H) 10/31/2023    KALIE 8.8 10/31/2023    BILITOTAL 0.4 10/31/2023    ALBUMIN 3.8 10/31/2023    ALT 19 10/31/2023    AST 27 10/31/2023       Results reviewed, labs MET treatment parameters, ok to proceed with treatment.    Post Infusion Assessment:  Patient tolerated infusion without incident.  Blood return noted pre and post infusion.  Site patent and intact, free from redness, edema or discomfort.  No evidence of extravasations.  Access discontinued per protocol.     Discharge Plan:   Patient discharged in stable condition accompanied by: self.  Departure Mode: Ambulatory.      Tasneem Roman RN

## 2023-11-02 NOTE — PROGRESS NOTES
Called Will and left message that his radiation is complete.   Asked him to call and confirm he received message.   Also let  him know the therapist will call in am to confirm he knows not to come.

## 2023-11-03 PROBLEM — D64.9 ANEMIA, UNSPECIFIED: Status: ACTIVE | Noted: 2017-05-26

## 2023-11-03 PROBLEM — N17.9 AKI (ACUTE KIDNEY INJURY) (H): Status: ACTIVE | Noted: 2023-01-01

## 2023-11-03 PROBLEM — F41.9 ANXIETY DISORDER, UNSPECIFIED: Status: ACTIVE | Noted: 2017-05-26

## 2023-11-03 PROBLEM — I25.10 ATHEROSCLEROTIC HEART DISEASE OF NATIVE CORONARY ARTERY WITHOUT ANGINA PECTORIS: Status: ACTIVE | Noted: 2017-05-26

## 2023-11-03 PROBLEM — R79.89 ELEVATED TROPONIN: Status: ACTIVE | Noted: 2023-01-01

## 2023-11-03 PROBLEM — R06.02 SOB (SHORTNESS OF BREATH): Status: ACTIVE | Noted: 2023-01-01

## 2023-11-03 PROBLEM — I25.5 ISCHEMIC CARDIOMYOPATHY: Status: ACTIVE | Noted: 2017-05-26

## 2023-11-03 PROBLEM — R07.9 CHEST PAIN, UNSPECIFIED TYPE: Status: ACTIVE | Noted: 2023-01-01

## 2023-11-03 PROBLEM — R14.0 ABDOMINAL DISTENTION: Status: ACTIVE | Noted: 2023-01-01

## 2023-11-03 PROBLEM — I99.8 ISCHEMIC REST PAIN OF LOWER EXTREMITY: Status: ACTIVE | Noted: 2017-05-17

## 2023-11-03 PROBLEM — K63.5 POLYP OF COLON: Status: ACTIVE | Noted: 2019-02-22

## 2023-11-03 PROBLEM — R21 RASH AND OTHER NONSPECIFIC SKIN ERUPTION: Status: ACTIVE | Noted: 2017-05-26

## 2023-11-03 PROBLEM — L03.90 CELLULITIS: Status: ACTIVE | Noted: 2023-01-01

## 2023-11-03 PROBLEM — I21.3 ACUTE ST ELEVATION MYOCARDIAL INFARCTION (STEMI) (H): Status: ACTIVE | Noted: 2023-01-01

## 2023-11-03 PROBLEM — M79.606 ISCHEMIC REST PAIN OF LOWER EXTREMITY: Status: ACTIVE | Noted: 2017-05-17

## 2023-11-03 PROBLEM — K59.00 CONSTIPATION, UNSPECIFIED: Status: ACTIVE | Noted: 2017-05-26

## 2023-11-03 PROBLEM — D12.2 BENIGN NEOPLASM OF ASCENDING COLON: Status: ACTIVE | Noted: 2019-02-26

## 2023-11-03 PROBLEM — I20.0 UNSTABLE ANGINA (H): Status: ACTIVE | Noted: 2023-01-01

## 2023-11-03 PROBLEM — I21.4 NSTEMI (NON-ST ELEVATED MYOCARDIAL INFARCTION) (H): Status: ACTIVE | Noted: 2023-01-01

## 2023-11-03 PROBLEM — G89.18 POST-OP PAIN: Status: ACTIVE | Noted: 2023-01-01

## 2023-11-03 PROBLEM — M13.0 POLYARTHRITIS: Status: ACTIVE | Noted: 2017-08-11

## 2023-11-03 PROBLEM — M10.9 ACUTE GOUT OF RIGHT FOOT: Status: ACTIVE | Noted: 2017-08-11

## 2023-11-03 PROBLEM — I20.0 UNSTABLE ANGINA PECTORIS (H): Status: ACTIVE | Noted: 2023-01-01

## 2023-11-03 NOTE — PROGRESS NOTES
Infusion Nursing Note:  Young Ordonez presents today for IV fluids (1L NS).    Patient seen by provider today: Yes:     Note: Pt arrives ambulatory with wife to [facility] after visit with .    Intravenous Access:  Implanted Port.    Treatment Conditions:  Pt has been feeling unwell, with diarrhea and vomiting.    Post Infusion Assessment:  Patient tolerated infusion without incident. Post fluids, /79   Pulse 90     Site patent and intact, free from redness, edema or discomfort.  No evidence of extravasations.  Access discontinued per protocol.     Discharge Plan:   Patient discharged in stable condition accompanied by: wife.  Departure Mode: Ambulatory.      Afshan Loya RN

## 2023-11-03 NOTE — ED TRIAGE NOTES
Pt reports nausea, vomiting and diarrhea with shortness of breath.  Pt reports he had his last chemo was Tuesday and radiation yesterday.  Pt being treated for lung cancer.

## 2023-11-03 NOTE — PROGRESS NOTES
[facility]: Cancer Care Follow-Up Note                                    Discussion with Patient:                                                      Patient called Dr. Crowe, on call provider lat evening as he had SOB. VS taken at home and were stable Chest X ray ordered and he was directed to complete in the morning and then come to clinic afterward to review with Dr. Crowe,   Will arrived at clinic early this morning, He relayed that he had a bad night and barely slept. He has had multiple episodes of diarrhea  and emesis x 1 today. He reports SOB. He did go to ER this morning as he was not feeling well. He completed chext x ray and elected to come to clinic as things were moving slow in ER.   Spoke with Dr. Crowe and labs ordered. She will review labs and chest x ray with him and further plan to be established.       Dates of Treatment:                                                      Infusion given in last 28 days       Administered MAR Action Medication Dose Rate Visit    10/10/2023 13:35 New Bag PACLitaxel (TAXOL) 85 mg in sodium chloride 0.9% in non-PVC container 289.17 mL infusion 85 mg 289.2 mL/hr Infusion Therapy Visit on 10/10/2023 in [facility]    10/10/2023 14:46 New Bag CARBOplatin 145 mg in sodium chloride 0.9 % 124.5 mL infusion 145 mg 249 mL/hr Infusion Therapy Visit on 10/10/2023 in [facility]    10/17/2023 13:08 New Bag PACLitaxel (TAXOL) 85 mg in sodium chloride 0.9% in non-PVC container 289.17 mL infusion 85 mg 289.2 mL/hr Infusion Therapy Visit on 10/17/2023 in [facility]    10/17/2023 14:19 New Bag CARBOplatin 165 mg in sodium chloride 0.9 % 126.5 mL infusion 165 mg 253 mL/hr Infusion Therapy Visit on 10/17/2023 in [facility]    10/24/2023 11:44 New Bag PACLitaxel (TAXOL) 85 mg in sodium chloride 0.9% in non-PVC container 264.17 mL infusion 85 mg 264.2 mL/hr  Infusion Therapy Visit on 10/24/2023 in [facility]    10/24/2023 12:50 New Bag CARBOplatin 150 mg in sodium chloride 0.9 % 125 mL infusion 150 mg 250 mL/hr Infusion Therapy Visit on 10/24/2023 in [facility]    10/31/2023 13:33 New Bag PACLitaxel (TAXOL) 85 mg in sodium chloride 0.9% in non-PVC container 289.17 mL infusion 85 mg 289.2 mL/hr Infusion Therapy Visit on 10/31/2023 in [facility]    10/31/2023 14:40 New Bag CARBOplatin 150 mg in sodium chloride 0.9 % 125 mL infusion 150 mg 250 mL/hr Infusion Therapy Visit on 10/31/2023 in [facility]            Assessment:                                                      Nausea/vomiting, diarrhea and SOB. Chest x ray completed and labs drawn. Imaging and labs to be reviewed by Dr. Crowe.    Intervention/Education provided during outreach:                                                       Will is at clinic, patient given water to sip on and emesis basin if needed. He will   meet with Dr. Crowe for further management and recommendations.      Confirmed patient has clinic and triage numbers    Signature:  Cait Branham RN

## 2023-11-03 NOTE — PHARMACY-ADMISSION MEDICATION HISTORY
Pharmacist Admission Medication History    Admission medication history is complete. The information provided in this note is only as accurate as the sources available at the time of the update.    Information Source(s): Patient and CareEverywhere/SureScripts via in-person    Pertinent Information:     Changes made to PTA medication list:  Added: prochlorperazine  Deleted: tolterodine, senna s, emla crea  Changed: None    Medication Affordability:No issue       Allergies reviewed with patient and updates made in EHR: yes    Medication History Completed By: Hawa Beck RPH 11/3/2023 2:56 PM    PTA Med List   Medication Sig Last Dose    acetaminophen (TYLENOL) 500 MG tablet Take 1,000 mg by mouth every 6 hours as needed for mild pain 11/2/2023 at PM    allopurinol (ZYLOPRIM) 300 MG tablet Take 1 tablet (300 mg) by mouth daily 11/2/2023 at PM    amLODIPine (NORVASC) 5 MG tablet Take 1 tablet (5 mg) by mouth daily 11/2/2023 at PM    aspirin (ASA) 325 MG EC tablet Take 325 mg by mouth at bedtime 11/2/2023 at PM    atenolol (TENORMIN) 100 MG tablet Take 1 tablet (100 mg) by mouth daily 11/2/2023 at PM    atorvastatin (LIPITOR) 80 MG tablet Take 1 tablet (80 mg) by mouth daily 11/2/2023 at PM    docusate sodium (COLACE) 100 MG capsule Take 100 mg by mouth daily as needed for constipation Unknown    lisinopril (ZESTRIL) 20 MG tablet Take 1 tablet (20 mg) by mouth daily 11/2/2023 at PM    LORazepam (ATIVAN) 0.5 MG tablet Take 1-2 tablets (0.5-1 mg) by mouth daily as needed for anxiety not recent    ondansetron (ZOFRAN ODT) 4 MG ODT tab Take 1 tablet (4 mg) by mouth every 8 hours as needed for nausea 11/1/2023 at PM    prochlorperazine (COMPAZINE) 10 MG tablet Take 10 mg by mouth every 6 hours as needed for nausea or vomiting 11/1/2023 at PM    sertraline (ZOLOFT) 100 MG tablet Take 2 tablets (200 mg) by mouth daily 11/2/2023 at PM    traZODone (DESYREL) 50 MG tablet Take 1 tablet (50 mg) by mouth at bedtime 11/2/2023 at PM

## 2023-11-03 NOTE — ED NOTES
Pt ordered food but did not eat much as it was distasteful.    Pt complained of 10/10 chest pain prior to blood product start.  EKG repeated.  Will assess for more NTG

## 2023-11-03 NOTE — ED TRIAGE NOTES
Patient has midsternal chest pain since 1105 and pain into jaw. Pain 8-10/10. Diarrhea, nausea, vomiting.

## 2023-11-03 NOTE — CONSULTS
Thank you, Dr. Navarrete ref. provider found, for asking the [facility] team to see Mr. Young Ordonez to evaluate       Assessment/Recommendations   Assessment/Plan:  1. Acute heart failure in setting of ischemic CM with symptoms concerning for ischemia - no sig change on ECG, trop HS 70,  with marked elevation in BNP and rales at bases, with Hgb 7.8 and recent chemo/XRT for lung cancer and surgery for bladder cancer - on iv heparin and pain free.   Echo pending.        - transfuse a unit of PRBC to keep Hgb >8 slowly. Noted plt count 90's may have to stop aspirin and/or clopidogrel if concern for bleeding.         - iv diuresis          - metoprolol tartrate 12.5mg bid         - NTG paste         - start clopidogrel and cont asp 81mg, cont iv heparin overnight - if pain free tomorrow can stop         - atorvastatin 80mg        - if hgb stable over weekend and heart failure improves, would plan for angiography and possible PCI on Monday.         - if angina returns, would start iv NTG and transfer to [facility] for urgent angiography   2. CAD s/p MI 30 years ago with PTCA - noted fixed defect on stress nuclear imaging and echo in past - would favor no revascularization in that territory, but await angiogrpahy.  Cont statin, plavix/asp -if drop in hgb would stop aspirin.   3. CM - prior echo EF 45-50% in 6/ 2023  iv diuresis overnight, short acting beta blocker, await echo and then further therapy pending cath.  No mention of further chemo/immunotherapy agents at this time.         Clinically Significant Risk Factors Present on Admission          # Hypocalcemia: Lowest Ca = 8.3 mg/dL in last 2 days, will monitor and replace as appropriate       # Drug Induced Platelet Defect: home medication list includes an antiplatelet medication   # Hypertension: Noted on problem list                  History of Present Illness/Subjective    Mr. Young Ordonez is a 73 year old male with hx of lung cancer, COPD, MI  "with ischemic CM, COPD, CVA found to have chest pain and abn troponin.  He was diagnosed with nonsmall cell lung cancer right upper lobe stage IIIb, s/p chemotherapy with cisplatin and paclitaxel recently, and XRT, hx tob use, CM mildly reduced (echo 202 with EF 45-50% with thinning inf septal and antoseptal, apical segments c/w prior distal LAD occlusion, 2017 stress nuclear iwht fixed defect similar segments, with dyspnea , nausea/vomiting and chest pain brought him to ED.  Given NTG with relief of chest pain.  Noted bladder cancer surgery in August with no cardiac complications and no further hematuria.  Pt states symptoms of chest pressure started yesterday and were similar to 30 years ago with AMI where he was treated in [facility] with PTCA - presumably the LAD.  Today received NTG SL x 2 and no further pressure/pain since.      Meds  Iv heparin  Atorvstatin 80mg      Labs: BNP 6,827; trop 77 (peak 84)' WBC 3/1. Hgb 7.8, plt 92;  K 3.5; Cr 1.05  ECG Stachy w ST elevateion V1-3 and prior Ant MI (noted in 2020)  CT PE no PE, noted lung nodules, pulm edema     Physical Examination Review of Systems   /64   Pulse 87   Temp 98.2  F (36.8  C) (Oral)   Resp 23   Ht 1.727 m (5' 8\")   Wt 73.9 kg (163 lb)   SpO2 95%   BMI 24.78 kg/m    Body mass index is 24.78 kg/m .  Wt Readings from Last 3 Encounters:   11/03/23 73.9 kg (163 lb)   11/03/23 70.3 kg (155 lb)   11/03/23 73 kg (161 lb)     No intake or output data in the 24 hours ending 11/03/23 1528  General Appearance:   no distress, normal body habitus   ENT/Mouth: membranes moist, no oral lesions or bleeding gums.      EYES:  no scleral icterus, normal conjunctivae   Neck: no carotid bruits or thyromegaly   Chest/Lungs:   lungs are clear to auscultation, no rales or wheezing,  sternal scar, equal chest wall expansion    Cardiovascular:   Regular. Normal first and second heart sounds with no murmurs, rubs, or gallops; the carotid, radial and posterior " tibial pulses are intact, Jugular venous pressure , edema bilaterally    Abdomen:  no organomegaly, masses, bruits, or tenderness; bowel sounds are present   Extremities: no cyanosis or clubbing   Skin: no xanthelasma, warm.    Neurologic: normal  bilateral, no tremors     Psychiatric: alert and oriented x3, calm     Review of Systems - 12 points nega other than above      Medical History  Surgical History Family History Social History   Past Medical History:   Diagnosis Date     Acute myocardial infarction, unspecified site, episode of care unspecified 01/01/1995     Arthritis      Cardiomyopathy (H)      Cerebral artery occlusion with cerebral infarction (H) 2018    lost vision in right eye     Claudication (H24)      COPD (chronic obstructive pulmonary disease) (H)      Coronary artery disease      Depression      DEPRESSIVE DISORDER NEC 04/29/2008     Essential hypertension, benign      Gout      Gout attack 05/25/2017     Hepatitis C carrier (H)      HTN (hypertension)      Hyperlipidemia      Infectious viral hepatitis     carrier of viral hepatitis     Low back pain     chronic     MI (myocardial infarction) (H) 01/01/1995     Obesity      Other and unspecified hyperlipidemia      PAD (peripheral artery disease) (H24)      Peyronie's disease     Past Surgical History:   Procedure Laterality Date     ANGIOPLASTY  1995     ANKLE FRACTURE SURGERY Left      BYPASS GRAFT AORTOFEMORAL N/A 5/17/2017    Procedure: AORTOBIFEMORAL BYPASS ;  Surgeon: Ilir FERRO MD;  Location: [facility];  Service:      CAROTID ENDARTERECTOMY Right 4/12/2018    Procedure: RIGHT CAROTID ENDARTERECTOMY WITH EEG;  Surgeon: Sergei Lemus MD;  Location: [facility];  Service:      CYSTOSCOPY, TRANSURETHRAL RESECTION (TUR) TUMOR BLADDER, COMBINED N/A 8/3/2023    Procedure: Transurethral resection of bladder tumor 2 cm to 5 cm;  Surgeon: German Mitchell MD;  Location:  OR     FRACTURE SURGERY       IR AORTIC  ARCH 4 VESSEL ANGIOGRAM  11/9/2009     IR CAROTID ANGIOGRAM  11/9/2009     IR CAROTID ANGIOGRAM  11/9/2009     IR CHEST PORT PLACEMENT > 5 YRS OF AGE  9/11/2023     IR EXTREMITY ANGIOGRAM BILATERAL  4/6/2017     MANDIBLE SURGERY       ZZC NONSPECIFIC PROCEDURE  1995    MI -- angioplasty    Family History   Problem Relation Age of Onset     Cancer Mother      Respiratory Father         d:age 59     Cerebrovascular Disease Father      Hypertension Father      Family History Negative Brother         b:1960     Heart Disease Paternal Grandmother      Emphysema Paternal Grandfather      Diabetes Other         paternal uncle     Glaucoma No family hx of      Macular Degeneration No family hx of     Social History     Socioeconomic History     Marital status:      Spouse name: Mitra     Number of children: 2     Years of education: 12     Highest education level: Not on file   Occupational History     Occupation:      Comment: [employer]   Tobacco Use     Smoking status: Former     Packs/day: 1.5     Types: Cigarettes     Quit date: 1/1/2013     Years since quitting: 10.8     Smokeless tobacco: Never   Substance and Sexual Activity     Alcohol use: No     Comment: Alcoholic Drinks/day: sober since 1984     Drug use: Yes     Types: Marijuana     Comment: marijuana sometimes     Sexual activity: Yes     Partners: Female   Other Topics Concern     Not on file   Social History Narrative     Not on file     Social Determinants of Health     Financial Resource Strain: Not on file   Food Insecurity: Not on file   Transportation Needs: Not on file   Physical Activity: Not on file   Stress: Not on file   Social Connections: Not on file   Interpersonal Safety: Not on file   Housing Stability: Not on file          Medications  Allergies   Scheduled Meds:    allopurinol  300 mg Oral At Bedtime     amLODIPine  5 mg Oral At Bedtime     aspirin  325 mg Oral At Bedtime     atenolol  100 mg Oral At Bedtime      atorvastatin  80 mg Oral At Bedtime     lisinopril  20 mg Oral At Bedtime     sertraline  200 mg Oral At Bedtime     traZODone  50 mg Oral At Bedtime     Continuous Infusions:    heparin 900 Units/hr (11/03/23 1412)     PRN Meds:.acetaminophen, docusate sodium, LORazepam, ondansetron, prochlorperazine Allergies   Allergen Reactions     No Known Allergies          Lab Results    Chemistry/lipid CBC Cardiac Enzymes/BNP/TSH/INR   Lab Results   Component Value Date    CHOL 122 06/05/2023    HDL 58 06/05/2023    TRIG 86 06/05/2023    BUN 17.2 11/03/2023     11/03/2023    CO2 24 11/03/2023    Lab Results   Component Value Date    WBC 3.1 (L) 11/03/2023    HGB 7.8 (L) 11/03/2023    HCT 23.0 (L) 11/03/2023    MCV 95 11/03/2023    PLT 92 (L) 11/03/2023    Lab Results   Component Value Date    TROPONINI 0.03 08/20/2020    TSH 2.10 06/05/2023    INR 1.04 08/01/2023              Gordo Chester MD  Interventional Cardiology  [facility]

## 2023-11-03 NOTE — PROGRESS NOTES
"Oncology Rooming Note    November 3, 2023 8:26 AM   Young Ordonez is a 73 year old male who presents for:    Chief Complaint   Patient presents with    Oncology Clinic Visit     Primary malignant neoplasm of right lower lobe of lung (H)     Initial Vitals: /67   Pulse 81   Temp 98.7  F (37.1  C)   Resp 16   Wt 73 kg (161 lb)   SpO2 95%   BMI 24.48 kg/m   Estimated body mass index is 24.48 kg/m  as calculated from the following:    Height as of an earlier encounter on 11/3/23: 1.727 m (5' 8\").    Weight as of this encounter: 73 kg (161 lb). Body surface area is 1.87 meters squared.  Extreme Pain (8) Comment: Data Unavailable   No LMP for male patient.  Allergies reviewed: Yes  Medications reviewed: Yes    Medications: Medication refills not needed today.  Pharmacy name entered into [employer]:    [facility] [id number] [place] [facility] [id number] [facility] [address] [place] [address] [facility] [address]    Clinical concerns: SOB, No sleep all night, throwing up bile this am, diarrhea all night, and wife says he is confused and probably is dehydrated        Yuki Carrillo LPN             "

## 2023-11-03 NOTE — PROGRESS NOTES
Radiation Treatment Summary          Patient: Young Ordonez            MRN: [id number]           : 1950        Care Provider: Ifeoma Angel MD         Date of Service: Nov 3, 2023      HISTORY: Young Ordonez was treated with VMAT radiation therapy stage IIIB NSCLC of the right lung.     SITE TREATED: right lung/mediastinum   TOTAL DOSE: 6000cGy  NUMBER OF FRACTIONS: 30  DATES COMPLETED: 2023  CONCURRENT CHEMOTHERAPY: Yes      On 2023 noted to have a new pleural effusion on CBCT imaging for XRT. Seen today by Dr Crowe. Had CXR no effusion. When leaving had CP, went to ER where elevated troponin admitted for acute heart failure and ischemic disease.  Planned angiography and PCI Monday unless deteriorates.       PLAN: Radiation completed. Although finished early still got curative dose. F/U 4-6 weeks prn     Signed by: Ifeoma Angel MD

## 2023-11-03 NOTE — Clinical Note
Check Procedures. Is This A New Presentation, Or A Follow-Up?: Skin Lesion What Type Of Note Output Would You Prefer (Optional)?: Standard Output How Severe Is Your Skin Lesion?: moderate Is This A New Presentation, Or A Follow-Up?: Skin Lesions

## 2023-11-03 NOTE — Clinical Note
Prepped: groin and right radial. Prepped with: ChloraPrep. The patient was draped. .Pre-procedure site marking:No

## 2023-11-03 NOTE — H&P
[facility]    History and Physical - Hospitalist Service       Date of Admission:  11/3/2023    Assessment & Plan      Young Ordonez is a 73 year old male admitted on 11/3/2023. He has a history of HTN, HLD, ACS, Depression, cardiomyopathy, CAD, CHF, COPD, Stroke w/ right eye vision loss, Right Lower Lob non-small cell lung cancer admitted with 1 day history of diarrhea, vomiting, dyspnea and several hour history of chest pain.     Dyspnea   Diarrhea & Vomiting - resolved  Acute on Chronic CHF  Hypervolemia   EKG negative, CTPE negative for PE with diffuse pulmonary edema with bilateral pleural effusions, right worse than left. Groundglass opacity throughout, possible superinfection or aspiration. No new enlarging thoracic lymph nodes. Lactic acid normal. BNP 6,827. Last echo 8/20/2023 showed EF of 45-50%.  -40 mg IV lasix evening of 11/03/23 and morning 11/4/23   -Echo ordered 11/03/23     ACS Rule Out  Chest Pain   Elevated Troponins  Troponin elevated 84 and decreased to 77 on admission. ST elevations on EKG although these are chronic. No new changes. Patient had balloon of a coronary artery previously without stent placement. Chest pain relieved with nitroglycerin. Patient will be medically stabilized and consider angiogram early next week.   -Cardiology Consulted, appreciate recommendations   -Nitroglycerin patch   -Daily 12.5 BID metoprolol  -Aspirin 81 mg for 11/4/23  -Plavix 300 mg 11/03/23 and 75 mg daily    Epigastric Pain  -40 mg Protonix Daily    Pancytopenia  Likely secondary to treatment for cancer. Hgb 7.8.  -1 unit of blood on 11/03/23     Insomnia   -PTA Trazodone 50 mg    Anxiety  -PTA lorazepam 0.5 mg 1-2 tablets PRN for anxiety     HTN  -Hold PTA amlodipine 5 mg  -Hold PTA Lisinopril 20 mg     Hyperlipidemia  PTA atorvastatin 80    Gout  -PTA allopurinol 300 mg tablet     Generalized Anxiety Disorder  -PTA Sertraline 200 mg        Diet:  2 Gram Sodium Diet   DVT  Prophylaxis: Heparin infusion, Plavix 300 mg  Boswell Catheter: Not present  Fluids: PO  Lines: PRESENT             Cardiac Monitoring: None  Code Status:  Full Code     Clinically Significant Risk Factors Present on Admission          # Hypocalcemia: Lowest Ca = 8.3 mg/dL in last 2 days, will monitor and replace as appropriate       # Drug Induced Platelet Defect: home medication list includes an antiplatelet medication   # Hypertension: Noted on problem list                 Disposition Plan      Expected Discharge Date: 11/05/2023                The patient's care was discussed with the Attending Physician, Dr. Rios .    JACKSON MILLER MD  Hospitalist Service  [facility]  Securely message with [employer] (more info)  Text page via [facility] Paging/Directory   ______________________________________________________________________    Chief Complaint   Chest pain, shortness of breath    History is obtained from the patient    History of Present Illness   Young Ordonez is a 73 year old male who developed chest pain following fluid bolus that we are treating with for CHF exacerbation and ACS work up.     11/2/23 Will developed diarrhea vomiting and dyspnea. The morning of 11/03/23 he eant to the ED. Given his history of cancer, recently completing his chemo regimen and the wait time in the ED, he called his oncologist to see if he could be seen there. At oncology he received a CXR, CBC and CMP which were non-revealing. Patient received IV fluid bolus at the oncologist. Immediately after receiving fluids he developed chest pain that he said felt very similar to his previous heart attack in 1995. Presented to our ED with chest pain.    Past Medical History    Past Medical History:   Diagnosis Date    Acute myocardial infarction, unspecified site, episode of care unspecified 01/01/1995    Arthritis     Cardiomyopathy (H)     Cerebral artery occlusion with cerebral infarction (H) 2018    lost vision  in right eye    Claudication (H24)     COPD (chronic obstructive pulmonary disease) (H)     Coronary artery disease     Depression     DEPRESSIVE DISORDER NEC 04/29/2008    Essential hypertension, benign     Gout     Gout attack 05/25/2017    Hepatitis C carrier (H)     HTN (hypertension)     Hyperlipidemia     Infectious viral hepatitis     carrier of viral hepatitis    Low back pain     chronic    MI (myocardial infarction) (H) 01/01/1995    Obesity     Other and unspecified hyperlipidemia     PAD (peripheral artery disease) (H24)     Peyronie's disease        Past Surgical History   Past Surgical History:   Procedure Laterality Date    ANGIOPLASTY  1995    ANKLE FRACTURE SURGERY Left     BYPASS GRAFT AORTOFEMORAL N/A 5/17/2017    Procedure: AORTOBIFEMORAL BYPASS ;  Surgeon: Ilir FERRO MD;  Location: [facility] OR;  Service:     CAROTID ENDARTERECTOMY Right 4/12/2018    Procedure: RIGHT CAROTID ENDARTERECTOMY WITH EEG;  Surgeon: Sergei Lemus MD;  Location: [facility];  Service:     CYSTOSCOPY, TRANSURETHRAL RESECTION (TUR) TUMOR BLADDER, COMBINED N/A 8/3/2023    Procedure: Transurethral resection of bladder tumor 2 cm to 5 cm;  Surgeon: German Mitchell MD;  Location:  OR    FRACTURE SURGERY      IR AORTIC ARCH 4 VESSEL ANGIOGRAM  11/9/2009    IR CAROTID ANGIOGRAM  11/9/2009    IR CAROTID ANGIOGRAM  11/9/2009    IR CHEST PORT PLACEMENT > 5 YRS OF AGE  9/11/2023    IR EXTREMITY ANGIOGRAM BILATERAL  4/6/2017    MANDIBLE SURGERY      ZZC NONSPECIFIC PROCEDURE  1995    MI -- angioplasty       Prior to Admission Medications   Prior to Admission Medications   Prescriptions Last Dose Informant Patient Reported? Taking?   LORazepam (ATIVAN) 0.5 MG tablet   No No   Sig: Take 1-2 tablets (0.5-1 mg) by mouth daily as needed for anxiety   acetaminophen (TYLENOL) 325 MG tablet   No No   Sig: Take 2 tablets (650 mg) by mouth every 4 hours as needed for mild pain   Patient not taking: Reported on  10/2/2023   allopurinol (ZYLOPRIM) 300 MG tablet   No No   Sig: Take 1 tablet (300 mg) by mouth daily   amLODIPine (NORVASC) 5 MG tablet   No No   Sig: Take 1 tablet (5 mg) by mouth daily   aspirin (ASA) 325 MG tablet   No No   Sig: Take 1 tablet (325 mg) by mouth daily Only start taking this dose of aspirin AFTER you have finished the 20 days of Plavix.   atenolol (TENORMIN) 100 MG tablet   No No   Sig: Take 1 tablet (100 mg) by mouth daily   atorvastatin (LIPITOR) 80 MG tablet   No No   Sig: Take 1 tablet (80 mg) by mouth daily   lidocaine-prilocaine (EMLA) 2.5-2.5 % external cream   No No   Sig: Apply topically as needed for moderate pain   Patient not taking: Reported on 9/18/2023   lisinopril (ZESTRIL) 20 MG tablet   No No   Sig: Take 1 tablet (20 mg) by mouth daily   ondansetron (ZOFRAN ODT) 4 MG ODT tab   No No   Sig: Take 1 tablet (4 mg) by mouth every 8 hours as needed for nausea   Patient not taking: Reported on 10/23/2023   senna-docusate (SENOKOT-S/PERICOLACE) 8.6-50 MG tablet   No No   Sig: Take 1-2 tablets by mouth 2 times daily   sertraline (ZOLOFT) 100 MG tablet   No No   Sig: Take 2 tablets (200 mg) by mouth daily   tolterodine (DETROL) 2 MG tablet   No No   Sig: Take 1 tablet (2 mg) by mouth every 12 hours as needed for incontinence (Spasms)   traZODone (DESYREL) 50 MG tablet   No No   Sig: Take 1 tablet (50 mg) by mouth at bedtime      Facility-Administered Medications: None          Physical Exam   Vital Signs: Temp: 98.2  F (36.8  C) Temp src: Oral BP: 99/62 Pulse: 84   Resp: 17 SpO2: 96 % O2 Device: None (Room air)    Weight: 163 lbs 0 oz    PHYSICAL EXAM:  GENERAL: Awake, alert, lying in hospital bed. No acute distress.   HEENT: No scleral icterus or conjunctival injection. Oral cavity moist and pink with no ulcers, exudate, or thrush present. No cervical or supraclavicular lymphadenopathy.  SKIN: Warm and dry. No bruises, rashes, or skin lesions.  LUNGS: Normal work of breathing with no use  of accessory muscles. Crackles heard in lower lung fields. Diminished lung sounds in right upper lung field. No wheezes heard throughout.   CARDIAC: RRR. Normal S1 and S2. No murmurs, clicks, or rubs appreciated. No JVD. No peripheral edema.  ABDOMEN: Non-distended. Soft and non-tender throughout with no masses or organomegaly.  NEUROLOGIC: Alert and oriented. Sensation to light touch involving upper and lower extremities intact bilaterally.   EXTREMITIES: No gross deformity or peripheral edema. Appear well-perfused.       Data     I have personally reviewed the following data over the past 24 hrs:    3.1 (L)  \   7.8 (L)   / 92 (L)     140 106 17.2 /  105 (H)   3.5 24 1.05 \     ALT: 16 AST: 27 AP: 68 TBILI: 0.6   ALB: 3.6 TOT PROTEIN: 6.0 (L) LIPASE: N/A     Trop: 77 (H) BNP: 6,827 (H)     Procal: 0.12 (H) CRP: N/A Lactic Acid: 1.5         Imaging results reviewed over the past 24 hrs:   Recent Results (from the past 24 hour(s))   XR Chest 2 Views    Narrative    EXAM: XR CHEST 2 VIEWS  LOCATION: [facility]  DATE: 11/3/2023    INDICATION:  Right upper lobe non-small cell lung cancer with suspicious synchronous lesions in the right lower lobe and left upper lobe as well. Pleural effusion  COMPARISON: CT PET 08/18/2023, chest x-ray 08/01/2023 and older studies      Impression    IMPRESSION: Left IJ Port-A-Cath. Right upper lobe and lower lobe nodules have both decreased in size and are poorly defined. No new parenchymal disease. No effusions. Heart and pulmonary vascularity are normal. Numerous clips are noted in the left   midabdomen.     Paucity of stool in the colonic flexures with small air-fluid levels suggestive of an enteritis.   CT Chest Pulmonary Embolism w Contrast    Narrative    EXAM: CT CHEST PULMONARY EMBOLISM W CONTRAST  LOCATION: [facility]  DATE: 11/3/2023    INDICATION: Cancerwith chest pain with breathing; no right upper lobe primary lung  malignancy  COMPARISON: CT chest abdomen and pelvis 08/18/2023, CT chest 06/20/2023  TECHNIQUE: CT chest pulmonary angiogram during arterial phase injection of IV contrast. Multiplanar reformats and MIP reconstructions were performed. Dose reduction techniques were used.   CONTRAST: IV Iodinated contrast    FINDINGS:  ANGIOGRAM CHEST: Main pulmonary trunk is nonenlarged. No pulmonary embolism to the subsegmental level. Thoracic aorta is of normal caliber. Cardiomegaly. There is thinning of the left ventricle near the apex with associated calcifications compatible with   prior infarct.     LUNGS AND PLEURA: Medium right and small left pleural effusions with associated bibasilar atelectasis.. Mild diffuse pulmonary edema. Additional patchy consolidation and groundglass involving the right middle and bilateral upper lobes, possibly   superimposed aspiration or infection (series 6 image 149). Slightly decreased size of the right upper lobe spiculated nodule (series 5 image 28), 2.4 x 1.3 cm, previously 2.7 x 2.3 cm on 06/30/2023. Right lower lobe nodule is also likely decreased in   size measuring up to 1.2 cm, previously 2.0 cm (series 5 image 71). Evaluation for smaller underlying solitary nodules limited given diffuse edema.    MEDIASTINUM/AXILLAE: Similar enlarged mediastinal and hilar lymph nodes.    CORONARY ARTERY CALCIFICATION: Severe.    UPPER ABDOMEN: Cholelithiasis.    MUSCULOSKELETAL: Normal.      Impression    IMPRESSION:  1.  No pulmonary embolism to the subsegmental level.  2.  Mild diffuse pulmonary edema, as well as small right and trace left pleural effusions. Additional patchy groundglass throughout both lungs may represent superimposed infection or aspiration.  3.  Decreased size of the largest two lung nodules involving the right upper and right lower lobes as described when compared to June 2023. Smaller nodules elsewhere poorly evaluated on the current examination given diffuse edema. Consider  short interval   follow-up chest CT following resolution of acute processes.  4.  Similar enlarged mediastinal and hilar lymph nodes, presumably gabriele metastases. No definite new or enlarging thoracic lymph nodes.

## 2023-11-03 NOTE — ED NOTES
Pt given 3rd NTG from earlier order as he had chest pain 10/10.  He had a repeat EKG and troponin lab sent.  Pt had this chest pain prior to start of blood product.  MD alerted and Cards paged.  Plan to send pt to floor.  Awaiting Cards call.  Pt now states his chest pain is gone.  He had the 1 NTG SL and a NTG patch placed.    Will plan transfer to facility that can provide urgent angiography and possible percutaneous intervention.

## 2023-11-03 NOTE — ED NOTES
"Patient refusing vitals or triage, stating \"I need the bathroom now!\". Requested he allow vitals, skin pallor. Patient declines.   "

## 2023-11-03 NOTE — LETTER
"    11/3/2023         RE: Young Ordonez  [address] [facility] [address]        Dear Colleague,    Thank you for referring your patient, Yougn Ordonez, to the [facility]. Please see a copy of my visit note below.    Oncology Rooming Note    November 3, 2023 8:26 AM   Young Ordonez is a 73 year old male who presents for:    Chief Complaint   Patient presents with     Oncology Clinic Visit     Primary malignant neoplasm of right lower lobe of lung (H)     Initial Vitals: /67   Pulse 81   Temp 98.7  F (37.1  C)   Resp 16   Wt 73 kg (161 lb)   SpO2 95%   BMI 24.48 kg/m   Estimated body mass index is 24.48 kg/m  as calculated from the following:    Height as of an earlier encounter on 11/3/23: 1.727 m (5' 8\").    Weight as of this encounter: 73 kg (161 lb). Body surface area is 1.87 meters squared.  Extreme Pain (8) Comment: Data Unavailable   No LMP for male patient.  Allergies reviewed: Yes  Medications reviewed: Yes    Medications: Medication refills not needed today.  Pharmacy name entered into Kutuan:    [facility] [address] [place], [place] - [address] [facility] [address] [place] [address] [facility] [address]    Clinical concerns: SOB, No sleep all night, throwing up bile this am, diarrhea all night, and wife says he is confused and probably is dehydrated        Yuki Carrillo LPN               [facility] Hematology and Oncology Progress Note    Patient: Young Ordonez  MRN: [id number]  Date of Service: Nov 3, 2023         Reason for Visit    Chief Complaint   Patient presents with     Oncology Clinic Visit     Primary malignant neoplasm of right lower lobe of lung (H)       Assessment and Plan     Cancer Staging   Primary malignant neoplasm of right lower lobe of lung (H)  Staging form: Lung, AJCC 8th Edition  - Clinical stage from 8/30/2023: Stage IIIB (cT4(2), cN2, cM0) - " Signed by Glenn Crowe MD on 9/3/2023      ECOG Performance    0 - Independent     Pain  Pain Score: Extreme Pain (8)    #. Right pleural effusion found on CT images in radiation  #. Acute diarrhea, nausea, vomiting  #. Anemia related to chemotherapy    He has shortness of breath with exertion. His vital are within normal. I obtained CXR this morning and it did not show pleural effusion. Labs were reviewed. He is anemic at Hgb of 8. He does not want blood transfusion. It is expected from chemotherapy and discussed recovery in a few weeks. I encouraged him to call me if his shortness of breath is not improved in a few days.  Xray showed enteritis likely from recent chemo. I offered IVF and he agreed.    He does not want to go back to radiation therapy dept to re-evaluate and complete the last 3 doses if able.     After the IV fluid 1 L in infusion, he developed chest pressure and pain radiation to the jaw. Given his vascular disease history, he was sent to ER for ACS evaluation.      #Non-small cell carcinoma of the right upper lobe with likely lymph node metastasis, synchronous right lower lobe and left upper lobe lung neoplasm.  A 2.3 x 2.2 cm right upper lobe and additional bilateral pulmonary nodules, suspicious for metastatic disease or primary lung cancer  #.  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 S/p TURBT with intravesical mitomycin on 8/3/2023. On clinical surveillance by Dr. Mitchell.  #.  Past smoker  #.  Significant vascular disease     He completed weekly chemotherapy concurrently with radiation at this point. To follow up with me in early Dec with CT scan.      Encounter Diagnoses:    Problem List Items Addressed This Visit       Anemia, unspecified     Other Visit Diagnoses       Dyspnea on exertion    -  Primary                     CC: Jigar Crawford MD    ______________________________________________________________________________  Diagnosis  8/2023-right upper lobe of the lung, right lower lobe with thoracic lymph node metastases.  Additional mildly FDG avid spiculated nodule in the periphery of the left upper lobe.   -Right upper lobe lung mass biopsy showed non-small cell carcinoma (squamous cell carcinoma).  TPS 25%, low PD-L1 expression.  Fusion negative. CDKN2A 58fs positive.  No targetable mutation.     8/3/2023-A right bladder mass of 2.1 cm which biopsy of the bladder tumor showed papillary transitional carcinoma, low-grade, no evidence of invasive malignancy.    8/18/2023-PET scan showed 3 x 2.2 x 2.2 cm anterior right upper lobe mass with additional FDG avid nodule in the right lower lobe measuring 2.4 x 1.1 x 2.1 cm.  FDG avid right 11 R, 7, right lower station 4 lymph nodes were suspicious for synchronous right upper/lower primary lung neoplasm with thoracic lymph node metastasis.  He has additional FDG avid left upper lobe lesion of 1.1 x 0.5 cm likely representing additional synchronous neoplasm.    8/16/2023-MRI brain was negative for intracranial metastatic disease.    Treatment to date  8/3/2023- S/p TURBT with intravesical mitomycin    9/18/2023- definitive chemoradiation (weekly carboplatin/paclitaxel) for right upper lobe lung cancer. 30/33 radiation completed due to effusion found on radiation CT resulting target lesion out of the radiation field.   -Left upper lobe lung cancer is plan to be treated with consolidative SBRT after the treatment of right lung cancer.    History of Present Illness    . Young Ordonez presented today accompanied by his wife, Mitra.  They called last night due to shortness of breath, vomiting and diarrhea. No chest pain.     Review of systems  Apart from describing in HPI, the remainder of comprehensive ROS was negative.    Past History    Past Medical History:   Diagnosis Date     Acute myocardial infarction,  unspecified site, episode of care unspecified 01/01/1995     Arthritis      Cardiomyopathy (H)      Cerebral artery occlusion with cerebral infarction (H) 2018    lost vision in right eye     Claudication (H24)      COPD (chronic obstructive pulmonary disease) (H)      Coronary artery disease      Depression      DEPRESSIVE DISORDER NEC 04/29/2008     Essential hypertension, benign      Gout      Gout attack 05/25/2017     Hepatitis C carrier (H)      HTN (hypertension)      Hyperlipidemia      Infectious viral hepatitis     carrier of viral hepatitis     Low back pain     chronic     MI (myocardial infarction) (H) 01/01/1995     Obesity      Other and unspecified hyperlipidemia      PAD (peripheral artery disease) (H24)      Peyronie's disease        Past Surgical History:   Procedure Laterality Date     ANGIOPLASTY  1995     ANKLE FRACTURE SURGERY Left      BYPASS GRAFT AORTOFEMORAL N/A 5/17/2017    Procedure: AORTOBIFEMORAL BYPASS ;  Surgeon: Ilir FERRO MD;  Location: [facility];  Service:      CAROTID ENDARTERECTOMY Right 4/12/2018    Procedure: RIGHT CAROTID ENDARTERECTOMY WITH EEG;  Surgeon: Sergei Lemus MD;  Location: [facility] OR;  Service:      CYSTOSCOPY, TRANSURETHRAL RESECTION (TUR) TUMOR BLADDER, COMBINED N/A 8/3/2023    Procedure: Transurethral resection of bladder tumor 2 cm to 5 cm;  Surgeon: German Mitchell MD;  Location: [facility] OR     FRACTURE SURGERY       IR AORTIC ARCH 4 VESSEL ANGIOGRAM  11/9/2009     IR CAROTID ANGIOGRAM  11/9/2009     IR CAROTID ANGIOGRAM  11/9/2009     IR CHEST PORT PLACEMENT > 5 YRS OF AGE  9/11/2023     IR EXTREMITY ANGIOGRAM BILATERAL  4/6/2017     MANDIBLE SURGERY       ZZC NONSPECIFIC PROCEDURE  1995    MI -- angioplasty       Physical Exam    /67   Pulse 81   Temp 98.7  F (37.1  C)   Resp 16   Wt 73 kg (161 lb)   SpO2 95%   BMI 24.48 kg/m      General: alert, awake, not in acute distress, pale and looks tired  HEENT: Head: Normal,  normocephalic, atraumatic.  Eye: Normal external eye, conjunctiva, lids cornea, NARESH.  Pharynx: Normal buccal mucosa. Normal pharynx.  Neck / Thyroid: Supple, no masses, nodes, nodules or enlargement.  Lymphatics: No abnormally enlarged lymph nodes.  Chest: Normal chest wall and respirations. Clear to auscultation.  Heart: S1 S2 RRR.   Abdomen: abdomen is soft with lower abdominal tenderness. No masses, organomegaly or guarding  Extremities: normal strength, tone, and muscle mass  Skin: normal. no rash or abnormalities  CNS: non focal.    Lab Results    Recent Results (from the past 168 hour(s))   CBC with platelets and differential   Result Value Ref Range    WBC Count 3.1 (L) 4.0 - 11.0 10e3/uL    RBC Count 2.68 (L) 4.40 - 5.90 10e6/uL    Hemoglobin 8.5 (L) 13.3 - 17.7 g/dL    Hematocrit 24.9 (L) 40.0 - 53.0 %    MCV 93 78 - 100 fL    MCH 31.7 26.5 - 33.0 pg    MCHC 34.1 31.5 - 36.5 g/dL    RDW 16.9 (H) 10.0 - 15.0 %    Platelet Count 109 (L) 150 - 450 10e3/uL    % Neutrophils 77 %    % Lymphocytes 12 %    % Monocytes 8 %    % Eosinophils 1 %    % Basophils 1 %    % Immature Granulocytes 1 %    NRBCs per 100 WBC 0 <1 /100    Absolute Neutrophils 2.4 1.6 - 8.3 10e3/uL    Absolute Lymphocytes 0.4 (L) 0.8 - 5.3 10e3/uL    Absolute Monocytes 0.2 0.0 - 1.3 10e3/uL    Absolute Eosinophils 0.0 0.0 - 0.7 10e3/uL    Absolute Basophils 0.0 0.0 - 0.2 10e3/uL    Absolute Immature Granulocytes 0.0 <=0.4 10e3/uL    Absolute NRBCs 0.0 10e3/uL   Creatinine   Result Value Ref Range    Creatinine 1.20 (H) 0.67 - 1.17 mg/dL    GFR Estimate 64 >60 mL/min/1.73m2   Comprehensive metabolic panel   Result Value Ref Range    Sodium 140 135 - 145 mmol/L    Potassium 3.6 3.4 - 5.3 mmol/L    Carbon Dioxide (CO2) 24 22 - 29 mmol/L    Anion Gap 10 7 - 15 mmol/L    Urea Nitrogen 19.8 8.0 - 23.0 mg/dL    Creatinine 1.20 (H) 0.67 - 1.17 mg/dL    GFR Estimate 64 >60 mL/min/1.73m2    Calcium 8.8 8.8 - 10.2 mg/dL    Chloride 106 98 - 107 mmol/L     Glucose 106 (H) 70 - 99 mg/dL    Alkaline Phosphatase 72 40 - 129 U/L    AST 27 0 - 45 U/L    ALT 19 0 - 70 U/L    Protein Total 5.9 (L) 6.4 - 8.3 g/dL    Albumin 3.8 3.5 - 5.2 g/dL    Bilirubin Total 0.4 <=1.2 mg/dL   Extra Red Top Tube   Result Value Ref Range    Hold Specimen [facility]    Comprehensive metabolic panel (BMP + Alb, Alk Phos, ALT, AST, Total. Bili, TP)   Result Value Ref Range    Sodium 140 135 - 145 mmol/L    Potassium 3.5 3.4 - 5.3 mmol/L    Carbon Dioxide (CO2) 24 22 - 29 mmol/L    Anion Gap 10 7 - 15 mmol/L    Urea Nitrogen 17.2 8.0 - 23.0 mg/dL    Creatinine 1.05 0.67 - 1.17 mg/dL    GFR Estimate 75 >60 mL/min/1.73m2    Calcium 8.3 (L) 8.8 - 10.2 mg/dL    Chloride 106 98 - 107 mmol/L    Glucose 105 (H) 70 - 99 mg/dL    Alkaline Phosphatase 68 40 - 129 U/L    AST 27 0 - 45 U/L    ALT 16 0 - 70 U/L    Protein Total 6.0 (L) 6.4 - 8.3 g/dL    Albumin 3.6 3.5 - 5.2 g/dL    Bilirubin Total 0.6 <=1.2 mg/dL   CBC with platelets and differential   Result Value Ref Range    WBC Count 3.7 (L) 4.0 - 11.0 10e3/uL    RBC Count 2.49 (L) 4.40 - 5.90 10e6/uL    Hemoglobin 8.0 (L) 13.3 - 17.7 g/dL    Hematocrit 23.3 (L) 40.0 - 53.0 %    MCV 94 78 - 100 fL    MCH 32.1 26.5 - 33.0 pg    MCHC 34.3 31.5 - 36.5 g/dL    RDW 17.8 (H) 10.0 - 15.0 %    Platelet Count 102 (L) 150 - 450 10e3/uL    % Neutrophils 84 %    % Lymphocytes 8 %    % Monocytes 7 %    % Eosinophils 0 %    % Basophils 0 %    % Immature Granulocytes 1 %    NRBCs per 100 WBC 0 <1 /100    Absolute Neutrophils 3.1 1.6 - 8.3 10e3/uL    Absolute Lymphocytes 0.3 (L) 0.8 - 5.3 10e3/uL    Absolute Monocytes 0.2 0.0 - 1.3 10e3/uL    Absolute Eosinophils 0.0 0.0 - 0.7 10e3/uL    Absolute Basophils 0.0 0.0 - 0.2 10e3/uL    Absolute Immature Granulocytes 0.0 <=0.4 10e3/uL    Absolute NRBCs 0.0 10e3/uL   ECG 12-LEAD WITH MUSE (LHE)   Result Value Ref Range    Systolic Blood Pressure 109 mmHg    Diastolic Blood Pressure 63 mmHg    Ventricular Rate 106 BPM    Atrial Rate  106 BPM    LA Interval 170 ms    QRS Duration 126 ms     ms    QTc 486 ms    P Axis 89 degrees    R AXIS -37 degrees    T Axis 95 degrees    Interpretation ECG       Sinus tachycardia  Left axis deviation  Left ventricular hypertrophy with QRS widening and repolarization abnormality  Cannot rule out Septal infarct (cited on or before 10-APR-2018)  Abnormal ECG  When compared with ECG of 20-AUG-2020 05:56,  Vent. rate has increased BY  41 BPM  Incomplete right bundle branch block is no longer Present  Questionable change in initial forces of Anterior leads  Confirmed by SEE ED PROVIDER NOTE FOR, ECG INTERPRETATION ([id number]),  Leroy Barakat ([id number]) on 11/3/2023 4:31:26 PM     Lactic Acid STAT   Result Value Ref Range    Lactic Acid 1.5 0.7 - 2.0 mmol/L   Troponin T, High Sensitivity (now)   Result Value Ref Range    Troponin T, High Sensitivity 84 (H) <=22 ng/L   Extra Blue Top Tube   Result Value Ref Range    Hold Specimen JIC    Extra Purple Top Tube   Result Value Ref Range    Hold Specimen JIC    CBC with platelets   Result Value Ref Range    WBC Count 3.1 (L) 4.0 - 11.0 10e3/uL    RBC Count 2.43 (L) 4.40 - 5.90 10e6/uL    Hemoglobin 7.8 (L) 13.3 - 17.7 g/dL    Hematocrit 23.0 (L) 40.0 - 53.0 %    MCV 95 78 - 100 fL    MCH 32.1 26.5 - 33.0 pg    MCHC 33.9 31.5 - 36.5 g/dL    RDW 17.9 (H) 10.0 - 15.0 %    Platelet Count 92 (L) 150 - 450 10e3/uL   Troponin T, High Sensitivity (now)   Result Value Ref Range    Troponin T, High Sensitivity 77 (H) <=22 ng/L   N terminal pro BNP outpatient   Result Value Ref Range    N Terminal Pro BNP Outpatient 6,827 (H) 0 - 900 pg/mL   Procalcitonin   Result Value Ref Range    Procalcitonin 0.12 (H) <0.05 ng/mL   Prepare red blood cells (unit)   Result Value Ref Range    Blood Component Type Red Blood Cells     Product Code [id number]     Unit Status Transfused     Unit Number [id number]     CROSSMATCH Compatible     CODING SYSTEM [id number]     ISSUE DATE AND TIME  [id number]     UNIT ABO/RH A+     UNIT TYPE ISBT 6200    Adult Type and Screen   Result Value Ref Range    ABO/RH(D) A POS     Antibody Screen Negative Negative    SPECIMEN EXPIRATION DATE [id number]    ECG 12-LEAD WITH MUSE (LHE)   Result Value Ref Range    Systolic Blood Pressure 116 mmHg    Diastolic Blood Pressure 70 mmHg    Ventricular Rate 103 BPM    Atrial Rate 103 BPM    DE Interval 182 ms    QRS Duration 126 ms     ms    QTc 471 ms    P Axis 93 degrees    R AXIS -31 degrees    T Axis 109 degrees    Interpretation ECG       Sinus tachycardia with Premature atrial complexes  Left axis deviation  Left ventricular hypertrophy with QRS widening and repolarization abnormality  Cannot rule out Septal infarct (cited on or before 10-APR-2018)  Abnormal ECG  When compared with ECG of 03-NOV-2023 11:21,  Premature atrial complexes are now Present  ST more elevated in Anterior leads  Confirmed by SEE ED PROVIDER NOTE FOR, ECG INTERPRETATION (4000),  Any Mtz ([id number]) on 11/3/2023 9:52:10 PM     Troponin T, High Sensitivity   Result Value Ref Range    Troponin T, High Sensitivity 107 (HH) <=22 ng/L   Heparin Unfractionated Anti Xa Level   Result Value Ref Range    Anti Xa Unfractionated Heparin 0.51 For Reference Range, See Comment IU/mL   CBC with platelets   Result Value Ref Range    WBC Count 3.2 (L) 4.0 - 11.0 10e3/uL    RBC Count 2.96 (L) 4.40 - 5.90 10e6/uL    Hemoglobin 9.4 (L) 13.3 - 17.7 g/dL    Hematocrit 26.5 (L) 40.0 - 53.0 %    MCV 90 78 - 100 fL    MCH 31.8 26.5 - 33.0 pg    MCHC 35.5 31.5 - 36.5 g/dL    RDW 16.4 (H) 10.0 - 15.0 %    Platelet Count 93 (L) 150 - 450 10e3/uL   Basic metabolic panel   Result Value Ref Range    Sodium 139 135 - 145 mmol/L    Potassium 2.9 (L) 3.4 - 5.3 mmol/L    Chloride 102 98 - 107 mmol/L    Carbon Dioxide (CO2) 25 22 - 29 mmol/L    Anion Gap 12 7 - 15 mmol/L    Urea Nitrogen 11.9 8.0 - 23.0 mg/dL    Creatinine 0.89 0.67 - 1.17 mg/dL    GFR Estimate 90  >60 mL/min/1.73m2    Calcium 8.3 (L) 8.8 - 10.2 mg/dL    Glucose 96 70 - 99 mg/dL   Heparin Unfractionated Anti Xa Level   Result Value Ref Range    Anti Xa Unfractionated Heparin 0.21 For Reference Range, See Comment IU/mL   Potassium   Result Value Ref Range    Potassium 3.4 3.4 - 5.3 mmol/L   Heparin Unfractionated Anti Xa Level   Result Value Ref Range    Anti Xa Unfractionated Heparin 0.48 For Reference Range, See Comment IU/mL   Potassium   Result Value Ref Range    Potassium 3.6 3.4 - 5.3 mmol/L         Imaging    Echocardiogram Complete    Result Date: 2023  [id number]^ROCK^RAMSES^[place] [address]  Name: AHMET REES MRN: [id number] : 1950 Study Date: 2023 08:57 AM Age: 73 yrs Gender: Male Patient Location: [facility] Reason For Study: CHF Ordering Physician: RAMSES MADERA Referring Physician: MAURICIO MONTALVO Performed By: ACE  BSA: 1.8 m2 Height: 68 in Weight: 153 lb HR: 87 BP: 116/73 mmHg ______________________________________________________________________________ Procedure Complete Portable Echo Adult. Definity (NDC #82936-496) given intravenously. ______________________________________________________________________________ Interpretation Summary  1. The left ventricle is normal in size. Left ventricular systolic performance is moderately reduced. The ejection fraction is estimated to be 30%. 2. There is severe septal, distal anterior, and apical hypokinesis (no apical mural thrombus is detected). There is severe mid to distal inferior hypokinesis. There is severe mid to distal posterior hypokinesis. There is mid to distal anteroseptal akinesis. 3. No significant valvular heart disease is identified on this study. 4. Normal right ventricular size and systolic performance. 5. There is mild left atrial enlargement.  When compared to the prior real-time echocardiogram dated 2020, the apical wall motion  abnormality does not appear as pronounced on the current study. The areas of hypokinesis involving the inferior and posterior segments, however, appear to be new. ______________________________________________________________________________ Left ventricle: The left ventricle is normal in size. Left ventricular systolic performance is moderately reduced. The ejection fraction is estimated to be 30%. There is severe septal, distal anterior, and apical hypokinesis (no apical mural thrombus is detected). There is severe mid to distal inferior hypokinesis. There is severe mid to distal posterior hypokinesis. There is mid to distal anteroseptal akinesis. Left ventricular wall thickness is normal.  Assessment of left atrial pressure (LAP): The cumulative findings suggest moderately elevated left atrial pressure (the E/A is > 0.8 and <2.0 plus 2 or 3 of 3 of the following present: Average E/e' > 14, TRvel > 2.8 m/s, and/or LA vol. index > 34 ml/mÂ  ).  Right ventricle: Normal right ventricular size and systolic performance.  Left atrium: There is mild left atrial enlargement.  Right atrium: The right atrium is of normal size.  IVC: The IVC is of normal caliber.  Aortic valve: The aortic valve is comprised of three cusps. No significant aortic stenosis or aortic insufficiency is detected on this study.  Mitral valve: There is mild to moderate mitral annular calcification. There is mild nonspecific mitral valve leaflet thickening. There is trace tricuspid insufficiency.  Tricuspid valve: The tricuspid valve is grossly morphologically normal. There is trace tricuspid insufficiency.  Pulmonic valve: The pulmonic valve is grossly morphologically normal.  Thoracic aorta: The aortic root and proximal ascending aorta are of normal dimension.  Pericardium: There is no significant pericardial effusion. ______________________________________________________________________________  ______________________________________________________________________________ MMode/2D Measurements & Calculations IVSd: 0.87 cm LVIDd: 5.5 cm LVIDs: 4.1 cm LVPWd: 1.2 cm FS: 26.1 % LV mass(C)d: 219.1 grams LV mass(C)dI: 120.1 grams/m2 Ao root diam: 3.1 cm LA dimension: 3.8 cm LA/Ao: 1.2 LVOT diam: 2.0 cm LVOT area: 3.1 cm2 Ao root diam index Ht(cm/m): 1.8 Ao root diam index BSA (cm/m2): 1.7 LA Volume (BP): 62.6 ml  LA Volume Index (BP): 34.4 ml/m2 LA Volume Indexed (AL/bp): 36.4 ml/m2 RV Base: 3.1 cm RWT: 0.42 TAPSE: 1.4 cm  Time Measurements MM HR: 89.0 BPM  Doppler Measurements & Calculations MV E max bruno: 93.9 cm/sec MV A max bruno: 97.9 cm/sec MV E/A: 0.96 MV dec slope: 624.0 cm/sec2 MV dec time: 0.15 sec Ao V2 max: 112.0 cm/sec Ao max P.0 mmHg Ao V2 mean: 80.7 cm/sec Ao mean PG: 3.0 mmHg Ao V2 VTI: 24.0 cm DEEPTI(I,D): 2.4 cm2 DEEPTI(V,D): 2.7 cm2 LV V1 max PG: 3.7 mmHg LV V1 max: 95.6 cm/sec LV V1 VTI: 18.5 cm SV(LVOT): 58.1 ml SI(LVOT): 31.9 ml/m2 PA acc time: 0.11 sec AV Bruno Ratio (DI): 0.85 DEEPTI Index (cm2/m2): 1.3 E/E': 16.0 E/E' av.7 Lateral E/e': 16.0 Medial E/e': 25.4 Peak E' Bruno: 5.9 cm/sec RV S Bruno: 14.0 cm/sec  ______________________________________________________________________________ Report approved by: Ngoc Jacobo 2023 01:36 PM       Cardiac Catheterization    Result Date: 11/3/2023     2nd Mrg lesion is 90% stenosed.    3rd Mrg-1 lesion is 95% stenosed.    3rd Mrg-2 lesion is 80% stenosed.    1st LPL lesion is 60% stenosed.    2nd LPL lesion is 80% stenosed.    Ost LAD to Prox LAD lesion is 99% stenosed.    Mid LAD lesion is 95% stenosed.    Mid LAD to Dist LAD lesion is 99% stenosed.    Mid RCA lesion is 99% stenosed.    Dist Cx lesion is 40% stenosed. 1.  Severe calcified diffuse multivessel coronary artery disease.  Suboptimal anatomy for PCI. 2.  Separate adjacent ostia for LAD and left circumflex, (no left main) 3.  LAD lesions appear chronic.  LAD fills slowly beyond  "mid lesion. 4.  No obvious unstable plaque; multiple potential \"culprit\" lesions 5.  Ischemic symptoms probably a combination of severe chronic coronary disease with superimposed anemia    CT Chest Pulmonary Embolism w Contrast    Result Date: 11/3/2023  EXAM: CT CHEST PULMONARY EMBOLISM W CONTRAST [facility]ATION: [facility] DATE: 11/3/2023 INDICATION: Cancerwith chest pain with breathing; no right upper lobe primary lung malignancy COMPARISON: CT chest abdomen and pelvis 08/18/2023, CT chest 06/20/2023 TECHNIQUE: CT chest pulmonary angiogram during arterial phase injection of IV contrast. Multiplanar reformats and MIP reconstructions were performed. Dose reduction techniques were used. CONTRAST: IV Iodinated contrast FINDINGS: ANGIOGRAM CHEST: Main pulmonary trunk is nonenlarged. No pulmonary embolism to the subsegmental level. Thoracic aorta is of normal caliber. Cardiomegaly. There is thinning of the left ventricle near the apex with associated calcifications compatible with  prior infarct. LUNGS AND PLEURA: Medium right and small left pleural effusions with associated bibasilar atelectasis.. Mild diffuse pulmonary edema. Additional patchy consolidation and groundglass involving the right middle and bilateral upper lobes, possibly superimposed aspiration or infection (series 6 image 149). Slightly decreased size of the right upper lobe spiculated nodule (series 5 image 28), 2.4 x 1.3 cm, previously 2.7 x 2.3 cm on 06/30/2023. Right lower lobe nodule is also likely decreased in size measuring up to 1.2 cm, previously 2.0 cm (series 5 image 71). Evaluation for smaller underlying solitary nodules limited given diffuse edema. MEDIASTINUM/AXILLAE: Similar enlarged mediastinal and hilar lymph nodes. CORONARY ARTERY CALCIFICATION: Severe. UPPER ABDOMEN: Cholelithiasis. MUSCULOSKELETAL: Normal.     IMPRESSION: 1.  No pulmonary embolism to the subsegmental level. 2.  Mild diffuse pulmonary edema, as " well as small right and trace left pleural effusions. Additional patchy groundglass throughout both lungs may represent superimposed infection or aspiration. 3.  Decreased size of the largest two lung nodules involving the right upper and right lower lobes as described when compared to June 2023. Smaller nodules elsewhere poorly evaluated on the current examination given diffuse edema. Consider short interval  follow-up chest CT following resolution of acute processes. 4.  Similar enlarged mediastinal and hilar lymph nodes, presumably gabriele metastases. No definite new or enlarging thoracic lymph nodes.     XR Chest 2 Views    Result Date: 11/3/2023  EXAM: XR CHEST 2 VIEWS LOCATION: [facility] DATE: 11/3/2023 INDICATION:  Right upper lobe non-small cell lung cancer with suspicious synchronous lesions in the right lower lobe and left upper lobe as well. Pleural effusion COMPARISON: CT PET 08/18/2023, chest x-ray 08/01/2023 and older studies     IMPRESSION: Left IJ Port-A-Cath. Right upper lobe and lower lobe nodules have both decreased in size and are poorly defined. No new parenchymal disease. No effusions. Heart and pulmonary vascularity are normal. Numerous clips are noted in the left midabdomen. Paucity of stool in the colonic flexures with small air-fluid levels suggestive of an enteritis.       Signed by: Glenn Crowe MD      Again, thank you for allowing me to participate in the care of your patient.        Sincerely,        Glenn Crowe MD

## 2023-11-03 NOTE — ED NOTES
Per IC.  Patient and wife came up to desk stating they were leaving.  Refused to wait for RN to sign LWBS.  Seen leaving department by IC

## 2023-11-03 NOTE — ED PROVIDER NOTES
EMERGENCY DEPARTMENT ENCOUNTER      NAME: Young Ordonez  AGE: 73 year old male  YOB: 1950  MRN: [id number]  EVALUATION DATE & TIME: No admission date for patient encounter.    PCP: Jigar Crawford    ED PROVIDER: Nuha Mares MD      Chief Complaint   Patient presents with    Chest Pain    Diarrhea    Jaw Pain    Vomiting         FINAL IMPRESSION:  1. Chest pain, unspecified type    2. Elevated troponin    3. SOB (shortness of breath)        MEDICAL DECISION MAKIN:32 AM I met with the patient, obtained history, performed an initial exam, and discussed options and plan for diagnostics and treatment here in the ED.   Pertinent Labs & Imaging studies reviewed. (See chart for details)  1:45 PM I rechecked and updated the patient.  Patient took a baby aspirin this morning. He is having chest pain now in ED. Patient's shortness of breath is improved. I am giving the patient nitroglycerin, starting him on a heparin drip, and paging cards. Patient's last echocardiogram was in 2017  1:51 PM I paged the Hospitalist and cardiology, pt had improvement of pain with nitroglycerin    2:28 PM Spoke with Dr. Lane with resident team            Young Ordonez is a 73 year old male who presents with chest pain.  Patient states last night began having nausea multiple episodes of diarrhea and shortness of breath.  He went to his oncologist today had a CBC chemistry LFTs and chest x-ray with no acute changes.  He states since leaving there he developed chest pain radiating to his neck associated with the shortness of breath.  No radiation to the back.  No hemoptysis.  No fevers or chills.  Patient does have a recent hemoglobin that has been decreasing currently at 8 based on blood draw this morning.  Broad differential for chest pain and shortness of breath including ACS pneumonia pneumothorax PE no radiation to back or symptoms concerning for dissection.  Also concern for infectious etiology with diarrhea  and vomiting versus secondary to chemo treatments.  Will order laboratory testing EKG troponin PE study and reevaluate.    Plan: Elevated troponin with ongoing chest pain heparin drip and nitro ordered in the emergency department.  We will plan to admit for serial enzymes and cardiology consultation.  Patient comfortable with admission plan.    30  minutes of critical care time     Medical Decision Making    History:  Supplemental history from: Family Member/Significant Other  External Record(s) reviewed: Outpatient Record: Oncology 10/23/23    Work Up:  Chart documentation includes differential considered and any EKGs or imaging independently interpreted by provider, where specified.  In additional to work up documented, I considered the following work up: Documented in chart, if applicable.    External consultation:  Discussion of management with another provider: Cardiology and Hospitalist    Complicating factors:  Care impacted by chronic illness: Chronic Kidney Disease, Chronic Lung Disease, Hyperlipidemia, Hypertension, Mental Health, and Other: Gout  Care affected by social determinants of health: Alcohol Abuse and/or Recreational Drug Use    Disposition considerations: Admit.          MEDICATIONS GIVEN IN THE EMERGENCY:  Medications   nitroGLYcerin (NITROSTAT) sublingual tablet 0.4 mg (0.4 mg Sublingual $Given 11/3/23 1408)   heparin 25,000 units in 0.45% NaCl 250 mL ANTICOAGULANT infusion (900 Units/hr Intravenous $New Bag 11/3/23 1412)   iopamidol (ISOVUE-370) solution 100 mL (75 mLs Intravenous $Given 11/3/23 1304)   heparin loading dose for LOW INTENSITY TREATMENT * Give BEFORE starting heparin infusion (4,450 Units Intravenous $Given 11/3/23 1412)       NEW PRESCRIPTIONS STARTED AT TODAY'S ER VISIT  New Prescriptions    No medications on file            =================================================================    HPI  Use of : N/A         Young KOCH José Luis is a 73 year old male who  presents for chest pain.    Per patient's wife: Patient reports a history of lung cancer. His last chemo infusion was 3 days ago (round 6/6) and his last radiation was yesterday. Last night the patient reports shortness of breath, fatigue, chills, frequent diarrhea (atypical for him after chemo), sleep disturbance, abdominal cramping, and eventually today 2 episodes of vomiting. Today he saw his oncologist who gave him 1L of fluid which helped some. As soon as he left his oncologist's office he began to have chest pain which radiates to his jaw with no palliative or provoking factors and no pain with deep breaths. He reports this is the same pain he had when he had a previous heart attack and pain that he has had after chemotherapy. He denies recent antibiotic use, recent neutropenia, chest pain last night, cough, fever, black stool, dyspnea, coughing up blood, or any other complaints at this time.     Per Chart Review Oncology 10/23/23  Hx of lung cancer right lower lobe receiving chemotherapy, Taxol and Carboplatin for chemotherapy, went to clinic today had a chest x-ray,        REVIEW OF SYSTEMS   All other systems reviewed and are negative unless noted in HPI.      PAST MEDICAL HISTORY:  Past Medical History:   Diagnosis Date    Acute myocardial infarction, unspecified site, episode of care unspecified 01/01/1995    Arthritis     Cardiomyopathy (H)     Cerebral artery occlusion with cerebral infarction (H) 2018    lost vision in right eye    Claudication (H24)     COPD (chronic obstructive pulmonary disease) (H)     Coronary artery disease     Depression     DEPRESSIVE DISORDER NEC 04/29/2008    Essential hypertension, benign     Gout     Gout attack 05/25/2017    Hepatitis C carrier (H)     HTN (hypertension)     Hyperlipidemia     Infectious viral hepatitis     carrier of viral hepatitis    Low back pain     chronic    MI (myocardial infarction) (H) 01/01/1995    Obesity     Other and unspecified  hyperlipidemia     PAD (peripheral artery disease) (H24)     Peyronie's disease        PAST SURGICAL HISTORY:  Past Surgical History:   Procedure Laterality Date    ANGIOPLASTY  1995    ANKLE FRACTURE SURGERY Left     BYPASS GRAFT AORTOFEMORAL N/A 5/17/2017    Procedure: AORTOBIFEMORAL BYPASS ;  Surgeon: Ilir FERRO MD;  Location: [facility] Main OR;  Service:     CAROTID ENDARTERECTOMY Right 4/12/2018    Procedure: RIGHT CAROTID ENDARTERECTOMY WITH EEG;  Surgeon: Sergei Lemus MD;  Location: [facility] Main OR;  Service:     CYSTOSCOPY, TRANSURETHRAL RESECTION (TUR) TUMOR BLADDER, COMBINED N/A 8/3/2023    Procedure: Transurethral resection of bladder tumor 2 cm to 5 cm;  Surgeon: German Mitchell MD;  Location: [facility] OR    FRACTURE SURGERY      IR AORTIC ARCH 4 VESSEL ANGIOGRAM  11/9/2009    IR CAROTID ANGIOGRAM  11/9/2009    IR CAROTID ANGIOGRAM  11/9/2009    IR CHEST PORT PLACEMENT > 5 YRS OF AGE  9/11/2023    IR EXTREMITY ANGIOGRAM BILATERAL  4/6/2017    MANDIBLE SURGERY      ZZC NONSPECIFIC PROCEDURE  1995    MI -- angioplasty       CURRENT MEDICATIONS:    acetaminophen (TYLENOL) 325 MG tablet  allopurinol (ZYLOPRIM) 300 MG tablet  amLODIPine (NORVASC) 5 MG tablet  aspirin (ASA) 325 MG tablet  atenolol (TENORMIN) 100 MG tablet  atorvastatin (LIPITOR) 80 MG tablet  lidocaine-prilocaine (EMLA) 2.5-2.5 % external cream  lisinopril (ZESTRIL) 20 MG tablet  LORazepam (ATIVAN) 0.5 MG tablet  ondansetron (ZOFRAN ODT) 4 MG ODT tab  senna-docusate (SENOKOT-S/PERICOLACE) 8.6-50 MG tablet  sertraline (ZOLOFT) 100 MG tablet  tolterodine (DETROL) 2 MG tablet  traZODone (DESYREL) 50 MG tablet        ALLERGIES:  Allergies   Allergen Reactions    No Known Allergies        FAMILY HISTORY:  Family History   Problem Relation Age of Onset    Cancer Mother     Respiratory Father         d:age 59    Cerebrovascular Disease Father     Hypertension Father     Family History Negative Brother         b:1960    Heart Disease  "Paternal Grandmother     Emphysema Paternal Grandfather     Diabetes Other         paternal uncle    Glaucoma No family hx of     Macular Degeneration No family hx of        SOCIAL HISTORY:   Social History     Socioeconomic History    Marital status:      Spouse name: Mitra    Number of children: 2    Years of education: 12   Occupational History    Occupation:      Comment: [employer]   Tobacco Use    Smoking status: Former     Packs/day: 1.5     Types: Cigarettes     Quit date: 1/1/2013     Years since quitting: 10.8    Smokeless tobacco: Never   Substance and Sexual Activity    Alcohol use: No     Comment: Alcoholic Drinks/day: sober since 1984    Drug use: Yes     Types: Marijuana     Comment: marijuana sometimes    Sexual activity: Yes     Partners: Female       VITALS:  Patient Vitals for the past 24 hrs:   BP Temp Temp src Pulse Resp SpO2 Height Weight   11/03/23 1245 99/62 -- -- 84 17 96 % -- --   11/03/23 1230 100/67 -- -- 83 16 96 % -- --   11/03/23 1215 101/67 -- -- 84 17 95 % -- --   11/03/23 1200 97/66 -- -- 85 20 91 % -- --   11/03/23 1145 96/63 -- -- 86 21 94 % -- --   11/03/23 1132 97/63 -- -- 96 30 93 % -- --   11/03/23 1121 -- 98.2  F (36.8  C) Oral -- -- -- -- --   11/03/23 1117 109/63 -- -- 96 22 94 % 1.727 m (5' 8\") 73.9 kg (163 lb)       PHYSICAL EXAM    VITAL SIGNS: BP 99/62   Pulse 84   Temp 98.2  F (36.8  C) (Oral)   Resp 17   Ht 1.727 m (5' 8\")   Wt 73.9 kg (163 lb)   SpO2 96%   BMI 24.78 kg/m    General Appearance: Alert, interactive appropriately, well groomed  Head:  Atraumatic  Eyes: EOMI  ENT:  Dry op   Cardio:  Regular rate and rhythm  Pulm:  Bibasilar crackles   Abdomen:  Soft NT/ND  MS: No tenderness  Skin: No signs of trauma  Neuro: MAEE, Interacting appropriately, normal coordination      LAB:  All pertinent labs reviewed and interpreted.  Labs Ordered and Resulted from Time of ED Arrival to Time of ED Departure   TROPONIN T, HIGH SENSITIVITY - " Abnormal       Result Value    Troponin T, High Sensitivity 84 (*)    TROPONIN T, HIGH SENSITIVITY - Abnormal    Troponin T, High Sensitivity 77 (*)    CBC WITH PLATELETS - Abnormal    WBC Count 3.1 (*)     RBC Count 2.43 (*)     Hemoglobin 7.8 (*)     Hematocrit 23.0 (*)     MCV 95      MCH 32.1      MCHC 33.9      RDW 17.9 (*)     Platelet Count 92 (*)    LACTIC ACID WHOLE BLOOD - Normal    Lactic Acid 1.5     HEPARIN UNFRACTIONATED ANTI XA LEVEL       RADIOLOGY:  CT Chest Pulmonary Embolism w Contrast   Final Result   IMPRESSION:   1.  No pulmonary embolism to the subsegmental level.   2.  Mild diffuse pulmonary edema, as well as small right and trace left pleural effusions. Additional patchy groundglass throughout both lungs may represent superimposed infection or aspiration.   3.  Decreased size of the largest two lung nodules involving the right upper and right lower lobes as described when compared to June 2023. Smaller nodules elsewhere poorly evaluated on the current examination given diffuse edema. Consider short interval    follow-up chest CT following resolution of acute processes.   4.  Similar enlarged mediastinal and hilar lymph nodes, presumably gabriele metastases. No definite new or enlarging thoracic lymph nodes.             EKG:   Performed at: 11:21  Impression: Sinus tachycardia  Left axis deviation  Left ventricular hypertrophy with QRS widening and repolarization abnormality  Cannot rule out septal infarct (cited on or before 10-APR-2018)  Abnormal ECG  Comparison: When compared with ECG of 20-AUG-2020  Vent. Rate has increased by 41 BPM  Incomplete right bundle branch block is no longer present  Questionable change in initial forces of Anterior leads.  I have independently reviewed and interpreted the EKG(s) documented above.     PROCEDURES:   None    I, Paul Lira, am serving as a scribe to document services personally performed by Dr. Mares, based on my observation and the provider's  statements to me. I,Nuha Mares MD, attest that Paul Soraya is acting in a scribe capacity, has observed my performance of the services and has documented them in accordance with my direction.     Nuha Mares MD  Emergency Medicine  [facility] EMERGENCY ROOM  [address] [facility] [address]  [phone]  Dept: [phone]       Nuha Mares MD  11/03/23 3669

## 2023-11-03 NOTE — PROGRESS NOTES
Patient treatment set up pictures reviewed by provider and patient seen to have a pleural effusion.  Decision made to stop all radiation treatments and complete treatment course at 30 out of 33 fractions.  Dr. Angel had called patient and left a voicemail message with information.  Patient seen by nursing in infusion center on 11/3/2023 and discharge instructions reviewed and given to patient.  Follow-up with Dr. Angel in about 4 to 6 weeks.  Patient instructed to to make appointments on his way out of the clinic after infusions are completed.  Both patient and wife appreciative of information and verbalized understanding.

## 2023-11-04 NOTE — PLAN OF CARE
[facility] - ICU    RN Progress Note:            Pertinent Assessments:      Please refer to flowsheet rows for full assessment     ST elevation in V2. TR band off ~0115. Potassium = 2.9           Key Events - This Shift:       Pt arrived to unit around 2230. VSS. Denies chest pain. No signs of bleeding or hematoma on TR band site. TR band removed ~0115, heparin gtt resumed per Dr Flores's order.                     Barriers to Discharge / Downgrade:     Heparin gtt. Needs to discuss treatment options with cardiology to determine plan before discharge. Not currently ICU status so downgrade is non-applicable.

## 2023-11-04 NOTE — PHARMACY-ADMISSION MEDICATION HISTORY
Please see Admission Medication History note completed on 11/3/23 under previous encounter at [facility] for information regarding prior to admission medications.    Nicollette McMann, PharmD, BCPS

## 2023-11-04 NOTE — CONSULTS
Cardiothoracic Surgery Consult    Date of Service: 11/4/2023    REFERRING CARDIOLOGIST: Dr. Chester     REASON FOR CONSULTATION: Consideration for surgical revascularization    CONSULTING CARDIOVASCULAR SURGEON: Dr. Stephania Brown     HISTORY OF PRESENT ILLNESS: Young Ordonez is a 73 year old year-old male who has a PMH of CAD, previous angioplasty of LAD, recent RLL non-small cell lung cancer stage IIIb undergoing chemotherapy and radiation therapy, HTN, HLD, stroke with right eye vision loss, heart failure with mildly reduced LVE who presented to [facility] 11/3/2023 with chest pain, dyspnea, elevated troponin and nt-pro-BNP. He was started on Ntg drip and lasix, transferred to [facility] 11/4/2023 for further evaluation. Patient received 300 mg 11/3 and 75 mg this am. Coronary angiogram performed today demonstrates severe multi-vessel coronary artery disease as below. CV Surgery is consulted for possible coronary artery bypass surgery.    Patient reports he has not had chest pain since his LAD PTCA 30 years ago and has been very active until recently with cancer treatment. He completed his cancer treatment a couple days ago and is planning to have an Oncology visit with PET scan the end of this month. Has been told he has a good prognosis. Currently is chest pain free on heparin drip.     The patient's coronary artery disease risk factors include HTN, HLD, +family history of CAD including his father and paternal GM, hx of smoking with 50+ pack year history, quit 10 years ago. Patient is generally active. Patient denies history of bleeding/clotting issues and issues with anesthesia in the past.     PAST MEDICAL HISTORY:   Past Medical History:   Diagnosis Date    Acute myocardial infarction, unspecified site, episode of care unspecified 01/01/1995    Arthritis     Cardiomyopathy (H)     Cerebral artery occlusion with cerebral infarction (H) 2018    lost vision in right eye    Claudication (H24)     COPD  (chronic obstructive pulmonary disease) (H)     Coronary artery disease     Depression     DEPRESSIVE DISORDER NEC 04/29/2008    Essential hypertension, benign     Gout     Gout attack 05/25/2017    Hepatitis C carrier (H)     HTN (hypertension)     Hyperlipidemia     Infectious viral hepatitis     carrier of viral hepatitis    Low back pain     chronic    MI (myocardial infarction) (H) 01/01/1995    Obesity     Other and unspecified hyperlipidemia     PAD (peripheral artery disease) (H24)     Peyronie's disease        PAST SURGICAL HISTORY:   Past Surgical History:   Procedure Laterality Date    ANGIOPLASTY  1995    ANKLE FRACTURE SURGERY Left     BYPASS GRAFT AORTOFEMORAL N/A 5/17/2017    Procedure: AORTOBIFEMORAL BYPASS ;  Surgeon: Ilir FERRO MD;  Location: [facility] OR;  Service:     CAROTID ENDARTERECTOMY Right 4/12/2018    Procedure: RIGHT CAROTID ENDARTERECTOMY WITH EEG;  Surgeon: Sergei Lemus MD;  Location: [facility] OR;  Service:     CYSTOSCOPY, TRANSURETHRAL RESECTION (TUR) TUMOR BLADDER, COMBINED N/A 8/3/2023    Procedure: Transurethral resection of bladder tumor 2 cm to 5 cm;  Surgeon: German Mitchell MD;  Location:  OR    FRACTURE SURGERY      IR AORTIC ARCH 4 VESSEL ANGIOGRAM  11/9/2009    IR CAROTID ANGIOGRAM  11/9/2009    IR CAROTID ANGIOGRAM  11/9/2009    IR CHEST PORT PLACEMENT > 5 YRS OF AGE  9/11/2023    IR EXTREMITY ANGIOGRAM BILATERAL  4/6/2017    MANDIBLE SURGERY      [facility] NONSPECIFIC PROCEDURE  1995    MI -- angioplasty       ALLERGIES:   Allergies   Allergen Reactions    No Known Allergies           CURRENT MEDICATIONS:  Current Facility-Administered Medications   Medication    acetaminophen (TYLENOL) tablet 650 mg    Or    acetaminophen (TYLENOL) Suppository 650 mg    acetaminophen (TYLENOL) tablet 650 mg    allopurinol (ZYLOPRIM) tablet 300 mg    [Held by provider] amLODIPine (NORVASC) tablet 5 mg    aspirin EC tablet 81 mg    [Held by provider] atenolol  (TENORMIN) tablet 100 mg    atorvastatin (LIPITOR) tablet 80 mg    bisacodyl (DULCOLAX) EC tablet 5 mg    Or    bisacodyl (DULCOLAX) EC tablet 10 mg    clopidogrel (PLAVIX) tablet 75 mg    fentaNYL (PF) (SUBLIMAZE) injection 25 mcg    furosemide (LASIX) tablet 40 mg    heparin infusion 25,000 units in D5W 250 mL ANTICOAGULANT    HOLD:  Metformin and metformin containing medications if patient received IV contrast with acute kidney injury or severe chronic kidney disease (stage IV or stage V; i.e., eGFR less than 30)    hydrALAZINE (APRESOLINE) injection 10 mg    isosorbide mononitrate (IMDUR) 24 hr tablet 30 mg    lisinopril (ZESTRIL) tablet 2.5 mg    [Held by provider] lisinopril (ZESTRIL) tablet 20 mg    LORazepam (ATIVAN) tablet 0.5-1 mg    melatonin tablet 1 mg    metoprolol tartrate (LOPRESSOR) half-tab 12.5 mg    midazolam (VERSED) injection 0.5 mg    ondansetron (ZOFRAN ODT) ODT tab 4 mg    Or    ondansetron (ZOFRAN) injection 4 mg    oxyCODONE (ROXICODONE) tablet 5 mg    Or    oxyCODONE (ROXICODONE) tablet 10 mg    polyethylene glycol (MIRALAX) Packet 17 g    prochlorperazine (COMPAZINE) injection 5 mg    Or    prochlorperazine (COMPAZINE) tablet 5 mg    Or    prochlorperazine (COMPAZINE) suppository 12.5 mg    sertraline (ZOLOFT) tablet 200 mg    traZODone (DESYREL) tablet 50 mg         FAMILY HISTORY:   Family History   Problem Relation Age of Onset    Cancer Mother     Respiratory Father         d:age 59    Cerebrovascular Disease Father     Hypertension Father     Family History Negative Brother         b:1960    Heart Disease Paternal Grandmother     Emphysema Paternal Grandfather     Diabetes Other         paternal uncle    Glaucoma No family hx of     Macular Degeneration No family hx of          SOCIAL HISTORY:   Social History     Socioeconomic History    Marital status:      Spouse name: Mitra    Number of children: 2    Years of education: 12    Highest education level: Not on file    Occupational History    Occupation:      Comment: [employer]   Tobacco Use    Smoking status: Former     Packs/day: 1.5     Types: Cigarettes     Quit date: 1/1/2013     Years since quitting: 10.8    Smokeless tobacco: Never   Substance and Sexual Activity    Alcohol use: No     Comment: Alcoholic Drinks/day: sober since 1984    Drug use: Yes     Types: Marijuana     Comment: marijuana sometimes    Sexual activity: Yes     Partners: Female   Other Topics Concern    Not on file   Social History Narrative    Not on file     Social Determinants of Health     Financial Resource Strain: Not on file   Food Insecurity: Not on file   Transportation Needs: Not on file   Physical Activity: Not on file   Stress: Not on file   Social Connections: Not on file   Interpersonal Safety: Not on file   Housing Stability: Not on file       REVIEW OF SYSTEMS:  CONSTITUTIONAL: Pleasant. Has lost ~ 50 lbs since his lung cancer diagnosis a year ago and treatment.    SKIN: No rash  CARDIOVASCULAR: No chest pain or chest discomfort currently. No palpitations or edema. See HPI.   RESPIRATORY: No shortness of breath or cough.  GASTROINTESTINAL: No nausea, vomiting. Has had watery stools recently, thought to be due to his chemotherapy. No abdominal pain.  NEUROLOGICAL: No headache, dizziness, syncope  HEMATOLOGIC: pancytopenia likely due to recent chemo, no bleeding or bruising.     PHYSICAL EXAMINATION:   Vitals: /73   Pulse 90   Temp 98.5  F (36.9  C) (Axillary)   Resp 30   Wt 69.8 kg (153 lb 14.4 oz)   SpO2 96%   BMI 23.40 kg/m    GENERAL:  Well developed and well nourished. NAD.  CARDIOVASCULAR: RRR on monitor; no edema.  RESPIRATIONS: Non-labored breathing on room air.  ABDOMEN: Soft, non-distended, non-tender  EXTREMITIES: No deformity, no edema  NEUROLOGIC: Intact and symmetric with no focal deficits.   PSYCHIATRIC: Alert and oriented x3, pleasant     LABORATORY STUDIES:   Lab Results   Component Value Date  "   WBC 3.2 (L) 11/04/2023    HGB 9.4 (L) 11/04/2023    HCT 26.5 (L) 11/04/2023    MCV 90 11/04/2023    PLT 93 (L) 11/04/2023      Lab Results   Component Value Date    BUN 11.9 11/04/2023     11/04/2023    CO2 25 11/04/2023     EKG 11/3/2023: ST, LAD with LBBB.       CARDIAC CATHETERIZATION  11/4/2023:   Conclusion    2nd Mrg lesion is 90% stenosed.    3rd Mrg-1 lesion is 95% stenosed.    3rd Mrg-2 lesion is 80% stenosed.    1st LPL lesion is 60% stenosed.    2nd LPL lesion is 80% stenosed.    Ost LAD to Prox LAD lesion is 99% stenosed.    Mid LAD lesion is 95% stenosed.    Mid LAD to Dist LAD lesion is 99% stenosed.    Mid RCA lesion is 99% stenosed.    Dist Cx lesion is 40% stenosed.     1.  Severe calcified diffuse multivessel coronary artery disease.  Suboptimal anatomy for PCI.  2.  Separate adjacent ostia for LAD and left circumflex, (no left main)  3.  LAD lesions appear chronic.  LAD fills slowly beyond mid lesion.  4.  No obvious unstable plaque; multiple potential \"culprit\" lesions  5.  Ischemic symptoms probably a combination of severe chronic coronary disease with superimposed anemia     TRANSTHORACIC ECHOCARDIOGRAM 11/4/2023: pending     CXR 11/3/2023:  MPRESSION: Left IJ Port-A-Cath. Right upper lobe and lower lobe nodules have both decreased in size and are poorly defined. No new parenchymal disease. No effusions. Heart and pulmonary vascularity are normal. Numerous clips are noted in the left     IMPRESSION AND PLAN: Young Ordonez is a 73 year old with severe multi-vessel coronary artery disease who presented to the ED with chest pain and dyspnea, NSTEMI. Has a history of PTCA of the LAD 1995, HTN, HLD, tobacco use, and family history of CAD. He finished chemo and radiation therapy a couple days ago for non-small cell lung cancer and is awaiting repeat PET scan later this month. Echocardiography in 2020 showed LVEF 45-50% with WMA, today's echo is pending. He was loaded with Plavix 300 mg " yesterday and 75 mg this am. Currently on heparin drip and pain free.     This is a complex case with Mr. Ordonez's co-morbidities and recently undergoing treatment for lung cancer, which can impede wound healing post-CABG. He does not seem to be in acute heart failure at this time. Would agree with Cardiology to do a viability study, treat medically and monitor. We will re-evaluate after viability study and decide about candidacy for CABG.  Will review echo when available, follow patient and make further recommendations.     - Remainder of cares per primary team.   - consider oncology consult to further determine prognosis of pt's lung cancer  - Hold plavix      Thank you very much for this referral.       Patient and plan discussed with attending, Dr. Brown.    Mabel Zaidi, DNP, CNP  [facility] Cardiothoracic Surgery  Pgr [phone]    ATTESTATION:    I saw and evaluated the patient, personally reviewed the imaging, and agree with the fellow's note as written above. The patient is a 73 year old year-old male who has a history of CAD, previous angioplasty of LAD, recent RLL non-small cell lung cancer stage IIIb who recently completed chemotherapy and radiation therapy, HTN, HLD, stroke with right eye vision loss, heart failure with EF 30% who was transferred for CABG evaluation. Coronary angiogram demonstrates severe multi-vessel coronary artery disease. We will await MR viability study before making a decision on candidacy for CABG. He has multiple factors that make his case more high risk, including reduced EF and NSCLC with recent, potentially ongoing treatment. May need oncology recs as well to help understand future treatment plan and prognosis. Please hold plavix.    Stephania Brown MD

## 2023-11-04 NOTE — PROGRESS NOTES
Care Management Follow Up    Length of Stay (days): 1    Expected Discharge Date: 11/05/2023     Concerns to be Addressed:     Care Progression  Patient plan of care discussed at interdisciplinary rounds: Yes    Anticipated Discharge Disposition:  Anticipate home     Anticipated Discharge Services:  NA  Anticipated Discharge DME:  [facility]    Patient/family educated on Medicare website which has current facility and service quality ratings:  NA  Education Provided on the Discharge Plan:  NA  Patient/Family in Agreement with the Plan:  NA    Referrals Placed by CM/SW:  NA  Private pay costs discussed: Not applicable    Additional Information:  Chart reviewed.     Social HX: lives w/spouse, Mitra will transport. independent at baseline w/no svcs and CM to follow for discharge needs.     CM will continue to follow care progression and aide in discharge planning as needed.     Arabella Solorzano RN

## 2023-11-04 NOTE — ED NOTES
Pt currently not experiencing chest pain at this time, resident ok with keeping nitroglycerin patch in place and holding the nitroglycerin drip at this time. SL nitroglycerin held as well.

## 2023-11-04 NOTE — PLAN OF CARE
[facility] - ICU    RN Progress Note:            Pertinent Assessments:      Please refer to flowsheet rows for full assessment     Denied any chest pain           Key Events - This Shift:   - refused to have his chlorhexidine bath because he did not get any sleep last night and wanted to rest for awhile.  - Diarrhea was resolved after giving loperamide.

## 2023-11-04 NOTE — PROGRESS NOTES
"[facility]    Medicine Progress Note - Hospitalist Service    Date of Admission:  11/3/2023    Assessment & Plan      Young Ordonez is a 73 year old male admitted on 11/3/2023. He has a history of CAD s/p MI, ischemic CM, HF, HTN, HLD, stroke, non small cell lung cancer and is admitted for NSTEMI and possible CHF exacerbation.     NSTEMI  Ischemic cardiomyopathy  CAD s/p MI  Patient initially presented to [facility] ER with chest pain. He has history of MI with PTCA about 30 years ago. Troponin initially elevated 84 and decreased to 77 on admission but increased to 107 with recurrent severe chest pain few hours after admitted.  Persistent ST changes noted on repeat EKGs. His pain improved with sublingual nitro and nitro patch. Care was discussed with cardiology who recommended urgent transfer to [facility] for Cath Lab given recurrent pain. Patient was transferred to [facility] for left heart cath. Patient underwent left heart cath which revealed diffuse multivessel CAD with suboptimal anatomy for PCI. No obvious unstable plaque; multiple potential \"culprit\" lesions. Ischemic symptoms likely combination of severe chronic coronary disease with superimposed anemia. Patient currently denies any chest pain, shortness of breath.   - IP Cardiology consult, appreciate their recs   > multivessel dx seen on angio, no amenable to PCI. Asking CV surgery to see pt   > Echo today, may need cardiac MRI to assess viability if surgery considered  - CV surgery consulted, appreciate their recs  - Heparin gtt  - DAPT w/ Aspirin 81 mg, Plavix 75 mg daily   - PTA atorvastatin  - Transfuse for hgb <89     Concern for acute on chronic heart failure  Heart failure with mildly reduced ejection fraction  Dyspnea   Work up at [facility] showed CTPE negative for PE. Mild diffuse pulmonary edema as well as small right and trace left pleural effusions, right worse than left. Additional patchy ground glass throughout both " lungs. BNP 6,827. Last echo 8/20/2023 showed EF of 45-50%.  - 40 mg IV lasix daily  - Echo pending    Hx of NSCLC of RLL, stage IIIb  Diarrhea of unknown origin  Follows w/ Dr. Crowe from [facility]. Undergoing chemotherapy and radiation currently. Now w/ several days of profuse watery diarrhea. Likely chemotherapy related in the setting of no fever, ill contacts, or new/suspect foods.  - Oncology consulted, appreciate their recs  - Imodium QID PRN for diarrhea    Pancytopenia  Likely secondary to treatment for cancer. Hgb 7.8.  S/p 1 unit PRBC prior to transfer to [facility]. No evidence of acute bleeding.  - per prior cardiology consult, goal to keep Hgb >8  -CBC in AM    Chronic Conditions:  HTN: From [facility], appears PTA meds were held. Will await cardiology consult for further recs on BP meds -Held PTA amlodipine, Lisinopril, Atenolol  Epigastric Pain - PTA Protonix   Insomnia -PTA Trazodone 50 mg  Anxiety-PTA lorazepam 0.5 mg 1-2 tablets PRN for anxiety   Gout-PTA allopurinol 300 mg tablet   Generalized Anxiety Disorder-PTA Sertraline 200 mg           Diet: Regular Diet Adult    DVT Prophylaxis: heparin gtt  Boswell Catheter: Not present  Lines: PRESENT      Port A Cath Single 09/11/23 Left Chest wall-Site Assessment: WDL      Cardiac Monitoring: ACTIVE order. Indication: Post- PCI/Angiogram (24 hours)  Code Status: Full Code      Clinically Significant Risk Factors Present on Admission        # Hypokalemia: Lowest K = 2.9 mmol/L in last 2 days, will replace as needed   # Hypocalcemia: Lowest Ca = 8.3 mg/dL in last 2 days, will monitor and replace as appropriate       # Drug Induced Platelet Defect: home medication list includes an antiplatelet medication   # Hypertension: Noted on problem list          # Financial/Environmental Concerns: none         Disposition Plan      Expected Discharge Date: 11/05/2023                  The patient's care was discussed with the Attending Physician,  Dr. Mcneal .    Silas Mccartney MD  Hospitalist Service  [facility]  Securely message with waygum (more info)  Text page via [employer] Paging/Directory   ______________________________________________________________________    Interval History   Pt asymptomatic this AM. Uneventful night, no questions or concerns.    Physical Exam   Vital Signs: Temp: 98.5  F (36.9  C) Temp src: Axillary BP: 107/73 Pulse: 90   Resp: 30 SpO2: 96 % O2 Device: None (Room air) Oxygen Delivery: 2 LPM  Weight: 153 lbs 14.4 oz    GEN: Lying comfortably in bed, opens eyes to voice, NAD.  HEENT: Normocephalic, atraumatic.   NECK: Supple. No cervical or supraclavicular adenopathy.   PULM: Non-labored breathing. No use of accessory muscles. Basilar crackles.  CV: Regular rate and rhythm. Normal S1, S2.  ABDOMEN: Normoactive bowel sounds. Non-tender to palpation. Non-distended.    EXTREMITES:  No clubbing, cyanosis, or edema.    SKIN: No rash on limited skin exam.   NEURO:  Awake. Oriented to person, place, time and situation. Moving all extremities.    PSYCH: Calm. Appropriate affect, insight, judgment.     Data     I have personally reviewed the following data over the past 24 hrs:    3.2 (L)  \   9.4 (L)   / 93 (L)     139 102 11.9 /  96   3.4 25 0.89 \     Trop: 107 (HH) BNP: 6,827 (H)     Procal: 0.12 (H) CRP: N/A Lactic Acid: N/A         Imaging results reviewed over the past 24 hrs:   Recent Results (from the past 24 hour(s))   CT Chest Pulmonary Embolism w Contrast    Narrative    EXAM: CT CHEST PULMONARY EMBOLISM W CONTRAST  LOCATION: [facility]  DATE: 11/3/2023    INDICATION: Cancerwith chest pain with breathing; no right upper lobe primary lung malignancy  COMPARISON: CT chest abdomen and pelvis 08/18/2023, CT chest 06/20/2023  TECHNIQUE: CT chest pulmonary angiogram during arterial phase injection of IV contrast. Multiplanar reformats and MIP reconstructions were performed. Dose  reduction techniques were used.   CONTRAST: IV Iodinated contrast    FINDINGS:  ANGIOGRAM CHEST: Main pulmonary trunk is nonenlarged. No pulmonary embolism to the subsegmental level. Thoracic aorta is of normal caliber. Cardiomegaly. There is thinning of the left ventricle near the apex with associated calcifications compatible with   prior infarct.     LUNGS AND PLEURA: Medium right and small left pleural effusions with associated bibasilar atelectasis.. Mild diffuse pulmonary edema. Additional patchy consolidation and groundglass involving the right middle and bilateral upper lobes, possibly   superimposed aspiration or infection (series 6 image 149). Slightly decreased size of the right upper lobe spiculated nodule (series 5 image 28), 2.4 x 1.3 cm, previously 2.7 x 2.3 cm on 06/30/2023. Right lower lobe nodule is also likely decreased in   size measuring up to 1.2 cm, previously 2.0 cm (series 5 image 71). Evaluation for smaller underlying solitary nodules limited given diffuse edema.    MEDIASTINUM/AXILLAE: Similar enlarged mediastinal and hilar lymph nodes.    CORONARY ARTERY CALCIFICATION: Severe.    UPPER ABDOMEN: Cholelithiasis.    MUSCULOSKELETAL: Normal.      Impression    IMPRESSION:  1.  No pulmonary embolism to the subsegmental level.  2.  Mild diffuse pulmonary edema, as well as small right and trace left pleural effusions. Additional patchy groundglass throughout both lungs may represent superimposed infection or aspiration.  3.  Decreased size of the largest two lung nodules involving the right upper and right lower lobes as described when compared to June 2023. Smaller nodules elsewhere poorly evaluated on the current examination given diffuse edema. Consider short interval   follow-up chest CT following resolution of acute processes.  4.  Similar enlarged mediastinal and hilar lymph nodes, presumably gabriele metastases. No definite new or enlarging thoracic lymph nodes.     Cardiac Catheterization  "   Narrative      2nd Mrg lesion is 90% stenosed.    3rd Mrg-1 lesion is 95% stenosed.    3rd Mrg-2 lesion is 80% stenosed.    1st LPL lesion is 60% stenosed.    2nd LPL lesion is 80% stenosed.    Ost LAD to Prox LAD lesion is 99% stenosed.    Mid LAD lesion is 95% stenosed.    Mid LAD to Dist LAD lesion is 99% stenosed.    Mid RCA lesion is 99% stenosed.    Dist Cx lesion is 40% stenosed.    1.  Severe calcified diffuse multivessel coronary artery disease.    Suboptimal anatomy for PCI.  2.  Separate adjacent ostia for LAD and left circumflex, (no left main)  3.  LAD lesions appear chronic.  LAD fills slowly beyond mid lesion.  4.  No obvious unstable plaque; multiple potential \"culprit\" lesions  5.  Ischemic symptoms probably a combination of severe chronic coronary   disease with superimposed anemia     "

## 2023-11-04 NOTE — ED PROVIDER NOTES
EMERGENCY DEPARTMENT ENCOUNTER      NAME: Young Ordonez  AGE: 73 year old male  YOB: 1950  MRN: [id number]  EVALUATION DATE & TIME: 11/3/2023  9:03 PM    PCP: Jigar Crawford    ED PROVIDER: Paul Swain MD      Chief Complaint   Patient presents with    Chest Pain         FINAL IMPRESSION:  No diagnosis found.      ED COURSE & MEDICAL DECISION MAKING:    Pertinent Labs & Imaging studies reviewed. (See chart for details)  73 year old male presents to the Emergency Department as a transfer from [facility] for urgent catheterization.  Patient had chest pain starting earlier today and initially presented to [facility].  Evaluation there was unremarkable for NSTEMI but he had continually upgoing troponins and after discussion with cardiology recommended transfer to [facility] for urgent overnight cath.  He started on heparin drip and loaded with Plavix.  He says his chest pain has entirely resolved at the time of arrival to [facility].  Denies any shortness of breath.  No headache, lightheadedness or dizziness.  No abdominal pain.  No nausea or vomiting.    Exam patient is well-appearing in no acute distress.  The port in his left chest is accessed with no surrounding erythema or tenderness.  Lungs are clear without any wheezes or rales.  No abdominal tenderness.  Strong right radial pulse.  No significant lower extremity edema.    Repeat EKG here does show signs of LVH and ST elevation in V1 through V3 as well as ST depression in 1 and the lateral precordial leads which could be secondary to repolarization abnormality versus ischemic changes. ST abnormalities appear somewhat improved when compared to EKGs earlier today.    Patient was held in the waiting room pending Cath Lab preparation and once Cath Lab was ready patient was transferred out of for catheterization will be admitted thereafter.         9:04 PM I met and evaluated the patient.   9:33 PM The Cath Lab is in the ED and will  take the patient up.     Medical Decision Making    History:  Supplemental history from: Documented in chart, if applicable, Family Member/Significant Other, and EMS  External Record(s) reviewed: Documented in chart, if applicable. and Inpatient Record: 11/3/23 [facility] Admission    Work Up:  Chart documentation includes differential considered and any EKGs or imaging independently interpreted by provider, where specified.  In additional to work up documented, I considered the following work up: Documented in chart, if applicable.    External consultation:  Discussion of management with another provider: Documented in chart, if applicable    Complicating factors:  Care impacted by chronic illness: Cancer/Chemotherapy, Chronic Kidney Disease, Heart Disease, Hyperlipidemia, Hypertension, and Mental Health  Care affected by social determinants of health: N/A    Disposition considerations: Admit.       At the conclusion of the encounter I discussed the results of all of the tests and the disposition. The questions were answered. The patient or family acknowledged understanding and was agreeable with the care plan.     0 minutes of critical care time     MEDICATIONS GIVEN IN THE EMERGENCY:  Medications - No data to display    NEW PRESCRIPTIONS STARTED AT TODAY'S ER VISIT  New Prescriptions    No medications on file          =================================================================    HPI    Patient information was obtained from: the patient and EMS    Use of : N/A         Young Ordonez is a 73 year old male with a pertinent history of hypertension, MI, hyperlipidemia, stroke, PAD, alcohol use disorder, COPD, stage 3a CKD, lung cancer, anxiety, and hepatitis C who presents to this ED by EMS for evaluation of chest pain.  Patient began having chest pain earlier today and was evaluated at [facility] and diagnosed with an NSTEMI.  Upgoing troponins and after discussions with cardiology at  [facility] recommended transfer to [facility] for urgent heart catheterization.  On arrival here he is chest pain-free and denies any additional symptoms.    Per Chart Review:   11/3/23 The patient was admitted to [facility] for evaluation of chest pain which radiated to his neck. His troponin was elevated, nitroglycerin was given and a heparin drip was started. He was admitted to the hospitalist. His EKG was unremarkable. His CT PE showed diffuse pulmonary edema with bilateral plural effusions, right worse than left. His troponin this evening was 107, causing him to be transferred to [facility] to go to the cath lab.         REVIEW OF SYSTEMS   Refer to the HPI    PAST MEDICAL HISTORY:  Past Medical History:   Diagnosis Date    Acute myocardial infarction, unspecified site, episode of care unspecified 01/01/1995    Arthritis     Cardiomyopathy (H)     Cerebral artery occlusion with cerebral infarction (H) 2018    lost vision in right eye    Claudication (H24)     COPD (chronic obstructive pulmonary disease) (H)     Coronary artery disease     Depression     DEPRESSIVE DISORDER NEC 04/29/2008    Essential hypertension, benign     Gout     Gout attack 05/25/2017    Hepatitis C carrier (H)     HTN (hypertension)     Hyperlipidemia     Infectious viral hepatitis     carrier of viral hepatitis    Low back pain     chronic    MI (myocardial infarction) (H) 01/01/1995    Obesity     Other and unspecified hyperlipidemia     PAD (peripheral artery disease) (H24)     Peyronie's disease        PAST SURGICAL HISTORY:  Past Surgical History:   Procedure Laterality Date    ANGIOPLASTY  1995    ANKLE FRACTURE SURGERY Left     BYPASS GRAFT AORTOFEMORAL N/A 5/17/2017    Procedure: AORTOBIFEMORAL BYPASS ;  Surgeon: Ilir FERRO MD;  Location: [facility];  Service:     CAROTID ENDARTERECTOMY Right 4/12/2018    Procedure: RIGHT CAROTID ENDARTERECTOMY WITH EEG;  Surgeon: Sergei Lemus MD;  Location: [facility];   Service:     CYSTOSCOPY, TRANSURETHRAL RESECTION (TUR) TUMOR BLADDER, COMBINED N/A 8/3/2023    Procedure: Transurethral resection of bladder tumor 2 cm to 5 cm;  Surgeon: German Mitchell MD;  Location: [facility] OR    FRACTURE SURGERY      IR AORTIC ARCH 4 VESSEL ANGIOGRAM  11/9/2009    IR CAROTID ANGIOGRAM  11/9/2009    IR CAROTID ANGIOGRAM  11/9/2009    IR CHEST PORT PLACEMENT > 5 YRS OF AGE  9/11/2023    IR EXTREMITY ANGIOGRAM BILATERAL  4/6/2017    MANDIBLE SURGERY      ZZC NONSPECIFIC PROCEDURE  1995    MI -- angioplasty           CURRENT MEDICATIONS:    acetaminophen (TYLENOL) 500 MG tablet  allopurinol (ZYLOPRIM) 300 MG tablet  amLODIPine (NORVASC) 5 MG tablet  aspirin (ASA) 325 MG EC tablet  atenolol (TENORMIN) 100 MG tablet  atorvastatin (LIPITOR) 80 MG tablet  docusate sodium (COLACE) 100 MG capsule  lisinopril (ZESTRIL) 20 MG tablet  LORazepam (ATIVAN) 0.5 MG tablet  ondansetron (ZOFRAN ODT) 4 MG ODT tab  prochlorperazine (COMPAZINE) 10 MG tablet  sertraline (ZOLOFT) 100 MG tablet  traZODone (DESYREL) 50 MG tablet        ALLERGIES:  Allergies   Allergen Reactions    No Known Allergies        FAMILY HISTORY:  Family History   Problem Relation Age of Onset    Cancer Mother     Respiratory Father         d:age 59    Cerebrovascular Disease Father     Hypertension Father     Family History Negative Brother         b:1960    Heart Disease Paternal Grandmother     Emphysema Paternal Grandfather     Diabetes Other         paternal uncle    Glaucoma No family hx of     Macular Degeneration No family hx of        SOCIAL HISTORY:   Social History     Socioeconomic History    Marital status:      Spouse name: Mitra    Number of children: 2    Years of education: 12   Occupational History    Occupation:      Comment: [employer]   Tobacco Use    Smoking status: Former     Packs/day: 1.5     Types: Cigarettes     Quit date: 1/1/2013     Years since quitting: 10.8    Smokeless tobacco: Never    Substance and Sexual Activity    Alcohol use: No     Comment: Alcoholic Drinks/day: sober since 1984    Drug use: Yes     Types: Marijuana     Comment: marijuana sometimes    Sexual activity: Yes     Partners: Female       VITALS:  /82   Pulse 92   Temp 98.5  F (36.9  C) (Oral)   Resp 18   Wt 73.9 kg (163 lb)   SpO2 97%   BMI 24.78 kg/m      PHYSICAL EXAM    Constitutional: Well developed, Well nourished, NAD,  HENT: Normocephalic, Atraumatic,  mucous membranes moist,   Neck- trachea midline, No stridor.    Eyes:EOMI, Conjunctiva normal, No discharge.   Respiratory: Normal breath sounds, No respiratory distress, No wheezing.    Cardiovascular: Normal heart rate, Regular rhythm,  No murmurs, port in left upper chest with no surrounding erythema or crepitus  Abdominal: Soft, No tenderness, No rebound or guarding.     Musculoskeletal: no deformity or malalignment   Integument: Warm, Dry, No erythema, No rash.   Neurologic: Alert & oriented x 3   Psychiatric: Affect normal, Cooperative.      LAB:  All pertinent labs reviewed and interpreted.       RADIOLOGY:  Reviewed all pertinent imaging. Please see official radiology report.  No orders to display       EKG:    Performed at: 2103    Impression: EKG shows a sinus rhythm at 93 beats a minute, borderline right axis, MT interval 200 ms with no nonconducted P waves or signs of heart block,  ms with possible LVH criteria, ST elevation present in V1, V2 and V3 but appears little bit improved when compared to prior EKGs earlier today, with slight ST depression in lead I as well as V5 and V6        I have independently reviewed and interpreted the EKG(s) documented above.    PROCEDURES:   None.       [facility] Documentation:   CMS Diagnoses:               GORAN, Bridgett Marin, am serving as a scribe to document services personally performed by Paul Swain MD based on my observation and the provider's statements to me. I, Paul Swain MD, attest  that Bridgett Marin is acting in a scribe capacity, has observed my performance of the services and has documented them in accordance with my direction.    Paul Swain MD  [facility] EMERGENCY DEPARTMENT  [address] [place] [id number] [phone]       Paul Swain MD  11/03/23 5702

## 2023-11-04 NOTE — ED TRIAGE NOTES
Pt comes from  via EMS for chest pain and NSTEMI. Positive troponins at  with hx of MI. Heparin running at 900 U/hr on arrival. Cath lab activated by .      Pt is currently pain free. Given 4 mg zofran by EMS on route.      Triage Assessment (Adult)       Row Name 11/03/23 4250          Triage Assessment    Airway WDL WDL        Respiratory WDL    Respiratory WDL WDL        Cardiac WDL    Cardiac WDL WDL

## 2023-11-04 NOTE — H&P
"[facility]    History and Physical - Hospitalist Service       Date of Admission:  11/3/2023    Assessment & Plan      Young Ordonez is a 73 year old male admitted on 11/3/2023. He has a history of CAD s/p MI, ischemic CM, HF, HTN, HLD, stroke, non small cell lung cancer and is admitted for NSTEMI and possible CHF exacerbation.     NSTEMI  Ischemic cardiomyopathy  CAD s/p MI  Patient initially presented to [facility] ER with chest pain. He has history of MI with PTCA about 30 years ago. Troponin initially elevated 84 and decreased to 77 on admission but increased to 107 with recurrent severe chest pain few hours after admitted.  Persistent ST changes noted on repeat EKGs. His pain improved with sublingual nitro and nitro patch. Care was discussed with cardiology who recommended urgent transfer to [facility] for Cath Lab given recurrent pain. Patient was transferred to [facility] for left heart cath. Patient underwent left heart cath which revealed diffuse multivessel CAD with suboptimal anatomy for PCI. No obvious unstable plaque; multiple potential \"culprit\" lesions. Ischemic symptoms likely combination of severe chronic coronary disease with superimposed anemia. Patient currently denies any chest pain, shortness of breath.   -  IP Cardiology consult  - Heparin drip   - Aspirin 81 mg for 11/4/23  - Plavix 300 mg 11/03/23 and 75 mg daily   - PTA atorvastatin     Concern for acute on chronic heart failure  Heart failure with mildly reduced ejection fraction  Dyspnea   Work up at [facility] showed CTPE negative for PE. Mild diffuse pulmonary edema as well as small right and trace left pleural effusions, right worse than left. Additional patchy ground glass throughout both lungs. BNP 6,827. Last echo 8/20/2023 showed EF of 45-50%.  - 40 mg IV lasix daily  - Echo pending    Pancytopenia  Likely secondary to treatment for cancer. Hgb 7.8.  S/p 1 unit PRBC prior to transfer to [facility]. No " evidence of acute bleeding.  - per prior cardiology consult, goal to keep Hgb >8  -CBC in AM    Chronic Conditions:  HTN: From [facility], appears PTA meds were held. Will await cardiology consult for further recs on BP meds -Held PTA amlodipine, Lisinopril, Atenolol  Epigastric Pain - PTA Protonix   Insomnia -PTA Trazodone 50 mg  Anxiety-PTA lorazepam 0.5 mg 1-2 tablets PRN for anxiety   Gout-PTA allopurinol 300 mg tablet   Generalized Anxiety Disorder-PTA Sertraline 200 mg     Non-small cell lung cancer  Has been on chemotherapy and radiation treatment with recent radiation treatment prior to admission.          Diet: Regular Diet Adult    DVT Prophylaxis: on heparin ggt  Boswell Catheter: Not present  Lines: PRESENT             Cardiac Monitoring: ACTIVE order. Indication: Post- PCI/Angiogram (24 hours)  Code Status: Full Code      Clinically Significant Risk Factors Present on Admission          # Hypocalcemia: Lowest Ca = 8.3 mg/dL in last 2 days, will monitor and replace as appropriate       # Drug Induced Platelet Defect: home medication list includes an antiplatelet medication   # Hypertension: Noted on problem list            # Financial/Environmental Concerns: none         Disposition Plan      Expected Discharge Date: 11/05/2023                The patient's care will be discussed at morning report with the Attending Physician, Dr. Mcneal .      Latanya Sarmiento, DO  Hospitalist Service  [facility]  Securely message with [employer] (more info)  Text page via [employer] Paging/Directory   ______________________________________________________________________    Chief Complaint   Chest pain    History is obtained from the patient    History of Present Illness   Young Ordonez is a 73 year old male who has a history of HTN, HLD, ACS, depression, cardiomyopathy, CAD, CHF, COPD, stroke, RLL no small cell lung cancer and is admitted for NSTEMI now s/p cardiac catheterization 11/3/23.      Patient presented to [facility] ER for 1 day history of diarrhea, vomiting, dyspnea and several hour history of chest pain. Found to have elevated troponin, chronic ST changes on EKG. Chest pain was relieved with NTG. Cardiology was consulted and due to recurrent chest pain, he was transferred to [place] for urgent cath.     Left heart cath was done which showed severe calcified diffuse multivessel CAD suboptimal anatomy for PCI.     When assessing patient, he feels well. States he is tired, but denies any current chest pain, shortness of breath, nausea, vomiting.       Past Medical History    Past Medical History:   Diagnosis Date    Acute myocardial infarction, unspecified site, episode of care unspecified 01/01/1995    Arthritis     Cardiomyopathy (H)     Cerebral artery occlusion with cerebral infarction (H) 2018    lost vision in right eye    Claudication (H24)     COPD (chronic obstructive pulmonary disease) (H)     Coronary artery disease     Depression     DEPRESSIVE DISORDER NEC 04/29/2008    Essential hypertension, benign     Gout     Gout attack 05/25/2017    Hepatitis C carrier (H)     HTN (hypertension)     Hyperlipidemia     Infectious viral hepatitis     carrier of viral hepatitis    Low back pain     chronic    MI (myocardial infarction) (H) 01/01/1995    Obesity     Other and unspecified hyperlipidemia     PAD (peripheral artery disease) (H24)     Peyronie's disease        Past Surgical History   Past Surgical History:   Procedure Laterality Date    ANGIOPLASTY  1995    ANKLE FRACTURE SURGERY Left     BYPASS GRAFT AORTOFEMORAL N/A 5/17/2017    Procedure: AORTOBIFEMORAL BYPASS ;  Surgeon: Ilir FERRO MD;  Location: [facility];  Service:     CAROTID ENDARTERECTOMY Right 4/12/2018    Procedure: RIGHT CAROTID ENDARTERECTOMY WITH EEG;  Surgeon: Sergei Lemus MD;  Location: [facility] OR;  Service:     CYSTOSCOPY, TRANSURETHRAL RESECTION (TUR) TUMOR BLADDER, COMBINED N/A 8/3/2023     Procedure: Transurethral resection of bladder tumor 2 cm to 5 cm;  Surgeon: German Mitchell MD;  Location: [facility] OR    FRACTURE SURGERY      IR AORTIC ARCH 4 VESSEL ANGIOGRAM  11/9/2009    IR CAROTID ANGIOGRAM  11/9/2009    IR CAROTID ANGIOGRAM  11/9/2009    IR CHEST PORT PLACEMENT > 5 YRS OF AGE  9/11/2023    IR EXTREMITY ANGIOGRAM BILATERAL  4/6/2017    MANDIBLE SURGERY      Z NONSPECIFIC PROCEDURE  1995    MI -- angioplasty       Prior to Admission Medications   Prior to Admission Medications   Prescriptions Last Dose Informant Patient Reported? Taking?   LORazepam (ATIVAN) 0.5 MG tablet  at PRN  No Yes   Sig: Take 1-2 tablets (0.5-1 mg) by mouth daily as needed for anxiety   acetaminophen (TYLENOL) 500 MG tablet 11/2/2023 at PM  Yes Yes   Sig: Take 1,000 mg by mouth every 6 hours as needed for mild pain   allopurinol (ZYLOPRIM) 300 MG tablet 11/2/2023 at PM  No Yes   Sig: Take 1 tablet (300 mg) by mouth daily   amLODIPine (NORVASC) 5 MG tablet 11/2/2023 at PM  No Yes   Sig: Take 1 tablet (5 mg) by mouth daily   aspirin (ASA) 325 MG EC tablet 11/2/2023 at PM  Yes Yes   Sig: Take 325 mg by mouth at bedtime   atenolol (TENORMIN) 100 MG tablet 11/2/2023 at PM  No Yes   Sig: Take 1 tablet (100 mg) by mouth daily   atorvastatin (LIPITOR) 80 MG tablet 11/2/2023 at PM  No Yes   Sig: Take 1 tablet (80 mg) by mouth daily   docusate sodium (COLACE) 100 MG capsule  at PRN  Yes Yes   Sig: Take 100 mg by mouth daily as needed for constipation   lisinopril (ZESTRIL) 20 MG tablet 11/2/2023 at PM  No Yes   Sig: Take 1 tablet (20 mg) by mouth daily   ondansetron (ZOFRAN ODT) 4 MG ODT tab 11/1/2023 at PM  No Yes   Sig: Take 1 tablet (4 mg) by mouth every 8 hours as needed for nausea   prochlorperazine (COMPAZINE) 10 MG tablet 11/1/2023 at PM  Yes Yes   Sig: Take 10 mg by mouth every 6 hours as needed for nausea or vomiting   sertraline (ZOLOFT) 100 MG tablet 11/2/2023 at PM  No Yes   Sig: Take 2 tablets (200 mg) by mouth  daily   traZODone (DESYREL) 50 MG tablet 11/2/2023 at PM  No Yes   Sig: Take 1 tablet (50 mg) by mouth at bedtime      Facility-Administered Medications: None           Physical Exam   Vital Signs: Temp: 97.6  F (36.4  C) Temp src: Oral BP: 121/72 Pulse: 84   Resp: 18 SpO2: 94 % O2 Device: Nasal cannula Oxygen Delivery: 2 LPM  Weight: 163 lbs 0 oz    GEN: Pleasant male, in no acute distress; resting comfortable  HEENT: Normocephalic, atraumatic.   NECK: Supple. No cervical or supraclavicular adenopathy.   PULM: Non-labored breathing. No use of accessory muscles. Basilar crackles.  CV: Regular rate and rhythm. Normal S1, S2.  ABDOMEN: Normoactive bowel sounds. Non-tender to palpation. Non-distended.    EXTREMITES:  No clubbing, cyanosis, or edema.    SKIN: No rash on limited skin exam.   NEURO:  Awake. Oriented to person, place, time and situation. Moving all extremities.    PSYCH: Calm. Appropriate affect, insight, judgment.       Medical Decision Making             Data     I have personally reviewed the following data over the past 24 hrs:    3.1 (L)  \   7.8 (L)   / 92 (L)     140 106 17.2 /  105 (H)   3.5 24 1.05 \     ALT: 16 AST: 27 AP: 68 TBILI: 0.6   ALB: 3.6 TOT PROTEIN: 6.0 (L) LIPASE: N/A     Trop: 107 (HH) BNP: 6,827 (H)     Procal: 0.12 (H) CRP: N/A Lactic Acid: 1.5         Imaging results reviewed over the past 24 hrs:   Recent Results (from the past 24 hour(s))   XR Chest 2 Views    Narrative    EXAM: XR CHEST 2 VIEWS  LOCATION: [facility]  DATE: 11/3/2023    INDICATION:  Right upper lobe non-small cell lung cancer with suspicious synchronous lesions in the right lower lobe and left upper lobe as well. Pleural effusion  COMPARISON: CT PET 08/18/2023, chest x-ray 08/01/2023 and older studies      Impression    IMPRESSION: Left IJ Port-A-Cath. Right upper lobe and lower lobe nodules have both decreased in size and are poorly defined. No new parenchymal disease. No effusions.  Heart and pulmonary vascularity are normal. Numerous clips are noted in the left   midabdomen.     Paucity of stool in the colonic flexures with small air-fluid levels suggestive of an enteritis.   CT Chest Pulmonary Embolism w Contrast    Narrative    EXAM: CT CHEST PULMONARY EMBOLISM W CONTRAST  LOCATION: [facility]  DATE: 11/3/2023    INDICATION: Cancerwith chest pain with breathing; no right upper lobe primary lung malignancy  COMPARISON: CT chest abdomen and pelvis 08/18/2023, CT chest 06/20/2023  TECHNIQUE: CT chest pulmonary angiogram during arterial phase injection of IV contrast. Multiplanar reformats and MIP reconstructions were performed. Dose reduction techniques were used.   CONTRAST: IV Iodinated contrast    FINDINGS:  ANGIOGRAM CHEST: Main pulmonary trunk is nonenlarged. No pulmonary embolism to the subsegmental level. Thoracic aorta is of normal caliber. Cardiomegaly. There is thinning of the left ventricle near the apex with associated calcifications compatible with   prior infarct.     LUNGS AND PLEURA: Medium right and small left pleural effusions with associated bibasilar atelectasis.. Mild diffuse pulmonary edema. Additional patchy consolidation and groundglass involving the right middle and bilateral upper lobes, possibly   superimposed aspiration or infection (series 6 image 149). Slightly decreased size of the right upper lobe spiculated nodule (series 5 image 28), 2.4 x 1.3 cm, previously 2.7 x 2.3 cm on 06/30/2023. Right lower lobe nodule is also likely decreased in   size measuring up to 1.2 cm, previously 2.0 cm (series 5 image 71). Evaluation for smaller underlying solitary nodules limited given diffuse edema.    MEDIASTINUM/AXILLAE: Similar enlarged mediastinal and hilar lymph nodes.    CORONARY ARTERY CALCIFICATION: Severe.    UPPER ABDOMEN: Cholelithiasis.    MUSCULOSKELETAL: Normal.      Impression    IMPRESSION:  1.  No pulmonary embolism to the  "subsegmental level.  2.  Mild diffuse pulmonary edema, as well as small right and trace left pleural effusions. Additional patchy groundglass throughout both lungs may represent superimposed infection or aspiration.  3.  Decreased size of the largest two lung nodules involving the right upper and right lower lobes as described when compared to June 2023. Smaller nodules elsewhere poorly evaluated on the current examination given diffuse edema. Consider short interval   follow-up chest CT following resolution of acute processes.  4.  Similar enlarged mediastinal and hilar lymph nodes, presumably gabriele metastases. No definite new or enlarging thoracic lymph nodes.     Cardiac Catheterization    Narrative      2nd Mrg lesion is 90% stenosed.    3rd Mrg-1 lesion is 95% stenosed.    3rd Mrg-2 lesion is 80% stenosed.    1st LPL lesion is 60% stenosed.    2nd LPL lesion is 80% stenosed.    Ost LAD to Prox LAD lesion is 99% stenosed.    Mid LAD lesion is 95% stenosed.    Mid LAD to Dist LAD lesion is 99% stenosed.    Mid RCA lesion is 99% stenosed.    Dist Cx lesion is 40% stenosed.    1.  Severe calcified diffuse multivessel coronary artery disease.    Suboptimal anatomy for PCI.  2.  Separate adjacent ostia for LAD and left circumflex, (no left main)  3.  LAD lesions appear chronic.  LAD fills slowly beyond mid lesion.  4.  No obvious unstable plaque; multiple potential \"culprit\" lesions  5.  Ischemic symptoms probably a combination of severe chronic coronary   disease with superimposed anemia     "

## 2023-11-04 NOTE — PROGRESS NOTES
Met with patient  to review role of care management, progression of care and possible need for services at discharge, including OP services, home care, or skilled nursing care. Patient alert, oriented and engaged in the conversation.     Assessed, lives w/spouse, Mitra will transport. independent at baseline w/no svcs and CM to follow for discharge needs.

## 2023-11-04 NOTE — PROGRESS NOTES
Cardiology Progress Note    Assessment/Plan:  1.  Acute coronary syndrome, status post coronary angiography last evening demonstrating multivessel coronary artery disease, not amenable to percutaneous intervention.  Patient currently pain-free this morning without report of recurrent chest discomfort or shortness of breath.  We will ask CV surgery to see.  Also obtain echocardiogram to assess LV function and wall motion.  May need cardiac MRI to assess viability if surgery considered.  2.  Essential hypertension, controlled  3.  Ischemic cardiomyopathy, mild by previous echocardiogram.  Again we will reassess LV function as he presented with congestive heart failure symptoms.  He appears fairly well compensated currently without evidence of volume overload.  Hold on additional diuretic therapy but will transition from IV to oral furosemide at 40 mg daily.  4.  Hypercholesterolemia, on atorvastatin    Primary cardiologist: Dr. Gordo Chester    Subjective:  Patient denies any complaints of chest discomfort or shortness of breath this morning.  Discussed results of coronary angiogram last night.     allopurinol  300 mg Oral Daily    [Held by provider] amLODIPine  5 mg Oral Daily    aspirin  81 mg Oral Daily    [Held by provider] atenolol  100 mg Oral Daily    atorvastatin  80 mg Oral Daily    clopidogrel  75 mg Oral Daily    furosemide  40 mg Intravenous Daily    [Held by provider] lisinopril  20 mg Oral Daily    [Held by provider] metoprolol tartrate  12.5 mg Oral BID    sertraline  200 mg Oral Daily    traZODone  50 mg Oral At Bedtime         Objective:   Vital signs in last 24 hours:  Temp:  [97.6  F (36.4  C)-98.7  F (37.1  C)] 98.5  F (36.9  C)  Pulse:  [] 84  Resp:  [13-37] 18  BP: ()/() 112/69  SpO2:  [91 %-99 %] 92 %  Weight:        Review of Systems:  negative    Physical Exam:  General appearance: alert, cooperative, no distress   Head: Normocephalic, without obvious abnormality,  atraumatic  Neck: no JVD   Lungs: clear to auscultation bilaterally   Heart: Regular rate and rhythm.  S1, S2 normal.  No murmur or gallop  Extremities: No peripheral edema bilaterally    Cardiographics (personally reviewed):   Telemetry demonstrates sinus rhythm    Imaging (personally reviewed):   Echocardiogram pending.    Lab Results (personally reviewed):     Recent Labs   Lab Test 06/05/23  1522   CHOL 122   HDL 58   LDL 47   TRIG 86     Recent Labs   Lab Test 06/05/23  1522 05/31/22  1502 08/21/20  0337   LDL 47 74 35     Recent Labs   Lab Test 11/04/23  0414      POTASSIUM 2.9*   CHLORIDE 102   CO2 25   GLC 96   BUN 11.9   CR 0.89   GFRESTIMATED 90   KALIE 8.3*     Recent Labs   Lab Test 11/04/23  0414 11/03/23  0855 10/31/23  1126   CR 0.89 1.05 1.20*  1.20*     Recent Labs   Lab Test 08/20/20  0557 04/26/17  1201   A1C 5.4 5.9          Recent Labs   Lab Test 11/04/23  0414   WBC 3.2*   HGB 9.4*   HCT 26.5*   MCV 90   PLT 93*     Recent Labs   Lab Test 11/04/23  0414 11/03/23  1152 11/03/23  0855   HGB 9.4* 7.8* 8.0*    Recent Labs   Lab Test 08/20/20  0115   TROPONINI 0.03     Recent Labs   Lab Test 11/03/23  1347   NTBNP 6,827*     Recent Labs   Lab Test 06/05/23  1522   TSH 2.10     Recent Labs   Lab Test 08/01/23  0740 08/20/20  0557 08/20/20  0115   INR 1.04 1.17* 1.09          Crystal Burns MD

## 2023-11-05 NOTE — PLAN OF CARE
Problem: Cardiac Catheterization (Diagnostic/Interventional)  Goal: Anesthesia/Sedation Recovery  Intervention: Optimize Anesthesia Recovery  Recent Flowsheet Documentation  Taken 11/5/2023 0900 by Praveena Buchanan, RN  Safety Promotion/Fall Prevention: activity supervised  Goal: Absence of Vascular Access Complication  Intervention: Prevent and Manage Access Complications  Recent Flowsheet Documentation  Taken 11/5/2023 0900 by Praveena Buchanan, RN  Activity Management: activity adjusted per tolerance   Goal Outcome Evaluation:    Vitals stable. No pain.  Requested Ativan 1 mg at 0900.  Hep gtt 1050/unit(s)/hr running.  NSR BBB.  Need stool for C-diff sample.  On RA.  A & O.  SBA.  BP 81/58 at noon. Gave albumin IV. No BM for pt.

## 2023-11-05 NOTE — PROGRESS NOTES
Cardiology Progress Note    Assessment/Plan:  1.  Acute coronary syndrome, status post coronary angiography demonstrating multivessel coronary artery disease, not amenable to percutaneous intervention.  CV surgery has seen in consult and agrees with viability study.  Continue IV heparin today.  2.  Ischemic cardiomyopathy, moderate to severe with LVEF 30%.  There is severe septal, distal anterior and apical hypokinesis along with severe mid to distal inferior and posterior hypokinesis.  Mid to distal septal akinesis.  As above, will plan to pursue viability study with cardiac MRI.  3.  Essential hypertension, controlled.  Continue low-dose metoprolol and lisinopril.  4.  Hypercholesterolemia, on atorvastatin    Primary cardiologist: Dr. Gordo Chester    Subjective:  Patient reports no recurrence of the chest discomfort he had at time of presentation.  Remains on heparin drip.     allopurinol  300 mg Oral Daily    aspirin  81 mg Oral Daily    atorvastatin  80 mg Oral Daily    [Held by provider] clopidogrel  75 mg Oral Daily    furosemide  40 mg Oral Daily    isosorbide mononitrate  30 mg Oral Daily    lisinopril  2.5 mg Oral Daily    metoprolol tartrate  12.5 mg Oral BID    sertraline  200 mg Oral Daily    traZODone  50 mg Oral At Bedtime         Objective:   Vital signs in last 24 hours:  Temp:  [97.5  F (36.4  C)-98.6  F (37  C)] 97.6  F (36.4  C)  Pulse:  [76-90] 76  Resp:  [18-22] 18  BP: ()/(60-78) 105/78  SpO2:  [93 %-97 %] 97 %  Weight:        Review of Systems:  Patient with persistent diarrhea.  C. difficile work-up pending.    Physical Exam:  General appearance: alert, cooperative, no distress   Head: Normocephalic, without obvious abnormality, atraumatic  Neck: no JVD   Lungs: clear to auscultation bilaterally   Heart: Regular rate and rhythm.  S1, S2 normal.  No murmur or gallop  Extremities: No peripheral edema bilaterally    Cardiographics (personally reviewed):   Telemetry demonstrates  sinus rhythm    Imaging (personally reviewed):   No new cardiac imaging    Lab Results (personally reviewed):     Recent Labs   Lab Test 06/05/23  1522   CHOL 122   HDL 58   LDL 47   TRIG 86     Recent Labs   Lab Test 06/05/23  1522 05/31/22  1502 08/21/20  0337   LDL 47 74 35     Recent Labs   Lab Test 11/05/23  0627      POTASSIUM 3.8   CHLORIDE 106   CO2 28   GLC 99   BUN 12.0   CR 0.96   GFRESTIMATED 83   KALIE 7.7*     Recent Labs   Lab Test 11/05/23  0627 11/04/23  0414 11/03/23  0855   CR 0.96 0.89 1.05     Recent Labs   Lab Test 08/20/20  0557 04/26/17  1201   A1C 5.4 5.9          Recent Labs   Lab Test 11/04/23  0414   WBC 3.2*   HGB 9.4*   HCT 26.5*   MCV 90   PLT 93*     Recent Labs   Lab Test 11/04/23  0414 11/03/23  1152 11/03/23  0855   HGB 9.4* 7.8* 8.0*    Recent Labs   Lab Test 08/20/20  0115   TROPONINI 0.03     Recent Labs   Lab Test 11/03/23  1347   NTBNP 6,827*     Recent Labs   Lab Test 06/05/23  1522   TSH 2.10     Recent Labs   Lab Test 08/01/23  0740 08/20/20  0557 08/20/20  0115   INR 1.04 1.17* 1.09          Crystal Burns MD

## 2023-11-05 NOTE — PROGRESS NOTES
"[facility]    Medicine Progress Note - Hospitalist Service    Date of Admission:  11/3/2023    Assessment & Plan   Young Ordonez is a 73 year old male admitted on 11/3/2023. He has a history of CAD s/p MI, ischemic CM, HF, HTN, HLD, stroke, non small cell lung cancer and is admitted for NSTEMI. Cards and CV surgery currently discussing plans for possible CABG, getting viability study today.    NSTEMI  Ischemic cardiomyopathy  CAD s/p MI  Patient initially presented to [facility] ER with chest pain. He has history of MI with PTCA about 30 years ago. Troponin initially elevated 84 and decreased to 77 on admission but increased to 107 with recurrent severe chest pain few hours after admitted.  Persistent ST changes noted on repeat EKGs. His pain improved with sublingual nitro and nitro patch. Care was discussed with cardiology who recommended urgent transfer to [facility] for Cath Lab given recurrent pain. Patient was transferred to [facility] for left heart cath. Patient underwent left heart cath which revealed diffuse multivessel CAD with suboptimal anatomy for PCI. No obvious unstable plaque; multiple potential \"culprit\" lesions. Ischemic symptoms likely combination of severe chronic coronary disease with superimposed anemia. Patient currently denies any chest pain, shortness of breath. Echo 11/4 showing LVEF of 30%, severe septal, distal anteiror, and apical hypokinesis, no mural thrombus detected.  - IP Cardiology consult, appreciate their recs   > multivessel dx seen on angio, no amenable to PCI.   - CV surgery consulted, appreciate their recs   - Cardiac MRI viability study today  - Heparin gtt  - ASA 81 mg daily  - Holding Plavix per CV surgery  - PTA atorvastatin  - Transfuse for hgb <8     Concern for acute on chronic heart failure  HFrEF  Dyspnea, improved  Work up at [facility] showed CTPE negative for PE. Mild diffuse pulmonary edema as well as small right and trace left pleural " effusions, right worse than left. Additional patchy ground glass throughout both lungs. BNP 6,827. Echo done 11/4, see above.  - 40 mg IV lasix daily    Hx of NSCLC of RLL, stage IIIb  Diarrhea of unknown origin  Follows w/ Dr. Crowe from [facility]. Completed chemotherapy and radiation week before admission. Had been having diarrhea the last several days, no hx of chemo associated diarrhea previously. Improved w/ imodium, will still get c dif studies if recurs.   - Oncology consulted, appreciate their recs  - Imodium QID PRN for diarrhea  - C dif toxin pending    Pancytopenia  Likely secondary to treatment for cancer. Hgb 7.8.  S/p 1 unit PRBC prior to transfer to [facility]. No evidence of acute bleeding.  - per prior cardiology consult, goal to keep Hgb >8  -CBC in AM    Chronic Conditions:  HTN: From [facility], appears PTA meds were held. Will await cardiology consult for further recs on BP meds -Held PTA amlodipine, Lisinopril, Atenolol  Epigastric Pain - PTA Protonix   Insomnia -PTA Trazodone 50 mg  Anxiety-PTA lorazepam 0.5 mg 1-2 tablets PRN for anxiety   Gout-PTA allopurinol 300 mg tablet   Generalized Anxiety Disorder-PTA Sertraline 200 mg           Diet: Regular Diet Adult    DVT Prophylaxis: heparin  Boswell Catheter: Not present  Lines: PRESENT      Port A Cath Single 09/11/23 Left Chest wall-Site Assessment: WDL      Cardiac Monitoring: ACTIVE order. Indication: AMI (NSTEMI/ STEMI) (48 hours)  Code Status: Full Code      Clinically Significant Risk Factors        # Hypokalemia: Lowest K = 2.9 mmol/L in last 2 days, will replace as needed         # Thrombocytopenia: Lowest platelets = 93 in last 2 days, will monitor for bleeding   # Hypertension: Noted on problem list            # Financial/Environmental Concerns: none         Disposition Plan      Expected Discharge Date: 11/06/2023        Discharge Comments: Hep gtt          The patient's care was discussed with the Attending  Physician, Dr. Mcneal .    Silas Mccartney MD  Hospitalist Service  [facility]  Securely message with [employer] (more info)  Text page via [employer] Paging/Directory   ______________________________________________________________________    Interval History   Pt reports diarrhea has completely resolved after imodium yesterday. Uneventful night, no questions or concerns this AM. Excited to watch the [employer] game today.    Physical Exam   Vital Signs: Temp: 97.6  F (36.4  C) Temp src: Oral BP: 105/78 Pulse: 76   Resp: 18 SpO2: 97 % O2 Device: None (Room air)    Weight: 152 lbs 8.93 oz    GEN: Lying comfortably in bed, opens eyes to voice, NAD.  HEENT: Normocephalic, atraumatic.   PULM: Non-labored breathing. No use of accessory muscles. Basilar crackles.  CV: Regular rate and rhythm. Normal S1, S2.  ABDOMEN: Normoactive bowel sounds. Non-tender to palpation. Non-distended.    EXTREMITES:  No clubbing, cyanosis, or edema.    SKIN: No rash on limited skin exam.   NEURO:  Awake. Oriented to person, place, time and situation. Moving all extremities.    PSYCH: Calm. Appropriate affect, insight, judgment.     Data     I have personally reviewed the following data over the past 24 hrs:    N/A  \   N/A   / N/A     141 106 12.0 /  99   3.8 28 0.96 \       Imaging results reviewed over the past 24 hrs:   No results found for this or any previous visit (from the past 24 hour(s)).

## 2023-11-05 NOTE — PROGRESS NOTES
"Care Management Follow Up    Length of Stay (days): 2    Expected Discharge Date: 11/06/2023     Concerns to be Addressed:     Care progression  Patient plan of care discussed at interdisciplinary rounds: Yes    Anticipated Discharge Disposition:  TBD     Anticipated Discharge Services:  TBD  Anticipated Discharge DME:  [facility]    Patient/family educated on Medicare website which has current facility and service quality ratings:  NA  Education Provided on the Discharge Plan:  Per team  Patient/Family in Agreement with the Plan:  NA    Referrals Placed by CM/SW:  NA  Private pay costs discussed: Not applicable    Additional Information:  Chart reviewed. Echo completed 11/4. Possible CABG.    Per provider, Dr. Mcneal, not ready for discharge. Cardiology will meet with patient to discuss a plan.     Social Hx: \"Lives w/spouse, Mitra. Independent at baseline w/no svcs. CM to follow for discharge needs. Family will transport.\"    RNCM to follow for medical progression, recommendations, and final discharge plan.     Charley Bruce RN     "

## 2023-11-05 NOTE — PLAN OF CARE
Problem: Adult Inpatient Plan of Care  Goal: Optimal Comfort and Wellbeing  Outcome: Progressing  Intervention: Provide Person-Centered Care  Recent Flowsheet Documentation  Taken 11/5/2023 [id number] by Leighann Lozoya RN  Trust Relationship/Rapport:   care explained   choices provided     Problem: Cardiac Catheterization (Diagnostic/Interventional)  Goal: Stable Heart Rate and Rhythm  Outcome: Progressing   Goal Outcome Evaluation:    Pt restful, comfortable over noc. Denies chest pain. Heparin gtt infusing per md order. NSR on tele.

## 2023-11-05 NOTE — PROGRESS NOTES
CVTS Progress Note      Please see consult 11/4/2023.     Echo completed 11/4/2023: Interpretation Summary     1. The left ventricle is normal in size. Left ventricular systolic performance  is moderately reduced. The ejection fraction is estimated to be 30%.  2. There is severe septal, distal anterior, and apical hypokinesis (no apical  mural thrombus is detected). There is severe mid to distal inferior  hypokinesis. There is severe mid to distal posterior hypokinesis. There is mid  to distal anteroseptal akinesis.  3. No significant valvular heart disease is identified on this study.  4. Normal right ventricular size and systolic performance.  5. There is mild left atrial enlargement.     When compared to the prior real-time echocardiogram dated 20 August 2020, the  apical wall motion abnormality does not appear as pronounced on the current  study. The areas of hypokinesis involving the inferior and posterior segments,  however, appear to be new.      PLAN:   Hold plavix for possible CABG this admission.   Will continue to follow, agree with viability study.     Reviewed with Dr. Brown.     Mabel Zaidi, LOU, CNP  [facility] Cardiothoracic Surgery  Pgr [phone]

## 2023-11-05 NOTE — PROGRESS NOTES
[facility] Hematology and Oncology Progress Note    Patient: Young Ordonez  MRN: [id number]  Date of Service: Nov 3, 2023         Reason for Visit    Chief Complaint   Patient presents with    Oncology Clinic Visit     Primary malignant neoplasm of right lower lobe of lung (H)       Assessment and Plan     Cancer Staging   Primary malignant neoplasm of right lower lobe of lung (H)  Staging form: Lung, AJCC 8th Edition  - Clinical stage from 8/30/2023: Stage IIIB (cT4(2), cN2, cM0) - Signed by Glenn Crowe MD on 9/3/2023      ECOG Performance    0 - Independent     Pain  Pain Score: Extreme Pain (8)    #. Right pleural effusion found on CT images in radiation  #. Acute diarrhea, nausea, vomiting  #. Anemia related to chemotherapy    He has shortness of breath with exertion. His vital are within normal. I obtained CXR this morning and it did not show pleural effusion. Labs were reviewed. He is anemic at Hgb of 8. He does not want blood transfusion. It is expected from chemotherapy and discussed recovery in a few weeks. I encouraged him to call me if his shortness of breath is not improved in a few days.  Xray showed enteritis likely from recent chemo. I offered IVF and he agreed.    He does not want to go back to radiation therapy dept to re-evaluate and complete the last 3 doses if able.     After the IV fluid 1 L in infusion, he developed chest pressure and pain radiation to the jaw. Given his vascular disease history, he was sent to ER for ACS evaluation.      #Non-small cell carcinoma of the right upper lobe with likely lymph node metastasis, synchronous right lower lobe and left upper lobe lung neoplasm.  A 2.3 x 2.2 cm right upper lobe and additional bilateral pulmonary nodules, suspicious for metastatic disease or primary lung cancer  #.  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 S/p TURBT with intravesical mitomycin on 8/3/2023. On clinical  surveillance by Dr. Mitchell.  #.  Past smoker  #.  Significant vascular disease     He completed weekly chemotherapy concurrently with radiation at this point. To follow up with me in early Dec with CT scan.      Encounter Diagnoses:    Problem List Items Addressed This Visit       Anemia, unspecified     Other Visit Diagnoses       Dyspnea on exertion    -  Primary                     CC: Jigar Crawford MD   ______________________________________________________________________________  Diagnosis  8/2023-right upper lobe of the lung, right lower lobe with thoracic lymph node metastases.  Additional mildly FDG avid spiculated nodule in the periphery of the left upper lobe.   -Right upper lobe lung mass biopsy showed non-small cell carcinoma (squamous cell carcinoma).  TPS 25%, low PD-L1 expression.  Fusion negative. CDKN2A 58fs positive.  No targetable mutation.     8/3/2023-A right bladder mass of 2.1 cm which biopsy of the bladder tumor showed papillary transitional carcinoma, low-grade, no evidence of invasive malignancy.    8/18/2023-PET scan showed 3 x 2.2 x 2.2 cm anterior right upper lobe mass with additional FDG avid nodule in the right lower lobe measuring 2.4 x 1.1 x 2.1 cm.  FDG avid right 11 R, 7, right lower station 4 lymph nodes were suspicious for synchronous right upper/lower primary lung neoplasm with thoracic lymph node metastasis.  He has additional FDG avid left upper lobe lesion of 1.1 x 0.5 cm likely representing additional synchronous neoplasm.    8/16/2023-MRI brain was negative for intracranial metastatic disease.    Treatment to date  8/3/2023- S/p TURBT with intravesical mitomycin    9/18/2023- definitive chemoradiation (weekly carboplatin/paclitaxel) for right upper lobe lung cancer. 30/33 radiation completed due to effusion found on radiation CT resulting target lesion out of the radiation field.   -Left upper lobe lung cancer is plan to be treated with consolidative SBRT after the  treatment of right lung cancer.    History of Present Illness    Mr. Young Ordonez presented today accompanied by his wife, Mitra.  They called last night due to shortness of breath, vomiting and diarrhea. No chest pain.     Review of systems  Apart from describing in HPI, the remainder of comprehensive ROS was negative.    Past History    Past Medical History:   Diagnosis Date    Acute myocardial infarction, unspecified site, episode of care unspecified 01/01/1995    Arthritis     Cardiomyopathy (H)     Cerebral artery occlusion with cerebral infarction (H) 2018    lost vision in right eye    Claudication (H24)     COPD (chronic obstructive pulmonary disease) (H)     Coronary artery disease     Depression     DEPRESSIVE DISORDER NEC 04/29/2008    Essential hypertension, benign     Gout     Gout attack 05/25/2017    Hepatitis C carrier (H)     HTN (hypertension)     Hyperlipidemia     Infectious viral hepatitis     carrier of viral hepatitis    Low back pain     chronic    MI (myocardial infarction) (H) 01/01/1995    Obesity     Other and unspecified hyperlipidemia     PAD (peripheral artery disease) (H24)     Peyronie's disease        Past Surgical History:   Procedure Laterality Date    ANGIOPLASTY  1995    ANKLE FRACTURE SURGERY Left     BYPASS GRAFT AORTOFEMORAL N/A 5/17/2017    Procedure: AORTOBIFEMORAL BYPASS ;  Surgeon: Ilir FERRO MD;  Location: [facility];  Service:     CAROTID ENDARTERECTOMY Right 4/12/2018    Procedure: RIGHT CAROTID ENDARTERECTOMY WITH EEG;  Surgeon: Sergei Lemus MD;  Location: [facility];  Service:     CYSTOSCOPY, TRANSURETHRAL RESECTION (TUR) TUMOR BLADDER, COMBINED N/A 8/3/2023    Procedure: Transurethral resection of bladder tumor 2 cm to 5 cm;  Surgeon: German Mitchell MD;  Location:  OR    FRACTURE SURGERY      IR AORTIC ARCH 4 VESSEL ANGIOGRAM  11/9/2009    IR CAROTID ANGIOGRAM  11/9/2009    IR CAROTID ANGIOGRAM  11/9/2009    IR CHEST PORT PLACEMENT  > 5 YRS OF AGE  9/11/2023    IR EXTREMITY ANGIOGRAM BILATERAL  4/6/2017    MANDIBLE SURGERY      ZZC NONSPECIFIC PROCEDURE  1995    MI -- angioplasty       Physical Exam    /67   Pulse 81   Temp 98.7  F (37.1  C)   Resp 16   Wt 73 kg (161 lb)   SpO2 95%   BMI 24.48 kg/m      General: alert, awake, not in acute distress, pale and looks tired  HEENT: Head: Normal, normocephalic, atraumatic.  Eye: Normal external eye, conjunctiva, lids cornea, NARESH.  Pharynx: Normal buccal mucosa. Normal pharynx.  Neck / Thyroid: Supple, no masses, nodes, nodules or enlargement.  Lymphatics: No abnormally enlarged lymph nodes.  Chest: Normal chest wall and respirations. Clear to auscultation.  Heart: S1 S2 RRR.   Abdomen: abdomen is soft with lower abdominal tenderness. No masses, organomegaly or guarding  Extremities: normal strength, tone, and muscle mass  Skin: normal. no rash or abnormalities  CNS: non focal.    Lab Results    Recent Results (from the past 168 hour(s))   CBC with platelets and differential   Result Value Ref Range    WBC Count 3.1 (L) 4.0 - 11.0 10e3/uL    RBC Count 2.68 (L) 4.40 - 5.90 10e6/uL    Hemoglobin 8.5 (L) 13.3 - 17.7 g/dL    Hematocrit 24.9 (L) 40.0 - 53.0 %    MCV 93 78 - 100 fL    MCH 31.7 26.5 - 33.0 pg    MCHC 34.1 31.5 - 36.5 g/dL    RDW 16.9 (H) 10.0 - 15.0 %    Platelet Count 109 (L) 150 - 450 10e3/uL    % Neutrophils 77 %    % Lymphocytes 12 %    % Monocytes 8 %    % Eosinophils 1 %    % Basophils 1 %    % Immature Granulocytes 1 %    NRBCs per 100 WBC 0 <1 /100    Absolute Neutrophils 2.4 1.6 - 8.3 10e3/uL    Absolute Lymphocytes 0.4 (L) 0.8 - 5.3 10e3/uL    Absolute Monocytes 0.2 0.0 - 1.3 10e3/uL    Absolute Eosinophils 0.0 0.0 - 0.7 10e3/uL    Absolute Basophils 0.0 0.0 - 0.2 10e3/uL    Absolute Immature Granulocytes 0.0 <=0.4 10e3/uL    Absolute NRBCs 0.0 10e3/uL   Creatinine   Result Value Ref Range    Creatinine 1.20 (H) 0.67 - 1.17 mg/dL    GFR Estimate 64 >60 mL/min/1.73m2    Comprehensive metabolic panel   Result Value Ref Range    Sodium 140 135 - 145 mmol/L    Potassium 3.6 3.4 - 5.3 mmol/L    Carbon Dioxide (CO2) 24 22 - 29 mmol/L    Anion Gap 10 7 - 15 mmol/L    Urea Nitrogen 19.8 8.0 - 23.0 mg/dL    Creatinine 1.20 (H) 0.67 - 1.17 mg/dL    GFR Estimate 64 >60 mL/min/1.73m2    Calcium 8.8 8.8 - 10.2 mg/dL    Chloride 106 98 - 107 mmol/L    Glucose 106 (H) 70 - 99 mg/dL    Alkaline Phosphatase 72 40 - 129 U/L    AST 27 0 - 45 U/L    ALT 19 0 - 70 U/L    Protein Total 5.9 (L) 6.4 - 8.3 g/dL    Albumin 3.8 3.5 - 5.2 g/dL    Bilirubin Total 0.4 <=1.2 mg/dL   Extra Red Top Tube   Result Value Ref Range    Hold Specimen [facility]    Comprehensive metabolic panel (BMP + Alb, Alk Phos, ALT, AST, Total. Bili, TP)   Result Value Ref Range    Sodium 140 135 - 145 mmol/L    Potassium 3.5 3.4 - 5.3 mmol/L    Carbon Dioxide (CO2) 24 22 - 29 mmol/L    Anion Gap 10 7 - 15 mmol/L    Urea Nitrogen 17.2 8.0 - 23.0 mg/dL    Creatinine 1.05 0.67 - 1.17 mg/dL    GFR Estimate 75 >60 mL/min/1.73m2    Calcium 8.3 (L) 8.8 - 10.2 mg/dL    Chloride 106 98 - 107 mmol/L    Glucose 105 (H) 70 - 99 mg/dL    Alkaline Phosphatase 68 40 - 129 U/L    AST 27 0 - 45 U/L    ALT 16 0 - 70 U/L    Protein Total 6.0 (L) 6.4 - 8.3 g/dL    Albumin 3.6 3.5 - 5.2 g/dL    Bilirubin Total 0.6 <=1.2 mg/dL   CBC with platelets and differential   Result Value Ref Range    WBC Count 3.7 (L) 4.0 - 11.0 10e3/uL    RBC Count 2.49 (L) 4.40 - 5.90 10e6/uL    Hemoglobin 8.0 (L) 13.3 - 17.7 g/dL    Hematocrit 23.3 (L) 40.0 - 53.0 %    MCV 94 78 - 100 fL    MCH 32.1 26.5 - 33.0 pg    MCHC 34.3 31.5 - 36.5 g/dL    RDW 17.8 (H) 10.0 - 15.0 %    Platelet Count 102 (L) 150 - 450 10e3/uL    % Neutrophils 84 %    % Lymphocytes 8 %    % Monocytes 7 %    % Eosinophils 0 %    % Basophils 0 %    % Immature Granulocytes 1 %    NRBCs per 100 WBC 0 <1 /100    Absolute Neutrophils 3.1 1.6 - 8.3 10e3/uL    Absolute Lymphocytes 0.3 (L) 0.8 - 5.3 10e3/uL     Absolute Monocytes 0.2 0.0 - 1.3 10e3/uL    Absolute Eosinophils 0.0 0.0 - 0.7 10e3/uL    Absolute Basophils 0.0 0.0 - 0.2 10e3/uL    Absolute Immature Granulocytes 0.0 <=0.4 10e3/uL    Absolute NRBCs 0.0 10e3/uL   ECG 12-LEAD WITH MUSE (LHE)   Result Value Ref Range    Systolic Blood Pressure 109 mmHg    Diastolic Blood Pressure 63 mmHg    Ventricular Rate 106 BPM    Atrial Rate 106 BPM    IN Interval 170 ms    QRS Duration 126 ms     ms    QTc 486 ms    P Axis 89 degrees    R AXIS -37 degrees    T Axis 95 degrees    Interpretation ECG       Sinus tachycardia  Left axis deviation  Left ventricular hypertrophy with QRS widening and repolarization abnormality  Cannot rule out Septal infarct (cited on or before 10-APR-2018)  Abnormal ECG  When compared with ECG of 20-AUG-2020 05:56,  Vent. rate has increased BY  41 BPM  Incomplete right bundle branch block is no longer Present  Questionable change in initial forces of Anterior leads  Confirmed by SEE ED PROVIDER NOTE FOR, ECG INTERPRETATION ([id number]),  Leroy Barakat ([id number]) on 11/3/2023 4:31:26 PM     Lactic Acid STAT   Result Value Ref Range    Lactic Acid 1.5 0.7 - 2.0 mmol/L   Troponin T, High Sensitivity (now)   Result Value Ref Range    Troponin T, High Sensitivity 84 (H) <=22 ng/L   Extra Blue Top Tube   Result Value Ref Range    Hold Specimen JIC    Extra Purple Top Tube   Result Value Ref Range    Hold Specimen JIC    CBC with platelets   Result Value Ref Range    WBC Count 3.1 (L) 4.0 - 11.0 10e3/uL    RBC Count 2.43 (L) 4.40 - 5.90 10e6/uL    Hemoglobin 7.8 (L) 13.3 - 17.7 g/dL    Hematocrit 23.0 (L) 40.0 - 53.0 %    MCV 95 78 - 100 fL    MCH 32.1 26.5 - 33.0 pg    MCHC 33.9 31.5 - 36.5 g/dL    RDW 17.9 (H) 10.0 - 15.0 %    Platelet Count 92 (L) 150 - 450 10e3/uL   Troponin T, High Sensitivity (now)   Result Value Ref Range    Troponin T, High Sensitivity 77 (H) <=22 ng/L   N terminal pro BNP outpatient   Result Value Ref Range    N Terminal  Pro BNP Outpatient 6,827 (H) 0 - 900 pg/mL   Procalcitonin   Result Value Ref Range    Procalcitonin 0.12 (H) <0.05 ng/mL   Prepare red blood cells (unit)   Result Value Ref Range    Blood Component Type Red Blood Cells     Product Code R2672A09     Unit Status Transfused     Unit Number [id number]     CROSSMATCH Compatible     CODING SYSTEM [id number]     ISSUE DATE AND TIME [id number]     UNIT ABO/RH A+     UNIT TYPE ISBT 6200    Adult Type and Screen   Result Value Ref Range    ABO/RH(D) A POS     Antibody Screen Negative Negative    SPECIMEN EXPIRATION DATE [id number]    ECG 12-LEAD WITH MUSE (LHE)   Result Value Ref Range    Systolic Blood Pressure 116 mmHg    Diastolic Blood Pressure 70 mmHg    Ventricular Rate 103 BPM    Atrial Rate 103 BPM    FL Interval 182 ms    QRS Duration 126 ms     ms    QTc 471 ms    P Axis 93 degrees    R AXIS -31 degrees    T Axis 109 degrees    Interpretation ECG       Sinus tachycardia with Premature atrial complexes  Left axis deviation  Left ventricular hypertrophy with QRS widening and repolarization abnormality  Cannot rule out Septal infarct (cited on or before 10-APR-2018)  Abnormal ECG  When compared with ECG of 03-NOV-2023 11:21,  Premature atrial complexes are now Present  ST more elevated in Anterior leads  Confirmed by SEE ED PROVIDER NOTE FOR, ECG INTERPRETATION (4000),  Any Mtz ([id number]) on 11/3/2023 9:52:10 PM     Troponin T, High Sensitivity   Result Value Ref Range    Troponin T, High Sensitivity 107 (HH) <=22 ng/L   Heparin Unfractionated Anti Xa Level   Result Value Ref Range    Anti Xa Unfractionated Heparin 0.51 For Reference Range, See Comment IU/mL   CBC with platelets   Result Value Ref Range    WBC Count 3.2 (L) 4.0 - 11.0 10e3/uL    RBC Count 2.96 (L) 4.40 - 5.90 10e6/uL    Hemoglobin 9.4 (L) 13.3 - 17.7 g/dL    Hematocrit 26.5 (L) 40.0 - 53.0 %    MCV 90 78 - 100 fL    MCH 31.8 26.5 - 33.0 pg    MCHC 35.5 31.5 - 36.5 g/dL    RDW  16.4 (H) 10.0 - 15.0 %    Platelet Count 93 (L) 150 - 450 10e3/uL   Basic metabolic panel   Result Value Ref Range    Sodium 139 135 - 145 mmol/L    Potassium 2.9 (L) 3.4 - 5.3 mmol/L    Chloride 102 98 - 107 mmol/L    Carbon Dioxide (CO2) 25 22 - 29 mmol/L    Anion Gap 12 7 - 15 mmol/L    Urea Nitrogen 11.9 8.0 - 23.0 mg/dL    Creatinine 0.89 0.67 - 1.17 mg/dL    GFR Estimate 90 >60 mL/min/1.73m2    Calcium 8.3 (L) 8.8 - 10.2 mg/dL    Glucose 96 70 - 99 mg/dL   Heparin Unfractionated Anti Xa Level   Result Value Ref Range    Anti Xa Unfractionated Heparin 0.21 For Reference Range, See Comment IU/mL   Potassium   Result Value Ref Range    Potassium 3.4 3.4 - 5.3 mmol/L   Heparin Unfractionated Anti Xa Level   Result Value Ref Range    Anti Xa Unfractionated Heparin 0.48 For Reference Range, See Comment IU/mL   Potassium   Result Value Ref Range    Potassium 3.6 3.4 - 5.3 mmol/L         Imaging    Echocardiogram Complete    Result Date: 2023  [id number]^ROCK^RAMSES^[place] [address]  Name: AHMET REES MRN: [id number] : 1950 Study Date: 2023 08:57 AM Age: 73 yrs Gender: Male Patient Location: [facility] Reason For Study: CHF Ordering Physician: RAMSES MADERA Referring Physician: MAURICIO MONTLAVO Performed By: ACE  BSA: 1.8 m2 Height: 68 in Weight: 153 lb HR: 87 BP: 116/73 mmHg ______________________________________________________________________________ Procedure Complete Portable Echo Adult. Definity (NDC #57279-014) given intravenously. ______________________________________________________________________________ Interpretation Summary  1. The left ventricle is normal in size. Left ventricular systolic performance is moderately reduced. The ejection fraction is estimated to be 30%. 2. There is severe septal, distal anterior, and apical hypokinesis (no apical mural thrombus is detected). There is severe mid to distal inferior  hypokinesis. There is severe mid to distal posterior hypokinesis. There is mid to distal anteroseptal akinesis. 3. No significant valvular heart disease is identified on this study. 4. Normal right ventricular size and systolic performance. 5. There is mild left atrial enlargement.  When compared to the prior real-time echocardiogram dated 20 August 2020, the apical wall motion abnormality does not appear as pronounced on the current study. The areas of hypokinesis involving the inferior and posterior segments, however, appear to be new. ______________________________________________________________________________ Left ventricle: The left ventricle is normal in size. Left ventricular systolic performance is moderately reduced. The ejection fraction is estimated to be 30%. There is severe septal, distal anterior, and apical hypokinesis (no apical mural thrombus is detected). There is severe mid to distal inferior hypokinesis. There is severe mid to distal posterior hypokinesis. There is mid to distal anteroseptal akinesis. Left ventricular wall thickness is normal.  Assessment of left atrial pressure (LAP): The cumulative findings suggest moderately elevated left atrial pressure (the E/A is > 0.8 and <2.0 plus 2 or 3 of 3 of the following present: Average E/e' > 14, TRvel > 2.8 m/s, and/or LA vol. index > 34 ml/mÂ  ).  Right ventricle: Normal right ventricular size and systolic performance.  Left atrium: There is mild left atrial enlargement.  Right atrium: The right atrium is of normal size.  IVC: The IVC is of normal caliber.  Aortic valve: The aortic valve is comprised of three cusps. No significant aortic stenosis or aortic insufficiency is detected on this study.  Mitral valve: There is mild to moderate mitral annular calcification. There is mild nonspecific mitral valve leaflet thickening. There is trace tricuspid insufficiency.  Tricuspid valve: The tricuspid valve is grossly morphologically normal. There is  trace tricuspid insufficiency.  Pulmonic valve: The pulmonic valve is grossly morphologically normal.  Thoracic aorta: The aortic root and proximal ascending aorta are of normal dimension.  Pericardium: There is no significant pericardial effusion. ______________________________________________________________________________ ______________________________________________________________________________ MMode/2D Measurements & Calculations IVSd: 0.87 cm LVIDd: 5.5 cm LVIDs: 4.1 cm LVPWd: 1.2 cm FS: 26.1 % LV mass(C)d: 219.1 grams LV mass(C)dI: 120.1 grams/m2 Ao root diam: 3.1 cm LA dimension: 3.8 cm LA/Ao: 1.2 LVOT diam: 2.0 cm LVOT area: 3.1 cm2 Ao root diam index Ht(cm/m): 1.8 Ao root diam index BSA (cm/m2): 1.7 LA Volume (BP): 62.6 ml  LA Volume Index (BP): 34.4 ml/m2 LA Volume Indexed (AL/bp): 36.4 ml/m2 RV Base: 3.1 cm RWT: 0.42 TAPSE: 1.4 cm  Time Measurements MM HR: 89.0 BPM  Doppler Measurements & Calculations MV E max bruno: 93.9 cm/sec MV A max bruno: 97.9 cm/sec MV E/A: 0.96 MV dec slope: 624.0 cm/sec2 MV dec time: 0.15 sec Ao V2 max: 112.0 cm/sec Ao max P.0 mmHg Ao V2 mean: 80.7 cm/sec Ao mean PG: 3.0 mmHg Ao V2 VTI: 24.0 cm DEEPTI(I,D): 2.4 cm2 DEEPTI(V,D): 2.7 cm2 LV V1 max PG: 3.7 mmHg LV V1 max: 95.6 cm/sec LV V1 VTI: 18.5 cm SV(LVOT): 58.1 ml SI(LVOT): 31.9 ml/m2 PA acc time: 0.11 sec AV Bruno Ratio (DI): 0.85 DEEPTI Index (cm2/m2): 1.3 E/E': 16.0 E/E' av.7 Lateral E/e': 16.0 Medial E/e': 25.4 Peak E' Bruno: 5.9 cm/sec RV S Bruno: 14.0 cm/sec  ______________________________________________________________________________ Report approved by: Ngoc Jacobo 2023 01:36 PM       Cardiac Catheterization    Result Date: 11/3/2023    2nd Mrg lesion is 90% stenosed.   3rd Mrg-1 lesion is 95% stenosed.   3rd Mrg-2 lesion is 80% stenosed.   1st LPL lesion is 60% stenosed.   2nd LPL lesion is 80% stenosed.   Ost LAD to Prox LAD lesion is 99% stenosed.   Mid LAD lesion is 95% stenosed.   Mid LAD to Dist LAD  "lesion is 99% stenosed.   Mid RCA lesion is 99% stenosed.   Dist Cx lesion is 40% stenosed. 1.  Severe calcified diffuse multivessel coronary artery disease.  Suboptimal anatomy for PCI. 2.  Separate adjacent ostia for LAD and left circumflex, (no left main) 3.  LAD lesions appear chronic.  LAD fills slowly beyond mid lesion. 4.  No obvious unstable plaque; multiple potential \"culprit\" lesions 5.  Ischemic symptoms probably a combination of severe chronic coronary disease with superimposed anemia    CT Chest Pulmonary Embolism w Contrast    Result Date: 11/3/2023  EXAM: CT CHEST PULMONARY EMBOLISM W CONTRAST LOCATION: [facility] DATE: 11/3/2023 INDICATION: Cancerwith chest pain with breathing; no right upper lobe primary lung malignancy COMPARISON: CT chest abdomen and pelvis 08/18/2023, CT chest 06/20/2023 TECHNIQUE: CT chest pulmonary angiogram during arterial phase injection of IV contrast. Multiplanar reformats and MIP reconstructions were performed. Dose reduction techniques were used. CONTRAST: IV Iodinated contrast FINDINGS: ANGIOGRAM CHEST: Main pulmonary trunk is nonenlarged. No pulmonary embolism to the subsegmental level. Thoracic aorta is of normal caliber. Cardiomegaly. There is thinning of the left ventricle near the apex with associated calcifications compatible with  prior infarct. LUNGS AND PLEURA: Medium right and small left pleural effusions with associated bibasilar atelectasis.. Mild diffuse pulmonary edema. Additional patchy consolidation and groundglass involving the right middle and bilateral upper lobes, possibly superimposed aspiration or infection (series 6 image 149). Slightly decreased size of the right upper lobe spiculated nodule (series 5 image 28), 2.4 x 1.3 cm, previously 2.7 x 2.3 cm on 06/30/2023. Right lower lobe nodule is also likely decreased in size measuring up to 1.2 cm, previously 2.0 cm (series 5 image 71). Evaluation for smaller underlying " solitary nodules limited given diffuse edema. MEDIASTINUM/AXILLAE: Similar enlarged mediastinal and hilar lymph nodes. CORONARY ARTERY CALCIFICATION: Severe. UPPER ABDOMEN: Cholelithiasis. MUSCULOSKELETAL: Normal.     IMPRESSION: 1.  No pulmonary embolism to the subsegmental level. 2.  Mild diffuse pulmonary edema, as well as small right and trace left pleural effusions. Additional patchy groundglass throughout both lungs may represent superimposed infection or aspiration. 3.  Decreased size of the largest two lung nodules involving the right upper and right lower lobes as described when compared to June 2023. Smaller nodules elsewhere poorly evaluated on the current examination given diffuse edema. Consider short interval  follow-up chest CT following resolution of acute processes. 4.  Similar enlarged mediastinal and hilar lymph nodes, presumably gabriele metastases. No definite new or enlarging thoracic lymph nodes.     XR Chest 2 Views    Result Date: 11/3/2023  EXAM: XR CHEST 2 VIEWS LOCATION: [facility] DATE: 11/3/2023 INDICATION:  Right upper lobe non-small cell lung cancer with suspicious synchronous lesions in the right lower lobe and left upper lobe as well. Pleural effusion COMPARISON: CT PET 08/18/2023, chest x-ray 08/01/2023 and older studies     IMPRESSION: Left IJ Port-A-Cath. Right upper lobe and lower lobe nodules have both decreased in size and are poorly defined. No new parenchymal disease. No effusions. Heart and pulmonary vascularity are normal. Numerous clips are noted in the left midabdomen. Paucity of stool in the colonic flexures with small air-fluid levels suggestive of an enteritis.       Signed by: Glenn Crowe MD

## 2023-11-05 NOTE — PLAN OF CARE
Problem: Acute Coronary Syndrome  Goal: Optimal Adaptation to Illness  Outcome: Progressing     Problem: Acute Coronary Syndrome  Goal: Adequate Tissue Perfusion    Problem: Acute Coronary Syndrome  Goal: Absence of Cardiac-Related Pain  Outcome: Progressing   Goal Outcome Evaluation:    A&O x4. No c/o chest pain, SOB, dizziness. NPO at midnight d/t MRI Viability Study test scheduled tomorrow at 0800. CHG bath complete. Bp's have been WNL this shift. Will continue to monitor.     Heparin gtt 1050 units/hr.   Anti-Xa recheck AM

## 2023-11-06 NOTE — PROGRESS NOTES
Patient is scheduled for cardiac MRI stress test tomorrow 11/7/23 at 1015. Please keep patient NPO 4 hours prior to test time and caffeine free 12 hours prior to test. Please be sure IV site is patent. Holds of meds contraindicated for test have been placed in the patient's MAR and discussed with patient's RN.   Ira Gao RN  [facility]

## 2023-11-06 NOTE — PROGRESS NOTES
"Care Management Follow Up    Length of Stay (days): 3    Expected Discharge Date: 11/08/2023     Concerns to be Addressed:     Care progression  Patient plan of care discussed at interdisciplinary rounds: Yes    Anticipated Discharge Disposition:  TBD     Anticipated Discharge Services:  TBD  Anticipated Discharge DME:  [facility]    Patient/family educated on Medicare website which has current facility and service quality ratings:  NA  Education Provided on the Discharge Plan:  Per team  Patient/Family in Agreement with the Plan:  NA    Referrals Placed by CM/SW:  NA  Private pay costs discussed: Not applicable    Additional Information:  Per provider, Dr. Mccartney, patient is not close to ready for discharge. Will be here several more days.    Social Hx: \"Lives w/spouse, Mitra. Independent at baseline w/no svcs. CM to follow for discharge needs. Family will transport.\"    RNCM to follow for medical progression, recommendations, and final discharge plan.     Charley Bruce RN     "

## 2023-11-06 NOTE — PLAN OF CARE
Problem: Risk for Delirium  Goal: Improved Sleep  Outcome: Progressing     Problem: Acute Coronary Syndrome  Goal: Normalized Cardiac Rhythm  Outcome: Progressing   Goal Outcome Evaluation:    Pt restful over noc. Heparin gtt infusing per md order. Denies pain. NSR on tele. Plan for cardiac mri/viability test today.

## 2023-11-06 NOTE — PLAN OF CARE
Problem: Fluid Volume Deficit  Goal: Fluid Balance  Outcome: Not Progressing   Goal Outcome Evaluation:    BP 99/67.  Held Metoprolol, isobride, lasix.  Dr. Henrik smartred to give metoprolol wait 1/2 hr recheck bp and give isobride.  Recheck bp 91/56.  So held Isobride.  Cardiology dc'd heparin drip at 1000. Ordered Lovenox shot. NSR 1st degree BBB.  Had cardiac viability MRI.  Enteric precautions dc'd. Ind in room.  No pain.

## 2023-11-06 NOTE — PROGRESS NOTES
MRI reviewed with infarct apex with EF 35.%, discussed with CT surgery and oncology.  Pt being treated with curative intent from an oncology standpoint, considering immunotherapy, with probable recurrence risk in the next couple years.  Discussed with CT surgery,noted calcified aorta, PAD with peripheral bypass lower ext and CEA right with narrowing o the left, concern for limited healing post recent chemotherapy of the sternum, and limited benefit given mid to distal anterior wall is infarcted, will restart clopiodogrel, lower asp and discuss with interventional cardiology options.  Discussed above with the patient and wife and they understand and agree.    Discussed with interventional cardiology, plan as above and schedule PCI next week as outpatient - will walk tomorrow and if no angina, plan discharge with follow up PCI next week

## 2023-11-06 NOTE — PROGRESS NOTES
Imp/plan:  Ischemic CM severe calcified dz in left dom system, with prior MI 30 years ago - recently completed therapy for lung cancer with XRT/chemo to right side and surgery for bladder cancer in August, he is open to options for revasculariztion.  Plan for MRI cardaic with gadolinium (not stress) to screen for viablity (presumed in latera/inf and base and scar at apex based on echo and hx).  Patient pain free and no dypnsea.  Cont metoprolol, iv heparin change to lovenox, imdur, asp raise 162mg (stopped clopidogrel from Friday load), atorvastatin.  Lungs min rhonchi, pt laying flat, will stop diuretics.      Current Facility-Administered Medications   Medication    acetaminophen (TYLENOL) tablet 650 mg    Or    acetaminophen (TYLENOL) Suppository 650 mg    allopurinol (ZYLOPRIM) tablet 300 mg    aspirin EC tablet 81 mg    atorvastatin (LIPITOR) tablet 80 mg    bisacodyl (DULCOLAX) EC tablet 5 mg    Or    bisacodyl (DULCOLAX) EC tablet 10 mg    [Held by provider] clopidogrel (PLAVIX) tablet 75 mg    fentaNYL (PF) (SUBLIMAZE) injection 25 mcg    furosemide (LASIX) tablet 40 mg    heparin infusion 25,000 units in D5W 250 mL ANTICOAGULANT    HOLD:  Metformin and metformin containing medications if patient received IV contrast with acute kidney injury or severe chronic kidney disease (stage IV or stage V; i.e., eGFR less than 30)    hydrALAZINE (APRESOLINE) injection 10 mg    isosorbide mononitrate (IMDUR) 24 hr tablet 30 mg    loperamide (IMODIUM) capsule 2 mg    LORazepam (ATIVAN) tablet 0.5-1 mg    melatonin tablet 1 mg    metoprolol tartrate (LOPRESSOR) half-tab 12.5 mg    midazolam (VERSED) injection 0.5 mg    naloxone (NARCAN) injection 0.2 mg    Or    naloxone (NARCAN) injection 0.4 mg    Or    naloxone (NARCAN) injection 0.2 mg    Or    naloxone (NARCAN) injection 0.4 mg    ondansetron (ZOFRAN ODT) ODT tab 4 mg    Or    ondansetron (ZOFRAN) injection 4 mg    oxyCODONE (ROXICODONE) tablet 5 mg    Or     "oxyCODONE (ROXICODONE) tablet 10 mg    polyethylene glycol (MIRALAX) Packet 17 g    prochlorperazine (COMPAZINE) injection 5 mg    Or    prochlorperazine (COMPAZINE) tablet 5 mg    Or    prochlorperazine (COMPAZINE) suppository 12.5 mg    sertraline (ZOLOFT) tablet 200 mg    traZODone (DESYREL) tablet 50 mg     Past Medical History:   Diagnosis Date    Acute myocardial infarction, unspecified site, episode of care unspecified 01/01/1995    Arthritis     Cardiomyopathy (H)     Cerebral artery occlusion with cerebral infarction (H) 2018    lost vision in right eye    Claudication (H24)     COPD (chronic obstructive pulmonary disease) (H)     Coronary artery disease     Depression     DEPRESSIVE DISORDER NEC 04/29/2008    Essential hypertension, benign     Gout     Gout attack 05/25/2017    Hepatitis C carrier (H)     HTN (hypertension)     Hyperlipidemia     Infectious viral hepatitis     carrier of viral hepatitis    Low back pain     chronic    MI (myocardial infarction) (H) 01/01/1995    Obesity     Other and unspecified hyperlipidemia     PAD (peripheral artery disease) (H24)     Peyronie's disease         Review of Systems: 12 points negative other than above    BP (P) 91/59   Pulse 81   Temp 98.5  F (36.9  C) (Oral)   Resp 18   Wt 69.8 kg (153 lb 14.1 oz)   SpO2 98%   BMI 23.40 kg/m    JVP<7cm, carotids normal  Lungs clear  Cor RRR no c,r,m  Abs soft +BS, no mass  Ext no c,c,e    Lab Results   Component Value Date    HGB 9.4 (L) 11/04/2023     Lab Results   Component Value Date    PLT 93 (L) 11/04/2023     No results found for: \"CREATININE\"  No components found for: \"K\"          Gordo Chester MD  Interventional Cardiology   [facility]  [phone]    "

## 2023-11-06 NOTE — PROGRESS NOTES
"[facility]    Medicine Progress Note - Hospitalist Service    Date of Admission:  11/3/2023    Assessment & Plan   Young Ordonez is a 73 year old male admitted on 11/3/2023. He has a history of CAD s/p MI, ischemic CM, HF, HTN, HLD, stroke, non small cell lung cancer and is admitted for NSTEMI. Cards and CV surgery currently discussing plans for possible CABG, getting viability study today.    NSTEMI  Ischemic cardiomyopathy  CAD s/p MI  Soft BPs  Patient initially presented to [facility] ER with chest pain. He has history of MI with PTCA about 30 years ago. Troponin initially elevated 84 and decreased to 77 on admission but increased to 107 with recurrent severe chest pain few hours after admitted.  Persistent ST changes noted on repeat EKGs. His pain improved with sublingual nitro and nitro patch. Care was discussed with cardiology who recommended urgent transfer to [facility] for Cath Lab given recurrent pain. Patient was transferred to [facility] for left heart cath. Patient underwent left heart cath which revealed diffuse multivessel CAD with suboptimal anatomy for PCI. No obvious unstable plaque; multiple potential \"culprit\" lesions. Ischemic symptoms likely combination of severe chronic coronary disease with superimposed anemia. Patient currently denies any chest pain, shortness of breath. Echo 11/4 showing LVEF of 30%, severe septal, distal anteiror, and apical hypokinesis, no mural thrombus detected.  - IP Cardiology consult, appreciate their recs   > multivessel dx seen on angio, not amenable to PCI.    > Heparin gtt transitioning to lovenox today  - CV surgery consulted, appreciate their recs   - Cardiac MRI viability study today, NPO 1 hour before this  - ASA 81 mg daily  - Holding Plavix prior to possible CABG  - PTA atorvastatin  - Transfuse for hgb <8  - s/p albumin infusion yesterday for ongoing low BPs, may need to repeat this today     Concern for acute on chronic heart " failure  HFrEF  Dyspnea, improved  Work up at [facility] showed CTPE negative for PE. Mild diffuse pulmonary edema as well as small right and trace left pleural effusions, right worse than left. Additional patchy ground glass throughout both lungs. BNP 6,827. Echo done 11/4, see above.  - 40 mg IV lasix daily  - Continuing metoprolol    Hx of NSCLC of RLL, stage IIIb  Likely chemo related diarrhea, improved  Follows w/ Dr. Crowe from [facility]. Completed chemotherapy and radiation week before admission. Had been having diarrhea the last several days, no hx of chemo associated diarrhea previously. Improved w/ imodium, will still get c dif studies if recurs.   - Oncology consulted, appreciate their recs  - Imodium QID PRN for diarrhea  - C dif toxin pending    Pancytopenia  Likely secondary to treatment for cancer. Hgb 7.8.  S/p 1 unit PRBC prior to transfer to [facility]. No evidence of acute bleeding.  - per prior cardiology consult, goal to keep Hgb >8  -CBC in AM    Chronic Conditions:  HTN: From [facility], appears PTA meds were held. Will await cardiology consult for further recs on BP meds -Held PTA amlodipine, Lisinopril, Atenolol  Epigastric Pain - PTA Protonix   Insomnia -PTA Trazodone 50 mg  Anxiety-PTA lorazepam 0.5 mg 1-2 tablets PRN for anxiety   Gout-PTA allopurinol 300 mg tablet   Generalized Anxiety Disorder-PTA Sertraline 200 mg           Diet: Regular Diet Adult    DVT Prophylaxis: heparin gtt  Boswell Catheter: Not present  Lines: PRESENT      Port A Cath Single 09/11/23 Left Chest wall-Site Assessment: WDL      Cardiac Monitoring: ACTIVE order. Indication: AMI (NSTEMI/ STEMI) (48 hours)  Code Status: Full Code      Clinically Significant Risk Factors                  # Hypertension: Noted on problem list            # Financial/Environmental Concerns: none         Disposition Plan      Expected Discharge Date: 11/08/2023        Discharge Comments: Hep gtt -Possible CABG this  admission          The patient's care was discussed with the Attending Physician, Dr. Orourke .    Silas Mccartney MD  Hospitalist Service  [facility]  Securely message with [employer] (more info)  Text page via [employer] Paging/Directory   ______________________________________________________________________    Interval History   Pt to have cardiac MRI viability study sometime this afternoon. No diarrhea the last two days.    Physical Exam   Vital Signs: Temp: 98.5  F (36.9  C) Temp src: Oral BP: (P) 91/59 Pulse: 81   Resp: 18 SpO2: 98 % O2 Device: None (Room air)    Weight: 153 lbs 14.1 oz    GEN: Lying comfortably in bed, opens eyes to voice, NAD.  HEENT: Normocephalic, atraumatic.   PULM: Non-labored breathing. No use of accessory muscles. Basilar crackles.  CV: Regular rate and rhythm. Normal S1, S2.  ABDOMEN: Normoactive bowel sounds. Non-tender to palpation. Non-distended.    EXTREMITES:  No clubbing, cyanosis, or edema.    SKIN: No rash on limited skin exam.   NEURO:  Awake. Oriented to person, place, time and situation. Moving all extremities.    PSYCH: Calm. Appropriate affect, insight, judgment.     Data     I have personally reviewed the following data over the past 24 hrs:    N/A  \   N/A   / N/A     140 105 12.0 /  95   3.5 26 0.97 \       Imaging results reviewed over the past 24 hrs:   No results found for this or any previous visit (from the past 24 hour(s)).

## 2023-11-06 NOTE — PROGRESS NOTES
[facility] Hematology and Oncology Inpatient Progress Note    Patient: Young Ordonez  MRN: [id number]  Date of Service:  11/05/2023            Assessment and Plan    Cancer Staging   Primary malignant neoplasm of right lower lobe of lung (H)  Staging form: Lung, AJCC 8th Edition  - Clinical stage from 8/30/2023: Stage IIIB (cT4(2), cN2, cM0) - Signed by Glenn Crowe MD on 9/3/2023    1.  Squamous cell carcinoma of the lung: He recently finished concurrent chemotherapy and radiation.  His last chemotherapy dose was given on October 31.  Probably had some treatment-related diarrhea but this is resolved.  His counts are slowly improving.  He is at least stage IIIb lung cancer.  Cure rates are approximately 30%.  For those for relapse, median survival is around 2-1/2 to 3 years.  We will continue to follow peripherally.    History of Present Illness    Mr. 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  This evening he is feeling okay.  He is not having any active symptoms.  He did have some diarrhea over the last few days but states that today is better.  He just had a solid bowel movement.    Review of Systems    As above in the history.     Review of Systems otherwise Negative for:  General: chills, fever or night sweats  Psychological: anxiety or depression  Ophthalmic: blurry vision, double vision or loss of vision, vision change  ENT: epistaxis, oral lesions, hearing changes  Hematological and Lymphatic: bleeding, bruising, jaundice, swollen lymph nodes  Endocrine: hot flashes, unexpected weight changes  Respiratory: cough, hemoptysis, orthopnea or shortness of breath/FRANCO  Cardiovascular: edema, palpitations or PND  Gastrointestinal: abdominal pain, blood in stools, change in bowel habits, constipation, diarrhea or  nausea/vomiting  Genito-Urinary: change in urinary stream, incontinence, frequency/urgency  Musculoskeletal: joint pain, stiffness, swelling, muscle pain or weakness  Neurological: dizziness, headaches, numbness/tingling  Dermatological: lumps and rash    ECOG performance status is 1    Physical Exam    /60 (BP Location: Right arm)   Pulse 96   Temp 98.4  F (36.9  C) (Oral)   Resp 18   Wt 69.2 kg (152 lb 8.9 oz)   SpO2 97%   BMI 23.20 kg/m      General: patient appears stated age of 73 year old. Nontoxic and in no distress.   HEENT: Head: atraumatic, normocephalic. Sclerae anicteric.  Chest:  Normal respiratory effort  Cardiac:  No edema.   Abdomen: abdomen is non-distended  Extremities: normal tone and muscle bulk.  Skin: no lesions or rash. Warm and dry.   CNS: alert and oriented. Grossly non-focal.   Psychiatric: normal mood and affect.     Lab Results    Recent Results (from the past 24 hour(s))   Heparin Unfractionated Anti Xa Level    Collection Time: 23 12:14 AM   Result Value Ref Range    Anti Xa Unfractionated Heparin 0.30 For Reference Range, See Comment IU/mL   Basic metabolic panel    Collection Time: 23  6:27 AM   Result Value Ref Range    Sodium 141 135 - 145 mmol/L    Potassium 3.8 3.4 - 5.3 mmol/L    Chloride 106 98 - 107 mmol/L    Carbon Dioxide (CO2) 28 22 - 29 mmol/L    Anion Gap 7 7 - 15 mmol/L    Urea Nitrogen 12.0 8.0 - 23.0 mg/dL    Creatinine 0.96 0.67 - 1.17 mg/dL    GFR Estimate 83 >60 mL/min/1.73m2    Calcium 7.7 (L) 8.8 - 10.2 mg/dL    Glucose 99 70 - 99 mg/dL     Imaging:    Echocardiogram Complete    Result Date: 2023  [id number]^ROCK^RAMSES^[place] [address]  Name: AHMET REES MRN: [id number] : 1950 Study Date: 2023 08:57 AM Age: 73 yrs Gender: Male Patient Location: [facility] Reason For Study: CHF Ordering Physician: RAMSES MADERA Referring Physician: MAURICIO MONTALVO  Performed By: ACE  BSA: 1.8 m2 Height: 68 in Weight: 153 lb HR: 87 BP: 116/73 mmHg ______________________________________________________________________________ Procedure Complete Portable Echo Adult. Definity (NDC #07061-285) given intravenously. ______________________________________________________________________________ Interpretation Summary  1. The left ventricle is normal in size. Left ventricular systolic performance is moderately reduced. The ejection fraction is estimated to be 30%. 2. There is severe septal, distal anterior, and apical hypokinesis (no apical mural thrombus is detected). There is severe mid to distal inferior hypokinesis. There is severe mid to distal posterior hypokinesis. There is mid to distal anteroseptal akinesis. 3. No significant valvular heart disease is identified on this study. 4. Normal right ventricular size and systolic performance. 5. There is mild left atrial enlargement.  When compared to the prior real-time echocardiogram dated 20 August 2020, the apical wall motion abnormality does not appear as pronounced on the current study. The areas of hypokinesis involving the inferior and posterior segments, however, appear to be new. ______________________________________________________________________________ Left ventricle: The left ventricle is normal in size. Left ventricular systolic performance is moderately reduced. The ejection fraction is estimated to be 30%. There is severe septal, distal anterior, and apical hypokinesis (no apical mural thrombus is detected). There is severe mid to distal inferior hypokinesis. There is severe mid to distal posterior hypokinesis. There is mid to distal anteroseptal akinesis. Left ventricular wall thickness is normal.  Assessment of left atrial pressure (LAP): The cumulative findings suggest moderately elevated left atrial pressure (the E/A is > 0.8 and <2.0 plus 2 or 3 of 3 of the following present: Average E/e' > 14, TRvel > 2.8 m/s,  and/or LA vol. index > 34 ml/mÂ  ).  Right ventricle: Normal right ventricular size and systolic performance.  Left atrium: There is mild left atrial enlargement.  Right atrium: The right atrium is of normal size.  IVC: The IVC is of normal caliber.  Aortic valve: The aortic valve is comprised of three cusps. No significant aortic stenosis or aortic insufficiency is detected on this study.  Mitral valve: There is mild to moderate mitral annular calcification. There is mild nonspecific mitral valve leaflet thickening. There is trace tricuspid insufficiency.  Tricuspid valve: The tricuspid valve is grossly morphologically normal. There is trace tricuspid insufficiency.  Pulmonic valve: The pulmonic valve is grossly morphologically normal.  Thoracic aorta: The aortic root and proximal ascending aorta are of normal dimension.  Pericardium: There is no significant pericardial effusion. ______________________________________________________________________________ ______________________________________________________________________________ MMode/2D Measurements & Calculations IVSd: 0.87 cm LVIDd: 5.5 cm LVIDs: 4.1 cm LVPWd: 1.2 cm FS: 26.1 % LV mass(C)d: 219.1 grams LV mass(C)dI: 120.1 grams/m2 Ao root diam: 3.1 cm LA dimension: 3.8 cm LA/Ao: 1.2 LVOT diam: 2.0 cm LVOT area: 3.1 cm2 Ao root diam index Ht(cm/m): 1.8 Ao root diam index BSA (cm/m2): 1.7 LA Volume (BP): 62.6 ml  LA Volume Index (BP): 34.4 ml/m2 LA Volume Indexed (AL/bp): 36.4 ml/m2 RV Base: 3.1 cm RWT: 0.42 TAPSE: 1.4 cm  Time Measurements MM HR: 89.0 BPM  Doppler Measurements & Calculations MV E max beth: 93.9 cm/sec MV A max beth: 97.9 cm/sec MV E/A: 0.96 MV dec slope: 624.0 cm/sec2 MV dec time: 0.15 sec Ao V2 max: 112.0 cm/sec Ao max P.0 mmHg Ao V2 mean: 80.7 cm/sec Ao mean PG: 3.0 mmHg Ao V2 VTI: 24.0 cm DEEPTI(I,D): 2.4 cm2 DEEPTI(V,D): 2.7 cm2 LV V1 max PG: 3.7 mmHg LV V1 max: 95.6 cm/sec LV V1 VTI: 18.5 cm SV(LVOT): 58.1 ml SI(LVOT): 31.9 ml/m2 PA acc  "time: 0.11 sec AV Bruno Ratio (DI): 0.85 DEEPTI Index (cm2/m2): 1.3 E/E': 16.0 E/E' av.7 Lateral E/e': 16.0 Medial E/e': 25.4 Peak E' Burno: 5.9 cm/sec RV S Bruno: 14.0 cm/sec  ______________________________________________________________________________ Report approved by: Ngoc Jacobo 2023 01:36 PM       Cardiac Catheterization    Result Date: 11/3/2023    2nd Mrg lesion is 90% stenosed.   3rd Mrg-1 lesion is 95% stenosed.   3rd Mrg-2 lesion is 80% stenosed.   1st LPL lesion is 60% stenosed.   2nd LPL lesion is 80% stenosed.   Ost LAD to Prox LAD lesion is 99% stenosed.   Mid LAD lesion is 95% stenosed.   Mid LAD to Dist LAD lesion is 99% stenosed.   Mid RCA lesion is 99% stenosed.   Dist Cx lesion is 40% stenosed. 1.  Severe calcified diffuse multivessel coronary artery disease.  Suboptimal anatomy for PCI. 2.  Separate adjacent ostia for LAD and left circumflex, (no left main) 3.  LAD lesions appear chronic.  LAD fills slowly beyond mid lesion. 4.  No obvious unstable plaque; multiple potential \"culprit\" lesions 5.  Ischemic symptoms probably a combination of severe chronic coronary disease with superimposed anemia    CT Chest Pulmonary Embolism w Contrast    Result Date: 11/3/2023  EXAM: CT CHEST PULMONARY EMBOLISM W CONTRAST LOCATION: [facility] DATE: 11/3/2023 INDICATION: Cancerwith chest pain with breathing; no right upper lobe primary lung malignancy COMPARISON: CT chest abdomen and pelvis 2023, CT chest 2023 TECHNIQUE: CT chest pulmonary angiogram during arterial phase injection of IV contrast. Multiplanar reformats and MIP reconstructions were performed. Dose reduction techniques were used. CONTRAST: IV Iodinated contrast FINDINGS: ANGIOGRAM CHEST: Main pulmonary trunk is nonenlarged. No pulmonary embolism to the subsegmental level. Thoracic aorta is of normal caliber. Cardiomegaly. There is thinning of the left ventricle near the apex with associated " calcifications compatible with  prior infarct. LUNGS AND PLEURA: Medium right and small left pleural effusions with associated bibasilar atelectasis.. Mild diffuse pulmonary edema. Additional patchy consolidation and groundglass involving the right middle and bilateral upper lobes, possibly superimposed aspiration or infection (series 6 image 149). Slightly decreased size of the right upper lobe spiculated nodule (series 5 image 28), 2.4 x 1.3 cm, previously 2.7 x 2.3 cm on 06/30/2023. Right lower lobe nodule is also likely decreased in size measuring up to 1.2 cm, previously 2.0 cm (series 5 image 71). Evaluation for smaller underlying solitary nodules limited given diffuse edema. MEDIASTINUM/AXILLAE: Similar enlarged mediastinal and hilar lymph nodes. CORONARY ARTERY CALCIFICATION: Severe. UPPER ABDOMEN: Cholelithiasis. MUSCULOSKELETAL: Normal.     IMPRESSION: 1.  No pulmonary embolism to the subsegmental level. 2.  Mild diffuse pulmonary edema, as well as small right and trace left pleural effusions. Additional patchy groundglass throughout both lungs may represent superimposed infection or aspiration. 3.  Decreased size of the largest two lung nodules involving the right upper and right lower lobes as described when compared to June 2023. Smaller nodules elsewhere poorly evaluated on the current examination given diffuse edema. Consider short interval  follow-up chest CT following resolution of acute processes. 4.  Similar enlarged mediastinal and hilar lymph nodes, presumably gabriele metastases. No definite new or enlarging thoracic lymph nodes.     XR Chest 2 Views    Result Date: 11/3/2023  EXAM: XR CHEST 2 VIEWS LOCATION: [facility] DATE: 11/3/2023 INDICATION:  Right upper lobe non-small cell lung cancer with suspicious synchronous lesions in the right lower lobe and left upper lobe as well. Pleural effusion COMPARISON: CT PET 08/18/2023, chest x-ray 08/01/2023 and older studies      IMPRESSION: Left IJ Port-A-Cath. Right upper lobe and lower lobe nodules have both decreased in size and are poorly defined. No new parenchymal disease. No effusions. Heart and pulmonary vascularity are normal. Numerous clips are noted in the left midabdomen. Paucity of stool in the colonic flexures with small air-fluid levels suggestive of an enteritis.       Signed by: Harsha Villarreal MD

## 2023-11-07 NOTE — DISCHARGE SUMMARY
"[facility]  Hospitalist Discharge Summary      Date of Admission:  11/3/2023  Date of Discharge:  11/8/2023  7:34 PM  Discharging Provider: Silas Mccartney MD  Discharge Service: Hospitalist Service    Discharge Diagnoses   NSTEMI  Ischemic cardiomyopathy  CAD s/p MI  Soft BPs  Hx of HFrEF  Dyspnea, improved  Hx of NSCLC of RLL, stage IIIb  Likely chemo related diarrhea, improved  Pancytopenia    Clinically Significant Risk Factors          Follow-ups Needed After Discharge     Unresulted Labs Ordered in the Past 30 Days of this Admission       No orders found from 10/4/2023 to 11/4/2023.        These results will be followed up by Jigar Crawford    Discharge Disposition   Discharged to home  Condition at discharge: Stable    Hospital Course   Young Ordonez is a 73 year old male admitted on 11/3/2023. He has a history of CAD s/p MI, ischemic CM, HF, HTN, HLD, stroke, non small cell lung cancer and is admitted for NSTEMI. Now on all oral medical management, planning for OP PCI next week.    NSTEMI  Ischemic cardiomyopathy  CAD s/p MI  Soft BPs, improved  Patient initially presented to [facility] ER with chest pain. He has history of MI with PTCA about 30 years ago. Troponin initially elevated 84 and decreased to 77 on admission but increased to 107 with recurrent severe chest pain few hours after admitted.  Persistent ST changes noted on repeat EKGs. His pain improved with sublingual nitro and nitro patch. Care was discussed with cardiology who recommended urgent transfer to [facility] for Cath Lab given recurrent pain. Patient was transferred to [facility] for left heart cath. Patient underwent left heart cath which revealed diffuse multivessel CAD with suboptimal anatomy for PCI. No obvious unstable plaque; multiple potential \"culprit\" lesions. Ischemic symptoms likely combination of severe chronic coronary disease with superimposed anemia. Echo 11/4 showing LVEF of 30%, severe " septal, distal anteiror, and apical hypokinesis, no mural thrombus detected. Cardiac MRI viability study performed, reviewed by willy and CV surgery, ultimately decided on follow up next week as an OP to have PCI placed as he is a poor CABG candidate due to recent chest radiation. Cards ordered life vest which was brought to the hospital and fitted to pt w/ education on this prior to discharge.    Hx of NSCLC of RLL, stage IIIb  Likely chemo related diarrhea, improved  Panyctopenia  Follows w/ Dr. Crowe from [facility]. Completed chemotherapy and radiation week before admission. Had been having diarrhea several days prior to admission, no hx of chemo associated diarrhea previously. Improved w/ imodium.    Consultations This Hospital Stay   PHARMACY IP CONSULT  HOSPITALIST IP CONSULT  CARDIOLOGY IP CONSULT  CARDIOLOGY IP CONSULT  PHARMACY IP CONSULT  CARDIOTHORACIC SURGERY IP CONSULT  HEMATOLOGY & ONCOLOGY IP CONSULT    Code Status   Prior    Silas Mccartney MD  [facility]  [address] [place] [id number]  Phone: [phone]  Fax: [phone]  ______________________________________________________________________    Physical Exam   Vital Signs:                    Weight: 152 lbs 4.8 oz    GEN: Lying comfortably in bed, opens eyes to voice, NAD.  HEENT: Normocephalic, atraumatic.   PULM: Non-labored breathing. No use of accessory muscles. Basilar crackles.  CV: Regular rate and rhythm. Normal S1, S2.  ABDOMEN: Normoactive bowel sounds. Non-tender to palpation. Non-distended.    EXTREMITES:  No clubbing, cyanosis, or edema.    SKIN: No rash on limited skin exam.   NEURO:  Awake. Oriented to person, place, time and situation. Moving all extremities.    PSYCH: Calm. Appropriate affect, insight, judgment.        Primary Care Physician   Jigar Crawford    Discharge Orders      Reason for your hospital stay    Transferred from [facility] for  NSTEMI needing angiogram.     Follow-up and recommended labs and tests     Follow up with planned visit w/ cards next week for likely PCI. Also recommend following up with your pcp in 7-10 days to evaluate for hospital follow up.     Activity    Your activity upon discharge: activity as tolerated     Diet    Follow this diet upon discharge: Orders Placed This Encounter      Regular Diet Adult     Significant Results and Procedures   Most Recent 3 CBC's:  Recent Labs   Lab Test 11/07/23  1111 11/04/23  0414 11/03/23  1152   WBC 2.5* 3.2* 3.1*   HGB 9.1* 9.4* 7.8*   MCV 93 90 95   * 93* 92*     Most Recent 3 BMP's:  Recent Labs   Lab Test 11/08/23  0543 11/07/23  0504 11/06/23  0636    141 140   POTASSIUM 3.8 3.7 3.5   CHLORIDE 104 105 105   CO2 27 26 26   BUN 12.9 11.6 12.0   CR 1.03 1.01 0.97   ANIONGAP 7 10 9   KALIE 8.2* 8.1* 7.9*   GLC 96 94 95     Discharge Medications   Discharge Medication List as of 11/8/2023  6:45 PM        START taking these medications    Details   clopidogrel (PLAVIX) 75 MG tablet Take 1 tablet (75 mg) by mouth daily, Disp-30 tablet, R-0, E-Prescribe      isosorbide mononitrate (IMDUR) 30 MG 24 hr tablet Take 1 tablet (30 mg) by mouth daily, Disp-30 tablet, R-1, E-Prescribe      metoprolol succinate ER (TOPROL XL) 25 MG 24 hr tablet Take 1 tablet (25 mg) by mouth daily, Disp-30 tablet, R-1, E-Prescribe           CONTINUE these medications which have CHANGED    Details   aspirin 81 MG EC tablet Take 1 tablet (81 mg) by mouth daily, Disp-30 tablet, R-0, E-Prescribe           CONTINUE these medications which have NOT CHANGED    Details   acetaminophen (TYLENOL) 500 MG tablet Take 1,000 mg by mouth every 6 hours as needed for mild pain, Historical      allopurinol (ZYLOPRIM) 300 MG tablet Take 1 tablet (300 mg) by mouth daily, Disp-90 tablet, R-3, E-Prescribe      atorvastatin (LIPITOR) 80 MG tablet Take 1 tablet (80 mg) by mouth daily, Disp-90 tablet, R-3, E-Prescribe       docusate sodium (COLACE) 100 MG capsule Take 100 mg by mouth daily as needed for constipation, Historical      lisinopril (ZESTRIL) 20 MG tablet Take 1 tablet (20 mg) by mouth daily, Disp-90 tablet, R-3, E-Prescribe      LORazepam (ATIVAN) 0.5 MG tablet Take 1-2 tablets (0.5-1 mg) by mouth daily as needed for anxiety, Disp-60 tablet, R-3, E-Prescribe      ondansetron (ZOFRAN ODT) 4 MG ODT tab Take 1 tablet (4 mg) by mouth every 8 hours as needed for nausea, Disp-4 tablet, R-0, E-Prescribe      prochlorperazine (COMPAZINE) 10 MG tablet Take 10 mg by mouth every 6 hours as needed for nausea or vomiting, Historical      sertraline (ZOLOFT) 100 MG tablet Take 2 tablets (200 mg) by mouth daily, Disp-180 tablet, R-1, E-Prescribe      traZODone (DESYREL) 50 MG tablet Take 1 tablet (50 mg) by mouth at bedtime, Disp-90 tablet, R-1, E-Prescribe           STOP taking these medications       amLODIPine (NORVASC) 5 MG tablet Comments:   Reason for Stopping:         atenolol (TENORMIN) 100 MG tablet Comments:   Reason for Stopping:         loperamide (IMODIUM) 2 MG capsule Comments:   Reason for Stopping:             Allergies   Allergies   Allergen Reactions    No Known Allergies

## 2023-11-07 NOTE — PLAN OF CARE
Problem: Adult Inpatient Plan of Care  Goal: Optimal Comfort and Wellbeing  Outcome: Progressing     Problem: Cardiac Catheterization (Diagnostic/Interventional)  Goal: Absence of Bleeding  Outcome: Progressing     Problem: Acute Coronary Syndrome  Goal: Absence of Cardiac-Related Pain  Outcome: Progressing     Pt A/Ox4. Denied pain and SOB. Ambulated hallway x2 w/o difficulty per pt. K protocol, recheck in the AM. Plan to possibly discharge home tomorrow per MD note.

## 2023-11-07 NOTE — PLAN OF CARE
Problem: Acute Coronary Syndrome  Goal: Absence of Cardiac-Related Pain  Outcome: Progressing     Problem: Acute Coronary Syndrome  Goal: Normalized Cardiac Rhythm  Outcome: Progressing   Goal Outcome Evaluation:      A&O x4. No c/o chest pain, SOB, dizziness, or N/V. The pt's Bp's were good this shift 109/63 and 135/75. Up independently in room. VSS      Tele- NSR w/ BBB

## 2023-11-07 NOTE — PROGRESS NOTES
Imp/plan:  Ischemic CM - pt pain free over night but limited ambulation, stop lovenox today and cont asp/clopidogrel, change metroprolol to succinate, ambulate and if doing well consider discharge tomorrow given extensive severe CAD.  Plan revascularization and then titrate up medications for heart failure  CAD with severe narrowing in Circ/LAD in left dom system and fixed defect in mid to apex ant wall, pt not a candidate for CABG, discussed with interventional cardiology and plan PCI next week with Dr. Flores if pt stable on medical therapy.    Current Facility-Administered Medications   Medication    acetaminophen (TYLENOL) tablet 650 mg    Or    acetaminophen (TYLENOL) Suppository 650 mg    allopurinol (ZYLOPRIM) tablet 300 mg    aspirin EC tablet 81 mg    atorvastatin (LIPITOR) tablet 80 mg    bisacodyl (DULCOLAX) EC tablet 5 mg    Or    bisacodyl (DULCOLAX) EC tablet 10 mg    clopidogrel (PLAVIX) tablet 75 mg    enoxaparin ANTICOAGULANT (LOVENOX) injection 70 mg    fentaNYL (PF) (SUBLIMAZE) injection 25 mcg    HOLD:  Metformin and metformin containing medications if patient received IV contrast with acute kidney injury or severe chronic kidney disease (stage IV or stage V; i.e., eGFR less than 30)    isosorbide mononitrate (IMDUR) 24 hr tablet 30 mg    loperamide (IMODIUM) capsule 2 mg    LORazepam (ATIVAN) tablet 0.5 mg    melatonin tablet 1 mg    metoprolol tartrate (LOPRESSOR) half-tab 12.5 mg    midazolam (VERSED) injection 0.5 mg    naloxone (NARCAN) injection 0.2 mg    Or    naloxone (NARCAN) injection 0.4 mg    Or    naloxone (NARCAN) injection 0.2 mg    Or    naloxone (NARCAN) injection 0.4 mg    ondansetron (ZOFRAN ODT) ODT tab 4 mg    Or    ondansetron (ZOFRAN) injection 4 mg    oxyCODONE (ROXICODONE) tablet 5 mg    Or    oxyCODONE (ROXICODONE) tablet 10 mg    polyethylene glycol (MIRALAX) Packet 17 g    prochlorperazine (COMPAZINE) injection 5 mg    Or    prochlorperazine (COMPAZINE) tablet 5 mg     "Or    prochlorperazine (COMPAZINE) suppository 12.5 mg    sertraline (ZOLOFT) tablet 200 mg    traZODone (DESYREL) tablet 50 mg     Past Medical History:   Diagnosis Date    Acute myocardial infarction, unspecified site, episode of care unspecified 01/01/1995    Arthritis     Cardiomyopathy (H)     Cerebral artery occlusion with cerebral infarction (H) 2018    lost vision in right eye    Claudication (H24)     COPD (chronic obstructive pulmonary disease) (H)     Coronary artery disease     Depression     DEPRESSIVE DISORDER NEC 04/29/2008    Essential hypertension, benign     Gout     Gout attack 05/25/2017    Hepatitis C carrier (H)     HTN (hypertension)     Hyperlipidemia     Infectious viral hepatitis     carrier of viral hepatitis    Low back pain     chronic    MI (myocardial infarction) (H) 01/01/1995    Obesity     Other and unspecified hyperlipidemia     PAD (peripheral artery disease) (H24)     Peyronie's disease         Review of Systems: 12 points negative other than above    BP 99/70 (BP Location: Left arm)   Pulse 94   Temp 98.3  F (36.8  C) (Oral)   Resp 16   Wt 69.7 kg (153 lb 9.6 oz)   SpO2 97%   BMI 23.35 kg/m    JVP<7cm, carotids normal  Lungs clear  Cor RRR no c,r,m  Abs soft +BS, no mass  Ext no c,c,e    Lab Results   Component Value Date    HGB 9.4 (L) 11/04/2023     Lab Results   Component Value Date    PLT 93 (L) 11/04/2023     No results found for: \"CREATININE\"  No components found for: \"K\"          Gordo Chester MD  Interventional Cardiology   [facility]  [phone]    "

## 2023-11-07 NOTE — PLAN OF CARE
Problem: Adult Inpatient Plan of Care  Goal: Optimal Comfort and Wellbeing  Outcome: Progressing  Intervention: Provide Person-Centered Care  Recent Flowsheet Documentation  Taken 11/7/2023 0419 by Leighann Lozoya, RN  Trust Relationship/Rapport: care explained  Taken 11/7/2023 0010 by Leighann Lozoya, RN  Trust Relationship/Rapport: care explained     Problem: Adult Inpatient Plan of Care  Goal: Readiness for Transition of Care  Outcome: Progressing   Goal Outcome Evaluation:    Pt comfortable over noc. Denies chest pain. Vitals stable. Pt hopeful to discharge.

## 2023-11-07 NOTE — PROGRESS NOTES
"Care Management Follow Up    Length of Stay (days): 4    Expected Discharge Date: 11/08/2023     Concerns to be Addressed:     Care progression  Patient plan of care discussed at interdisciplinary rounds: Yes    Anticipated Discharge Disposition:  TBD     Anticipated Discharge Services:  TBD  Anticipated Discharge DME:  [facility]    Patient/family educated on Medicare website which has current facility and service quality ratings:  NA  Education Provided on the Discharge Plan:  Per team  Patient/Family in Agreement with the Plan:  NA    Referrals Placed by CM/SW:  NA  Private pay costs discussed: Not applicable    Additional Information:  Per provider, Dr. Mccartney, patient maybe discharge home later today or tomorrow.    Social Hx: \"Lives w/spouse, Mitra. Independent at baseline w/no svcs. CM to follow for discharge needs. Family will transport.\"    RNCM to follow for medical progression, recommendations, and final discharge plan.     Charley Bruce RN     "

## 2023-11-07 NOTE — PROGRESS NOTES
"[facility]    Medicine Progress Note - Hospitalist Service    Date of Admission:  11/3/2023    Assessment & Plan   Young Ordonez is a 73 year old male admitted on 11/3/2023. He has a history of CAD s/p MI, ischemic CM, HF, HTN, HLD, stroke, non small cell lung cancer and is admitted for NSTEMI. Now on all oral medical management, planning for OP PCI next week.    NSTEMI  Ischemic cardiomyopathy  CAD s/p MI  Soft BPs  Patient initially presented to [facility] ER with chest pain. He has history of MI with PTCA about 30 years ago. Troponin initially elevated 84 and decreased to 77 on admission but increased to 107 with recurrent severe chest pain few hours after admitted.  Persistent ST changes noted on repeat EKGs. His pain improved with sublingual nitro and nitro patch. Care was discussed with cardiology who recommended urgent transfer to [facility] for Cath Lab given recurrent pain. Patient was transferred to [facility] for left heart cath. Patient underwent left heart cath which revealed diffuse multivessel CAD with suboptimal anatomy for PCI. No obvious unstable plaque; multiple potential \"culprit\" lesions. Ischemic symptoms likely combination of severe chronic coronary disease with superimposed anemia. Echo 11/4 showing LVEF of 30%, severe septal, distal anteiror, and apical hypokinesis, no mural thrombus detected.  - IP Cardiology consult, appreciate their recs   > stop lovenox today, continue asa/plavix. Change to metoprolol succinate   > Ambulate today, if doing well can discharge tomorrow   > Plan for PCI next week w/ Dr. Flores if stable on medical therapy  - CV surgery consulted, appreciate their recs  - Continue ASA and plavix  - PTA atorvastatin  - Transfuse for hgb <8    HX of HFrEF  Dyspnea, improved  Work up at [facility] showed CTPE negative for PE. Mild diffuse pulmonary edema as well as small right and trace left pleural effusions, right worse than left. Additional patchy " ground glass throughout both lungs. BNP 6,827. Echo done 11/4, see above. Now nearly euvolemic  - Continuing metoprolol    Hx of NSCLC of RLL, stage IIIb  Likely chemo related diarrhea, improved  Follows w/ Dr. Crowe from [facility]. Completed chemotherapy and radiation week before admission. Had been having diarrhea the last several days, no hx of chemo associated diarrhea previously. Improved w/ imodium, will still get c dif studies if recurs.   - Oncology consulted, appreciate their recs  - Imodium QID PRN for diarrhea    Pancytopenia  Likely secondary to treatment for cancer. Hgb 7.8.  S/p 1 unit PRBC prior to transfer to [facility]. No evidence of acute bleeding.  - per prior cardiology consult, goal to keep Hgb >8  - AM CBCs    Chronic Conditions:  HTN: From [facility], appears PTA meds were held. Will await cardiology consult for further recs on BP meds -Held PTA amlodipine, Lisinopril, Atenolol  Epigastric Pain - PTA Protonix   Insomnia -PTA Trazodone 50 mg  Anxiety-PTA lorazepam 0.5 mg 1-2 tablets PRN for anxiety   Gout-PTA allopurinol 300 mg tablet   Generalized Anxiety Disorder-PTA Sertraline 200 mg           Diet: Regular Diet Adult    DVT Prophylaxis: Ambulate as able  Boswell Catheter: Not present  Lines: PRESENT      Port A Cath Single 09/11/23 Left Chest wall-Site Assessment: WDL      Cardiac Monitoring: ACTIVE order. Indication: AMI (NSTEMI/ STEMI) (48 hours)  Code Status: Full Code      Clinically Significant Risk Factors                # Thrombocytopenia: Lowest platelets = 105 in last 2 days, will monitor for bleeding   # Hypertension: Noted on problem list            # Financial/Environmental Concerns: none         Disposition Plan     Expected Discharge Date: 11/08/2023        Discharge Comments: Hep gtt -Possible CABG this admission          The patient's care was discussed with the Attending Physician, Dr. Orourke .    Silas Mccartnye MD  Hospitalist [facility]  [facility]  Securely message with [employer]iki (more info)  Text page via [employer] Paging/Directory   ______________________________________________________________________    Interval History   Doing well this AM, still no recurrent chest pain. Blood pressures have remained within goal range.     Physical Exam   Vital Signs: Temp: 98.3  F (36.8  C) Temp src: Oral BP: 101/61 Pulse: 97   Resp: 16 SpO2: 98 % O2 Device: None (Room air)    Weight: 153 lbs 9.6 oz    GEN: Lying comfortably in bed, opens eyes to voice, NAD.  HEENT: Normocephalic, atraumatic.   PULM: Non-labored breathing. No use of accessory muscles. Basilar crackles.  CV: Regular rate and rhythm. Normal S1, S2.  ABDOMEN: Normoactive bowel sounds. Non-tender to palpation. Non-distended.    EXTREMITES:  No clubbing, cyanosis, or edema.    SKIN: No rash on limited skin exam.   NEURO:  Awake. Oriented to person, place, time and situation. Moving all extremities.    PSYCH: Calm. Appropriate affect, insight, judgment.     Data     I have personally reviewed the following data over the past 24 hrs:    2.5 (L)  \   9.1 (L)   / 105 (L)     141 105 11.6 /  94   3.7 26 1.01 \       Imaging results reviewed over the past 24 hrs:   No results found for this or any previous visit (from the past 24 hour(s)).

## 2023-11-08 NOTE — PLAN OF CARE
Goal Outcome Evaluation:         Vitals:    11/07/23 1525 11/07/23 1529 11/07/23 1552 11/07/23 2027   BP: (!) 87/60 128/66 108/70 91/58   BP Location: Left arm Right arm Left arm Left arm   Patient Position: Fowlers Fowlers  Right side   Cuff Size: Adult Small   Adult Small   Pulse: 91  94 101   Resp: 16   18   Temp: 98.3  F (36.8  C)   98  F (36.7  C)   TempSrc: Oral   Oral   SpO2: 96%   95%   Weight:            Denies pain. SBA. Room air. Alert and oriented. Blood pressures soft. Right radial site is a little bruised but soft and no hematoma. Plan is to discharge Wednesday. Angelica Fleming RN

## 2023-11-08 NOTE — PROGRESS NOTES
"Care Management Follow Up    Length of Stay (days): 5    Expected Discharge Date: 11/08/2023     Concerns to be Addressed:     Care progression  Patient plan of care discussed at interdisciplinary rounds: Yes    Anticipated Discharge Disposition:  TBD     Anticipated Discharge Services:  TBD  Anticipated Discharge DME:  [facility]    Patient/family educated on Medicare website which has current facility and service quality ratings:  NA  Education Provided on the Discharge Plan:  Per team  Patient/Family in Agreement with the Plan:  NA    Referrals Placed by CM/SW:  NA  Private pay costs discussed: Not applicable    Additional Information:  Per provider, Dr. Mccartney, cardiology fitting patient for a life vest and patient maybe discharge home later today.    Social Hx: \"Lives w/spouse, Mitra. Independent at baseline w/no svcs. CM to follow for discharge needs. Family will transport.\"    RNCM to follow for medical progression, recommendations, and final discharge plan.     Charley Bruce RN     "

## 2023-11-08 NOTE — PLAN OF CARE
Goal Outcome Evaluation:      Plan of Care Reviewed With: patient    Overall Patient Progress: improvingOverall Patient Progress: improving         Vitals:    11/07/23 1552 11/07/23 2027 11/07/23 2321 11/08/23 0324   BP: 108/70 91/58 133/80 97/66   BP Location: Left arm Left arm Left arm Left arm   Patient Position:  Right side     Cuff Size:  Adult Small     Pulse: 94 101 98 79   Resp:  18 18 18   Temp:  98  F (36.7  C) 98.4  F (36.9  C) 98.1  F (36.7  C)   TempSrc:  Oral Oral Oral   SpO2:  95% 94% 95%   Weight:        Denies pain. Right wrist is soft and no hematoma noted. Ind in the room. Blood pressures soft. Will discharge today and outpatient angio PCI.  Lungs diminished. Angelica Fleming, RN

## 2023-11-08 NOTE — TELEPHONE ENCOUNTER
----- Message -----  From: Gordo Chester MD  Sent: 11/8/2023   2:42 PM CST  To: Bridgett Huber RN; Bran Flores MD; *  Subject: PCI staged                                       [place] [facility] [place]  Mr. Ordonez will be going home today from telem - decision after meeting with CT surgery and Dr. Flores and the pt/spouse is to set up staged PCI next week with Dr. Flores for PCI to Circ and prox LAD.    Can you please make arrangements??  Thanks  Gordo

## 2023-11-08 NOTE — H&P (VIEW-ONLY)
Cardiology Progress Note    Assessment:  1.  Ischemic cardiomyopathy.  MRI per chart review revealed infarct at the apex with an ejection fraction of 35%.  Patient being treated for curative intent from an oncology standpoint notes from Dr. Chester indicating discussed with CT surgery noted calcified aorta peripheral arterial disease with peripheral bypass lower extremity and CEA with concern regarding decreased healing post recent chemotherapy.  Plan was for revascularization next week as outlined in his chart notes.  Current cardiovascular medications include aspirin, atorvastatin, clopidogrel, isosorbide mononitrate 30 mg daily, metoprolol 25 mg daily, lisinopril has been on hold with trend towards lower blood pressure.  Initial troponin 84 decreased to 77 and subsequently 107.  MRI with ejection fraction of 35.5%, transmural infarction in the distal anterior, mid to distal anteroseptal and entire apex.  Normal right ventricular systolic function and no significant valve abnormality    2.  Somewhat soft blood pressure.  Hemoglobin 9.1 being followed by primary care.  Blood pressures been running in the low 100 range occasionally 97.  He reports no symptoms of dizziness or lightheadedness.  He is interested in going home and will make sure that things are coordinated for intervention next week.    3.  History of non-small cell lung cancer of the right lower lobe stage IIIb.  Question chemo related diarrhea.  Follows with Dr. Morton.  Principal Problem:    NSTEMI (non-ST elevated myocardial infarction) (H)  Active Problems:    Unstable angina (H)    Unstable angina pectoris (H)  Medications:  Allopurinol  Aspirin  Atorvastatin  Clopidogrel  Isosorbide mononitrate  Lisinopril on hold  Metoprolol XL 25 mg daily    Plan:  1.  Tentative plan for coronary stenting next week.  2.  I reviewed with him the reduced left ventricular function and the potential higher risk for serious heart rhythm abnormality and I did discuss  LifeVest and the uncertainty with respect to the data regarding LifeVest.  He wanted to learn more and I will place a process with the company to determine if he would qualify.  3.  Ideally would resume lisinopril but need to be somewhat cautious given trend towards lower blood pressure.    Addendum: I personally reviewed the echocardiogram and agree that left-ventricular systolic function is less than 35% based on the echocardiographic imaging and interpretation.    Subjective:   Ahmet Ordonez is seen in follow-up.  First visit for this examiner.  Chart notes are reviewed.  Patient with ischemic cardiomyopathy with plans for revascularization next week as outlined in Dr. Chester's notes.  He is lying comfortably in the bed without distress.  He reports no chest discomfort reports that he was up ambulating.    Objective:   Vital signs in last 24 hours:  VITALS: BP 97/66 (BP Location: Left arm)   Pulse 79   Temp 98.1  F (36.7  C) (Oral)   Resp 18   Wt 69.1 kg (152 lb 4.8 oz)   SpO2 95%   BMI 23.16 kg/m    BMI: Body mass index is 23.16 kg/m .  Wt Readings from Last 3 Encounters:   23 69.1 kg (152 lb 4.8 oz)   23 73.9 kg (163 lb)   23 70.3 kg (155 lb)       Intake/Output Summary (Last 24 hours) at 2023 0612  Last data filed at 2023 0000  Gross per 24 hour   Intake 720 ml   Output --   Net 720 ml       Physical Exam:  General: Lying comfortably in the bed no acute distress   Neck: No JVD   Lungs: clear to auscultation   COR: RRR, soft systolic murmur   Abd: nondistended, BS present   Extrem: No edema right wrist site from cath is normal.      Cardiographics:    EC 3, sinus rhythm, LVH with QRS changes, lateral ST-T segment changes.  ST-T segment changes somewhat less prominent than on the prior EKG of November 3.                                                     CMR Report       MRN:               [id number]                                  Name:        AHMET ORDONEZ                                   :              1950-Mar-09                                  Scan Date:                                           Electronically signed by Artie Mattson  14:04:45     VITALS   ==========================================================================================================     HEIGHT: 68.0 in    (172.7 cm)  WEIGHT: 153.9 lbs    (69.8 kgs)  BSA: 1.83 m^2     SUMMARY   ==========================================================================================================     1.  Borderline enlarged left ventricular size with moderate reduction in left ventricular ejection  fraction. The mid to distal anterior, mid to distal anteroseptal and apical wall segments are akinetic with  thin wall thickness.  Severe hypokinesis of the inferior wall segments.  The quantified left ventricular  ejection fraction is 35.5%.    2.  Transmural infarction of the distal anterior, mid to distal anteroseptal and entire apex of the left  ventricle.  Non-transmural infarction of the mid anterior wall segments.   All other walls segments are  without myocardial infarction/fibrosis and remain viable.    3.  Normal right ventricular size and systolic function.  RVEF: 75%.   4.  No significant valvular abnormalities.     =========================================================================================================  REPORT FINDINGS:     LEFT VENTRICLE: Borderline enlarged left ventricular size with moderate reduction in left ventricular ejection fraction.  The mid to distal anterior, mid to distal anteroseptal and apical wall segments are akinetic with thin wall  thickness.  Severe hypokinesis of the inferior wall segments.  The quantified left ventricular ejection  fraction is 35.5%.       VIABILITY: Transmural infarction of the distal anterior, mid to distal anteroseptal and entire apex of the left  ventricle.  Non-transmural infarction of the mid anterior wall  segments.   All other walls segments are  without myocardial infarction/fibrosis and remain viable.       RIGHT VENTRICLE: Normal right ventricular size and systolic function.  RVEF: 75%.      LA/RA SEPTUM: The atrial septum is intact.      LEFT ATRIUM: LA is mildly enlarged.     RIGHT ATRIUM: RA cavity size is normal.     PERICARDIUM: Pericardium is normal. There is no pericardial effusion.     PLEURAL EFFUSION: There is no pleural effusion.     AORTIC VALVE: The aortic valve annulus is normal in size. Aortic valve is trileaflet. There is no aortic regurgitation.  There is no aortic stenosis.     MITRAL VALVE: The mitral valve annulus is normal in size. Mitral valve leaflets are normal. There is no mitral  regurgitation. There is no mitral stenosis.     TRICUSPID VALVE: The tricuspid valve annulus is normal in size. Tricuspid valve leaflets are normal. There is no tricuspid  regurgitation. There is no tricuspid stenosis.      PULMONIC VALVE: The pulmonic valve annulus is normal in size. Pulmonic valve leaflets are normal. There is no pulmonic  regurgitation. There is no pulmonic stenosis.      AORTIC ROOT: The aortic root is normal.     CHEST:  Normal thoracic aortic size and its major branches.    Next appt: 2023 at 03:20 PM in Radiology. ([facility] CT Rm 2)    0 Result Notes  Details    Reading Physician Reading Date Result Priority   Nancy Martinez MD  [phone] 2023      Narrative & Impression  [id number]  [id number]  [id number]  [id number]^[address]^[place] [address]     Name: AHMET REES  MRN: [id number]  : 1950  Study Date: 2023 08:57 AM  Age: 73 yrs  Gender: Male  Patient Location: [facility]  Reason For Study: CHF  Ordering Physician: RAMSES MADERA  Referring Physician: MAURICIO MONTALVO  Performed By: ACE     BSA: 1.8 m2  Height: 68 in  Weight: 153 lb  HR: 87  BP: 116/73  mmHg  ______________________________________________________________________________  Procedure  Complete Portable Echo Adult. Definity ([facility]C #60772-495) given intravenously.  ______________________________________________________________________________  Interpretation Summary     1. The left ventricle is normal in size. Left ventricular systolic performance  is moderately reduced. The ejection fraction is estimated to be 30%.  2. There is severe septal, distal anterior, and apical hypokinesis (no apical  mural thrombus is detected). There is severe mid to distal inferior  hypokinesis. There is severe mid to distal posterior hypokinesis. There is mid  to distal anteroseptal akinesis.  3. No significant valvular heart disease is identified on this study.  4. Normal right ventricular size and systolic performance.  5. There is mild left atrial enlargement.     When compared to the prior real-time echocardiogram dated 20 August 2020, the  apical wall motion abnormality does not appear as pronounced on the current  study. The areas of hypokinesis involving the inferior and posterior segments,  however, appear to be new.  ______________________________________________________________________________  Left ventricle:  The left ventricle is normal in size. Left ventricular systolic performance is  moderately reduced. The ejection fraction is estimated to be 30%. There is  severe septal, distal anterior, and apical hypokinesis (no apical mural  thrombus is detected). There is severe mid to distal inferior hypokinesis.  There is severe mid to distal posterior hypokinesis. There is mid to distal  anteroseptal akinesis. Left ventricular wall thickness is normal.     Assessment of left atrial pressure (LAP): The cumulative findings suggest  moderately elevated left atrial pressure (the E/A is > 0.8 and <2.0 plus 2 or  3 of 3 of the following present: Average E/e' > 14, TRvel > 2.8 m/s, and/or LA  vol. index > 34 ml/mÂ  ).      Right ventricle:  Normal right ventricular size and systolic performance.     Left atrium:  There is mild left atrial enlargement.     Right atrium:  The right atrium is of normal size.     IVC:  The IVC is of normal caliber.     Aortic valve:  The aortic valve is comprised of three cusps. No significant aortic stenosis  or aortic insufficiency is detected on this study.     Mitral valve:  There is mild to moderate mitral annular calcification. There is mild  nonspecific mitral valve leaflet thickening. There is trace tricuspid  insufficiency.     Tricuspid valve:  The tricuspid valve is grossly morphologically normal. There is trace  tricuspid insufficiency.     Pulmonic valve:  The pulmonic valve is grossly morphologically normal.     Thoracic aorta:  The aortic root and proximal ascending aorta are of normal dimension.     Pericardium:  There is no significant pericardial effusion.  ______________________________________     73-year-old man with history of known coronary disease previous anterior MI, transferred from [facility] with unstable angina/troponin elevation consistent with non-STEMI.     Pre Procedure Diagnosis    NSTEMI  CHF exacerbation    Post Procedure Diagnosis    Same  Severe multivessel coronary disease      Conclusion          2nd Mrg lesion is 90% stenosed.     3rd Mrg-1 lesion is 95% stenosed.     3rd Mrg-2 lesion is 80% stenosed.     1st LPL lesion is 60% stenosed.     2nd LPL lesion is 80% stenosed.     Ost LAD to Prox LAD lesion is 99% stenosed.     Mid LAD lesion is 95% stenosed.     Mid LAD to Dist LAD lesion is 99% stenosed.     Mid RCA lesion is 99% stenosed.     Dist Cx lesion is 40% stenosed.     1.  Severe calcified diffuse multivessel coronary artery disease.  Suboptimal anatomy for PCI.  2.  Separate adjacent ostia for LAD and left circumflex, (no left main)  3.  LAD lesions appear chronic.  LAD fills slowly beyond mid lesion.  4.  No obvious unstable plaque; multiple potential  "\"culprit\" lesions  5.  Ischemic symptoms probably a combination of severe chronic coronary disease with superimposed anemia            Plan      Follow bedrest per protocol    Continued medical management and lifestyle modifications for cardiovascular risk factor optimizations.    Consider noninvasive study to assess myocardial viability in anterior, apical, lateral and inferior  wall.    Consider revascularization if symptoms refractory to medical therapy.     Recommendations    Heart team evaluation with cardiovascular surgery and interventional cardiology is needed to determine treatment options and optimal treatment strategy.  Assessment of myocardial viability needed.  Transfuse to hemoglobin greater than 10 prior to surgical or percutaneous  intervention  Aggressive medical therapy for angina and CHF     Coronary Findings    Diagnostic  Dominance: Left  Left Main   Separate adjacent ostia for LAD and left circumflex.      Left Anterior Descending   Ost LAD to Prox LAD lesion is 99% stenosed. The lesion is eccentric. The lesion is severely calcified.   Mid LAD lesion is 95% stenosed. The lesion is concentric. The lesion is moderately calcified.   Mid LAD to Dist LAD lesion is 99% stenosed. The lesion is moderately calcified.      Left Circumflex   Dist Cx lesion is 40% stenosed. Not the culprit lesion. The lesion is calcified.      First Obtuse Marginal Branch   The vessel is small.      Second Obtuse Marginal Branch   2nd Mrg lesion is 90% stenosed.      Third Obtuse Marginal Branch   3rd Mrg-1 lesion is 95% stenosed. The lesion is type B2 - medium risk. The lesion is severely calcified.   3rd Mrg-2 lesion is 80% stenosed.      First Left Posterolateral Branch   1st LPL lesion is 60% stenosed. The lesion is mildly calcified.      Second Left Posterolateral Branch   2nd LPL lesion is 80% stenosed. The lesion is calcified.      Right Coronary Artery   Mid RCA lesion is 99% stenosed. Small nondominant vessel    "           Telemetry: Sinus rhythm, no significant ectopy    Imaging:   Chest X-Ray:   Narrative & Impression   EXAM: XR CHEST 2 VIEWS  LOCATION: [facility]  DATE: 11/3/2023     INDICATION:  Right upper lobe non-small cell lung cancer with suspicious synchronous lesions in the right lower lobe and left upper lobe as well. Pleural effusion  COMPARISON: CT PET 08/18/2023, chest x-ray 08/01/2023 and older studies                                                                      IMPRESSION: Left IJ Port-A-Cath. Right upper lobe and lower lobe nodules have both decreased in size and are poorly defined. No new parenchymal disease. No effusions. Heart and pulmonary vascularity are normal. Numerous clips are noted in the left   midabdomen.      Paucity of stool in the colonic flexures with small air-fluid levels suggestive of an enteritis.     Order-Level Documents:     Lab Results    Chemistry/lipid CBC Cardiac Enzymes/BNP/TSH/INR   Recent Labs   Lab Test 06/05/23  1522   CHOL 122   HDL 58   LDL 47   TRIG 86     Recent Labs   Lab Test 06/05/23  1522 05/31/22  1502 08/21/20  0337   LDL 47 74 35     Recent Labs   Lab Test 11/08/23  0543      POTASSIUM 3.8   CHLORIDE 104   CO2 27   GLC 96   BUN 12.9   CR 1.03   GFRESTIMATED 77   KALIE 8.2*     Recent Labs   Lab Test 11/08/23  0543 11/07/23  0504 11/06/23  0636   CR 1.03 1.01 0.97     Recent Labs   Lab Test 08/20/20  0557 04/26/17  1201   A1C 5.4 5.9          Recent Labs   Lab Test 11/07/23  1111   WBC 2.5*   HGB 9.1*   HCT 26.1*   MCV 93   *     Recent Labs   Lab Test 11/07/23  1111 11/04/23  0414 11/03/23  1152   HGB 9.1* 9.4* 7.8*    Recent Labs   Lab Test 08/20/20  0115   TROPONINI 0.03     Recent Labs   Lab Test 11/03/23  1347   NTBNP 6,827*     Recent Labs   Lab Test 06/05/23  1522   TSH 2.10     Recent Labs   Lab Test 08/01/23  0740 08/20/20  0557 08/20/20  0115   INR 1.04 1.17* 1.09

## 2023-11-08 NOTE — DISCHARGE INSTRUCTIONS

## 2023-11-08 NOTE — PROGRESS NOTES
Cardiology Progress Note    Assessment:  1.  Ischemic cardiomyopathy.  MRI per chart review revealed infarct at the apex with an ejection fraction of 35%.  Patient being treated for curative intent from an oncology standpoint notes from Dr. Chester indicating discussed with CT surgery noted calcified aorta peripheral arterial disease with peripheral bypass lower extremity and CEA with concern regarding decreased healing post recent chemotherapy.  Plan was for revascularization next week as outlined in his chart notes.  Current cardiovascular medications include aspirin, atorvastatin, clopidogrel, isosorbide mononitrate 30 mg daily, metoprolol 25 mg daily, lisinopril has been on hold with trend towards lower blood pressure.  Initial troponin 84 decreased to 77 and subsequently 107.  MRI with ejection fraction of 35.5%, transmural infarction in the distal anterior, mid to distal anteroseptal and entire apex.  Normal right ventricular systolic function and no significant valve abnormality    2.  Somewhat soft blood pressure.  Hemoglobin 9.1 being followed by primary care.  Blood pressures been running in the low 100 range occasionally 97.  He reports no symptoms of dizziness or lightheadedness.  He is interested in going home and will make sure that things are coordinated for intervention next week.    3.  History of non-small cell lung cancer of the right lower lobe stage IIIb.  Question chemo related diarrhea.  Follows with Dr. Morton.  Principal Problem:    NSTEMI (non-ST elevated myocardial infarction) (H)  Active Problems:    Unstable angina (H)    Unstable angina pectoris (H)  Medications:  Allopurinol  Aspirin  Atorvastatin  Clopidogrel  Isosorbide mononitrate  Lisinopril on hold  Metoprolol XL 25 mg daily    Plan:  1.  Tentative plan for coronary stenting next week.  2.  I reviewed with him the reduced left ventricular function and the potential higher risk for serious heart rhythm abnormality and I did discuss  LifeVest and the uncertainty with respect to the data regarding LifeVest.  He wanted to learn more and I will place a process with the company to determine if he would qualify.  3.  Ideally would resume lisinopril but need to be somewhat cautious given trend towards lower blood pressure.    Addendum: I personally reviewed the echocardiogram and agree that left-ventricular systolic function is less than 35% based on the echocardiographic imaging and interpretation.    Subjective:   Ahmet Ordonez is seen in follow-up.  First visit for this examiner.  Chart notes are reviewed.  Patient with ischemic cardiomyopathy with plans for revascularization next week as outlined in Dr. Chester's notes.  He is lying comfortably in the bed without distress.  He reports no chest discomfort reports that he was up ambulating.    Objective:   Vital signs in last 24 hours:  VITALS: BP 97/66 (BP Location: Left arm)   Pulse 79   Temp 98.1  F (36.7  C) (Oral)   Resp 18   Wt 69.1 kg (152 lb 4.8 oz)   SpO2 95%   BMI 23.16 kg/m    BMI: Body mass index is 23.16 kg/m .  Wt Readings from Last 3 Encounters:   23 69.1 kg (152 lb 4.8 oz)   23 73.9 kg (163 lb)   23 70.3 kg (155 lb)       Intake/Output Summary (Last 24 hours) at 2023 0612  Last data filed at 2023 0000  Gross per 24 hour   Intake 720 ml   Output --   Net 720 ml       Physical Exam:  General: Lying comfortably in the bed no acute distress   Neck: No JVD   Lungs: clear to auscultation   COR: RRR, soft systolic murmur   Abd: nondistended, BS present   Extrem: No edema right wrist site from cath is normal.      Cardiographics:    EC 3, sinus rhythm, LVH with QRS changes, lateral ST-T segment changes.  ST-T segment changes somewhat less prominent than on the prior EKG of November 3.                                                     CMR Report       MRN:               [id number]                                  Name:        AHMET ORDONEZ                                   :              1950-Mar-09                                  Scan Date:                                           Electronically signed by Artie Mattson  14:04:45     VITALS   ==========================================================================================================     HEIGHT: 68.0 in    (172.7 cm)  WEIGHT: 153.9 lbs    (69.8 kgs)  BSA: 1.83 m^2     SUMMARY   ==========================================================================================================     1.  Borderline enlarged left ventricular size with moderate reduction in left ventricular ejection  fraction. The mid to distal anterior, mid to distal anteroseptal and apical wall segments are akinetic with  thin wall thickness.  Severe hypokinesis of the inferior wall segments.  The quantified left ventricular  ejection fraction is 35.5%.    2.  Transmural infarction of the distal anterior, mid to distal anteroseptal and entire apex of the left  ventricle.  Non-transmural infarction of the mid anterior wall segments.   All other walls segments are  without myocardial infarction/fibrosis and remain viable.    3.  Normal right ventricular size and systolic function.  RVEF: 75%.   4.  No significant valvular abnormalities.     =========================================================================================================  REPORT FINDINGS:     LEFT VENTRICLE: Borderline enlarged left ventricular size with moderate reduction in left ventricular ejection fraction.  The mid to distal anterior, mid to distal anteroseptal and apical wall segments are akinetic with thin wall  thickness.  Severe hypokinesis of the inferior wall segments.  The quantified left ventricular ejection  fraction is 35.5%.       VIABILITY: Transmural infarction of the distal anterior, mid to distal anteroseptal and entire apex of the left  ventricle.  Non-transmural infarction of the mid anterior wall  segments.   All other walls segments are  without myocardial infarction/fibrosis and remain viable.       RIGHT VENTRICLE: Normal right ventricular size and systolic function.  RVEF: 75%.      LA/RA SEPTUM: The atrial septum is intact.      LEFT ATRIUM: LA is mildly enlarged.     RIGHT ATRIUM: RA cavity size is normal.     PERICARDIUM: Pericardium is normal. There is no pericardial effusion.     PLEURAL EFFUSION: There is no pleural effusion.     AORTIC VALVE: The aortic valve annulus is normal in size. Aortic valve is trileaflet. There is no aortic regurgitation.  There is no aortic stenosis.     MITRAL VALVE: The mitral valve annulus is normal in size. Mitral valve leaflets are normal. There is no mitral  regurgitation. There is no mitral stenosis.     TRICUSPID VALVE: The tricuspid valve annulus is normal in size. Tricuspid valve leaflets are normal. There is no tricuspid  regurgitation. There is no tricuspid stenosis.      PULMONIC VALVE: The pulmonic valve annulus is normal in size. Pulmonic valve leaflets are normal. There is no pulmonic  regurgitation. There is no pulmonic stenosis.      AORTIC ROOT: The aortic root is normal.     CHEST:  Normal thoracic aortic size and its major branches.    Next appt: 2023 at 03:20 PM in Radiology. ([facility] CT Rm 2)    0 Result Notes  Details    Reading Physician Reading Date Result Priority   Nancy Martinez MD  [phone] 2023      Narrative & Impression  [id number]  [id number]  [id number]  [id number]^ROCK^RAMSES^[place] [address]     Name: AHMET REES  MRN: [id number]  : 1950  Study Date: 2023 08:57 AM  Age: 73 yrs  Gender: Male  Patient Location: [facility]  Reason For Study: CHF  Ordering Physician: RAMSES MADERA  Referring Physician: MAURICIO MONTALVO  Performed By: ACE     BSA: 1.8 m2  Height: 68 in  Weight: 153 lb  HR: 87  BP: 116/73  mmHg  ______________________________________________________________________________  Procedure  Complete Portable Echo Adult. Definity (NDC #37398-641) given intravenously.  ______________________________________________________________________________  Interpretation Summary     1. The left ventricle is normal in size. Left ventricular systolic performance  is moderately reduced. The ejection fraction is estimated to be 30%.  2. There is severe septal, distal anterior, and apical hypokinesis (no apical  mural thrombus is detected). There is severe mid to distal inferior  hypokinesis. There is severe mid to distal posterior hypokinesis. There is mid  to distal anteroseptal akinesis.  3. No significant valvular heart disease is identified on this study.  4. Normal right ventricular size and systolic performance.  5. There is mild left atrial enlargement.     When compared to the prior real-time echocardiogram dated 20 August 2020, the  apical wall motion abnormality does not appear as pronounced on the current  study. The areas of hypokinesis involving the inferior and posterior segments,  however, appear to be new.  ______________________________________________________________________________  Left ventricle:  The left ventricle is normal in size. Left ventricular systolic performance is  moderately reduced. The ejection fraction is estimated to be 30%. There is  severe septal, distal anterior, and apical hypokinesis (no apical mural  thrombus is detected). There is severe mid to distal inferior hypokinesis.  There is severe mid to distal posterior hypokinesis. There is mid to distal  anteroseptal akinesis. Left ventricular wall thickness is normal.     Assessment of left atrial pressure (LAP): The cumulative findings suggest  moderately elevated left atrial pressure (the E/A is > 0.8 and <2.0 plus 2 or  3 of 3 of the following present: Average E/e' > 14, TRvel > 2.8 m/s, and/or LA  vol. index > 34 ml/mÂ  ).      Right ventricle:  Normal right ventricular size and systolic performance.     Left atrium:  There is mild left atrial enlargement.     Right atrium:  The right atrium is of normal size.     IVC:  The IVC is of normal caliber.     Aortic valve:  The aortic valve is comprised of three cusps. No significant aortic stenosis  or aortic insufficiency is detected on this study.     Mitral valve:  There is mild to moderate mitral annular calcification. There is mild  nonspecific mitral valve leaflet thickening. There is trace tricuspid  insufficiency.     Tricuspid valve:  The tricuspid valve is grossly morphologically normal. There is trace  tricuspid insufficiency.     Pulmonic valve:  The pulmonic valve is grossly morphologically normal.     Thoracic aorta:  The aortic root and proximal ascending aorta are of normal dimension.     Pericardium:  There is no significant pericardial effusion.  ______________________________________     73-year-old man with history of known coronary disease previous anterior MI, transferred from [facility] with unstable angina/troponin elevation consistent with non-STEMI.     Pre Procedure Diagnosis    NSTEMI  CHF exacerbation    Post Procedure Diagnosis    Same  Severe multivessel coronary disease      Conclusion          2nd Mrg lesion is 90% stenosed.     3rd Mrg-1 lesion is 95% stenosed.     3rd Mrg-2 lesion is 80% stenosed.     1st LPL lesion is 60% stenosed.     2nd LPL lesion is 80% stenosed.     Ost LAD to Prox LAD lesion is 99% stenosed.     Mid LAD lesion is 95% stenosed.     Mid LAD to Dist LAD lesion is 99% stenosed.     Mid RCA lesion is 99% stenosed.     Dist Cx lesion is 40% stenosed.     1.  Severe calcified diffuse multivessel coronary artery disease.  Suboptimal anatomy for PCI.  2.  Separate adjacent ostia for LAD and left circumflex, (no left main)  3.  LAD lesions appear chronic.  LAD fills slowly beyond mid lesion.  4.  No obvious unstable plaque; multiple potential  "\"culprit\" lesions  5.  Ischemic symptoms probably a combination of severe chronic coronary disease with superimposed anemia            Plan      Follow bedrest per protocol    Continued medical management and lifestyle modifications for cardiovascular risk factor optimizations.    Consider noninvasive study to assess myocardial viability in anterior, apical, lateral and inferior  wall.    Consider revascularization if symptoms refractory to medical therapy.     Recommendations    Heart team evaluation with cardiovascular surgery and interventional cardiology is needed to determine treatment options and optimal treatment strategy.  Assessment of myocardial viability needed.  Transfuse to hemoglobin greater than 10 prior to surgical or percutaneous  intervention  Aggressive medical therapy for angina and CHF     Coronary Findings    Diagnostic  Dominance: Left  Left Main   Separate adjacent ostia for LAD and left circumflex.      Left Anterior Descending   Ost LAD to Prox LAD lesion is 99% stenosed. The lesion is eccentric. The lesion is severely calcified.   Mid LAD lesion is 95% stenosed. The lesion is concentric. The lesion is moderately calcified.   Mid LAD to Dist LAD lesion is 99% stenosed. The lesion is moderately calcified.      Left Circumflex   Dist Cx lesion is 40% stenosed. Not the culprit lesion. The lesion is calcified.      First Obtuse Marginal Branch   The vessel is small.      Second Obtuse Marginal Branch   2nd Mrg lesion is 90% stenosed.      Third Obtuse Marginal Branch   3rd Mrg-1 lesion is 95% stenosed. The lesion is type B2 - medium risk. The lesion is severely calcified.   3rd Mrg-2 lesion is 80% stenosed.      First Left Posterolateral Branch   1st LPL lesion is 60% stenosed. The lesion is mildly calcified.      Second Left Posterolateral Branch   2nd LPL lesion is 80% stenosed. The lesion is calcified.      Right Coronary Artery   Mid RCA lesion is 99% stenosed. Small nondominant vessel    "           Telemetry: Sinus rhythm, no significant ectopy    Imaging:   Chest X-Ray:   Narrative & Impression   EXAM: XR CHEST 2 VIEWS  LOCATION: [facility]  DATE: 11/3/2023     INDICATION:  Right upper lobe non-small cell lung cancer with suspicious synchronous lesions in the right lower lobe and left upper lobe as well. Pleural effusion  COMPARISON: CT PET 08/18/2023, chest x-ray 08/01/2023 and older studies                                                                      IMPRESSION: Left IJ Port-A-Cath. Right upper lobe and lower lobe nodules have both decreased in size and are poorly defined. No new parenchymal disease. No effusions. Heart and pulmonary vascularity are normal. Numerous clips are noted in the left   midabdomen.      Paucity of stool in the colonic flexures with small air-fluid levels suggestive of an enteritis.     Order-Level Documents:     Lab Results    Chemistry/lipid CBC Cardiac Enzymes/BNP/TSH/INR   Recent Labs   Lab Test 06/05/23  1522   CHOL 122   HDL 58   LDL 47   TRIG 86     Recent Labs   Lab Test 06/05/23  1522 05/31/22  1502 08/21/20  0337   LDL 47 74 35     Recent Labs   Lab Test 11/08/23  0543      POTASSIUM 3.8   CHLORIDE 104   CO2 27   GLC 96   BUN 12.9   CR 1.03   GFRESTIMATED 77   KALIE 8.2*     Recent Labs   Lab Test 11/08/23  0543 11/07/23  0504 11/06/23  0636   CR 1.03 1.01 0.97     Recent Labs   Lab Test 08/20/20  0557 04/26/17  1201   A1C 5.4 5.9          Recent Labs   Lab Test 11/07/23  1111   WBC 2.5*   HGB 9.1*   HCT 26.1*   MCV 93   *     Recent Labs   Lab Test 11/07/23  1111 11/04/23  0414 11/03/23  1152   HGB 9.1* 9.4* 7.8*    Recent Labs   Lab Test 08/20/20  0115   TROPONINI 0.03     Recent Labs   Lab Test 11/03/23  1347   NTBNP 6,827*     Recent Labs   Lab Test 06/05/23  1522   TSH 2.10     Recent Labs   Lab Test 08/01/23  0740 08/20/20  0557 08/20/20  0115   INR 1.04 1.17* 1.09

## 2023-11-10 NOTE — PROGRESS NOTES
Connected Care Resource Center Contact  [facility]/Voicemail     Clinical Data: Post-Discharge Outreach     Outreach attempted x 2.  Left message on patient's voicemail, providing [facility]'s central phone number of [phone] ([phone]) for questions/concerns and/or to schedule an appt with an [facility] provider, if they do not have a PCP.      Plan:  [facility] will do no further outreaches at this time.       Vicky Hanna RN  Connected Care Resource Center, [facility]    *Connected Care Resource Team does NOT follow patient ongoing. Referrals are identified based on internal discharge reports and the outreach is to ensure patient has an understanding of their discharge instructions.

## 2023-11-13 NOTE — PROGRESS NOTES
[place] Ordonez  [address] [facility] [address]  [phone] (home)     Procedure cardiologist:  NORI  PCP:  Jigar Crawford  H&P completed by:  NORI 11/3  Admit date 11/14  Arrival time:  0730  Anticoagulation: None  CPAP: no  Previous PCI: no  Bypass Grafts: No  Renal Issues: No  Diabetic?: No  Device?: No  Type:  na  Ambulation status: indep    Patient Education/  Angiogram Teaching    Reason for Visit:  Telephone call to discuss pre-procedure education in preparation for: staged PCI    Procedure Prep:  Primary Cardiologist note dated: 11/3 NORI  EKG results obtained, dated: on admission  Hemogram results obtained: 11/13  Basic Metabolic Panel results obtained: 11/13  Lipid Profile results obtained: 11/13  Pertinent cardiac test results: ca/lhc 11/3        Pre-procedure instructions  Patient instructed to be NPO per anesthesia guidelines.  Patient instructed to shower the evening before or the morning of the procedure.  Patient instructed to arrange for transportation home following procedure from a responsible family member of friend. No driving for at least 24 hours.  Patient instructed to have a responsible adult with them for 24 hours post-procedure.  Post-procedure follow up process.  Conscious sedation discussed.    Pre-procedure medication instructions  Patient instructed on antiplatelet medication.  Continue medications as scheduled, with a small amount of water on the day of the procedure unless indicated.  Patient instructed to take 324 mg of Aspirin am of procedure: yes  Other medication: instructed to take all other meds as usual the a.m. of the procedure.      Patient states understanding of procedure and agrees to proceed.    *PATIENTS RECORDS AVAILABLE IN [facility] UNLESS OTHERWISE INDICATED*      Patient Active Problem List   Diagnosis    Essential hypertension, benign    Acute myocardial infarction (H)    Hyperlipidemia    Depressive disorder, not elsewhere classified    Ischemic stroke (H)     PAD (peripheral artery disease) (H24)    History of alcohol dependence (H)    Chronic obstructive pulmonary disease, unspecified COPD type (H)    Recurrent major depressive disorder, remission status unspecified (H24)    Chronic kidney disease, stage 3a (H)    Stenosis of left carotid artery    Decreased ejection fraction    Primary malignant neoplasm of right lower lobe of lung (H)    Encounter for antineoplastic chemotherapy    Abdominal distention    Acute gout of right foot    FRANCY (acute kidney injury) (H24)    Anemia, unspecified    Anxiety disorder, unspecified    Atherosclerotic heart disease of native coronary artery without angina pectoris    Chronic hepatitis C (H)    Cirrhosis of liver (H)    Constipation, unspecified    Cellulitis    Benign neoplasm of ascending colon    Chronic gouty arthritis    Ischemic cardiomyopathy    Ischemic rest pain of lower extremity    Polyarthritis    Polyp of colon    Post-op pain    Rash and other nonspecific skin eruption    SOB (shortness of breath)    Elevated troponin    Chest pain, unspecified type    NSTEMI (non-ST elevated myocardial infarction) (H)    Unstable angina (H)    Unstable angina pectoris (H)       Current Outpatient Medications   Medication Sig Dispense Refill    acetaminophen (TYLENOL) 500 MG tablet Take 1,000 mg by mouth every 6 hours as needed for mild pain      allopurinol (ZYLOPRIM) 300 MG tablet Take 1 tablet (300 mg) by mouth daily 90 tablet 3    aspirin 81 MG EC tablet Take 1 tablet (81 mg) by mouth daily 30 tablet 0    atorvastatin (LIPITOR) 80 MG tablet Take 1 tablet (80 mg) by mouth daily 90 tablet 3    clopidogrel (PLAVIX) 75 MG tablet Take 1 tablet (75 mg) by mouth daily 30 tablet 0    docusate sodium (COLACE) 100 MG capsule Take 100 mg by mouth daily as needed for constipation      isosorbide mononitrate (IMDUR) 30 MG 24 hr tablet Take 1 tablet (30 mg) by mouth daily 30 tablet 1    lisinopril (ZESTRIL) 20 MG tablet Take 1 tablet (20 mg) by  mouth daily 90 tablet 3    LORazepam (ATIVAN) 0.5 MG tablet Take 1-2 tablets (0.5-1 mg) by mouth daily as needed for anxiety 60 tablet 3    metoprolol succinate ER (TOPROL XL) 25 MG 24 hr tablet Take 1 tablet (25 mg) by mouth daily 30 tablet 1    ondansetron (ZOFRAN ODT) 4 MG ODT tab Take 1 tablet (4 mg) by mouth every 8 hours as needed for nausea 4 tablet 0    prochlorperazine (COMPAZINE) 10 MG tablet Take 10 mg by mouth every 6 hours as needed for nausea or vomiting      sertraline (ZOLOFT) 100 MG tablet Take 2 tablets (200 mg) by mouth daily 180 tablet 1    traZODone (DESYREL) 50 MG tablet Take 1 tablet (50 mg) by mouth at bedtime 90 tablet 1       Allergies   Allergen Reactions    No Known Allergies          Bridgett Huber, RN

## 2023-11-13 NOTE — TELEPHONE ENCOUNTER
----- Message from Leon Azul sent at 11/13/2023  9:03 AM CST -----  Regarding: RE: planned PCI  Case type: STAGED PCI    Procedure Physician(s): HARDIK/EMILY    Procedure Date and Patient Arrival Time: Tuesday 11/14, with arrival time of 730    H&P: Completed on 11/3    Pre-Procedure Lab Appt: Monday 1PM at [facility] - Please place lab orders if you haven't already!    Alerts/Important Info: None    THANKS!  MARIAN   ----- Message -----  From: Bridgett Huber RN  Sent: 11/9/2023  11:54 AM CST  To: [facility], located within [facility] Procedure  Pool - [facility]  Subject: planned PCI                                      Case Type: planned staged PCI  Ordering Provider: NORI  H&P: Zoey or PCP  Alerts: none  Additional Info/Urgency: next week with HARDIK, labs need to be fasting  Orders for Pre-Procedure Labs? ordered

## 2023-11-13 NOTE — TELEPHONE ENCOUNTER
Second call to patient-- unable to reach. Left message for patient to return call to discuss angios scheduled for tomorrow. -sea

## 2023-11-14 PROBLEM — Z98.61 PERCUTANEOUS TRANSLUMINAL CORONARY ANGIOPLASTY STATUS: Status: ACTIVE | Noted: 2023-01-01

## 2023-11-14 PROBLEM — Z95.5 S/P DRUG ELUTING CORONARY STENT PLACEMENT: Status: ACTIVE | Noted: 2023-01-01

## 2023-11-14 NOTE — PROGRESS NOTES
Patient received from cathlab @ 1138. Patient is very restless and having shortness of breath. Patient was sitting up and Lawanda NP at bedside. RT came and placed patient on Bipap. Lasix 20 mg given and Morphine 1 mg IV given. Patient did complained of mild chest pain. Unable to void at first- bladder scanned with only 87ccs. After 30 minutes, patient verbalized feeling better. BP down to 80s systolic to 50s systolic. Lawanda NP aware and ok with  his since this is the patient's baseline BP. Chest pain is resolved too. TR band in right wrist remained intact. Bruising noted but no hematoma. Patient remained alert and oriented. Wife came and at bedside. Patient able to void prior to transfer to . Patient transferred to  in stable condition. Belongings brought up to patient's room. Report given to Yojana HERNANDEZ.

## 2023-11-14 NOTE — CONSULTS
[facility]  Consult Note - Hospitalist Service  Date of Admission:  11/14/2023  Consult Requested by: Shahida Glover   Reason for Consult: post procedural medical management    Assessment & Plan   Young Ordonez is a 73 year old male admitted on 11/14/2023 after cardiac cath.    Acute HFrEF exacerbation:  -Continue IV diuresis.  -Monitor intake and output.  Daily weight.  -Monitor creatinine and electrolytes    Acute hypoxemic respiratory failure:  - Currently on BiPAP. Wean as tolerated.     CAD: Coronary angiogram reveals multivessel disease now s/p PCI with LORRAINE to LCX   -Continue dual antiplatelet with aspirin and plavix  - Continue Lipitor, metoprolol and Imdur.    FRANCY: Monitor creatinine closely while on diuresis.       Clinically Significant Risk Factors Present on Admission                # Drug Induced Platelet Defect: home medication list includes an antiplatelet medication   # Hypertension: Noted on problem list   # Non-Invasive mechanical ventilation: current O2 Device: BiPAP/CPAP  # Acute hypoxic respiratory failure: continue supplemental O2 as needed         # Financial/Environmental Concerns:           Gómez Apple MD  Hospitalist Service  Securely message with Vocera (more info)  Text page via [facility] Paging/Directory   ______________________________________________________________________    Chief Complaint   Postprocedure medical management    History of Present Illness   Young Ordonez is a 73 year old male with a medical history of CHF, CAD, hypertension admitted after coronary angiogram.  [facility] [place].  Patient was seen after he arrived to the floor. He reports feeling no SOB on BiPAP.       Past Medical History    Past Medical History:   Diagnosis Date    Acute myocardial infarction, unspecified site, episode of care unspecified 01/01/1995    Arthritis     Cardiomyopathy (H)     Cerebral artery occlusion with cerebral infarction (H)  2018    lost vision in right eye    Claudication (H24)     COPD (chronic obstructive pulmonary disease) (H)     Coronary artery disease     Depression     DEPRESSIVE DISORDER NEC 04/29/2008    Essential hypertension, benign     Gout     Gout attack 05/25/2017    Hepatitis C carrier (H)     HTN (hypertension)     Hyperlipidemia     Infectious viral hepatitis     carrier of viral hepatitis    Low back pain     chronic    MI (myocardial infarction) (H) 01/01/1995    Obesity     Other and unspecified hyperlipidemia     PAD (peripheral artery disease) (H24)     Peyronie's disease        Past Surgical History   Past Surgical History:   Procedure Laterality Date    ANGIOPLASTY  1995    ANKLE FRACTURE SURGERY Left     BYPASS GRAFT AORTOFEMORAL N/A 5/17/2017    Procedure: AORTOBIFEMORAL BYPASS ;  Surgeon: Ilir FERRO MD;  Location: [facility] OR;  Service:     CAROTID ENDARTERECTOMY Right 4/12/2018    Procedure: RIGHT CAROTID ENDARTERECTOMY WITH EEG;  Surgeon: Sergei Lemus MD;  Location: [facility] OR;  Service:     CV CORONARY ANGIOGRAM N/A 11/3/2023    Procedure: Coronary Angiogram;  Surgeon: Bran Flores MD;  Location: [facility] CATH LAB CV    CV LEFT HEART CATH N/A 11/3/2023    Procedure: Left Heart Catheterization;  Surgeon: Bran Flores MD;  Location: [facility] CATH LAB CV    CYSTOSCOPY, TRANSURETHRAL RESECTION (TUR) TUMOR BLADDER, COMBINED N/A 8/3/2023    Procedure: Transurethral resection of bladder tumor 2 cm to 5 cm;  Surgeon: German Mitchell MD;  Location: [facility] OR    FRACTURE SURGERY      IR AORTIC ARCH 4 VESSEL ANGIOGRAM  11/9/2009    IR CAROTID ANGIOGRAM  11/9/2009    IR CAROTID ANGIOGRAM  11/9/2009    IR CHEST PORT PLACEMENT > 5 YRS OF AGE  9/11/2023    IR EXTREMITY ANGIOGRAM BILATERAL  4/6/2017    MANDIBLE SURGERY      ZZC NONSPECIFIC PROCEDURE  1995    MI -- angioplasty       Medications   I have reviewed this patient's current medications       Review of Systems    The 10 point  Review of Systems is negative other than noted in the HPI or here.      Physical Exam   Vital Signs: Temp: 97.4  F (36.3  C) Temp src: Oral BP: 114/75 Pulse: 92   Resp: 24 SpO2: 97 % O2 Device: BiPAP/CPAP Oxygen Delivery: 6 LPM  Weight: 155 lbs 0 oz    General appearance: not in acute distress  HEENT: PERRL, EOMI  Lungs: Clear breath sounds in bilateral lung fields  Cardiovascular: Regular rate and rhythm, normal S1-S2  Abdomen: Soft, non tender, no distension  Musculoskeletal: No joint swelling  Skin: No rash and no edema  Neurology: AAO ×3.  Cranial nerves II - XII normal.  Normal muscle strength in all four extremities.     Medical Decision Making       46 MINUTES SPENT BY ME on the date of service doing chart review, history, exam, documentation & further activities per the note.      Data         Imaging results reviewed over the past 24 hrs:   Recent Results (from the past 24 hour(s))   Cardiac Catheterization    Narrative      2nd LPL lesion is 80% stenosed.    Ost LAD to Prox LAD lesion is 99% stenosed.    Mid LAD lesion is 95% stenosed.    Mid LAD to Dist LAD lesion is 99% stenosed.    Dist Cx lesion is 40% stenosed.    Mid RCA lesion is 99% stenosed.    2nd Mrg lesion is 90% stenosed.    3rd Mrg-1 lesion is 95% stenosed.    1st LPL lesion is 60% stenosed.    3rd Mrg-2 lesion is 90% stenosed.    1.  Successful PCI left circumflex OM 3 branch with PTCA, shockwave   lithotripsy, [place] cutting balloon angioplasty, and drug-eluting stent   implant x1.  0% residual CHARLIE-3 flow.  2.  Successful PTCA  OM 3 branch distal to stent.  3.  Patient transfused 1 unit PRBCs beginning prior to procedure.  Lasix   20 mg IV given at end of procedure.

## 2023-11-14 NOTE — DISCHARGE INSTRUCTIONS

## 2023-11-14 NOTE — Clinical Note
The first balloon was inserted into the circumflex and proximal circumflex.Max pressure = 6 berenice. Total duration = 27 seconds.     Max pressure = 8 berenice. Total duration = 29 seconds.    Balloon reinflated a second time: Max pressure = 8 berenice. Total duration = 29 seconds.

## 2023-11-14 NOTE — PROGRESS NOTES
Pt sleeping, on BiPAP 12/6, 14, 30%. Pt comfortable and tolerating well.     Crystal Nicholas, RT

## 2023-11-14 NOTE — Clinical Note
The first balloon was inserted into the circumflex and marginal circumflex.Max pressure = 3 berenice. Total duration = 10 seconds.     Max pressure = 4 berenice. Total duration = 10 seconds.   OM2 shockwave delivery.  Balloon reinflated a second time: Max pressure = 4 berenice. Total duration = 10 seconds. Shockwave delivery Balloon reinflated a third time: Max pressure = 4 berenice. Total duration = 10 seconds. Shockwave delivery

## 2023-11-14 NOTE — Clinical Note
The first balloon was inserted into the circumflex.Max pressure = 10 berenice. Total duration = 10 seconds.

## 2023-11-14 NOTE — PROGRESS NOTES
Patient placed on BiPAP with settings of  12/6, 14, 60% FiO2. Tolerating well at this time.Oxygen saturations of 96%.

## 2023-11-14 NOTE — PHARMACY-ADMISSION MEDICATION HISTORY
Pharmacist Admission Medication History    Admission medication history is complete. The information provided in this note is only as accurate as the sources available at the time of the update.    Information Source(s): Clinic records, Hospital records, and CareEverywhere/SureScripts via in-person  [facility] RN documented last doses.     Pertinent Information: none    Changes made to PTA medication list:  Added: None  Deleted: None  Changed: None    Medication Affordability:  Not including over the counter (OTC) medications, was there a time in the past 3 months when you did not take your medications as prescribed because of cost?: No    Allergies reviewed with patient and updates made in EHR: yes    Medication History Completed By: Ifeoma [facility]val [facility] 11/14/2023 1:24 PM    PTA Med List   Medication Sig Last Dose    acetaminophen (TYLENOL) 500 MG tablet Take 1,000 mg by mouth every 6 hours as needed for mild pain Past Week at prn    allopurinol (ZYLOPRIM) 300 MG tablet Take 1 tablet (300 mg) by mouth daily 11/14/2023 at am    aspirin 81 MG EC tablet Take 1 tablet (81 mg) by mouth daily 11/14/2023 at 325 mg am    atorvastatin (LIPITOR) 80 MG tablet Take 1 tablet (80 mg) by mouth daily 11/14/2023 at am    clopidogrel (PLAVIX) 75 MG tablet Take 1 tablet (75 mg) by mouth daily 11/14/2023 at am    docusate sodium (COLACE) 100 MG capsule Take 100 mg by mouth daily as needed for constipation Past Month at prn    isosorbide mononitrate (IMDUR) 30 MG 24 hr tablet Take 1 tablet (30 mg) by mouth daily 11/11/2023 at am    lisinopril (ZESTRIL) 20 MG tablet Take 1 tablet (20 mg) by mouth daily 11/14/2023 at am    LORazepam (ATIVAN) 0.5 MG tablet Take 1-2 tablets (0.5-1 mg) by mouth daily as needed for anxiety 11/13/2023 at pm    melatonin 5 MG CAPS Take 1 capsule by mouth at bedtime as needed, may repeat once Past Week at pm    metoprolol succinate ER (TOPROL XL) 25 MG 24 hr tablet Take 1 tablet (25 mg) by mouth daily  11/14/2023 at am    ondansetron (ZOFRAN ODT) 4 MG ODT tab Take 1 tablet (4 mg) by mouth every 8 hours as needed for nausea More than a month at prn    prochlorperazine (COMPAZINE) 10 MG tablet Take 10 mg by mouth every 6 hours as needed for nausea or vomiting More than a month at prn    sertraline (ZOLOFT) 100 MG tablet Take 2 tablets (200 mg) by mouth daily 11/14/2023 at am    traZODone (DESYREL) 50 MG tablet Take 1 tablet (50 mg) by mouth at bedtime Past Month at pm

## 2023-11-14 NOTE — PROGRESS NOTES
Transported patient to  room  329 from [facility] room 11 without incident. Patient transported on BiPap. Patient tolerated transport well with no adverse reaction.

## 2023-11-14 NOTE — PRE-PROCEDURE
GENERAL PRE-PROCEDURE:   Procedure:  Percutaneous coronary intervention  Date/Time:  11/14/2023 9:00 AM    Written consent obtained?: Yes    Risks and benefits: Risks, benefits and alternatives were discussed    Consent given by:  Patient  Patient states understanding of procedure being performed: Yes    Patient's understanding of procedure matches consent: Yes    Procedure consent matches procedure scheduled: Yes    Expected level of sedation:  Moderate  Appropriately NPO:  Yes  ASA Class:  3 (CAD, ICM, non-small cell lung CA, anemia, hypotension)  Mallampati  :  Grade 2- soft palate, base of uvula, tonsillar pillars, and portion of posterior pharyngeal wall visible  Lungs:  Lungs clear with good breath sounds bilaterally  Heart:  Normal heart sounds and rate  History & Physical reviewed:  History and physical reviewed and updates made (see comment)  H&P Comments:  Clinically Significant Risk Factors Present on Admission    Cardiovascular : CAD, ICM; EF 35%    Fluid & Electrolyte Disorders : Not present on admission    Gastroenterology : Not present on admission    Hematology/Oncology : Anemia    Nephrology : Not present on admission    Neurology : Not present on admission    Pulmonology : non-small cell lung CA stage IIIb; on chemo    Systemic : Weakness, frequent falls        Statement of review:  I have reviewed the lab findings, diagnostic data, medications, and the plan for sedation

## 2023-11-14 NOTE — Clinical Note
The first balloon was inserted into the circumflex and marginal circumflex.Max pressure = 6 berenice. Total duration = 16 seconds.     Max pressure = 8 berenice. Total duration = 18 seconds.    Balloon reinflated a second time: Max pressure = 8 berenice. Total duration = 18 seconds.  Balloon reinflated a third time: Max pressure = 10 berenice. Total duration = 15 seconds.

## 2023-11-14 NOTE — Clinical Note
The first balloon was inserted into the circumflex and marginal circumflex.Max pressure = 12 berenice. Total duration = 17 seconds.     Max pressure = 12 berenice. Total duration = 16 seconds.    Balloon reinflated a second time: Max pressure = 12 berenice. Total duration = 16 seconds.

## 2023-11-14 NOTE — CONSULTS
HEART CARE NOTE        Thank you for asking the [facility] team to see Young Ordonez to evaluate  ADHF    Assessment/Recommendations     1. HFrEF c/b severe ADHF   Assessment / Plan  Hypervolemic on physical exam; continue IV diuresis; continue to monitor UOP and renal function closely   Patient is high risk for adverse cardiac events 2/2 advanced age, CAD, HTN  GDMT as detailed below    Current Pharmacotherapy AHA Guideline-Directed Medical Therapy   Lisinopril - on hold given renal dysfunction Lisinopril 20 mg twice daily   Metoprolol succinate 25 mg daily  Carvedilol 25 mg twice daily   Spironolactone - not started Spironolactone 25 mg once daily   Hydralazine NA Hydralazine 100 mg three times daily   Isosorbide mononitrate 30 mg daily Isosorbide dinitrate 40 mg three times daily   SGLT2 inhibitor:Dapagliflozin/Empagliflozin - not started Dapagliflozin or Empagliflozin 10 mg daily     2. CAD  Assessment / Plan  Hx of severe multivessel disease now s/p PCI with LORRAINE to LCX   Denies chest pain or anginal equivalents   Continue ASA, high intensity atorvastatin, metoprolol, isosorbide mononitrate    3. HTN  Assessment / Plan  Borderline hemodynamics - hold oral afterload reduction/antihypertensives    4. FRANCY  Assessment / Plan  Continue to monitor renal function closely while on IV diuresis and especially after administration of contrast dye    Clinically Significant Risk Factors Present on Admission                # Drug Induced Platelet Defect: home medication list includes an antiplatelet medication   # Hypertension: Noted on problem list          # Financial/Environmental Concerns:          Cardiomyopathy  Systolic acute    Fluid overload, unspecified, Other fluid overload, and Other disorders of electrolyte and fluid balance, not elsewhere classified    Acute kidney failure, unspecified    90 minutes spent reviewing prior records (including documentation, laboratory studies, cardiac  testing/imaging), history and physical exam, planning, and subsequent documentation.      History of Present Illness/Subjective    Mr. Young Ordonez is a 73 year old male with a PMHx significant for (per Epic notation) severe multi-vessel CAD, ICM; EF 35%, non-small cell lung CA, HTN and anemia who presented for planned Lcx PCI. Will was transfused 1 unit of PRBCs under the direction of Dr. Flores, in anticipation of his procedure due to Hgb <10. Coronary stenting was successful with good result. Post procedure patient developed respiratory distress requiring NIPPV, lasix and morphine administration.     Today, Mr. Ordonez denies chest pain, but endorses dyspnea and orthopnea; observed on BIPAP; Management plan as detailed above     ECG: Personally reviewed. normal sinus rhythm, left axis deviation.    ECHO (personnaly Reviewed on 11/14/23):   1. The left ventricle is normal in size. Left ventricular systolic performance  is moderately reduced. The ejection fraction is estimated to be 30%.  2. There is severe septal, distal anterior, and apical hypokinesis (no apical  mural thrombus is detected). There is severe mid to distal inferior  hypokinesis. There is severe mid to distal posterior hypokinesis. There is mid  to distal anteroseptal akinesis.  3. No significant valvular heart disease is identified on this study.  4. Normal right ventricular size and systolic performance.  5. There is mild left atrial enlargement.     When compared to the prior real-time echocardiogram dated 20 August 2020, the  apical wall motion abnormality does not appear as pronounced on the current  study. The areas of hypokinesis involving the inferior and posterior segments,  however, appear to be new.    Cardiac MRI personally reviewed on 11/14/23:  1.  Borderline enlarged left ventricular size with moderate reduction in left ventricular ejection  fraction. The mid to distal anterior, mid to distal anteroseptal and apical wall segments are  "akinetic with  thin wall thickness.  Severe hypokinesis of the inferior wall segments.  The quantified left ventricular  ejection fraction is 35.5%.    2.  Transmural infarction of the distal anterior, mid to distal anteroseptal and entire apex of the left  ventricle.  Non-transmural infarction of the mid anterior wall segments.   All other walls segments are  without myocardial infarction/fibrosis and remain viable.    3.  Normal right ventricular size and systolic function.  RVEF: 75%.   4.  No significant valvular abnormalities.    Telemetry: personally reviewed November 14, 2023; notable for sinus rhythm     Lab results: personally reviewed November 14, 2023; notable for FRANCY    Medical history and pertinent documents reviewed in Care Everywhere please where applicable see details above          Physical Examination Review of Systems   /71   Pulse 114   Temp 98.4  F (36.9  C)   Resp (!) 54   Ht 1.727 m (5' 8\")   Wt 70.3 kg (155 lb)   SpO2 90%   BMI 23.57 kg/m    Body mass index is 23.57 kg/m .  Wt Readings from Last 3 Encounters:   11/14/23 70.3 kg (155 lb)   11/08/23 69.1 kg (152 lb 4.8 oz)   11/03/23 73.9 kg (163 lb)     General Appearance:   normal body habitus   ENT/Mouth: membranes moist, no oral lesions or bleeding gums.      EYES:  no scleral icterus, normal conjunctivae   Neck: no carotid bruits or thyromegaly   Chest/Lungs:   lungs are clear to auscultation, no rales or wheezing, equal chest wall expansion    Cardiovascular:   Regular. Normal first and second heart sounds with no murmurs, rubs, or gallops; the carotid, radial and posterior tibial pulses are intact, + JVD and trace LE edema bilaterally    Abdomen:  no organomegaly, masses, bruits, or tenderness; bowel sounds are present   Extremities: no cyanosis or clubbing   Skin: no xanthelasma, warm.    Neurologic: alert and oriented x3, calm     Psychiatric: alert and oriented x3, calm     A complete 10 systems ROS was reviewed  And is " negative except what is listed in the HPI.          Medical History  Surgical History Family History Social History   Past Medical History:   Diagnosis Date     Acute myocardial infarction, unspecified site, episode of care unspecified 01/01/1995     Arthritis      Cardiomyopathy (H)      Cerebral artery occlusion with cerebral infarction (H) 2018    lost vision in right eye     Claudication (H24)      COPD (chronic obstructive pulmonary disease) (H)      Coronary artery disease      Depression      DEPRESSIVE DISORDER NEC 04/29/2008     Essential hypertension, benign      Gout      Gout attack 05/25/2017     Hepatitis C carrier (H)      HTN (hypertension)      Hyperlipidemia      Infectious viral hepatitis     carrier of viral hepatitis     Low back pain     chronic     MI (myocardial infarction) (H) 01/01/1995     Obesity      Other and unspecified hyperlipidemia      PAD (peripheral artery disease) (H24)      Peyronie's disease     Past Surgical History:   Procedure Laterality Date     ANGIOPLASTY  1995     ANKLE FRACTURE SURGERY Left      BYPASS GRAFT AORTOFEMORAL N/A 5/17/2017    Procedure: AORTOBIFEMORAL BYPASS ;  Surgeon: Ilir FERRO MD;  Location: [facility];  Service:      CAROTID ENDARTERECTOMY Right 4/12/2018    Procedure: RIGHT CAROTID ENDARTERECTOMY WITH EEG;  Surgeon: Sergei Lemus MD;  Location: [facility];  Service:      CV CORONARY ANGIOGRAM N/A 11/3/2023    Procedure: Coronary Angiogram;  Surgeon: Bran Flores MD;  Location: [facility] CATH LAB CV     CV LEFT HEART CATH N/A 11/3/2023    Procedure: Left Heart Catheterization;  Surgeon: Bran Flores MD;  Location: [facility] CATH LAB CV     CYSTOSCOPY, TRANSURETHRAL RESECTION (TUR) TUMOR BLADDER, COMBINED N/A 8/3/2023    Procedure: Transurethral resection of bladder tumor 2 cm to 5 cm;  Surgeon: German Mitchell MD;  Location:  OR     FRACTURE SURGERY       IR AORTIC ARCH 4 VESSEL ANGIOGRAM  11/9/2009     IR CAROTID  ANGIOGRAM  11/9/2009     IR CAROTID ANGIOGRAM  11/9/2009     IR CHEST PORT PLACEMENT > 5 YRS OF AGE  9/11/2023     IR EXTREMITY ANGIOGRAM BILATERAL  4/6/2017     MANDIBLE SURGERY       ZZC NONSPECIFIC PROCEDURE  1995    MI -- angioplasty    no family history of premature coronary artery disease Social History     Socioeconomic History     Marital status:      Spouse name: Mitra     Number of children: 2     Years of education: 12     Highest education level: Not on file   Occupational History     Occupation:      Comment: [employer]   Tobacco Use     Smoking status: Former     Packs/day: 1.5     Types: Cigarettes     Quit date: 1/1/2013     Years since quitting: 10.8     Smokeless tobacco: Never   Substance and Sexual Activity     Alcohol use: No     Comment: Alcoholic Drinks/day: sober since 1984     Drug use: Yes     Types: Marijuana     Comment: marijuana daily     Sexual activity: Yes     Partners: Female   Other Topics Concern     Not on file   Social History Narrative     Not on file     Social Determinants of Health     Financial Resource Strain: Not on file   Food Insecurity: Not on file   Transportation Needs: Not on file   Physical Activity: Not on file   Stress: Not on file   Social Connections: Not on file   Interpersonal Safety: Not on file   Housing Stability: Not on file           Lab Results    Chemistry/lipid CBC Cardiac Enzymes/BNP/TSH/INR   Lab Results   Component Value Date    CHOL 97 11/13/2023    HDL 49 11/13/2023    TRIG 72 11/13/2023    BUN 13.1 11/13/2023     11/13/2023    CO2 24 11/13/2023    Lab Results   Component Value Date    WBC 3.6 (L) 11/13/2023    HGB 8.7 (L) 11/13/2023    HCT 25.7 (L) 11/13/2023    MCV 97 11/13/2023     (L) 11/13/2023    Lab Results   Component Value Date    TROPONINI 0.03 08/20/2020    TSH 2.10 06/05/2023    INR 1.04 08/01/2023     Lab Results   Component Value Date    TROPONINI 0.03 08/20/2020          Weight:    Wt Readings  from Last 3 Encounters:   11/14/23 70.3 kg (155 lb)   11/08/23 69.1 kg (152 lb 4.8 oz)   11/03/23 73.9 kg (163 lb)       Allergies  Allergies   Allergen Reactions     No Known Allergies          Surgical History  Past Surgical History:   Procedure Laterality Date     ANGIOPLASTY  1995     ANKLE FRACTURE SURGERY Left      BYPASS GRAFT AORTOFEMORAL N/A 5/17/2017    Procedure: AORTOBIFEMORAL BYPASS ;  Surgeon: Ilir FERRO MD;  Location: [facility];  Service:      CAROTID ENDARTERECTOMY Right 4/12/2018    Procedure: RIGHT CAROTID ENDARTERECTOMY WITH EEG;  Surgeon: Sergei eLmus MD;  Location: [facility] OR;  Service:      CV CORONARY ANGIOGRAM N/A 11/3/2023    Procedure: Coronary Angiogram;  Surgeon: Bran Flores MD;  Location: [facility] CATH LAB CV     CV LEFT HEART CATH N/A 11/3/2023    Procedure: Left Heart Catheterization;  Surgeon: Bran Flores MD;  Location: [facility] CATH LAB CV     CYSTOSCOPY, TRANSURETHRAL RESECTION (TUR) TUMOR BLADDER, COMBINED N/A 8/3/2023    Procedure: Transurethral resection of bladder tumor 2 cm to 5 cm;  Surgeon: German Mitchell MD;  Location: [facility] OR     FRACTURE SURGERY       IR AORTIC ARCH 4 VESSEL ANGIOGRAM  11/9/2009     IR CAROTID ANGIOGRAM  11/9/2009     IR CAROTID ANGIOGRAM  11/9/2009     IR CHEST PORT PLACEMENT > 5 YRS OF AGE  9/11/2023     IR EXTREMITY ANGIOGRAM BILATERAL  4/6/2017     MANDIBLE SURGERY       ZZC NONSPECIFIC PROCEDURE  1995    MI -- angioplasty       Social History  Tobacco:   History   Smoking Status     Former     Packs/day: 1.50     Types: Cigarettes     Quit date: 1/1/2013   Smokeless Tobacco     Never    Alcohol:   Social History    Substance and Sexual Activity      Alcohol use: No        Comment: Alcoholic Drinks/day: sober since 1984   Illicit Drugs:   History   Drug Use     Types: Marijuana     Comment: marijuana daily       Family History  Family History   Problem Relation Age of Onset     Cancer Mother      Respiratory  Father         d:age 59     Cerebrovascular Disease Father      Hypertension Father      Family History Negative Brother         b:1960     Heart Disease Paternal Grandmother      Emphysema Paternal Grandfather      Diabetes Other         paternal uncle     Glaucoma No family hx of      Macular Degeneration No family hx of           David Townsend MD on 11/14/2023      cc: Jigar Crawford,

## 2023-11-14 NOTE — Clinical Note
The first balloon was inserted into the circumflex and marginal circumflex.Max pressure = 5 berenice. Total duration = 17 seconds.     Max pressure = 7 berenice. Total duration = 22 seconds.    Balloon reinflated a second time: Max pressure = 7 berenice. Total duration = 22 seconds.  Balloon reinflated a third time: Max pressure = 10 berenice. Total duration = 15 seconds.  Balloon reinflated a fourth time: Max pressure = 14 berenice. Total duration = 16 seconds .

## 2023-11-14 NOTE — PROGRESS NOTES
TR band completely off  there is a oft bruising above the site and will monitor this no bleeding noted so far will keep the arm board for 2 hrs as patient forgets not to use his RT hand .voiding freely able to verbalize needs tolerating BiPap so far  O2 saturation 96%  .denied any pain or any discomfort .

## 2023-11-14 NOTE — PLAN OF CARE
Hgb 8.7.  Blood product infusing after consent was signed.  Planned intervention today.  No questions.  Wife present.

## 2023-11-14 NOTE — INTERVAL H&P NOTE
I have reviewed the surgical (or preoperative) H&P that is linked to this encounter, and examined the patient. There are no significant changes    Clinical Conditions Present on Arrival:  Clinically Significant Risk Factors Present on Admission        # Hypokalemia: Lowest K = 2.9 mmol/L in last 30 days, will replace as needed    # Hypocalcemia: Lowest Ca = 7.7 mg/dL in last 30 days, will monitor and replace as appropriate       # Drug Induced Platelet Defect: home medication list includes an antiplatelet medication

## 2023-11-14 NOTE — Clinical Note
The first balloon was inserted into the circumflex and marginal circumflex.Max pressure = 5 berenice. Total duration = 10 seconds.     Max pressure = 5 berenice. Total duration = 10 seconds.   OM2 shockwave delivery.  Balloon reinflated a second time: Max pressure = 5 berenice. Total duration = 10 seconds. Balloon ruptured

## 2023-11-14 NOTE — Clinical Note
The first balloon was inserted into the circumflex and marginal circumflex.Max pressure = 12 berenice. Total duration = 30 seconds.     Max pressure = 8 berenice. Total duration = 20 seconds.    Balloon reinflated a second time: Max pressure = 8 berenice. Total duration = 20 seconds.  Balloon reinflated a third time: Max pressure = 10 berenice. Total duration = 20 seconds.

## 2023-11-14 NOTE — Clinical Note
The first balloon was inserted into the circumflex and proximal circumflex.Max pressure = 4 berenice. Total duration = 10 seconds.     Max pressure = 4 berenice. Total duration = 10 seconds.   Shockwave delivery.  Balloon reinflated a second time: Max pressure = 4 berenice. Total duration = 10 seconds. Shockwave delivery

## 2023-11-14 NOTE — Clinical Note
The first balloon was inserted into the circumflex and proximal circumflex.Max pressure = 3 berenice. Total duration = 10 seconds.     Max pressure = 3 berenice. Total duration = 10 seconds.   Shockwave delivery.  Balloon reinflated a second time: Max pressure = 3 berenice. Total duration = 10 seconds. Shockwave delivery

## 2023-11-14 NOTE — PROGRESS NOTES
"Brief CV progress note:    Young \"Will\" ZEE Ordonez is a 74 y/o WM with history of severe multi-vessel CAD, ICM; EF 35%, non-small cell lung CA, HTN and anemia who presented for planned Lcx PCI. Will was transfused 1 unit of PRBCs under the direction of Dr. Flores, in anticipation of his procedure due to Hgb <10. Coronary stenting was successful with good result. Post procedure patient developed respiratory distress requiring NIPPV, lasix and morphine administration.     Case discussed with Dr. Flores. Will admit to Cardiology IP Heart Failure service. Hold Lisinopril for now; patient was reporting dizziness/LH at home, SBP upper 80s-90s prior to procedure, Scr 1.30.     OK to discharge from an interventional standpoint when optimized from heart failure perspective. Life vest was placed as interim therapy during recent admission and should be continued for prescribed length of time.               "

## 2023-11-15 NOTE — PROGRESS NOTES
[facility]    Medicine Progress Note - Hospitalist Service    Date of Admission:  11/14/2023    Assessment & Plan     Young Ordonez is a 73 year old male admitted on 11/14/2023 after cardiac cath.    Acute HFrEF exacerbation:  -Received IV diuresis. Change to oral bumex  -Monitor intake and output.  Daily weight.  -Monitor creatinine and electrolytes    Acute hypoxemic respiratory failure:  - Was on BiPAP. Switched to nasal cannula oxygen 3 LPM this morning. Wean as tolerated. Not on supplemental oxygen at baseline.    CAD: Coronary angiogram reveals multivessel disease now s/p PCI with LORRAINE to LCX   -Continue dual antiplatelet with aspirin and plavix  - Continue Lipitor, metoprolol and Imdur.    FRANCY: Creatinine trended up to 1.57 currently.  - Plan albumin today. Monitor creatinine closely while on diuresis.    Anion gap metabolic acidosis: likely due to FRANCY. Monitor.     Thrombocytopenia: Lowest platelets = 101 in last 2 days, will monitor for bleeding          Diet: Low Saturated Fat Na <2400 mg  Fluid restriction 1500 ML FLUID    DVT Prophylaxis: Pneumatic Compression Devices  Boswell Catheter: Not present  Lines: None     Cardiac Monitoring: ACTIVE order. Indication: Post- PCI/Angiogram (24 hours)  Code Status: Full Code      Clinically Significant Risk Factors             # Anion Gap Metabolic Acidosis: Highest Anion Gap = 19 mmol/L in last 2 days, will monitor and treat as appropriate    # Thrombocytopenia: Lowest platelets = 101 in last 2 days, will monitor for bleeding  # Acute Kidney Injury, unspecified: based on a >150% or 0.3 mg/dL increase in last creatinine compared to past 90 day average, will monitor renal function  # Hypertension: Noted on problem list            # Financial/Environmental Concerns:           Disposition Plan     Expected Discharge Date: 11/16/2023                    Gómez Apple MD  Hospitalist Service  [facility]  Securely message  with Viki (more info)  Text page via [facility] Paging/Directory   ______________________________________________________________________    Interval History   Patient was not able to tolerate when off BiPAP last night. He was maintained on BiPAP overnight. When seen this morning, he reports that his SOB has improved. He was placed on 3 LPM nasal cannula oxygen and was able to tolerate it. No chest pain.    Physical Exam   Vital Signs: Temp: 97.9  F (36.6  C) Temp src: Oral BP: (!) 80/56 Pulse: 95   Resp: 22 SpO2: 100 % O2 Device: Nasal cannula Oxygen Delivery: 2 LPM  Weight: 149 lbs 7.55 oz    General appearance: not in acute distress  HEENT: PERRL, EOMI  Lungs: Clear breath sounds in bilateral lung fields  Cardiovascular: Regular rate and rhythm, normal S1-S2  Abdomen: Soft, non tender, no distension  Musculoskeletal: No joint swelling  Skin: No rash and no edema  Neurology: AAO ×3.  Cranial nerves II - XII normal.  Normal muscle strength in all four extremities.     Medical Decision Making       45 MINUTES SPENT BY ME on the date of service doing chart review, history, exam, documentation & further activities per the note.      Data     I have personally reviewed the following data over the past 24 hrs:    5.6  \   9.1 (L)   / 101 (L)     139 100 22.1 /  112 (H)   3.5 22 1.57 (H) \     Procal: N/A CRP: N/A Lactic Acid: 2.0         Imaging results reviewed over the past 24 hrs:   No results found for this or any previous visit (from the past 24 hour(s)).

## 2023-11-15 NOTE — PROGRESS NOTES
HEART CARE NOTE          Assessment/Recommendations   1. HFrEF c/b severe ADHF   Assessment / Plan  Nearing euvolemia; some FRANCY on am labs - transition to oral diuretic regimen and continue to monitor UOP and renal function   Patient is high risk for adverse cardiac events 2/2 advanced age, CAD, HTN  GDMT as detailed below     Current Pharmacotherapy AHA Guideline-Directed Medical Therapy   Lisinopril - on hold given renal dysfunction Lisinopril 20 mg twice daily   Metoprolol succinate 25 mg daily  Carvedilol 25 mg twice daily   Spironolactone - not started Spironolactone 25 mg once daily   Hydralazine NA Hydralazine 100 mg three times daily   Isosorbide mononitrate 30 mg daily Isosorbide dinitrate 40 mg three times daily   SGLT2 inhibitor:Dapagliflozin/Empagliflozin - not started Dapagliflozin or Empagliflozin 10 mg daily      2. CAD  Assessment / Plan  Hx of severe multivessel disease now s/p PCI with LORRAINE to LCX   Denies chest pain or anginal equivalents   Continue ASA, high intensity atorvastatin, metoprolol, isosorbide mononitrate     3. HTN  Assessment / Plan  Borderline hemodynamics - hold oral afterload reduction/antihypertensives     4. FRANCY  Assessment / Plan  Continue to monitor renal function closely particularly in light of recent contrast dye    Plan of care discussed on November 15, 2023 with patient at bedside, and primary team overseeing patient's care    50 minutes spent reviewing prior records (including documentation, laboratory studies, cardiac testing/imaging), history and physical exam, planning, and subsequent documentation.      History of Present Illness/Subjective    Mr. Young Ordonez is a 73 year old male with a PMHx significant for (per Epic notation) severe multi-vessel CAD, ICM; EF 35%, non-small cell lung CA, HTN and anemia who presented for planned Lcx PCI. Will was transfused 1 unit of PRBCs under the direction of Dr. Flores, in anticipation of his procedure due to Hgb <10.  Coronary stenting was successful with good result. Post procedure patient developed respiratory distress requiring NIPPV, lasix and morphine administration.      Today, Mr. Ordonez denies any acute cardiac events or complaints; Management plan as detailed above      ECG: Personally reviewed. normal sinus rhythm, left axis deviation.     ECHO (personnaly Reviewed on 11/14/23):   1. The left ventricle is normal in size. Left ventricular systolic performance  is moderately reduced. The ejection fraction is estimated to be 30%.  2. There is severe septal, distal anterior, and apical hypokinesis (no apical  mural thrombus is detected). There is severe mid to distal inferior  hypokinesis. There is severe mid to distal posterior hypokinesis. There is mid  to distal anteroseptal akinesis.  3. No significant valvular heart disease is identified on this study.  4. Normal right ventricular size and systolic performance.  5. There is mild left atrial enlargement.     When compared to the prior real-time echocardiogram dated 20 August 2020, the  apical wall motion abnormality does not appear as pronounced on the current  study. The areas of hypokinesis involving the inferior and posterior segments,  however, appear to be new.     Cardiac MRI personally reviewed on 11/14/23:  1.  Borderline enlarged left ventricular size with moderate reduction in left ventricular ejection  fraction. The mid to distal anterior, mid to distal anteroseptal and apical wall segments are akinetic with  thin wall thickness.  Severe hypokinesis of the inferior wall segments.  The quantified left ventricular  ejection fraction is 35.5%.    2.  Transmural infarction of the distal anterior, mid to distal anteroseptal and entire apex of the left  ventricle.  Non-transmural infarction of the mid anterior wall segments.   All other walls segments are  without myocardial infarction/fibrosis and remain viable.    3.  Normal right ventricular size and systolic  "function.  RVEF: 75%.   4.  No significant valvular abnormalities    Lab results: personally reviewed November 15, 2023; notable for progressive FRANCY    Medical history and pertinent documents reviewed in Care Everywhere please where applicable see details above        Physical Examination Review of Systems   BP 98/69 (BP Location: Left arm)   Pulse 98   Temp 100.1  F (37.8  C) (Axillary)   Resp 22   Ht 1.727 m (5' 8\")   Wt 67.8 kg (149 lb 7.6 oz)   SpO2 99%   BMI 22.73 kg/m    Body mass index is 22.73 kg/m .  Wt Readings from Last 3 Encounters:   11/15/23 67.8 kg (149 lb 7.6 oz)   11/08/23 69.1 kg (152 lb 4.8 oz)   11/03/23 73.9 kg (163 lb)     General Appearance:   no distress, normal body habitus   ENT/Mouth: membranes moist, no oral lesions or bleeding gums.      EYES:  no scleral icterus, normal conjunctivae   Neck: no carotid bruits or thyromegaly   Chest/Lungs:   lungs are clear to auscultation, no rales or wheezing, equal chest wall expansion    Cardiovascular:   Regular. Normal first and second heart sounds with no murmurs, rubs, or gallops; the carotid, radial and posterior tibial pulses are intact, no JVD or LE edema bilaterally    Abdomen:  no organomegaly, masses, bruits, or tenderness; bowel sounds are present   Extremities: no cyanosis or clubbing   Skin: no xanthelasma, warm.    Neurologic: NAD     Psychiatric: alert and oriented x3, calm     A complete 10 systems ROS was reviewed  And is negative except what is listed in the HPI.          Medical History  Surgical History Family History Social History   Past Medical History:   Diagnosis Date     Acute myocardial infarction, unspecified site, episode of care unspecified 01/01/1995     Arthritis      Cardiomyopathy (H)      Cerebral artery occlusion with cerebral infarction (H) 2018    lost vision in right eye     Claudication (H24)      COPD (chronic obstructive pulmonary disease) (H)      Coronary artery disease      Depression      DEPRESSIVE " DISORDER NEC 04/29/2008     Essential hypertension, benign      Gout      Gout attack 05/25/2017     Hepatitis C carrier (H)      HTN (hypertension)      Hyperlipidemia      Infectious viral hepatitis     carrier of viral hepatitis     Low back pain     chronic     MI (myocardial infarction) (H) 01/01/1995     Obesity      Other and unspecified hyperlipidemia      PAD (peripheral artery disease) (H24)      Peyronie's disease     Past Surgical History:   Procedure Laterality Date     ANGIOPLASTY  1995     ANKLE FRACTURE SURGERY Left      BYPASS GRAFT AORTOFEMORAL N/A 5/17/2017    Procedure: AORTOBIFEMORAL BYPASS ;  Surgeon: Ilir FERRO MD;  Location: [facility];  Service:      CAROTID ENDARTERECTOMY Right 4/12/2018    Procedure: RIGHT CAROTID ENDARTERECTOMY WITH EEG;  Surgeon: Sergei Lemus MD;  Location: [facility] OR;  Service:      CV CORONARY ANGIOGRAM N/A 11/3/2023    Procedure: Coronary Angiogram;  Surgeon: Bran Flores MD;  Location: [facility] CATH LAB CV     CV LEFT HEART CATH N/A 11/3/2023    Procedure: Left Heart Catheterization;  Surgeon: Bran Flores MD;  Location: [facility] CATH LAB CV     CYSTOSCOPY, TRANSURETHRAL RESECTION (TUR) TUMOR BLADDER, COMBINED N/A 8/3/2023    Procedure: Transurethral resection of bladder tumor 2 cm to 5 cm;  Surgeon: German Mitchell MD;  Location: [facility] OR     FRACTURE SURGERY       IR AORTIC ARCH 4 VESSEL ANGIOGRAM  11/9/2009     IR CAROTID ANGIOGRAM  11/9/2009     IR CAROTID ANGIOGRAM  11/9/2009     IR CHEST PORT PLACEMENT > 5 YRS OF AGE  9/11/2023     IR EXTREMITY ANGIOGRAM BILATERAL  4/6/2017     MANDIBLE SURGERY       ZZC NONSPECIFIC PROCEDURE  1995    MI -- angioplasty    no family history of premature coronary artery disease Social History     Socioeconomic History     Marital status:      Spouse name: Mitra     Number of children: 2     Years of education: 12     Highest education level: Not on file   Occupational History      Occupation:      Comment: [employer]   Tobacco Use     Smoking status: Former     Packs/day: 1.5     Types: Cigarettes     Quit date: 1/1/2013     Years since quitting: 10.8     Smokeless tobacco: Never   Substance and Sexual Activity     Alcohol use: No     Comment: Alcoholic Drinks/day: sober since 1984     Drug use: Yes     Types: Marijuana     Comment: marijuana daily     Sexual activity: Yes     Partners: Female   Other Topics Concern     Not on file   Social History Narrative     Not on file     Social Determinants of Health     Financial Resource Strain: Not on file   Food Insecurity: Not on file   Transportation Needs: Not on file   Physical Activity: Not on file   Stress: Not on file   Social Connections: Not on file   Interpersonal Safety: Not on file   Housing Stability: Not on file           Lab Results    Chemistry/lipid CBC Cardiac Enzymes/BNP/TSH/INR   Lab Results   Component Value Date    CHOL 97 11/13/2023    HDL 49 11/13/2023    TRIG 72 11/13/2023    BUN 20.1 11/15/2023     11/15/2023    CO2 20 (L) 11/15/2023    Lab Results   Component Value Date    WBC 5.6 11/15/2023    HGB 9.1 (L) 11/15/2023    HCT 28.1 (L) 11/15/2023    MCV 97 11/15/2023     (L) 11/15/2023    Lab Results   Component Value Date    TROPONINI 0.03 08/20/2020    TSH 2.10 06/05/2023    INR 1.04 08/01/2023     Lab Results   Component Value Date    TROPONINI 0.03 08/20/2020          Weight:    Wt Readings from Last 3 Encounters:   11/15/23 67.8 kg (149 lb 7.6 oz)   11/08/23 69.1 kg (152 lb 4.8 oz)   11/03/23 73.9 kg (163 lb)       Allergies  Allergies   Allergen Reactions     No Known Allergies          Surgical History  Past Surgical History:   Procedure Laterality Date     ANGIOPLASTY  1995     ANKLE FRACTURE SURGERY Left      BYPASS GRAFT AORTOFEMORAL N/A 5/17/2017    Procedure: AORTOBIFEMORAL BYPASS ;  Surgeon: Ilir FERRO MD;  Location: [facility];  Service:      CAROTID ENDARTERECTOMY  Right 4/12/2018    Procedure: RIGHT CAROTID ENDARTERECTOMY WITH EEG;  Surgeon: Sergei Lemus MD;  Location: [facility] OR;  Service:      CV CORONARY ANGIOGRAM N/A 11/3/2023    Procedure: Coronary Angiogram;  Surgeon: Bran Flores MD;  Location: [facility] CATH LAB CV     CV LEFT HEART CATH N/A 11/3/2023    Procedure: Left Heart Catheterization;  Surgeon: Bran Flores MD;  Location: [facility] CATH LAB CV     CYSTOSCOPY, TRANSURETHRAL RESECTION (TUR) TUMOR BLADDER, COMBINED N/A 8/3/2023    Procedure: Transurethral resection of bladder tumor 2 cm to 5 cm;  Surgeon: German Mitchell MD;  Location: [facility] OR     FRACTURE SURGERY       IR AORTIC ARCH 4 VESSEL ANGIOGRAM  11/9/2009     IR CAROTID ANGIOGRAM  11/9/2009     IR CAROTID ANGIOGRAM  11/9/2009     IR CHEST PORT PLACEMENT > 5 YRS OF AGE  9/11/2023     IR EXTREMITY ANGIOGRAM BILATERAL  4/6/2017     MANDIBLE SURGERY       ZZC NONSPECIFIC PROCEDURE  1995    MI -- angioplasty       Social History  Tobacco:   History   Smoking Status     Former     Packs/day: 1.50     Types: Cigarettes     Quit date: 1/1/2013   Smokeless Tobacco     Never    Alcohol:   Social History    Substance and Sexual Activity      Alcohol use: No        Comment: Alcoholic Drinks/day: sober since 1984   Illicit Drugs:   History   Drug Use     Types: Marijuana     Comment: marijuana daily       Family History  Family History   Problem Relation Age of Onset     Cancer Mother      Respiratory Father         d:age 59     Cerebrovascular Disease Father      Hypertension Father      Family History Negative Brother         b:1960     Heart Disease Paternal Grandmother      Emphysema Paternal Grandfather      Diabetes Other         paternal uncle     Glaucoma No family hx of      Macular Degeneration No family hx of           David Townsend MD on 11/15/2023      cc: Jigar Crawford

## 2023-11-15 NOTE — PROGRESS NOTES
"CLINICAL NUTRITION SERVICES  -  ASSESSMENT NOTE    Recommendations Ordered by Registered Dietitian (RD):   Ensure Enlive [place] BID with meals (breakfast, lunch)   Future/Additional Recommendations:   Full NFPE when patient available  Quantify decreased intake   Malnutrition:   Unable to determine due to incomplete NFPE and missing intake values, but suspected malnutrition      REASON FOR ASSESSMENT  Young Ordonez is a 73 year old male seen by Registered Dietitian for Nutrition Risk Monitoring    PMH:  CAD, HTN, ICM, EF 35%, non-small cell lung CA, anemia    Admitted d/t planned cardiac catheter.     NUTRITION HISTORY  Today, Mitra (wife) and Will expressed an overall decrease in appetite for Will over the past month since his chemotherapy, with a noted 20 lb weight loss. Pt has dentures and is not able to eat hard foods, such as nuts, and Mitra expressed difficulty cooking at home d/t preparing meals for only one person since Will does not really eat.     CURRENT NUTRITION ORDERS  Diet Order:     Low Saturated Fat/Low Cholesterol/2400 mg Sodium   Fluid Restriction 1500 mL    Current Intake/Tolerance:  Today, Mitra and Will noted that his appetite has been far less than normal since his appointment 11/8 (7 days) with a significant decrease in appetite. Both thought a nutritional supplement would be beneficial to get calories and protein in. We also discussed scheduling meals and getting intake where possible.    NUTRITION FOCUSED PHYSICAL ASSESSMENT FOR DIAGNOSING MALNUTRITION)  No:  Patient not available - sleeping upon follow up    ANTHROPOMETRICS  Height: 5' 8\"  Weight: 67.8 kg (149 lbs 7.55 oz)  Body mass index is 22.73 kg/m .  Weight Status:  Normal BMI  IBW: 70 kg (154 lbs)  % IBW: 97%  Weight History:   Wt Readings from Last 10 Encounters:   11/15/23 67.8 kg (149 lb 7.6 oz)   11/08/23 69.1 kg (152 lb 4.8 oz)   11/03/23 73.9 kg (163 lb)   11/03/23 70.3 kg (155 lb)   11/03/23 73 kg (161 lb)   10/31/23 72.4 " kg (159 lb 11.2 oz)   10/30/23 71.9 kg (158 lb 8 oz)   10/24/23 72.9 kg (160 lb 12.8 oz)   10/23/23 73.1 kg (161 lb 1.6 oz)   10/16/23 74.2 kg (163 lb 8 oz)     Noted 14 lb weight loss in 1 month (8.5%).  No noted edema, per chart.     LABS  Electrolytes WNL, No noted Phos or Mg    GFR 46 - L    MEDICATIONS  Lipitor  Imdur  Plavix  Troprol  Colace PRN  Apresoline PRN  Lopressor PRN  Nitrostat PRN  Zofran PRN    ASSESSED NUTRITION NEEDS PER APPROVED PRACTICE GUIDELINES:    Dosing Weight 67.8 kg  Estimated Energy Needs: 0720-2279 kcals (25-30 Kcal/Kg)  Justification: maintenance  Estimated Protein Needs: 68-81 grams protein (1-1.2 g pro/Kg)  Justification: preservation of lean body mass  Estimated Fluid Needs: 1500 mL  Justification: per provider pending fluid status    MALNUTRITION:  % Weight Loss:  > 5% in 1 month (severe malnutrition)  % Intake:  Decreased intake does not meet criteria for malnutrition, only due to inability to quantify intake during diet education encounter   Subcutaneous Fat Loss:  Unable to assess d/t pt sleeping upon encounter  Muscle Loss:  Unable to assess d/t pt sleeping upon encounter  Fluid Retention:  None noted    Malnutrition Diagnosis: Unable to determine due to incomplete NFPE and missing intake values, but suspected malnutrition    NUTRITION DIAGNOSIS:  Inadequate oral intake related to decrease in appetite as evidenced by unintended weight loss and pt and wife reports of decreased appetite.    NUTRITION INTERVENTIONS  Recommendations / Nutrition Prescription  Scheduled nutritional supplement  Small, frequent, scheduled meals, as tolerated    Implementation  Nutrition education: Provided education on Heart Healthy Diet - see previous note.  Composition of Meals and Snacks  Medical Food Supplement    Nutrition Goals  Consume % of nutrition through meals and supplements.    MONITORING AND EVALUATION:  Progress towards goals will be monitored and evaluated per protocol and  Practice Guidelines    Yamileth Villa  Dietetic Intern   sinus rhythm, occ PVC, LAD

## 2023-11-15 NOTE — CONSULTS
Care Management Initial Consult    General Information  Assessment completed with: Patient, Spouse or significant other, patient and wife Mitra  Type of CM/SW Visit: Initial Assessment    Primary Care Provider verified and updated as needed: Yes   Readmission within the last 30 days:           Advance Care Planning:            Communication Assessment  Patient's communication style: spoken language (English or Bilingual)    Hearing Difficulty or Deaf: no        Cognitive  Cognitive/Neuro/Behavioral: WDL                      Living Environment:   People in home: spouse     Current living Arrangements: house      Able to return to prior arrangements: yes       Family/Social Support:  Care provided by: self  Provides care for: no one  Marital Status:   Wife          Description of Support System: Supportive         Current Resources:   Patient receiving home care services: No     Community Resources: OP Infusion  Equipment currently used at home: none  Supplies currently used at home:      Employment/Financial:  Employment Status: retired        Financial Concerns:             Does the patient's insurance plan have a 3 day qualifying hospital stay waiver?  No    Lifestyle & Psychosocial Needs:  Social Determinants of Health     Food Insecurity: Not on file   Depression: At risk (6/5/2023)    PHQ-2     PHQ-2 Score: 3   Housing Stability: Not on file   Tobacco Use: Medium Risk (11/14/2023)    Patient History     Smoking Tobacco Use: Former     Smokeless Tobacco Use: Never     Passive Exposure: Not on file   Financial Resource Strain: Not on file   Alcohol Use: Not on file   Transportation Needs: Not on file   Physical Activity: Not on file   Interpersonal Safety: Not on file   Stress: Not on file   Social Connections: Not on file       Functional Status:  Prior to admission patient needed assistance:   Dependent ADLs:: Independent  Dependent IADLs:: Independent  Assesssment of Functional Status: Not at baseline  with ADL Functioning    Mental Health Status:  Mental Health Status: No Current Concerns       Chemical Dependency Status:  Chemical Dependency Status: No Current Concerns             Values/Beliefs:  Spiritual, Cultural Beliefs, Scientologist Practices, Values that affect care:                 Additional Information:  Chart reviewed.  SW met with patient and his wife in patient's room. Patient lives with wife,  he is typically independent at baseline, does not use an  assistive device and does  not use oxygen. Wife reports patient has been more dizzy and has fallen at home.   Wife reports she works outside of the home part time. Patient recently finished chemo treatment. Pt recently hospitalized at [facility] 11/3-11/8/2023.  Wife states her daughter may be able to provide additional supervision and support at home if needed.    Ladan Roldan, BHARATHISW

## 2023-11-15 NOTE — PROGRESS NOTES
Occupational Therapy      11/15/23 1020   Appointment Info   Signing Clinician's Name / Credentials (OT) Katelyn Dodd OTR/L   Living Environment   People in Home spouse   Current Living Arrangements other (see comments)  ([facility])   Home Accessibility stairs to enter home;stairs within home   Number of Stairs, Main Entrance 6   Stair Railings, Main Entrance railings safe and in good condition   Number of Stairs, Within Home, Primary greater than 10 stairs   Stair Railings, Within Home, Primary railings safe and in good condition  (landing half way up)   Transportation Anticipated family or friend will provide   Living Environment Comments W/I shower w/ GB   Self-Care   Usual Activity Tolerance moderate   Current Activity Tolerance fair   Regular Exercise No   Equipment Currently Used at Home none   Fall history within last six months yes   Number of times patient has fallen within last six months 2   Activity/Exercise/Self-Care Comment Ind. w/ ADL tasks   Instrumental Activities of Daily Living (IADL)   IADL Comments Pt spouse assists w/ IADL tasks   General Information   Onset of Illness/Injury or Date of Surgery 11/14/23   Referring Physician Gordo Chester MD   Additional Occupational Profile Info/Pertinent History of Current Problem 73 year old male admitted on 11/14/2023 after cardiac cath   Existing Precautions/Restrictions cardiac   General Observations and Info R. radial wrist angio site   Cognitive Status Examination   Orientation Status orientation to person, place and time   Range of Motion Comprehensive   General Range of Motion no range of motion deficits identified   Bed Mobility   Bed Mobility supine-sit   Supine-Sit [place] (Bed Mobility) verbal cues;supervision   Transfers   Transfers sit-stand transfer   Sit-Stand Transfer   Sit-Stand [place] (Transfers) contact guard;verbal cues;supervision   Assistive Device (Sit-Stand Transfers) walker, front-wheeled   Balance   Balance  Assessment standing dynamic balance   Standing Balance: Dynamic contact guard   Position/Device Used, Standing Balance walker, front-wheeled   Activities of Daily Living   BADL Assessment/Intervention lower body dressing   Lower Body Dressing Assessment/Training   Position (Lower Body Dressing) unsupported sitting   [place] Level (Lower Body Dressing) minimum assist (75% patient effort)   Clinical Impression   Criteria for Skilled Therapeutic Interventions Met (OT) Yes, treatment indicated   OT Diagnosis decreased ADL ind.   OT Problem List-Impairments impacting ADL problems related to;activity tolerance impaired;balance;strength;post-surgical precautions   Assessment of Occupational Performance 1-3 Performance Deficits   Planned Therapy Interventions (OT) ADL retraining;home program guidelines;balance training   Clinical Decision Making Complexity (OT) problem focused assessment/low complexity   Risk & Benefits of therapy have been explained evaluation/treatment results reviewed;patient   OT Total Evaluation Time   OT Eval, Low Complexity Minutes (42408) 12   OT Goals   Therapy Frequency (OT) Daily   OT Predicted Duration/Target Date for Goal Attainment 11/22/23   OT Goals Cardiac Phase 1;Toilet Transfer/Toileting;Transfers;Lower Body Dressing   OT: Lower Body Dressing Modified independent;using adaptive equipment;within precautions   OT: Transfer Modified independent;with assistive device   OT: Toilet Transfer/Toileting Modified independent;using adaptive equipment   OT: Understanding of cardiac education to maximize quality of life, condition management, and health outcomes Patient;Verbalize;Demonstrate   OT: Perform aerobic activity with stable cardiovascular response continuous;15 minutes   Self-Care/Home Management   Self-Care/Home Mgmt/ADL, Compensatory, Meal Prep Minutes (78212) 10   Treatment Detail/Skilled Intervention pt provided education/training on prec. following Angio w/ R. wrist, Min.A for LB  dressing, intial STS CGA w/o device pt then ambulated w/in room CGA w/ cues for tech/safety   Therapeutic Procedures/Exercise   Therapeutic Procedure: strength, endurance, ROM, flexibillity minutes (36660) 8   Treatment Detail/Skilled Intervention see ambulation tab   Treatment Time Includes (CR Only) See specific exercise details intervention group(s)   Ambulation   Workload 120ft   Symptoms Fatigue   Cardiovascular Response Hypotensive response to exercise;Hypotension at rest   Exercise Details CGA w/o device slight unsteadiness- no sig. LOB but multiple bouts of instability requiring CGA- OT ed. pt on use of fww for safe mobility pt does not typically use device.   Vital Signs Details pre act. 95 BP 91/60 post  BP 84/55   Cardiac Education   Education Provided Daily weights;Signs and symptoms;Resuming home activities;Outpatient Cardiac Rehab;Precautions;Home exercise program   Education Packet Given to Patient Yes   All Patient Education Handouts Reviewed with Patient and/or Family Yes   Cardiac Rehab Phase II Plan   Phase II Order Received No   Phase II Appointment Status Not scheduled   Not Scheduled Reason Other (see comments)  (order placed not scheduled)   OT Discharge Planning   OT Plan toileting, LB dressing, R. wrist angio prec., endurance   OT Discharge Recommendation (DC Rec) home with assist;home with outpatient cardiac rehab   OT Rationale for DC Rec Pt moving w/ CGA w/o device slightly unsteady rec. fww for added stability/support- PT ordered. Pt lives in [facility] w/ spouse w/ KAYLEN/stairs w/in. Expect pt to progress for safe d/c home when medically stable. Pt may benefit from increased assist upon d/c home to enhance ADL ind.   OT Brief overview of current status CGA trnfs- use fww mobility   Total Session Time   Timed Code Treatment Minutes 18   Total Session Time (sum of timed and untimed services) 30

## 2023-11-15 NOTE — PROGRESS NOTES
Went to pt room to trial off bipap to 3LNC. Patient could only tolerate 35 min off bipap before desatting and becoming tachy. Placed pt back on bipap @2045.

## 2023-11-15 NOTE — PLAN OF CARE
"  Problem: Adult Inpatient Plan of Care  Goal: Plan of Care Review  Description: The Plan of Care Review/Shift note should be completed every shift.  The Outcome Evaluation is a brief statement about your assessment that the patient is improving, declining, or no change.  This information will be displayed automatically on your shift  note.  Outcome: Progressing  Goal: Patient-Specific Goal (Individualized)  Description: You can add care plan individualizations to a care plan. Examples of Individualization might be:  \"Parent requests to be called daily at 9am for status\", \"I have a hard time hearing out of my right ear\", or \"Do not touch me to wake me up as it startles  me\".  Outcome: Progressing  Goal: Absence of Hospital-Acquired Illness or Injury  Intervention: Identify and Manage Fall Risk  Recent Flowsheet Documentation  Taken 11/15/2023 1630 by Yojana Ferris RN  Safety Promotion/Fall Prevention: activity supervised  Taken 11/15/2023 1259 by Yojana Ferris RN  Safety Promotion/Fall Prevention: activity supervised  Taken 11/15/2023 0745 by Yojana Ferris RN  Safety Promotion/Fall Prevention: activity supervised  Intervention: Prevent Skin Injury  Recent Flowsheet Documentation  Taken 11/15/2023 1630 by Yojana Ferris RN  Body Position: position changed independently  Taken 11/15/2023 1259 by Yojana Ferris RN  Body Position: position changed independently  Taken 11/15/2023 0745 by Yojana Ferris RN  Body Position: position changed independently  Intervention: Prevent and Manage VTE (Venous Thromboembolism) Risk  Recent Flowsheet Documentation  Taken 11/15/2023 1630 by Yojana Ferris RN  VTE Prevention/Management:   SCDs (sequential compression devices) on   SCDs (sequential compression devices) off  Intervention: Prevent Infection  Recent Flowsheet Documentation  Taken 11/15/2023 1630 by Yojana Ferris RN  Infection Prevention: environmental surveillance performed  Taken " 11/15/2023 1259 by Yojana Ferris, RN  Infection Prevention: environmental surveillance performed  Taken 11/15/2023 0745 by Yojana Ferris, RN  Infection Prevention: environmental surveillance performed  Goal: Optimal Comfort and Wellbeing  Outcome: Progressing  Goal: Readiness for Transition of Care  Outcome: Progressing   Goal Outcome Evaluation:  Alert and oriented BiPap off since this Morning and on 3 L of O2 and O2 saturation stable at 94% -100% no complaints of any SOB .Noted to have poor appetite but drinking good amount  and able to use the urinal and on primo fit when trying to rest Ate half of lunch serving but declined to order dinner says he wants to rest . SCD' s in place .Wife was updated .

## 2023-11-15 NOTE — PROGRESS NOTES
11/15/23 1400   Appointment Info   Signing Clinician's Name / Credentials (PT) Helena Gage PT   Living Environment   People in Home spouse   Current Living Arrangements   ([facility])   Home Accessibility stairs to enter home;stairs within home   Number of Stairs, Main Entrance 6   Stair Railings, Main Entrance railings safe and in good condition   Number of Stairs, Within Home, Primary greater than 10 stairs   Stair Railings, Within Home, Primary railings safe and in good condition   Transportation Anticipated family or friend will provide   Self-Care   Current Activity Tolerance fair   Regular Exercise No   Equipment Currently Used at Home none   General Information   Onset of Illness/Injury or Date of Surgery 11/14/23   Referring Physician VÍCTOR Apple   Patient/Family Therapy Goals Statement (PT) return home with no falls   Pertinent History of Current Problem (include personal factors and/or comorbidities that impact the POC) 74 y/o WM with history of severe multi-vessel CAD, ICM; EF 35%, non-small cell lung CA, HTN and anemia who presented for planned Lcx PCI. Will was transfused 1 unit of PRBCs under the direction of Dr. Flores, in anticipation of his procedure due to Hgb <10. Coronary stenting was successful with good result. Post procedure patient developed respiratory distress requiring NIPPV, lasix and morphine administration.   Existing Precautions/Restrictions fall;cardiac  (R wrist angio site)   Weight-Bearing Status - RUE other (see comments)  (limited 2/2 wrist angio site)   Cognition   Affect/Mental Status (Cognition) WFL   Orientation Status (Cognition) oriented x 4   Follows Commands (Cognition) WFL   Pain Assessment   Patient Currently in Pain No   Range of Motion (ROM)   ROM Comment BLE ROM WFL   Strength (Manual Muscle Testing)   Strength Comments generalized weakness BLE, 4-/5, fatigues easily   Bed Mobility   Bed Mobility supine-sit   Supine-Sit [place] (Bed Mobility) contact guard;verbal cues    Bed Mobility Limitations decreased ability to use arms for pushing/pulling;decreased ability to use legs for bridging/pushing   Impairments Contributing to Impaired Bed Mobility decreased strength   Assistive Device (Bed Mobility) bed rails   Comment, (Bed Mobility) sitting balance at EOB is good   Transfers   Transfers sit-stand transfer   Sit-Stand Transfer   Sit-Stand [place] (Transfers) contact guard;verbal cues   Assistive Device (Sit-Stand Transfers) walker, front-wheeled   Comment, (Sit-Stand Transfer) encouraged use of FWW to conserve energy and assist with balance as he continues to recover   Gait/Stairs (Locomotion)   [place] Level (Gait) contact guard;1 person assist;verbal cues   Assistive Device (Gait) walker, front-wheeled   Distance in Feet (Gait) 20   Pattern (Gait) step-to   Deviations/Abnormal Patterns (Gait) negrito decreased   Clinical Impression   Criteria for Skilled Therapeutic Intervention Yes, treatment indicated   PT Diagnosis (PT) impaired functional mobility   Influenced by the following impairments weakness/fatigue   Functional limitations due to impairments bdmobility, transfers,gait/stairs   Clinical Presentation (PT Evaluation Complexity) stable   Clinical Presentation Rationale presents as diagnosed   Clinical Decision Making (Complexity) moderate complexity   Planned Therapy Interventions (PT) bed mobility training;transfer training;gait training;stair training;strengthening;balance training   Risk & Benefits of therapy have been explained patient;spouse/significant other   PT Total Evaluation Time   PT Eval, Moderate Complexity Minutes (40083) 10   Physical Therapy Goals   PT Frequency Daily   PT Predicted Duration/Target Date for Goal Attainment 11/20/23   PT Goals Bed Mobility;Transfers;Gait;Stairs   PT: Bed Mobility Independent;Supine to/from sit;Rolling   PT: Transfers Supervision/stand-by assist;Sit to/from stand;Bed to/from chair;Assistive device   PT: Gait  Supervision/stand-by assist;Rolling walker;150 feet   PT: Stairs 6 stairs;Minimal assist  (with rail, CGA)   Interventions   Interventions Quick Adds Gait Training;Therapeutic Procedure   Therapeutic Procedure/Exercise   Ther. Procedure: strength, endurance, ROM, flexibillity Minutes (42786) 10   Symptoms Noted During/After Treatment fatigue   Treatment Detail/Skilled Intervention instructed in and performed BLE ex in sitting.  Verbal cues and demo for correct technique, rest breaks as needed.   Gait Training   Gait Training Minutes (10308) 15   Symptoms Noted During/After Treatment (Gait Training) fatigue   Treatment Detail/Skilled Intervention slower pace, improved posture and stability with FWW.  Toilet transfer with FWW and grab bar, CGA.  Static standing at sink without UE support 1 min, amb  60' wwith FWW and CGA/SBA   Distance in Feet 60   [place] Level (Gait Training) stand-by assist   Physical Assistance Level (Gait Training) verbal cues;1 person assist   Weight Bearing (Gait Training) weight-bearing as tolerated   Assistive Device (Gait Training) rolling walker   Gait Analysis Deviations decreased negrito;decreased stride length   Impairments (Gait Analysis/Training) balance impaired;strength decreased   PT Discharge Planning   PT Plan bed mobility, transfers and gt with FWW, LE strengthening, add stairs as able   PT Discharge Recommendation (DC Rec) home with assist;home with home care physical therapy   PT Rationale for DC Rec moving failry well, limited by fatigue, wife able to assist if needed   PT Brief overview of current status transfers and gt with FWW, CGA, 60'   PT Equipment Needed at Discharge shower chair;walker, rolling   Total Session Time   Timed Code Treatment Minutes 25   Total Session Time (sum of timed and untimed services) 35

## 2023-11-15 NOTE — CONSULTS
NUTRITION EDUCATION        REASON FOR ASSESSMENT:  Consulted to educate pt on Heart Healthy Diet     NUTRITION HISTORY:  Information obtained from Holly (wife):    Pt has had a decreased appetite since his chemotherapy treatment, and his wife states he has had poor intake. She finds it difficult to cook for one person, as he does not really eat, and she claims he has lost 20 lbs over the past few months. She is familiar with label reading, but was interested in further information. Pt has dentures and does not eat nuts or other hard foods, but will eat fish if he catches them. See malnutrition screening for further information.     CURRENT DIET:  Low Saturated Fat, <2400 gm Na diet with Fluid Restriction 1500 mL     NUTRITION DIAGNOSIS:  Food- and nutrition-related knowledge deficit R/t no previous diet education as evidenced by pt and wife reports of no previous education.     INTERVENTIONS:     Nutrition Prescription:  Continue Low Saturated Fat, <2400 gm Na diet with Fluid Restriction 1500 mL     Implementation:      *  Nutrition Education (Content):              A)  Provided handout of Food Choice guidelines for Heart Healthy Eating, Heart Healthy Label Reading Tips, and General Recommendations for a Heart Healthy Diet              B)  Discussed foods appropriate for a general heart healthy diet (limiting saturated fats, sodium, and sugars, incorporating fruits, vegetables, plant-based sterols, and fish)      *  Nutrition Education (Application):              A)  Discussed current eating habits and recommended alternative food choices, as well as ways to eat with decreased appetite, such as scheduling meals.      *  Anticipate good compliance      *  Diet Education - refer to Education Flowsheet     Goals:      *  Patient will verbalize understanding of diet met      *  All of the above goals met during the education session     Follow Up/Monitoring:      *  Provided RD contact information for future  questions      *  Recommended Out-Patient Nutrition Referral, if further diet instructions are needed     Yamileth Villa  Dietetic Intern

## 2023-11-15 NOTE — PLAN OF CARE
Heart Failure Care Map  GOALS TO BE MET BEFORE DISCHARGE:    1. Decrease congestion and/or edema with diuretic therapy to achieve near optimal volume status.     Dyspnea improved: No, further care required to meet this goal. Please explain SOB noted at rest and with exertion   Edema improved: No, further care required to meet this goal. Please explain edema present        Last 24 hour I/O:   Intake/Output Summary (Last 24 hours) at 11/15/2023 0510  Last data filed at 11/15/2023 0400  Gross per 24 hour   Intake 920 ml   Output 2650 ml   Net -1730 ml           Net I/O and Weights since admission:   10/16 0700 - 11/15 0659  In: 920 [P.O.:720; I.V.:200]  Out: 2650 [Urine:2650]  Net: -1730     Vitals:    11/14/23 0756   Weight: 70.3 kg (155 lb)       2.  O2 sats > 90% on room air, or at prior home O2 therapy level.      Able to wean O2 this shift to keep sats above 90%?: No, further care required to meet this goal. Please explain Requiring Bipap   Does patient use Home O2? No          Current oxygenation status:   SpO2: 99 %     O2 Device: Nasal cannula, Oxygen Delivery: 5 LPM    3.  Tolerates ambulation and mobility near baseline.     Ambulation: No, further care required to meet this goal. Please explain too Short of breath and weak. Rest/Sleep overnight   Times patient ambulated with staff this shift: 0    Patient denied pain. Wanted to be weaned off Bipap, RN and RT tried but patient unable to hold sats on NC at 5LPM. Bipap placed on again.   Patient with fair tolerance to Bipap. Education completed on why he needs it. Acknowledged understanding.  Adequate urine output overnight.   BP soft overnight, asymptomatic.   Angio site (right radial) ecchymotic (unchanged) with weak pulse. Pulse present with doppler.  Spiked fever overnight. Sepsis protocol triggered, LA drawn, resulted at 2.0. MALIHA George, notified. STAT BMP, CBC and blood culture labs ordered. Tylenol given.    Please review the Heart Failure Care Map for  additional HF goal outcomes.    Davis Zhu RN  11/15/2023

## 2023-11-16 PROBLEM — N18.9 ACUTE KIDNEY INJURY SUPERIMPOSED ON CKD (H): Status: ACTIVE | Noted: 2023-01-01

## 2023-11-16 NOTE — SIGNIFICANT EVENT
Significant Event Note    Time of event: 9:17 PM November 15, 2023    Description of event:  Notified by RN that patient persistently hypotensive, requiring assessment. Here with CHF exacerbation and has been aggressively diuresing.     Upon encounter, patient in no acute distress. A&O. Feels fine but does note a little more shortness of breath. Stable at 3L by NC. Lungs diminished, don't sound wet. No lower extremity edema.     Suspect we may have overcorrected fluid status.     Plan:  Giving IV albumin and 500 mL NS. Patient having anxiety and requesting Ativan - OK to give if his MAP comes up >65.     Discussed with: bedside nurse    Artie Mena MD    12:14AM UPDATE: MAP recovered with interventions. Continue to monitor closely.

## 2023-11-16 NOTE — PROGRESS NOTES
5:37 PM  Received page from Dr. Apple regarding patient who was recently on our service and should be on our list.     Young Ordonez is a 73 year old male with CAD and lung cancer admitted on 11/14/23 after coronary angiogram s/p PCI.     Patient came in with an NSTEMI and was found to have CAD but not candidate for CABG. S/p PCI with LORRAINE to LCX. Patient became hypoxic during this hospitilization and was diuresed on put on BiPAP. Transitioned off bipap on 11/15 and with new FRANCY (switch to oral diuretic. Also experienced hypotension 11/15 PM -> albumin and 500 ml bolus. On 11/16 Cr downtrending and patient possible fluid overloaded, trying to avoid fluids and continue diuresis.     Dr. Apple rounded on patient today and we will take over tomorrow 11/17.

## 2023-11-16 NOTE — PLAN OF CARE
"  Problem: Adult Inpatient Plan of Care  Goal: Plan of Care Review  Description: The Plan of Care Review/Shift note should be completed every shift.  The Outcome Evaluation is a brief statement about your assessment that the patient is improving, declining, or no change.  This information will be displayed automatically on your shift  note.  Outcome: Progressing  Goal: Patient-Specific Goal (Individualized)  Description: You can add care plan individualizations to a care plan. Examples of Individualization might be:  \"Parent requests to be called daily at 9am for status\", \"I have a hard time hearing out of my right ear\", or \"Do not touch me to wake me up as it startles  me\".  Outcome: Progressing  Goal: Absence of Hospital-Acquired Illness or Injury  Outcome: Progressing  Intervention: Identify and Manage Fall Risk  Recent Flowsheet Documentation  Taken 11/16/2023 1607 by Yojana Ferris RN  Safety Promotion/Fall Prevention: activity supervised  Taken 11/16/2023 1256 by Yojana Ferris RN  Safety Promotion/Fall Prevention: activity supervised  Taken 11/16/2023 0737 by Yojana Ferris RN  Safety Promotion/Fall Prevention: activity supervised  Intervention: Prevent Skin Injury  Recent Flowsheet Documentation  Taken 11/16/2023 1607 by Yojana Ferris RN  Body Position: position changed independently  Taken 11/16/2023 1604 by Yojana Ferris RN  Body Position: position changed independently  Taken 11/16/2023 1256 by Yojana Ferris RN  Body Position: position changed independently  Taken 11/16/2023 0737 by Yojana Ferris RN  Body Position: position changed independently  Intervention: Prevent Infection  Recent Flowsheet Documentation  Taken 11/16/2023 1607 by Yojana Ferris RN  Infection Prevention: rest/sleep promoted  Taken 11/16/2023 1256 by Yojana Ferris RN  Infection Prevention: rest/sleep promoted  Taken 11/16/2023 0737 by Yojana Ferris RN  Infection Prevention: rest/sleep " promoted  Goal: Optimal Comfort and Wellbeing  Outcome: Progressing  Intervention: Monitor Pain and Promote Comfort  Recent Flowsheet Documentation  Taken 11/16/2023 0737 by Yojana Ferris, RN  Pain Management Interventions: medication (see MAR)   Goal Outcome Evaluation:       Up to the bathroom with SBA and appears steady on his feet but declined to work with PT Ate 100 % of his lunch had a loose BM X 1 .Complained of lower abdominal pain 6/10 10 mg of Oxycodone and pain was down to 0/10 on reassessment.

## 2023-11-16 NOTE — PROGRESS NOTES
HEART CARE NOTE          Assessment/Recommendations   1. HFrEF c/b severe ADHF   Assessment / Plan  Renal function improving transition to oral diuretic regimen; continue to monitor UOP and renal function   Patient is high risk for adverse cardiac events 2/2 advanced age, CAD, HTN  GDMT as detailed below     Current Pharmacotherapy AHA Guideline-Directed Medical Therapy   Lisinopril - on hold given renal dysfunction Lisinopril 20 mg twice daily   Metoprolol succinate 25 mg daily  Carvedilol 25 mg twice daily   Spironolactone - not started Spironolactone 25 mg once daily   Hydralazine NA Hydralazine 100 mg three times daily   Isosorbide mononitrate 30 mg daily - held 2/2 borderline BP Isosorbide dinitrate 40 mg three times daily   SGLT2 inhibitor:Dapagliflozin/Empagliflozin - not started Dapagliflozin or Empagliflozin 10 mg daily      2. CAD  Assessment / Plan  Hx of severe multivessel disease now s/p PCI with LORRAINE to LCX   Denies chest pain or anginal equivalents   Continue ASA, high intensity atorvastatin, metoprolol, isosorbide mononitrate     3. HTN  Assessment / Plan  Borderline hemodynamics - hold oral afterload reduction/antihypertensives     4. FRANCY  Assessment / Plan  Continue to monitor renal function closely particularly in light of recent contrast dye       Plan of care discussed on November 16, 2023 with patient at bedside, and primary team overseeing patient's care    50 minutes spent reviewing prior records (including documentation, laboratory studies, cardiac testing/imaging), history and physical exam, planning, and subsequent documentation.    History of Present Illness/Subjective    Mr. Young Ordonez is a 73 year old male with a PMHx significant for (per Epic notation) severe multi-vessel CAD, ICM; EF 35%, non-small cell lung CA, HTN and anemia who presented for planned Lcx PCI. Will was transfused 1 unit of PRBCs under the direction of Dr. Flores, in anticipation of his procedure due to Hgb <10.  Coronary stenting was successful with good result. Post procedure patient developed respiratory distress requiring NIPPV, lasix and morphine administration.      Today, Mr. Ordonez denies any acute cardiac events or complaints; Management plan as detailed above      ECG: Personally reviewed. normal sinus rhythm, left axis deviation.     ECHO (personnaly Reviewed on 11/14/23):   1. The left ventricle is normal in size. Left ventricular systolic performance  is moderately reduced. The ejection fraction is estimated to be 30%.  2. There is severe septal, distal anterior, and apical hypokinesis (no apical  mural thrombus is detected). There is severe mid to distal inferior  hypokinesis. There is severe mid to distal posterior hypokinesis. There is mid  to distal anteroseptal akinesis.  3. No significant valvular heart disease is identified on this study.  4. Normal right ventricular size and systolic performance.  5. There is mild left atrial enlargement.     When compared to the prior real-time echocardiogram dated 20 August 2020, the  apical wall motion abnormality does not appear as pronounced on the current  study. The areas of hypokinesis involving the inferior and posterior segments,  however, appear to be new.     Cardiac MRI personally reviewed on 11/14/23:  1.  Borderline enlarged left ventricular size with moderate reduction in left ventricular ejection  fraction. The mid to distal anterior, mid to distal anteroseptal and apical wall segments are akinetic with  thin wall thickness.  Severe hypokinesis of the inferior wall segments.  The quantified left ventricular  ejection fraction is 35.5%.    2.  Transmural infarction of the distal anterior, mid to distal anteroseptal and entire apex of the left  ventricle.  Non-transmural infarction of the mid anterior wall segments.   All other walls segments are  without myocardial infarction/fibrosis and remain viable.    3.  Normal right ventricular size and systolic  "function.  RVEF: 75%.   4.  No significant valvular abnormalities    Lab results: personally reviewed November 16, 2023; notable for resolving FRANCY    Medical history and pertinent documents reviewed in Care Everywhere please where applicable see details above        Physical Examination Review of Systems   BP 93/65 (BP Location: Left arm)   Pulse 105   Temp 98.5  F (36.9  C) (Oral)   Resp 18   Ht 1.727 m (5' 8\")   Wt 69.4 kg (153 lb)   SpO2 95%   BMI 23.26 kg/m    Body mass index is 23.26 kg/m .  Wt Readings from Last 3 Encounters:   11/16/23 69.4 kg (153 lb)   11/08/23 69.1 kg (152 lb 4.8 oz)   11/03/23 73.9 kg (163 lb)     General Appearance:   no distress, normal body habitus   ENT/Mouth: membranes moist, no oral lesions or bleeding gums.      EYES:  no scleral icterus, normal conjunctivae   Neck: no carotid bruits or thyromegaly   Chest/Lungs:   lungs are clear to auscultation, no rales or wheezing, equal chest wall expansion    Cardiovascular:   Regular. Normal first and second heart sounds with no murmurs, rubs, or gallops; the carotid, radial and posterior tibial pulses are intact, no JVD or LE edema bilaterally    Abdomen:  no organomegaly, masses, bruits, or tenderness; bowel sounds are present   Extremities: no cyanosis or clubbing   Skin: no xanthelasma, warm.    Neurologic: NAD     Psychiatric: alert and oriented x3, calm     A complete 10 systems ROS was reviewed  And is negative except what is listed in the HPI.          Medical History  Surgical History Family History Social History   Past Medical History:   Diagnosis Date     Acute myocardial infarction, unspecified site, episode of care unspecified 01/01/1995     Arthritis      Cardiomyopathy (H)      Cerebral artery occlusion with cerebral infarction (H) 2018    lost vision in right eye     Claudication (H24)      COPD (chronic obstructive pulmonary disease) (H)      Coronary artery disease      Depression      DEPRESSIVE DISORDER NEC " 04/29/2008     Essential hypertension, benign      Gout      Gout attack 05/25/2017     Hepatitis C carrier (H)      HTN (hypertension)      Hyperlipidemia      Infectious viral hepatitis     carrier of viral hepatitis     Low back pain     chronic     MI (myocardial infarction) (H) 01/01/1995     Obesity      Other and unspecified hyperlipidemia      PAD (peripheral artery disease) (H24)      Peyronie's disease     Past Surgical History:   Procedure Laterality Date     ANGIOPLASTY  1995     ANKLE FRACTURE SURGERY Left      BYPASS GRAFT AORTOFEMORAL N/A 5/17/2017    Procedure: AORTOBIFEMORAL BYPASS ;  Surgeon: Ilir FERRO MD;  Location: [facility];  Service:      CAROTID ENDARTERECTOMY Right 4/12/2018    Procedure: RIGHT CAROTID ENDARTERECTOMY WITH EEG;  Surgeon: Sergei Lemus MD;  Location: [facility] OR;  Service:      CV CORONARY ANGIOGRAM N/A 11/3/2023    Procedure: Coronary Angiogram;  Surgeon: Bran Flores MD;  Location: [facility] CATH LAB CV     CV LEFT HEART CATH N/A 11/3/2023    Procedure: Left Heart Catheterization;  Surgeon: Bran Flores MD;  Location: [facility] CATH LAB CV     CYSTOSCOPY, TRANSURETHRAL RESECTION (TUR) TUMOR BLADDER, COMBINED N/A 8/3/2023    Procedure: Transurethral resection of bladder tumor 2 cm to 5 cm;  Surgeon: German Mitchell MD;  Location:  OR     FRACTURE SURGERY       IR AORTIC ARCH 4 VESSEL ANGIOGRAM  11/9/2009     IR CAROTID ANGIOGRAM  11/9/2009     IR CAROTID ANGIOGRAM  11/9/2009     IR CHEST PORT PLACEMENT > 5 YRS OF AGE  9/11/2023     IR EXTREMITY ANGIOGRAM BILATERAL  4/6/2017     MANDIBLE SURGERY       ZZC NONSPECIFIC PROCEDURE  1995    MI -- angioplasty    no family history of premature coronary artery disease Social History     Socioeconomic History     Marital status:      Spouse name: Mitra     Number of children: 2     Years of education: 12     Highest education level: Not on file   Occupational History     Occupation: truck       Comment: [employer]   Tobacco Use     Smoking status: Former     Packs/day: 1.5     Types: Cigarettes     Quit date: 1/1/2013     Years since quitting: 10.8     Smokeless tobacco: Never   Substance and Sexual Activity     Alcohol use: No     Comment: Alcoholic Drinks/day: sober since 1984     Drug use: Yes     Types: Marijuana     Comment: marijuana daily     Sexual activity: Yes     Partners: Female   Other Topics Concern     Not on file   Social History Narrative     Not on file     Social Determinants of Health     Financial Resource Strain: Not on file   Food Insecurity: Not on file   Transportation Needs: Not on file   Physical Activity: Not on file   Stress: Not on file   Social Connections: Not on file   Interpersonal Safety: Not on file   Housing Stability: Not on file           Lab Results    Chemistry/lipid CBC Cardiac Enzymes/BNP/TSH/INR   Lab Results   Component Value Date    CHOL 97 11/13/2023    HDL 49 11/13/2023    TRIG 72 11/13/2023    BUN 27.1 (H) 11/16/2023     11/16/2023    CO2 20 (L) 11/16/2023    Lab Results   Component Value Date    WBC 5.6 11/15/2023    HGB 9.1 (L) 11/15/2023    HCT 28.1 (L) 11/15/2023    MCV 97 11/15/2023     (L) 11/15/2023    Lab Results   Component Value Date    TROPONINI 0.03 08/20/2020    TSH 2.10 06/05/2023    INR 1.04 08/01/2023     Lab Results   Component Value Date    TROPONINI 0.03 08/20/2020          Weight:    Wt Readings from Last 3 Encounters:   11/16/23 69.4 kg (153 lb)   11/08/23 69.1 kg (152 lb 4.8 oz)   11/03/23 73.9 kg (163 lb)       Allergies  Allergies   Allergen Reactions     No Known Allergies          Surgical History  Past Surgical History:   Procedure Laterality Date     ANGIOPLASTY  1995     ANKLE FRACTURE SURGERY Left      BYPASS GRAFT AORTOFEMORAL N/A 5/17/2017    Procedure: AORTOBIFEMORAL BYPASS ;  Surgeon: Ilir FERRO MD;  Location: [facility];  Service:      CAROTID ENDARTERECTOMY Right 4/12/2018     Procedure: RIGHT CAROTID ENDARTERECTOMY WITH EEG;  Surgeon: Sergei Lemus MD;  Location: [facility] OR;  Service:      CV CORONARY ANGIOGRAM N/A 11/3/2023    Procedure: Coronary Angiogram;  Surgeon: Bran Flores MD;  Location: [facility] CATH LAB CV     CV LEFT HEART CATH N/A 11/3/2023    Procedure: Left Heart Catheterization;  Surgeon: Bran Flores MD;  Location: [facility] CATH LAB CV     CYSTOSCOPY, TRANSURETHRAL RESECTION (TUR) TUMOR BLADDER, COMBINED N/A 8/3/2023    Procedure: Transurethral resection of bladder tumor 2 cm to 5 cm;  Surgeon: German Mitchell MD;  Location: [facility] OR     FRACTURE SURGERY       IR AORTIC ARCH 4 VESSEL ANGIOGRAM  11/9/2009     IR CAROTID ANGIOGRAM  11/9/2009     IR CAROTID ANGIOGRAM  11/9/2009     IR CHEST PORT PLACEMENT > 5 YRS OF AGE  9/11/2023     IR EXTREMITY ANGIOGRAM BILATERAL  4/6/2017     MANDIBLE SURGERY       ZZC NONSPECIFIC PROCEDURE  1995    MI -- angioplasty       Social History  Tobacco:   History   Smoking Status     Former     Packs/day: 1.50     Types: Cigarettes     Quit date: 1/1/2013   Smokeless Tobacco     Never    Alcohol:   Social History    Substance and Sexual Activity      Alcohol use: No        Comment: Alcoholic Drinks/day: sober since 1984   Illicit Drugs:   History   Drug Use     Types: Marijuana     Comment: marijuana daily       Family History  Family History   Problem Relation Age of Onset     Cancer Mother      Respiratory Father         d:age 59     Cerebrovascular Disease Father      Hypertension Father      Family History Negative Brother         b:1960     Heart Disease Paternal Grandmother      Emphysema Paternal Grandfather      Diabetes Other         paternal uncle     Glaucoma No family hx of      Macular Degeneration No family hx of           David Townsend MD on 11/16/2023      cc: Jigar Crawford

## 2023-11-16 NOTE — PROGRESS NOTES
[facility] Progress Note - Hospitalist Service    Date of Admission:  11/14/2023    Assessment & Plan     Young Ordonez is a 73 year old male with recent NSTEMI and lung cancer admitted on 11/14/2023 after coronary angiogram and PCI.    CAD:   Had NSTEMI one week ago. Coronary angiogram reveals multivessel disease. Patient is not a candidate for CABG. Now s/p PCI with LORRAINE to LCX   - Continue dual antiplatelet with aspirin and plavix  - Continue Lipitor, metoprolol and Imdur.    Acute HFrEF exacerbation, ischemic cardiomyopathy:  Echo on 11/4/23 showed LVEF 30% with severe septal, distal anterior, and apical hypokinesis.  - Cardiology input appreciated  - Received IV diuresis, complicated by FRANCY. Changed to oral bumex currently  - Monitor intake and output.  Daily weight.  - Monitor creatinine and electrolytes    Acute hypoxemic respiratory failure:  - Was on BiPAP after coronary angiogram. Switched to nasal cannula oxygen since 11/15. Currently on 3 LPM. Wean as tolerated. Not on supplemental oxygen at baseline.  - Incentive spirometer  - Encourage increase activity  - Nebs prn  - Chest XR today given patient has difficulty weaning off supplemental oxygen    FRANCY on CKD IIIa: Creatinine trended up to 1.57. His baseline is  from normal to 1.3. Improved to 1.36 today.  - Monitor creatinine closely while on diuresis.    Anion gap metabolic acidosis: likely due to FRANCY. Monitor.     Non small cell lung cancer of the RLL:   Recently completed chemotherapy and radiation. Repeat imaging scheduled on 11/28/23, then follow up with Dr Crowe.    Thrombocytopenia: Platelet count has been low at the level of 100s. Likely related to his recent chemotherapy. Monitor.    Chronic anemia: hemoglobin currently 8-9, stable at his baseline. Monitor.     Anxiety and depression: Continue PTA Zoloft.      Plan of care discussed with patient's wife by the bedside today.          Diet: Low Saturated Fat Na  <2400 mg  Fluid restriction 1500 ML FLUID  Snacks/Supplements Adult: Ensure Enlive; With Meals    DVT Prophylaxis: Pneumatic Compression Devices  Boswell Catheter: Not present  Lines: None     Cardiac Monitoring: ACTIVE order. Indication: Post- PCI/Angiogram (24 hours)  Code Status: Full Code      Clinically Significant Risk Factors             # Anion Gap Metabolic Acidosis: Highest Anion Gap = 20 mmol/L in last 2 days, will monitor and treat as appropriate    # Thrombocytopenia: Lowest platelets = 101 in last 2 days, will monitor for bleeding     # Hypertension: Noted on problem list            # Financial/Environmental Concerns:           Disposition Plan      Expected Discharge Date: 11/17/2023      Destination: home;home with family        Plan to discharge home when hypoxia improves. May need home oxygen.       Gómez Apple MD  Hospitalist Service  [facility]  Securely message with [employer] (more info)  Text page via [employer] Paging/Directory   ______________________________________________________________________    Interval History   Overnight event noted. Patient developed hypotension and needed albumin and fluid bolus. Patient developed some SOB and hypoxia this morning. The supplemental oxygen needs to be increased. No chest pain.    Physical Exam   Vital Signs: Temp: 97.9  F (36.6  C) Temp src: Oral BP: 107/66 Pulse: 102   Resp: 22 SpO2: 91 % O2 Device: Nasal cannula Oxygen Delivery: 3 LPM  Weight: 152 lbs 15.99 oz    General appearance: not in acute distress  HEENT: PERRL, EOMI  Lungs: Fine crackles in bilateral lung base.  Cardiovascular: Regular rate and rhythm, normal S1-S2  Abdomen: Soft, non tender, no distension  Musculoskeletal: No joint swelling  Skin: No rash and no edema  Neurology: AAO ×3.  Cranial nerves II - XII normal.  Normal muscle strength in all four extremities.     Medical Decision Making       45 MINUTES SPENT BY ME on the date of service doing chart review,  history, exam, documentation & further activities per the note.      Data     I have personally reviewed the following data over the past 24 hrs:    N/A  \   N/A   / N/A     138 98 27.1 (H) /  101 (H)   3.4 20 (L) 1.36 (H) \     Procal: N/A CRP: N/A Lactic Acid: 1.8         Imaging results reviewed over the past 24 hrs:   No results found for this or any previous visit (from the past 24 hour(s)).

## 2023-11-16 NOTE — PLAN OF CARE
Heart Failure Care Map  GOALS TO BE MET BEFORE DISCHARGE:    1. Decrease congestion and/or edema with diuretic therapy to achieve near optimal volume status.     Dyspnea improved: No, further care required to meet this goal. Please explain SOB noted at rest and with exertion   Edema improved: Yes, satisfactory for discharge.        Last 24 hour I/O:   Intake/Output Summary (Last 24 hours) at 11/16/2023 0623  Last data filed at 11/16/2023 0518  Gross per 24 hour   Intake 480 ml   Output 1000 ml   Net -520 ml           Net I/O and Weights since admission:   10/17 0700 - 11/16 0659  In: 1400 [P.O.:1200; I.V.:200]  Out: 3650 [Urine:3650]  Net: -2250     Vitals:    11/14/23 0756 11/15/23 0410 11/16/23 0500   Weight: 70.3 kg (155 lb) 67.8 kg (149 lb 7.6 oz) 69.4 kg (153 lb)       2.  O2 sats > 90% on room air, or at prior home O2 therapy level.      Able to wean O2 this shift to keep sats above 90%?: No, further care required to meet this goal. Please explain continues to be on 3LPM NC   Does patient use Home O2? No          Current oxygenation status:   SpO2: 95 %     O2 Device: Nasal cannula, Oxygen Delivery: 1 LPM    3.  Tolerates ambulation and mobility near baseline.     Ambulation: No, further care required to meet this goal. Please explain BP too low and SOB to walk   Times patient ambulated with staff this shift: 0    Patient with low BP and MAP < 65 overnight (see flowsheet). Patient asymptomatic. 50g of Albumin 25% given with little improvement in BP. 500 ml NS bolus given too. Improvement in BP and MAP noted.  MALIHA Cedeño, notified and came up to evaluate patient.  Lungs continue to be diminish.  Continues to need O2 at 3LPM NC.  This AM, Dr. Townsend, cardiologist, order another dose of 50g of Albumin 25%. She discontinued Imdur.  He did report back pain at bedtime, relieved with PRN Tylenol.  Sepsis protocol triggered at bedtime, Lactic Acid resulted back at 1.8.  Remains afebrile overnight.     Please review  the Heart Failure Care Map for additional HF goal outcomes.    Davis Zhu RN  11/16/2023

## 2023-11-17 PROBLEM — D12.4 BENIGN NEOPLASM OF DESCENDING COLON: Status: ACTIVE | Noted: 2019-02-26

## 2023-11-17 PROBLEM — N18.30 STAGE 3 CHRONIC KIDNEY DISEASE (H): Status: ACTIVE | Noted: 2017-05-26

## 2023-11-17 PROBLEM — F33.9 MAJOR DEPRESSIVE DISORDER, RECURRENT (H): Status: ACTIVE | Noted: 2017-05-26

## 2023-11-17 NOTE — PROGRESS NOTES
10:30- Amio bolus and Amio gttt started for A Fib w/ RVR.              Eliquis 5 mg BID.   10.00- Increase need for oxygen from 5 L NC to 10 L Oxy mask , House officer called      and updated.

## 2023-11-17 NOTE — PROGRESS NOTES
Care Management Follow Up    Length of Stay (days): 3    Expected Discharge Date: 11/18/2023     Concerns to be Addressed: all concerns addressed in this encounter, discharge planning     Patient plan of care discussed at interdisciplinary rounds: Yes    Anticipated Discharge Disposition: Home     Anticipated Discharge Services:  likely no skilled services  Anticipated Discharge DME:  walker    Patient/family educated on Medicare website which has current facility and service quality ratings:    Education Provided on the Discharge Plan:  yes  Patient/Family in Agreement with the Plan:  yes    Referrals Placed by CM/SW:    Private pay costs discussed: Not applicable    Additional Information:  Chart reviewed. Goal is home at discharge. Cardiology following.     Patient lives with wife,  he is typically independent at baseline, does not use an  assistive device and does  not use oxygen. Wife reports patient has been more dizzy and has fallen at home. Recently completed radiation and chemotherapy. Wife works part time outside of the home.      Ladan Roldan, BHARATHISW

## 2023-11-17 NOTE — PLAN OF CARE
Problem: Dysrhythmia  Goal: Normalized Cardiac Rhythm  Outcome: Progressing  Intervention: Monitor and Manage Cardiac Rhythm Effect  Recent Flowsheet Documentation  Taken 11/17/2023 1138 by Mechelle Camacho RN  VTE Prevention/Management:   SCDs (sequential compression devices) off   patient refused intervention  Taken 11/17/2023 0800 by Mechelle Camacho RN  VTE Prevention/Management:   SCDs (sequential compression devices) off   patient refused intervention   Goal Outcome Evaluation:  Pt drowsy but oriented x 4, ambulate to bathroom x 1 with assist of one and a walker but remained bedrest due to hypotensive and increased oxygen need.  Stat EKG, CXR and ABG obtained, no acute changes. Tele converted back to Sinus rhythm after a bolus of Amiodarone and continues drip. Albumen was given twice for low BP but remained soft. Poor intake will continue to monitor.

## 2023-11-17 NOTE — PLAN OF CARE
Heart Failure Care Map  GOALS TO BE MET BEFORE DISCHARGE:    1. Decrease congestion and/or edema with diuretic therapy to achieve near optimal volume status.     Dyspnea improved: No, further care required to meet this goal. Please explain SOB noted at rest and with exertion   Edema improved: Yes, satisfactory for discharge.        Last 24 hour I/O:   Intake/Output Summary (Last 24 hours) at 11/17/2023 0533  Last data filed at 11/17/2023 0309  Gross per 24 hour   Intake 480 ml   Output 350 ml   Net 130 ml           Net I/O and Weights since admission:   10/18 0700 - 11/17 0659  In: 1880 [P.O.:1680; I.V.:200]  Out: 4000 [Urine:4000]  Net: -2120     Vitals:    11/14/23 0756 11/15/23 0410 11/16/23 0500 11/17/23 0306   Weight: 70.3 kg (155 lb) 67.8 kg (149 lb 7.6 oz) 69.4 kg (153 lb) 69.9 kg (154 lb 1.6 oz)       2.  O2 sats > 90% on room air, or at prior home O2 therapy level.      Able to wean O2 this shift to keep sats above 90%?: No, further care required to meet this goal. Please explain requiring O2   Does patient use Home O2? No          Current oxygenation status:   SpO2: 90 %     O2 Device: Nasal cannula, Oxygen Delivery: 5 LPM    3.  Tolerates ambulation and mobility near baseline.     Ambulation: No, further care required to meet this goal. Please explain rest/sleep   Times patient ambulated with staff this shift: 0    Patient denied pain. SOB noted although patient denied having SOB.  O2 increased to 5LPM O2 NC from 3LPM.  Lungs diminished. This AM lungs sounding coarse. Dr. Townsend, cardiologist, ordered IV Bumex 2mg once and holding PO Bumex.    This AM at 0315 patient converted to Afib HR 90s-low 100s; asymptomatic.  Dr. Aguilar, , notified.  STAT EKG and STAT labs ordered.   Dr. Aguilar wanted scheduled 0800 scheduled metoprolol given now.    Please review the Heart Failure Care Map for additional HF goal outcomes.    Davis Zhu RN  11/17/2023

## 2023-11-17 NOTE — CONSULTS
[facility]  Cardiac Electrophysiology  [address]  [place] [address]   Office: [phone]  Fax: [phone]     Cardiac Electrophysiology Consultation    Patient: Young Ordonez   : 1950     Referring Provider: Dr Townsend    CHIEF COMPLAINT/REASON FOR CONSULTATION  New onset Atrial fibrillation with RVR    Assessment/Recommendations   Mr. Young Ordonez is a 73 year old male with a PMHx significant for severe multi-vessel CAD, ICM; EF 35%, non-small cell lung CA currently receiving chemo and radiation, HTN and anemia who presented for planned Lcx PCI by Dr Flores on 23. Will was transfused 1 unit of PRBCs under the direction of Dr. Flores, in anticipation of his procedure due to Hgb <10. Coronary stenting was successful with good result. Post procedure patient developed respiratory distress requiring NIPPV, lasix and morphine administration.  Diagnosed with new onset AF with RVR 23.     New onset AF with RVR  -Symptomatic reporting SOB at rest, dizziness/lightheaded, abdominal pain  -Continue with IV amiodarone drip x48 hours, ventricular rates currently in the 100's, Blood pressure low after amiodarone bolus requiring albumin bolus, continue to monitor closely  -Eliquis 5 mg BID for stroke prevention, already initiated  -Metoprolol XL 25 mg daily, continue for rate control  -Case reviewed and discussed with Dr Walker    2. HFrEF  -Managed by HF team, see detailed plan of care per  Dr Townsend    3. CAD  -history of 3 vessel disease  -recent stent to Left Cx, performed by Dr Flores on 23  -Denies CP/angina today  -On ASA, high dose Lipitor, Plavix, Metoprolol XL    [id number] of 6: age, CAD, HTN, HF, CVA on Eliquis 5 mg BID         History of Present Illness   Mr. Young Ordonez is a 73 year old male with a PMHx significant for severe multi-vessel CAD, ICM; EF 35%, non-small cell lung CA currently receiving chemo and radiation, HTN and  "anemia who presented for planned Lcx PCI by Dr Flores on 23. Will was transfused 1 unit of PRBCs under the direction of Dr. Flores, in anticipation of his procedure due to Hgb <10. Coronary stenting was successful with good result. Post procedure patient developed respiratory distress requiring NIPPV, lasix and morphine administration.  Diagnosed with new onset AF with RVR 23.     New onset AF with RVR, confirmed by EKG today, ventricular rate 104 bpm  Symptomatic: appears uncomfortable, laying in bed, SOB at rest, oxygen 10 L mask sats 94%, ABG result pending, dizziness/ lightheaded, abdominal pain. Denies CP, palpitations, syncope.   Receiving IV amiodarone bolus followed by continuous infusion at 1 mg/min + Metoprolol XL 25 mg daily, initiated on Eliquis 5 mg BID for stroke prevention. Known history of previous ischemic stroke back in , no residuals from that stroke. Patient with non small cell lung cancer, currently receiving chemo and radiation therapy, last treatment, one week ago. Has been able to eat and drink without difficulty prior to admission, is using marijuana PRN to manage his nausea. No ETOH or caffeine use currently. Family history is positive for AF and MI in father and maternal grandmother, both  from MI.   GFR 49, Creat 1.50 (Lisinopril on hold). K 3.2, Mg 2.0 Hgb 9.1       Physical Examination  Review of Systems   VITALS: BP (!) 81/58   Pulse 101   Temp 98.1  F (36.7  C) (Oral)   Resp 20   Ht 1.727 m (5' 8\")   Wt 69.9 kg (154 lb 1.6 oz)   SpO2 94%   BMI 23.43 kg/m    Wt Readings from Last 3 Encounters:   23 69.9 kg (154 lb 1.6 oz)   23 69.1 kg (152 lb 4.8 oz)   23 73.9 kg (163 lb)       Intake/Output Summary (Last 24 hours) at 2023 1116  Last data filed at 2023 0900  Gross per 24 hour   Intake 540 ml   Output 550 ml   Net -10 ml     CONSTITUTIONAL: mild distress laying in bed  EYES:  Conjunctivae pink, sclerae clear.    E/N/T:  Oral " mucosa pink, moist mucous membranes  RESPIRATORY:  SOB at rest with oxygen in place, decreased in bilateral lower lobes, upper lobes CTA  CARDIOVASCULAR:  Irregular rhythm, RVR, no murmur, rub or gallop, no LE swelling  GASTROINTESTINAL:  Abdomen without masses or tenderness  EXTREMITIES:  No clubbing or cyanosis.    MUSCULOSKELETAL:  Overall grossly normal muscle strength  SKIN:  Overall, skin warm and dry, no lesions. Pale  NEURO/PSYCH:  Oriented x 3 with normal affect.   Constitutional:  No weight loss or loss of appetite    Eyes:  No difficulty with vision, no double vision, no dry eyes  ENT:  No sore throat, difficulty swallowing; changes in hearing or tinnitus  Cardiovascular: As detailed above  Respiratory:  No cough  Musculoskeletal  No joint pain, muscle aches  Neurologic:  No syncope, Positive for lightheadedness  Hematologic: No easy bruising, excessive bleeding tendency   Gastrointestinal:  No jaundice, positive for abdominal pain   Genitourinary: No changes in urinary habits, no trouble urinating    Psychiatric: No anxiety or depression      Medical History  Surgical History   Past Medical History:   Diagnosis Date     Acute myocardial infarction, unspecified site, episode of care unspecified 01/01/1995     Arthritis      Cardiomyopathy (H)      Cerebral artery occlusion with cerebral infarction (H) 2018    lost vision in right eye     Claudication (H24)      COPD (chronic obstructive pulmonary disease) (H)      Coronary artery disease      Depression      DEPRESSIVE DISORDER NEC 04/29/2008     Essential hypertension, benign      Gout      Gout attack 05/25/2017     Hepatitis C carrier (H)      HTN (hypertension)      Hyperlipidemia      Infectious viral hepatitis     carrier of viral hepatitis     Low back pain     chronic     MI (myocardial infarction) (H) 01/01/1995     Obesity      Other and unspecified hyperlipidemia      PAD (peripheral artery disease) (H24)      Peyronie's disease     Past  Surgical History:   Procedure Laterality Date     ANGIOPLASTY  1995     ANKLE FRACTURE SURGERY Left      BYPASS GRAFT AORTOFEMORAL N/A 5/17/2017    Procedure: AORTOBIFEMORAL BYPASS ;  Surgeon: Ilir FERRO MD;  Location: [facility] OR;  Service:      CAROTID ENDARTERECTOMY Right 4/12/2018    Procedure: RIGHT CAROTID ENDARTERECTOMY WITH EEG;  Surgeon: Sergei Lemus MD;  Location: [facility] OR;  Service:      CV CORONARY ANGIOGRAM N/A 11/3/2023    Procedure: Coronary Angiogram;  Surgeon: Bran Flores MD;  Location: [facility] CATH LAB CV     CV CORONARY LITHOTRIPSY PCI N/A 11/14/2023    Procedure: Percutaneous Coronary Intervention - Lithotripsy;  Surgeon: Bran Flores MD;  Location: [facility] CATH LAB CV     CV LEFT HEART CATH N/A 11/3/2023    Procedure: Left Heart Catheterization;  Surgeon: Bran Flores MD;  Location: [facility] CATH LAB CV     CV PCI N/A 11/14/2023    Procedure: Percutaneous Coronary Intervention;  Surgeon: Bran Flores MD;  Location: [facility] CATH LAB CV     CYSTOSCOPY, TRANSURETHRAL RESECTION (TUR) TUMOR BLADDER, COMBINED N/A 8/3/2023    Procedure: Transurethral resection of bladder tumor 2 cm to 5 cm;  Surgeon: German Mitchell MD;  Location: [facility] OR     FRACTURE SURGERY       IR AORTIC ARCH 4 VESSEL ANGIOGRAM  11/9/2009     IR CAROTID ANGIOGRAM  11/9/2009     IR CAROTID ANGIOGRAM  11/9/2009     IR CHEST PORT PLACEMENT > 5 YRS OF AGE  9/11/2023     IR EXTREMITY ANGIOGRAM BILATERAL  4/6/2017     MANDIBLE SURGERY       ZZC NONSPECIFIC PROCEDURE  1995    MI -- angioplasty         Family History Social History   Family History   Problem Relation Age of Onset     Cancer Mother      Respiratory Father         d:age 59     Cerebrovascular Disease Father      Hypertension Father      Family History Negative Brother         b:1960     Heart Disease Paternal Grandmother      Emphysema Paternal Grandfather      Diabetes Other         paternal uncle     Glaucoma No family  hx of      Macular Degeneration No family hx of         Social History     Tobacco Use     Smoking status: Former     Packs/day: 1.5     Types: Cigarettes     Quit date: 1/1/2013     Years since quitting: 10.8     Smokeless tobacco: Never   Substance Use Topics     Alcohol use: No     Comment: Alcoholic Drinks/day: sober since 1984     Drug use: Yes     Types: Marijuana     Comment: marijuana daily         Medications  Allergies     Current Facility-Administered Medications:      acetaminophen (TYLENOL) tablet 650 mg, 650 mg, Oral, Q4H PRN, Shahida Glover CNP, 650 mg at 11/15/23 2044     allopurinol (ZYLOPRIM) tablet 300 mg, 300 mg, Oral, Daily, Shahida Glover CNP, 300 mg at 11/17/23 0821     amiodarone (NEXTERONE) 1.8 mg/mL in dextrose 5% 200 mL ADULT STANDARD infusion, 1 mg/min, Intravenous, Continuous, David Townsend MD, Last Rate: 33.3 mL/hr at 11/17/23 0949, 1 mg/min at 11/17/23 0949     amiodarone (NEXTERONE) 1.8 mg/mL in dextrose 5% 200 mL ADULT STANDARD infusion, 0.5 mg/min, Intravenous, Continuous, David Townsend MD     apixaban ANTICOAGULANT (ELIQUIS) tablet 5 mg, 5 mg, Oral, BID, David Townsend MD, 5 mg at 11/17/23 0937     aspirin EC tablet 81 mg, 81 mg, Oral, Daily, Shahida Glover CNP, 81 mg at 11/17/23 0821     atorvastatin (LIPITOR) tablet 80 mg, 80 mg, Oral, Daily, Shahida Glover CNP, 80 mg at 11/17/23 0820     bumetanide (BUMEX) tablet 2 mg, 2 mg, Oral, BID, David Townsend MD     clopidogrel (PLAVIX) tablet 75 mg, 75 mg, Oral, Daily, Shahida Glover CNP, 75 mg at 11/17/23 0821     Continuing beta blocker from home medication list OR beta blocker order already placed during this visit, , Does not apply, DOES NOT GO TO MAR, Kal[place]itsiotis, Shahida C, CNP     Continuing statin from home medication list OR statin order already placed during this visit, , Does not apply, DOES NOT GO TO Adalberto PICKARD Nicole C, CNP      docusate sodium (COLACE) capsule 100 mg, 100 mg, Oral, Daily PRN, Shahida Glover CNP     hydrALAZINE (APRESOLINE) injection 10 mg, 10 mg, Intravenous, Q4H PRN, Shahida Glover CNP     ipratropium - albuterol 0.5 mg/2.5 mg/3 mL (DUONEB) neb solution 3 mL, 3 mL, Nebulization, 4x Daily PRN, Gómez Apple MD, 3 mL at 11/16/23 0758     LORazepam (ATIVAN) tablet 0.5-1 mg, 0.5-1 mg, Oral, Daily PRN, Shahida Glover CNP, 1 mg at 11/16/23 2207     metoprolol (LOPRESSOR) injection 5 mg, 5 mg, Intravenous, Q15 Min PRN, Shahida Glover CNP     metoprolol succinate ER (TOPROL XL) 24 hr tablet 25 mg, 25 mg, Oral, Daily, Gómez Apple MD, 25 mg at 11/17/23 0544     naloxone (NARCAN) injection 0.2 mg, 0.2 mg, Intravenous, Q2 Min PRN **OR** naloxone (NARCAN) injection 0.4 mg, 0.4 mg, Intravenous, Q2 Min PRN **OR** naloxone (NARCAN) injection 0.2 mg, 0.2 mg, Intramuscular, Q2 Min PRN **OR** naloxone (NARCAN) injection 0.4 mg, 0.4 mg, Intramuscular, Q2 Min PRN, Bran Flores MD     nitroGLYcerin (NITROSTAT) sublingual tablet 0.4 mg, 0.4 mg, Sublingual, Q5 Min PRN, Shahida Glover CNP     ondansetron (ZOFRAN ODT) ODT tab 4 mg, 4 mg, Oral, Q6H PRN **OR** ondansetron (ZOFRAN) injection 4 mg, 4 mg, Intravenous, Q6H PRN, Shahida Glover CNP     oxyCODONE (ROXICODONE) tablet 5 mg, 5 mg, Oral, Q4H PRN **OR** oxyCODONE (ROXICODONE) tablet 10 mg, 10 mg, Oral, Q4H PRN, Shahida Glover CNP, 10 mg at 11/16/23 0737     Percutaneous Coronary Intervention orders placed (this is information for BPA alerting), , Does not apply, DOES NOT GO TO MAR, Shahida Glover CNP     sertraline (ZOLOFT) tablet 200 mg, 200 mg, Oral, Daily, Shahida Glover CNP, 200 mg at 11/17/23 0820     sodium chloride 0.9% BOLUS 1-250 mL, 1-250 mL, Intravenous, Q1H PRN, Shahida Glover CNP     traZODone (DESYREL) tablet 50 mg, 50 mg, Oral, At Bedtime, Shahida Glover CNP, 50  mg at 11/16/23 2207     Allergies   Allergen Reactions     No Known Allergies           Lab Results    Chemistry CBC Cardiac Enzymes/BNP/TSH/INR   Recent Labs   Lab Test 11/17/23  0651      POTASSIUM 3.2*   CHLORIDE 97*   CO2 25   *   BUN 35.7*   CR 1.50*   GFRESTIMATED 49*   KALIE 8.7*     Recent Labs   Lab Test 11/17/23  0651 11/16/23  0535 11/15/23  0553   CR 1.50* 1.36* 1.57*          Recent Labs   Lab Test 11/17/23  0651   WBC 7.0   HGB 9.1*   HCT 27.0*   MCV 94   PLT 95*     Recent Labs   Lab Test 11/17/23  0651 11/15/23  0145 11/13/23  1312   HGB 9.1* 9.1* 8.7*    Recent Labs   Lab Test 08/20/20  0115   TROPONINI 0.03     Recent Labs   Lab Test 11/03/23  1347   NTBNP 6,827*     Recent Labs   Lab Test 06/05/23  1522   TSH 2.10     Recent Labs   Lab Test 08/01/23  0740 08/20/20  0557 08/20/20  0115   INR 1.04 1.17* 1.09         Data Review    ECGs (all tracings independently reviewed)    11/17/23 AF left axis deviation LVH rate 100 QT/QTC: 364/469 ms      ECHO 11/3/23  1. The left ventricle is normal in size. Left ventricular systolic performance  is moderately reduced. The ejection fraction is estimated to be 30%.  2. There is severe septal, distal anterior, and apical hypokinesis (no apical  mural thrombus is detected). There is severe mid to distal inferior  hypokinesis. There is severe mid to distal posterior hypokinesis. There is mid  to distal anteroseptal akinesis.  3. No significant valvular heart disease is identified on this study.  4. Normal right ventricular size and systolic performance.  5. There is mild left atrial enlargement.    Coronary Cath 11/14/23  1.  Successful PCI left circumflex OM 3 branch with PTCA, shockwave lithotripsy, [place] cutting balloon angioplasty, and drug-eluting stent implant x1.  0% residual CHARLIE-3 flow.  2.  Successful PTCA  OM 3 branch distal to stent.  3.  Patient transfused 1 unit PRBCs beginning prior to procedure.  Lasix 20 mg IV given at end of  procedure.    Cardiac MRI 11/3/23  1.  Borderline enlarged left ventricular size with moderate reduction in left ventricular ejection  fraction. The mid to distal anterior, mid to distal anteroseptal and apical wall segments are akinetic with  thin wall thickness.  Severe hypokinesis of the inferior wall segments.  The quantified left ventricular  ejection fraction is 35.5%.    2.  Transmural infarction of the distal anterior, mid to distal anteroseptal and entire apex of the left  ventricle.  Non-transmural infarction of the mid anterior wall segments.   All other walls segments are  without myocardial infarction/fibrosis and remain viable.    3.  Normal right ventricular size and systolic function.  RVEF: 75%.   4.  No significant valvular abnormalities.               Alma Cerrato NP  Clinical Cardiac Electrophysiology  [facility]  Office: [phone]  Fax: [phone]   Nurses: [phone]       65 minutes spent reviewing prior records (including documentation, laboratory studies, cardiac testing/imaging), history and physical exam, planning, and subsequent documentation.

## 2023-11-17 NOTE — PROGRESS NOTES
[facility]    Progress Note - Hospitalist Service       Date of Admission:  11/14/2023    Assessment & Plan   Young Ordonez is a 73 year old male admitted on 11/14/2023. He has a history of severe multi-vessel CAD, ICM with EF 35%, non-small cell lung CA currently receiving chemo and radiation, HTN and anemia. He was admitted for planned cardiac catheterization with LORRAINE to Lcx on 11/14/23. Recovery complicated by respiratory distress requiring BiPAP, HFrEF exacerbation, and new onset atrial fibrillation with RVR.     CAD s/p PCI with DEX to Lcx 11/14  History of severe multivessel CAD. Recent NSTEMI on 11/3/23. Deemed not a candidate for CABG. Admitted for planned Lcx PCI, which was completed on 11/14 by Dr. Flores.  - DAPT: aspirin, Plavix   - Atorvastatin 80 mg daily   - Metoprolol 25 mg daily   - Daily CBC, BMP    Atrial fibrillation with RVR, new onset 11/17   Patient developed new-onset atrial fibrillation with RVR on 11/17 AM. Was symptomatic with shortness of breath, light-headedness/dizziness. Cardiology consulted and started on amiodarone ggt. Patient converted to NSR around 1100 on 11/17. Borderline hypotensive all day in 80s/50s. Discussed with cardiology, who recommended continuing amiodarone ggt, albumin bolus, and IV diuresis.  - Cardiology consulted, appreciate recommendations   - Amiodarone ggt started 11/17 AM; continue for 24 hours  - Plan to transition to PO amiodarone 11/18 AM   - Eliquis 5 mg BID     HFrEF exacerbation   Ischemic cardiomyopathy   Acute hypoxic respiratory failure   Hypotension  Patient developed AHRF following PCI, required BiPAP. Improved with IV diuresis. Echo on 11/4/23 showed LVEF 30% with severe septal, distal anterior, and apical hypokinesis. Increased O2 needs today 11/17, likely secondary to pulmonary edema (confirmed by CXR) from a fib with RVR. Also hypotensive, likely related to third spacing of fluids from HFrEF as well as amiodarone  ggt. Cardiology managing diuresis.   - Cardiology and EP cardiology consulted, appreciate recommendations   - Bumex IV   - Metoprolol 25 mg daily   - AM BMP  - Incentive spirometry     Non small cell lung cancer of RLL   Recently completed chemotherapy and radiation. Repeat imaging scheduled on 11/28/23, then follow up with Dr Crowe.   - Outpatient follow-up with Dr. Crowe     Thrombocytopenia   Plt count in 90s. Likely related to recent chemotherapy.   - Daily CBC     Chronic anemia  Hgb 8-9, stable at baseline.   - Daily CBC     Chronic conditions  Gout: PTA allopurinol   MDD/SANTA: PTA Zoloft   Insomnia: PTA trazodone          Diet: Low Saturated Fat Na <2400 mg  Fluid restriction 1500 ML FLUID  Snacks/Supplements Adult: Ensure Enlive; With Meals    DVT Prophylaxis: DOAC  Boswell Catheter: Not present  Fluids: PO   Lines: None     Cardiac Monitoring: ACTIVE order. Indication: Tachyarrhythmias, acute (48 hours)  Code Status: Full Code      Clinically Significant Risk Factors        # Hypokalemia: Lowest K = 3.2 mmol/L in last 2 days, will replace as needed      # Anion Gap Metabolic Acidosis: Highest Anion Gap = 20 mmol/L in last 2 days, will monitor and treat as appropriate    # Thrombocytopenia: Lowest platelets = 95 in last 2 days, will monitor for bleeding  # Acute Kidney Injury, unspecified: based on a >150% or 0.3 mg/dL increase in last creatinine compared to past 90 day average, will monitor renal function  # Hypertension: Noted on problem list            # Financial/Environmental Concerns:           Disposition Plan      Expected Discharge Date: 11/18/2023      Destination: home;home with family  Discharge Comments: Home at [place].        The patient's care was discussed with the Attending Physician, Dr. Chavis .    Latanya Galindo MD  Hospitalist Service  [facility]  Securely message with [employer] info)  Text page via [employer] Paging/Directory    ______________________________________________________________________    Interval History   Overnight, patient developed new atrial fibrillation with RVR. Started on amiodarone drip this morning by EP cardiology.     Patient states he is feeling fine this afternoon. Shortness of breath has improved significantly since early this morning. No chest pain, abdominal pain, new pain elsewhere. No light-headedness/dizziness, palpitations. No fevers, chills.     Physical Exam   Vital Signs: Temp: 99  F (37.2  C) Temp src: Oral BP: (!) 79/53 Pulse: 91   Resp: 20 SpO2: 96 % O2 Device: Simple face mask Oxygen Delivery: 10 LPM  Weight: 154 lbs 1.62 oz    GEN: Pleasant male, in no acute distress.   HEENT: Normocephalic, atraumatic. Extraoccular eye movements intact. Anicteric sclera. Moist mucous membranes.   NECK: Supple. No cervical or supraclavicular adenopathy.   PULM: Non-labored breathing. No use of accessory muscles. Diffuse crackles bilaterally.   CV: Tachycardic rate 90s and regular rhythm. Normal S1, S2. No rubs, murmurs, or gallops.    ABDOMEN: Normoactive bowel sounds. Non-tender to palpation. Non-distended.    EXTREMITES:  No clubbing, cyanosis, or edema.    NEURO:  Awake. Oriented to person, place, time and situation. Cranial nerves 2-12 grossly intact. Moving all extremities.    PSYCH: Calm. Appropriate affect, insight, judgment.      Medical Decision Making     Please see A&P for additional details of medical decision making.      Data   ------------------------- PAST 24 HR DATA REVIEWED -----------------------------------------------    I have personally reviewed the following data over the past 24 hrs:    5.8  \   7.6 (L)   / 83 (L)     136 98 40.1 (H) /  145 (H)   3.9 24 1.67 (H) \     ALT: 39 AST: 62 (H) AP: 74 TBILI: 1.6 (H)   ALB: 5.1 TOT PROTEIN: 6.9 LIPASE: N/A       Imaging results reviewed over the past 24 hrs:   Recent Results (from the past 24 hour(s))   XR Chest Port 1 View    Narrative    EXAM:  XR CHEST PORT 1 VIEW  LOCATION: [facility]  DATE: 11/16/2023    INDICATION: admitted for coronary angiogram. Now has persistent hypoxia.  COMPARISON: 11/03/2023      Impression    IMPRESSION: Shallow inspiration. Stable left internal jugular port with catheter tip at the SVC/RA junction. Prominent cardiac silhouette secondary to the shallow inspiration. New airspace changes throughout both lungs which most likely represent   pulmonary edema versus pneumonitis. Calcified atherosclerotic changes at the aortic arch.   XR Chest Port 1 View    Narrative    EXAM: XR CHEST PORT 1 VIEW  LOCATION: [facility]  DATE: 11/17/2023    INDICATION: Worsening hypoxia.  COMPARISON: 11/16/2023.      Impression    IMPRESSION: Linear and hazy opacities throughout both lungs with slight apical and peripheral sparing is stable to slightly increased since 11/16/2023 and would be compatible with edema in the proper clinical setting. No pleural effusion or pneumothorax.   Mild cardiac enlargement and pulmonary venous congestion stable. Port anterior left chest wall, left IJ central venous catheter tip low SVC.

## 2023-11-17 NOTE — PROGRESS NOTES
HEART CARE NOTE          Assessment/Recommendations     1. HFrEF c/b severe ADHF   Assessment / Plan  Tolerating oral diuretic regimen - no changes at this time  Patient is high risk for adverse cardiac events 2/2 advanced age, CAD, HTN  GDMT as detailed below     Current Pharmacotherapy AHA Guideline-Directed Medical Therapy   Lisinopril - on hold given renal dysfunction Lisinopril 20 mg twice daily   Metoprolol succinate 25 mg daily  Carvedilol 25 mg twice daily   Spironolactone - not started Spironolactone 25 mg once daily   Hydralazine NA Hydralazine 100 mg three times daily   Isosorbide mononitrate 30 mg daily - held 2/2 borderline BP Isosorbide dinitrate 40 mg three times daily   SGLT2 inhibitor:Dapagliflozin/Empagliflozin - not started Dapagliflozin or Empagliflozin 10 mg daily      2. CAD  Assessment / Plan  Hx of severe multivessel disease now s/p PCI with LORRAINE to LCX   Denies chest pain or anginal equivalents  Continue ASA, high intensity atorvastatin, metoprolol     3. HTN  Assessment / Plan  Borderline hemodynamics - hold oral afterload reduction/antihypertensives     4. FRANCY  Assessment / Plan  Resolving; continue to monitor renal function closely particularly in light of recent contrast dye    Plan of care discussed on November 17, 2023 with patient at bedside, and primary team overseeing patient's care     60 minutes spent reviewing prior records (including documentation, laboratory studies, cardiac testing/imaging), history and physical exam, planning, and subsequent documentation.    History of Present Illness/Subjective    Mr. Young Ordonez is a 73 year old male with a PMHx significant for (per Epic notation) severe multi-vessel CAD, ICM; EF 35%, non-small cell lung CA, HTN and anemia who presented for planned Lcx PCI. Will was transfused 1 unit of PRBCs under the direction of Dr. Flores, in anticipation of his procedure due to Hgb <10. Coronary stenting was successful with good result. Post  procedure patient developed respiratory distress requiring NIPPV, lasix and morphine administration.      Today, Mr. Ordonez denies any acute cardiac events or complaints; Management plan as detailed above      ECG: Personally reviewed. normal sinus rhythm, left axis deviation.     ECHO (personnaly Reviewed on 11/14/23):   1. The left ventricle is normal in size. Left ventricular systolic performance  is moderately reduced. The ejection fraction is estimated to be 30%.  2. There is severe septal, distal anterior, and apical hypokinesis (no apical  mural thrombus is detected). There is severe mid to distal inferior  hypokinesis. There is severe mid to distal posterior hypokinesis. There is mid  to distal anteroseptal akinesis.  3. No significant valvular heart disease is identified on this study.  4. Normal right ventricular size and systolic performance.  5. There is mild left atrial enlargement.     When compared to the prior real-time echocardiogram dated 20 August 2020, the  apical wall motion abnormality does not appear as pronounced on the current  study. The areas of hypokinesis involving the inferior and posterior segments,  however, appear to be new.     Cardiac MRI personally reviewed on 11/14/23:  1.  Borderline enlarged left ventricular size with moderate reduction in left ventricular ejection  fraction. The mid to distal anterior, mid to distal anteroseptal and apical wall segments are akinetic with  thin wall thickness.  Severe hypokinesis of the inferior wall segments.  The quantified left ventricular  ejection fraction is 35.5%.    2.  Transmural infarction of the distal anterior, mid to distal anteroseptal and entire apex of the left  ventricle.  Non-transmural infarction of the mid anterior wall segments.   All other walls segments are  without myocardial infarction/fibrosis and remain viable.    3.  Normal right ventricular size and systolic function.  RVEF: 75%.   4.  No significant valvular  "abnormalities      Medical history and pertinent documents reviewed in Care Everywhere please where applicable see details above        Physical Examination Review of Systems   /65 (BP Location: Left arm)   Pulse 112   Temp 98.1  F (36.7  C) (Oral)   Resp 28   Ht 1.727 m (5' 8\")   Wt 69.9 kg (154 lb 1.6 oz)   SpO2 90%   BMI 23.43 kg/m    Body mass index is 23.43 kg/m .  Wt Readings from Last 3 Encounters:   11/17/23 69.9 kg (154 lb 1.6 oz)   11/08/23 69.1 kg (152 lb 4.8 oz)   11/03/23 73.9 kg (163 lb)     General Appearance:   no distress, normal body habitus   ENT/Mouth: membranes moist, no oral lesions or bleeding gums.      EYES:  no scleral icterus, normal conjunctivae   Neck: no carotid bruits or thyromegaly   Chest/Lungs:   lungs are clear to auscultation, no rales or wheezing, equal chest wall expansion    Cardiovascular:   Regular. Normal first and second heart sounds with no murmurs, rubs, or gallops; the carotid, radial and posterior tibial pulses are intact, no JVD or LE edema bilaterally    Abdomen:  no organomegaly, masses, bruits, or tenderness; bowel sounds are present   Extremities: no cyanosis or clubbing   Skin: no xanthelasma, warm.    Neurologic: NAD     Psychiatric: alert and oriented x3, calm     A complete 10 systems ROS was reviewed  And is negative except what is listed in the HPI.          Medical History  Surgical History Family History Social History   Past Medical History:   Diagnosis Date     Acute myocardial infarction, unspecified site, episode of care unspecified 01/01/1995     Arthritis      Cardiomyopathy (H)      Cerebral artery occlusion with cerebral infarction (H) 2018    lost vision in right eye     Claudication (H24)      COPD (chronic obstructive pulmonary disease) (H)      Coronary artery disease      Depression      DEPRESSIVE DISORDER NEC 04/29/2008     Essential hypertension, benign      Gout      Gout attack 05/25/2017     Hepatitis C carrier (H)      HTN " (hypertension)      Hyperlipidemia      Infectious viral hepatitis     carrier of viral hepatitis     Low back pain     chronic     MI (myocardial infarction) (H) 01/01/1995     Obesity      Other and unspecified hyperlipidemia      PAD (peripheral artery disease) (H24)      Peyronie's disease     Past Surgical History:   Procedure Laterality Date     ANGIOPLASTY  1995     ANKLE FRACTURE SURGERY Left      BYPASS GRAFT AORTOFEMORAL N/A 5/17/2017    Procedure: AORTOBIFEMORAL BYPASS ;  Surgeon: Ilir FERRO MD;  Location: [facility] OR;  Service:      CAROTID ENDARTERECTOMY Right 4/12/2018    Procedure: RIGHT CAROTID ENDARTERECTOMY WITH EEG;  Surgeon: Sergei Lemus MD;  Location: [facility] OR;  Service:      CV CORONARY ANGIOGRAM N/A 11/3/2023    Procedure: Coronary Angiogram;  Surgeon: Bran Flores MD;  Location: [facility] CATH LAB CV     CV CORONARY LITHOTRIPSY PCI N/A 11/14/2023    Procedure: Percutaneous Coronary Intervention - Lithotripsy;  Surgeon: Bran Flores MD;  Location: [facility] LAB CV     CV LEFT HEART CATH N/A 11/3/2023    Procedure: Left Heart Catheterization;  Surgeon: Bran Flores MD;  Location: [facility] CATH LAB CV     CV PCI N/A 11/14/2023    Procedure: Percutaneous Coronary Intervention;  Surgeon: Bran Flores MD;  Location: [facility] CV     CYSTOSCOPY, TRANSURETHRAL RESECTION (TUR) TUMOR BLADDER, COMBINED N/A 8/3/2023    Procedure: Transurethral resection of bladder tumor 2 cm to 5 cm;  Surgeon: German Mitchell MD;  Location: [facility] OR     FRACTURE SURGERY       IR AORTIC ARCH 4 VESSEL ANGIOGRAM  11/9/2009     IR CAROTID ANGIOGRAM  11/9/2009     IR CAROTID ANGIOGRAM  11/9/2009     IR CHEST PORT PLACEMENT > 5 YRS OF AGE  9/11/2023     IR EXTREMITY ANGIOGRAM BILATERAL  4/6/2017     MANDIBLE SURGERY       ZZC NONSPECIFIC PROCEDURE  1995    MI -- angioplasty    no family history of premature coronary artery disease Social History     Socioeconomic  History     Marital status:      Spouse name: Mitra     Number of children: 2     Years of education: 12     Highest education level: Not on file   Occupational History     Occupation:      Comment: [employer]   Tobacco Use     Smoking status: Former     Packs/day: 1.5     Types: Cigarettes     Quit date: 1/1/2013     Years since quitting: 10.8     Smokeless tobacco: Never   Substance and Sexual Activity     Alcohol use: No     Comment: Alcoholic Drinks/day: sober since 1984     Drug use: Yes     Types: Marijuana     Comment: marijuana daily     Sexual activity: Yes     Partners: Female   Other Topics Concern     Not on file   Social History Narrative     Not on file     Social Determinants of Health     Financial Resource Strain: Not on file   Food Insecurity: Not on file   Transportation Needs: Not on file   Physical Activity: Not on file   Stress: Not on file   Social Connections: Not on file   Interpersonal Safety: Not on file   Housing Stability: Not on file           Lab Results    Chemistry/lipid CBC Cardiac Enzymes/BNP/TSH/INR   Lab Results   Component Value Date    CHOL 97 11/13/2023    HDL 49 11/13/2023    TRIG 72 11/13/2023    BUN 27.1 (H) 11/16/2023     11/16/2023    CO2 20 (L) 11/16/2023    Lab Results   Component Value Date    WBC 5.6 11/15/2023    HGB 9.1 (L) 11/15/2023    HCT 28.1 (L) 11/15/2023    MCV 97 11/15/2023     (L) 11/15/2023    Lab Results   Component Value Date    TROPONINI 0.03 08/20/2020    TSH 2.10 06/05/2023    INR 1.04 08/01/2023     Lab Results   Component Value Date    TROPONINI 0.03 08/20/2020          Weight:    Wt Readings from Last 3 Encounters:   11/17/23 69.9 kg (154 lb 1.6 oz)   11/08/23 69.1 kg (152 lb 4.8 oz)   11/03/23 73.9 kg (163 lb)       Allergies  Allergies   Allergen Reactions     No Known Allergies          Surgical History  Past Surgical History:   Procedure Laterality Date     ANGIOPLASTY  1995     ANKLE FRACTURE SURGERY Left       BYPASS GRAFT AORTOFEMORAL N/A 5/17/2017    Procedure: AORTOBIFEMORAL BYPASS ;  Surgeon: Ilir FERRO MD;  Location: [facility] OR;  Service:      CAROTID ENDARTERECTOMY Right 4/12/2018    Procedure: RIGHT CAROTID ENDARTERECTOMY WITH EEG;  Surgeon: Sergei Lemus MD;  Location: [facility] OR;  Service:      CV CORONARY ANGIOGRAM N/A 11/3/2023    Procedure: Coronary Angiogram;  Surgeon: Bran Flores MD;  Location: [facility] LAB CV     CV CORONARY LITHOTRIPSY PCI N/A 11/14/2023    Procedure: Percutaneous Coronary Intervention - Lithotripsy;  Surgeon: Bran Flores MD;  Location: [facility] LAB CV     CV LEFT HEART CATH N/A 11/3/2023    Procedure: Left Heart Catheterization;  Surgeon: Bran Flores MD;  Location: [facility] LAB CV     CV PCI N/A 11/14/2023    Procedure: Percutaneous Coronary Intervention;  Surgeon: Bran Flores MD;  Location: [facility] CV     CYSTOSCOPY, TRANSURETHRAL RESECTION (TUR) TUMOR BLADDER, COMBINED N/A 8/3/2023    Procedure: Transurethral resection of bladder tumor 2 cm to 5 cm;  Surgeon: German Mitchell MD;  Location: [facility] OR     FRACTURE SURGERY       IR AORTIC ARCH 4 VESSEL ANGIOGRAM  11/9/2009     IR CAROTID ANGIOGRAM  11/9/2009     IR CAROTID ANGIOGRAM  11/9/2009     IR CHEST PORT PLACEMENT > 5 YRS OF AGE  9/11/2023     IR EXTREMITY ANGIOGRAM BILATERAL  4/6/2017     MANDIBLE SURGERY       ZZC NONSPECIFIC PROCEDURE  1995    MI -- angioplasty       Social History  Tobacco:   History   Smoking Status     Former     Packs/day: 1.50     Types: Cigarettes     Quit date: 1/1/2013   Smokeless Tobacco     Never    Alcohol:   Social History    Substance and Sexual Activity      Alcohol use: No        Comment: Alcoholic Drinks/day: sober since 1984   Illicit Drugs:   History   Drug Use     Types: Marijuana     Comment: marijuana daily       Family History  Family History   Problem Relation Age of Onset     Cancer Mother      Respiratory Father          d:age 59     Cerebrovascular Disease Father      Hypertension Father      Family History Negative Brother         b:1960     Heart Disease Paternal Grandmother      Emphysema Paternal Grandfather      Diabetes Other         paternal uncle     Glaucoma No family hx of      Macular Degeneration No family hx of           David Townsend MD on 11/17/2023      cc: Jigar Crawford

## 2023-11-17 NOTE — PROGRESS NOTES
P with AF with RVR, started on iv amiodonone, converted to sinus, sat improved and BP but still with rales on exam.  Will add iv bumex, cont iv amiodarone switch to oral amiodarone in AM, check BMP in AM

## 2023-11-17 NOTE — PROGRESS NOTES
CLINICAL NUTRITION SERVICES - REASSESSMENT NOTE    RECOMMENDATIONS FOR MD/PROVIDER TO ORDER:   Consideration of adding Phos to BMP   Recommendations Ordered by Registered Dietitian (RD):   Continue with supplements BID  Continue eating as tolerated and able   Future/Additional Recommendations:   Complete NFPE when pt condition improves  Quantify lowered intake prior to admission   Malnutrition: Patient does not meet two of the above criteria necessary for diagnosing malnutrition, but highly suspected malnutrition      EVALUATION OF PROGRESS TOWARD GOALS   Diet:  Low Saturated Fat, <2400 mg Sodium, Fluid restriction 1500 mL  Ensure Enlive strawberry BID (B, L)    Intake/Tolerance:    Flowsheets indicated 0-50% of meals taken.  HealthTouch shows 0-2 meals being ordered, all of which are small (tomato soup/bread, lemon fruit ice/jello)  Per RN 11/16 pt ate 100% of lunch (tomato soup and bread)    Today, Will was not feeling well and did not want to answer any questions about his condition or have a physical examination d/t being on oxygen. He understands nutrition is available and will follow up with him.    ASSESSED NUTRITION NEEDS: (carried from 11/15)  Dosing Weight 67.8 kg  Estimated Energy Needs: 0251-0584 kcals (25-30 Kcal/Kg)  Justification: maintenance  Estimated Protein Needs: 68-81 grams protein (1-1.2 g pro/Kg)  Justification: preservation of lean body mass  Estimated Fluid Needs: 1500 mL  Justification: per provider pending fluid status    NEW FINDINGS:   Increased oxygen need 5L NC --> 10L Oxy mask  New FRANCY 11/15 - switched to oral diuretic    Weight    Vitals:    11/14/23 0756 11/15/23 0410 11/16/23 0500 11/17/23 0306   Weight: 70.3 kg (155 lb) 67.8 kg (149 lb 7.6 oz) 69.4 kg (153 lb) 69.9 kg (154 lb 1.6 oz)   Since last encounter, weight has remained relatively stable. Noted previous assessment of 14 lb weight loss in 1 month (8.5%).     No noted edema, per chart.    GI  BM every other day, last was  today.  Per Provider: 11/17: abdominal pain    Labs  Na WNL, K 3.2 - L, Mg WNL, No noted Phos  -126, stable  GFR 49 - L  Cr 1.5 - H    Meds  Lipitor  Imdur  Plavix  Troprol  Colace PRN  Apresoline PRN  Lopressor PRN  Nitrostat PRN  Zofran PRN    Previous Goals:   Consume % of nutrition through meals and supplements.   Evaluation: Not met    Previous Nutrition Diagnosis:   Inadequate oral intake related to decrease in appetite as evidenced by unintended weight loss and pt and wife reports of decreased appetite.   Evaluation: No change, ongoing      MALNUTRITION  % Weight Loss:  > 5% in 1 month (severe malnutrition)   % Intake:  Unable to obtain d/t pt condition  Subcutaneous Fat Loss:  Refused NFPE  Muscle Loss:  Refused NFPE  Fluid Retention:  None noted    Malnutrition Diagnosis: Patient does not meet two of the above criteria necessary for diagnosing malnutrition, but highly suspected malnutrition    CURRENT NUTRITION DIAGNOSIS  Inadequate oral intake related to decrease in appetite as evidenced by unintended weight loss and pt and wife reports of decreased appetite.     INTERVENTIONS  Recommendations / Nutrition Prescription  Continue to eat meals, as tolerated.  Continue supplements BID with meals.    Implementation  Composition of Meals and Snacks  Medical Food Supplement    Goals  Consume % of nutrition through meals and supplements.     MONITORING AND EVALUATION:  Progress towards goals will be monitored and evaluated per protocol and Practice Guidelines    Yamileth Villa  Dietetic Intern

## 2023-11-17 NOTE — TELEPHONE ENCOUNTER
Contact patient to arrange consult with AFIB clinic, ( EP KADIE) in 2-3 weeks   Per Dr. Townsend

## 2023-11-17 NOTE — SIGNIFICANT EVENT
Significant Event Note    Time of event: 5:19 AM November 17, 2023    Description of event:  Called about new a-fib on tele. This appears new for patient. Will get EKG to confirm. Likely in setting of recent cardiac procedure. Currently rates well controlled. On DAPT. Asymptomatic outside of stable ongoing shortness of breath that's been present since yesterday morning.    Plan:  -EKG, CBC, BMP, mag    Discussed with: bedside nurse    Katelyn Aguilar MD    5:34 AM  EKG confirms a-fib. Rate 104. Has PO metoprolol scheduled for 0800. Will give this now.

## 2023-11-18 NOTE — PROGRESS NOTES
Echocardiogram from this morning reviewed at the bedside.  Severe reduction left ventricular systolic function fairly significant hypokinesis of the lateral.  Inferior basilar, lateral basilar, and septal basilar motion is the best.    Compared to previous echocardiogram done prior to percutaneous intervention, the lateral wall is mildly more hypokinetic but not a dramatic change.    Suspect some injury from somewhat complicated percutaneous intervention with elevation of troponins.  We will try to support and try to promote diuresis without hypotension.  We will also ask our nephrology colleagues to help us with renal insufficiency.    We will continue to follow.

## 2023-11-18 NOTE — PLAN OF CARE
[facility] - ICU    RN Progress Note:            Pertinent Assessments:      Please refer to flowsheet rows for full assessment     Will is alert and oriented, appropriate and makes needs known.  Remains on 6L O2.  Currently on dobutamine and levophed with stable blood pressures.             Key Events - This Shift:       Critically elevated troponin, see note regarding phone call with cardiology.          MIKAELA SAT (Sedation Awakening Trial): For use ONLY if intubated    SAT Safety Screen Not Applicable   If FAILED why?    SAT Performed Not Applicable   If FAILED why?               Barriers to Discharge / Downgrade:     Vasoactive infusions.

## 2023-11-18 NOTE — CONSULTS
RENAL CONSULT NOTE    REQUESTING PHYSICIAN: Jaswant Beck MD    REASON FOR CONSULT: Hypervolemia, FRANCY    ASSESSMENT/PLAN:  Acute kidney injury: With urine sodium less than 20, etiology consistent with decreased renal perfusion either from volume depletion versus cardiogenic etiology.  He also had recent contrast exposure from PCI, it is possible that contributed to his FRANCY but generally a cause tubular injury after initial course of prerenal etiology.  Patient chest x-ray showed volume overload and needs to remove fluid to release congestion.  As patient is on vasopressor, unable to get another pressure to give diuretics to remove extra water from the system.  Because of that reason, we will need to start CRRT with primary intention of UF and balancing electrolytes in the setting of acute kidney injury.  CRRT plan to initiate today with effluent rate of 25 mL/kg/h, will use K4 bath with additional 200 ml/hr post filter K 4 bath PrismaSol.  CRRT labs and replacement per protocol.  Renal diet  Renally dosing medications  Avoid nephrotoxic medications.  Appreciate intensivist for CVC placement.     Electrolytes: Sodium 135, potassium 3.2.  CRT replacement per protocol.    Acid-base: Bicarb 25.  Monitor and replace per protocol.    BMD: Calcium 8.7.  Follow-up labs.    Hypotension/hypervolemia: Blood pressure low and volume is hyper. On Vasopressor.   BP management per intensivist  CRRT with UF goal of 0 to 200 mm/h as tolerated.        HPI: 73 years old female with baseline creatinine around 0.8-1.0 and past medical history of HFrEF most recent EF 30% admitted on 11/14/2023 for planned PCI with Dr. Flores.  Postprocedure, he developed A-fib with RVR and started on amiodarone and apixaban and cardiogenic shock.  Patient has been receiving IV diuretics throughout the process and initially made about 2 L of urine output per day and then trending down.  With rise of creatinine with unable to remove fluid with  diuretics, nephrology consulted for additional management.  Patient stated denies any family history of kidney disease.  No history of kidney stone or hematuria.  Never been told that he has kidney disease in the past.  Patient smoked about 50 years and quit 10 years ago.  Denies taking history of NSAID regularly.    REVIEW OF SYSTEMS: a 12 point review of systems was reviewed and negative other than noted in the HPI above.      Past Medical History:   Diagnosis Date    Acute myocardial infarction, unspecified site, episode of care unspecified 01/01/1995    Arthritis     Cardiomyopathy (H)     Cerebral artery occlusion with cerebral infarction (H) 2018    lost vision in right eye    Claudication (H24)     COPD (chronic obstructive pulmonary disease) (H)     Coronary artery disease     Depression     DEPRESSIVE DISORDER NEC 04/29/2008    Essential hypertension, benign     Gout     Gout attack 05/25/2017    Hepatitis C carrier (H)     HTN (hypertension)     Hyperlipidemia     Infectious viral hepatitis     carrier of viral hepatitis    Low back pain     chronic    MI (myocardial infarction) (H) 01/01/1995    Obesity     Other and unspecified hyperlipidemia     PAD (peripheral artery disease) (H24)     Peyronie's disease        No current facility-administered medications on file prior to encounter.  acetaminophen (TYLENOL) 500 MG tablet, Take 1,000 mg by mouth every 6 hours as needed for mild pain  allopurinol (ZYLOPRIM) 300 MG tablet, Take 1 tablet (300 mg) by mouth daily  aspirin 81 MG EC tablet, Take 1 tablet (81 mg) by mouth daily  atorvastatin (LIPITOR) 80 MG tablet, Take 1 tablet (80 mg) by mouth daily  clopidogrel (PLAVIX) 75 MG tablet, Take 1 tablet (75 mg) by mouth daily  docusate sodium (COLACE) 100 MG capsule, Take 100 mg by mouth daily as needed for constipation  isosorbide mononitrate (IMDUR) 30 MG 24 hr tablet, Take 1 tablet (30 mg) by mouth daily  lisinopril (ZESTRIL) 20 MG tablet, Take 1 tablet (20 mg)  by mouth daily  LORazepam (ATIVAN) 0.5 MG tablet, Take 1-2 tablets (0.5-1 mg) by mouth daily as needed for anxiety  melatonin 5 MG CAPS, Take 1 capsule by mouth at bedtime as needed, may repeat once  metoprolol succinate ER (TOPROL XL) 25 MG 24 hr tablet, Take 1 tablet (25 mg) by mouth daily  ondansetron (ZOFRAN ODT) 4 MG ODT tab, Take 1 tablet (4 mg) by mouth every 8 hours as needed for nausea  prochlorperazine (COMPAZINE) 10 MG tablet, Take 10 mg by mouth every 6 hours as needed for nausea or vomiting  sertraline (ZOLOFT) 100 MG tablet, Take 2 tablets (200 mg) by mouth daily  traZODone (DESYREL) 50 MG tablet, Take 1 tablet (50 mg) by mouth at bedtime        No current outpatient medications on file.      ALLERGIES/SENSITIVITIES:  Allergies   Allergen Reactions    No Known Allergies      Social History     Tobacco Use    Smoking status: Former     Packs/day: 1.5     Types: Cigarettes     Quit date: 1/1/2013     Years since quitting: 10.8    Smokeless tobacco: Never   Substance Use Topics    Alcohol use: No     Comment: Alcoholic Drinks/day: sober since 1984    Drug use: Yes     Types: Marijuana     Comment: marijuana daily     I have reviewed this patient's family history and updated it with pertinent information if needed.  Family History   Problem Relation Age of Onset    Cancer Mother     Respiratory Father         d:age 59    Cerebrovascular Disease Father     Hypertension Father     Family History Negative Brother         b:1960    Heart Disease Paternal Grandmother     Emphysema Paternal Grandfather     Diabetes Other         paternal uncle    Glaucoma No family hx of     Macular Degeneration No family hx of          PHYSICAL EXAM:  Physical Exam   Temp: 98.1  F (36.7  C) Temp src: Oral BP: (!) 89/60 Pulse: 100   Resp: 20 SpO2: 97 % O2 Device: High Flow Nasal Cannula (HFNC) Oxygen Delivery: 45 LPM  Vitals:    11/16/23 0500 11/17/23 0306 11/18/23 0400   Weight: 69.4 kg (153 lb) 69.9 kg (154 lb 1.6 oz) 73.1 kg  (161 lb 3.2 oz)     Vital Signs with Ranges  Temp:  [96.6  F (35.9  C)-98.1  F (36.7  C)] 98.1  F (36.7  C)  Pulse:  [] 100  Resp:  [18-49] 20  BP: ()/(48-72) 89/60  FiO2 (%):  [6 %-60 %] 60 %  SpO2:  [82 %-99 %] 97 %  I/O last 3 completed shifts:  In: 1815.94 [P.O.:580; I.V.:835.94]  Out: 835 [Urine:835]    @TMAXR(24)@    Patient Vitals for the past 72 hrs:   Weight   11/18/23 0400 73.1 kg (161 lb 3.2 oz)   11/17/23 0306 69.9 kg (154 lb 1.6 oz)   11/16/23 0500 69.4 kg (153 lb)       General - A & O x 3, NAD  HEENT - PERRLA, no scleral icterus  Neck - no carotid bruits, no JVD  Respiratory - On BiPAP.   Cardiovascular - AP RRR without murmur  Abdomen - soft, BS present, no bruits, no fluid wave  Extremities - well perfused, no edema  Integumentary - intact, good turgor, no rash/lesions  Neurologic - grossly intact  Psych:  Judgement intact, affect WNL  :  Boswell's with concentrated urine in the bag.     Laboratory:     Recent Labs   Lab 11/18/23  0012 11/17/23  1621 11/17/23  0651 11/15/23  0145 11/13/23  1312   WBC  --  5.8 7.0 5.6 3.6*   RBC  --  2.37* 2.86* 2.89* 2.65*   HGB 8.2* 7.6* 9.1* 9.1* 8.7*   HCT  --  22.6* 27.0* 28.1* 25.7*   PLT  --  83* 95* 101* 106*       Basic Metabolic Panel:  Recent Labs   Lab 11/18/23  0941 11/18/23  0358 11/17/23  1621 11/17/23  0651 11/16/23  0535 11/15/23  0553 11/15/23  0145   NA  --  135 136 136 138 139 139   POTASSIUM 3.3* 3.2* 3.9 3.2* 3.4 3.5 3.9   CHLORIDE  --  96* 98 97* 98 100 100   CO2  --  25 24 25 20* 22 20*   BUN  --  46.3* 40.1* 35.7* 27.1* 22.1 20.1   CR  --  2.05* 1.67* 1.50* 1.36* 1.57* 1.50*   GLC  --  165* 145* 126* 101* 112* 112*   KALIE  --  8.7* 9.4 8.7* 9.1 8.8 9.4       INRNo lab results found in last 7 days.    Recent Labs   Lab Test 11/18/23  0941 11/18/23  0358 11/17/23  1621   POTASSIUM 3.3* 3.2* 3.9   CHLORIDE  --  96* 98   BUN  --  46.3* 40.1*      Recent Labs   Lab Test 11/18/23  0012 11/17/23  1621 11/03/23  0855 09/26/23  0916  06/05/23  1522   ALBUMIN  --  5.1 3.6   < > 4.5   BILITOTAL  --  1.6* 0.6   < > 0.4   ALT  --  39 16   < > 23   AST  --  62* 27   < > 28   PROTEIN 20*  --   --   --  30*    < > = values in this interval not displayed.       Personally reviewed today's laboratory studies      Thank you for involving us in the care of this patient. We will continue to follow along with you.      Kartik Altamirano MD  [facility]  [phone]

## 2023-11-18 NOTE — PROGRESS NOTES
"Care Management Follow Up    Length of Stay (days): 4    Expected Discharge Date: 11/18/2023     Concerns to be Addressed: discharge planning   Patient plan of care discussed at interdisciplinary rounds: Yes    Anticipated Discharge Disposition: Home     Anticipated Discharge Services:    Education Provided on the Discharge Plan:  Per Care Team   Patient/Family in Agreement with the Plan:  Yes    Referrals Placed by CM/SW:    Private pay costs discussed: Not applicable    Additional Information:  Chart reviewed.     CM updates:  Per rounds pt is needing HFNC today.     Therapy in the past recs home with OP CR, and possible home with home PT.       Patient was resting this AM, RNCM spoke to wife Mitra over the phone. She was open to home PT as well as OP CR, she is hoping for improvement with patient. She stated, \"he has been so weak I would not be surprised if they in the future suggest a higher level of care. \"      RNCM sent referral for home PT for now (pending).     Social Hx:  \"Pt is typically independent at baseline, does not use an  assistive device and does  not use oxygen. Wife reports patient has been more dizzy and has fallen at home. Recently completed radiation and chemotherapy. Wife works part time outside of the home. \"        Cm will continue to follow plan of care, review recommendations, and assist with any discharge needs anticipated.       Mitra Jones RN      "

## 2023-11-18 NOTE — PROGRESS NOTES
[facility]  [phone]          Assessment/Recommendations   Chart reviewed.  Patient with recent percutaneous intervention.  This was preceded by cardiac MRI for viability.  There was no viability in the anterior apical wall and there was severe disease in the left anterior descending coronary artery with a ejection fraction of 35%.  The inferior wall was severely hypokinetic as well.  Dr. Flores opened up a large obtuse marginal vessel on 11/14/2023..  Patient has had some shortness of breath since that time and been followed by our service.  Developed atrial fibrillation which resolved with loading with intravenous amiodarone but then hypotension later yesterday and through the night.  Patient transferred to the intensive care unit for pressor support and on low-dose Levophed and low-dose dobutamine.  ECG shows interventricular conduction delay with some ST depressions inferior laterally and poor R wave progression consistent with previous known akinetic anterior apical wall.  Troponins significantly elevated but no baseline prior to percutaneous intervention.  Patient also anemic and received blood prior to the percutaneous intervention and 1 unit of blood last evening for hemoglobin below 8.    Patient remains in sinus rhythm but has pulmonary vascular congestion with crackles a quarter the third of the way up bilaterally.  Jugular venous pressure is also elevated.  Systolic blood pressure is 88 this morning.  Patient feels mildly dyspneic but absolutely no chest discomfort.  Not hungry with mild abdominal discomfort.    We will check a echocardiogram, limited in nature to look at the lateral wall.  If the lateral wall has become hypokinetic, will discuss with our interventional colleagues about the possibility of repeat coronary angiography.  If the lateral wall continues to have this reasonable systolic motion, we will continue supportive care and attempt to diurese without causing  "hypotension.    Overall situation includes multiple issues and is concerning and discussed with the patient and clinical nurse at the bedside today.       Subjective   Please see notes above.  Chart reviewed.  Patient denies any chest discomfort.  Mild shortness of breath.  Not hungry.    ECG/Tele: Personally reviewed.       Physical Examination Review of Systems   BP (!) 84/58   Pulse 93   Temp 98.1  F (36.7  C) (Oral)   Resp (!) 31   Ht 1.727 m (5' 8\")   Wt 73.1 kg (161 lb 3.2 oz)   SpO2 95%   BMI 24.51 kg/m    Body mass index is 24.51 kg/m .  Wt Readings from Last 3 Encounters:   11/18/23 73.1 kg (161 lb 3.2 oz)   11/08/23 69.1 kg (152 lb 4.8 oz)   11/03/23 73.9 kg (163 lb)     General Appearance:   Alert, cooperative and in no acute distress.   ENT/Mouth: Pink/moist     EYES:  no scleral icterus, normal conjunctivae   Neck: JVP 12 cm.  Positive hepatojugular reflux. Thyroid not visualized.   Chest/Lungs:   Lungs crackles 1/4-1/3 up bilaterally.  Equal chest wall expansion.   Cardiovascular:   S1, S2 without murmur ,clicks or rubs. Brachial, radial pulses are intact and symetric.    Abdomen:  Nontender.    Extremities: No cyanosis, clubbing or edema.  Extremities warm and dry.   Skin: no xanthelasma, warm.    Neurologic: normal arm movement bilateral, no tremors     Psychiatric: Appropriate affect.      Enc Vitals  BP: (!) 84/58  Pulse: 93  Resp: (!) 31  Temp: 98.1  F (36.7  C)  Temp src: Oral  SpO2: 95 %  Weight: 73.1 kg (161 lb 3.2 oz)  Height: 172.7 cm (5' 8\")                                           Medical History  Surgical History Family History Social History   Past Medical History:   Diagnosis Date     Acute myocardial infarction, unspecified site, episode of care unspecified 01/01/1995     Arthritis      Cardiomyopathy (H)      Cerebral artery occlusion with cerebral infarction (H) 2018    lost vision in right eye     Claudication (H24)      COPD (chronic obstructive pulmonary disease) (H)      " Coronary artery disease      Depression      DEPRESSIVE DISORDER NEC 04/29/2008     Essential hypertension, benign      Gout      Gout attack 05/25/2017     Hepatitis C carrier (H)      HTN (hypertension)      Hyperlipidemia      Infectious viral hepatitis     carrier of viral hepatitis     Low back pain     chronic     MI (myocardial infarction) (H) 01/01/1995     Obesity      Other and unspecified hyperlipidemia      PAD (peripheral artery disease) (H24)      Peyronie's disease     Past Surgical History:   Procedure Laterality Date     ANGIOPLASTY  1995     ANKLE FRACTURE SURGERY Left      BYPASS GRAFT AORTOFEMORAL N/A 5/17/2017    Procedure: AORTOBIFEMORAL BYPASS ;  Surgeon: Ilir FERRO MD;  Location: [facility] OR;  Service:      CAROTID ENDARTERECTOMY Right 4/12/2018    Procedure: RIGHT CAROTID ENDARTERECTOMY WITH EEG;  Surgeon: Sergei Lemus MD;  Location: [facility] OR;  Service:      CV CORONARY ANGIOGRAM N/A 11/3/2023    Procedure: Coronary Angiogram;  Surgeon: Bran Flores MD;  Location: [facility] CATH LAB CV     CV CORONARY LITHOTRIPSY PCI N/A 11/14/2023    Procedure: Percutaneous Coronary Intervention - Lithotripsy;  Surgeon: Bran Flores MD;  Location: [facility] LAB CV     CV LEFT HEART CATH N/A 11/3/2023    Procedure: Left Heart Catheterization;  Surgeon: Bran Flores MD;  Location: [facility] CATH LAB CV     CV PCI N/A 11/14/2023    Procedure: Percutaneous Coronary Intervention;  Surgeon: Bran Flores MD;  Location: [facility] CATH LAB CV     CYSTOSCOPY, TRANSURETHRAL RESECTION (TUR) TUMOR BLADDER, COMBINED N/A 8/3/2023    Procedure: Transurethral resection of bladder tumor 2 cm to 5 cm;  Surgeon: German Mitchell MD;  Location: [facility] OR     FRACTURE SURGERY       IR AORTIC ARCH 4 VESSEL ANGIOGRAM  11/9/2009     IR CAROTID ANGIOGRAM  11/9/2009     IR CAROTID ANGIOGRAM  11/9/2009     IR CHEST PORT PLACEMENT > 5 YRS OF AGE  9/11/2023     IR EXTREMITY ANGIOGRAM  BILATERAL  4/6/2017     MANDIBLE SURGERY       PICC TRIPLE LUMEN PLACEMENT  11/17/2023     ZZC NONSPECIFIC PROCEDURE  1995    MI -- angioplasty    Family History   Problem Relation Age of Onset     Cancer Mother      Respiratory Father         d:age 59     Cerebrovascular Disease Father      Hypertension Father      Family History Negative Brother         b:1960     Heart Disease Paternal Grandmother      Emphysema Paternal Grandfather      Diabetes Other         paternal uncle     Glaucoma No family hx of      Macular Degeneration No family hx of     Social History     Socioeconomic History     Marital status:      Spouse name: Mitra     Number of children: 2     Years of education: 12     Highest education level: Not on file   Occupational History     Occupation:      Comment: [employer]   Tobacco Use     Smoking status: Former     Packs/day: 1.5     Types: Cigarettes     Quit date: 1/1/2013     Years since quitting: 10.8     Smokeless tobacco: Never   Substance and Sexual Activity     Alcohol use: No     Comment: Alcoholic Drinks/day: sober since 1984     Drug use: Yes     Types: Marijuana     Comment: marijuana daily     Sexual activity: Yes     Partners: Female   Other Topics Concern     Not on file   Social History Narrative     Not on file     Social Determinants of Health     Financial Resource Strain: Not on file   Food Insecurity: Not on file   Transportation Needs: Not on file   Physical Activity: Not on file   Stress: Not on file   Social Connections: Not on file   Interpersonal Safety: Not on file   Housing Stability: Not on file          Medications  Allergies   No current outpatient medications on file.    Allergies   Allergen Reactions     No Known Allergies          Lab Results    Chemistry/lipid CBC Cardiac Enzymes/BNP/TSH/INR   Lab Results   Component Value Date    CHOL 97 11/13/2023    HDL 49 11/13/2023    TRIG 72 11/13/2023    BUN 46.3 (H) 11/18/2023     11/18/2023     CO2 25 11/18/2023    Lab Results   Component Value Date    WBC 5.8 11/17/2023    HGB 8.2 (L) 11/18/2023    HCT 22.6 (L) 11/17/2023    MCV 95 11/17/2023    PLT 83 (L) 11/17/2023    Lab Results   Component Value Date    TROPONINI 0.03 08/20/2020    TSH 2.10 06/05/2023    INR 1.04 08/01/2023

## 2023-11-18 NOTE — PROGRESS NOTES
RESPIRATORY CARE NOTE  Patient was on HFNC 45L 60% due to SOB and is now on BIPAP due to increase WOB, S/T 10/5 R12 40%, BS crackles. Patient states he prefers to be on BIPAP than HFNC. RT following.    Ila Wiggins, RT

## 2023-11-18 NOTE — PROGRESS NOTES
Writer spoke with on call cardiologists, Dr. Jaswant Beck,  in regards to patient's elevated at 1707. Recommended restarting patient's dobutamine, and minimizing levophed.  At this time he would not recommend returning to cath lab as he is not having symptoms (chest pain/pressure) and ECG is unchanged.

## 2023-11-18 NOTE — PROGRESS NOTES
After finishing the second order of Albumin BP remained low house officer updated and will be discussing with Cardiology for further plan.  Mechelle LUND RN.

## 2023-11-18 NOTE — CONSULTS
Intensivist Consult  2023     Name: Young Ordonez  : 1950  MRN: [id number]  PCP: Jigar Crawford         ASSESSMENT/PLAN:  73 year old man with history of multivessel CAD & systolic HF, admitted on 2023 for a planned PCI. Transferred to the ICU  in cardiogenic shock.    Neuro/Psych:   #. MDD. Continue sertraline & trazodone.  #. Headache.   #. MSK pain attributed to his fall at home.  Tramadol for headache  OK for lower dose oxycodone once his SBP is > 90    Pulmonary:   #. Acute hypoxic respiratory failure, imaging consistent w/ pulmonary edema.   #. NSCLC, completed radiation & 6-wk chemotherapy course. Sees Dr. Crowe. Coarse lung sounds on the right are consistent w/ the site of radiation.   Goal SpO2 >/= 90%  Trial of BiPAP to help w/ work of breathing    Cardiovascular:   #. Cardiogenic shock.   #. Severe multivessel CAD s/p PCI .  #. Systolic heart failure.  #. Atrial fibrillation, new onset this hospitalization.  Start NE, goal MAP >/= 65 AND SBP >/= 90  Continue dobutamine at this time  Metoprolol on hold  Bumetanide on hold for now  Continue amiodarone  Continue ASA, atorvastatin, clopidogrel    Infectious: no acute issues. No focal radiographic findings to suggest PNA.    Renal: baseline Cr ~1,0.   #. FRANCY, likely mixed process (cardiorenal vs inadequate EAV for afferent flow). Worsened over the course of the day.   #. Hypomagnesemia.   Strict I/O, avoid nephrotoxins as able  Aggressive Mg repletion  K & Mg replacement protocols in place  Check urine labs    GI: no acute issues.     Heme:   #. Anemia, baseline Hgb 9-10.   #. Thrombocytopenia, presumed to be result of chemotherapy.  Transfusing 1 unit pRBC per Cardiology -- defer to their service on this issue    Endo: no acute issues.     Access/Lines/Tubes: PICC, PIV x2, L chest port, external vail catheter being exchanged for an indwelling one    Prophylaxis:   #. Stress ulcer: not currently indicated  #. Diet: low  "saturated fat, low Na, fluid restriction at 1.5 L  #. VTE: apixaban    CODE: FULL    Billing: This patient is critically ill: Yes. Total critical care time today 67 min, excluding procedures.     Mandy Resendez MD  Pulmonary and Critical Care     Clinically Significant Risk Factors        # Hypokalemia: Lowest K = 3.2 mmol/L in last 2 days, will replace as needed      # Anion Gap Metabolic Acidosis: Highest Anion Gap = 20 mmol/L in last 2 days, will monitor and treat as appropriate    # Thrombocytopenia: Lowest platelets = 83 in last 2 days, will monitor for bleeding  # Acute Kidney Injury, unspecified: based on a >150% or 0.3 mg/dL increase in last creatinine compared to past 90 day average, will monitor renal function  # Hypertension: Noted on problem list            # Financial/Environmental Concerns:                   HISTORY OF PRESENTING ILLNESS:  Young Ordonez is a 73 year old man with history of NSCLC on chemotherapy and radiation, hypertension, severe multivessel CAD not amenable to surgery, systolic heart failure, admitted on 11/14/2023 for planned PCI with Dr. Flores.  Based on chart review patient's post PCI course was complicated by A-fib with RVR, for which he was started on amiodarone and apixaban.  He is being diuresed with afterload reduction therapy on hold due to hypotension.  Was started on dobutamine 2.5 per on-call cardiologist and will get 1 unit of blood for hemoglobin of 7.6.  Patient was transferred to the ICU for persistent hypotension.    Mr. Ordonez reports feeling \"like crap\". He has a headache and pain in his RLQ that he attributes to his fall at home; he has not gotten anything for his headache & he is quite concerned about that. Reports chest pressure and some discomfort w/o overt pain. Reports feeling short of breath, more so when he is laying down (as he is now due to his hypotension). Denies lightheadedness. Confirms FULL code status. Comfortable w/ proposed plan for a vail " catheter.     REVIEW OF SYSTEMS: 12-point ROS was negative with exceptions as detailed in the HPI.    MEDICAL HISTORY:  has a past medical history of Acute myocardial infarction, unspecified site, episode of care unspecified (01/01/1995), Arthritis, Cardiomyopathy (H), Cerebral artery occlusion with cerebral infarction (H) (2018), Claudication (H24), COPD (chronic obstructive pulmonary disease) (H), Coronary artery disease, Depression, DEPRESSIVE DISORDER NEC (04/29/2008), Essential hypertension, benign, Gout, Gout attack (05/25/2017), Hepatitis C carrier (H), HTN (hypertension), Hyperlipidemia, Infectious viral hepatitis, Low back pain, MI (myocardial infarction) (H) (01/01/1995), Obesity, Other and unspecified hyperlipidemia, PAD (peripheral artery disease) (H24), and Peyronie's disease.    He has no past medical history of Chronic infection, History of blood transfusion, Malignant hyperthermia, or Sleep apnea.  SURGICAL HISTORY:  has a past surgical history that includes NONSPECIFIC PROCEDURE (1995); IR Carotid Angiogram (11/9/2009); IR Aortic Arch 4 Vessel Angiogram (11/9/2009); IR Carotid Angiogram (11/9/2009); IR Extremity Angiogram Bilateral (4/6/2017); Angioplasty (1995); Mandible surgery; Ankle Fracture Surgery (Left); fracture surgery; Bypass graft aortofemoral (N/A, 5/17/2017); carotid endarterectomy (Right, 4/12/2018); Cystoscopy, transurethral resection (TUR) tumor bladder, combined (N/A, 8/3/2023); IR Chest Port Placement > 5 Yrs of Age (9/11/2023); Coronary Angiogram (N/A, 11/3/2023); Left Heart Catheterization (N/A, 11/3/2023); Percutaneous Coronary Intervention (N/A, 11/14/2023); Percutaneous Coronary Intervention - Lithotripsy (N/A, 11/14/2023); and PICC/Midline Placement (11/17/2023).  SOCIAL HISTORY:  reports that he quit smoking about 10 years ago. His smoking use included cigarettes. He smoked an average of 1.5 packs per day. He has never used smokeless tobacco. He reports current drug use.  Drug: Marijuana. He reports that he does not drink alcohol.  FAMILY HISTORY: family history includes Cancer in his mother; Cerebrovascular Disease in his father; Diabetes in an other family member; Emphysema in his paternal grandfather; Family History Negative in his brother; Heart Disease in his paternal grandmother; Hypertension in his father; Respiratory in his father.  MEDICATIONS: personally reviewed, including EMR/Care Everywhere. Pertinent information noted & updated.     ALLERGIES:  Allergies   Allergen Reactions    No Known Allergies        PHYSICAL EXAM:  Temp:  [97.7  F (36.5  C)-99  F (37.2  C)] 98  F (36.7  C)  Pulse:  [] 89  Resp:  [18-28] 18  BP: ()/(48-72) 81/50  FiO2 (%):  [6 %] 6 %  SpO2:  [88 %-98 %] 94 %  FiO2 (%): 6 %  Resp: 18      Physical Exam:   General: lying in bed, NAD  HEENT: EOMI, PERRL, DMM  CV: RRR, no M/R/G; extremities adequately perfused  Pulm: bilateral breath sounds that are more coarse on the right, no wheezing, no rhonchi, I am unable to appreciate any crackles (exam limited d/t the need to keep patient supine)  Abd: soft, ND, NT, normal bowel sounds  Msk: warm to touch, no peripheral edema  Derm: no acute lesions/rashes on limited exam  Neuro: awake, alert, attentive; moves extremities w/o focal deficit  Psych: calm    LABS: I personally reviewed all available & pertinent laboratory studies w/in the EMR.    IMAGING/STUDIES: I personally reviewed all available & pertinent  imaging studies w/in the EMR.    This report was prepared using speech recognition software.  Any typographical errors are unintentional.  Please, contact me directly for any clarifications of my report.

## 2023-11-18 NOTE — PROCEDURES
"PICC Line Insertion Procedure Note  Pt. Name: Young Ordonez  MRN:        [id number]    Procedure: Insertion of a  Triple Lumen  5 fr  Bard SOLO (valved) Power PICC, Lot number [id number]    Indications:IV meds and blood infusion    Contraindications : left port in    Procedure Details     Patient identified with 2 identifiers and \"Time Out\" conducted.  .     Central line insertion bundle followed: hand hygiene performed prior to procedure, site cleansed with cholraprep, hat, mask, sterile gloves, sterile gown worn, patient draped with maximum barrier head to toe drape, sterile field maintained.    The vein was assessed and found to be compressible and of adequate size.     Lidocaine 1% 2 ml administered sq to the insertion site. A 5 Fr PICC was inserted into the basilic vein of the right arm with ultrasound guidance. 1 attempt(s) required to access vein.   Catheter threaded without difficulty. Good blood return noted.    Modified Seldinger Technique used for insertion.    The 8 sharps that are included in the PICC insertion kit were accounted for and disposed of in the sharps container prior to breakdown of the sterile field.    Catheter secured with Statlock, biopatch and Tegaderm dressing applied.    Findings:    Total catheter length  44 cm, with 0 cm exposed. Mid upper arm circumference is 26 cm. Catheter was flushed with 30 cc NS. Patient  tolerated procedure well.    Tip placement verified by 3CG . Tip placement in the SVC.    CLABSI prevention brochure left at bedside.    Patient's primary RN notified PICC is ready for use.      Comments:    Thanks       [facility]        "

## 2023-11-18 NOTE — PLAN OF CARE
Goal Outcome Evaluation:         Problem: Dysrhythmia  Goal: Normalized Cardiac Rhythm  Outcome: Progressing     Problem: Adult Inpatient Plan of Care  Goal: Optimal Comfort and Wellbeing  Outcome: Progressing  Intervention: Monitor Pain and Promote Comfort  Recent Flowsheet Documentation  Taken 11/17/2023 1945 by Bridgett Cleveland, RN  Pain Management Interventions: (not willing to try tylenol, bp too low for any opioids)   medication offered but refused   quiet environment facilitated   distraction   emotional support         Pt transferred to ICU around 2215. Report given to ICU nurse. R PICC line placed. Pt has amio running at 0.5mg/min, dobutamine running at 5.2ml/hr and blood products. Mag sulfate was just started for 0.2 lab result. K and Mg protocol. Primofit currently, but order for indwelling urinary catheter. Pt.'s bp's still running consistently low. R radial site bruised, CMS intact.

## 2023-11-18 NOTE — PROGRESS NOTES
Critical care progress note    Name: Young Ordonez  : 1950  MRN: [id number]  PCP: Jigar Crawford         ASSESSMENT/PLAN: This is a Young Ordonez is a 73 year old man with history of NSCLC on chemotherapy and radiation, hypertension, severe multivessel CAD not amenable to surgery, systolic heart failure, admitted on 2023 for planned PCI with Dr. Flores.  Patient's post PCI course was complicated by A-fib with RVR, for which he was started on amiodarone and apixaban.  Patient was started on dobutamine for cardiogenic shock.  He was transfused 1 unit of packed red blood cells for hemoglobin 7.6.  Echocardiogram from 2023 showed an EF of 30%.  There is severe septal, distal anterior, and apical hypokinesis.    Neuro/Psych:   #. MDD. Continue sertraline & trazodone.  #. Headache.   #. MSK pain attributed to his fall at home.  Tramadol for headache  OK for lower dose oxycodone once his SBP is > 90    Pulmonary:   #. Acute hypoxic respiratory failure, imaging consistent w/ pulmonary edema.   #. NSCLC, completed radiation & 6-wk chemotherapy course. Sees Dr. Crowe.   Goal SpO2 >/= 90%  Placed on HFNC this am. Change to BiPA    Cardiovascular:   #. Cardiogenic shock.   #. Severe multivessel CAD s/p PCI .  #. Systolic heart failure.  #. Atrial fibrillation, new onset this hospitalization.  On NE, goal MAP >/= 65 AND SBP >/= 90, Dobutamine ggt, Bumetanide 2 mg q8h.  Continue amiodarone  Continue ASA, atorvastatin, clopidogrel  HOLDING Metoprolol  Follow-up echocardiogram.  Follow-up repeat troponin and lactic acid    Renal: baseline Cr ~1,0.   #. FRANCY, likely mixed process (cardiorenal vs inadequate EAV for afferent flow).   #. Hypomagnesemia.   Strict I/O, avoid nephrotoxins as able  K & Mg replacement protocols in place  CRRT per nephrolology    GI: no acute issues.     Heme:   #. history of NSCLC on chemotherapy and radiation dg 2023  #. Anemia, baseline Hgb 9-10.   #. Thrombocytopenia,  "presumed to be result of chemotherapy.  S/p transfusion of 1 unit pRBC    Endo: no acute issues.     Access/Lines/Tubes: PICC, PIV x2, L chest port, external vail catheter being exchanged for an indwelling one    Prophylaxis:   #. Stress ulcer: PPI  #. Diet: low saturated fat, low Na, fluid restriction at 1.5 L  #. VTE: apixaban    CODE: FULL    Billing: This patient is critically ill: Yes. Total critical care time today 35 min, excluding procedures.     Jon Aquino MD  Pulmonary and Critical Care     Clinically Significant Risk Factors        # Hypokalemia: Lowest K = 3.2 mmol/L in last 2 days, will replace as needed     # Hypomagnesemia: Lowest Mg = 0.18 mg/dL in last 2 days, will replace as needed     # Thrombocytopenia: Lowest platelets = 83 in last 2 days, will monitor for bleeding    # Acute Kidney Injury, unspecified: based on a >150% or 0.3 mg/dL increase in last creatinine compared to past 90 day average, will monitor renal function  # Hypertension: Noted on problem list            # Financial/Environmental Concerns:                   HISTORY OF PRESENTING ILLNESS:  Young Ordonez is a 73 year old man with history of NSCLC on chemotherapy and radiation, hypertension, severe multivessel CAD not amenable to surgery, systolic heart failure, admitted on 11/14/2023 for planned PCI with Dr. Flores.  Based on chart review patient's post PCI course was complicated by A-fib with RVR, for which he was started on amiodarone and apixaban.  He is being diuresed with afterload reduction therapy on hold due to hypotension.  Was started on dobutamine 2.5 per on-call cardiologist and will get 1 unit of blood for hemoglobin of 7.6.  Patient was transferred to the ICU for persistent hypotension.    Mr. Ordonez reports feeling \"like crap\". He has a headache and pain in his RLQ that he attributes to his fall at home; he has not gotten anything for his headache & he is quite concerned about that. Reports chest pressure and " some discomfort w/o overt pain. Reports feeling short of breath, more so when he is laying down (as he is now due to his hypotension). Denies lightheadedness. Confirms FULL code status. Comfortable w/ proposed plan for a vail catheter.     REVIEW OF SYSTEMS: 12-point ROS was negative with exceptions as detailed in the HPI.    MEDICAL HISTORY:  has a past medical history of Acute myocardial infarction, unspecified site, episode of care unspecified (01/01/1995), Arthritis, Cardiomyopathy (H), Cerebral artery occlusion with cerebral infarction (H) (2018), Claudication (H24), COPD (chronic obstructive pulmonary disease) (H), Coronary artery disease, Depression, DEPRESSIVE DISORDER NEC (04/29/2008), Essential hypertension, benign, Gout, Gout attack (05/25/2017), Hepatitis C carrier (H), HTN (hypertension), Hyperlipidemia, Infectious viral hepatitis, Low back pain, MI (myocardial infarction) (H) (01/01/1995), Obesity, Other and unspecified hyperlipidemia, PAD (peripheral artery disease) (H24), and Peyronie's disease.    He has no past medical history of Chronic infection, History of blood transfusion, Malignant hyperthermia, or Sleep apnea.  SURGICAL HISTORY:  has a past surgical history that includes NONSPECIFIC PROCEDURE (1995); IR Carotid Angiogram (11/9/2009); IR Aortic Arch 4 Vessel Angiogram (11/9/2009); IR Carotid Angiogram (11/9/2009); IR Extremity Angiogram Bilateral (4/6/2017); Angioplasty (1995); Mandible surgery; Ankle Fracture Surgery (Left); fracture surgery; Bypass graft aortofemoral (N/A, 5/17/2017); carotid endarterectomy (Right, 4/12/2018); Cystoscopy, transurethral resection (TUR) tumor bladder, combined (N/A, 8/3/2023); IR Chest Port Placement > 5 Yrs of Age (9/11/2023); Coronary Angiogram (N/A, 11/3/2023); Left Heart Catheterization (N/A, 11/3/2023); Percutaneous Coronary Intervention (N/A, 11/14/2023); Percutaneous Coronary Intervention - Lithotripsy (N/A, 11/14/2023); and PICC/Midline Placement  (11/17/2023).  SOCIAL HISTORY:  reports that he quit smoking about 10 years ago. His smoking use included cigarettes. He smoked an average of 1.5 packs per day. He has never used smokeless tobacco. He reports current drug use. Drug: Marijuana. He reports that he does not drink alcohol.  FAMILY HISTORY: family history includes Cancer in his mother; Cerebrovascular Disease in his father; Diabetes in an other family member; Emphysema in his paternal grandfather; Family History Negative in his brother; Heart Disease in his paternal grandmother; Hypertension in his father; Respiratory in his father.  MEDICATIONS: personally reviewed, including EMR/Care Everywhere. Pertinent information noted & updated.     ALLERGIES:  Allergies   Allergen Reactions    No Known Allergies        PHYSICAL EXAM:  Temp:  [96.6  F (35.9  C)-99  F (37.2  C)] 98.1  F (36.7  C)  Pulse:  [] 102  Resp:  [18-49] 30  BP: ()/(48-72) 87/58  FiO2 (%):  [6 %-60 %] 60 %  SpO2:  [82 %-98 %] 95 %  FiO2 (%): 60 %  Resp: 30      Physical Exam:   General: lying in bed, NAD  HEENT: EOMI, PERRL, DMM  CV: RRR, no M/R/G; extremities adequately perfused  Pulm: bilateral breath sounds with crackles on exam  Abd: soft, ND, NT, normal bowel sounds  Msk: warm to touch, no peripheral edema  Derm: no acute lesions/rashes on limited exam  Neuro: awake, alert, attentive; moves extremities w/o focal deficit  Psych: Appropriate    LABS: I personally reviewed all available & pertinent laboratory studies w/in the EMR.    IMAGING/STUDIES: I personally reviewed all available & pertinent  imaging studies w/in the EMR.    This report was prepared using speech recognition software.  Any typographical errors are unintentional.  Please, contact me directly for any clarifications of my report.

## 2023-11-18 NOTE — SIGNIFICANT EVENT
Significant Event Note    Time of event: 8:09 PM November 17, 2023    Description of event:  Paged by nursing staff earlier in the evening due to hypotension.  Documentation delayed due to patient care.  Briefly, patient is admitted with heart failure exacerbation after PCI placement on 11/14.  He has been intermittently hypotensive in the last few days.  Cardiology is following.  He has been diuresing.    His blood pressure, however, has been persistently in the low 80s systolic now down into the 70s systolic throughout the day today.  Cardiology was made aware earlier in the day and felt that he still needed IV diuresis, however this was unable to be given as his systolic blood pressure was below 80.  He was felt to have worsening pulmonary edema due to increasing oxygen requirements, crackles in his lungs.  He was in atrial fibrillation for about 8 hours this morning which required IV amiodarone and this likely pushed him into cardiogenic shock.    I went to assess the patient.  He is seen sitting up in bed with 5 L nasal cannula in place, mentating well, able to verbalize pain in his back in the back of his head which she states is from falling in the bathroom and hitting his head on the toilet prior to this admission.  His extremities are warm.  He has diffuse crackles in his lungs, worse at the bases.  He appears pale.  He has no lower extremity edema.  His blood pressure at this point was 71/48 with pulse in the 70s and normal sinus rhythm.    Plan:    I spoke to the on-call cardiologist, Dr. Beck, who recommended starting a dobutamine drip as well as giving him a unit of blood for his hemoglobin of 7.6 that resulted earlier this evening.    I also spoke to the intensivist, Dr. Resendez, who agreed with the plan.  At this time there are no ICU beds available and so plan will be to place a PICC line, a Boswell catheter, start a dobutamine drip at 2.5 mcg/kg/hour, and transfuse a unit of blood.  Goal will be to  get his blood pressure up high enough to where he may tolerate some diuresis.  If he is unable to be managed safely on the floor, he will need to be transferred to the ICU.    Discussed with: bedside nurse and supervising physician, Dr. Partha Montoya MD    ADDENDUM 9:19 PM  Recheck patient.  Blood pressure 67/50 with a MAP of 55.  Manual blood pressure 74/50.  Dobutamine has been running for about an hour now.  PICC was placed and Boswell was placed.  Unit of blood has been ordered on is on the way.  Updated Dr. Resendez.  Resource nurse is being pulled to ICU and he will be transferred to the ICU at this time.

## 2023-11-19 NOTE — PROGRESS NOTES
[facility]    Progress Note - Hospitalist Service       Date of Admission:  11/14/2023  Assessment & Plan   Young Ordonez is a 73 year old male admitted on 11/14/2023. He has past medical history significant for non small cell lung ca on chemotherapy and radiation, severe multi-vessel CAD, ICM with EF 35%. He was initially admitted for planned cardiac catheterization with LORRAINE to Lcx on 11/14/23. His recovery complicated by respiratory distress requiring BiPAP, HFrEF exacerbation, intermittent hypotension and new onset atrial fibrillation with RVR.     11/17, patient was noted to be in Afib and was started on IV amiodarone. He was diuresing earlier for pulmonary edema per cardiology, but that was held due to soft BP. Hemoglobin was low 7.5 requiring blood transfusion. Patient went into cardiogenic shock likely 2/2 severe multivessel CAD and new onset Afib.  He was transferred to the ICU.    Patient is ICU primary: On multiple pressors, CRRT, BiPAP    Resident service will continue to follow along.  Please page the house officer when the patient is ready to step down to a medicine floor.    Colt Hanson MD, PGY3  [facility]   Pgr: [phone] or NWIX Jessica

## 2023-11-19 NOTE — PROGRESS NOTES
Critical care progress note    Name: Young Ordonez  : 1950  MRN: [id number]  PCP: Jigar Crawford         ASSESSMENT/PLAN: This is a Young Ordonez is a 73 year old man with history of NSCLC on chemotherapy and radiation, hypertension, severe multivessel CAD not amenable to surgery, systolic heart failure with reduced LV function, admitted on 2023 for planned PCI with Dr. Flores.  Patient's post PCI course was complicated by A-fib with RVR, for which he was started on amiodarone and apixaban.  Patient was started on dobutamine for cardiogenic shock.  He was transfused 1 unit of packed red blood cells for hemoglobin 7.6.  Echocardiogram from 2023 showed an EF of 30%.  There is severe septal, distal anterior, and apical hypokinesis.    Neuro/Psych:   #. MDD. Continue sertraline & trazodone.  #. Headache.   #. MSK pain attributed to his fall at home.  Tramadol for headache  OK for lower dose oxycodone once his SBP is > 90    Pulmonary:   #. Acute hypoxic respiratory failure, imaging consistent w/ pulmonary edema.   #. NSCLC, completed radiation & 6-wk chemotherapy course. Sees Dr. Crowe.   Goal SpO2 >/= 90%  On HFNC/BiPAP 40%.  Follow-up cxr in am    Cardiovascular:   #. Cardiogenic shock.   #. Severe multivessel CAD s/p PCI .  #. Systolic heart failure.  #. Atrial fibrillation, new onset this hospitalization.  On NE, Vasopressin, and Dobutamine. Will add IV steroids  Amiodarone, ASA, statin, clopidogrel  Follow-up echocardiogram --> EF 20-25%  Lactate and trop trending down.      Renal: baseline Cr ~1,0.   #. FRANCY, likely mixed process (cardiorenal vs inadequate EAV for afferent flow).   #. Hypomagnesemia.   Strict I/O, avoid nephrotoxins as able  K & Mg replacement protocols in place  Started on CRRT per nephrolology    GI: no acute issues.     Heme:   #. history of NSCLC on chemotherapy and radiation dg 2023  #. Anemia, baseline Hgb 9-10.   #. Thrombocytopenia, presumed to be result of  "chemotherapy.  S/p transfusion of 1 unit pRBC    Infectious disease:  #. Leukocytosis  #. Possible PNA  Empiric Zosyn  Check procal, viral swab, blood/sputum/urine cultures    Endo: no acute issues.     Access/Lines/Tubes: PICC, PIV x2, L chest port, external vail catheter being exchanged for an indwelling one    Prophylaxis:   #. Stress ulcer: PPI  #. Diet: NPO   #. VTE: apixaban    CODE: FULL    Billing: This patient is critically ill: Yes. Total critical care time today 40 min, excluding procedures.     Jon Aquino MD  Pulmonary and Critical Care     Clinically Significant Risk Factors        # Hypokalemia: Lowest K = 3.2 mmol/L in last 2 days, will replace as needed   # Hypocalcemia: Lowest Ca = 7.8 mg/dL in last 2 days, will monitor and replace as appropriate   # Hypomagnesemia: Lowest Mg = 0.18 mg/dL in last 2 days, will replace as needed     # Thrombocytopenia: Lowest platelets = 83 in last 2 days, will monitor for bleeding     # Hypertension: Noted on problem list  # Acute heart failure with reduced ejection fraction: last echo with EF <40% and receiving IV diuretics           # Financial/Environmental Concerns:                   HISTORY OF PRESENTING ILLNESS:  Young Ordonez is a 73 year old man with history of NSCLC on chemotherapy and radiation, hypertension, severe multivessel CAD not amenable to surgery, systolic heart failure, admitted on 11/14/2023 for planned PCI with Dr. Flores.  Based on chart review patient's post PCI course was complicated by A-fib with RVR, for which he was started on amiodarone and apixaban.  He is being diuresed with afterload reduction therapy on hold due to hypotension.  Was started on dobutamine 2.5 per on-call cardiologist and will get 1 unit of blood for hemoglobin of 7.6.  Patient was transferred to the ICU for persistent hypotension.    Mr. Ordonez reports feeling \"like crap\". He has a headache and pain in his RLQ that he attributes to his fall at home; he has not " gotten anything for his headache & he is quite concerned about that. Reports chest pressure and some discomfort w/o overt pain. Reports feeling short of breath, more so when he is laying down (as he is now due to his hypotension). Denies lightheadedness. Confirms FULL code status. Comfortable w/ proposed plan for a vail catheter.     REVIEW OF SYSTEMS: 12-point ROS was negative with exceptions as detailed in the HPI.    MEDICAL HISTORY:  has a past medical history of Acute myocardial infarction, unspecified site, episode of care unspecified (01/01/1995), Arthritis, Cardiomyopathy (H), Cerebral artery occlusion with cerebral infarction (H) (2018), Claudication (H24), COPD (chronic obstructive pulmonary disease) (H), Coronary artery disease, Depression, DEPRESSIVE DISORDER NEC (04/29/2008), Essential hypertension, benign, Gout, Gout attack (05/25/2017), Hepatitis C carrier (H), HTN (hypertension), Hyperlipidemia, Infectious viral hepatitis, Low back pain, MI (myocardial infarction) (H) (01/01/1995), Obesity, Other and unspecified hyperlipidemia, PAD (peripheral artery disease) (H24), and Peyronie's disease.    He has no past medical history of Chronic infection, History of blood transfusion, Malignant hyperthermia, or Sleep apnea.  SURGICAL HISTORY:  has a past surgical history that includes NONSPECIFIC PROCEDURE (1995); IR Carotid Angiogram (11/9/2009); IR Aortic Arch 4 Vessel Angiogram (11/9/2009); IR Carotid Angiogram (11/9/2009); IR Extremity Angiogram Bilateral (4/6/2017); Angioplasty (1995); Mandible surgery; Ankle Fracture Surgery (Left); fracture surgery; Bypass graft aortofemoral (N/A, 5/17/2017); carotid endarterectomy (Right, 4/12/2018); Cystoscopy, transurethral resection (TUR) tumor bladder, combined (N/A, 8/3/2023); IR Chest Port Placement > 5 Yrs of Age (9/11/2023); Coronary Angiogram (N/A, 11/3/2023); Left Heart Catheterization (N/A, 11/3/2023); Percutaneous Coronary Intervention (N/A, 11/14/2023);  Percutaneous Coronary Intervention - Lithotripsy (N/A, 11/14/2023); and PICC/Midline Placement (11/17/2023).  SOCIAL HISTORY:  reports that he quit smoking about 10 years ago. His smoking use included cigarettes. He smoked an average of 1.5 packs per day. He has never used smokeless tobacco. He reports current drug use. Drug: Marijuana. He reports that he does not drink alcohol.  FAMILY HISTORY: family history includes Cancer in his mother; Cerebrovascular Disease in his father; Diabetes in an other family member; Emphysema in his paternal grandfather; Family History Negative in his brother; Heart Disease in his paternal grandmother; Hypertension in his father; Respiratory in his father.  MEDICATIONS: personally reviewed, including EMR/Care Everywhere. Pertinent information noted & updated.     ALLERGIES:  Allergies   Allergen Reactions    No Known Allergies        PHYSICAL EXAM:  Temp:  [97  F (36.1  C)-98.6  F (37  C)] 98.6  F (37  C)  Pulse:  [] 91  Resp:  [17-75] 21  BP: ()/(50-67) 88/55  FiO2 (%):  [40 %-60 %] 40 %  SpO2:  [83 %-99 %] 97 %  FiO2 (%): 40 %  Resp: 21      Physical Exam:   General: lying in bed, NAD  HEENT: EOMI, PERRL, DMM  CV: RRR, no M/R/G; extremities adequately perfused  Pulm: bilateral breath sounds with crackles on exam  Abd: soft, ND, NT, normal bowel sounds  Msk: warm to touch, no peripheral edema  Derm: no acute lesions/rashes on limited exam  Neuro: awake, alert, attentive; moves extremities w/o focal deficit  Psych: Appropriate    LABS: I personally reviewed all available & pertinent laboratory studies w/in the EMR.    IMAGING/STUDIES: I personally reviewed all available & pertinent  imaging studies w/in the EMR.    This report was prepared using speech recognition software.  Any typographical errors are unintentional.  Please, contact me directly for any clarifications of my report.

## 2023-11-19 NOTE — PROGRESS NOTES
"[facility]  [phone]          Assessment/Recommendations   Patient with known severe reduction left ventricular systolic function, recent percutaneous intervention in large OM vessel which required multiple tools.  Atrial fibrillation post PCI with then developing hypotension and pulmonary vascular congestion.  Repeat echocardiogram showed stable left ventricular systolic function although significant global hypokinesis with the exception of the basilar lateral wall basilar septum and basilar inferior wall.    Labs are improved today with no elevation in lactic acid yesterday, creatinine improved, although continues to be anemic.  Troponin is dropping.  Is on continuous flow flow dialysis and tolerating well and they have not not taken off fluid but will start to take off some fluid today and see how his blood pressure tolerates it.  Blood pressure has been better.  Hopefully fluid removal will allow for less oxygen needs and can then try to wean off low-dose pressors.  Would continue low-dose dobutamine for inotropic support.    We will convert intravenous amiodarone to oral amiodarone today.    We will continue to follow with cautious optimism.       Subjective   Patient reports that breathing feels better with the CPAP mask on.  He had some chest discomfort yesterday but none today.  Not very hungry.  Denies any abdominal discomfort.    ECG/Tele: Personally reviewed.  Sinus rhythm 80 at the time of my visit.       Physical Examination Review of Systems   BP (!) 88/53   Pulse 81   Temp 98.6  F (37  C) (Axillary)   Resp 26   Ht 1.727 m (5' 8\")   Wt 74.3 kg (163 lb 12.8 oz)   SpO2 94%   BMI 24.91 kg/m    Body mass index is 24.91 kg/m .  Wt Readings from Last 3 Encounters:   11/19/23 74.3 kg (163 lb 12.8 oz)   11/08/23 69.1 kg (152 lb 4.8 oz)   11/03/23 73.9 kg (163 lb)     General Appearance:   Alert, cooperative and in no acute distress.   ENT/Mouth: Pink/moist     EYES:  no scleral " "icterus, normal conjunctivae   Neck: JVP cannot evaluate.  Evaluate hepatojugular reflux. Thyroid not visualized.   Chest/Lungs:   Lungs right anterior and lateral lungs with good air movement.  Crackles at the left lateral base.  Equal chest wall expansion.   Cardiovascular:   S1, S2 with 1/6 systolic murmur , no clicks or rubs.    Abdomen:  Nontender.   Extremities: No cyanosis, clubbing or edema.  Extremities warm and dry.   Skin: no xanthelasma, warm.    Neurologic: normal arm movement bilateral, no tremors     Psychiatric: Appropriate affect.      Enc Vitals  BP: (!) 88/53  Pulse: 81  Resp: 26  Temp: 98.6  F (37  C)  Temp src: Axillary  SpO2: 94 %  Weight: 74.3 kg (163 lb 12.8 oz)  Height: 172.7 cm (5' 8\")                                           Medical History  Surgical History Family History Social History   Past Medical History:   Diagnosis Date     Acute myocardial infarction, unspecified site, episode of care unspecified 01/01/1995     Arthritis      Cardiomyopathy (H)      Cerebral artery occlusion with cerebral infarction (H) 2018    lost vision in right eye     Claudication (H24)      COPD (chronic obstructive pulmonary disease) (H)      Coronary artery disease      Depression      DEPRESSIVE DISORDER NEC 04/29/2008     Essential hypertension, benign      Gout      Gout attack 05/25/2017     Hepatitis C carrier (H)      HTN (hypertension)      Hyperlipidemia      Infectious viral hepatitis     carrier of viral hepatitis     Low back pain     chronic     MI (myocardial infarction) (H) 01/01/1995     Obesity      Other and unspecified hyperlipidemia      PAD (peripheral artery disease) (H24)      Peyronie's disease     Past Surgical History:   Procedure Laterality Date     ANGIOPLASTY  1995     ANKLE FRACTURE SURGERY Left      BYPASS GRAFT AORTOFEMORAL N/A 5/17/2017    Procedure: AORTOBIFEMORAL BYPASS ;  Surgeon: Ilir FERRO MD;  Location: [facility];  Service:      CAROTID ENDARTERECTOMY " Right 4/12/2018    Procedure: RIGHT CAROTID ENDARTERECTOMY WITH EEG;  Surgeon: Sergei Lemus MD;  Location[facility];  Service:      CV CORONARY ANGIOGRAM N/A 11/3/2023    Procedure: Coronary Angiogram;  Surgeon: Bran Flores MD;  Location: [facility] CATH LAB CV     CV CORONARY LITHOTRIPSY PCI N/A 11/14/2023    Procedure: Percutaneous Coronary Intervention - Lithotripsy;  Surgeon: Bran Flores MD;  Location: [facility] LAB CV     CV LEFT HEART CATH N/A 11/3/2023    Procedure: Left Heart Catheterization;  Surgeon: Bran Flores MD;  Location: [facility] LAB CV     CV PCI N/A 11/14/2023    Procedure: Percutaneous Coronary Intervention;  Surgeon: Bran Flores MD;  Location: [facility] CV     CYSTOSCOPY, TRANSURETHRAL RESECTION (TUR) TUMOR BLADDER, COMBINED N/A 8/3/2023    Procedure: Transurethral resection of bladder tumor 2 cm to 5 cm;  Surgeon: German Mitchell MD;  Location: [facility] OR     FRACTURE SURGERY       IR AORTIC ARCH 4 VESSEL ANGIOGRAM  11/9/2009     IR CAROTID ANGIOGRAM  11/9/2009     IR CAROTID ANGIOGRAM  11/9/2009     IR CHEST PORT PLACEMENT > 5 YRS OF AGE  9/11/2023     IR EXTREMITY ANGIOGRAM BILATERAL  4/6/2017     MANDIBLE SURGERY       PICC TRIPLE LUMEN PLACEMENT  11/17/2023     ZZC NONSPECIFIC PROCEDURE  1995    MI -- angioplasty    Family History   Problem Relation Age of Onset     Cancer Mother      Respiratory Father         d:age 59     Cerebrovascular Disease Father      Hypertension Father      Family History Negative Brother         b:1960     Heart Disease Paternal Grandmother      Emphysema Paternal Grandfather      Diabetes Other         paternal uncle     Glaucoma No family hx of      Macular Degeneration No family hx of     Social History     Socioeconomic History     Marital status:      Spouse name: Mitra     Number of children: 2     Years of education: 12     Highest education level: Not on file   Occupational History     Occupation:       Comment: [employer]   Tobacco Use     Smoking status: Former     Packs/day: 1.5     Types: Cigarettes     Quit date: 1/1/2013     Years since quitting: 10.8     Smokeless tobacco: Never   Substance and Sexual Activity     Alcohol use: No     Comment: Alcoholic Drinks/day: sober since 1984     Drug use: Yes     Types: Marijuana     Comment: marijuana daily     Sexual activity: Yes     Partners: Female   Other Topics Concern     Not on file   Social History Narrative     Not on file     Social Determinants of Health     Financial Resource Strain: Not on file   Food Insecurity: Not on file   Transportation Needs: Not on file   Physical Activity: Not on file   Stress: Not on file   Social Connections: Not on file   Interpersonal Safety: Not on file   Housing Stability: Not on file          Medications  Allergies   No current outpatient medications on file.    Allergies   Allergen Reactions     No Known Allergies          Lab Results    Chemistry/lipid CBC Cardiac Enzymes/BNP/TSH/INR   Lab Results   Component Value Date    CHOL 97 11/13/2023    HDL 49 11/13/2023    TRIG 72 11/13/2023    BUN 29.7 (H) 11/19/2023     (L) 11/19/2023    CO2 20 (L) 11/19/2023    Lab Results   Component Value Date    WBC 14.2 (H) 11/19/2023    HGB 7.4 (L) 11/19/2023    HCT 22.2 (L) 11/19/2023    MCV 94 11/19/2023    PLT 87 (L) 11/19/2023    Lab Results   Component Value Date    TROPONINI 0.03 08/20/2020    TSH 2.10 06/05/2023    INR 1.04 08/01/2023

## 2023-11-19 NOTE — PROGRESS NOTES
RESPIRATORY CARE NOTE    Patient alternated between HFNC (45LPM/60%) and BiPAP S/T (R12 15/8). Maintaining Sp02 in low to mid 90s. Easily SOB. Markedly desaturates with any exertion. Awake, alert, and oriented.     Michael Richardson, RRT

## 2023-11-19 NOTE — PROGRESS NOTES
RENAL PROGRESS NOTE    CC:  Bran Flores MD    ASSESSMENT & PLAN:   Oliguric FRANCY in the setting of hypoperfusion: Likely from either hypoperfusion versus cardiorenal etiology.  With volume overload with hypotension and need clearance, CRRT was initiated yesterday.  He had net 0 UF removal yesterday and plan to remove net positive UF today and increase the UF rate as tolerated up to 200 mL per today as of now.  He is still making minimal amount of urine and will need to continue CRRT as of now.  No report of issue with mechanical and physiological with CRRT.  Continue CRRT the same dose today  Labs and replacement per protocol  Renal diet  Strict I's and O's  Avoid nephrotoxic medications  If clearance is no longer needed, we could consider a SCUF to do UF only procedure but with hypotension on vasopressor, it is expected needing of clearance with CRRT.    Electrolytes: Sodium 132, potassium 4.6.  Hyponatremia is also contributed by vasopressin.  -Monitor  -CRRT as above.    Acid-base: Bicarb 24  -CRRT as above.    BP/volume: Blood pressure soft with vasopressin, dobutamine and norepinephrine  -CRRT with UF up to 200 mL/h for now as tolerated.    Please call nephrology with additional question or concern.    SUBJECTIVE: Patient seen and examined at bedside.  Feeling better.  Net 0 UF overnight and plan to increase net negative today.      OBJECTIVE:  Physical Exam   Temp: 98.6  F (37  C) Temp src: Axillary BP: (!) 87/56 Pulse: 81   Resp: 18 SpO2: 95 % O2 Device: BiPAP/CPAP Oxygen Delivery: 45 LPM  Vitals:    11/17/23 0306 11/18/23 0400 11/19/23 0605   Weight: 69.9 kg (154 lb 1.6 oz) 73.1 kg (161 lb 3.2 oz) 74.3 kg (163 lb 12.8 oz)     Vital Signs with Ranges  Temp:  [97  F (36.1  C)-98.6  F (37  C)] 98.6  F (37  C)  Pulse:  [] 81  Resp:  [17-75] 18  BP: ()/(50-67) 87/56  FiO2 (%):  [40 %-60 %] 40 %  SpO2:  [83 %-100 %] 95 %  I/O last 3 completed shifts:  In: 3108.4 [P.O.:1010; I.V.:2098.4]  Out:  1891.4 [Urine:582; Other:1309.4]    @TMAXR(24)@    Patient Vitals for the past 72 hrs:   Weight   11/19/23 0605 74.3 kg (163 lb 12.8 oz)   11/18/23 0400 73.1 kg (161 lb 3.2 oz)   11/17/23 0306 69.9 kg (154 lb 1.6 oz)     Intake/Output Summary (Last 24 hours) at 11/19/2023 1249  Last data filed at 11/19/2023 1200  Gross per 24 hour   Intake 3420.8 ml   Output 2712.4 ml   Net 708.4 ml       PHYSICAL EXAM:  General - Alert and oriented x3, appears comfortable, NAD, on high flow NC. Eating breakfast.   Cardiovascular - Regular rate and rhythm, no rub  Respiratory - Clear to auscultation bilaterally, no crackles or wheezes  Abd: BS present, no guarding or pain with palpation, no ascites  Extremities - No lower extremity edema bilaterally  Skin: dry, intact, no rash, good turgor  Neuro:  Grossly intact, no focal deficits  MSK:  Grossly intact  Psych:  Normal affect    LABORATORY STUDIES:     Recent Labs   Lab 11/19/23  1158 11/19/23  0451 11/18/23  0012 11/17/23  1621 11/17/23  0651 11/15/23  0145 11/13/23  1312   WBC 13.4* 14.2*  --  5.8 7.0 5.6 3.6*   RBC 2.47* 2.36*  --  2.37* 2.86* 2.89* 2.65*   HGB 7.8* 7.4* 8.2* 7.6* 9.1* 9.1* 8.7*   HCT 23.3* 22.2*  --  22.6* 27.0* 28.1* 25.7*   PLT 90* 87*  --  83* 95* 101* 106*       Basic Metabolic Panel:  Recent Labs   Lab 11/19/23  0451 11/18/23  2111 11/18/23  1622 11/18/23  0941 11/18/23  0358 11/17/23  1621 11/17/23  0651 11/16/23  0535   * 135  --   --  135 136 136 138   POTASSIUM 4.2 3.5 3.9 3.3* 3.2* 3.9 3.2* 3.4   CHLORIDE 103 104  --   --  96* 98 97* 98   CO2 20* 19*  --   --  25 24 25 20*   BUN 29.7* 36.4*  --   --  46.3* 40.1* 35.7* 27.1*   CR 0.67 1.04  --   --  2.05* 1.67* 1.50* 1.36*   * 160*  --   --  165* 145* 126* 101*   KALIE 8.5* 7.8*  --   --  8.7* 9.4 8.7* 9.1       INRNo lab results found in last 7 days.     Recent Labs   Lab Test 11/19/23  1158 11/19/23  0451 09/18/23  0834 08/01/23  0740 06/05/23  1522 08/20/20  0557   INR  --   --   --  1.04   --  1.17*   WBC 13.4* 14.2*   < > 10.0   < > 13.9*   HGB 7.8* 7.4*   < > 11.8*   < > 13.7   PLT 90* 87*   < > 174   < > 195    < > = values in this interval not displayed.       Personally reviewed current labs      Kartik Altamirano MD  [facility]  [phone]

## 2023-11-19 NOTE — PROCEDURES
HD access catheter INSERTION PROCEDURE NOTE  (NON-OR)    Procedure Date: 11/18/2023   Performing Physician: Jon Aquino MD    Procedure:  Insertion of HD dialysis access catheter.  Right   INTERNAL  JUGULAR    Indications:   renal failure       Estimated Blood Loss:  MINIMAL  ml  Complications: NONE     Procedure Details:   Procedure was done as an emergency : No  The risks, benefits, complications, treatment options, and expected outcomes were discussed with PATIENT .  The risks and potential complications of their problem and purposed procedure include but are not limited to infection, bleeding, pain,  lung puncture, the need for additional procedures, creating a complication requiring transfusion or operation, and nerve and vessel injury.  The patient/alternate (see above) concurred with the proposed plan, giving informed consent.  The site of the procedure was properly noted/marked. The patient was identified as Young Ordonez with Date of Birth 1950 and the procedure verified as Insertion of Central Venous Catheter.  A Time Out was held and the above information confirmed.    Under sterile conditions the skin over the  Right   INTERNAL  JUGULAR     was prepped with Chlorhexidine and covered with a sterile drape.  Strict sterile conditions were maintained,  Cap, mask, and sterile gloves were worn by all participants.  5 ml of Lidocaine 1% local anesthetic was infiltrated into the skin and subcutaneous tissues.  A 22-gauge needle was used to identify the vein.  Using Sledinger technique an 18-gauge needle was then inserted into the vein.  A guide wire was then passed easily through the catheter.  There were no arrythmias    .  The catheter was then withdrawn.  A 12 [place] HD access catheter was then placed into the vessel over the guide wire.  All ports were flushed with sterile solution and had good non-pulsatile blood return.  The catheter was sutured into place and an occlusive sterile dressing  applied.  The patient tolerated the procedure well with no change in vital signs.     Number of attempts:  1     A chest x-ray was ordered.      Condition: stable      ________________________________________________________________________  Jon Aquino MD  11/19/20233:12 PM

## 2023-11-19 NOTE — PROGRESS NOTES
"BP (!) 77/60   Pulse 103   Temp 98.6  F (37  C) (Axillary)   Resp 24   Ht 1.727 m (5' 8\")   Wt 74.3 kg (163 lb 12.8 oz)   SpO2 95%   BMI 24.91 kg/m         Breath sounds decreased with few crackles. Pt on Bipap - under nose mask ( C).  S/T - 15/8- R12 FiO2 40%. Pt had no questions or c/o.       Jean Claude Mcneal, RT   "

## 2023-11-19 NOTE — PROGRESS NOTES
"Care Management Follow Up    Length of Stay (days): 5    Expected Discharge Date: 11/20/2023     Concerns to be Addressed: discharge planning   Patient plan of care discussed at interdisciplinary rounds: Yes    Anticipated Discharge Disposition: Home     Anticipated Discharge Services:    Education Provided on the Discharge Plan:  Per Care Team   Patient/Family in Agreement with the Plan:      Referrals Placed by CM/SW:    Private pay costs discussed: Not applicable    Additional Information:  Chart reviewed.    Cm updates:  Per rounds pt switching between HFNC and Bipap.  Pt eating bfast this AM. Plan for dialysis cath unclear if pt will need long term dialysis, and chest xray. On pressor support.     Neph and Cards following.     Social hx:  \"Pt is typically independent at baseline, does not use an  assistive device and does  not use oxygen. Wife reports patient has been more dizzy and has fallen at home. Recently completed radiation and chemotherapy. Wife works part time outside of the home. \"        Cm will continue to follow plan of care, review recommendations, and assist with any discharge needs anticipated.        Mitra Jones RN      "

## 2023-11-19 NOTE — PROGRESS NOTES
RESPIRATORY CARE NOTE    Patient has been on BIPAP all day per patient preference and SOB. Q4H rotation of masks (full face to nasal mask) S/T 15/8 RR12 40% BS clear/diminished. RT following.     Ila Wiggins, RT

## 2023-11-19 NOTE — PLAN OF CARE
[facility] - ICU    RN Progress Note:            Pertinent Assessments:      Please refer to flowsheet rows for full assessment     Patient explained that he just wanted lights off and to sleep at the start of my shift.  Cares have been clustered as much as possible, lights off entire shift.  Medicated with scheduled trazodone and 5mg oxycodone prn for pain management.  This morning states this is the best he's felt in days.             Key Events - This Shift:       CRRT initiated at 1900.  Levophed did increase from 0.18mcg to 0.23mcg, with the addition of vasopressin.  Net 0, not pulling any additional fluid.  Minimal urine output.  Electrolytes replaced via CRRT protocol.              Barriers to Discharge / Downgrade:     CRRT, vasoactive drips.          Point of Contact Update YES-OR-NO: No  If No, reason: Can be updated during wake hours.

## 2023-11-19 NOTE — PROGRESS NOTES
[facility]    Progress Note - Hospitalist Service       Date of Admission:  11/14/2023  Assessment & Plan   Young Ordonez is a 73 year old male admitted on 11/14/2023. He has past medical history significant for non small cell lung ca on chemotherapy and radiation, severe multi-vessel CAD, ICM with EF 35%. He was initially admitted for planned cardiac catheterization with LORRANIE to Lcx on 11/14/23. His recovery complicated by respiratory distress requiring BiPAP, HFrEF exacerbation, intermittent hypotension and new onset atrial fibrillation with RVR.     11/17, patient was noted to be in Afib and was started on IV amiodarone. He was diuresing earlier for pulmonary edema per cardiology, but that was held due to soft BP. Hemoglobin was low 7.5 requiring blood transfusion.    Patient went into cardiogenic shock likely 2/2 severe multivessel CAD and new onset Afib.     Patient is ICU primary on dobutamine.     Will continue to follow along.     DEREJE Merino  [facility] PGY2

## 2023-11-20 NOTE — SIGNIFICANT EVENT
Wife took belongings home.  No  home information at this time.  Wife will call [facility] Nursing Supervisor at [phone].     Shanon Rushing RN 2023 7:28 AM

## 2023-11-20 NOTE — PROGRESS NOTES
Intensivist update  11/20/2023     Bedside cardiac ultrasound performed, heart with poor contractility, and my gross estimation of his EF is around 10%.  No pericardial effusion.  IVC dilated and not demonstrating any respiratory variation.  Despite multiple attempts and multiple probes used, we are not able to get a good pleth or SPO2 on the monitor, however ABG demonstrated PO2 in the 200s and we were able to wean FiO2 to 70% and PEEP down to 14 (from 18). Phenylephrine weaning off at the advice of the [facility] staff.     Urgently called to the bedside due to recurrence of bradycardia and acutely dropping BP.  At this point patient has been critically ill but stable for close to an hour and we have been able to wean off phenylephrine, epinephrine, and dopamine.    At the time of my arrival patient's labs started to trickle in and he was found to be (unsurprisingly) quite acidotic  He was already administered 2 A of bicarb in divided doses and we saw significant improvement in his BP following these pushes  We rapidly pushed another 2 A of bicarb, however, patient's blood pressures continue to rapidly drop with concommitment drop in his heart rate  At this point we were back on maximal dosing of norepinephrine, vasopressin, phenylephrine, dopamine, epinephrine, and angiotensin II  Atropine was administered in setting of hemodynamically stable bradycardia to no avail  At this point we lost his pulse and CPR was started; he was already connected to defibrillator pads -- he was in PEA  We performed multiple rounds of CPR with PEA demonstrated on regular pulse and rhythm checks  We had a first period of ROSC with sinus bradycardia, and since patient was already maxed out on dopamine and epinephrine, we try to transcutaneously pace him -- he required around 200 mA to capture; unfortunately this perfusing rhythm was short and he subsequently lost his pulse again  We had a second period of ROSC with demonstration of narrow  tachycardia in the 120s; unfortunately this perfusing rhythm did not last and CPR was resumed  Patient received multiple rounds of epi, at least 6 total amps of bicarb, at least 2 total grams of calcium chloride, and at least 4 total g of magnesium sulfate  During a routine pulse check heart was visualized again with an ultrasound and no movement, including any valve movement could be demonstrated  Patient started to have copious pink frothy secretions ascending up the ETT, requiring frequent suctioning to allow for bagging    At this point I discussed the clinical situation with patient's wife and explained to her that Will has  despite our best attempts to revive him.  Mitra observed a round of CPR in the room and at this point we agreed that despite our best efforts we would not be able to bring Will back.  CPR was stopped with time of death at 04:45.    Emotional support provided to Mitra who is understandably very distraught.  She is very appreciative of all the efforts demonstrated by the care team.  I was able to get a hold of her daughter Lexis, who will be driving to the hospital to see the patient and support her mother.  Additional family members will be on their way as well.    Additional critical care time of 35 minutes spent at the bedside & coordinating care.    Mandy Resendez MD  Pulmonary and Critical Care

## 2023-11-20 NOTE — PROGRESS NOTES
I was called about the patient's cardiogenic shock.   The patient's medical records are reviewed.  Will has significant multiple vessel coronary artery disease with transmural myocardial infarction at mid LAD distribution.  He received PCI to LCX and OM3 which is dominant system.  He has significant ischemic cardiomyopathy LVEF 25-30%.  Post PCI, he developed atrial fibrillation with RVR and hypotension.  He is on CRRT per nephrology service. The patient's amiodarone was switched from iv to oral last night.  He developed atrial fibrillation with RVR.  His blood pressure is further dropped.  He needs more pressors.   At 2 am, the patient had a respiratory arrest and systolic pressure dropped to 63.  The patient had one round of compression and the patient is intubated.  The patient currently is on 6 pressors.  His nurse states the patient seams better response to angiotensin II infusion.  Critical team is inserting central artery line for monitor his blood pressure.  I discussed Extracorporeal membrane oxygenation (ECMO) with our interventional colleague Dr. Braga who reviewed the patient's medical records and thought that the patient is not a candidate for ECMO.  The patient's mean blood pressure is more than 65 mmHg and is able to take off two pressors epinephrine and dopamine infusion at this time. The critical team call the [facility] cardiology ICU team and the patient is accepted to [facility] for higher level care with more interventional support.

## 2023-11-20 NOTE — PROVIDER NOTIFICATION
Updated Dr. Resendez, Intensivist, regarding concerns of increased vasopressor requirements and tachycardia. Levophed is now at 0.36mcg/kg/min in addition to vasopressin 2.4 units/hr and dobutamine 2.5 mcg/kg/min.  CRRT running net 0.  Afib with rates 110-120.  Troponin had bumped up earlier in the day but is now trending down.  SOB/tachypneic per recent baseline for patient. VBG shows metabolic acidosis.  New orders for one amp sodium bicarb and albumin.     Venous Blood Gas  Recent Labs   Lab 11/19/23  2357 11/18/23  0358 11/17/23  1621   PHV 7.27* 7.37 7.35   PCO2V 40 43 46   PO2V 27 36 17*   HCO3V 18* 25 26   LACEY -8.7 -0.4 -0.1         Shanon Rushing RN 11/20/2023 12:24 AM

## 2023-11-20 NOTE — PROGRESS NOTES
Brief intensivist note  11/20/2023     Assessment: 73 year old man with history of multivessel CAD not amenable to bypass surgery and systolic heart failure, admitted on 11/14/2023 for planned PCI.  He was transferred to the ICU on 11/17 in cardiogenic shock.  Over the course of his ICU stay he was started on dobutamine with subsequent addition of norepinephrine and vasopressin.  He was started on CRRT 11/19.    Called to assess patient by RN due to an acute drop in blood pressure despite maximal norepinephrine and vasopressin support and CRRT running net even without pulling any fluid.    At this point we stopped dobutamine and added phenylephrine due to tachycardia into the 120s.  CRRT was stopped and fluid in the circuit was returned to the patient.  Since patient was known to be acidotic earlier in the night, we pushed bicarb.    Despite aforementioned measures patient's clinical status continued to deteriorate with progressive drop in his blood pressure, new bradycardia, and sudden onset of hypoxia.  Crash cart was brought into the room and pads applied to the patient due to concern of precipitous deterioration.  Shortly afterwards patient's heart rate descended into the 20s and a pulse could not be felt.  CPR was started immediately with return of the pulse after 2 rounds of compression.  Following ROSC patient remained hypotensive with minimal respiratory effort and oxygen saturation in the 70s.  ROBER MCLAUGHLIN was called and patient was intubated by CRNA.  At this point we continued to have very low oxygen saturation despite bagging with additional PEEP; patient was placed on ventilator and he was started on Veletri  Since patient was maxed out on norepinephrine, vasopressin, and phenylephrine, we started epinephrine, which was quickly uptitrated  Considering persistent low blood pressures and need for oral epinephrine pushes despite all the aforementioned pressors, added dopamine  Contacted on-call cardiologist  (Dr. Mckeon) for additional recommendations for further management, specifically question of any further advanced interventions in setting of refractory cardiogenic shock IABP versus Impella or consideration of ECMO  In short order had to add angiotensin II for persistent hypotension, which proved to be successful  At this point I placed an arterial line for appropriate monitoring (see separate procedure note)  We had a notable difference in readings from the BP cuff and arterial line, so we were able to start weaning off some of the vasopressor support with a goal of MAP >/= 65 & SBP >/= 90  At this point I called [facility] to see if patient was a candidate for ECMO.  Patient's clinical situation was kindly evaluated by Dr. Borrego, who agreed that even if patient may not be a candidate for ECMO in the emergent setting, he would be better served by care at an institution with higher level of care and a dedicated cardiac ICU  Patient's wife was updated on this development and is agreeable with the transfer, EMS called    Additional critical care time of 59 minutes spent at the bedside & coordinating care, separate from procedures that are documented elsewhere.    Mandy Resendez MD  Pulmonary and Critical Care

## 2023-11-20 NOTE — CODE DOCUMENTATION
Code blue called at 0412 as EMS arrived to transport patient to [facility]. Despite multiple resuscitation attempts, efforts unsuccessful. See code documentation report. Patient  at 0445.

## 2023-11-20 NOTE — SIGNIFICANT EVENT
CRRT stopped just before 0200 as patient was having a respiratory arrest.  Blood was successfully returned.     Shanon Rushing RN 11/20/2023 2:19 AM

## 2023-11-20 NOTE — PROCEDURES
Procedure Note  11/20/2023     Procedure: arterial line placement  Indications: shock    Informed consent: could not be obtained in a timely manner.  Time out: performed prior to the procedure to verify correct patient, site, & procedure    Equipment: all necessary equipment immediately available at bedside  Precautions: maximal sterile & barrier precautions maintained throughout the procedure    Patient was prepped and draped in the usual sterile manner using chlorhexidine scrub. 1% lidocaine was used to numb the region; 2 ml of lidocaine were used for local anesthesia. The  left femoral artery was palpated, & visualized using the vascular probe of the ultrasound. The left femoral artery was successfully cannulated with the finder needle, but despite presence of pulsatile blood, the wire could not be advanced despite multiple attempts (each time the wire was withdrawn, pulsatile blood could be seen). Subsequently switched to the right, and the  left femoral and right femoral artery was palpated, & visualized using the vascular probe of the ultrasound. The right femoral artery was successfully cannulated on the first attempt. Pulsatile, arterial blood was visualized, however the artery could only be threaded on the 2nd attempt due to difficulties passing the wire until patient was placed in using the Seldinger technique and a catheter was then sutured into place. Good wave-form was obtained. The patient tolerated the procedure well without any immediate complications. The area was cleaned and Tegaderm was applied.     Complications: no immediate complications  Estimated blood loss: 10 mL    Mandy Resendez MD  Pulmonary and Critical Care Medicine

## 2023-11-20 NOTE — PROGRESS NOTES
Report given to DAVID Marie at [facility] 4A.   EMS expected to arrive in 15-20 minutes.  Wife is at bedside and aware of planned transfer.

## 2023-11-20 NOTE — SIGNIFICANT EVENT
Wife Mitra updated up patients status, explained pt stopped breathing and was intubated.  We did one round of compressions but now have a BP and a pulse.  Asked wife to come to hospital if she is safely able to do so.  Made it clear that patient may not pull through this. Mitra lives in [place] and is on her way in.     Shanon Rushing RN 11/20/2023 2:20 AM

## 2023-11-20 NOTE — DISCHARGE SUMMARY
Death / Discharge Summary    Admit date: 11/14/2023  Patient YOB: 1950     Date and time of death: 11/20/2023 at 04:45    Reason for Admission: multivessel coronary artery disease requiring a planned coronary stenting     Hospital Summary:   Young Ordonez was admitted to [facility] on 11/14/2023 for a planned PCI in setting of his multivessel CAD. Following a successful procedure he developed atrial fibrillation requiring anticoagulation & rhythm control. He was being diuresed in setting of decompensated systolic heart failure & on 11/17 he was transferred to the ICU in setting of cardiogenic shock. His clinical status continued to deteriorate despite CRRT for volume management, inotropic, and vasopressor support. He suffered a hypoxic PEA arrest on 11/20 that required only 1 round of CPR, but resulted in profound refractory cardiogenic shock w/ likely worsened systolic heart failure. Mr. Ordonez was accepted as a transfer to [facility] Cardiac ICU for higher level of care & possible cardiac interventions, but he unfortunately had a repeat PEA arrest that he did not survive despite best efforts.    Events Leading to Death: cardiogenic shock w/ cardiac arrest & acute hypoxic respiratory failure    Consults: cardiology, pulmonary/intensive care, and nephrology  Significant Diagnostic Studies:   TTE 11/18:  The left ventricle is moderately dilated. There is normal left ventricular wall thickness. Diastolic Doppler findings (E/E' ratio and/or other parameters) suggest left ventricular filling pressures are increased. Severe global hypokesis with akinesis in anteroseptal, anterior and apical walls. No left ventricular thrombus formation. The visual ejection fraction is 20-25%. The right ventricle is normal size. Mildly decreased right ventricular systolic function The left atrium is moderately dilated. There is mild (1+) mitral regurgitation. IVC diameter >2.1 cm collapsing <50% with sniff suggests a  high RA pressure estimated at 15 mmHg or greater.    Treatments:   cardiac meds: dobutamine, amlodipine  Vasopressors: Epinephrine, norepinephrine, dopamine, phenylephrine, angiotensin II  anticoagulation: Apixaban,   steroids: solu-medrol,   respiratory therapy: O2 and mechanical ventilation, Flolan,  dialysis: CRRT for volume management    Autopsy:  Not performed     Principal Diagnosis:  Percutaneous transluminal coronary angioplasty status     Other Diagnoses: Principal Problem:    Percutaneous transluminal coronary angioplasty status  Active Problems:    Stage 3 chronic kidney disease (H)    Primary malignant neoplasm of right lower lobe of lung (H)    Acute kidney injury superimposed on CKD     S/P drug eluting coronary stent placement      Admitting Physician: Bran Flores MD     Primary Care Physician: Jigar Crawford    Discharge Physician: Mandy Resendez MD

## 2023-11-20 NOTE — SIGNIFICANT EVENT
Patient is increasingly more tachycardic, -130 atrial fibrillation.  He converted to afib early this afternoon after switching from IV amiodarone to PO dosing.  He is slightly SOB but this is not a new finding, has been feeling this way for a few days.  Slightly more hypotensive, will stop pulling fluid from CRRT for a couple hours to see if pressures rebound some.  Current drips: Levophed at 0.3mcg/kg/min, Vasopressin 2.4units/hr, and dobutamine 2.5mcg/kg/min.     Switched patient from HFNC 45L/80% to bipap 15/8 35% at this time.     Will continue to monitor closely.    Vitals:    11/19/23 2150 11/19/23 2155 11/19/23 2200 11/19/23 2205   BP: (!) 76/58 (!) 88/64 (!) 78/51 (!) 77/56   BP Location:       Cuff Size:       Pulse: 116 113 119 112   Resp: 28 28 30 28   Temp:       TempSrc:       SpO2: 98% 96% 95% 91%   Weight:       Height:            Shanon Rushing RN 11/19/2023 10:13 PM

## 2023-11-20 NOTE — PROGRESS NOTES
Patient is intubated due to respiratory arrest and hypotensive. ETT # 8.0 secured at 24 cm at the teeth. Current settings: AC 20 400 60% 14. Sats 100%. PIP 19 cmH2O, Pplat 15 cmH2O. RT following.    Roman Davis, RT

## 2023-11-20 NOTE — PROGRESS NOTES
[facility]: Cancer Care                                                                                          Patient . Removing self from RNCC.    Signature:  Rosalva Chavez RN

## 2023-11-20 NOTE — CODE DOCUMENTATION
Respiratory arrest called at 0159. At that time patient had a pulse and was in A fib. At 0200 patient lost pulse- PEA arrest. Epi given x2. Calcium given x2. Sodium bicarb given x1. CPR initiated. Patient intubated at 0205 by CRNA. ROSC achieved at 0205.

## 2023-11-20 NOTE — PROGRESS NOTES
Called in for death, emotional support for spouse.    This was an unexpected death. Mitra, wife, in shock, unable to cry. There is not a particular spirituality that brings questions or comfort.     Visited 30 minutes talking about their 40 years together and other unrelated topics. Mitra benefited from companionship as she waited for a daughter to arrive. Daughter arrived. I provided support and an informal blessing for Will.    JHONATAN Howard.

## 2023-11-20 NOTE — PROGRESS NOTES
At the bedside by nursing staff to pronounce the patient's death.    Examination:  Pupils fixed and dilated, no corneal reflex, no gag reflex, no spontaneous respirations on auscultation, no audible heart sounds, no peripheral pulses, no response to painful stimuli.    Time of death: 04:45  Date of death: 11/20/2023    Mandy Resendez MD  Pulmonary and Critical Care

## 2024-01-24 NOTE — TELEPHONE ENCOUNTER
I made an appt for Thursday-    Walter Wilkerson in for a follow-up for 17 year(s) post transplant, waitlist maintenance, and 1 month follow up visit. Walter Wilkerson is currently a status 4 on the transplant list. Patient is consented for hepatitis C donor. No DCD donor due to need for kidney transplant and redo heart transplant.  Medications reviewed and teaching completed. Weight does not need to be updated in UNOS, stable. Values verified. 6 month quiz due March 2024 and yearly consent form completed on: 9/11/23. 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 Patient presentation and most recent labs/cardiac testing reviewed with MD. Per MD, Walter Wilkerson is to start amlodipine 2.5 mg daily (due to hypertension) and complete NM stress test ASAP (to follow up on chest pain complaints).  Will follow drug screen and PETH test from today. Unable to obtain tacrolimus level today because patient took his morning medications prior to the blood draw. Per MD, okay to complete with next clinic visit.  Patient to RTC in 1 month(s) for clinic visit, pharmacist visit (patient wasn't entirely sure about his medications during review today), and labs.

## 2024-07-25 NOTE — PATIENT INSTRUCTIONS
Agree with Dr. Young  Sounds like UTI, treatment is antibiotics  Try that first for burning without vaginal cream  Thanks    Dr. Cha   Follow-up in 3 months for a cystoscopy

## (undated) DEVICE — LINEN HALF SHEET 5512

## (undated) DEVICE — CUSTOM PACK CORONARY SAN5BCRHEA

## (undated) DEVICE — SHTH INTRO 0.021IN ID 6FR DIA

## (undated) DEVICE — MANIFOLD KIT ANGIO AUTOMATED 014613

## (undated) DEVICE — DEVICE INFLATION SYR W/ HEMOSTASIS VALVE 12IN EXT IN4904

## (undated) DEVICE — PREP SCRUB CARE CHLOROXYLENOL (PCMX) 4OZ 29902-004

## (undated) DEVICE — SYR ANGIOGRAPHY MULTIUSE KIT ACIST 014612

## (undated) DEVICE — GLOVE BIOGEL PI ULTRATOUCH SZ 7.0 41170

## (undated) DEVICE — GLOVE BIOGEL PI MICRO INDICATOR UNDERGLOVE SZ 7.0 48970

## (undated) DEVICE — HEMOSTAT COMPRESSION VASC REGULAR OD24 CM 3524

## (undated) DEVICE — CATH BALLOON NC EMERGE 2.25X15MM H7493926715220

## (undated) DEVICE — PREP SCRUB SOL EXIDINE 4% CHG 4OZ 29002-404

## (undated) DEVICE — LINEN FULL SHEET 5511

## (undated) DEVICE — EXCHANGE WIRE .035 260 STAR/JFC/035/260/ M001491681

## (undated) DEVICE — ESU ELEC LOOP ENDOSCOPIC PK 5MMX33CM OLYMPUS SGL WA90300A

## (undated) DEVICE — CATH DIAGNOSTIC RADIAL 5FR TIG 4.0

## (undated) DEVICE — CATH BALLOON EMERGE 2.0X15MM H7493918915200

## (undated) DEVICE — KIT HAND CONTROL ACIST 014644 AR-P54

## (undated) DEVICE — CATH LAUNCHER 6FR EBU 4.0 LA6EBU40

## (undated) DEVICE — CATH TRAY FOLEY SURESTEP 16FR DRAIN BAG STATOCK A899916

## (undated) DEVICE — PACK CYSTO CUSTOM RIDGES

## (undated) DEVICE — WIRE GUIDE HI-TRQ  WHISPER MS JTIP 0.014"X190CM 1005357HJ

## (undated) DEVICE — ESU GROUND PAD ADULT W/CORD E7507

## (undated) DEVICE — SOL WATER IRRIG 1000ML BOTTLE 2F7114

## (undated) DEVICE — CATH URETERAL FLEX TIP TIGERTAIL 06FRX70CM 139006

## (undated) DEVICE — CATH GUIDING GUIDELINER JR3.5 ST CURVE OD6 FR COAST 5271

## (undated) DEVICE — BAG CLEAR TRASH 1.3M 39X33" P4040C

## (undated) DEVICE — EVACUATOR BLADDER UROVAC LATEX M0067301250

## (undated) DEVICE — ELECTRODE DEFIB CADENCE 22550R

## (undated) DEVICE — CATH BALLOON SHOCKWAVE CV2+ IVL CATH 2.5X12MM C2PIVL2512

## (undated) DEVICE — CATH MICROCATH 1.9FRX135CML ASAHI CARAVEL CRV135-19P

## (undated) DEVICE — TUBING IRRIG TUR Y TYPE 96" LF 6543-01

## (undated) DEVICE — CATH BALLOON NC EMERGE 2.25X12MM H7493926712220

## (undated) DEVICE — CATH BALLOON NC EMERGE 2.50X12MM H7493926712250

## (undated) DEVICE — SOL NACL 0.9% IRRIG 3000ML BAG 2B7477

## (undated) DEVICE — CUTTING BALLOON WOLVERINE 2.5X10MM

## (undated) RX ORDER — EPHEDRINE SULFATE 50 MG/ML
INJECTION, SOLUTION INTRAMUSCULAR; INTRAVENOUS; SUBCUTANEOUS
Status: DISPENSED
Start: 2023-01-01

## (undated) RX ORDER — DEXAMETHASONE SODIUM PHOSPHATE 4 MG/ML
INJECTION, SOLUTION INTRA-ARTICULAR; INTRALESIONAL; INTRAMUSCULAR; INTRAVENOUS; SOFT TISSUE
Status: DISPENSED
Start: 2023-01-01

## (undated) RX ORDER — PROPOFOL 10 MG/ML
INJECTION, EMULSION INTRAVENOUS
Status: DISPENSED
Start: 2023-01-01

## (undated) RX ORDER — FENTANYL CITRATE 50 UG/ML
INJECTION, SOLUTION INTRAMUSCULAR; INTRAVENOUS
Status: DISPENSED
Start: 2023-01-01

## (undated) RX ORDER — NEOSTIGMINE METHYLSULFATE 1 MG/ML
VIAL (ML) INJECTION
Status: DISPENSED
Start: 2023-01-01

## (undated) RX ORDER — ONDANSETRON 2 MG/ML
INJECTION INTRAMUSCULAR; INTRAVENOUS
Status: DISPENSED
Start: 2023-01-01

## (undated) RX ORDER — HEPARIN SODIUM 200 [USP'U]/100ML
INJECTION, SOLUTION INTRAVENOUS
Status: DISPENSED
Start: 2023-01-01

## (undated) RX ORDER — FUROSEMIDE 10 MG/ML
INJECTION INTRAMUSCULAR; INTRAVENOUS
Status: DISPENSED
Start: 2023-01-01

## (undated) RX ORDER — MORPHINE SULFATE 2 MG/ML
INJECTION, SOLUTION INTRAMUSCULAR; INTRAVENOUS
Status: DISPENSED
Start: 2023-01-01

## (undated) RX ORDER — LIDOCAINE HYDROCHLORIDE 10 MG/ML
INJECTION, SOLUTION EPIDURAL; INFILTRATION; INTRACAUDAL; PERINEURAL
Status: DISPENSED
Start: 2023-01-01

## (undated) RX ORDER — CEFAZOLIN SODIUM/WATER 2 G/20 ML
SYRINGE (ML) INTRAVENOUS
Status: DISPENSED
Start: 2023-01-01

## (undated) RX ORDER — GLYCOPYRROLATE 0.2 MG/ML
INJECTION INTRAMUSCULAR; INTRAVENOUS
Status: DISPENSED
Start: 2023-01-01

## (undated) RX ORDER — FENTANYL CITRATE-0.9 % NACL/PF 10 MCG/ML
PLASTIC BAG, INJECTION (ML) INTRAVENOUS
Status: DISPENSED
Start: 2023-01-01